# Patient Record
Sex: MALE | Employment: OTHER | URBAN - METROPOLITAN AREA
[De-identification: names, ages, dates, MRNs, and addresses within clinical notes are randomized per-mention and may not be internally consistent; named-entity substitution may affect disease eponyms.]

---

## 2017-01-02 ENCOUNTER — ANESTHESIA EVENT (OUTPATIENT)
Dept: ENDOSCOPY | Age: 82
End: 2017-01-02
Payer: MEDICARE

## 2017-01-03 ENCOUNTER — SURGERY (OUTPATIENT)
Age: 82
End: 2017-01-03

## 2017-01-03 ENCOUNTER — ANESTHESIA (OUTPATIENT)
Dept: ENDOSCOPY | Age: 82
End: 2017-01-03
Payer: MEDICARE

## 2017-01-03 PROBLEM — Z12.11 ENCOUNTER FOR COLONOSCOPY DUE TO HISTORY OF ADENOMATOUS COLONIC POLYPS: Status: ACTIVE | Noted: 2017-01-03

## 2017-01-03 PROBLEM — Z86.010 ENCOUNTER FOR COLONOSCOPY DUE TO HISTORY OF ADENOMATOUS COLONIC POLYPS: Status: ACTIVE | Noted: 2017-01-03

## 2017-01-03 PROCEDURE — 74011000250 HC RX REV CODE- 250

## 2017-01-03 PROCEDURE — 74011250636 HC RX REV CODE- 250/636

## 2017-01-03 RX ORDER — PROPOFOL 10 MG/ML
INJECTION, EMULSION INTRAVENOUS AS NEEDED
Status: DISCONTINUED | OUTPATIENT
Start: 2017-01-03 | End: 2017-01-03 | Stop reason: HOSPADM

## 2017-01-03 RX ORDER — FENTANYL CITRATE 50 UG/ML
INJECTION, SOLUTION INTRAMUSCULAR; INTRAVENOUS AS NEEDED
Status: DISCONTINUED | OUTPATIENT
Start: 2017-01-03 | End: 2017-01-03 | Stop reason: HOSPADM

## 2017-01-03 RX ORDER — LIDOCAINE HYDROCHLORIDE 20 MG/ML
INJECTION, SOLUTION EPIDURAL; INFILTRATION; INTRACAUDAL; PERINEURAL AS NEEDED
Status: DISCONTINUED | OUTPATIENT
Start: 2017-01-03 | End: 2017-01-03 | Stop reason: HOSPADM

## 2017-01-03 RX ADMIN — PROPOFOL 30 MG: 10 INJECTION, EMULSION INTRAVENOUS at 11:43

## 2017-01-03 RX ADMIN — FENTANYL CITRATE 50 MCG: 50 INJECTION, SOLUTION INTRAMUSCULAR; INTRAVENOUS at 11:29

## 2017-01-03 RX ADMIN — LIDOCAINE HYDROCHLORIDE 40 MG: 20 INJECTION, SOLUTION EPIDURAL; INFILTRATION; INTRACAUDAL; PERINEURAL at 11:29

## 2017-01-03 RX ADMIN — PROPOFOL 20 MG: 10 INJECTION, EMULSION INTRAVENOUS at 11:47

## 2017-01-03 RX ADMIN — FENTANYL CITRATE 50 MCG: 50 INJECTION, SOLUTION INTRAMUSCULAR; INTRAVENOUS at 11:35

## 2017-01-03 RX ADMIN — PROPOFOL 30 MG: 10 INJECTION, EMULSION INTRAVENOUS at 11:35

## 2017-01-03 RX ADMIN — PROPOFOL 70 MG: 10 INJECTION, EMULSION INTRAVENOUS at 11:29

## 2017-01-03 NOTE — ANESTHESIA PREPROCEDURE EVALUATION
Anesthetic History   No history of anesthetic complications            Review of Systems / Medical History  Patient summary reviewed and pertinent labs reviewed    Pulmonary                Comments: FLU SHOT received? YES       If NO, provide reason: Pt did not want a Flu shot    Current Smoker? NO       Elective Surgery? Yes       Abstained from smoking 24 hours prior to anesthesia? N/A    Risk Factors for Postoperative nausea/vomiting:       History of postoperative nausea/vomiting? NO       Female? NO       Motion sickness? NO       Intended opioid administration for postoperative analgesia?   NO   Neuro/Psych   Within defined limits           Cardiovascular    Hypertension        Dysrhythmias : atrial fibrillation           GI/Hepatic/Renal     GERD           Endo/Other      Hypothyroidism  Arthritis     Other Findings              Physical Exam    Airway  Mallampati: III  TM Distance: 4 - 6 cm  Neck ROM: normal range of motion   Mouth opening: Normal     Cardiovascular      Rate: normal         Dental    Dentition: Poor dentition     Pulmonary      Decreased breath sounds: bilateral           Abdominal  GI exam deferred       Other Findings            Anesthetic Plan    ASA: 3  Anesthesia type: MAC            Anesthetic plan and risks discussed with: Patient

## 2017-01-03 NOTE — ANESTHESIA POSTPROCEDURE EVALUATION
Post-Anesthesia Evaluation & Assessment    Visit Vitals    /70    Pulse 88    Temp 35.7 °C (96.3 °F)    Resp 12    Ht 5' 8\" (1.727 m)    Wt 86.2 kg (190 lb)    SpO2 97%    BMI 28.89 kg/m2       Nausea/Vomiting: no nausea    Post-operative hydration adequate.     Pain score (VAS): 0    Mental status & Level of consciousness: alert and oriented x 3    Neurological status: moves all extremities, sensation grossly intact    Pulmonary status: airway patent, no supplemental oxygen required    Complications related to anesthesia: none    Additional comments:

## 2017-01-19 ENCOUNTER — HOSPITAL ENCOUNTER (OUTPATIENT)
Dept: GENERAL RADIOLOGY | Age: 82
Discharge: HOME | End: 2017-01-19
Payer: MEDICARE

## 2017-01-19 ENCOUNTER — HOSPITAL ENCOUNTER (OUTPATIENT)
Dept: WOUND CARE | Age: 82
Discharge: HOME | End: 2017-01-19
Payer: MEDICARE

## 2017-01-19 VITALS
DIASTOLIC BLOOD PRESSURE: 81 MMHG | SYSTOLIC BLOOD PRESSURE: 139 MMHG | OXYGEN SATURATION: 98 % | RESPIRATION RATE: 18 BRPM | TEMPERATURE: 97 F | HEART RATE: 86 BPM

## 2017-01-19 DIAGNOSIS — R07.9 CHEST PAIN: ICD-10-CM

## 2017-01-19 PROBLEM — L97.222 NON-PRESSURE CHRONIC ULCER OF LEFT CALF WITH FAT LAYER EXPOSED (HCC): Status: ACTIVE | Noted: 2017-01-19

## 2017-01-19 PROBLEM — S81.811A LACERATION OF RIGHT LOWER LEG WITH COMPLICATION: Status: ACTIVE | Noted: 2017-01-19

## 2017-01-19 PROBLEM — R60.9 EDEMA: Status: ACTIVE | Noted: 2017-01-19

## 2017-01-19 PROBLEM — L97.212 NON-PRESSURE CHRONIC ULCER OF RIGHT CALF WITH FAT LAYER EXPOSED (HCC): Status: ACTIVE | Noted: 2017-01-19

## 2017-01-19 PROCEDURE — 11042 DBRDMT SUBQ TIS 1ST 20SQCM/<: CPT

## 2017-01-19 PROCEDURE — 74011000250 HC RX REV CODE- 250

## 2017-01-19 PROCEDURE — 74011250637 HC RX REV CODE- 250/637

## 2017-01-19 RX ORDER — LIDOCAINE AND PRILOCAINE 25; 25 MG/G; MG/G
CREAM TOPICAL
Status: COMPLETED
Start: 2017-01-19 | End: 2017-01-19

## 2017-01-19 RX ADMIN — COLLAGENASE SANTYL: 250 OINTMENT TOPICAL at 15:11

## 2017-01-19 RX ADMIN — LIDOCAINE AND PRILOCAINE: 25; 25 CREAM TOPICAL at 14:41

## 2017-01-19 NOTE — PROGRESS NOTES
Podiatry Consultation Note    Assessment:       Patient Active Problem List   Diagnosis Code    Malignant neoplasm of prostate ([facility] [address] C61    Hypertension I10    GERD (gastroesophageal reflux disease) K21.9    Glaucoma H40.9    Glaucoma H40.9    Dysphagia R13.10    Encounter for colonoscopy due to history of adenomatous colonic polyps Z12.11, Z86.010    Non-pressure chronic ulcer of right calf with fat layer exposed ([facility] [address] L97.212    Laceration of right lower leg with complication L89.273P    Edema R60.9       Plan:     Selective excisional debridement and LWC with Rx Santyl ointment Q 24 hrs with mepilex and Tubigrip. Elevate feet and legs when sitting and lying. Subjective:     I was asked by Dr. Shy Tyler to evaluate Cecelia Rahman for laceration RLE. Patient states a car door hit his right leg causing a wound. He was seen at [facility] and was placed on Clindamycin and Doxycycline for 10 days, started 12/15/16. The wound was primarily closed with sutures removed 12/15/16. He  is a 80 y.o. male admitted on 1/19/2017 for WOUND. Allergies   Allergen Reactions    Darvon [Propoxyphene] Hives, Myalgia and Other (comments)     Joint pain    Penicillins Rash and Itching       Current Outpatient Prescriptions   Medication Sig    brimonidine (ALPHAGAN P) 0.1 % ophthalmic solution Administer 1 Drop to both eyes two (2) times a day.  gabapentin (NEURONTIN) 600 mg tablet Take 600 mg by mouth three (3) times daily.  levothyroxine (SYNTHROID) 175 mcg tablet Take 175 mcg by mouth Daily (before breakfast).  furosemide (LASIX) 20 mg tablet Take  by mouth daily.  apixaban (ELIQUIS) 2.5 mg tablet Take 2.5 mg by mouth two (2) times a day.  traMADol (ULTRAM) 50 mg tablet Take 1 tablet by mouth every six (6) hours as needed for Pain.  bimatoprost (LUMIGAN) 0.03 % ophthalmic drops Administer 1 drop to both eyes every evening.     multivitamins-minerals-lutein (CENTRUM SILVER) Tab Take  by mouth.      traZODone (DESYREL) 50 mg tablet Take 50 mg by mouth nightly as needed.  PYRIDOXINE HCL (VITAMIN B-6 PO) Take  by mouth.  esomeprazole (NEXIUM) 40 mg capsule Take 40 mg by mouth two (2) times a day.  lisinopril (PRINIVIL) 20 mg tablet Take 20 mg by mouth two (2) times a day. No current facility-administered medications for this encounter. Past Medical History   Diagnosis Date    Arthritis     Atrial fibrillation ([facility] [address]     GERD (gastroesophageal reflux disease)     Glaucoma     Hypertension     Hypothyroidism     Malignant neoplasm of prostate ([facility] [address]      Amy 3+4, s/p combined radiation w/ naht completed 9/09, psa 5.5     Pancreatitis 2009     Past Surgical History   Procedure Laterality Date    Hx cholecystectomy      Hx cataract removal      Hx orthopaedic       rt shoulder cuff repair    Colonoscopy N/A 1/3/2017     COLONOSCOPY w/ polepectomy  performed by Penny Yeh MD at [facility] ENDOSCOPY     Family History   Problem Relation Age of Onset    Dementia Mother     Heart Disease Father      Social History     Social History    Marital status:      Spouse name: N/A    Number of children: N/A    Years of education: N/A     Occupational History    Not on file.      Social History Main Topics    Smoking status: Former Smoker     Quit date: 1/1/1972    Smokeless tobacco: Never Used    Alcohol use 1.2 oz/week     2 Cans of beer per week      Comment: weekly     Drug use: No    Sexual activity: Not on file     Other Topics Concern    Not on file     Social History Narrative       Physical Exam:  GENERAL: alert, cooperative, no distress, appears stated age    REVIEW OF SYSTEMS:  General: denies chronic fatigue, weight loss, fever, anemia, bruising, depression, nervousness, panic attacks  HEENT: denies ringing in ears, ear infections, dizzy spells, poor vision, glaucoma, sinus trouble, hoarseness, eye infections  GI: denies diarrhea, gas, bloating, heartburn, regurgitation, difficulty swallowing, painful swallowing, nausea, vomiting, constipation, abdominal pain, decreased appetite, blood in stools, black stools, jaundice, dark urine  Lungs: denies pneumonia, asthma, cough, SOB, hemoptysis  Heart: denies chest pain, irregular heart beat, ankle swelling, HTN  Skin: denies rashes, hives, allergic reaction  Urinary: denies UTI, kidney stones, decreased urine force and flow, urination at night, blood in urine, painful urination  Bones and Joints: denies arthritis, rheumatism, back pain, gout, osteoporosis  Neurologic: denies stroke, seizures, headaches, numbness, tingling      Vitals:    01/19/17 1418   BP: 139/81   Pulse: 86   Resp: 18   Temp: 97 °F (36.1 °C)   SpO2: 98%       OBJECTIVE:    Patient is well developed, well nourished, cooperative, pleasant and in no apparent distress. Lower Extremity Exam:     VASCULAR EXAM:. Pedal pulses are palpable 2/4 DP 1/4 PT right and left foot. Skin temperature is warm to warm right and left foot. Digital capillary fill time is 4 seconds right and left foot. There is pitting edema of the right and left foot and ankle. NEUROLOGICAL EXAM:. Sensation intact with 5.07g monofilament wire right and left foot. Deep tendon reflexes intact and symmetrical on the right and left foot. .     MUSCULOSKELETAL EXAM:. Muscle tone is normal.  Muscle strength of the flexor and extensor group inversion and eversion Bilateral. 5/5. DERMATOLOGICAL EXAM:. Skin is of abnormal texture and turgor with atrophic skin changes noting hair growth, Bilateral. There is a ulcer full thickness with slough noted in the wound bed. Wound measurements noted below       No results found for this or any previous visit (from the past 24 hour(s)). Imaging: deferred        DEBRIDEMENT NOTE    Selective sharp instrument debridement of slough and devitilized tissue    After the benefits/risks/SE were discussed, the patient agreed to proceed.      Time out was done:   * Patient was identified by name and date of birth   * Agreement on procedure being performed was verified   * Procedure site verified and marked as necessary   * Patient was positioned for comfort   * Consent was signed and verified. Site: Lateral RLE/calf    Instruments used:    [x]  Dermal curette  [] Blade        [] #15  [] #10  [] Forceps  [] Tissue nippers  [] sterile scissors  [] Other     Anesthesia:    [x]  EMLA 2.5% cream: applied to wound beds for approximately 15minutes. []   Lidocaine 2% Topical Gel      []  Lidocaine injectable 1% with epinephrine 1:100,000    []  Lidocaine injectable 1% without epinephrine    []  Other:     []  None         [] patient is insensate due to neuropathy         [] patient declines        [] allergy to anesthetic        [] tissue for debridement is either superficial, loosely adherent and/or necrotic and denervated     After satisfactory anesthesia achieved, the wound/s was/were sharply debrided necrotic, devitalized and granulation tissue down to the sub Q layer, revealing a clean and viable wound bed. Post debridement measurement was 3.6_x3.0_x_0.2_cm . Bleeding: <5mL Resolved with light focal pressure. Wounds were cleaned and irrigated with saline.      Wound care applied:   []   Hydrogell   []  Hypergel   []   Hydrofiber/Aquacel      []   Cadexomer Iodine (Iodosorb)   []  Silver Alginate    []   Medihoney:    [x]   Collagenase:Santyl   []  Calcium Alginate   []   Collagen:    []   Foam   []  Non-Adherent Contact Layer   []   Xeroform    []   Adaptec:   []   Hydrocolloid   []   Transparent Film    []   Promogran   []   Lilia   []   Promogran       []  Antibiotic ointment/cream  []   Wound VAC   []   Other (see below)     Other:     []   Dry Gauze and Roll Gauze    []   Foam and Roll Gauze    []   Dry Gauze    []    Bordered gauze:     []   Secure with Tape    [x]   Other  mepilex    [x]   Compression Wrap:          []   [facility] []Multi-Layer    [x]Tubular Bandages       Charlene-Ulcer Care    []   Cream     []   Lotion     []   Ointment     []   Barrier     []   Other:       The patient tolerated the procedure well with no complications. The patient left the exam room in satisfactory and stable condition.      CAROLEE Schuler DPM [facility]  [phone] [place]  1/19/2017, 3:23 PM

## 2017-01-19 NOTE — WOUND CARE
01/19/17 1429   Wound Leg Right; Lower; Posterior; Lateral   Date First Assessed/Time First Assessed: 01/19/17 1421   POA: Yes  Wound Type: Abrasion  Location: Leg  Orientation: Right; Lower; Posterior; Lateral   DRESSING STATUS Removed   DRESSING TYPE Adhesive wound dressing (Band-Aid)   Non-Pressure Ulcer Partial thickness (epider/derm)   Wound Length (cm) 3.4 cm   Wound Width (cm) 3 cm   Wound Depth (cm) 0.1   Wound Surface area (cm^3) 1.02 cm^2   Condition of Base Pink;Slough   Condition of Edges Open   Tissue Type Pink;Yellow   Tissue Type Percent Pink 5   Tissue Type Percent Yellow 95   Drainage Amount  Small    Drainage Color Serous   Wound Odor None   Periwound Skin Condition Edematous   Cleansing and Cleansing Agents  Dermal wound cleanser   Dressing Type Applied Other (Comment)  (Santyl, saline soaked gauze, Band Aid)   Procedure Tolerated Well     Compression stocking and Santyl ordered for patient. Will return to clinic in two weeks for follow-up. Patient's wife will perform dressing changes and wounds care daily at home.

## 2017-02-02 ENCOUNTER — HOSPITAL ENCOUNTER (OUTPATIENT)
Dept: WOUND CARE | Age: 82
Discharge: HOME | End: 2017-02-02
Payer: MEDICARE

## 2017-02-02 VITALS
DIASTOLIC BLOOD PRESSURE: 79 MMHG | SYSTOLIC BLOOD PRESSURE: 134 MMHG | OXYGEN SATURATION: 97 % | HEART RATE: 78 BPM | TEMPERATURE: 97.4 F | RESPIRATION RATE: 18 BRPM

## 2017-02-02 PROCEDURE — 74011000250 HC RX REV CODE- 250

## 2017-02-02 PROCEDURE — 11042 DBRDMT SUBQ TIS 1ST 20SQCM/<: CPT

## 2017-02-02 PROCEDURE — 77030011256 HC DRSG MEPILEX <16IN NO BORD MOLN -A: Performed by: PODIATRIST

## 2017-02-02 RX ORDER — LIDOCAINE AND PRILOCAINE 25; 25 MG/G; MG/G
CREAM TOPICAL
Status: COMPLETED
Start: 2017-02-02 | End: 2017-02-02

## 2017-02-02 RX ADMIN — LIDOCAINE AND PRILOCAINE: 25; 25 CREAM TOPICAL at 13:57

## 2017-02-02 NOTE — WOUND CARE
Above picture is of the wound prior to debridement. Above picture is of the wound after debridement. Mepliex applied today per Dr. Grover Multani order. Patient to resume Santyl at home. Patient states he has two tubes of Santyl at home. 02/02/17 1326   Wound Leg Right; Lower; Posterior; Lateral   Date First Assessed/Time First Assessed: 01/19/17 1421   POA: Yes  Wound Type: Abrasion  Location: Leg  Orientation: Right; Lower; Posterior; Lateral   DRESSING STATUS Removed   DRESSING TYPE Other (Comment)  (Saline moistened gauze/Mepilex border)   Non-Pressure Ulcer Partial thickness (epider/derm)   Wound Length (cm) 3.5 cm   Wound Width (cm) 2.6 cm   Wound Depth (cm) 0.1   Wound Surface area (cm^3) 0.91 cm^2   Condition of Base Pink;Slough   Condition of Edges Open   Tissue Type Pink;Yellow   Tissue Type Percent Pink 10   Tissue Type Percent Yellow 90   Drainage Amount  Small    Drainage Color Serous   Wound Odor None   Cleansing and Cleansing Agents  Normal saline   Dressing Type Applied Other (Comment)  (Mepliex border/compression stocking patient came in with)   Procedure Tolerated Well

## 2017-02-02 NOTE — PROGRESS NOTES
Progress and Debridement Note    Patient: Bryce Chang MRN: [id number]  SSN: [id number]    YOB: 1933  Age: 80 y.o. Sex: male      Assessment:     Active Problems:    Non-pressure chronic ulcer of right calf with fat layer exposed ([address] (1/19/2017)      Laceration of right lower leg with complication (4/93/6383)      Edema (1/19/2017)        Plan:      Selective excisional debridement with cross hatching and LWC with Rx Santyl ointment Q 24 hrs with mepilex and Tubigrip. Elevate feet and legs when sitting and lying      Subjective:     81 y/o male present to the wound clinic for ulcer RLE secondary to laceration from a care door injury. Patient states his wife has been applying the Santyl as instructed and using the CircAid compression    Objective:     Patient Vitals for the past 24 hrs:   Temp Pulse Resp BP SpO2   02/02/17 1319 97.4 °F (36.3 °C) 78 18 134/79 97 %       Physical Exam:     General Exam  alert, cooperative, no distress, appears stated age    REVIEW OF SYSTEMS:  No changes    VASCULAR EXAM:. Pedal pulses are palpable 2/4 DP 1/4 PT right and left foot. Skin temperature is warm to warm right and left foot. Digital capillary fill time is 4 seconds right and left foot. There is pitting edema of the right and left foot and ankle.      NEUROLOGICAL EXAM:. Sensation intact with 5.07g monofilament wire right and left foot. Deep tendon reflexes intact and symmetrical on the right and left foot.  .      MUSCULOSKELETAL EXAM:. Muscle tone is normal. Muscle strength of the flexor and extensor group inversion and eversion Bilateral. 5/5.      DERMATOLOGICAL EXAM:. Skin is of abnormal texture and turgor with atrophic skin changes noting hair growth, Bilateral. There is a ulcer full thickness with decrease slough with increase granulation tissue noted in the wound bed lateral RLE Wound measurements noted below   Wound Shoulder Right (Active)   Number of days:295       Wound Leg Right; Lower; Posterior; Lateral (Active)   DRESSING STATUS Removed 2/2/2017  1:26 PM   DRESSING TYPE Other (Comment) 2/2/2017  1:26 PM   Non-Pressure Ulcer Partial thickness (epider/derm) 2/2/2017  1:26 PM   Wound Length (cm) 3.5 cm 2/2/2017  1:26 PM   Wound Width (cm) 2.6 cm 2/2/2017  1:26 PM   Wound Depth (cm) 0.1 2/2/2017  1:26 PM   Wound Surface area (cm^3) 0.91 cm^2 2/2/2017  1:26 PM   Condition of Base Pink;Slough 2/2/2017  1:26 PM   Condition of Edges Open 2/2/2017  1:26 PM   Tissue Type Pink;Yellow 2/2/2017  1:26 PM   Tissue Type Percent Pink 10 2/2/2017  1:26 PM   Tissue Type Percent Yellow 90 2/2/2017  1:26 PM   Drainage Amount  Small  2/2/2017  1:26 PM   Drainage Color Serous 2/2/2017  1:26 PM   Wound Odor None 2/2/2017  1:26 PM   Periwound Skin Condition Edematous 1/19/2017  2:29 PM   Cleansing and Cleansing Agents  Normal saline 2/2/2017  1:26 PM   Dressing Type Applied Other (Comment) 1/19/2017  2:29 PM   Procedure Tolerated Well 1/19/2017  2:29 PM   Number of days:14              Lab/Data Review:  No results found for this or any previous visit (from the past 24 hour(s)). none      PROCEDURE    Selective sharp instrument excisional debridement of slough and devitilized tissue    After the benefits/risks/SE were discussed, the patient agreed to proceed. Time out was done:   * Patient was identified by name and date of birth   * Agreement on procedure being performed was verified   * Procedure site verified and marked as necessary   * Patient was positioned for comfort   * Consent was signed and verified. Site: Lateral RLE    Instruments used:    [x]  Dermal curette  [] Blade        [] #15  [] #10  [] Forceps  [] Tissue nippers  [] sterile scissors  [] Other     Anesthesia:    [x]  EMLA 2.5% cream: applied to wound beds for approximately 15minutes.      []   Lidocaine 2% Topical Gel      []  Lidocaine injectable 1% with epinephrine 1:100,000    []  Lidocaine injectable 1% without epinephrine []  Other:     []  None         [] patient is insensate due to neuropathy         [] patient declines        [] allergy to anesthetic        [] tissue for debridement is either superficial, loosely adherent and/or necrotic and denervated     After satisfactory anesthesia achieved, the wound/s was/were sharply debrided necrotic, devitalized and granulation tissue down to the sub Q layer, revealing a clean and viable wound bed. Post debridement measurement was 3.7_x2.6_x_0.2__cm . Bleeding: <5mL Resolved with light focal pressure. Wounds were cleaned and irrigated with saline. Wound care applied:   []   Hydrogell   []  Hypergel   []   Hydrofiber/Aquacel      []   Cadexomer Iodine (Iodosorb)   []  Silver Alginate    []   Medihoney:    [x]   Collagenase:Santyl   []  Calcium Alginate   []   Collagen:    []   Foam   []  Non-Adherent Contact Layer   []   Xeroform    []   Adaptec:   []   Hydrocolloid   []   Transparent Film    []  Antibiotic ointment/cream     []   Other (see below)     Other:     []   Dry Gauze and Roll Gauze    []   Foam and Roll Gauze    []   Dry Gauze    []   Bordered gauze:     []   Secure with Tape    [x]   Bordered foam       []   Other      [x]   Compression Wrap:          []   Unna Boot    []Multi-Layer    [x]Tubular Bandages       Charlene-Ulcer Care    []   Cream     []   Lotion     []   Ointment     []   Barrier     []   Other:       The patient tolerated the procedure well with no complications. The patient left the exam room in satisfactory and stable condition.      Signed By: Kana Aragon DPM     February 2, 2017 2:07 PM

## 2017-02-16 ENCOUNTER — HOSPITAL ENCOUNTER (OUTPATIENT)
Dept: WOUND CARE | Age: 82
Discharge: HOME | End: 2017-02-16
Payer: MEDICARE

## 2017-02-16 VITALS
RESPIRATION RATE: 18 BRPM | SYSTOLIC BLOOD PRESSURE: 145 MMHG | TEMPERATURE: 97.6 F | HEART RATE: 82 BPM | OXYGEN SATURATION: 97 % | DIASTOLIC BLOOD PRESSURE: 72 MMHG

## 2017-02-16 PROCEDURE — 77030020057 HC DRSG MTRX CLLGN STGN -B: Performed by: PODIATRIST

## 2017-02-16 PROCEDURE — 97597 DBRDMT OPN WND 1ST 20 CM/<: CPT

## 2017-02-16 PROCEDURE — 74011000250 HC RX REV CODE- 250

## 2017-02-16 PROCEDURE — 77030011256 HC DRSG MEPILEX <16IN NO BORD MOLN -A: Performed by: PODIATRIST

## 2017-02-16 RX ORDER — LIDOCAINE AND PRILOCAINE 25; 25 MG/G; MG/G
CREAM TOPICAL
Status: COMPLETED
Start: 2017-02-16 | End: 2017-02-16

## 2017-02-16 RX ADMIN — LIDOCAINE AND PRILOCAINE: 25; 25 CREAM TOPICAL at 15:05

## 2017-02-16 NOTE — PROGRESS NOTES
Progress and Debridement Note    Patient: Dianne Ogden MRN: [id number]  SSN: [id number]    YOB: 1933  Age: 80 y.o. Sex: male      Assessment:     Active Problems:    Non-pressure chronic ulcer of right calf with fat layer exposed ([address] (1/19/2017)      Laceration of right lower leg with complication (8/78/2253)      Edema (1/19/2017)        Plan:      Selective excisional debridement with [facility] with Lilia RLE. Patient may benefit from [facility] application at follow up visit. Subjective:   81 y/o male presents for fu care for laceration/ulcer RLE. Patient has been using the Santyl as instructed. Objective:     Patient Vitals for the past 24 hrs:   Temp Pulse Resp BP SpO2   02/16/17 1349 97.6 °F (36.4 °C) 82 18 145/72 97 %       Physical Exam:     General Exam  alert, cooperative, no distress, appears stated age    REVIEW OF SYSTEMS:  No changes    VASCULAR EXAM:. Pedal pulses are palpable 2/4 DP 1/4 PT right and left foot. Skin temperature is warm to warm right and left foot. Digital capillary fill time is 4 seconds right and left foot. There is pitting edema of the right and left foot and ankle.       NEUROLOGICAL EXAM:. Sensation intact with 5.07g monofilament wire right and left foot. Deep tendon reflexes intact and symmetrical on the right and left foot. Evans Harsha  MUSCULOSKELETAL EXAM:. Muscle tone is normal. Muscle strength of the flexor and extensor group inversion and eversion Bilateral. 5/5.       DERMATOLOGICAL EXAM:. Skin is of abnormal texture and turgor with atrophic skin changes noting hair growth, Bilateral. There is a ulcer full thickness with  increased granulation tissue noted in the wound bed lateral RLE Wound measurements noted below     Wound Shoulder Right (Active)   Number of days:309       Wound Leg Right; Lower; Posterior; Lateral (Active)   DRESSING STATUS Removed 2/16/2017  1:42 PM   DRESSING TYPE Adhesive wound dressing (Band-Aid) 2/16/2017  1:42 PM Non-Pressure Ulcer Partial thickness (epider/derm) 2/16/2017  1:42 PM   Wound Length (cm) 3 cm 2/16/2017  1:42 PM   Wound Width (cm) 1.9 cm 2/16/2017  1:42 PM   Wound Depth (cm) 0.1 2/16/2017  1:42 PM   Wound Surface area (cm^3) 0.57 cm^2 2/16/2017  1:42 PM   Condition of Base [place];Slough 2/16/2017  1:42 PM   Condition of Edges Open 2/16/2017  1:42 PM   Tissue Type Pink;Yellow 2/16/2017  1:42 PM   Tissue Type Percent Pink 60 2/16/2017  1:42 PM   Tissue Type Percent Yellow 40 2/16/2017  1:42 PM   Drainage Amount  Moderate 2/16/2017  1:42 PM   Drainage Color Serous 2/16/2017  1:42 PM   Wound Odor None 2/16/2017  1:42 PM   Periwound Skin Condition Edematous 1/19/2017  2:29 PM   Cleansing and Cleansing Agents  Normal saline 2/16/2017  1:42 PM   Dressing Type Applied Other (Comment) 2/2/2017  1:26 PM   Procedure Tolerated Well 2/2/2017  1:26 PM   Number of days:28              Lab/Data Review:  No results found for this or any previous visit (from the past 24 hour(s)). none      PROCEDURE    Selective sharp instrument excisional debridement of slough and devitilized tissue    After the benefits/risks/SE were discussed, the patient agreed to proceed. Time out was done:   * Patient was identified by name and date of birth   * Agreement on procedure being performed was verified   * Procedure site verified and marked as necessary   * Patient was positioned for comfort   * Consent was signed and verified. Site:RLE/calf    Instruments used:    [x]  Dermal curette  [] Blade        [] #15  [] #10  [] Forceps  [] Tissue nippers  [] sterile scissors  [] Other     Anesthesia:    []  EMLA 2.5% cream: applied to wound beds for approximately 15minutes.      []   Lidocaine 2% Topical Gel      []  Lidocaine injectable 1% with epinephrine 1:100,000    []  Lidocaine injectable 1% without epinephrine    []  Other:     []  None         [] patient is insensate due to neuropathy         [] patient declines        [] allergy to anesthetic        [] tissue for debridement is either superficial, loosely adherent and/or necrotic and denervated     After satisfactory anesthesia achieved, the wound/s was/were sharply debrided necrotic, devitalized and granulation tissue down to the sub Q layer, revealing a clean and viable wound bed. Post debridement measurement was 3.2_x_2.0__x_0.1_cm . Bleeding: <5mL Resolved with light focal pressure. Wounds were cleaned and irrigated with saline. Wound care applied:   []   Hydrogell   []  Hypergel   []   Hydrofiber/Aquacel      []   Cadexomer Iodine (Iodosorb)   []  Silver Alginate    []   Medihoney:    []   Collagenase:Santyl   []  Calcium Alginate   [x]   Collagen:    []   Foam   []  Non-Adherent Contact Layer   []   Xeroform    []   Adaptec:   []   Hydrocolloid   []   Transparent Film    []  Antibiotic ointment/cream     []   Other (see below)     Other:     []   Dry Gauze and Roll Gauze    []   Foam and Roll Gauze    []   Dry Gauze    []   Bordered gauze:     []   Secure with Tape    [x]   Bordered foam       []   Other      [x]   Compression Wrap:          []   Unna Boot    []Multi-Layer    [x]Tubular Bandages       Charlene-Ulcer Care    []   Cream     []   Lotion     []   Ointment     []   Barrier     []   Other:       The patient tolerated the procedure well with no complications. The patient left the exam room in satisfactory and stable condition.      Signed By: Mitesh Roman DPM     February 16, 2017 4:16 PM

## 2017-02-16 NOTE — WOUND CARE
02/16/17 1342   Wound Leg Right; Lower; Posterior; Lateral   Date First Assessed/Time First Assessed: 01/19/17 1421   POA: Yes  Wound Type: Abrasion  Location: Leg  Orientation: Right; Lower; Posterior; Lateral   DRESSING STATUS Removed   DRESSING TYPE Adhesive wound dressing (Band-Aid)   Non-Pressure Ulcer Partial thickness (epider/derm)   Wound Length (cm) 3 cm   Wound Width (cm) 1.9 cm   Wound Depth (cm) 0.1   Wound Surface area (cm^3) 0.57 cm^2   Condition of Base Pink;Slough   Condition of Edges Open   Tissue Type Pink;Yellow   Tissue Type Percent Pink 60   Tissue Type Percent Yellow 40   Drainage Amount  Moderate   Drainage Color Serous   Wound Odor None   Cleansing and Cleansing Agents  Normal saline   Dressing Type Applied Other (Comment)  (Lilia, Mepilex)   Procedure Tolerated Well

## 2017-02-20 ENCOUNTER — TELEPHONE (OUTPATIENT)
Dept: WOUND CARE | Age: 82
End: 2017-02-20

## 2017-02-20 NOTE — TELEPHONE ENCOUNTER
Patient called and left message on Saturday stating that his wound is \"infected\". Returned call this morning and discussed symptoms with patient. Patient states that the wound \"actually looks better than on Saturday when I called\". States that he has a \"red area\" around the wound where he was placing his band-aids. Inquired if patient is using the gentle bandages that were ordered for him and sent to his house. Patient states that he put another band-aid on but that he will go and change it \"right now\". Patient denies swelling in the area of the wound, warmth in the lorrie-wound, fould smelling or cloudy drainage, and fever. Educated patient that if any of these symptoms should occur that he should seek medical attention promptly. Patient verbalizes understanding.

## 2017-03-02 ENCOUNTER — HOSPITAL ENCOUNTER (OUTPATIENT)
Dept: WOUND CARE | Age: 82
Discharge: HOME | End: 2017-03-02
Payer: MEDICARE

## 2017-03-02 VITALS
OXYGEN SATURATION: 96 % | SYSTOLIC BLOOD PRESSURE: 124 MMHG | DIASTOLIC BLOOD PRESSURE: 69 MMHG | TEMPERATURE: 96.9 F | RESPIRATION RATE: 16 BRPM | HEART RATE: 81 BPM

## 2017-03-02 PROCEDURE — 74011000250 HC RX REV CODE- 250

## 2017-03-02 PROCEDURE — 77030011256 HC DRSG MEPILEX <16IN NO BORD MOLN -A: Performed by: PODIATRIST

## 2017-03-02 PROCEDURE — 15271 SKIN SUB GRAFT TRNK/ARM/LEG: CPT

## 2017-03-02 RX ORDER — LIDOCAINE AND PRILOCAINE 25; 25 MG/G; MG/G
CREAM TOPICAL
Status: COMPLETED
Start: 2017-03-02 | End: 2017-03-02

## 2017-03-02 RX ADMIN — LIDOCAINE AND PRILOCAINE: 25; 25 CREAM TOPICAL at 13:14

## 2017-03-09 ENCOUNTER — HOSPITAL ENCOUNTER (OUTPATIENT)
Dept: WOUND CARE | Age: 82
Discharge: HOME | End: 2017-03-09
Payer: MEDICARE

## 2017-03-09 ENCOUNTER — APPOINTMENT (OUTPATIENT)
Dept: WOUND CARE | Age: 82
End: 2017-03-09
Payer: MEDICARE

## 2017-03-09 PROCEDURE — 77030011256 HC DRSG MEPILEX <16IN NO BORD MOLN -A

## 2017-03-09 PROCEDURE — 97602 WOUND(S) CARE NON-SELECTIVE: CPT

## 2017-03-09 PROCEDURE — 74011250637 HC RX REV CODE- 250/637

## 2017-03-09 RX ORDER — TRIAMCINOLONE ACETONIDE 5 MG/G
CREAM TOPICAL
Status: COMPLETED
Start: 2017-03-09 | End: 2017-03-09

## 2017-03-09 RX ADMIN — TRIAMCINOLONE ACETONIDE: 5 CREAM TOPICAL at 12:52

## 2017-03-09 NOTE — WOUND CARE
03/09/17 1248   Wound Leg Right; Lower; Posterior; Lateral   Date First Assessed/Time First Assessed: 01/19/17 1421   POA: Yes  Wound Type: Abrasion  Location: Leg  Orientation: Right; Lower; Posterior; Lateral   DRESSING STATUS Removed   DRESSING TYPE Other (Comment)  (2x2's/Mepilex Border/Tape - Mepitel 1 left in place)   Non-Pressure Ulcer Partial thickness (epider/derm)   Wound Length (cm) (Mepitel 1 and graft left in place)   Wound Width (cm) (Mepitel 1 and graft left in place)   Wound Depth (cm) (Mepitel 1 and graft left in place)   Drainage Amount  Large   Drainage Color Serosanguinous   Wound Odor None   Periwound Skin Condition Excoriated;Erythema, blanchable;Macerated   Cleansing and Cleansing Agents  Normal saline   Dressing Type Applied Other (Comment)  (Mepitel 1/Moistened Gauze/Foam/Rolled Gauze/Tape/Tubi-)   Procedure Tolerated Well     Patient arrived to the [address] [place] [address] 3/9/2017 for a nurse visit dressing change. Patient states he has noticed increased drainage to the wound site. Upon removal of the dressing patient's periwound appeared excoriated. The graft and contact layer left in place and foam applied to cover the wound. Rolled gauze used and secured with tape. Patient's periwound sensitive to touch. Patient tolerated dressing change.       Wound Tracking:   Wound Location Right Lateral Leg    Week # 10  *Graft in place

## 2017-03-16 ENCOUNTER — HOSPITAL ENCOUNTER (OUTPATIENT)
Dept: WOUND CARE | Age: 82
Discharge: HOME | End: 2017-03-16
Payer: MEDICARE

## 2017-03-16 ENCOUNTER — APPOINTMENT (OUTPATIENT)
Dept: WOUND CARE | Age: 82
End: 2017-03-16
Payer: MEDICARE

## 2017-03-16 PROCEDURE — 77030011256 HC DRSG MEPILEX <16IN NO BORD MOLN -A

## 2017-03-16 PROCEDURE — 97602 WOUND(S) CARE NON-SELECTIVE: CPT

## 2017-03-16 NOTE — WOUND CARE
03/16/17 1252   Wound Leg Right; Lower; Posterior; Lateral   Date First Assessed/Time First Assessed: 01/19/17 1421   POA: Yes  Wound Type: Abrasion  Location: Leg  Orientation: Right; Lower; Posterior; Lateral   DRESSING STATUS Removed   DRESSING TYPE Other (Comment)  (Rolled Gauze/4x4's/TaPE)   Non-Pressure Ulcer Partial thickness (epider/derm)   Wound Length (cm) 2.7 cm   Wound Width (cm) 1.6 cm   Wound Depth (cm) 0.1   Wound Surface area (cm^3) 0.43 cm^2   Tissue Type Yellow;Red   Tissue Type Percent Red 80   Tissue Type Percent Yellow 20   Drainage Amount  Moderate   Drainage Color Serosanguinous   Wound Odor None   Periwound Skin Condition Excoriated;Erythema, blanchable   Cleansing and Cleansing Agents  Dermal wound cleanser   Dressing Type Applied Foam  (Tubi-/Mepilex Border)   Procedure Tolerated Well     Patient seen for nurse visit dressing change. Patient to follow up in wound clinic in one week.

## 2017-03-17 ENCOUNTER — APPOINTMENT (OUTPATIENT)
Dept: WOUND CARE | Age: 82
End: 2017-03-17
Payer: MEDICARE

## 2017-03-23 ENCOUNTER — HOSPITAL ENCOUNTER (OUTPATIENT)
Dept: WOUND CARE | Age: 82
Discharge: HOME | End: 2017-03-23
Payer: MEDICARE

## 2017-03-23 PROCEDURE — 77030011256 HC DRSG MEPILEX <16IN NO BORD MOLN -A

## 2017-03-23 PROCEDURE — 77030020057 HC DRSG MTRX CLLGN STGN -B

## 2017-03-23 NOTE — WOUND CARE
03/23/17 1252   Wound Leg Right; Lower; Posterior; Lateral   Date First Assessed/Time First Assessed: 01/19/17 1421   POA: Yes  Wound Type: Abrasion  Location: Leg  Orientation: Right; Lower; Posterior; Lateral   DRESSING STATUS Removed   DRESSING TYPE Other (Comment)  (Mepilex)   Non-Pressure Ulcer Partial thickness (epider/derm)   Wound Length (cm) 2.5 cm   Wound Width (cm) 1.1 cm   Wound Depth (cm) 0.1   Wound Surface area (cm^3) 0.28 cm^2   Tissue Type Yellow;Pink   Tissue Type Percent Pink 85   Tissue Type Percent Yellow 15   Drainage Amount  Scant   Drainage Color Serosanguinous   Wound Odor None   Periwound Skin Condition Intact   Cleansing and Cleansing Agents  Dermal wound cleanser   Dressing Type Applied Other (Comment)  (Curex, Lilia, Mepilex)   Procedure Tolerated Well       Patient seen in wound clinic for nurse visit dressing change. Wound is healing, patient to follow up in wound clinic in one week for nurse visit dressing change and on 4/5/17 with Dr Deepika Villa.

## 2017-03-30 ENCOUNTER — HOSPITAL ENCOUNTER (OUTPATIENT)
Dept: WOUND CARE | Age: 82
Discharge: HOME | End: 2017-03-30
Payer: MEDICARE

## 2017-03-30 PROCEDURE — 77030020057 HC DRSG MTRX CLLGN STGN -B

## 2017-03-30 PROCEDURE — 97602 WOUND(S) CARE NON-SELECTIVE: CPT

## 2017-03-30 PROCEDURE — 77030011256 HC DRSG MEPILEX <16IN NO BORD MOLN -A

## 2017-03-30 NOTE — WOUND CARE
03/30/17 1324   Wound Leg Right; Lower; Posterior; Lateral   Date First Assessed/Time First Assessed: 01/19/17 1421   POA: Yes  Wound Type: Abrasion  Location: Leg  Orientation: Right; Lower; Posterior; Lateral   DRESSING STATUS Removed   DRESSING TYPE Silicone; Other (Comment)  (Mepilex, Promogran)   Non-Pressure Ulcer Partial thickness (epider/derm)   Wound Length (cm) 2.1 cm   Wound Width (cm) 0.8 cm   Wound Depth (cm) 0.1   Wound Surface area (cm^3) 0.17 cm^2   Condition of Edges Open   Tissue Type Yellow;Pink   Tissue Type Percent Pink 85   Tissue Type Percent Yellow 15   Drainage Amount  Small    Drainage Color Serosanguinous   Wound Odor None   Periwound Skin Condition Intact   Cleansing and Cleansing Agents  Dermal wound cleanser   Dressing Type Applied Collagens/cell matrix; Silicone  (Promogran, Mepilex)   Procedure Tolerated Well         Patient seen in wound clinic for nurse visit dressing change. Patient to follow up in wound clinic in one week with Dr Gerald Sue.

## 2017-04-05 ENCOUNTER — HOSPITAL ENCOUNTER (OUTPATIENT)
Dept: WOUND CARE | Age: 82
Discharge: HOME | End: 2017-04-05
Payer: MEDICARE

## 2017-04-05 VITALS
RESPIRATION RATE: 16 BRPM | DIASTOLIC BLOOD PRESSURE: 70 MMHG | TEMPERATURE: 97.1 F | HEART RATE: 85 BPM | OXYGEN SATURATION: 98 % | SYSTOLIC BLOOD PRESSURE: 127 MMHG

## 2017-04-05 PROCEDURE — 77030011256 HC DRSG MEPILEX <16IN NO BORD MOLN -A: Performed by: INTERNAL MEDICINE

## 2017-04-05 PROCEDURE — 74011000250 HC RX REV CODE- 250

## 2017-04-05 PROCEDURE — 11042 DBRDMT SUBQ TIS 1ST 20SQCM/<: CPT

## 2017-04-05 PROCEDURE — 77030020057 HC DRSG MTRX CLLGN STGN -B: Performed by: INTERNAL MEDICINE

## 2017-04-05 RX ORDER — LIDOCAINE AND PRILOCAINE 25; 25 MG/G; MG/G
CREAM TOPICAL
Status: COMPLETED
Start: 2017-04-05 | End: 2017-04-05

## 2017-04-05 RX ADMIN — LIDOCAINE AND PRILOCAINE: 25; 25 CREAM TOPICAL at 09:44

## 2017-04-05 NOTE — PROGRESS NOTES
[facility] WOUND CARE INITIAL/ follow up NOTE      Patient: Michele Matamoros MRN: [id number]  SSN: [id number]    YOB: 1933  Age: 80 y.o. 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 - Post Thera skin X 2  1/2 with a membrane over granulation tissue  Periwound contact dermatitis     Laceration of right lower leg with complication      Chronic Skin lesions over forehead and face        WOUND POA CONDITIONS    Wound Shoulder Right (Active)   Number of days:357       Wound Leg Right; Lower; Posterior; Lateral (Active)   DRESSING STATUS Removed 3/30/2017  1:24 PM   DRESSING TYPE Silicone; Other (Comment) 3/30/2017  1:24 PM   Non-Pressure Ulcer Partial thickness (epider/derm) 3/30/2017  1:24 PM   Wound Length (cm) 2.1 cm 3/30/2017  1:24 PM   Wound Width (cm) 0.8 cm 3/30/2017  1:24 PM   Wound Depth (cm) 0.1 3/30/2017  1:24 PM   Wound Surface area (cm^3) 0.17 cm^2 3/30/2017  1:24 PM   Condition of Base Pink;Slough 3/2/2017 12:13 PM   Condition of Edges Open 3/30/2017  1:24 PM   Tissue Type Yellow;Pink 3/30/2017  1:24 PM   Tissue Type Percent Pink 85 3/30/2017  1:24 PM   Tissue Type Percent Red 80 3/16/2017 12:52 PM   Tissue Type Percent Yellow 15 3/30/2017  1:24 PM   Drainage Amount  Small  3/30/2017  1:24 PM   Drainage Color Serosanguinous 3/30/2017  1:24 PM   Wound Odor None 3/30/2017  1:24 PM   Periwound Skin Condition Intact 3/30/2017  1:24 PM   Cleansing and Cleansing Agents  Dermal wound cleanser 3/30/2017  1:24 PM   Dressing Type Applied Collagens/cell matrix; Silicone 2/19/7481  2:59 PM   Procedure Tolerated Well 3/30/2017  1:24 PM   Number of days:76                   PLAN /RECOMMENDATIONS:    Selective excisional debridement   Lilia and foam dressing     PROCEDURE NOTE:    DEBRIDEMENT NOTE    Selective sharp instrument debridement of slough and devitilized tissue    After the benefits/risks/SE were discussed, the patient agreed to proceed. Time out was done:   * Patient was identified by name and date of birth   * Agreement on procedure being performed was verified   * Procedure site verified and marked as necessary   * Patient was positioned for comfort   * Consent was signed and verified. Site: RLL    Instruments used:    []  Dermal curette  [] Blade        [] #15  [x] #10  [x] Forceps  [] Tissue nippers  [x] sterile scissors  [] Other     Anesthesia:    [x]  EMLA 2.5% cream: applied to wound beds for approximately 15minutes. []   Lidocaine 2% Topical Gel      []  Lidocaine injectable 1% with epinephrine 1:100,000    []  Lidocaine injectable 1% without epinephrine    []  Other:     []  None         [] patient is insensate due to neuropathy         [] patient declines        [] allergy to anesthetic        [] tissue for debridement is either superficial, loosely adherent and/or necrotic and denervated     After satisfactory anesthesia achieved, the membrane removed down to granulation tissue /sq layer, revealing a clean and viable wound bed. Post debridement measurement was ___x__same___x___cm . Bleeding: <5mL Resolved with light focal pressure. Wounds were cleaned and irrigated with saline.      Wound care applied:   []   Hydrogell   []  Hypergel   []   Hydrofiber/Aquacel      []   Cadexomer Iodine (Iodosorb)   []  Silver Alginate    []   Medihoney:    []   Collagenase:Santyl   []  Calcium Alginate   []   Collagen:    []   Foam   []  Non-Adherent Contact Layer   []   Xeroform    []   Adaptec:   []   Hydrocolloid   []   Transparent Film    []   Promogran   [x]   Lilia   []   Promogran       []  Antibiotic ointment/cream  []   Wound VAC   []   Other (see below)     Other:     []   Dry Gauze and Roll Gauze    [x]   Foam and Roll Gauze    []   Dry Gauze []    Bordered gauze:     []   Secure with Tape    []   Other      []   Compression Wrap:          []   Unna Boot    []Multi-Layer    []Tubular Bandages       Charlene-Ulcer Care    []   Cream     []   Lotion     []   Ointment     [x]   Barrier     [x]   Other: triamcinolone       The patient tolerated the procedure well with no complications. The patient left the exam room in satisfactory and stable condition. Dashawn Sim MD        FIRST VISIT DATA: on   4-5-17  Reason for consult:   Nazanin Camargo is 80 y.o. male on whom Wound Care Service is being consulted for RLL laceration followed by nonhealing wound    History of Present Illness: On First Visit  I was asked by Dr. Vicky Clark to evaluate and manage 80 yr old Mr Nazanin Camargo for laceration RLE. Patient states a car door hit his right leg causing a wound. He was seen at [facility] and was placed on Clindamycin and Doxycycline for 10 days, started 12/15/16. The wound was primarily closed with sutures removed 12/15/16. Post  three applications of Theraskin.    Wound still open with a memb over 1/2 of granulation tissue at the base.     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 ([address] L97.212    Laceration of right lower leg with complication R44.702Y    Edema R60.9     Allergies   Allergen Reactions    Adhesive Other (comments)     Skin irritation    Darvon [Propoxyphene] Hives, Myalgia and Other (comments)     Joint pain    Penicillins Rash and Itching      Past Medical History:   Diagnosis Date    Arthritis     Atrial fibrillation (HCC)     GERD (gastroesophageal reflux disease)     Glaucoma     Hypertension  Hypothyroidism     Malignant neoplasm of prostate ([facility]     [place] 3+4, s/p combined radiation w/ naht completed 9/09, psa 5.5     Pancreatitis 2009     Past Surgical History:   Procedure Laterality Date    COLONOSCOPY N/A 1/3/2017    COLONOSCOPY w/ polepectomy  performed by N[facility] Gustafson [facility] at [facility] ENDOSCOPY    HX CATARACT REMOVAL      HX CHOLECYSTECTOMY      HX ORTHOPAEDIC      rt shoulder cuff repair     Social History   Substance Use Topics    Smoking status: Former Smoker     Quit date: 1/1/1972    Smokeless tobacco: Never Used    Alcohol use 1.2 oz/week     2 Cans of beer per week      Comment: weekly      Family History   Problem Relation Age of Onset    Dementia Mother     Heart Disease Father      Prior to Admission medications    Medication Sig Start Date End Date Taking? Authorizing Provider   brimonidine (ALPHAGAN P) 0.1 % ophthalmic solution Administer 1 Drop to both eyes two (2) times a day. Historical Provider   gabapentin (NEURONTIN) 600 mg tablet Take 600 mg by mouth three (3) times daily. Historical Provider   levothyroxine (SYNTHROID) 175 mcg tablet Take 175 mcg by mouth Daily (before breakfast). Historical Provider   furosemide (LASIX) 20 mg tablet Take  by mouth daily. Historical Provider   apixaban (ELIQUIS) 2.5 mg tablet Take 2.5 mg by mouth two (2) times a day. Historical Provider   traMADol (ULTRAM) 50 mg tablet Take 1 tablet by mouth every six (6) hours as needed for Pain. 12/26/14   YADIRA Hines   bimatoprost (LUMIGAN) 0.03 % ophthalmic drops Administer 1 drop to both eyes every evening. Historical Provider   multivitamins-minerals-lutein (CENTRUM SILVER) Tab Take  by mouth. Historical Provider   traZODone (DESYREL) 50 mg tablet Take 50 mg by mouth nightly as needed. Historical Provider   PYRIDOXINE HCL (VITAMIN B-6 PO) Take  by mouth. Historical Provider   esomeprazole (NEXIUM) 40 mg capsule Take 40 mg by mouth two (2) times a day. Historical Provider   lisinopril (PRINIVIL) 20 mg tablet Take 20 mg by mouth two (2) times a day. Historical Provider     There are no discontinued medications. Current Outpatient Prescriptions   Medication Sig    brimonidine (ALPHAGAN P) 0.1 % ophthalmic solution Administer 1 Drop to both eyes two (2) times a day.  gabapentin (NEURONTIN) 600 mg tablet Take 600 mg by mouth three (3) times daily.  levothyroxine (SYNTHROID) 175 mcg tablet Take 175 mcg by mouth Daily (before breakfast).  furosemide (LASIX) 20 mg tablet Take  by mouth daily.  apixaban (ELIQUIS) 2.5 mg tablet Take 2.5 mg by mouth two (2) times a day.  traMADol (ULTRAM) 50 mg tablet Take 1 tablet by mouth every six (6) hours as needed for Pain.  bimatoprost (LUMIGAN) 0.03 % ophthalmic drops Administer 1 drop to both eyes every evening.  multivitamins-minerals-lutein (CENTRUM SILVER) Tab Take  by mouth.  traZODone (DESYREL) 50 mg tablet Take 50 mg by mouth nightly as needed.  PYRIDOXINE HCL (VITAMIN B-6 PO) Take  by mouth.  esomeprazole (NEXIUM) 40 mg capsule Take 40 mg by mouth two (2) times a day.  lisinopril (PRINIVIL) 20 mg tablet Take 20 mg by mouth two (2) times a day. No current facility-administered medications for this encounter.               MICROBIOLOGY:-   No results found for: CULT, CULT1, CULT2, GMS      Antibiotic History:    Current:     S/P:    Radiology:  [unfilled]      Review of Systems:     Constitutional: negative for chills, diaphoresis, fever, malaise/fatigue, weakness and weight loss   Skin: Known chronic dermatosis   HENT: negative for ear discharge, ear pain, hearing loss and sore throat   Eyes: negative for blurred vision, double vision and photophobia   Cardiovascular: negative for chest pain, claudication, leg swelling, orthopnea, paroxysmal nocturnal dyspnea   Respiratory: negative for cough, hemoptysis, shortness of breath or sputum production   Gastointestinal: negative for abdominal pain, blood in stool, constipation, diarrhea, melena, nausea and vomiting   Genitourinary: No dysuria, increased frequency, hematuria   Musculoskeletal: negative for back pain, Rt shoulder pain   Endo: negative for cold intolerance, heat intolerance, polydipsia and polyuria. Heme: negative for easy bleeding and easy bruising   Allergies: negative for hives   Neurological: negative for headaches, dizziness, focal weakness, level of consciousness, seizures and tingling   Psychiatric:  negative for depression, hallucinations and suicidal ideals       Objective:     No data found. Constitutional: well developed, nourished, no distress and alert and oriented x 3   HENT: atraumatic, nose normal, normocephalic and oropharynx clear and moist   Eyes: conjunctiva normal, EOM normal and PERRL   Neck: ROM normal, supple, trachea normal and no adenopathy, thrush. MMM   Cardiovascular: heart sounds normal, intact distal pulses, normal rate and regular rhythm   Pulmonary/Chest Wall: breath sounds normal and effort normal   Abdominal: appearance normal, bowel sounds normal, soft and No rebound, gaurding or tenderness   Genitourinary/Anorectal: deferred   Musculoskeletal: normal ROM   Neurological: awake, alert and oriented x 3, and   cranial nerves intact  muscular strength 5/5 and symmetric  reflexes normal and symmetric  sensory normal       Skin: Dermatosis of face and forehead       Lower Extremity Exam:      VASCULAR EXAM:. Pedal pulses are palpable 2/4 DP 1/4 PT right and left foot. Skin temperature is warm to warm right and left foot. Digital capillary fill time is 4 seconds right and left foot. There is pitting edema of the right and left foot and ankle.      NEUROLOGICAL EXAM:. Sensation intact with 5.07g monofilament wire right and left foot. Deep tendon reflexes intact and symmetrical on the right and left foot.  .      MUSCULOSKELETAL EXAM:. Muscle tone is normal. Muscle strength of the flexor and extensor group inversion and eversion Bilateral. 5/5.      DERMATOLOGICAL EXAM:. Skin is of abnormal texture and turgor with atrophic skin changes noting hair growth, Bilateral. There is a ulcer full thickness with slough noted in the wound bed.  Wound measurements noted below          Recent Results         Labwork and Ancillary Studies   CBC w/Diff  Lab Results   Component Value Date/Time    WBC 6.5 04/13/2016 06:29 AM    RBC 3.44 (L) 04/13/2016 06:29 AM    HCT 35.2 (L) 04/13/2016 06:29 AM    .3 (H) 04/13/2016 06:29 AM    MCH 33.4 04/13/2016 06:29 AM    MCHC 32.7 04/13/2016 06:29 AM    RDW 14.4 04/13/2016 06:29 AM    MONOS 12 (H) 04/13/2016 06:29 AM    EOS 2 04/13/2016 06:29 AM    BASOS 0 04/13/2016 06:29 AM       Comprehensive Metabolic Profile  Lab Results   Component Value Date/Time     04/13/2016 06:29 AM    CO2 30 04/13/2016 06:29 AM    BUN 34 (H) 04/13/2016 06:29 AM         Vince Ayala MD  Pager: [phone]  [facility] Wound Care Staff

## 2017-04-05 NOTE — WOUND CARE
04/05/17 0929   Wound Leg Right; Lower; Posterior; Lateral   Date First Assessed/Time First Assessed: 01/19/17 1421   POA: Yes  Wound Type: Abrasion  Location: Leg  Orientation: Right; Lower; Posterior; Lateral   DRESSING STATUS Removed   DRESSING TYPE Other (Comment)   Wound Length (cm) 2.1 cm   Wound Width (cm) 0.8 cm   Wound Depth (cm) 0.1   Wound Surface area (cm^3) 0.17 cm^2   Condition of Edges Open   Tissue Type Yellow;Pink   Tissue Type Percent Pink 80   Tissue Type Percent Yellow 20   Drainage Amount  Small    Drainage Color Serous   Wound Odor None   Periwound Skin Condition Excoriated   Cleansing and Cleansing Agents  Dermal wound cleanser   Dressing Type Applied Collagens/cell matrix; Silicone  (Lilia, Mepilex, Triamcinolone to periwound, Tubigrip)   Procedure Tolerated Well

## 2017-04-13 ENCOUNTER — HOSPITAL ENCOUNTER (OUTPATIENT)
Dept: GENERAL RADIOLOGY | Age: 82
Discharge: HOME | End: 2017-04-13
Attending: FAMILY MEDICINE
Payer: MEDICARE

## 2017-04-13 ENCOUNTER — HOSPITAL ENCOUNTER (OUTPATIENT)
Dept: WOUND CARE | Age: 82
Discharge: HOME | End: 2017-04-13
Payer: MEDICARE

## 2017-04-13 DIAGNOSIS — M25.529 ELBOW PAIN: ICD-10-CM
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
## 2017-04-13 PROCEDURE — 77030020057 HC DRSG MTRX CLLGN STGN -B

## 2017-04-13 PROCEDURE — 77030011256 HC DRSG MEPILEX <16IN NO BORD MOLN -A

## 2017-04-13 PROCEDURE — 74011250637 HC RX REV CODE- 250/637

## 2017-04-13 PROCEDURE — 97602 WOUND(S) CARE NON-SELECTIVE: CPT

## 2017-04-13 RX ORDER — TRIAMCINOLONE ACETONIDE 5 MG/G
CREAM TOPICAL
Status: COMPLETED
Start: 2017-04-13 | End: 2017-04-13

## 2017-04-13 RX ADMIN — TRIAMCINOLONE ACETONIDE: 5 CREAM TOPICAL at 12:58

## 2017-04-20 ENCOUNTER — HOSPITAL ENCOUNTER (OUTPATIENT)
Dept: WOUND CARE | Age: 82
Discharge: HOME | End: 2017-04-20
Payer: MEDICARE

## 2017-04-20 PROCEDURE — A6209 FOAM DRSG <=16 SQ IN W/O BDR: HCPCS | Performed by: PODIATRIST

## 2017-04-20 PROCEDURE — 11042 DBRDMT SUBQ TIS 1ST 20SQCM/<: CPT

## 2017-04-20 PROCEDURE — 77030020057 HC DRSG MTRX CLLGN STGN -B: Performed by: PODIATRIST

## 2017-04-20 PROCEDURE — A6222 GAUZE <=16 IN NO W/SAL W/O B: HCPCS | Performed by: PODIATRIST

## 2017-04-20 NOTE — PROGRESS NOTES
Progress and Debridement Note    Patient: Fred Black MRN: [id number]  SSN: [id number]    YOB: 1933  Age: 80 y.o. Sex: male      Assessment:     Active Problems:    Non-pressure chronic ulcer of right calf with fat layer exposed ([address] (1/19/2017)      Laceration of right lower leg with complication (6/66/6940)        Plan:      Selective excisional debridement with continued [address] with Lilia and mepilex AG RLE/calf. Subjective:     81 y/o male presents to the wound clinic for ulcer care on the RLE/calf region. Patient states he has been applying the collagen as instructed. \"The sore looks better\" No new complaints today    Objective:     No data found. Physical Exam:     General Exam  alert, cooperative, no distress, appears stated age    REVIEW OF SYSTEMS:  No changes    VASCULAR EXAM:. Pedal pulses are palpable 2/4 DP 1/4 PT right and left foot. Skin temperature is warm to warm right and left foot. Digital capillary fill time is 4 seconds right and left foot. There is pitting edema of the right and left foot and ankle.       NEUROLOGICAL EXAM:. Sensation intact with 5.07g monofilament wire right and left foot. Deep tendon reflexes intact and symmetrical on the right and left foot. Tamera Quevedo  MUSCULOSKELETAL EXAM:. Muscle tone is normal. Muscle strength of the flexor and extensor group inversion and eversion Bilateral. 5/5.       DERMATOLOGICAL EXAM:. Skin is of abnormal texture and turgor with atrophic skin changes noting hair growth, Bilateral. There is a ulcer full thickness with increased granulation tissue noted and decrease is measurements of  the wound bed lateral RLE Wound measurements noted below     Wound Shoulder Right (Active)   Number of days:372       Wound Leg Right; Lower; Posterior; Lateral (Active)   DRESSING STATUS Removed 4/20/2017  1:54 PM   DRESSING TYPE Other (Comment) 4/20/2017  1:54 PM   Non-Pressure Ulcer Partial thickness (epider/derm) 4/20/2017  1:54 PM Wound Length (cm) 2.1 cm 4/20/2017  1:54 PM   Wound Width (cm) 0.8 cm 4/20/2017  1:54 PM   Wound Depth (cm) 0.1 4/20/2017  1:54 PM   Wound Surface area (cm^3) 0.17 cm^2 4/20/2017  1:54 PM   Condition of Base Pink;Slough 3/2/2017 12:13 PM   Condition of Edges Open 4/20/2017  1:54 PM   Tissue Type Pink;Yellow 4/20/2017  1:54 PM   Tissue Type Percent Pink 90 4/20/2017  1:54 PM   Tissue Type Percent Red 80 3/16/2017 12:52 PM   Tissue Type Percent Yellow 10 4/20/2017  1:54 PM   Drainage Amount  Small  4/20/2017  1:54 PM   Drainage Color Serous 4/20/2017  1:54 PM   Wound Odor None 4/20/2017  1:54 PM   Periwound Skin Condition Intact 4/20/2017  1:54 PM   Cleansing and Cleansing Agents  Dermal wound cleanser 4/20/2017  1:54 PM   Dressing Type Applied Other (Comment) 4/13/2017 12:53 PM   Procedure Tolerated Well 4/13/2017 12:53 PM   Number of days:91       Wound Arm Right (Active)   DRESSING STATUS Removed 4/20/2017  1:54 PM   DRESSING TYPE Other (Comment) 4/20/2017  1:54 PM   Non-Pressure Ulcer Partial thickness (epider/derm) 4/20/2017  1:54 PM   Wound Length (cm) 2 cm 4/20/2017  1:54 PM   Wound Width (cm) 2 cm 4/20/2017  1:54 PM   Wound Depth (cm) 0.1 4/20/2017  1:54 PM   Wound Surface area (cm^3) 0.4 cm^2 4/20/2017  1:54 PM   Tissue Type Pink 4/20/2017  1:54 PM   Tissue Type Percent Pink 100 4/20/2017  1:54 PM   Tissue Type Percent Red 100 4/13/2017 12:53 PM   Drainage Amount  Moderate 4/20/2017  1:54 PM   Drainage Color Serosanguinous 4/20/2017  1:54 PM   Wound Odor None 4/20/2017  1:54 PM   Periwound Skin Condition Ecchymosis 4/20/2017  1:54 PM   Cleansing and Cleansing Agents  Dermal wound cleanser 4/20/2017  1:54 PM   Dressing Type Applied Other (Comment) 4/13/2017 12:53 PM   Procedure Tolerated Well 4/13/2017 12:53 PM   Number of days:7              Lab/Data Review:  No results found for this or any previous visit (from the past 24 hour(s)).       none      PROCEDURE    Selective sharp instrument excisional debridement of slough and devitilized tissue    After the benefits/risks/SE were discussed, the patient agreed to proceed. Time out was done:   * Patient was identified by name and date of birth   * Agreement on procedure being performed was verified   * Procedure site verified and marked as necessary   * Patient was positioned for comfort   * Consent was signed and verified. Site: lateral RLE/calf    Instruments used:    [x]  Dermal curette  [] Blade        [] #15  [] #10  [] Forceps  [] Tissue nippers  [] sterile scissors  [] Other     Anesthesia:    []  EMLA 2.5% cream: applied to wound beds for approximately 15minutes. []   Lidocaine 2% Topical Gel      []  Lidocaine injectable 1% with epinephrine 1:100,000    []  Lidocaine injectable 1% without epinephrine    []  Other:     [x]  None         [] patient is insensate due to neuropathy         [] patient declines        [] allergy to anesthetic        [x] tissue for debridement is either superficial, loosely adherent and/or necrotic and denervated     After satisfactory anesthesia achieved, the wound/s was/were sharply debrided necrotic, devitalized and granulation tissue down to the sub Q layer, revealing a clean and viable wound bed. Post debridement measurement was _2.2_x_0.8 x0.1_cm . Bleeding: <5mL Resolved with light focal pressure. Wounds were cleaned and irrigated with saline.      Wound care applied:   []   Hydrogell   []  Hypergel   []   Hydrofiber/Aquacel      []   Cadexomer Iodine (Iodosorb)   []  Silver Alginate    []   Medihoney:    []   Collagenase:Santyl   []  Calcium Alginate   [x]   Collagen:Lilia    []   Foam   []  Non-Adherent Contact Layer   []   Xeroform    []   Adaptec:   []   Hydrocolloid   []   Transparent Film    []  Antibiotic ointment/cream     []   Other (see below)     Other:     []   Dry Gauze and Roll Gauze    []   Foam and Roll Gauze    []   Dry Gauze    []   Bordered gauze:     []   Secure with Tape    [x] Bordered foam       []   Other      []   Compression Wrap:          []   Michelle Joya    []Multi-Layer    []Tubular Bandages       Charlene-Ulcer Care    []   Cream     []   Lotion     []   Ointment     []   Barrier     []   Other:       The patient tolerated the procedure well with no complications. The patient left the exam room in satisfactory and stable condition.      Signed By: Moe Givens DPM     April 20, 2017 2:27 PM

## 2017-04-20 NOTE — WOUND CARE
04/20/17 1354   Wound Leg Right; Lower; Posterior; Lateral   Date First Assessed/Time First Assessed: 01/19/17 1421   POA: Yes  Wound Type: Abrasion  Location: Leg  Orientation: Right; Lower; Posterior; Lateral   DRESSING STATUS Removed   DRESSING TYPE Other (Comment)  (Mepilex border)   Non-Pressure Ulcer Partial thickness (epider/derm)   Wound Length (cm) 2.1 cm   Wound Width (cm) 0.8 cm   Wound Depth (cm) 0.1   Wound Surface area (cm^3) 0.17 cm^2   Condition of Edges Open   Tissue Type Pink;Yellow   Tissue Type Percent Pink 90   Tissue Type Percent Yellow 10   Drainage Amount  Small    Drainage Color Serous   Wound Odor None   Periwound Skin Condition Intact   Cleansing and Cleansing Agents  Dermal wound cleanser   Dressing Type Applied Other (Comment)  (Lilia, Mepilex)   Procedure Tolerated Well   Wound Arm Right   Date First Assessed/Time First Assessed: 04/13/17 1304   POA: Yes  Wound Type: Abrasion  Location: Arm  Orientation: Right   DRESSING STATUS Removed   DRESSING TYPE Other (Comment)  (Coban, gauze)   Non-Pressure Ulcer Partial thickness (epider/derm)   Wound Length (cm) 2 cm   Wound Width (cm) 2 cm   Wound Depth (cm) 0.1  (Skin tear with scattered healing areas)   Wound Surface area (cm^3) 0.4 cm^2   Tissue Type Pink   Tissue Type Percent Pink 100   Drainage Amount  Moderate   Drainage Color Serosanguinous   Wound Odor None   Periwound Skin Condition Ecchymosis   Cleansing and Cleansing Agents  Dermal wound cleanser   Dressing Type Applied Other (Comment)  (Cutimed Sorbact)   Procedure Tolerated Well

## 2017-05-04 ENCOUNTER — HOSPITAL ENCOUNTER (OUTPATIENT)
Dept: WOUND CARE | Age: 82
Discharge: HOME | End: 2017-05-04
Payer: MEDICARE

## 2017-05-04 PROCEDURE — 74011000250 HC RX REV CODE- 250

## 2017-05-04 PROCEDURE — 17250 CHEM CAUT OF GRANLTJ TISSUE: CPT

## 2017-05-04 RX ORDER — SILVER NITRATE 38.21; 12.74 MG/1; MG/1
STICK TOPICAL
Status: COMPLETED
Start: 2017-05-04 | End: 2017-05-04

## 2017-05-04 RX ADMIN — SILVER NITRATE APPLICATORS: 25; 75 STICK TOPICAL at 13:47

## 2017-05-04 NOTE — PROGRESS NOTES
Podiatry Surgery Progress Note      Patient: Edna Maki MRN: [id number]  SSN: [id number]    YOB: 1933  Age: 80 y.o. Sex: male      Assessment:     Patient Active Problem List   Diagnosis Code    Malignant neoplasm of prostate ([facility] C61    Hypertension I10    GERD (gastroesophageal reflux disease) K21.9    Glaucoma H40.9    Glaucoma H40.9    Dysphagia R13.10    Encounter for colonoscopy due to history of adenomatous colonic polyps Z12.11, Z86.010    Non-pressure chronic ulcer of right calf with fat layer exposed ([facility] U15.760    Laceration of right lower leg with complication E49.989O    Edema R60.9          Plan:   Sliver Nitrate to the hypergranulation and will discontinue Lilia dressings. Will continue [address] with cutimed siltec sorbact Q 48 hrs. Monitor. RTC in 2 weeks    Total time spent with patient: [address] discussed with: Patient and Nursing Staff    Discussed:  Care Plan    Disposition:  Stable      Mr. Erlin Kent is a 80 y.o. male who was admitted for fu wound care RLE. Patient has been changing the leg dressing as instructed       Subjective:   Past Medical History  Past Medical History:   Diagnosis Date    Arthritis     Atrial fibrillation ([facility]     GERD (gastroesophageal reflux disease)     Glaucoma     Hypertension     Hypothyroidism     Malignant neoplasm of prostate ([facility]     [place] 3+4, s/p combined radiation w/ naht completed 9/09, psa 5.5     Pancreatitis 2009     Social History     Social History    Marital status:      Spouse name: N/A    Number of children: N/A    Years of education: N/A     Occupational History    Not on file.      Social History Main Topics    Smoking status: Former Smoker     Quit date: 1/1/1972    Smokeless tobacco: Never Used    Alcohol use 1.2 oz/week     2 Cans of beer per week      Comment: weekly     Drug use: No    Sexual activity: Not on file     Other Topics Concern    Not on file Social History Narrative       Current Medications  Current Outpatient Prescriptions   Medication Sig Dispense Refill    brimonidine (ALPHAGAN P) 0.1 % ophthalmic solution Administer 1 Drop to both eyes two (2) times a day.  gabapentin (NEURONTIN) 600 mg tablet Take 600 mg by mouth three (3) times daily.  levothyroxine (SYNTHROID) 175 mcg tablet Take 175 mcg by mouth Daily (before breakfast).  furosemide (LASIX) 20 mg tablet Take  by mouth daily.  apixaban (ELIQUIS) 2.5 mg tablet Take 2.5 mg by mouth two (2) times a day.  traMADol (ULTRAM) 50 mg tablet Take 1 tablet by mouth every six (6) hours as needed for Pain. 15 tablet 0    bimatoprost (LUMIGAN) 0.03 % ophthalmic drops Administer 1 drop to both eyes every evening.  multivitamins-minerals-lutein (CENTRUM SILVER) Tab Take  by mouth.  traZODone (DESYREL) 50 mg tablet Take 50 mg by mouth nightly as needed.  PYRIDOXINE HCL (VITAMIN B-6 PO) Take  by mouth.  esomeprazole (NEXIUM) 40 mg capsule Take 40 mg by mouth two (2) times a day.  lisinopril (PRINIVIL) 20 mg tablet Take 20 mg by mouth two (2) times a day. Patient Allergies  Allergies   Allergen Reactions    Adhesive Other (comments)     Skin irritation    Darvon [Propoxyphene] Hives, Myalgia and Other (comments)     Joint pain    Penicillins Rash and Itching          Objective:   General Exam  alert, cooperative, no distress, appears stated age    Vitals  There were no vitals taken for this visit. REVIEW OF SYSTEMS:  REVIEW OF SYSTEMS:  No changes     VASCULAR EXAM:. Pedal pulses are palpable 2/4 DP 1/4 PT right and left foot. Skin temperature is warm to warm right and left foot. Digital capillary fill time is 4 seconds right and left foot. There is pitting edema of the right and left foot and ankle.       NEUROLOGICAL EXAM:. Sensation intact with 5.07g monofilament wire right and left foot.  Deep tendon reflexes intact and symmetrical on the right and left foot. Yasmine Dodd  MUSCULOSKELETAL EXAM:. Muscle tone is normal. Muscle strength of the flexor and extensor group inversion and eversion Bilateral. 5/5.       DERMATOLOGICAL EXAM:. Skin is of abnormal texture and turgor with atrophic skin changes noting hair growth, Bilateral. There is a ulcer full thickness with hypergranulation tissue noted and continued decrease in measurements of  the wound bed lateral RLE Wound measurements noted below        Wound Shoulder Right (Active)   Number of days:386       Wound Leg Right; Lower; Posterior; Lateral (Active)   DRESSING STATUS Removed 5/4/2017  1:17 PM   DRESSING TYPE Other (Comment) 5/4/2017  1:17 PM   Non-Pressure Injury Partial thickness (epider/derm) 5/4/2017  1:17 PM   Wound Length (cm) 1.7 cm 5/4/2017  1:17 PM   Wound Width (cm) 1.4 cm 5/4/2017  1:17 PM   Wound Depth (cm) 0.1 5/4/2017  1:17 PM   Wound Surface area (cm^3) 0.24 cm^2 5/4/2017  1:17 PM   Condition of Base Pink;Slough 3/2/2017 12:13 PM   Condition of Edges Open 5/4/2017  1:17 PM   Tissue Type Red 5/4/2017  1:17 PM   Tissue Type Percent Pink 90 4/20/2017  1:54 PM   Tissue Type Percent Red 100 5/4/2017  1:17 PM   Tissue Type Percent Yellow 10 4/20/2017  1:54 PM   Drainage Amount  Small  5/4/2017  1:17 PM   Drainage Color Serosanguinous 5/4/2017  1:17 PM   Wound Odor None 5/4/2017  1:17 PM   Periwound Skin Condition Intact 5/4/2017  1:17 PM   Cleansing and Cleansing Agents  Dermal wound cleanser 5/4/2017  1:17 PM   Dressing Type Applied Other (Comment) 5/4/2017  1:17 PM   Procedure Tolerated Well 5/4/2017  1:17 PM   Number of days:105       Wound Arm Right (Active)   DRESSING STATUS Removed 4/20/2017  1:54 PM   DRESSING TYPE Other (Comment) 4/20/2017  1:54 PM   Non-Pressure Injury Partial thickness (epider/derm) 4/20/2017  1:54 PM   Wound Length (cm) 2 cm 4/20/2017  1:54 PM   Wound Width (cm) 2 cm 4/20/2017  1:54 PM   Wound Depth (cm) 0.1 4/20/2017  1:54 PM   Wound Surface area (cm^3) 0.4 cm^2 4/20/2017  1:54 PM   Tissue Type Pink 4/20/2017  1:54 PM   Tissue Type Percent Pink 100 4/20/2017  1:54 PM   Tissue Type Percent Red 100 4/13/2017 12:53 PM   Drainage Amount  Moderate 4/20/2017  1:54 PM   Drainage Color Serosanguinous 4/20/2017  1:54 PM   Wound Odor None 4/20/2017  1:54 PM   Periwound Skin Condition Ecchymosis 4/20/2017  1:54 PM   Cleansing and Cleansing Agents  Dermal wound cleanser 4/20/2017  1:54 PM   Dressing Type Applied Other (Comment) 4/20/2017  1:54 PM   Procedure Tolerated Well 4/20/2017  1:54 PM   Number of days:21            Labs  No results found for this or any previous visit (from the past 24 hour(s)).     X-Ray:  deferred    Procedures:   none               Martita Yeagre DPM  May 4, 2017

## 2017-05-04 NOTE — WOUND CARE
05/04/17 1317   Wound Leg Right; Lower; Posterior; Lateral   Date First Assessed/Time First Assessed: 01/19/17 1421   POA: Yes  Wound Type: Abrasion  Location: Leg  Orientation: Right; Lower; Posterior; Lateral   DRESSING STATUS Removed   DRESSING TYPE Other (Comment)  (Bordered Foam, Collagen)   Non-Pressure Injury Partial thickness (epider/derm)   Wound Length (cm) 1.7 cm   Wound Width (cm) 1.4 cm   Wound Depth (cm) 0.1   Wound Surface area (cm^3) 0.24 cm^2   Condition of Edges Open   Tissue Type Red   Tissue Type Percent Red 100   Drainage Amount  Small    Drainage Color Serosanguinous   Wound Odor None   Periwound Skin Condition Intact   Cleansing and Cleansing Agents  Dermal wound cleanser   Dressing Type Applied Other (Comment)  (Cutimed Siltec Sorbact)   Procedure Tolerated Well     Patient will return to clinic in two weeks for follow-up with Dr. Kacy Ratliff.

## 2017-05-18 ENCOUNTER — HOSPITAL ENCOUNTER (OUTPATIENT)
Dept: WOUND CARE | Age: 82
Discharge: HOME | End: 2017-05-18
Payer: MEDICARE

## 2017-05-18 VITALS
DIASTOLIC BLOOD PRESSURE: 91 MMHG | HEART RATE: 76 BPM | SYSTOLIC BLOOD PRESSURE: 140 MMHG | TEMPERATURE: 97.7 F | RESPIRATION RATE: 16 BRPM | OXYGEN SATURATION: 93 %

## 2017-05-18 PROCEDURE — A6021 COLLAGEN DRESSING <=16 SQ IN: HCPCS | Performed by: PODIATRIST

## 2017-05-18 PROCEDURE — 97602 WOUND(S) CARE NON-SELECTIVE: CPT

## 2017-05-18 PROCEDURE — 77030011256 HC DRSG MEPILEX <16IN NO BORD MOLN -A: Performed by: PODIATRIST

## 2017-05-18 PROCEDURE — 77030013575 HC DRSG HYDROFERA HOLL -B: Performed by: PODIATRIST

## 2017-05-18 NOTE — WOUND CARE
05/18/17 1309   Wound Leg Right; Lower; Posterior; Lateral   Date First Assessed/Time First Assessed: 01/19/17 1421   POA: Yes  Wound Type: Abrasion  Location: Leg  Orientation: Right; Lower; Posterior; Lateral   DRESSING STATUS Removed   DRESSING TYPE Other (Comment)  (Mepilex border, Cutimed sorbact)   Non-Pressure Injury Partial thickness (epider/derm)   Wound Length (cm) 1.9 cm   Wound Width (cm) 1.1 cm   Wound Depth (cm) 0.1   Wound Surface area (cm^3) 0.21 cm^2   Condition of Edges Open   Tissue Type Red;Yellow   Tissue Type Percent Red 95   Tissue Type Percent Yellow 5   Drainage Amount  Small    Drainage Color Serosanguinous   Wound Odor None   Periwound Skin Condition Intact   Cleansing and Cleansing Agents  Dermal wound cleanser   Dressing Type Applied Other (Comment)  (Endoform/Mepilex Border)   Procedure Tolerated Well

## 2017-05-18 NOTE — PROGRESS NOTES
Podiatry Surgery Progress Note      Patient: Mirna Be MRN: [id number]  SSN: [id number]    YOB: 1933  Age: 80 y.o. Sex: male      Assessment:     Patient Active Problem List   Diagnosis Code    Malignant neoplasm of prostate ([facility] C61    Hypertension I10    GERD (gastroesophageal reflux disease) K21.9    Glaucoma H40.9    Glaucoma H40.9    Dysphagia R13.10    Encounter for colonoscopy due to history of adenomatous colonic polyps Z12.11, Z86.010    Non-pressure chronic ulcer of right calf with fat layer exposed ([facility] R71.632    Laceration of right lower leg with complication U57.630U    Edema R60.9          Plan:   Superficially debrided the hypergranular tissue. Will change LWC to endoform with Hydrofera blur ready Q 48hrs. Plain to apply Theraskin gradft at next appointment    Total time spent with patient: [address] discussed with: Patient and Nursing Staff    Discussed:  Care Plan and home health    Disposition:  Stable      Mr. Sachin Jones is a 80 y.o. male who was seen in the wound clinic for fu ulcer RLE. No new complaints today       Subjective:   Past Medical History  Past Medical History:   Diagnosis Date    Arthritis     Atrial fibrillation ([facility]     GERD (gastroesophageal reflux disease)     Glaucoma     Hypertension     Hypothyroidism     Malignant neoplasm of prostate ([facility]     [place] 3+4, s/p combined radiation w/ naht completed 9/09, psa 5.5     Pancreatitis 2009     Social History     Social History    Marital status:      Spouse name: N/A    Number of children: N/A    Years of education: N/A     Occupational History    Not on file.      Social History Main Topics    Smoking status: Former Smoker     Quit date: 1/1/1972    Smokeless tobacco: Never Used    Alcohol use 1.2 oz/week     2 Cans of beer per week      Comment: weekly     Drug use: No    Sexual activity: Not on file     Other Topics Concern    Not on file Social History Narrative       Current Medications  Current Outpatient Prescriptions   Medication Sig Dispense Refill    brimonidine (ALPHAGAN P) 0.1 % ophthalmic solution Administer 1 Drop to both eyes two (2) times a day.  gabapentin (NEURONTIN) 600 mg tablet Take 600 mg by mouth three (3) times daily.  levothyroxine (SYNTHROID) 175 mcg tablet Take 175 mcg by mouth Daily (before breakfast).  furosemide (LASIX) 20 mg tablet Take  by mouth daily.  apixaban (ELIQUIS) 2.5 mg tablet Take 2.5 mg by mouth two (2) times a day.  traMADol (ULTRAM) 50 mg tablet Take 1 tablet by mouth every six (6) hours as needed for Pain. 15 tablet 0    bimatoprost (LUMIGAN) 0.03 % ophthalmic drops Administer 1 drop to both eyes every evening.  multivitamins-minerals-lutein (CENTRUM SILVER) Tab Take  by mouth.  traZODone (DESYREL) 50 mg tablet Take 50 mg by mouth nightly as needed.  PYRIDOXINE HCL (VITAMIN B-6 PO) Take  by mouth.  esomeprazole (NEXIUM) 40 mg capsule Take 40 mg by mouth two (2) times a day.  lisinopril (PRINIVIL) 20 mg tablet Take 20 mg by mouth two (2) times a day. Patient Allergies  Allergies   Allergen Reactions    Adhesive Other (comments)     Skin irritation    Darvon [Propoxyphene] Hives, Myalgia and Other (comments)     Joint pain    Penicillins Rash and Itching          Objective:   General Exam  alert, cooperative, no distress, appears stated age    Vitals  Visit Vitals    BP (!) 140/91 (BP 1 Location: Right arm, BP Patient Position: Sitting)    Pulse 76    Temp 97.7 °F (36.5 °C)    Resp 16    SpO2 93%       REVIEW OF SYSTEMS:  No changes      VASCULAR EXAM:. Pedal pulses are palpable 2/4 DP 1/4 PT right and left foot. Skin temperature is warm to warm right and left foot. Digital capillary fill time is 4 seconds right and left foot.  There is pitting edema of the right and left foot and ankle.       NEUROLOGICAL EXAM:. Sensation intact with 5.07g monofilament wire right and left foot. Deep tendon reflexes intact and symmetrical on the right and left foot. .       MUSCULOSKELETAL EXAM:. Muscle tone is normal. Muscle strength of the flexor and extensor group inversion and eversion Bilateral. 5/5.       DERMATOLOGICAL EXAM:. Skin is of abnormal texture and turgor with atrophic skin changes noting hair growth, Bilateral. There is a ulcer full thickness again with hypergranulation tissue noted . No SOI noted. Ulcer  Ulcer Location: R lower leg lateral, R lower leg posterior, Ulcer measurements: see below and Ulcer base: Hypergranulation   Lombardo Scale: Stage 2    Wound Shoulder Right (Active)   Number of days:400       Wound Leg Right; Lower; Posterior; Lateral (Active)   DRESSING STATUS Removed 5/18/2017  1:09 PM   DRESSING TYPE Other (Comment) 5/18/2017  1:09 PM   Non-Pressure Injury Partial thickness (epider/derm) 5/18/2017  1:09 PM   Wound Length (cm) 1.9 cm 5/18/2017  1:09 PM   Wound Width (cm) 1.1 cm 5/18/2017  1:09 PM   Wound Depth (cm) 0.1 5/18/2017  1:09 PM   Wound Surface area (cm^3) 0.21 cm^2 5/18/2017  1:09 PM   Condition of Base Pink;Slough 3/2/2017 12:13 PM   Condition of Edges Open 5/18/2017  1:09 PM   Tissue Type Red;Yellow 5/18/2017  1:09 PM   Tissue Type Percent Pink 90 4/20/2017  1:54 PM   Tissue Type Percent Red 95 5/18/2017  1:09 PM   Tissue Type Percent Yellow 5 5/18/2017  1:09 PM   Drainage Amount  Small  5/18/2017  1:09 PM   Drainage Color Serosanguinous 5/18/2017  1:09 PM   Wound Odor None 5/18/2017  1:09 PM   Periwound Skin Condition Intact 5/18/2017  1:09 PM   Cleansing and Cleansing Agents  Dermal wound cleanser 5/18/2017  1:09 PM   Dressing Type Applied Other (Comment) 5/4/2017  1:17 PM   Procedure Tolerated Well 5/4/2017  1:17 PM   Number of days:119       Wound Arm Right (Active)   DRESSING STATUS Removed 4/20/2017  1:54 PM   DRESSING TYPE Other (Comment) 4/20/2017  1:54 PM   Non-Pressure Injury Partial thickness (epider/derm) 4/20/2017  1:54 PM   Wound Length (cm) 2 cm 4/20/2017  1:54 PM   Wound Width (cm) 2 cm 4/20/2017  1:54 PM   Wound Depth (cm) 0.1 4/20/2017  1:54 PM   Wound Surface area (cm^3) 0.4 cm^2 4/20/2017  1:54 PM   Tissue Type Pink 4/20/2017  1:54 PM   Tissue Type Percent Pink 100 4/20/2017  1:54 PM   Tissue Type Percent Red 100 4/13/2017 12:53 PM   Drainage Amount  Moderate 4/20/2017  1:54 PM   Drainage Color Serosanguinous 4/20/2017  1:54 PM   Wound Odor None 4/20/2017  1:54 PM   Periwound Skin Condition Ecchymosis 4/20/2017  1:54 PM   Cleansing and Cleansing Agents  Dermal wound cleanser 4/20/2017  1:54 PM   Dressing Type Applied Other (Comment) 4/20/2017  1:54 PM   Procedure Tolerated Well 4/20/2017  1:54 PM   Number of days:35            Labs  No results found for this or any previous visit (from the past 24 hour(s)).     X-Ray:  deferred    Procedures:   none               De Emmanuel DPM  May 18, 2017

## 2017-06-01 ENCOUNTER — HOSPITAL ENCOUNTER (OUTPATIENT)
Dept: WOUND CARE | Age: 82
Discharge: HOME | End: 2017-06-01
Payer: MEDICARE

## 2017-06-01 VITALS
DIASTOLIC BLOOD PRESSURE: 77 MMHG | TEMPERATURE: 98.4 F | SYSTOLIC BLOOD PRESSURE: 122 MMHG | HEART RATE: 90 BPM | OXYGEN SATURATION: 93 % | RESPIRATION RATE: 16 BRPM

## 2017-06-01 PROCEDURE — 97602 WOUND(S) CARE NON-SELECTIVE: CPT

## 2017-06-01 PROCEDURE — 77030011256 HC DRSG MEPILEX <16IN NO BORD MOLN -A: Performed by: PODIATRIST

## 2017-06-01 NOTE — PROGRESS NOTES
Podiatry Surgery Progress Note      Patient: Meaghan Cheatham MRN: [id number]  SSN: [id number]    YOB: 1933  Age: 80 y.o. Sex: male      Assessment:     Patient Active Problem List   Diagnosis Code    Malignant neoplasm of prostate ([facility] C61    Hypertension I10    GERD (gastroesophageal reflux disease) K21.9    Glaucoma H40.9    Glaucoma H40.9    Dysphagia R13.10    Encounter for colonoscopy due to history of adenomatous colonic polyps Z12.11, Z86.010    Non-pressure chronic ulcer of right calf with fat layer exposed ([facility] Q18.801    Laceration of right lower leg with complication K77.479P    Edema R60.9          Plan:   Patient was advised to protect the area with bordered foam x 1-2 weeks. Monitor for any breakdown and RTC in this event. Total time spent with patient: [address] discussed with: Patient and Nursing Staff    Discussed:  D/C Planning    Disposition:  Stable      Mr. Angeles Nichols is a 80 y.o. male who was admitted for RLE ulcer care. He has been placing cutimed siltec sorbact as insrtucted. .        Subjective:   Past Medical History  Past Medical History:   Diagnosis Date    Arthritis     Atrial fibrillation ([facility]     GERD (gastroesophageal reflux disease)     Glaucoma     Hypertension     Hypothyroidism     Malignant neoplasm of prostate ([facility]     [place] 3+4, s/p combined radiation w/ naht completed 9/09, psa 5.5     Pancreatitis 2009     Social History     Social History    Marital status:      Spouse name: N/A    Number of children: N/A    Years of education: N/A     Occupational History    Not on file.      Social History Main Topics    Smoking status: Former Smoker     Quit date: 1/1/1972    Smokeless tobacco: Never Used    Alcohol use 1.2 oz/week     2 Cans of beer per week      Comment: weekly     Drug use: No    Sexual activity: Not on file     Other Topics Concern    Not on file     Social History Narrative       Current Medications  Current Outpatient Prescriptions   Medication Sig Dispense Refill    brimonidine (ALPHAGAN P) 0.1 % ophthalmic solution Administer 1 Drop to both eyes two (2) times a day.  gabapentin (NEURONTIN) 600 mg tablet Take 600 mg by mouth three (3) times daily.  levothyroxine (SYNTHROID) 175 mcg tablet Take 175 mcg by mouth Daily (before breakfast).  furosemide (LASIX) 20 mg tablet Take  by mouth daily.  apixaban (ELIQUIS) 2.5 mg tablet Take 2.5 mg by mouth two (2) times a day.  traMADol (ULTRAM) 50 mg tablet Take 1 tablet by mouth every six (6) hours as needed for Pain. 15 tablet 0    bimatoprost (LUMIGAN) 0.03 % ophthalmic drops Administer 1 drop to both eyes every evening.  multivitamins-minerals-lutein (CENTRUM SILVER) Tab Take  by mouth.  traZODone (DESYREL) 50 mg tablet Take 50 mg by mouth nightly as needed.  PYRIDOXINE HCL (VITAMIN B-6 PO) Take  by mouth.  esomeprazole (NEXIUM) 40 mg capsule Take 40 mg by mouth two (2) times a day.  lisinopril (PRINIVIL) 20 mg tablet Take 20 mg by mouth two (2) times a day. Patient Allergies  Allergies   Allergen Reactions    Adhesive Other (comments)     Skin irritation    Darvon [Propoxyphene] Hives, Myalgia and Other (comments)     Joint pain    Penicillins Rash and Itching          Objective:   General Exam  alert, cooperative, no distress, appears stated age    Vitals  Visit Vitals    /77    Pulse 90    Temp 98.4 °F (36.9 °C)    Resp 16    SpO2 93%       REVIEW OF SYSTEMS:  No changes      VASCULAR EXAM:. Pedal pulses are palpable 2/4 DP 1/4 PT right and left foot. Skin temperature is warm to warm right and left foot. Digital capillary fill time is 4 seconds right and left foot. There is pitting edema of the right and left foot and ankle.       NEUROLOGICAL EXAM:. Sensation intact with 5.07g monofilament wire right and left foot.  Deep tendon reflexes intact and symmetrical on the right and left foot. .       MUSCULOSKELETAL EXAM:. Muscle tone is normal. Muscle strength of the flexor and extensor group inversion and eversion Bilateral. 5/5.       DERMATOLOGICAL EXAM:. Skin is of abnormal texture and turgor with atrophic skin changes noting hair growth, Bilateral. There is a complete epithelization noted posterior aspect of the RLE/calf. Ulcer  Ulcer Location: R lower leg lateral, R lower leg posterior, Ulcer measurements: see below and Ulcer base: Complete epithelization  Lombardo Scale: Stage 2Resolved    Wound Shoulder Right (Active)   Number of days:414       Wound Leg Right; Lower; Posterior; Lateral (Active)   DRESSING STATUS Removed 5/18/2017  1:09 PM   DRESSING TYPE Other (Comment) 5/18/2017  1:09 PM   Non-Pressure Injury Partial thickness (epider/derm) 5/18/2017  1:09 PM   Wound Length (cm) 1.9 cm 5/18/2017  1:09 PM   Wound Width (cm) 1.1 cm 5/18/2017  1:09 PM   Wound Depth (cm) 0.1 5/18/2017  1:09 PM   Wound Surface area (cm^3) 0.21 cm^2 5/18/2017  1:09 PM   Condition of Base Pink;Slough 3/2/2017 12:13 PM   Condition of Edges Open 5/18/2017  1:09 PM   Tissue Type Red;Yellow 5/18/2017  1:09 PM   Tissue Type Percent Pink 90 4/20/2017  1:54 PM   Tissue Type Percent Red 95 5/18/2017  1:09 PM   Tissue Type Percent Yellow 5 5/18/2017  1:09 PM   Drainage Amount  Small  5/18/2017  1:09 PM   Drainage Color Serosanguinous 5/18/2017  1:09 PM   Wound Odor None 5/18/2017  1:09 PM   Periwound Skin Condition Intact 5/18/2017  1:09 PM   Cleansing and Cleansing Agents  Dermal wound cleanser 5/18/2017  1:09 PM   Dressing Type Applied Other (Comment) 5/18/2017  1:09 PM   Procedure Tolerated Well 5/18/2017  1:09 PM   Number of days:133       Wound Arm Right (Active)   DRESSING STATUS Removed 4/20/2017  1:54 PM   DRESSING TYPE Other (Comment) 4/20/2017  1:54 PM   Non-Pressure Injury Partial thickness (epider/derm) 4/20/2017  1:54 PM   Wound Length (cm) 2 cm 4/20/2017  1:54 PM   Wound Width (cm) 2 cm 4/20/2017  1:54 PM   Wound Depth (cm) 0.1 4/20/2017  1:54 PM   Wound Surface area (cm^3) 0.4 cm^2 4/20/2017  1:54 PM   Tissue Type Pink 4/20/2017  1:54 PM   Tissue Type Percent Pink 100 4/20/2017  1:54 PM   Tissue Type Percent Red 100 4/13/2017 12:53 PM   Drainage Amount  Moderate 4/20/2017  1:54 PM   Drainage Color Serosanguinous 4/20/2017  1:54 PM   Wound Odor None 4/20/2017  1:54 PM   Periwound Skin Condition Ecchymosis 4/20/2017  1:54 PM   Cleansing and Cleansing Agents  Dermal wound cleanser 4/20/2017  1:54 PM   Dressing Type Applied Other (Comment) 4/20/2017  1:54 PM   Procedure Tolerated Well 4/20/2017  1:54 PM   Number of days:49            Labs  No results found for this or any previous visit (from the past 24 hour(s)).     X-Ray:  none    Procedures:   none               QI Caro DPM  June 1, 2017

## 2017-06-01 NOTE — WOUND CARE
06/01/17 1327   Wound Leg Right; Lower; Posterior; Lateral   Date First Assessed/Time First Assessed: 01/19/17 1421   POA: Yes  Wound Type: Abrasion  Location: Leg  Orientation: Right; Lower; Posterior; Lateral   DRESSING STATUS Removed   DRESSING TYPE Silicone  (Mepilex)   Wound Length (cm) 0 cm   Wound Width (cm) 0 cm   Wound Depth (cm) 0   Wound Surface area (cm^3) 0 cm^2   Change in Wound Size % 100   Condition of Base Epithelializing   Condition of Edges Closed   Epithelialization (%) 100   Drainage Amount  None   Wound Odor None   Periwound Skin Condition Intact   Cleansing and Cleansing Agents  Dermal wound cleanser   Dressing Type Applied Silicone  (Mepilex)   Procedure Tolerated Well

## 2017-06-07 ENCOUNTER — HOSPITAL ENCOUNTER (OUTPATIENT)
Dept: VASCULAR SURGERY | Age: 82
Discharge: HOME | End: 2017-06-07
Attending: FAMILY MEDICINE
Payer: MEDICARE

## 2017-06-07 DIAGNOSIS — I65.29 CAROTID ARTERY STENOSIS: ICD-10-CM

## 2017-06-07 PROCEDURE — 93880 EXTRACRANIAL BILAT STUDY: CPT

## 2017-06-07 NOTE — PROCEDURES
[employer]  *** FINAL REPORT ***    Name: Adriane England  MRN: [id number]    Outpatient  : 1933  HIS Order #: [id number]  [address] [facility] Visit #: [id number]  Date: 2017    TYPE OF TEST: Cerebrovascular Duplex    REASON FOR TEST  165.29  Carotid artery stenosis by Life Line Screening. Right Carotid:-             Proximal               Mid                 Distal  cm/s  Systolic  Diastolic  Systolic  Diastolic  Systolic  Diastolic  CCA:     68.0      14.0       63.0      14.0       49.0      15.0  Bulb:  ECA:     94.0      13.0  ICA:     54.0      15.0       56.0      26.0       67.0      18.0  ICA/CCA:  0.9       1.3    ICA Stenosis: <50%    Right Vertebral:-  Finding: Antegrade  Sys:       54.0  Perla:       11.0    Right Subclavian: Normal    Left Carotid:-            Proximal                Mid                 Distal  cm/s  Systolic  Diastolic  Systolic  Diastolic  Systolic  Diastolic  CCA:     72.7      12.0       64.0      15.0       50.0      11.0  Bulb:  ECA:     89.0      11.0  ICA:     66.0      13.0       61.0      17.0       47.0      14.0  ICA/CCA:  0.9       1.1    ICA Stenosis: <50%    Left Vertebral:-  Finding: Antegrade  Sys:       52.0  Perla:       15.0    Left Subclavian: Normal    INTERPRETATION/FINDINGS  Duplex images were obtained using 2-D gray scale, color flow, and  spectral Doppler analysis. 1. Very mild heterogeneous plaque in the bifurcations and proximal  internal carotid arteries bilaterally with less than 50% stenosis. 2. No significant stenosis in the external carotid arteries  bilaterally. 3. Antegrade flow in both vertebral arteries. 4. Normal flow in both subclavian arteries. Plaque Morphology:  1. Heterogeneous plaque in the bulb and right ICA. 2. Heterogeneous plaque in the bulb and left ICA. ADDITIONAL COMMENTS    I have personally reviewed the data relevant to the interpretation of  this  study. TECHNOLOGIST: Danny Webb.  Fellowes, RTR, RVT  Signed: 06/07/2017 02:40 PM    PHYSICIAN: Awilda Leigh L. Alaina Carrel, MD  Signed: 06/07/2017 03:55 PM

## 2017-10-09 ENCOUNTER — HOSPITAL ENCOUNTER (EMERGENCY)
Age: 82
Discharge: HOME | End: 2017-10-09
Attending: EMERGENCY MEDICINE
Payer: MEDICARE

## 2017-10-09 ENCOUNTER — APPOINTMENT (OUTPATIENT)
Dept: CT IMAGING | Age: 82
End: 2017-10-09
Attending: EMERGENCY MEDICINE
Payer: MEDICARE

## 2017-10-09 VITALS
HEART RATE: 92 BPM | RESPIRATION RATE: 16 BRPM | SYSTOLIC BLOOD PRESSURE: 123 MMHG | DIASTOLIC BLOOD PRESSURE: 71 MMHG | TEMPERATURE: 99 F | OXYGEN SATURATION: 100 %

## 2017-10-09 DIAGNOSIS — T07.XXXA MULTIPLE ABRASIONS: ICD-10-CM

## 2017-10-09 DIAGNOSIS — S09.90XA CLOSED HEAD INJURY, INITIAL ENCOUNTER: Primary | ICD-10-CM

## 2017-10-09 PROCEDURE — 70450 CT HEAD/BRAIN W/O DYE: CPT

## 2017-10-09 PROCEDURE — 99282 EMERGENCY DEPT VISIT SF MDM: CPT

## 2017-10-09 NOTE — ED PROVIDER NOTES
HPI Comments: 3:41 PM Edd Cantrell is a 80 y.o. male with h/o A-fib and HTN who presents to ED complaining of Headaches and intermittent dizziness onset 2 days ago. Patient states that the storm door at his house knocked into him. He fell, trying to avoid landing on his right shoulder due to recent shoulder surgery, landing on his buttocks and fell back and hit his head. Patient says he lost consciousness for a few minutes. Does report bilateral arm abrasions that have been bandaged. Denies neck pain, numbness and tingling. CP and SOB prior to fall. Tetanus UTD. No other concerns or symptoms at this time. PCP: Asia Solomon MD      The history is provided by the patient. Past Medical History:   Diagnosis Date    Arthritis     Atrial fibrillation ([facility]     GERD (gastroesophageal reflux disease)     Glaucoma     Hypertension     Hypothyroidism     Malignant neoplasm of prostate ([facility]     [place] 3+4, s/p combined radiation w/ naht completed 9/09, psa 5.5     Pancreatitis 2009       Past Surgical History:   Procedure Laterality Date    COLONOSCOPY N/A 1/3/2017    COLONOSCOPY w/ polepectomy  performed by [facility]eh [facility] at [facility] ENDOSCOPY    HX CATARACT REMOVAL      HX CHOLECYSTECTOMY      HX ORTHOPAEDIC      rt shoulder cuff repair    HX ROTATOR CUFF REPAIR           Family History:   Problem Relation Age of Onset    Dementia Mother     Heart Disease Father        Social History     Social History    Marital status:      Spouse name: N/A    Number of children: N/A    Years of education: N/A     Occupational History    Not on file.      Social History Main Topics    Smoking status: Former Smoker     Quit date: 1/1/1972    Smokeless tobacco: Never Used    Alcohol use 1.2 oz/week     2 Cans of beer per week      Comment: weekly     Drug use: No    Sexual activity: Not on file     Other Topics Concern    Not on file     Social History Narrative         ALLERGIES: Adhesive; Darvon [propoxyphene]; and Penicillins    Review of Systems   Respiratory: Negative for shortness of breath. Cardiovascular: Negative for chest pain. Neurological: Positive for dizziness and headaches. Negative for numbness. All other systems reviewed and are negative. Vitals:    10/09/17 1537   BP: 123/71   Pulse: 92   Resp: 16   Temp: 99 °F (37.2 °C)   SpO2: 100%            Physical Exam   Constitutional: He is oriented to person, place, and time. He appears well-developed and well-nourished. HENT:   Head: Normocephalic and atraumatic. Eyes: EOM are normal. Pupils are equal, round, and reactive to light. Neck: Normal range of motion. Neck supple. No JVD present. No midline spinal tenderness or step off   Musculoskeletal: He exhibits no edema. Neurological: He is alert and oriented to person, place, and time. Strength 5/5 x4 extremities  Gross sensation intact x4   Normal gait   Skin: Skin is warm and dry. No erythema. 3cm linear skin tear L forearm  1cm skin tear R elbow   Psychiatric: Judgment normal.        MDM  Number of Diagnoses or Management Options  Closed head injury, initial encounter:   Multiple abrasions:   Diagnosis management comments: 81 y/o male presents with HA and dizziness after fall. Pt on eliquis so will obtain ct head to eval for bleed. ED Course       Procedures   Vitals:  Patient Vitals for the past 12 hrs:   Temp Pulse Resp BP SpO2   10/09/17 1537 99 °F (37.2 °C) 92 16 123/71 100 %       X-Ray, CT or other radiology findings or impressions:  CT HEAD WO CONT   IMPRESSION  IMPRESSION: No acute intracranial abnormality.               Discussed results with pt. Discussed return precautions and head injury precautions. Stable for dc home. Disposition:  Diagnosis:   1. Closed head injury, initial encounter    2.  Multiple abrasions        Disposition: Discharged    Follow-up Information     Follow up With Details Comments Contact Vonda Parker MD Schedule an appointment as soon as possible for a visit If symptoms worsen, As needed [address]  [phone]             Patient's Medications   Start Taking    No medications on file   Continue Taking    ACITRETIN 25 MG CAPSULE    TK 1 C PO QPM    ALBUTEROL (PROAIR HFA) 90 MCG/ACTUATION INHALER    Take  inhaled by mouth. ALPHAGAN P 0.15 % OPHTHALMIC SOLUTION        APIXABAN (ELIQUIS) 5 MG TABLET    5 mg. BIMATOPROST (LUMIGAN) 0.03 % OPHTHALMIC DROPS    Administer 1 drop to both eyes every evening. CLOBETASOL 0.05 % SHAM        COLCHICINE (COLCRYS) 0.6 MG TABLET    Take As Needed. CYANOCOBALAMIN 1,000 MCG TABLET    1,000 mcg. DESONIDE (TRIDESILON) 0.05 % CREAM    Take As Needed. DILTIAZEM CD (CARDIZEM CD) 180 MG ER CAPSULE    180 mg. ESOMEPRAZOLE (NEXIUM) 40 MG CAPSULE    40 mg.    FUROSEMIDE (LASIX) 40 MG TABLET        GABAPENTIN (NEURONTIN) 600 MG TABLET    Take 600 mg by mouth three (3) times daily. HYDROCORTISONE (HYTONE) 2.5 % TOPICAL CREAM        HYDROCORTISONE (HYTONE) 2.5 % TOPICAL CREAM        HYDROXYZINE HCL (ATARAX) 10 MG TABLET    Take As Needed. INGENOL MEBUTATE (PICATO) 0.015 % GEL        IPRATROPIUM-ALBUTEROL (COMBIVENT RESPIMAT)  MCG/ACTUATION INHALER    Take As Needed. LEVOTHYROXINE (SYNTHROID) 175 MCG TABLET    Take 175 mcg by mouth Daily (before breakfast). LORATADINE (CLARITIN) 10 MG TABLET    10 mg. METRONIDAZOLE (METROCREAM) 0.75 % TOPICAL CREAM    APPLY TO FACE BID    MULTIVITAMINS-MINERALS-LUTEIN (CENTRUM SILVER) TAB    Take  by mouth. OXYCODONE-ACETAMINOPHEN (PERCOCET) 5-325 MG PER TABLET    TK ONE TO TWO TS PO Q 4 TO 6 H PRNP    PICATO 0.015 % GEL        PYRIDOXINE HCL (VITAMIN B-6 PO)    Take  by mouth. SYNTHROID 150 MCG TABLET        TAMSULOSIN (FLOMAX) 0.4 MG CAPSULE    Take 1 Cap by mouth daily (after dinner).     TRAMADOL (ULTRAM) 50 MG TABLET    Take 1 tablet by mouth every six (6) hours as needed for Pain. TRAZODONE (DESYREL) 50 MG TABLET    Take 50 mg by mouth nightly as needed. These Medications have changed    No medications on file   Stop Taking    No medications on file     [facility] acting as a scribe for and in the presence of Edita Davison DO      October 09, 2017 at 3:41 PM       Provider Attestation:      I personally performed the services described in the documentation, reviewed the documentation, as recorded by the scribe in my presence, and it accurately and completely records my words and actions.  October 09, 2017 at 3:41 PM - Edita Davison DO

## 2017-10-09 NOTE — DISCHARGE INSTRUCTIONS
Learning About a Closed Head Injury  What is a closed head injury? A closed head injury happens when your head gets hit hard. The strong force of the blow causes your brain to shake in your skull. This movement can cause the brain to bruise, swell, or tear. Sometimes nerves or blood vessels also get damaged. This can cause bleeding in or around the brain. A concussion is a type of closed head injury. What are the symptoms? If you have a mild concussion, you may have a mild headache or feel \"not quite right. \" These symptoms are common. They usually go away over a few days to 4 weeks. But sometimes after a concussion, you feel like you can't function as well as before the injury. And you have new symptoms. This is called postconcussive syndrome. You may:  · Find it harder to solve problems, think, concentrate, or remember. · Have headaches. · Have changes in your sleep patterns, such as not being able to sleep or sleeping all the time. · Have changes in your personality. · Not be interested in your usual activities. · Feel angry or anxious without a clear reason. · Lose your sense of taste or smell. · Be dizzy, lightheaded, or unsteady. It may be hard to stand or walk. How is a closed head injury treated? Any person who may have a concussion needs to see a doctor. Some people have to stay in the hospital to be watched. Others can go home safely. If you go home, follow your doctor's instructions. He or she will tell you if you need someone to watch you closely for the next 24 hours or longer. Rest is the best treatment. Get plenty of sleep at night. And try to rest during the day. · Avoid activities that are physically or mentally demanding. These include housework, exercise, and schoolwork. And don't play video games, send text messages, or use the computer. You may need to change your school or work schedule to be able to avoid these activities.   · Ask your doctor when it's okay to drive, ride a bike, or operate machinery. · Take an over-the-counter pain medicine, such as acetaminophen (Tylenol), ibuprofen (Advil, Motrin), or naproxen (Aleve). Be safe with medicines. Read and follow all instructions on the label. · Check with your doctor before you use any other medicines for pain. · Do not drink alcohol or use illegal drugs. They can slow recovery. They can also increase your risk of getting a second head injury. Follow-up care is a key part of your treatment and safety. Be sure to make and go to all appointments, and call your doctor if you are having problems. It's also a good idea to know your test results and keep a list of the medicines you take. Where can you learn more? Go to http://zenaida-dell.info/. Enter E235 in the search box to learn more about \"Learning About a Closed Head Injury. \"  Current as of: October 14, 2016  Content Version: 11.3  © 0728-2361 [employer]. Care instructions adapted under license by [employer] (which disclaims liability or warranty for this information). If you have questions about a medical condition or this instruction, always ask your healthcare professional. [address] any warranty or liability for your use of this information.

## 2017-10-09 NOTE — ED NOTES
I have reviewed discharge instructions with patient and family. Patient verbalized understanding and has no further questions at this time. Education taught and patient verbalized understanding of education. Teach back method used. Patients pain 0/10. Belongings given to patient. Patient discharged with family to home.

## 2018-06-02 ENCOUNTER — HOSPITAL ENCOUNTER (INPATIENT)
Age: 83
LOS: 2 days | Discharge: HOME HEALTH | DRG: 871 | End: 2018-06-04
Attending: EMERGENCY MEDICINE | Admitting: HOSPITALIST
Payer: MEDICARE

## 2018-06-02 ENCOUNTER — APPOINTMENT (OUTPATIENT)
Dept: GENERAL RADIOLOGY | Age: 83
DRG: 871 | End: 2018-06-02
Attending: EMERGENCY MEDICINE
Payer: MEDICARE

## 2018-06-02 DIAGNOSIS — J18.9 PNEUMONIA OF RIGHT LOWER LOBE DUE TO INFECTIOUS ORGANISM: Primary | ICD-10-CM

## 2018-06-02 DIAGNOSIS — A41.9 SEPSIS, DUE TO UNSPECIFIED ORGANISM: ICD-10-CM

## 2018-06-02 DIAGNOSIS — I95.9 HYPOTENSION, UNSPECIFIED HYPOTENSION TYPE: ICD-10-CM

## 2018-06-02 DIAGNOSIS — R09.02 HYPOXIA: ICD-10-CM

## 2018-06-02 PROBLEM — E03.9 HYPOTHYROIDISM: Status: ACTIVE | Noted: 2018-06-02

## 2018-06-02 PROBLEM — I48.91 ATRIAL FIBRILLATION (HCC): Status: ACTIVE | Noted: 2018-06-02

## 2018-06-02 LAB
ALBUMIN SERPL-MCNC: 2.9 G/DL (ref 3.4–5)
ALBUMIN/GLOB SERPL: 1.1 {RATIO} (ref 0.8–1.7)
ALP SERPL-CCNC: 88 U/L (ref 45–117)
ALT SERPL-CCNC: 21 U/L (ref 16–61)
ANION GAP SERPL CALC-SCNC: 6 MMOL/L (ref 3–18)
APPEARANCE UR: CLEAR
AST SERPL-CCNC: 23 U/L (ref 15–37)
BASOPHILS # BLD: 0 K/UL (ref 0–0.06)
BASOPHILS NFR BLD: 0 % (ref 0–2)
BILIRUB SERPL-MCNC: 0.5 MG/DL (ref 0.2–1)
BILIRUB UR QL: NEGATIVE
BNP SERPL-MCNC: 1905 PG/ML (ref 0–1800)
BUN SERPL-MCNC: 13 MG/DL (ref 7–18)
BUN/CREAT SERPL: 12 (ref 12–20)
CALCIUM SERPL-MCNC: 8.2 MG/DL (ref 8.5–10.1)
CHLORIDE SERPL-SCNC: 103 MMOL/L (ref 100–108)
CK MB CFR SERPL CALC: NORMAL % (ref 0–4)
CK MB SERPL-MCNC: <1 NG/ML (ref 5–25)
CK SERPL-CCNC: 57 U/L (ref 39–308)
CO2 SERPL-SCNC: 32 MMOL/L (ref 21–32)
COLOR UR: YELLOW
CREAT SERPL-MCNC: 1.05 MG/DL (ref 0.6–1.3)
DIFFERENTIAL METHOD BLD: ABNORMAL
EOSINOPHIL # BLD: 0 K/UL (ref 0–0.4)
EOSINOPHIL NFR BLD: 0 % (ref 0–5)
ERYTHROCYTE [DISTWIDTH] IN BLOOD BY AUTOMATED COUNT: 14.4 % (ref 11.6–14.5)
FLUAV AG NPH QL IA: NEGATIVE
FLUBV AG NOSE QL IA: NEGATIVE
GLOBULIN SER CALC-MCNC: 2.7 G/DL (ref 2–4)
GLUCOSE SERPL-MCNC: 95 MG/DL (ref 74–99)
GLUCOSE UR STRIP.AUTO-MCNC: NEGATIVE MG/DL
HCT VFR BLD AUTO: 36 % (ref 36–48)
HGB BLD-MCNC: 12 G/DL (ref 13–16)
HGB UR QL STRIP: NEGATIVE
KETONES UR QL STRIP.AUTO: NEGATIVE MG/DL
L PNEUMO AG UR QL IA: NEGATIVE
LACTATE BLD-SCNC: 1.4 MMOL/L (ref 0.4–2)
LACTATE SERPL-SCNC: 1.2 MMOL/L (ref 0.4–2)
LEUKOCYTE ESTERASE UR QL STRIP.AUTO: NEGATIVE
LYMPHOCYTES # BLD: 0.5 K/UL (ref 0.9–3.6)
LYMPHOCYTES NFR BLD: 6 % (ref 21–52)
MCH RBC QN AUTO: 34.5 PG (ref 24–34)
MCHC RBC AUTO-ENTMCNC: 33.3 G/DL (ref 31–37)
MCV RBC AUTO: 103.4 FL (ref 74–97)
MONOCYTES # BLD: 0.3 K/UL (ref 0.05–1.2)
MONOCYTES NFR BLD: 4 % (ref 3–10)
NEUTS SEG # BLD: 7.1 K/UL (ref 1.8–8)
NEUTS SEG NFR BLD: 90 % (ref 40–73)
NITRITE UR QL STRIP.AUTO: NEGATIVE
PH UR STRIP: 6.5 [PH] (ref 5–8)
PLATELET # BLD AUTO: 191 K/UL (ref 135–420)
PMV BLD AUTO: 8.7 FL (ref 9.2–11.8)
POTASSIUM SERPL-SCNC: 3.8 MMOL/L (ref 3.5–5.5)
PROT SERPL-MCNC: 5.6 G/DL (ref 6.4–8.2)
PROT UR STRIP-MCNC: NEGATIVE MG/DL
RBC # BLD AUTO: 3.48 M/UL (ref 4.7–5.5)
S PNEUM AG UR QL: NEGATIVE
SODIUM SERPL-SCNC: 141 MMOL/L (ref 136–145)
SP GR UR REFRACTOMETRY: 1.01 (ref 1–1.03)
TROPONIN I SERPL-MCNC: 0.02 NG/ML (ref 0–0.04)
UROBILINOGEN UR QL STRIP.AUTO: 0.2 EU/DL (ref 0.2–1)
WBC # BLD AUTO: 7.9 K/UL (ref 4.6–13.2)

## 2018-06-02 PROCEDURE — 82550 ASSAY OF CK (CPK): CPT | Performed by: EMERGENCY MEDICINE

## 2018-06-02 PROCEDURE — 77030011256 HC DRSG MEPILEX <16IN NO BORD MOLN -A

## 2018-06-02 PROCEDURE — 36415 COLL VENOUS BLD VENIPUNCTURE: CPT | Performed by: EMERGENCY MEDICINE

## 2018-06-02 PROCEDURE — 83880 ASSAY OF NATRIURETIC PEPTIDE: CPT | Performed by: EMERGENCY MEDICINE

## 2018-06-02 PROCEDURE — 87450 LEGIONELLA PNEUMOPHILA AG, URINE: CPT | Performed by: HOSPITALIST

## 2018-06-02 PROCEDURE — 74011250637 HC RX REV CODE- 250/637: Performed by: HOSPITALIST

## 2018-06-02 PROCEDURE — 83605 ASSAY OF LACTIC ACID: CPT

## 2018-06-02 PROCEDURE — 51701 INSERT BLADDER CATHETER: CPT

## 2018-06-02 PROCEDURE — 74011250636 HC RX REV CODE- 250/636: Performed by: EMERGENCY MEDICINE

## 2018-06-02 PROCEDURE — 99285 EMERGENCY DEPT VISIT HI MDM: CPT

## 2018-06-02 PROCEDURE — 74011250636 HC RX REV CODE- 250/636: Performed by: HOSPITALIST

## 2018-06-02 PROCEDURE — 65270000029 HC RM PRIVATE

## 2018-06-02 PROCEDURE — 87040 BLOOD CULTURE FOR BACTERIA: CPT | Performed by: EMERGENCY MEDICINE

## 2018-06-02 PROCEDURE — 74011250637 HC RX REV CODE- 250/637: Performed by: EMERGENCY MEDICINE

## 2018-06-02 PROCEDURE — 83605 ASSAY OF LACTIC ACID: CPT | Performed by: EMERGENCY MEDICINE

## 2018-06-02 PROCEDURE — 71045 X-RAY EXAM CHEST 1 VIEW: CPT

## 2018-06-02 PROCEDURE — 74011250637 HC RX REV CODE- 250/637

## 2018-06-02 PROCEDURE — 85025 COMPLETE CBC W/AUTO DIFF WBC: CPT | Performed by: EMERGENCY MEDICINE

## 2018-06-02 PROCEDURE — 81003 URINALYSIS AUTO W/O SCOPE: CPT | Performed by: EMERGENCY MEDICINE

## 2018-06-02 PROCEDURE — 74011250636 HC RX REV CODE- 250/636

## 2018-06-02 PROCEDURE — 87449 NOS EACH ORGANISM AG IA: CPT | Performed by: HOSPITALIST

## 2018-06-02 PROCEDURE — 87804 INFLUENZA ASSAY W/OPTIC: CPT | Performed by: EMERGENCY MEDICINE

## 2018-06-02 PROCEDURE — 96365 THER/PROPH/DIAG IV INF INIT: CPT

## 2018-06-02 PROCEDURE — 80053 COMPREHEN METABOLIC PANEL: CPT | Performed by: EMERGENCY MEDICINE

## 2018-06-02 PROCEDURE — 93005 ELECTROCARDIOGRAM TRACING: CPT

## 2018-06-02 PROCEDURE — 77030005563 HC CATH URETH INT MMGH -A

## 2018-06-02 RX ORDER — IPRATROPIUM BROMIDE AND ALBUTEROL SULFATE 2.5; .5 MG/3ML; MG/3ML
3 SOLUTION RESPIRATORY (INHALATION)
Status: DISCONTINUED | OUTPATIENT
Start: 2018-06-02 | End: 2018-06-04 | Stop reason: HOSPADM

## 2018-06-02 RX ORDER — LEVOFLOXACIN 5 MG/ML
750 INJECTION, SOLUTION INTRAVENOUS EVERY 24 HOURS
Status: DISCONTINUED | OUTPATIENT
Start: 2018-06-03 | End: 2018-06-04 | Stop reason: HOSPADM

## 2018-06-02 RX ORDER — SODIUM CHLORIDE 0.9 % (FLUSH) 0.9 %
5-10 SYRINGE (ML) INJECTION EVERY 8 HOURS
Status: DISCONTINUED | OUTPATIENT
Start: 2018-06-02 | End: 2018-06-04 | Stop reason: HOSPADM

## 2018-06-02 RX ORDER — LEVOFLOXACIN 5 MG/ML
750 INJECTION, SOLUTION INTRAVENOUS EVERY 24 HOURS
Status: DISCONTINUED | OUTPATIENT
Start: 2018-06-02 | End: 2018-06-02 | Stop reason: SDUPTHER

## 2018-06-02 RX ORDER — LEVOFLOXACIN 5 MG/ML
INJECTION, SOLUTION INTRAVENOUS
Status: COMPLETED
Start: 2018-06-02 | End: 2018-06-02

## 2018-06-02 RX ORDER — TRAMADOL HYDROCHLORIDE 50 MG/1
50 TABLET ORAL
Status: DISCONTINUED | OUTPATIENT
Start: 2018-06-02 | End: 2018-06-04 | Stop reason: HOSPADM

## 2018-06-02 RX ORDER — TAMSULOSIN HYDROCHLORIDE 0.4 MG/1
0.4 CAPSULE ORAL
Status: DISCONTINUED | OUTPATIENT
Start: 2018-06-02 | End: 2018-06-04 | Stop reason: HOSPADM

## 2018-06-02 RX ORDER — PANTOPRAZOLE SODIUM 40 MG/1
40 TABLET, DELAYED RELEASE ORAL
Status: DISCONTINUED | OUTPATIENT
Start: 2018-06-03 | End: 2018-06-04 | Stop reason: HOSPADM

## 2018-06-02 RX ORDER — SODIUM CHLORIDE 0.9 % (FLUSH) 0.9 %
5-10 SYRINGE (ML) INJECTION AS NEEDED
Status: DISCONTINUED | OUTPATIENT
Start: 2018-06-02 | End: 2018-06-04 | Stop reason: HOSPADM

## 2018-06-02 RX ORDER — DILTIAZEM HYDROCHLORIDE 180 MG/1
180 CAPSULE, COATED, EXTENDED RELEASE ORAL DAILY
Status: DISCONTINUED | OUTPATIENT
Start: 2018-06-02 | End: 2018-06-04 | Stop reason: HOSPADM

## 2018-06-02 RX ORDER — ACETAMINOPHEN 500 MG
1000 TABLET ORAL
Status: COMPLETED | OUTPATIENT
Start: 2018-06-02 | End: 2018-06-02

## 2018-06-02 RX ORDER — GABAPENTIN 300 MG/1
600 CAPSULE ORAL 3 TIMES DAILY
Status: DISCONTINUED | OUTPATIENT
Start: 2018-06-02 | End: 2018-06-04 | Stop reason: HOSPADM

## 2018-06-02 RX ORDER — SODIUM CHLORIDE 9 MG/ML
75 INJECTION, SOLUTION INTRAVENOUS CONTINUOUS
Status: DISPENSED | OUTPATIENT
Start: 2018-06-02 | End: 2018-06-03

## 2018-06-02 RX ORDER — FUROSEMIDE 40 MG/1
40 TABLET ORAL DAILY
Status: DISCONTINUED | OUTPATIENT
Start: 2018-06-02 | End: 2018-06-02

## 2018-06-02 RX ORDER — TRAZODONE HYDROCHLORIDE 50 MG/1
50 TABLET ORAL
Status: DISCONTINUED | OUTPATIENT
Start: 2018-06-02 | End: 2018-06-04 | Stop reason: HOSPADM

## 2018-06-02 RX ADMIN — SODIUM CHLORIDE 2382 ML: 900 INJECTION, SOLUTION INTRAVENOUS at 05:38

## 2018-06-02 RX ADMIN — GABAPENTIN 600 MG: 300 CAPSULE ORAL at 22:13

## 2018-06-02 RX ADMIN — APIXABAN 5 MG: 5 TABLET, FILM COATED ORAL at 22:14

## 2018-06-02 RX ADMIN — GABAPENTIN 600 MG: 300 CAPSULE ORAL at 11:31

## 2018-06-02 RX ADMIN — Medication 10 ML: at 11:32

## 2018-06-02 RX ADMIN — LEVOFLOXACIN 750 MG: 5 INJECTION, SOLUTION INTRAVENOUS at 05:38

## 2018-06-02 RX ADMIN — APIXABAN 5 MG: 5 TABLET, FILM COATED ORAL at 12:01

## 2018-06-02 RX ADMIN — FUROSEMIDE 40 MG: 40 TABLET ORAL at 11:31

## 2018-06-02 RX ADMIN — Medication 10 ML: at 16:11

## 2018-06-02 RX ADMIN — SODIUM CHLORIDE 75 ML/HR: 900 INJECTION, SOLUTION INTRAVENOUS at 16:11

## 2018-06-02 RX ADMIN — TAMSULOSIN HYDROCHLORIDE 0.4 MG: 0.4 CAPSULE ORAL at 17:32

## 2018-06-02 RX ADMIN — GABAPENTIN 600 MG: 300 CAPSULE ORAL at 17:32

## 2018-06-02 RX ADMIN — DILTIAZEM HYDROCHLORIDE 180 MG: 180 CAPSULE, COATED, EXTENDED RELEASE ORAL at 11:31

## 2018-06-02 RX ADMIN — ACETAMINOPHEN 1000 MG: 500 TABLET, FILM COATED ORAL at 05:37

## 2018-06-02 RX ADMIN — Medication 10 ML: at 22:14

## 2018-06-02 NOTE — PROGRESS NOTES
Dr. Abdi paged for second time-awaiting call back. Care endorsed to [facility] RN-Pt Sat 98% on 3 liters O2.

## 2018-06-02 NOTE — ED NOTES
TRANSFER - ED to INPATIENT REPORT:    SBAR report made available to receiving floor on this patient being transferred to [facility]  for routine progression of care       Admitting diagnosis Pneumonia    Information from the following report(s) SBAR, ED Summary and Intake/Output was made available to receiving floor. Lines:   Peripheral IV 06/02/18 Left Antecubital (Active)   Site Assessment Clean, dry, & intact 6/2/2018  5:10 AM   Phlebitis Assessment 0 6/2/2018  5:10 AM   Infiltration Assessment 0 6/2/2018  5:10 AM   Dressing Status Clean, dry, & intact 6/2/2018  5:10 AM   Dressing Type Transparent 6/2/2018  5:10 AM   Hub Color/Line Status Patent; Flushed 6/2/2018  5:10 AM   Action Taken Blood drawn 6/2/2018  5:10 AM        Medication list unable to confirm    Opportunity for questions and clarification was provided. Patient is oriented to time, place, person and situation . Patient is  continent and non-ambulatory     Valuables given to family at patients request only belongings with patient is tshirt.      Patient transported with:   Monitor  O2 @ 2 liters  Registered Nurse

## 2018-06-02 NOTE — PROGRESS NOTES
Pt c/o shortness of breath. Crackles noted in lung bases. O2 Sat 100% on 3 liters O2. IVF turned off, Dr. Abdi paged.

## 2018-06-02 NOTE — PROGRESS NOTES
Admitted alert and oriented male pt to room 3002. Pt accompanied by wife and dtr. Pt on 2 liters n/c Sat 92%, SOB on minimal exertion. Per Luc Parr RN ER-pt is a medical pt and does not require tele. Pt denies pain but c/o chest tightness and frequent productive cough with green sputum. Expiratory wheeze noted.

## 2018-06-02 NOTE — PROGRESS NOTES
Pt with enoch boots to bilateral lower legs for stasis ulcers, per pt they were put on by [facility] under care of Dr. Lorin Hernández. Per pt enoch boots are due to come off tomorrow on Rachel 3-and he is scheduled to have outpatient vascular studies at doctor's office.

## 2018-06-02 NOTE — H&P
Internal Medicine History and Physical          Subjective     HPI: Sharon Mcdermott is a 80 y.o. male with a PMHx of afib on AC, GERD, hypothyroidism who presented to the ED with two day history of cough and SOB. The patient states this started yest morning and persisted into today. He remembers feeling very weak all of a sudden and developed a productive cough. Wife states recorded fever of 102.1 which broke with Tylenol. He has been around some sick contacts. He denies any N/V/D. He denies any associated CP. In the ED, he was found to have a RLL PNA on CXR and was given IVAB. His initial O2 was 89% and he was slightly hypotensive in the 80s. His baseline is 110s. He has Hx of HTN but was recently taken off some of his meds due to lower BPs. PMHx:  Past Medical History:   Diagnosis Date    Arthritis     Atrial fibrillation ([facility] [address]     GERD (gastroesophageal reflux disease)     Glaucoma     Hypertension     Hypothyroidism     Malignant neoplasm of prostate ([facility] [address]     [place] 3+4, s/p combined radiation w/ naht completed 9/09, psa 5.5     Pancreatitis 2009       PSurgHx:  Past Surgical History:   Procedure Laterality Date    COLONOSCOPY N/A 1/3/2017    COLONOSCOPY w/ polepectomy  performed by Vinicius Petit MD at [facility] ENDOSCOPY    HX CATARACT REMOVAL      HX CHOLECYSTECTOMY      HX ORTHOPAEDIC      rt shoulder cuff repair    HX ROTATOR CUFF REPAIR         SocialHx:  Social History     Social History    Marital status:      Spouse name: N/A    Number of children: N/A    Years of education: N/A     Occupational History    Not on file.      Social History Main Topics    Smoking status: Former Smoker     Quit date: 1/1/1972    Smokeless tobacco: Never Used    Alcohol use 1.2 oz/week     2 Cans of beer per week      Comment: weekly     Drug use: No    Sexual activity: Not on file     Other Topics Concern    Not on file     Social History Narrative       FamilyHx:  Family History   Problem Relation Age of Onset    Dementia Mother     Heart Disease Father        Prior to Admission Medications   Prescriptions Last Dose Informant Patient Reported? Taking? ALPHAGAN P 0.15 % ophthalmic solution   Yes No   PICATO 0.015 % gel   Yes No   PYRIDOXINE HCL (VITAMIN B-6 PO)   Yes No   Sig: Take  by mouth. acitretin 25 mg capsule   Yes No   Sig: TK 1 C PO QPM   albuterol (PROAIR HFA) 90 mcg/actuation inhaler   Yes No   Sig: Take  inhaled by mouth. apixaban (ELIQUIS) 2.5 mg tablet   Yes Yes   Sig: Take 2.5 mg by mouth two (2) times a day. bimatoprost (LUMIGAN) 0.03 % ophthalmic drops   Yes No   Sig: Administer 1 drop to both eyes every evening. clobetasol 0.05 % sham   Yes No   colchicine (COLCRYS) 0.6 mg tablet   Yes No   Sig: Take As Needed. cyanocobalamin 1,000 mcg tablet   Yes No   Si,000 mcg. desonide (TRIDESILON) 0.05 % cream   Yes No   Sig: Take As Needed. dilTIAZem CD (CARDIZEM CD) 180 mg ER capsule   Yes No   Si mg.   esomeprazole (NEXIUM) 40 mg capsule   Yes No   Si mg.   furosemide (LASIX) 40 mg tablet   Yes No   gabapentin (NEURONTIN) 600 mg tablet   Yes No   Sig: Take 600 mg by mouth three (3) times daily. hydrOXYzine HCl (ATARAX) 10 mg tablet   Yes No   Sig: Take As Needed. hydrocortisone (HYTONE) 2.5 % topical cream   Yes No   hydrocortisone (HYTONE) 2.5 % topical cream   Yes No   ingenol mebutate (PICATO) 0.015 % gel   Yes No   ipratropium-albuterol (COMBIVENT RESPIMAT)  mcg/actuation inhaler   Yes No   Sig: Take As Needed. levothyroxine (SYNTHROID) 175 mcg tablet   Yes No   Sig: Take 175 mcg by mouth Daily (before breakfast). loratadine (CLARITIN) 10 mg tablet   Yes No   Sig: 10 mg.   metroNIDAZOLE (METROCREAM) 0.75 % topical cream   Yes No   Sig: APPLY TO FACE BID   multivitamins-minerals-lutein (CENTRUM SILVER) Tab   Yes No   Sig: Take  by mouth. tamsulosin (FLOMAX) 0.4 mg capsule   No No   Sig: Take 1 Cap by mouth daily (after dinner).    traMADol (ULTRAM) 50 mg tablet   No No   Sig: Take 1 tablet by mouth every six (6) hours as needed for Pain. traZODone (DESYREL) 50 mg tablet   Yes No   Sig: Take 50 mg by mouth nightly as needed.       Facility-Administered Medications: None       Review of Systems:  CONST: Fever, weight loss, fatigue or chills  HEENT: Recent changes in vision, vertigo, epistaxis, dysphagia and hoarseness  CV: Chest pain, palpitations, HTN, edema and varicosities  RESP: Cough, shortness of breath, wheezing, hemoptysis, snoring and reactive airway disease  GI: Nausea, vomiting, abdominal pain, change in bowel habits, hematochezia, melena, and GERD   : Hematuria, dysuria, frequency, urgency, nocturia and stress urinary incontinence   MS: Weakness, joint pain and arthritis  ENDO: Diabetes, thyroid disease, polyuria, polydipsia, polyphagia, poor wound healing, heat intolerance, cold intolerance  LYMPH/HEME: Anemia, bruising and history of blood transfusions  INTEG: Dermatitis, abnormal moles  NEURO: Dizziness, headache, fainting, seizures and stroke  PSYCH: Anxiety and depression      Objective      Visit Vitals    /72 (BP 1 Location: Left arm, BP Patient Position: Head of bed elevated (Comment degrees))    Pulse 90    Temp 98.1 °F (36.7 °C)    Resp 18    Ht 5' 8\" (1.727 m)    Wt 79.4 kg (175 lb)    SpO2 98%    BMI 26.61 kg/m2       Physical Exam:  General Appearance: NAD, conversant  HENT: normocephalic/atraumatic, dry mucus membranes  Lungs: RLL rhonchi, coarse with normal respiratory effort  Cardiovascular: IRIR, no m/r/g, capillary refill < 2 sec, B/L DP/PT pulses +3/4  Abdomen: soft, non-tender, normal bowel sounds  Extremities: no cyanosis, B/L pedal edema w/ wraps B/L in place  Neuro: moves all extremities, no focal deficits  Psych: appropriate affect, alert and oriented to person, place and time    Laboratory Studies:  BMP:   Lab Results   Component Value Date/Time     06/02/2018 05:10 AM    K 3.8 06/02/2018 05:10 AM     06/02/2018 05:10 AM    CO2 32 06/02/2018 05:10 AM    AGAP 6 06/02/2018 05:10 AM    GLU 95 06/02/2018 05:10 AM    BUN 13 06/02/2018 05:10 AM    CREA 1.05 06/02/2018 05:10 AM    GFRAA >60 06/02/2018 05:10 AM    GFRNA >60 06/02/2018 05:10 AM     CBC:   Lab Results   Component Value Date/Time    WBC 7.9 06/02/2018 05:10 AM    HGB 12.0 (L) 06/02/2018 05:10 AM    HCT 36.0 06/02/2018 05:10 AM     06/02/2018 05:10 AM       Imaging Reviewed:  Xr Chest Port    Result Date: 6/2/2018  EXAM:Chest X-Ray  History: Sepsis Technique:  Portable Frontal View Comparison: 01/19/2017, 11/16/2011 _______________ FINDINGS: The trachea is midline. Stable midline cardiac silhouette and hilar structures. Developing confluent/consolidative right hilar to lower lung opacity with obscured right diaphragm, with depressed right minor fissure. Mild increased left perihilar to retrocardiac left lower lung interstitial markings. No pneumothorax. Intact osseous structures. _______________     IMPRESSION: 1. Right lower thoracic developing confluent airspace disease, pneumonia/atelectasis. Assessment/Plan     Active Hospital Problems    Diagnosis Date Noted    Pneumonia 06/02/2018    Atrial fibrillation ([facility] [address] 06/02/2018    Hypothyroidism 06/02/2018    Sepsis ([facility] [address] 06/02/2018    GERD (gastroesophageal reflux disease)      - Responded to fluids immediately w/ pressures stable at his baseline  - Cont IVAB for CAP coverage  - Strep and legionella Ag  - Trend CBC  - Cont puls ox  - Cont low flow IVFs  - PT/OT  - Cont acceptable home medications for chronic conditions   - DVT protocol    I have personally reviewed all pertinent labs, films and EKGs that have officially resulted. I reviewed available electronic documentation outlining the initial presentation as well as the emergency room physician's encounter.     Eber Mills DO  Internal Medicine, Hospitalist  Pager: [phone]  [facility]

## 2018-06-02 NOTE — ED PROVIDER NOTES
EMERGENCY DEPARTMENT HISTORY AND PHYSICAL EXAM    6:11 AM      Date: 6/2/2018  Patient Name: Fred Christianson    History of Presenting Illness     Chief Complaint   Patient presents with    Fever    Cough    Fatigue         History Provided By: Patient, Patient's Wife and Patient's Daughter    Chief Complaint: Fatigue  Duration: 1 Days  Timing:  Acute  Location: Generalized  Quality: N/A  Severity: Moderate  Modifying Factors: No worsening or alleviating factors. Pt tried nothing for this PTA. Associated Symptoms: Fever (102.7at Home), cough      Additional History (Context): Fred Christianson is a 80 y.o. male with hypertension who presents with moderate, acute, generalized fatigue, onset 1 day PTA, with associated symptoms of Fever (102.7at Home), and cough. No worsening or alleviating factors. Pt tried nothing for this PTA. Pt states he has been feeling extremely weak for the past day, to the point to where he can not get out of bed. Pt states he feels like a \"dummy\" physically. Pt states he has sick contacts at home with URI. Pt also states he is on blood thinners for HTN. Pt denies any constipations, or any other symptoms or complaints at this time. Pt is on Eliquas for A-Fib    PCP: Ayde Ivan MD    Current Facility-Administered Medications   Medication Dose Route Frequency Provider Last Rate Last Dose    sodium chloride (NS) flush 5-10 mL  5-10 mL IntraVENous PRN Gwendlyn Picking, DO        levoFLOXacin (LEVAQUIN) 750 mg in D5W IVPB  750 mg IntraVENous Q24H Gwendlyn Picking, DO   Stopped at 06/02/18 0708     Current Outpatient Prescriptions   Medication Sig Dispense Refill    apixaban (ELIQUIS) 2.5 mg tablet Take 2.5 mg by mouth two (2) times a day.  tamsulosin (FLOMAX) 0.4 mg capsule Take 1 Cap by mouth daily (after dinner). 90 Cap 3    acitretin 25 mg capsule TK 1 C PO QPM  1    albuterol (PROAIR HFA) 90 mcg/actuation inhaler Take  inhaled by mouth.       ALPHAGAN P 0.15 % ophthalmic solution       clobetasol 0.05 % sham       colchicine (COLCRYS) 0.6 mg tablet Take As Needed.  ipratropium-albuterol (COMBIVENT RESPIMAT)  mcg/actuation inhaler Take As Needed.  cyanocobalamin 1,000 mcg tablet 1,000 mcg.  desonide (TRIDESILON) 0.05 % cream Take As Needed.  dilTIAZem CD (CARDIZEM CD) 180 mg ER capsule 180 mg.      furosemide (LASIX) 40 mg tablet       hydrocortisone (HYTONE) 2.5 % topical cream       hydrocortisone (HYTONE) 2.5 % topical cream       hydrOXYzine HCl (ATARAX) 10 mg tablet Take As Needed.  PICATO 0.015 % gel       loratadine (CLARITIN) 10 mg tablet 10 mg.      metroNIDAZOLE (METROCREAM) 0.75 % topical cream APPLY TO FACE BID  1    ingenol mebutate (PICATO) 0.015 % gel       esomeprazole (NEXIUM) 40 mg capsule 40 mg.      gabapentin (NEURONTIN) 600 mg tablet Take 600 mg by mouth three (3) times daily.  levothyroxine (SYNTHROID) 175 mcg tablet Take 175 mcg by mouth Daily (before breakfast).  traMADol (ULTRAM) 50 mg tablet Take 1 tablet by mouth every six (6) hours as needed for Pain. 15 tablet 0    bimatoprost (LUMIGAN) 0.03 % ophthalmic drops Administer 1 drop to both eyes every evening.  multivitamins-minerals-lutein (CENTRUM SILVER) Tab Take  by mouth.  traZODone (DESYREL) 50 mg tablet Take 50 mg by mouth nightly as needed.  PYRIDOXINE HCL (VITAMIN B-6 PO) Take  by mouth.          Past History     Past Medical History:  Past Medical History:   Diagnosis Date    Arthritis     Atrial fibrillation ([facility] [address]     GERD (gastroesophageal reflux disease)     Glaucoma     Hypertension     Hypothyroidism     Malignant neoplasm of prostate ([facility] [address]     [place] 3+4, s/p combined radiation w/ naht completed 9/09, psa 5.5     Pancreatitis 2009       Past Surgical History:  Past Surgical History:   Procedure Laterality Date    COLONOSCOPY N/A 1/3/2017    COLONOSCOPY w/ polepectomy  performed by Arturo Canales MD at [address] [place] [address] ENDOSCOPY    HX CATARACT REMOVAL      HX CHOLECYSTECTOMY      HX ORTHOPAEDIC      rt shoulder cuff repair    HX ROTATOR CUFF REPAIR         Family History:  Family History   Problem Relation Age of Onset    Dementia Mother     Heart Disease Father        Social History:  Social History   Substance Use Topics    Smoking status: Former Smoker     Quit date: 1/1/1972    Smokeless tobacco: Never Used    Alcohol use 1.2 oz/week     2 Cans of beer per week      Comment: weekly        Allergies: Allergies   Allergen Reactions    Adhesive Other (comments)     Skin irritation    Darvon [Propoxyphene] Hives, Myalgia and Other (comments)     Joint pain    Penicillins Rash and Itching         Review of Systems       Review of Systems   Constitutional: Positive for fatigue (generalized) and fever. Negative for activity change. HENT: Negative for congestion and rhinorrhea. Eyes: Negative for visual disturbance. Respiratory: Positive for cough. Negative for shortness of breath. Cardiovascular: Negative for chest pain and palpitations. Gastrointestinal: Negative for abdominal pain, constipation, diarrhea, nausea and vomiting. Genitourinary: Negative for dysuria and hematuria. Musculoskeletal: Negative for back pain. Skin: Negative for rash. Neurological: Negative for dizziness, weakness and light-headedness. Psychiatric/Behavioral: Negative for agitation. All other systems reviewed and are negative. Physical Exam     Visit Vitals    /88    Pulse 85    Temp 99.9 °F (37.7 °C)    Resp 17    Ht 5' 8\" (1.727 m)    Wt 79.4 kg (175 lb)    SpO2 97%    BMI 26.61 kg/m2         Physical Exam   Constitutional: He appears well-developed and well-nourished. HENT:   Head: Normocephalic and atraumatic. Eyes: Conjunctivae are normal. Pupils are equal, round, and reactive to light. Neck: Normal range of motion. Neck supple. Cardiovascular: Normal rate and regular rhythm.     Pulmonary/Chest: Effort normal and breath sounds normal.   Abdominal: Soft. Bowel sounds are normal. There is no tenderness. Musculoskeletal: Normal range of motion. No spinal tenderness. Venous superficial ulcers bilateral lower extremities. Lymphadenopathy:     He has no cervical adenopathy. Neurological: He is alert. Skin: Skin is warm. No cellulitis on bilateral knees   Psychiatric: He has a normal mood and affect. Diagnostic Study Results     Labs -  Recent Results (from the past 12 hour(s))   METABOLIC PANEL, COMPREHENSIVE    Collection Time: 06/02/18  5:10 AM   Result Value Ref Range    Sodium 141 136 - 145 mmol/L    Potassium 3.8 3.5 - 5.5 mmol/L    Chloride 103 100 - 108 mmol/L    CO2 32 21 - 32 mmol/L    Anion gap 6 3.0 - 18 mmol/L    Glucose 95 74 - 99 mg/dL    BUN 13 7.0 - 18 MG/DL    Creatinine 1.05 0.6 - 1.3 MG/DL    BUN/Creatinine ratio 12 12 - 20      GFR est AA >60 >60 ml/min/1.73m2    GFR est non-AA >60 >60 ml/min/1.73m2    Calcium 8.2 (L) 8.5 - 10.1 MG/DL    Bilirubin, total 0.5 0.2 - 1.0 MG/DL    ALT (SGPT) 21 16 - 61 U/L    AST (SGOT) 23 15 - 37 U/L    Alk. phosphatase 88 45 - 117 U/L    Protein, total 5.6 (L) 6.4 - 8.2 g/dL    Albumin 2.9 (L) 3.4 - 5.0 g/dL    Globulin 2.7 2.0 - 4.0 g/dL    A-G Ratio 1.1 0.8 - 1.7     CBC WITH AUTOMATED DIFF    Collection Time: 06/02/18  5:10 AM   Result Value Ref Range    WBC 7.9 4.6 - 13.2 K/uL    RBC 3.48 (L) 4.70 - 5.50 M/uL    HGB 12.0 (L) 13.0 - 16.0 g/dL    HCT 36.0 36.0 - 48.0 %    .4 (H) 74.0 - 97.0 FL    MCH 34.5 (H) 24.0 - 34.0 PG    MCHC 33.3 31.0 - 37.0 g/dL    RDW 14.4 11.6 - 14.5 %    PLATELET 987 977 - 026 K/uL    MPV 8.7 (L) 9.2 - 11.8 FL    NEUTROPHILS 90 (H) 40 - 73 %    LYMPHOCYTES 6 (L) 21 - 52 %    MONOCYTES 4 3 - 10 %    EOSINOPHILS 0 0 - 5 %    BASOPHILS 0 0 - 2 %    ABS. NEUTROPHILS 7.1 1.8 - 8.0 K/UL    ABS. LYMPHOCYTES 0.5 (L) 0.9 - 3.6 K/UL    ABS. MONOCYTES 0.3 0.05 - 1.2 K/UL    ABS. EOSINOPHILS 0.0 0.0 - 0.4 K/UL    ABS. BASOPHILS 0.0 0.0 - 0.06 K/UL    DF AUTOMATED     CARDIAC PANEL,(CK, CKMB & TROPONIN)    Collection Time: 06/02/18  5:10 AM   Result Value Ref Range    CK 57 39 - 308 U/L    CK - MB <1.0 <3.6 ng/ml    CK-MB Index  0.0 - 4.0 %     CALCULATION NOT PERFORMED WHEN RESULT IS BELOW LINEAR LIMIT    Troponin-I, Qt. 0.02 0.0 - 0.045 NG/ML   NT-PRO BNP    Collection Time: 06/02/18  5:10 AM   Result Value Ref Range    NT pro-BNP 1905 (H) 0 - 1800 PG/ML   POC LACTIC ACID    Collection Time: 06/02/18  5:12 AM   Result Value Ref Range    Lactic Acid (POC) 1.4 0.4 - 2.0 mmol/L   EKG, 12 LEAD, INITIAL    Collection Time: 06/02/18  5:25 AM   Result Value Ref Range    Ventricular Rate 103 BPM    Atrial Rate 103 BPM    QRS Duration 94 ms    Q-T Interval 354 ms    QTC Calculation (Bezet) 463 ms    Calculated R Axis -36 degrees    Calculated T Axis 86 degrees    Diagnosis       Atrial fibrillation with rapid ventricular response  Left axis deviation  Nonspecific ST and T wave abnormality , probably digitalis effect  Abnormal ECG  When compared with ECG of 03-JAN-2017 10:34,  Nonspecific T wave abnormality, worse in Lateral leads     INFLUENZA A & B AG (RAPID TEST)    Collection Time: 06/02/18  6:35 AM   Result Value Ref Range    Influenza A Antigen NEGATIVE  NEG      Influenza B Antigen NEGATIVE  NEG     URINALYSIS W/ RFLX MICROSCOPIC    Collection Time: 06/02/18  7:32 AM   Result Value Ref Range    Color YELLOW      Appearance CLEAR      Specific gravity 1.015 1.005 - 1.030      pH (UA) 6.5 5.0 - 8.0      Protein NEGATIVE  NEG mg/dL    Glucose NEGATIVE  NEG mg/dL    Ketone NEGATIVE  NEG mg/dL    Bilirubin NEGATIVE  NEG      Blood NEGATIVE  NEG      Urobilinogen 0.2 0.2 - 1.0 EU/dL    Nitrites NEGATIVE  NEG      Leukocyte Esterase NEGATIVE  NEG         Radiologic Studies -   XR CHEST PORT    (Results Pending)         Medical Decision Making   I am the first provider for this patient.     I reviewed the vital signs, available nursing notes, past medical history, past surgical history, family history and social history. Vital Signs-Reviewed the patient's vital signs. Pulse Oximetry Analysis -  97% on room air (Interpretation)normal    Pt is Hypotensive at 80 systolic blood pressure. Pt is Hypoxic 89%   these vitals First set off vitals on arrival.      Cardiac Monitor:  Rate: 103  Rhythm:  Normal Sinus Rhythm     EKG: Interpreted by the EP. Time Interpreted: 6994   Rate: 103   Rhythm: Sinus Tachycardia    Interpretation: A fib is present. Non-specific ST abnormalities. Records Reviewed: Nursing Notes and Old Medical Records (Time of Review: 6:11 AM)        Provider Notes (Medical Decision Making): Pt presents with fatigue, fever, cough. Concern with pneumonia, and Hx with hypoxia and HTN. CXR reveals infultry of the right base. Sepsis protocol initiated because of HTN which has improved on arrival. Pt will need to be admited and aggressively treated for symptoms. 7:59 AM Pt does meet sepsis criteria as Pt has no elevated lactate and HTN is resolved with fluids. Pt will be treated for sepsis,  but has no indication of sepsis or sepsis shock. Consult:  Discussed care with Dr Yaa Mederos, Specialty: Hospitalist  Standard discussion; including history of patients chief complaint, available diagnostic results, and treatment course. Will evaluate Pt in the ED.  8:28 AM, 6/2/2018      9:00 AM Dr Yaa Mederos accepted Pt for admittance. He evaluated the patient emergency department. 08:35 AM Pt reevaluated. Wife was at bedside. BP of 125/18-with 76 map. Fluids for sepsis continuing. Pt is improved will be admit     6:23 AM CXR Show right vascular infiltrate. Left vascular infiltrate compared to 2017 is unchanged. 6:23 AM CBC non-specific   Cmp Non-specific  On review     awaiting urinalysis. Patient continues to be stable in the emergency department with normal platelets.   Patient and wife state that he is feeling much better and is improving and his color is improving appears more alert. Nurse agrees with this assessment as well. There is no sign of severe sepsis or  Septic shock before patient went to the floor    Procedures:   CRITICAL CARE:    Core Measures:     Critical Care Time: 7:55 AM  I have spent 30 minutes of critical care time involved in lab review, consultations with specialist, family decision-making, and documentation. During this entire length of time I was immediately available to the patient. Critical Care: The reason for providing this level of medical care for this critically ill patient was due a critical illness that impaired one or more vital organ systems such that there was a high probability of imminent or life threatening deterioration in the patients condition. This care involved high complexity decision making to assess, manipulate, and support vital system functions, to treat this degreee vital organ system failure and to prevent further life threatening deterioration of the patients condition. For Hospitalized Patients:    1. Hospitalization Decision Time:  The decision to hospitalize the patient was made by Dr. Ricky Kelley at [facility] on 6/2/2018    2. Aspirin: Aspirin was not given because the patient did not present with a stroke at the time of their Emergency Department evaluation    Diagnosis     Clinical Impression:   1. Pneumonia of right lower lobe due to infectious organism ([facility] [address]    2. Sepsis, due to unspecified organism ([facility] [address]    3. Hypotension, unspecified hypotension type    4. Hypoxia        Disposition: Admitted    Follow-up Information     None           Patient's Medications   Start Taking    No medications on file   Continue Taking    ACITRETIN 25 MG CAPSULE    TK 1 C PO QPM    ALBUTEROL (PROAIR HFA) 90 MCG/ACTUATION INHALER    Take  inhaled by mouth. ALPHAGAN P 0.15 % OPHTHALMIC SOLUTION        APIXABAN (ELIQUIS) 2.5 MG TABLET    Take 2.5 mg by mouth two (2) times a day.     BIMATOPROST (LUMIGAN) 0.03 % OPHTHALMIC DROPS    Administer 1 drop to both eyes every evening. CLOBETASOL 0.05 % SHAM        COLCHICINE (COLCRYS) 0.6 MG TABLET    Take As Needed. CYANOCOBALAMIN 1,000 MCG TABLET    1,000 mcg. DESONIDE (TRIDESILON) 0.05 % CREAM    Take As Needed. DILTIAZEM CD (CARDIZEM CD) 180 MG ER CAPSULE    180 mg. ESOMEPRAZOLE (NEXIUM) 40 MG CAPSULE    40 mg.    FUROSEMIDE (LASIX) 40 MG TABLET        GABAPENTIN (NEURONTIN) 600 MG TABLET    Take 600 mg by mouth three (3) times daily. HYDROCORTISONE (HYTONE) 2.5 % TOPICAL CREAM        HYDROCORTISONE (HYTONE) 2.5 % TOPICAL CREAM        HYDROXYZINE HCL (ATARAX) 10 MG TABLET    Take As Needed. INGENOL MEBUTATE (PICATO) 0.015 % GEL        IPRATROPIUM-ALBUTEROL (COMBIVENT RESPIMAT)  MCG/ACTUATION INHALER    Take As Needed. LEVOTHYROXINE (SYNTHROID) 175 MCG TABLET    Take 175 mcg by mouth Daily (before breakfast). LORATADINE (CLARITIN) 10 MG TABLET    10 mg. METRONIDAZOLE (METROCREAM) 0.75 % TOPICAL CREAM    APPLY TO FACE BID    MULTIVITAMINS-MINERALS-LUTEIN (CENTRUM SILVER) TAB    Take  by mouth. PICATO 0.015 % GEL        PYRIDOXINE HCL (VITAMIN B-6 PO)    Take  by mouth. TAMSULOSIN (FLOMAX) 0.4 MG CAPSULE    Take 1 Cap by mouth daily (after dinner). TRAMADOL (ULTRAM) 50 MG TABLET    Take 1 tablet by mouth every six (6) hours as needed for Pain. TRAZODONE (DESYREL) 50 MG TABLET    Take 50 mg by mouth nightly as needed. These Medications have changed    No medications on file   Stop Taking    APIXABAN (ELIQUIS) 5 MG TABLET    5 mg.     OXYCODONE-ACETAMINOPHEN (PERCOCET) 5-325 MG PER TABLET    TK ONE TO TWO TS PO Q 4 TO 6 H PRNP    SYNTHROID 150 MCG TABLET         _______________________________    Attestations:  [address] acting as a scribe for and in the presence of Jose Larkin MD      June 02, 2018 at 6:11 AM       Provider Attestation:      I personally performed the services described in the documentation, reviewed the documentation, as recorded by the scribe in my presence, and it accurately and completely records my words and actions.  June 02, 2018 at 6:11 AM - Garett Paez MD    _______________________________

## 2018-06-02 NOTE — ED TRIAGE NOTES
Patient states he is having trouble getting around this AM. Reports cough and fever x 2 days. Took Tylenol approx 3 hrs ago.

## 2018-06-03 LAB
ATRIAL RATE: 103 BPM
CALCULATED R AXIS, ECG10: -36 DEGREES
CALCULATED T AXIS, ECG11: 86 DEGREES
DIAGNOSIS, 93000: NORMAL
Q-T INTERVAL, ECG07: 354 MS
QRS DURATION, ECG06: 94 MS
QTC CALCULATION (BEZET), ECG08: 463 MS
VENTRICULAR RATE, ECG03: 103 BPM

## 2018-06-03 PROCEDURE — 97162 PT EVAL MOD COMPLEX 30 MIN: CPT

## 2018-06-03 PROCEDURE — 65270000029 HC RM PRIVATE

## 2018-06-03 PROCEDURE — 74011250637 HC RX REV CODE- 250/637: Performed by: HOSPITALIST

## 2018-06-03 PROCEDURE — 97530 THERAPEUTIC ACTIVITIES: CPT

## 2018-06-03 PROCEDURE — 74011250636 HC RX REV CODE- 250/636: Performed by: HOSPITALIST

## 2018-06-03 PROCEDURE — 77010033678 HC OXYGEN DAILY

## 2018-06-03 RX ADMIN — LEVOFLOXACIN 750 MG: 5 INJECTION, SOLUTION INTRAVENOUS at 06:55

## 2018-06-03 RX ADMIN — GABAPENTIN 600 MG: 300 CAPSULE ORAL at 22:22

## 2018-06-03 RX ADMIN — GABAPENTIN 600 MG: 300 CAPSULE ORAL at 15:51

## 2018-06-03 RX ADMIN — Medication 10 ML: at 06:56

## 2018-06-03 RX ADMIN — Medication 10 ML: at 22:23

## 2018-06-03 RX ADMIN — APIXABAN 5 MG: 5 TABLET, FILM COATED ORAL at 22:23

## 2018-06-03 RX ADMIN — TAMSULOSIN HYDROCHLORIDE 0.4 MG: 0.4 CAPSULE ORAL at 18:46

## 2018-06-03 RX ADMIN — TRAZODONE HYDROCHLORIDE 50 MG: 50 TABLET ORAL at 22:23

## 2018-06-03 RX ADMIN — TRAMADOL HYDROCHLORIDE 50 MG: 50 TABLET, FILM COATED ORAL at 18:52

## 2018-06-03 RX ADMIN — GABAPENTIN 600 MG: 300 CAPSULE ORAL at 08:34

## 2018-06-03 RX ADMIN — PANTOPRAZOLE SODIUM 40 MG: 40 TABLET, DELAYED RELEASE ORAL at 06:55

## 2018-06-03 RX ADMIN — Medication 10 ML: at 13:29

## 2018-06-03 RX ADMIN — DILTIAZEM HYDROCHLORIDE 180 MG: 180 CAPSULE, COATED, EXTENDED RELEASE ORAL at 08:34

## 2018-06-03 RX ADMIN — TRAMADOL HYDROCHLORIDE 50 MG: 50 TABLET, FILM COATED ORAL at 03:52

## 2018-06-03 RX ADMIN — LEVOTHYROXINE SODIUM 175 MCG: 75 TABLET ORAL at 06:55

## 2018-06-03 RX ADMIN — APIXABAN 5 MG: 5 TABLET, FILM COATED ORAL at 08:35

## 2018-06-03 NOTE — PROGRESS NOTES
Assumed pt's care, he is alert and oriented, he denies any pain and in no apparent discomfort. Right arm dressing reinforced, bed locked and low with call light within pt's reach. 1009: Pt assisted to the BR for ADLs, tolerated well. 1230: Pt sitting in chair. 1522: Pt sitting in chair, spouse at beside. No complaint voiced. Bedside shift change report given to Gonzalez Blackwood RN (oncoming nurse) by Issa Bobby RN (offgoing nurse). Report included the following information SBAR, Kardex, Intake/Output, MAR and Med Rec Status.

## 2018-06-03 NOTE — PROGRESS NOTES
met with Patient completed the initial Spiritual Assessment of the patient, and offered Pastoral Care, see flow sheets for interventions. Patient does not have any Bahai/cultural needs that will affect patients preferences in health care. Charted reviewed. Chaplains will continue to follow and will provide pastoral care on an as needed/requested basis.       Magalie Starr  [phone]

## 2018-06-03 NOTE — PROGRESS NOTES
Problem: Mobility Impaired (Adult and Pediatric)  Goal: *Acute Goals and Plan of Care (Insert Text)  Physical Therapy Goals  Initiated 6/3/2018 and to be accomplished within 7 day(s) with SAT's >89%  1. Patient will move from supine to sit and sit to supine , scoot up and down and roll side to side in bed with modified independence. 2.  Patient will transfer from bed to chair and chair to bed with modified independence using the least restrictive device. 3.  Patient will perform sit to stand with modified independence. 4.  Patient will ambulate with modified independence for 150 feet with the least restrictive device. 5.  Patient will ascend/descend 4 stairs with  handrail(s) with modified independence. Outcome: Progressing Towards Goal  PHYSICAL THERAPY: Initial Assessment   INPATIENT: Medicare: Hospital Day: 2     Patient: Neetu Bradford [de-identified]80 y.o. male)    Date: 6/3/2018  Primary Diagnosis: Pneumonia    ,     Precautions: Fall  WBAT       PLOF:I with ADL's and ambulation without assistive device     ASSESSMENT :  Patient requires between supervision/set-up and minimal assistance/contact guard assist for , transfers and ambulation. Patient found sitting in bathroom when PT arrived  On room air. SAT's taken and were 88%. moved to sitting in chair with cga and supervision for 3 feet  Holding furniture. Patient  Returned to PO2 and SAT's improved to 93%  Sit to stand and exercises in sitting SAT's stayed 96% with PO2 on. No pain reported education on plan of care safety importance of mobility verbalized understanding stayed in chair nursing notified. Patient presents with deficits in:  Bed Mobility, Transfers, Gait, Strength, Balance, Stairs and Precautions    Patient will benefit from skilled intervention to address the above impairments.   Patients rehabilitation potential is considered to be Fair  Factors which may influence rehabilitation potential include:   []         None noted  []         Mental ability/status  [x]         Medical condition  []         Home/family situation and support systems  []         Safety awareness  []         Pain tolerance/management  []         Other:      PLAN :  Recommendations and Planned Interventions:  [x]           Bed Mobility Training             [x]    Neuromuscular Re-Education  [x]           Transfer Training                   []    Orthotic/Prosthetic Training  [x]           Gait Training                          []    Modalities  [x]           Therapeutic Exercises          []    Edema Management/Control  [x]           Therapeutic Activities            [x]    Patient and Family Training/Education  []           Other (comment):      EDUCATION:   Education:  Patient was educated on the following topics:  plan of care safety importance of mobility verbalized understanding    Barriers to Learning/Limitations: None  Compensate with: visual, verbal, tactile, kinesthetic cues/model    Recommendations for the next treatment session: gait with oxygen using rw in schwartz   Frequency/Duration: Patient will be followed by physical therapy 3-5 times a week to address goals.   Discharge Recommendations: Home Health  Further Equipment Recommendations for Discharge: rolling walker and N/A  Factors which may impact discharge planning: n/a     SUBJECTIVE:   Patient stated .    OBJECTIVE DATA SUMMARY:     Past Medical History:   Diagnosis Date    Arthritis     [facility]     GERD (gastroesophageal reflux disease)     Glaucoma     Hypertension     Hypothyroidism     Malignant neoplasm of prostate ([facility]     [place] 3+4, s/p combined radiation w/ naht completed 9/09, psa 5.5     Pancreatitis 2009     Past Surgical History:   Procedure Laterality Date    COLONOSCOPY N/A 1/3/2017    COLONOSCOPY w/ polepectomy  performed by [facility]faro [facility] at [facility] ENDOSCOPY    HX CATARACT REMOVAL      HX CHOLECYSTECTOMY      HX ORTHOPAEDIC      rt shoulder cuff repair    HX ROTATOR CUFF REPAIR           Eval Complexity: History: HIGH Complexity :3+ comorbidities / personal factors will impact the outcome/ POC Exam:MEDIUM Complexity : 3 Standardized tests and measures addressing body structure, function, activity limitation and / or participation in recreation  Presentation: MEDIUM Complexity : Evolving with changing characteristics  Clinical Decision Making:Medium Complexity [facility] Standing Balance Scale3/5 Overall Complexity:MEDIUM    G CODES:Mobility [id number] Current  CK= 40-59%  [id number] Goal  CI= 1-19%. The severity rating is based on the Other [employer] Balance Scale3/5    [employer] Balance Scale3/5  0: Pt performs 25% or less of standing activity (Max assist) CN, 100% impaired. 1: Pt supports self with upper extremities but requires therapist assistance. Pt performs 25-50% of effort (Mod assist) CM, 80% to <100% impaired. 1+: Pt supports self with upper extremities but requires therapist assistance. Pt performs >50% effort. (Min assist). CL, 60% to <80% impaired. 2: Pt supports self independently with both upper extremities (walker, crutches, parallel bars). CL, 60% to <80% impaired. 2+: Pt support self independently with 1 upper extremity (cane, crutch, 1 parallel bar). CK, 40% to <60% impaired. 3: Pt stands without upper extremity support for up to 30 seconds. CK, 40% to <60% impaired. 3+: Pt stands without upper extremity support for 30 seconds or greater. CJ, 20% to <40% impaired. 4: Pt independently moves and returns center of gravity 1-2 inches in one plane. CJ, 20% to <40% impaired. 4+: Pt independently moves and returns center of gravity 1-2 inches in multiple planes. CI, 1% to <20% impaired. 5: Pt independently moves and returns center of gravity in all planes greater than 2 inches. CH, 0% impaired.     Prior Level of Function/Home Situation: *I with ADL's and ambulation without assistive device  Home Situation  Home Environment: Private residence  # Steps to Enter: 4  One/Two Story Residence: One story  Living Alone: No  Support Systems: Family member(s)  Patient Expects to be Discharged to[de-identified] Private residence  Current DME Used/Available at Home: None  Critical Behavior:  Neurologic State: Alert  Orientation Level: Oriented X4  Cognition: Follows commands  Safety/Judgement: Awareness of environment; Fall prevention  Psychosocial  Patient Behaviors: Calm; Cooperative  Purposeful Interaction: Yes  Pt Identified Daily Priority: Clinical issues (comment)  Caritas Process: Nurture loving kindness; Attend basic human needs; Teaching/learning;Create healing environment  Caring Interventions: Reassure  Reassure: Therapeutic listening; Informing; Acceptance;Caring rounds  Therapeutic Modalities: Humor; Intentional therapeutic touch  Skin Condition/Temp: Fragile;Peeling;Dry     Skin Integrity: Tear;Abrasion; Wound (add Wound LDA) (Right arm)  Skin Integumentary  Skin Color: Ecchymosis (comment)  Skin Condition/Temp: Fragile;Peeling;Dry  Skin Integrity: Tear;Abrasion; Wound (add Wound LDA) (Right arm)  Turgor: Epidermis thin w/ loss of subcut tissue  Hair Growth: Present  Varicosities: Absent       Manual Muscle Testing (LE)  3+/5 both legs   Tone : normal   Sensation:  Intact   Range Of Motion:  wfl both legs     Functional Mobility:      Functional Status      Indep   (I)   Mod I   Super-vision   Min A   Mod A   Max A   Total A   Assist x2 Verbal cues Additional time Not tested   Comments   Rolling []  []  [] []    []    []  []  [] [] [] [x]    Supine to sit []  []  [] []  []  []  []  [] [] [] [x]    Sit to supine []  []  [] []  []  []  []  [] [] [] [x]    Sit to stand []  []  [x] [x]  []  []  []  [] [] [] []    Stand to sit []  []  [x] [x]  []  []  []  [] [] [] []    Bed to chair transfers []  []  [] []  []  []  []  [] [] [] []        Balance    Good   Fair   Poor   Unable   Not tested   Comments   Sitting static [x]  [x]  []  []  []    Sitting dynamic []  [x]  []  []  []    Standing static []  [x]  []  []  []    Standing dynamic []  [x]  []  []  []        Mobility/Gait:   Level of Assistance: Stand-by assistance, Contact guard assistance, Additional time and Assist x1  Assistive Device: hand held assist   Distance Ambulated: 3 feet     Left Lower Extremity: WBAT  Right Lower Extremity: WBAT  Base of Support: center of gravity altered  Speed/Annie: delayed, pace decreased (<100 feet/min) and slow  Step Length: left shortened and right shortened  Gait Abnormalities: decreased step clearance and step to gait        Therapeutic Exercises: Ankle pumps, glut sets knee ext, hip flex. Pain:  Pre treatment pain level:0  Post treatment pain level:0  Pain Scale 1: Numeric (0 - 10)  Pain Intensity 1: 0  Vital Signs  Temp: 98.3 °F (36.8 °C)     Pulse (Heart Rate): 87     BP: 127/71     Resp Rate: 20     O2 Sat (%): 97 %    Activity Tolerance:   Fair   Please refer to the flow sheet for vital signs taken during this treatment. After treatment:   [x]         Patient left in no apparent distress sitting up in chair  []         Patient left in no apparent distress in bed  [x]         Call bell left within reach  [x]         Nursing notified  []         Caregiver present  []         Bed alarm activated    COMMUNICATION/EDUCATION:   [x]         Fall prevention education was provided and the patient/caregiver indicated understanding. [x]         Patient/family have participated as able in goal setting and plan of care. [x]         Patient/family agree to work toward stated goals and plan of care. []         Patient understands intent and goals of therapy, but is neutral about his/her participation. []         Patient is unable to participate in goal setting and plan of care.         Thank you for this referral.  Melody Hebert, PT   Time Calculation: 25 mins

## 2018-06-03 NOTE — PROGRESS NOTES
1930 --  Bedside, Verbal and Written shift change report given to [id number] (oncoming nurse) by Virgen Israel nurse). Report included the following information SBAR, Kardex, Intake/Output, MAR and Recent Results. Will discuss with oncoming RN possible place Cardiac monitor on pt due dx of Afib w/ RVR.      2214 -- PM medications administered, pt tolerated with ease, will continue to monitor.      0000 -- Shift reassessment, pt condition unchanged, will continue to monitor. 0345 -- PRN pain medications administered, pt tolerated with ease, will continue to monitor.     7940 --  Shift reassessment, pt condition unchanged, will continue to monitor.        0800  -- Bedside, Verbal and Written shift change report given to [address]) by DEMI (offgoing nurse). Report included the following information SBAR, Kardex, Intake/Output, MAR and Recent Results. Skin assessment completed.

## 2018-06-03 NOTE — PROGRESS NOTES
Reason for Admission:  Pneumonia                    RRAT Score:  8                   Plan for utilizing home health:   As indiacted                       Likelihood of Readmission:  mod                         Transition of Care Plan:    Home with home care as indicated    Interviewed patient, he agrees to share his discharge information with his spouse, Leroy Valentino . He was independent prior to admission and sees Dr Eliu Robertson for his primary care needs. His discharge plan is to return home. Patient is requesting walker at discharge. Requested H2H via [phone]. Heather Drake RN  Care Management Interventions  PCP Verified by CM: Yes  Palliative Care Criteria Met (RRAT>21 & CHF Dx)?: No  Mode of Transport at Discharge:  Other (see comment) (private vehicle)  Transition of Care Consult (CM Consult): Discharge Planning  MyChart Signup: No  Discharge Durable Medical Equipment: No  Health Maintenance Reviewed: Yes  Physical Therapy Consult: Yes  Occupational Therapy Consult: No  Speech Therapy Consult: No  Current Support Network: Lives with Spouse  Confirm Follow Up Transport: Family  Plan discussed with Pt/Family/Caregiver: Yes  Reading Resource Information Provided?: No

## 2018-06-03 NOTE — PROGRESS NOTES
Problem: Falls - Risk of  Goal: *Absence of Falls  Document Madelaine Fall Risk and appropriate interventions in the flowsheet.    Outcome: Progressing Towards Goal  Fall Risk Interventions:  Mobility Interventions: Patient to call before getting OOB, PT Consult for mobility concerns, PT Consult for assist device competence         Medication Interventions: Patient to call before getting OOB, Teach patient to arise slowly         History of Falls Interventions: Door open when patient unattended, Investigate reason for fall, Room close to nurse's station

## 2018-06-03 NOTE — PROGRESS NOTES
Internal Medicine Progress Note    Patient's Name: Kaya Morgan  Admit Date: 6/2/2018  Length of Stay: 1      Assessment/Plan     Active Hospital Problems    Diagnosis Date Noted    Pneumonia 06/02/2018    Atrial fibrillation ([facility] 06/02/2018    Hypothyroidism 06/02/2018    Sepsis ([facility] 06/02/2018    GERD (gastroesophageal reflux disease)      - Cont IVAB  - Strep and legionella neg  - Blood cx NGTD  - OK to stop IVFs  - PT/OT  - Possible d/c in next 24-48 hours w/ transition to PO antibx  - Cont acceptable home medications for chronic conditions   - DVT protocol    I have personally reviewed all pertinent labs and films that have officially resulted over the last 24 hours. I have personally checked for all pending labs that are awaiting final results. Subjective     Pt s/e @ bedside  No major events overnight  Feeling better today  Still w/ some SOB  Denies CP    Objective     Visit Vitals    /79 (BP 1 Location: Right arm, BP Patient Position: Head of bed elevated (Comment degrees))    Pulse 84    Temp 98.1 °F (36.7 °C)    Resp 20    Ht 5' 8\" (1.727 m)    Wt 85.3 kg (188 lb)    SpO2 93%    BMI 28.59 kg/m2       Physical Exam:  General Appearance: NAD, conversant  Lungs: B/L coarse w/ mild exp wheeze and rhonchi on L  CV: IRIR, no m/r/g  Abdomen: soft, non-tender, normal bowel sounds  Extremities: no cyanosis, no peripheral edema  Neuro: No focal deficits, motor/sensory intact    Lab/Data Reviewed:  BMP: No results found for: NA, K, CL, CO2, AGAP, GLU, BUN, CREA, GFRAA, GFRNA  CBC: No results found for: WBC, HGB, HGBEXT, HCT, HCTEXT, PLT, PLTEXT, HGBEXT, HCTEXT, PLTEXT    Imaging Reviewed:  No results found.     Medications Reviewed:  Current Facility-Administered Medications   Medication Dose Route Frequency    sodium chloride (NS) flush 5-10 mL  5-10 mL IntraVENous PRN    traZODone (DESYREL) tablet 50 mg  50 mg Oral QHS PRN    tamsulosin (FLOMAX) capsule 0.4 mg  0.4 mg Oral PCD    dilTIAZem CD (CARDIZEM CD) capsule 180 mg  180 mg Oral DAILY    gabapentin (NEURONTIN) capsule 600 mg  600 mg Oral TID    levothyroxine (SYNTHROID) tablet 175 mcg  175 mcg Oral ACB    traMADol (ULTRAM) tablet 50 mg  50 mg Oral Q6H PRN    sodium chloride (NS) flush 5-10 mL  5-10 mL IntraVENous Q8H    sodium chloride (NS) flush 5-10 mL  5-10 mL IntraVENous PRN    levoFLOXacin (LEVAQUIN) 750 mg in D5W IVPB  750 mg IntraVENous Q24H    albuterol-ipratropium (DUO-NEB) 2.5 MG-0.5 MG/3 ML  3 mL Nebulization Q4H PRN    apixaban (ELIQUIS) tablet 5 mg  5 mg Oral BID    pantoprazole (PROTONIX) tablet 40 mg  40 mg Oral ACB           Evelina Barraza DO  Internal Medicine, Hospitalist  Pager: [phone]

## 2018-06-04 ENCOUNTER — HOME HEALTH ADMISSION (OUTPATIENT)
Dept: HOME HEALTH SERVICES | Facility: HOME HEALTH | Age: 83
End: 2018-06-04
Payer: MEDICARE

## 2018-06-04 VITALS
HEIGHT: 68 IN | WEIGHT: 180 LBS | BODY MASS INDEX: 27.28 KG/M2 | SYSTOLIC BLOOD PRESSURE: 144 MMHG | HEART RATE: 85 BPM | OXYGEN SATURATION: 98 % | DIASTOLIC BLOOD PRESSURE: 87 MMHG | RESPIRATION RATE: 20 BRPM | TEMPERATURE: 97 F

## 2018-06-04 PROCEDURE — 74011250637 HC RX REV CODE- 250/637: Performed by: NURSE PRACTITIONER

## 2018-06-04 PROCEDURE — 97116 GAIT TRAINING THERAPY: CPT

## 2018-06-04 PROCEDURE — 74011250636 HC RX REV CODE- 250/636: Performed by: HOSPITALIST

## 2018-06-04 PROCEDURE — 74011250637 HC RX REV CODE- 250/637: Performed by: HOSPITALIST

## 2018-06-04 RX ORDER — GUAIFENESIN 600 MG/1
600 TABLET, EXTENDED RELEASE ORAL EVERY 12 HOURS
Status: DISCONTINUED | OUTPATIENT
Start: 2018-06-04 | End: 2018-06-04 | Stop reason: HOSPADM

## 2018-06-04 RX ORDER — LEVOFLOXACIN 750 MG/1
750 TABLET ORAL DAILY
Qty: 5 TAB | Refills: 0 | Status: SHIPPED | OUTPATIENT
Start: 2018-06-04 | End: 2018-06-09

## 2018-06-04 RX ADMIN — GUAIFENESIN 600 MG: 600 TABLET, EXTENDED RELEASE ORAL at 11:52

## 2018-06-04 RX ADMIN — PANTOPRAZOLE SODIUM 40 MG: 40 TABLET, DELAYED RELEASE ORAL at 08:22

## 2018-06-04 RX ADMIN — Medication 10 ML: at 06:00

## 2018-06-04 RX ADMIN — LEVOFLOXACIN 750 MG: 5 INJECTION, SOLUTION INTRAVENOUS at 05:39

## 2018-06-04 RX ADMIN — LEVOTHYROXINE SODIUM 175 MCG: 75 TABLET ORAL at 05:41

## 2018-06-04 RX ADMIN — DILTIAZEM HYDROCHLORIDE 180 MG: 180 CAPSULE, COATED, EXTENDED RELEASE ORAL at 08:22

## 2018-06-04 RX ADMIN — GABAPENTIN 600 MG: 300 CAPSULE ORAL at 08:22

## 2018-06-04 RX ADMIN — APIXABAN 5 MG: 5 TABLET, FILM COATED ORAL at 08:22

## 2018-06-04 NOTE — PROGRESS NOTES
Problem: Falls - Risk of  Goal: *Absence of Falls  Document Madelaine Fall Risk and appropriate interventions in the flowsheet.    Outcome: Progressing Towards Goal  Fall Risk Interventions:  Mobility Interventions: Bed/chair exit alarm         Medication Interventions: Evaluate medications/consider consulting pharmacy    Elimination Interventions: Call light in reach    History of Falls Interventions: Door open when patient unattended

## 2018-06-04 NOTE — PROGRESS NOTES
Offered the pt FOC for home care, he chose [facility]. Pt verified the contact information on the face sheet is correct. Referral sent to intake via [facility] and called to their intake nurse, Deneen Dsouza. Pt reported he will get a rolling walker from his Jehovah's witness. Care Management Interventions  PCP Verified by CM: Yes  Palliative Care Criteria Met (RRAT>21 & CHF Dx)?: No  Mode of Transport at Discharge:  Other (see comment) (private vehicle)  Transition of Care Consult (CM Consult): [address]: Yes  MyChart Signup: No  Discharge Durable Medical Equipment: No  Health Maintenance Reviewed: Yes  Physical Therapy Consult: Yes  Occupational Therapy Consult: No  Speech Therapy Consult: No  Current Support Network: Lives with Spouse  Confirm Follow Up Transport: Family  Plan discussed with Pt/Family/Caregiver: Yes  Freedom of Choice Offered: Yes  [address] Provided?: No  Discharge Location  Discharge Placement: Home with home health

## 2018-06-04 NOTE — PROGRESS NOTES
Nutrition initial assessment/Plan of care      RECOMMENDATIONS:     1. Cardiac diet  2. Monitor weight, labs and PO intake  3. RD to follow     GOALS:     1. PO intake meets >75% of protein/calorie needs by 6/11  2. Weight Maintenance (+/- 1-2 lb by 6/11)       ASSESSMENT:     Weight status is classified as overweight per BMI of 27.4. PO intake is adequate. Labs noted. Nutrition recommendations listed. RD to follow. Nutrition Diagnoses:   Overweight related to excessive energy intake as evidenced by BMI of 27.4. Nutrition Risk:  [] High  [] Moderate [x]  Low    SUBJECTIVE/OBJECTIVE:      Admitted with sepsis. Patient reports having a good appetite and eating all of meals. Observed 100% intake of lunch meal during visit. % intake of meals per vitals. States weight has been stable at 175 lb. Denies food allergies and problems with chewing/swallowing. Will monitor. Information Obtained from:    [x] Chart Review   [x] Patient   [] Family/Caregiver   [] Nurse/Physician   [] Interdisciplinary Meeting/Rounds    Diet: Cardiac diet  Medications: [x] Reviewed    Allergies: [x] Reviewed   Encounter Diagnoses     ICD-10-CM ICD-9-CM   1. Pneumonia of right lower lobe due to infectious organism ([facility] J18.1 486   2. Sepsis, due to unspecified organism ([facility] A41.9 038.9     995.91   3. Hypotension, unspecified hypotension type I95.9 458.9   4.  Hypoxia R09.02 799.02     Past Medical History:   Diagnosis Date    Arthritis     Atrial fibrillation ([facility]     GERD (gastroesophageal reflux disease)     Glaucoma     Hypertension     Hypothyroidism     Malignant neoplasm of prostate ([facility]     [place] 3+4, s/p combined radiation w/ naht completed 9/09, psa 5.5     Pancreatitis 2009      Labs:  Lab Results   Component Value Date/Time    Sodium 141 06/02/2018 05:10 AM    Potassium 3.8 06/02/2018 05:10 AM    Chloride 103 06/02/2018 05:10 AM    CO2 32 06/02/2018 05:10 AM    Anion gap 6 06/02/2018 05:10 AM    Glucose 95 06/02/2018 05:10 AM    BUN 13 06/02/2018 05:10 AM    Creatinine 1.05 06/02/2018 05:10 AM    Calcium 8.2 (L) 06/02/2018 05:10 AM    Albumin 2.9 (L) 06/02/2018 05:10 AM     Anthropometrics: BMI (calculated): 27.4  Last 3 Recorded Weights in this Encounter    06/02/18 0456 06/03/18 0343 06/04/18 0546   Weight: 79.4 kg (175 lb) 85.3 kg (188 lb) 81.6 kg (180 lb)    Ht Readings from Last 1 Encounters:   06/02/18 5' 8\" (1.727 m)     Patient Vitals for the past 100 hrs:   % Diet Eaten   06/04/18 0940 100 %   06/03/18 1744 75 %   06/03/18 1314 100 %   06/03/18 0915 50 %   06/02/18 1744 50 %   06/02/18 1312 50 %     IBW: 154 lb %IBW: 117% UBW: 175 lb %UBW: 103%   [] Weight Loss [] Weight Gain [x] Weight Stable    Estimated Nutrition Needs: [x] MSJ   Calories: 1920 Kcal Based on:   [x] Actual BW    Protein:   82-98 g Based on:   [x] Actual BW    Fluid:       2000 ml Based on:   [x] Actual BW      [x] No Cultural, Mormonism or ethnic dietary need identified.     [] Cultural, Mormonism and ethnic food preferences identified and addressed     Wt Status:  [] Normal (18.6 - 24.9) [] Underweight (<18.5) [x] Overweight (25 - 29.9) [] Mild Obesity (30 - 34.9)  [] Moderate Obesity (35 - 39.9) [] Morbid Obesity (40+)     Nutrition Problems Identified:   [] Suboptimal PO intake   [] Food Allergies  [] Difficulty chewing/swallowing/poor dentition  [] Constipation/Diarrhea   [] Nausea/Vomiting   [x] None  [] Other:     Plan:   [x] Therapeutic Diet  []  Obtained/adjusted food preferences/tolerances and/or snacks options   []  Supplements added   [] Occupational therapy following for feeding techniques  []  HS snack added   []  Modify diet texture   []  Modify diet for food allergies   []  Assist with menu selection   [x]  Monitor PO intake on meal rounds   [x]  Continue inpatient monitoring and intervention   []  Participated in discharge planning/Interdisciplinary rounds/Team meetings   []  Other:     Education Needs:   [] Not appropriate for teaching at this time due to:   [x] Identified and addressed    Nutrition Monitoring and Evaluation:  [x] Continue ongoing monitoring and intervention  [] Other Romana Kiel

## 2018-06-04 NOTE — DISCHARGE INSTRUCTIONS
Patient armband removed and shredded      DISCHARGE SUMMARY from Nurse    PATIENT INSTRUCTIONS:    After general anesthesia or intravenous sedation, for 24 hours or while taking prescription Narcotics:  · Limit your activities  · Do not drive and operate hazardous machinery  · Do not make important personal or business decisions  · Do  not drink alcoholic beverages  · If you have not urinated within 8 hours after discharge, please contact your surgeon on call. Report the following to your surgeon:  · Excessive pain, swelling, redness or odor of or around the surgical area  · Temperature over 100.5  · Nausea and vomiting lasting longer than 4 hours or if unable to take medications  · Any signs of decreased circulation or nerve impairment to extremity: change in color, persistent  numbness, tingling, coldness or increase pain  · Any questions    What to do at Home:  Recommended activity: Activity as tolerated. If you experience any of the following symptoms chest pains, shortness of breath, or dizziness, please follow up with primary care provider/ED. *  Please give a list of your current medications to your Primary Care Provider. *  Please update this list whenever your medications are discontinued, doses are      changed, or new medications (including over-the-counter products) are added. *  Please carry medication information at all times in case of emergency situations. These are general instructions for a healthy lifestyle:    No smoking/ No tobacco products/ Avoid exposure to second hand smoke  Surgeon General's Warning:  Quitting smoking now greatly reduces serious risk to your health.     Obesity, smoking, and sedentary lifestyle greatly increases your risk for illness    A healthy diet, regular physical exercise & weight monitoring are important for maintaining a healthy lifestyle    You may be retaining fluid if you have a history of heart failure or if you experience any of the following symptoms:  Weight gain of 3 pounds or more overnight or 5 pounds in a week, increased swelling in our hands or feet or shortness of breath while lying flat in bed. Please call your doctor as soon as you notice any of these symptoms; do not wait until your next office visit. Recognize signs and symptoms of STROKE:    F-face looks uneven    A-arms unable to move or move unevenly    S-speech slurred or non-existent    T-time-call 911 as soon as signs and symptoms begin-DO NOT go       Back to bed or wait to see if you get better-TIME IS BRAIN. Warning Signs of HEART ATTACK     Call 911 if you have these symptoms:   Chest discomfort. Most heart attacks involve discomfort in the center of the chest that lasts more than a few minutes, or that goes away and comes back. It can feel like uncomfortable pressure, squeezing, fullness, or pain.  Discomfort in other areas of the upper body. Symptoms can include pain or discomfort in one or both arms, the back, neck, jaw, or stomach.  Shortness of breath with or without chest discomfort.  Other signs may include breaking out in a cold sweat, nausea, or lightheadedness. Don't wait more than five minutes to call 911 - MINUTES MATTER! Fast action can save your life. Calling 911 is almost always the fastest way to get lifesaving treatment. Emergency Medical Services staff can begin treatment when they arrive -- up to an hour sooner than if someone gets to the hospital by car. The discharge information has been reviewed with the patient. The patient verbalized understanding. Discharge medications reviewed with the patient and appropriate educational materials and side effects teaching were provided.   ___________________________________________________________________________________________________________________________________

## 2018-06-04 NOTE — PROGRESS NOTES
7818 -- Bedside shift change report given to FLYNN Briseno (oncoming nurse) by Maxwell Cooper RN (offgoing nurse). Report included the following information SBAR, Kardex, Intake/Output, MAR and Recent Results.      0680 -- AM medications given, well tolerated, will continue to monitor. 1010 -- Walk Test completed.      1052 -- Reassessment completed, no change in patient condition, will continue to monitor.      1403 -- Patient discharged.

## 2018-06-04 NOTE — PROGRESS NOTES
Problem: Mobility Impaired (Adult and Pediatric)  Goal: *Acute Goals and Plan of Care (Insert Text)  Physical Therapy Goals  Initiated 6/3/2018 and to be accomplished within 7 day(s) with SAT's >89%  1. Patient will move from supine to sit and sit to supine , scoot up and down and roll side to side in bed with modified independence. 2.  Patient will transfer from bed to chair and chair to bed with modified independence using the least restrictive device. 3.  Patient will perform sit to stand with modified independence. 4.  Patient will ambulate with modified independence for 150 feet with the least restrictive device. 5.  Patient will ascend/descend 4 stairs with  handrail(s) with modified independence. Outcome: Progressing Towards Goal    PHYSICAL THERAPY: Daily TREATMENT Note   INPATIENT: Medicare: Hospital Day: 3     Patient: John Bhatia [de-identified]80 y.o. male)    Date: 6/4/2018  Primary Diagnosis: Pneumonia    ,  ,   Precautions: Fall  WBAT    Chart, physical therapy assessment, plan of care and goals were reviewed. PLOF:I with adl's and ambulation without  Assistive device     ASSESSMENT:  patient in bed with O2 on removed and came to sitting at edge of bed. Increased coughing wit movement but  SAT's stayed 90 to 91 % with gait  Of 50 feet x2  Was 94% and when he returned to room 93 % patient moving on own and reported no pain . Education on safety plan of care and  Oxygen level SAT\"S explained post gait.; verbalized understanding and needs reinforcement. Left in bed. With call light . Progression toward goals:        Improving slowly and progressing toward goals     PLAN:  Patient continues to benefit from skilled intervention to address the above impairments. Continue treatment per established plan of care. EDUCATION:   Education:  Patient was educated on the following topics: safety plan of care and  Oxygen level SAT\"S explained post gait.; verbalized understanding and needs reinforcement. Barriers to Learning/Limitations: None  Compensate with: visual, verbal, tactile, kinesthetic cues/model    Discharge Recommendations:  Home Health  Further Equipment Recommendations for Discharge:  N/A  Factors which may impact discharge planning: n/a     SUBJECTIVE:   Patient stated .    OBJECTIVE DATA SUMMARY:   Critical Behavior:  Neurologic State: Alert  Orientation Level: Oriented X4  Cognition: Follows commands  Safety/Judgement: Fall prevention    G CODE:Mobility [id number] Current  CJ= 20-39%   Goal  CI= 1-19%. The severity rating is based on the Other clinical observations.    Functional Mobility:      Functional Status     Indep   (I)   Mod I   Super-vision   Min A   Mod A   Max A   Total A   Assist x2 Verbal cues Additional time Not tested   Comments   Rolling []  [x]  [] []    []    []  []  [] [] [] []    Supine to sit []  [x]  [x] []  []  []  []  [] [] [] []    Sit to supine []  [x]  [x] []  []  []  []  [] [] [] []    Sit to stand []  [x]  [x] []  []  []  []  [] [] [] []    Stand to sit []  []  [x] [x]  []  []  []  [] [] [] []    Bed to chair transfers []  []  [] []  []  []  []  [] [] [] []        Balance   Good   Fair   Poor   Unable   Not tested   Comments   Sitting static [x]  []  []  []  []    Sitting dynamic [x]  [x]  []  []  []    Standing static [x]  [x]  []  []  []    Standing dynamic []  [x]  []  []  []      Mobility/Gait:   Level of Assistance: Modified independent  Assistive Device: rolling walker  Distance Ambulated: 50 feet   x2  Left Lower Extremity: WBAT  Right Lower Extremity: WBAT  Base of Support: center of gravity altered  Speed/Annie: slow  Step Length: left shortened and right shortened  Gait Abnormalities: path deviations and shuffling gait    Neuro Re-Education:  Upright posture with standing     Vital Signs  Temp: 97 °F (36.1 °C)     Pulse (Heart Rate): 85     BP: 144/87     Resp Rate: 20     O2 Sat (%): 98 %  Pain:  Pre treatment pain level:0  Post treatment pain level:0  Pain Scale 1: Visual  Pain Intensity 1: 0  Activity Tolerance:   Fair      After treatment:   Patient left in no apparent distress in bed  Call bell left within reach  Nursing notified    Efrain Chang PT   Time Calculation: 25 mins

## 2018-06-04 NOTE — PROGRESS NOTES
Resting on Room Air 96%  Ambulating on Room Air 92%  Ambulation on 2L of O2 94%    Completed 1010 today.

## 2018-06-04 NOTE — DISCHARGE SUMMARY
[facility] [place]    Patient: Cullen Witt MRN: [id number]  CSN: [id number]    YOB: 1933  Age: 80 y.o. Sex: male    DOA: 6/2/2018 LOS:  LOS: 2 days   Discharge Date: 6/4/2018     Admission Diagnoses: Pneumonia    Discharge Diagnoses:    Problem List as of 6/4/2018  Date Reviewed: 8/29/2017          Codes Class Noted - Resolved    * (Principal)Pneumonia ICD-10-CM: J18.9  ICD-9-CM: 486  6/2/2018 - Present        Atrial fibrillation ([facility] ICD-10-CM: I48.91  ICD-9-CM: 427.31  6/2/2018 - Present        Hypothyroidism ICD-10-CM: E03.9  ICD-9-CM: 244.9  6/2/2018 - Present        Sepsis ([facility] ICD-10-CM: A41.9  ICD-9-CM: 038.9, 995.91  6/2/2018 - Present        Non-pressure chronic ulcer of right calf with fat layer exposed ([facility] ICD-10-CM: K66.462  ICD-9-CM: 707.12  1/19/2017 - Present        Laceration of right lower leg with complication AAL-22-HK: W98.196N  ICD-9-CM: 891.1  1/19/2017 - Present        Edema ICD-10-CM: R60.9  ICD-9-CM: 782.3  1/19/2017 - Present        Encounter for colonoscopy due to history of adenomatous colonic polyps ICD-10-CM: Z12.11, Z86.010  ICD-9-CM: V76.51, V12.72  1/3/2017 - Present        Dysphagia ICD-10-CM: R13.10  ICD-9-CM: 787.20  6/17/2016 - Present        Hypertension ICD-10-CM: I10  ICD-9-CM: 401.9  Unknown - Present        GERD (gastroesophageal reflux disease) ICD-10-CM: K21.9  ICD-9-CM: 530.81  Unknown - Present        Glaucoma ICD-10-CM: H40.9  ICD-9-CM: 365.9  Unknown - Present        Glaucoma ICD-10-CM: H40.9  ICD-9-CM: 365.9  Unknown - Present        Malignant neoplasm of prostate ([facility] ICD-10-CM: C61  ICD-9-CM: 753  1/16/2012 - Present              Discharge Condition: Stable    Discharge To: Home    Consults: [facility] Course: 80 y.o.  male with a PMHx of afib on AC, GERD, hypothyroidism who presented to the ED with two day history of cough and SOB. The patient states this started the day prior and persisted.   He remembers feeling very weak all of a sudden and developed a productive cough. Wife states recorded fever of 102.1 which broke with Tylenol. He has been around some sick contacts. He denies any N/V/D. He denies any associated CP. In the ED, he was found to have a RLL PNA on CXR and was given IV Abx. His initial O2 was 89% and he was slightly hypotensive in the 80s. His baseline is 110s. He has Hx of HTN but was recently taken off some of his meds due to lower BPs. Pt remained afebrile w/o leukocytosis. He had a walk test today- oxygen sats on RA 96%, ambulating on room air 92%. Labs and vital signs WNL. He is appropriate for discharge home, on oral abx, recommend OTC Mucinex. He is to follow up with PCP within one week. Physical Exam:  General appearance: alert, cooperative, no distress, appears stated age  Head: Normocephalic, without obvious abnormality, atraumatic  Lungs: mild audible congestion, clears after cough  Heart: regular rate and rhythm, S1, S2 normal, no murmur, click, rub or gallop  Abdomen: soft, non-tender.  Bowel sounds normal.   Extremities: extremities normal, atraumatic, no cyanosis or edema  Skin: Scattered bruises BUE, dressings BLE intact  Neurologic: Grossly normal  PSY: Mood and affect normal, appropriately behaved    Significant Diagnostic Studies: labs  BMP:         Lab Results   Component Value Date/Time      06/02/2018 05:10 AM     K 3.8 06/02/2018 05:10 AM      06/02/2018 05:10 AM     CO2 32 06/02/2018 05:10 AM     AGAP 6 06/02/2018 05:10 AM     GLU 95 06/02/2018 05:10 AM     BUN 13 06/02/2018 05:10 AM     CREA 1.05 06/02/2018 05:10 AM     GFRAA >60 06/02/2018 05:10 AM     GFRNA >60 06/02/2018 05:10 AM      CBC:         Lab Results   Component Value Date/Time     WBC 7.9 06/02/2018 05:10 AM     HGB 12.0 (L) 06/02/2018 05:10 AM     HCT 36.0 06/02/2018 05:10 AM               Discharge Medications:     Discharge Medication List as of 6/4/2018  1:30 PM      START taking these medications    Details   levoFLOXacin (LEVAQUIN) 750 mg tablet Take 1 Tab by mouth daily for 5 days. , Print, Disp-5 Tab, R-0         CONTINUE these medications which have CHANGED    Details   apixaban (ELIQUIS) 5 mg tablet Take 1 Tab by mouth two (2) times a day., Print, Disp-60 Tab, R-0         CONTINUE these medications which have NOT CHANGED    Details   tamsulosin (FLOMAX) 0.4 mg capsule Take 1 Cap by mouth daily (after dinner). , Normal, Disp-90 Cap, R-3      acitretin 25 mg capsule TK 1 C PO QPM, Historical Med, R-1      albuterol (PROAIR HFA) 90 mcg/actuation inhaler Take  inhaled by mouth., Historical Med      ALPHAGAN P 0.15 % ophthalmic solution Historical Med, PAPI      clobetasol 0.05 % sham Historical Med      colchicine (COLCRYS) 0.6 mg tablet Take As Needed., Historical Med      ipratropium-albuterol (COMBIVENT RESPIMAT)  mcg/actuation inhaler Take As Needed., Historical Med      cyanocobalamin 1,000 mcg tablet 1,000 mcg., Historical Med      desonide (TRIDESILON) 0.05 % cream Take As Needed., Historical Med      dilTIAZem CD (CARDIZEM CD) 180 mg ER capsule 180 mg., Historical Med      furosemide (LASIX) 40 mg tablet Historical Med      hydrocortisone (HYTONE) 2.5 % topical cream Historical Med      hydrOXYzine HCl (ATARAX) 10 mg tablet Take As Needed., Historical Med      loratadine (CLARITIN) 10 mg tablet 10 mg., Historical Med      metroNIDAZOLE (METROCREAM) 0.75 % topical cream APPLY TO FACE BID, Historical Med, R-1      ingenol mebutate (PICATO) 0.015 % gel Historical Med      esomeprazole (NEXIUM) 40 mg capsule 40 mg., Historical Med      gabapentin (NEURONTIN) 600 mg tablet Take 600 mg by mouth three (3) times daily. , Historical Med      levothyroxine (SYNTHROID) 175 mcg tablet Take 175 mcg by mouth Daily (before breakfast). , Historical Med      traMADol (ULTRAM) 50 mg tablet Take 1 tablet by mouth every six (6) hours as needed for Pain., Print, Disp-15 tablet, R-0      bimatoprost (LUMIGAN) 0.03 % ophthalmic drops Administer 1 drop to both eyes every evening., Historical Med      multivitamins-minerals-lutein (CENTRUM SILVER) Tab Take  by mouth.  , Historical Med      traZODone (DESYREL) 50 mg tablet Take 50 mg by mouth nightly as needed., Historical Med      PYRIDOXINE HCL (VITAMIN B-6 PO) Take  by mouth., Historical Med                 Activity: Activity as tolerated and PT/OT Eval and Treat    Diet: Cardiac Diet    Wound Care: Continue with current wound care treatment, follow up as planned this Friday 6/8/2018    Follow-up: Follow up with PCP within one week.     Discharge time: > 35 mins  Neyda Zambrano NP  6/4/2018, 2:17 PM

## 2018-06-04 NOTE — PROGRESS NOTES
Chart reviewed. Transition plan remains home with home health/H2H & out-pt follow up when medically stable. Will cont to follow for further needs. Michelle Vazquez RN,ext [phone].

## 2018-06-04 NOTE — PROGRESS NOTES
1900  -- Bedside, Verbal and Written shift change report given to 2309 Edwin Beebe (oncoming nurse) by MAMADOU (offgoing nurse). Pt approved report to be given with daughter Hal Huitron [phone] at bedside. Discussed with pt and daughter possible discharge 06/04/18, concerns are wife of pt does not drive, daughter will need to be called for transportation, information will be passed on to oncoming RN. Pt would also like prescription for liquid Mucinex at discharge. Report included the following information SBAR, Kardex, Intake/Output, MAR and Recent Results.       8268 -- PM and PRN sleep medications administered, pt tolerated with ease, will continue to monitor.     0030 -- Shift reassessment, pt condition unchanged, will continue to monitor. 0100 -- Pt assisted to restroom Hygiene care provided to pt, gown and linen changed and bath given.      0400 --  Shift reassessment, pt condition unchanged, will continue to monitor.        0700  -- Bedside, Verbal and Written shift change report given to SIMRAN  (oncoming nurse) by DEMI (offgoing nurse). Report included the following information SBAR, Kardex, Intake/Output, MAR and Recent Results. Skin assessment completed.

## 2018-06-04 NOTE — PROGRESS NOTES
Problem: Falls - Risk of  Goal: *Absence of Falls  Document Madelaine Fall Risk and appropriate interventions in the flowsheet.    Outcome: Progressing Towards Goal  Fall Risk Interventions:  Mobility Interventions: Bed/chair exit alarm, Patient to call before getting OOB, PT Consult for mobility concerns, PT Consult for assist device competence         Medication Interventions: Evaluate medications/consider consulting pharmacy, Patient to call before getting OOB, Teach patient to arise slowly    Elimination Interventions: Call light in reach, Urinal in reach, Patient to call for help with toileting needs, Toileting schedule/hourly rounds    History of Falls Interventions: Door open when patient unattended

## 2018-06-05 ENCOUNTER — PATIENT OUTREACH (OUTPATIENT)
Dept: CASE MANAGEMENT | Age: 83
End: 2018-06-05

## 2018-06-05 ENCOUNTER — HOME CARE VISIT (OUTPATIENT)
Dept: SCHEDULING | Facility: HOME HEALTH | Age: 83
End: 2018-06-05
Payer: MEDICARE

## 2018-06-05 ENCOUNTER — HOME CARE VISIT (OUTPATIENT)
Dept: HOME HEALTH SERVICES | Facility: HOME HEALTH | Age: 83
End: 2018-06-05

## 2018-06-05 VITALS — HEART RATE: 95 BPM | DIASTOLIC BLOOD PRESSURE: 72 MMHG | SYSTOLIC BLOOD PRESSURE: 124 MMHG | TEMPERATURE: 99.3 F

## 2018-06-05 PROCEDURE — 3331090001 HH PPS REVENUE CREDIT

## 2018-06-05 PROCEDURE — G0151 HHCP-SERV OF PT,EA 15 MIN: HCPCS

## 2018-06-05 PROCEDURE — 400013 HH SOC

## 2018-06-05 PROCEDURE — 3331090002 HH PPS REVENUE DEBIT

## 2018-06-05 NOTE — PROGRESS NOTES
Transition of Care (THIAGO) Plan:  Home with home health & out-pt follow up    [facility] Transportation:       How is patient being transported at discharge? Family     When?  6/5/18     Is transport scheduled? NA    Follow-up appointment and transportation:     PCP? Dr. Jae Brunson     Specialist?     Time/Date? 6/8/18 @1045     Who is transporting to the follow-up appointment? Family      Is transport for follow up appointment scheduled? NA    Communication plan (with patient/family): Who is being called? Patient or Next of Kin? Responsible party? Savannah Mohs [place](patient)      What number(s) is to be used? [phone]      What service provider is calling for [facility] services? [facility]      When are they calling? 24-48hrs    Readmission Risk? (Green/Low; Yellow/Moderate; Red/High):  LOW    Care Management Interventions  PCP Verified by CM: Yes  Palliative Care Criteria Met (RRAT>21 & CHF Dx)?: No  Mode of Transport at Discharge:  Other (see comment) (private vehicle)  Transition of Care Consult (CM Consult): [address]: Yes  MyChart Signup: No  Discharge Durable Medical Equipment: No  Health Maintenance Reviewed: Yes  Physical Therapy Consult: Yes  Occupational Therapy Consult: No  Speech Therapy Consult: No  Current Support Network: Lives with Spouse  Confirm Follow Up Transport: Family  Plan discussed with Pt/Family/Caregiver: Yes  Freedom of Choice Offered: Yes  [address] Provided?: No  Discharge Location  Discharge Placement: Home with home health

## 2018-06-05 NOTE — PROGRESS NOTES
Hospital Discharge Follow-Up      Date/Time:  2018 2:53 PM    Patient was admitted to [facility] on 18 and discharged on 18 for pneumonia/sepsis. The physician discharge summary was available at the time of outreach. Patient was contacted within 2 business days of discharge. Inpatient RRAT score: 8  Was this a readmission? no   Patient stated reason for the readmission: NA    Nurse Navigator (NN) contacted the patient's wife, Mrs. Rosemary Tanner (patient is napping at present time) by telephone to perform post hospital discharge assessment. Verified name and  with Mrs. Rosemary Tanner as identifiers. Provided introduction to self, and explanation of the Nurse Navigator role. Reviewed discharge instructions and red flags with Mrs. Rosemary Tanner who verbalized understanding. Mrs. Rosemary Tanner given an opportunity to ask questions and does not have any further questions or concerns at this time. Mrs. Rosemary Tanner agrees to contact the PCP office for questions related to their healthcare. NN provided contact information for future reference. Summary of patient's top problems:  1. Pneumonia - Mrs. Rosemary Tanner reports that patient continues to have productive cough, remains 'weak' but denies fever at this time. 2. Sepsis - NN instructed Mrs. Rosemary Tanner when to call PCP: temp >100.5,<97.2; increased shortness of breath; sputum that is green, brown or bloody. Mrs. Madsen Jacki understanding. [facility]  Date of initial visit: 18    Durable Medical Equipment ordered/company: NA  Durable Medical Equipment received: Mrs. Rosemary Tanner states that patient received rolling walker from his [employer]    Barriers to care? None noted at this time    Advance Care Planning:   Does patient have an Advance Directive:  not on file     Medication(s):     New Medications at Discharge: yes  Changed Medications at Discharge: no - Mrs. Rosemary Tanner states that scripts for eliquis and synthyroid remain same as prior to hospitalization, therefore she did not get scripts filled since patient just received medications from [facility]. Discontinued Medications at Discharge: no    Mrs. Winkler Notice that both she and patient understand administration of home medications. There were no barriers to obtaining medications identified at this time. Referral to Pharm D needed: no     Current Outpatient Prescriptions   Medication Sig    levoFLOXacin (LEVAQUIN) 750 mg tablet Take 1 Tab by mouth daily for 5 days.  apixaban (ELIQUIS) 5 mg tablet Take 1 Tab by mouth two (2) times a day.  tamsulosin (FLOMAX) 0.4 mg capsule Take 1 Cap by mouth daily (after dinner).  acitretin 25 mg capsule TK 1 C PO QPM    albuterol (PROAIR HFA) 90 mcg/actuation inhaler Take  inhaled by mouth.  ALPHAGAN P 0.15 % ophthalmic solution     clobetasol 0.05 % sham     colchicine (COLCRYS) 0.6 mg tablet Take As Needed.  ipratropium-albuterol (COMBIVENT RESPIMAT)  mcg/actuation inhaler Take As Needed.  cyanocobalamin 1,000 mcg tablet 1,000 mcg.  desonide (TRIDESILON) 0.05 % cream Take As Needed.  dilTIAZem CD (CARDIZEM CD) 180 mg ER capsule 180 mg.    furosemide (LASIX) 40 mg tablet     hydrocortisone (HYTONE) 2.5 % topical cream     hydrOXYzine HCl (ATARAX) 10 mg tablet Take As Needed.  loratadine (CLARITIN) 10 mg tablet 10 mg.    metroNIDAZOLE (METROCREAM) 0.75 % topical cream APPLY TO FACE BID    ingenol mebutate (PICATO) 0.015 % gel     esomeprazole (NEXIUM) 40 mg capsule 40 mg.    gabapentin (NEURONTIN) 600 mg tablet Take 600 mg by mouth three (3) times daily.  levothyroxine (SYNTHROID) 175 mcg tablet Take 175 mcg by mouth Daily (before breakfast).  traMADol (ULTRAM) 50 mg tablet Take 1 tablet by mouth every six (6) hours as needed for Pain.  bimatoprost (LUMIGAN) 0.03 % ophthalmic drops Administer 1 drop to both eyes every evening.     multivitamins-minerals-lutein (CENTRUM SILVER) Tab Take  by mouth.  traZODone (DESYREL) 50 mg tablet Take 50 mg by mouth nightly as needed.  PYRIDOXINE HCL (VITAMIN B-6 PO) Take  by mouth. No current facility-administered medications for this visit. There are no discontinued medications.     PCP/Specialist follow up: Future Appointments  Date Time Provider Kristen Carter   8/28/2018 1:00 PM MD [address]rera [address] - PCP- 6/8/18

## 2018-06-06 ENCOUNTER — HOME CARE VISIT (OUTPATIENT)
Dept: HOME HEALTH SERVICES | Facility: HOME HEALTH | Age: 83
End: 2018-06-06
Payer: MEDICARE

## 2018-06-06 PROCEDURE — 3331090001 HH PPS REVENUE CREDIT

## 2018-06-06 PROCEDURE — 3331090002 HH PPS REVENUE DEBIT

## 2018-06-06 NOTE — ANCILLARY DISCHARGE INSTRUCTIONS
[facility]  Discharge Phone Call       After-Care Discharge Phone Call Questions: completed by nurse navigator     Were you able to get your prescriptions filled? Comment:      [] Yes  []No    Comment if answer is \"No\"   Are you taking your medication(s) as your doctor ordered? Do you understand the purpose of your medications? Comment:    [] Yes  []No    Comment if answer is \"No\"   Are you taking any other medications that are not on the list?  Comment:      [] Yes  []No    Comment if answer is \"Yes\"   Do you have any questions about your medications? Are you aware of potential side effects? Comment:    [] Yes  []No    Comment if answer is \"Yes\"   Did you make your follow-up appointments (if the hospital did not do this before  discharge)? Comment:    [] Yes  []No    Comment if answer is \"No\"   Is there any reason you might not be able to keep your follow-up appointments? Comment:     [] Yes  []No    Comment if answer is \"Yes\"   Do you have any questions about your care plan? Are you aware of what health problems to be alert for? Comment:    [] Yes  []No    Comment if answer is \"Yes\"   Do you have a good understanding of how you should manage your health? Comment:    [] Yes  []No    Comment if answer is \"Yes\"   Do you know which symptoms to watch for that would mean you would need to call your doctor right away? Comment:      [] Yes  []No    Comment if answer is \"No\"   Do you have any questions about the follow up process or any instructions that we have provided? Comment:    [] Yes  []No    Comment if answer is \"Yes\"   Did staff take your preferences into account?         [] Yes  []No    Comment if answer is \"Yes\"

## 2018-06-07 PROCEDURE — 3331090001 HH PPS REVENUE CREDIT

## 2018-06-07 PROCEDURE — 3331090002 HH PPS REVENUE DEBIT

## 2018-06-08 ENCOUNTER — HOME CARE VISIT (OUTPATIENT)
Dept: SCHEDULING | Facility: HOME HEALTH | Age: 83
End: 2018-06-08
Payer: MEDICARE

## 2018-06-08 LAB
BACTERIA SPEC CULT: NORMAL
BACTERIA SPEC CULT: NORMAL
SERVICE CMNT-IMP: NORMAL
SERVICE CMNT-IMP: NORMAL

## 2018-06-08 PROCEDURE — G0157 HHC PT ASSISTANT EA 15: HCPCS

## 2018-06-08 PROCEDURE — 3331090001 HH PPS REVENUE CREDIT

## 2018-06-08 PROCEDURE — 3331090002 HH PPS REVENUE DEBIT

## 2018-06-09 VITALS
DIASTOLIC BLOOD PRESSURE: 80 MMHG | HEART RATE: 72 BPM | OXYGEN SATURATION: 100 % | SYSTOLIC BLOOD PRESSURE: 118 MMHG | TEMPERATURE: 99 F

## 2018-06-09 PROCEDURE — 3331090001 HH PPS REVENUE CREDIT

## 2018-06-09 PROCEDURE — 3331090002 HH PPS REVENUE DEBIT

## 2018-06-10 PROCEDURE — 3331090001 HH PPS REVENUE CREDIT

## 2018-06-10 PROCEDURE — 3331090002 HH PPS REVENUE DEBIT

## 2018-06-11 ENCOUNTER — HOME CARE VISIT (OUTPATIENT)
Dept: SCHEDULING | Facility: HOME HEALTH | Age: 83
End: 2018-06-11
Payer: MEDICARE

## 2018-06-11 VITALS — TEMPERATURE: 98 F | SYSTOLIC BLOOD PRESSURE: 120 MMHG | DIASTOLIC BLOOD PRESSURE: 72 MMHG | HEART RATE: 80 BPM

## 2018-06-11 PROCEDURE — 3331090001 HH PPS REVENUE CREDIT

## 2018-06-11 PROCEDURE — G0157 HHC PT ASSISTANT EA 15: HCPCS

## 2018-06-11 PROCEDURE — 3331090002 HH PPS REVENUE DEBIT

## 2018-06-12 ENCOUNTER — HOME CARE VISIT (OUTPATIENT)
Dept: SCHEDULING | Facility: HOME HEALTH | Age: 83
End: 2018-06-12
Payer: MEDICARE

## 2018-06-12 PROCEDURE — G0157 HHC PT ASSISTANT EA 15: HCPCS

## 2018-06-12 PROCEDURE — 3331090001 HH PPS REVENUE CREDIT

## 2018-06-12 PROCEDURE — 3331090002 HH PPS REVENUE DEBIT

## 2018-06-13 PROCEDURE — 3331090001 HH PPS REVENUE CREDIT

## 2018-06-13 PROCEDURE — 3331090002 HH PPS REVENUE DEBIT

## 2018-06-14 ENCOUNTER — HOME CARE VISIT (OUTPATIENT)
Dept: SCHEDULING | Facility: HOME HEALTH | Age: 83
End: 2018-06-14
Payer: MEDICARE

## 2018-06-14 VITALS
SYSTOLIC BLOOD PRESSURE: 124 MMHG | TEMPERATURE: 97.5 F | OXYGEN SATURATION: 100 % | DIASTOLIC BLOOD PRESSURE: 70 MMHG | HEART RATE: 61 BPM

## 2018-06-14 VITALS — SYSTOLIC BLOOD PRESSURE: 100 MMHG | TEMPERATURE: 98.1 F | DIASTOLIC BLOOD PRESSURE: 64 MMHG

## 2018-06-14 PROCEDURE — 3331090001 HH PPS REVENUE CREDIT

## 2018-06-14 PROCEDURE — G0157 HHC PT ASSISTANT EA 15: HCPCS

## 2018-06-14 PROCEDURE — 3331090002 HH PPS REVENUE DEBIT

## 2018-06-15 PROCEDURE — 3331090001 HH PPS REVENUE CREDIT

## 2018-06-15 PROCEDURE — 3331090002 HH PPS REVENUE DEBIT

## 2018-06-16 PROCEDURE — 3331090001 HH PPS REVENUE CREDIT

## 2018-06-16 PROCEDURE — 3331090002 HH PPS REVENUE DEBIT

## 2018-06-17 PROCEDURE — 3331090001 HH PPS REVENUE CREDIT

## 2018-06-17 PROCEDURE — 3331090002 HH PPS REVENUE DEBIT

## 2018-06-18 ENCOUNTER — HOME CARE VISIT (OUTPATIENT)
Dept: SCHEDULING | Facility: HOME HEALTH | Age: 83
End: 2018-06-18
Payer: MEDICARE

## 2018-06-18 PROCEDURE — G0157 HHC PT ASSISTANT EA 15: HCPCS

## 2018-06-18 PROCEDURE — 3331090001 HH PPS REVENUE CREDIT

## 2018-06-18 PROCEDURE — 3331090002 HH PPS REVENUE DEBIT

## 2018-06-19 ENCOUNTER — HOME CARE VISIT (OUTPATIENT)
Dept: SCHEDULING | Facility: HOME HEALTH | Age: 83
End: 2018-06-19
Payer: MEDICARE

## 2018-06-19 VITALS
SYSTOLIC BLOOD PRESSURE: 122 MMHG | OXYGEN SATURATION: 93 % | DIASTOLIC BLOOD PRESSURE: 70 MMHG | HEART RATE: 63 BPM | TEMPERATURE: 98.3 F

## 2018-06-19 PROCEDURE — 3331090002 HH PPS REVENUE DEBIT

## 2018-06-19 PROCEDURE — 3331090001 HH PPS REVENUE CREDIT

## 2018-06-19 PROCEDURE — G0157 HHC PT ASSISTANT EA 15: HCPCS

## 2018-06-20 VITALS
HEART RATE: 80 BPM | TEMPERATURE: 98.7 F | OXYGEN SATURATION: 96 % | SYSTOLIC BLOOD PRESSURE: 114 MMHG | DIASTOLIC BLOOD PRESSURE: 60 MMHG

## 2018-06-20 PROCEDURE — 3331090002 HH PPS REVENUE DEBIT

## 2018-06-20 PROCEDURE — 3331090001 HH PPS REVENUE CREDIT

## 2018-06-21 ENCOUNTER — HOME CARE VISIT (OUTPATIENT)
Dept: SCHEDULING | Facility: HOME HEALTH | Age: 83
End: 2018-06-21
Payer: MEDICARE

## 2018-06-21 PROCEDURE — 3331090001 HH PPS REVENUE CREDIT

## 2018-06-21 PROCEDURE — 3331090002 HH PPS REVENUE DEBIT

## 2018-06-21 PROCEDURE — G0157 HHC PT ASSISTANT EA 15: HCPCS

## 2018-06-22 VITALS
HEART RATE: 79 BPM | OXYGEN SATURATION: 94 % | SYSTOLIC BLOOD PRESSURE: 104 MMHG | TEMPERATURE: 98.8 F | DIASTOLIC BLOOD PRESSURE: 60 MMHG

## 2018-06-22 PROCEDURE — 3331090002 HH PPS REVENUE DEBIT

## 2018-06-22 PROCEDURE — 3331090001 HH PPS REVENUE CREDIT

## 2018-06-23 PROCEDURE — 3331090002 HH PPS REVENUE DEBIT

## 2018-06-23 PROCEDURE — 3331090001 HH PPS REVENUE CREDIT

## 2018-06-24 PROCEDURE — 3331090002 HH PPS REVENUE DEBIT

## 2018-06-24 PROCEDURE — 3331090001 HH PPS REVENUE CREDIT

## 2018-06-25 ENCOUNTER — HOME CARE VISIT (OUTPATIENT)
Dept: SCHEDULING | Facility: HOME HEALTH | Age: 83
End: 2018-06-25
Payer: MEDICARE

## 2018-06-25 VITALS
DIASTOLIC BLOOD PRESSURE: 65 MMHG | HEART RATE: 85 BPM | TEMPERATURE: 98.8 F | OXYGEN SATURATION: 97 % | SYSTOLIC BLOOD PRESSURE: 110 MMHG

## 2018-06-25 PROCEDURE — 3331090001 HH PPS REVENUE CREDIT

## 2018-06-25 PROCEDURE — 3331090002 HH PPS REVENUE DEBIT

## 2018-06-25 PROCEDURE — G0157 HHC PT ASSISTANT EA 15: HCPCS

## 2018-06-26 ENCOUNTER — HOME CARE VISIT (OUTPATIENT)
Dept: SCHEDULING | Facility: HOME HEALTH | Age: 83
End: 2018-06-26
Payer: MEDICARE

## 2018-06-26 ENCOUNTER — HOSPITAL ENCOUNTER (OUTPATIENT)
Dept: GENERAL RADIOLOGY | Age: 83
Discharge: HOME | End: 2018-06-26
Payer: MEDICARE

## 2018-06-26 VITALS
TEMPERATURE: 98.4 F | HEART RATE: 66 BPM | SYSTOLIC BLOOD PRESSURE: 120 MMHG | DIASTOLIC BLOOD PRESSURE: 70 MMHG | OXYGEN SATURATION: 96 %

## 2018-06-26 DIAGNOSIS — R05.9 COUGH: ICD-10-CM

## 2018-06-26 PROCEDURE — 71046 X-RAY EXAM CHEST 2 VIEWS: CPT

## 2018-06-26 PROCEDURE — G0151 HHCP-SERV OF PT,EA 15 MIN: HCPCS

## 2018-06-26 PROCEDURE — 3331090001 HH PPS REVENUE CREDIT

## 2018-06-26 PROCEDURE — 3331090002 HH PPS REVENUE DEBIT

## 2018-06-27 PROCEDURE — 3331090001 HH PPS REVENUE CREDIT

## 2018-06-27 PROCEDURE — 3331090002 HH PPS REVENUE DEBIT

## 2018-07-31 ENCOUNTER — PATIENT OUTREACH (OUTPATIENT)
Dept: CASE MANAGEMENT | Age: 83
End: 2018-07-31

## 2018-07-31 NOTE — PROGRESS NOTES
Follow up Dx:  [facility] -18 pneumonia/sepsis Nurse Navigator(NN) contacted the patient by telephone to perform follow up assessment. Verified  and address with patient as identifiers. Patient attended PCP follow up visits on 18 and 18. Attended pulmonologist follow up appointment on 18, pulmonary testing was done on 18. Patient has not had any ED visits/hospitalizations since [facility] discharge 18. NN will resolve this episode.

## 2019-01-01 ENCOUNTER — HOME CARE VISIT (OUTPATIENT)
Dept: HOME HEALTH SERVICES | Facility: HOME HEALTH | Age: 84
End: 2019-01-01
Payer: MEDICARE

## 2019-01-01 ENCOUNTER — HOME CARE VISIT (OUTPATIENT)
Dept: SCHEDULING | Facility: HOME HEALTH | Age: 84
End: 2019-01-01

## 2019-01-01 ENCOUNTER — APPOINTMENT (OUTPATIENT)
Dept: GENERAL RADIOLOGY | Age: 84
DRG: 867 | End: 2019-01-01
Attending: PHYSICIAN ASSISTANT
Payer: MEDICARE

## 2019-01-01 ENCOUNTER — APPOINTMENT (OUTPATIENT)
Dept: GENERAL RADIOLOGY | Age: 84
DRG: 867 | End: 2019-01-01
Attending: HOSPITALIST
Payer: MEDICARE

## 2019-01-01 ENCOUNTER — APPOINTMENT (OUTPATIENT)
Dept: RADIATION THERAPY | Age: 84
End: 2019-01-01

## 2019-01-01 ENCOUNTER — HOSPITAL ENCOUNTER (OUTPATIENT)
Dept: RADIATION THERAPY | Age: 84
Discharge: HOME | End: 2019-09-27
Payer: MEDICARE

## 2019-01-01 ENCOUNTER — HOSPITAL ENCOUNTER (OUTPATIENT)
Dept: RADIATION THERAPY | Age: 84
Discharge: HOME | End: 2019-08-14
Payer: MEDICARE

## 2019-01-01 ENCOUNTER — APPOINTMENT (OUTPATIENT)
Dept: CT IMAGING | Age: 84
DRG: 867 | End: 2019-01-01
Attending: INTERNAL MEDICINE
Payer: MEDICARE

## 2019-01-01 ENCOUNTER — HOSPITAL ENCOUNTER (OUTPATIENT)
Dept: NUCLEAR MEDICINE | Age: 84
Discharge: HOME | DRG: 867 | End: 2019-10-14
Attending: INTERNAL MEDICINE
Payer: MEDICARE

## 2019-01-01 ENCOUNTER — HOSPITAL ENCOUNTER (OUTPATIENT)
Dept: RADIATION THERAPY | Age: 84
Discharge: HOME | End: 2019-09-20
Payer: MEDICARE

## 2019-01-01 ENCOUNTER — APPOINTMENT (OUTPATIENT)
Dept: GENERAL RADIOLOGY | Age: 84
DRG: 867 | End: 2019-01-01
Attending: INTERNAL MEDICINE
Payer: MEDICARE

## 2019-01-01 ENCOUNTER — APPOINTMENT (OUTPATIENT)
Dept: CT IMAGING | Age: 84
DRG: 309 | End: 2019-01-01
Attending: EMERGENCY MEDICINE
Payer: MEDICARE

## 2019-01-01 ENCOUNTER — APPOINTMENT (OUTPATIENT)
Dept: GENERAL RADIOLOGY | Age: 84
DRG: 867 | End: 2019-01-01
Attending: EMERGENCY MEDICINE
Payer: MEDICARE

## 2019-01-01 ENCOUNTER — ANESTHESIA (OUTPATIENT)
Dept: ENDOSCOPY | Age: 84
DRG: 867 | End: 2019-01-01
Payer: MEDICARE

## 2019-01-01 ENCOUNTER — HOSPITAL ENCOUNTER (OUTPATIENT)
Dept: RADIATION THERAPY | Age: 84
Discharge: HOME | End: 2019-09-24
Payer: MEDICARE

## 2019-01-01 ENCOUNTER — HOME CARE VISIT (OUTPATIENT)
Dept: HOME HEALTH SERVICES | Facility: HOME HEALTH | Age: 84
End: 2019-01-01

## 2019-01-01 ENCOUNTER — HOSPITAL ENCOUNTER (OUTPATIENT)
Dept: RADIATION THERAPY | Age: 84
Discharge: HOME | End: 2019-08-08
Payer: MEDICARE

## 2019-01-01 ENCOUNTER — HOME CARE VISIT (OUTPATIENT)
Dept: SCHEDULING | Facility: HOME HEALTH | Age: 84
End: 2019-01-01
Payer: MEDICARE

## 2019-01-01 ENCOUNTER — HOSPITAL ENCOUNTER (INPATIENT)
Age: 84
LOS: 20 days | Discharge: NURSING, REHAB OR LONG-TERM CARE | DRG: 867 | End: 2019-10-28
Attending: EMERGENCY MEDICINE | Admitting: HOSPITALIST
Payer: MEDICARE

## 2019-01-01 ENCOUNTER — PATIENT OUTREACH (OUTPATIENT)
Dept: CASE MANAGEMENT | Age: 84
End: 2019-01-01

## 2019-01-01 ENCOUNTER — APPOINTMENT (OUTPATIENT)
Dept: GENERAL RADIOLOGY | Age: 84
End: 2019-01-01
Attending: PHYSICIAN ASSISTANT
Payer: MEDICARE

## 2019-01-01 ENCOUNTER — HOSPITAL ENCOUNTER (OUTPATIENT)
Dept: RADIATION THERAPY | Age: 84
End: 2019-01-01

## 2019-01-01 ENCOUNTER — HOSPITAL ENCOUNTER (OUTPATIENT)
Dept: NUCLEAR MEDICINE | Age: 84
Discharge: HOME | DRG: 867 | End: 2019-10-13
Attending: INTERNAL MEDICINE
Payer: MEDICARE

## 2019-01-01 ENCOUNTER — ANESTHESIA EVENT (OUTPATIENT)
Dept: ENDOSCOPY | Age: 84
DRG: 867 | End: 2019-01-01
Payer: MEDICARE

## 2019-01-01 ENCOUNTER — HOSPITAL ENCOUNTER (INPATIENT)
Age: 84
LOS: 13 days | Discharge: STILL ADMITTED OR ADMINISTRATIVE | End: 2019-11-10
Attending: INTERNAL MEDICINE | Admitting: INTERNAL MEDICINE

## 2019-01-01 ENCOUNTER — APPOINTMENT (OUTPATIENT)
Dept: CT IMAGING | Age: 84
DRG: 867 | End: 2019-01-01
Attending: EMERGENCY MEDICINE
Payer: MEDICARE

## 2019-01-01 ENCOUNTER — APPOINTMENT (OUTPATIENT)
Dept: GENERAL RADIOLOGY | Age: 84
DRG: 872 | End: 2019-01-01
Attending: EMERGENCY MEDICINE
Payer: MEDICARE

## 2019-01-01 ENCOUNTER — HOME HEALTH ADMISSION (OUTPATIENT)
Dept: HOME HEALTH SERVICES | Facility: HOME HEALTH | Age: 84
End: 2019-01-01
Payer: MEDICARE

## 2019-01-01 ENCOUNTER — HOSPITAL ENCOUNTER (OUTPATIENT)
Dept: RADIATION THERAPY | Age: 84
Discharge: HOME | End: 2019-08-16
Payer: MEDICARE

## 2019-01-01 ENCOUNTER — HOSPITAL ENCOUNTER (EMERGENCY)
Age: 84
Discharge: HOME | End: 2019-10-03
Attending: EMERGENCY MEDICINE
Payer: MEDICARE

## 2019-01-01 ENCOUNTER — APPOINTMENT (OUTPATIENT)
Dept: NON INVASIVE DIAGNOSTICS | Age: 84
DRG: 309 | End: 2019-01-01
Attending: FAMILY MEDICINE
Payer: MEDICARE

## 2019-01-01 ENCOUNTER — HOSPITAL ENCOUNTER (OUTPATIENT)
Dept: RADIATION THERAPY | Age: 84
Discharge: HOME | End: 2019-08-15
Payer: MEDICARE

## 2019-01-01 ENCOUNTER — APPOINTMENT (OUTPATIENT)
Dept: GENERAL RADIOLOGY | Age: 84
DRG: 872 | End: 2019-01-01
Attending: PHYSICIAN ASSISTANT
Payer: MEDICARE

## 2019-01-01 ENCOUNTER — HOSPITAL ENCOUNTER (OUTPATIENT)
Dept: RADIATION THERAPY | Age: 84
Discharge: HOME | End: 2019-09-03
Payer: MEDICARE

## 2019-01-01 ENCOUNTER — HOSPITAL ENCOUNTER (OUTPATIENT)
Dept: RADIATION THERAPY | Age: 84
Discharge: HOME | End: 2019-08-20
Payer: MEDICARE

## 2019-01-01 ENCOUNTER — HOSPITAL ENCOUNTER (OUTPATIENT)
Dept: MRI IMAGING | Age: 84
Discharge: HOME | End: 2019-05-23
Attending: FAMILY MEDICINE
Payer: MEDICARE

## 2019-01-01 ENCOUNTER — APPOINTMENT (OUTPATIENT)
Dept: GENERAL RADIOLOGY | Age: 84
End: 2019-01-01
Attending: EMERGENCY MEDICINE
Payer: MEDICARE

## 2019-01-01 ENCOUNTER — HOSPITAL ENCOUNTER (OUTPATIENT)
Dept: RADIATION THERAPY | Age: 84
Discharge: HOME | End: 2019-09-26
Payer: MEDICARE

## 2019-01-01 ENCOUNTER — HOSPITAL ENCOUNTER (OUTPATIENT)
Dept: RADIATION THERAPY | Age: 84
Discharge: HOME | End: 2019-09-30
Payer: MEDICARE

## 2019-01-01 ENCOUNTER — HOSPITAL ENCOUNTER (OUTPATIENT)
Dept: RADIATION THERAPY | Age: 84
Discharge: HOME | End: 2019-08-30
Payer: MEDICARE

## 2019-01-01 ENCOUNTER — HOSPITAL ENCOUNTER (OUTPATIENT)
Dept: RADIATION THERAPY | Age: 84
Discharge: HOME | End: 2019-08-22
Payer: MEDICARE

## 2019-01-01 ENCOUNTER — HOSPITAL ENCOUNTER (EMERGENCY)
Age: 84
Discharge: HOME | End: 2019-09-16
Attending: EMERGENCY MEDICINE
Payer: MEDICARE

## 2019-01-01 ENCOUNTER — HOSPITAL ENCOUNTER (EMERGENCY)
Age: 84
Discharge: HOME | End: 2019-09-23
Attending: EMERGENCY MEDICINE
Payer: MEDICARE

## 2019-01-01 ENCOUNTER — HOSPITAL ENCOUNTER (OUTPATIENT)
Dept: NUCLEAR MEDICINE | Age: 84
Discharge: HOME | DRG: 867 | End: 2019-10-12
Attending: INTERNAL MEDICINE
Payer: MEDICARE

## 2019-01-01 ENCOUNTER — HOSPITAL ENCOUNTER (OUTPATIENT)
Dept: RADIATION THERAPY | Age: 84
Discharge: HOME | End: 2019-08-21
Payer: MEDICARE

## 2019-01-01 ENCOUNTER — HOSPITAL ENCOUNTER (OUTPATIENT)
Dept: RADIATION THERAPY | Age: 84
Discharge: HOME | End: 2019-08-28
Payer: MEDICARE

## 2019-01-01 ENCOUNTER — APPOINTMENT (OUTPATIENT)
Dept: CT IMAGING | Age: 84
End: 2019-01-01
Attending: EMERGENCY MEDICINE
Payer: MEDICARE

## 2019-01-01 ENCOUNTER — HOSPITAL ENCOUNTER (OUTPATIENT)
Dept: RADIATION THERAPY | Age: 84
Discharge: HOME | End: 2019-08-27
Payer: MEDICARE

## 2019-01-01 ENCOUNTER — HOME HEALTH ADMISSION (OUTPATIENT)
Dept: HOME HEALTH SERVICES | Facility: HOME HEALTH | Age: 84
End: 2019-01-01

## 2019-01-01 ENCOUNTER — HOSPITAL ENCOUNTER (EMERGENCY)
Age: 84
End: 2019-11-10
Attending: EMERGENCY MEDICINE | Admitting: EMERGENCY MEDICINE
Payer: MEDICARE

## 2019-01-01 ENCOUNTER — APPOINTMENT (OUTPATIENT)
Dept: GENERAL RADIOLOGY | Age: 84
End: 2019-01-01
Attending: NURSE PRACTITIONER

## 2019-01-01 ENCOUNTER — APPOINTMENT (OUTPATIENT)
Dept: GENERAL RADIOLOGY | Age: 84
End: 2019-01-01
Attending: HOSPITALIST
Payer: MEDICARE

## 2019-01-01 ENCOUNTER — HOSPITAL ENCOUNTER (OUTPATIENT)
Dept: RADIATION THERAPY | Age: 84
Discharge: HOME | End: 2019-08-13
Payer: MEDICARE

## 2019-01-01 ENCOUNTER — HOSPITAL ENCOUNTER (INPATIENT)
Age: 84
LOS: 1 days | Discharge: HOME | DRG: 309 | End: 2019-09-28
Attending: EMERGENCY MEDICINE | Admitting: FAMILY MEDICINE
Payer: MEDICARE

## 2019-01-01 ENCOUNTER — HOSPITAL ENCOUNTER (INPATIENT)
Age: 84
LOS: 3 days | Discharge: HOME | DRG: 872 | End: 2019-07-13
Attending: EMERGENCY MEDICINE | Admitting: HOSPITALIST
Payer: MEDICARE

## 2019-01-01 ENCOUNTER — HOSPITAL ENCOUNTER (OUTPATIENT)
Dept: RADIATION THERAPY | Age: 84
Discharge: HOME | End: 2019-09-23
Payer: MEDICARE

## 2019-01-01 ENCOUNTER — APPOINTMENT (OUTPATIENT)
Dept: VASCULAR SURGERY | Age: 84
DRG: 872 | End: 2019-01-01
Attending: PHYSICIAN ASSISTANT
Payer: MEDICARE

## 2019-01-01 ENCOUNTER — HOSPITAL ENCOUNTER (OUTPATIENT)
Dept: RADIATION THERAPY | Age: 84
Discharge: HOME | End: 2019-08-12
Payer: MEDICARE

## 2019-01-01 ENCOUNTER — HOSPITAL ENCOUNTER (OUTPATIENT)
Dept: RADIATION THERAPY | Age: 84
Discharge: HOME | End: 2019-08-29
Payer: MEDICARE

## 2019-01-01 ENCOUNTER — HOSPITAL ENCOUNTER (EMERGENCY)
Age: 84
Discharge: HOME | End: 2019-04-16
Attending: EMERGENCY MEDICINE
Payer: MEDICARE

## 2019-01-01 ENCOUNTER — HOSPITAL ENCOUNTER (OUTPATIENT)
Dept: RADIATION THERAPY | Age: 84
Discharge: HOME | End: 2019-08-26
Payer: MEDICARE

## 2019-01-01 ENCOUNTER — HOSPITAL ENCOUNTER (OUTPATIENT)
Dept: RADIATION THERAPY | Age: 84
Discharge: HOME | End: 2019-08-19
Payer: MEDICARE

## 2019-01-01 ENCOUNTER — HOSPITAL ENCOUNTER (OUTPATIENT)
Dept: RADIATION THERAPY | Age: 84
Discharge: HOME | End: 2019-09-25
Payer: MEDICARE

## 2019-01-01 ENCOUNTER — APPOINTMENT (OUTPATIENT)
Dept: NUCLEAR MEDICINE | Age: 84
DRG: 867 | End: 2019-01-01
Attending: INTERNAL MEDICINE
Payer: MEDICARE

## 2019-01-01 ENCOUNTER — APPOINTMENT (OUTPATIENT)
Dept: VASCULAR SURGERY | Age: 84
DRG: 309 | End: 2019-01-01
Attending: FAMILY MEDICINE
Payer: MEDICARE

## 2019-01-01 ENCOUNTER — HOSPITAL ENCOUNTER (OUTPATIENT)
Dept: RADIATION THERAPY | Age: 84
Discharge: HOME | End: 2019-08-23
Payer: MEDICARE

## 2019-01-01 VITALS
OXYGEN SATURATION: 95 % | TEMPERATURE: 99.8 F | HEART RATE: 120 BPM | DIASTOLIC BLOOD PRESSURE: 56 MMHG | SYSTOLIC BLOOD PRESSURE: 91 MMHG

## 2019-01-01 VITALS
TEMPERATURE: 98.6 F | RESPIRATION RATE: 18 BRPM | SYSTOLIC BLOOD PRESSURE: 110 MMHG | OXYGEN SATURATION: 90 % | HEART RATE: 100 BPM | DIASTOLIC BLOOD PRESSURE: 55 MMHG

## 2019-01-01 VITALS
OXYGEN SATURATION: 100 % | HEART RATE: 75 BPM | SYSTOLIC BLOOD PRESSURE: 105 MMHG | WEIGHT: 190 LBS | TEMPERATURE: 98 F | DIASTOLIC BLOOD PRESSURE: 73 MMHG | RESPIRATION RATE: 15 BRPM | HEIGHT: 68 IN | BODY MASS INDEX: 28.79 KG/M2

## 2019-01-01 VITALS
OXYGEN SATURATION: 98 % | TEMPERATURE: 98.2 F | RESPIRATION RATE: 16 BRPM | BODY MASS INDEX: 28.79 KG/M2 | HEART RATE: 86 BPM | HEIGHT: 68 IN | SYSTOLIC BLOOD PRESSURE: 158 MMHG | WEIGHT: 190 LBS | DIASTOLIC BLOOD PRESSURE: 72 MMHG

## 2019-01-01 VITALS
OXYGEN SATURATION: 90 % | TEMPERATURE: 98.3 F | SYSTOLIC BLOOD PRESSURE: 104 MMHG | DIASTOLIC BLOOD PRESSURE: 66 MMHG | HEART RATE: 92 BPM

## 2019-01-01 VITALS
HEART RATE: 94 BPM | SYSTOLIC BLOOD PRESSURE: 110 MMHG | DIASTOLIC BLOOD PRESSURE: 55 MMHG | TEMPERATURE: 97.8 F | OXYGEN SATURATION: 90 %

## 2019-01-01 VITALS
TEMPERATURE: 99.8 F | OXYGEN SATURATION: 91 % | SYSTOLIC BLOOD PRESSURE: 91 MMHG | DIASTOLIC BLOOD PRESSURE: 56 MMHG | HEART RATE: 115 BPM

## 2019-01-01 VITALS
DIASTOLIC BLOOD PRESSURE: 35 MMHG | SYSTOLIC BLOOD PRESSURE: 76 MMHG | OXYGEN SATURATION: 100 % | HEART RATE: 31 BPM | TEMPERATURE: 92.4 F

## 2019-01-01 VITALS
TEMPERATURE: 97.8 F | SYSTOLIC BLOOD PRESSURE: 140 MMHG | DIASTOLIC BLOOD PRESSURE: 75 MMHG | RESPIRATION RATE: 16 BRPM | HEART RATE: 80 BPM | OXYGEN SATURATION: 97 %

## 2019-01-01 VITALS
BODY MASS INDEX: 29.46 KG/M2 | OXYGEN SATURATION: 99 % | HEART RATE: 98 BPM | TEMPERATURE: 97.7 F | SYSTOLIC BLOOD PRESSURE: 116 MMHG | DIASTOLIC BLOOD PRESSURE: 61 MMHG | HEIGHT: 68 IN | WEIGHT: 194.4 LBS | RESPIRATION RATE: 18 BRPM

## 2019-01-01 VITALS
TEMPERATURE: 97.8 F | RESPIRATION RATE: 18 BRPM | DIASTOLIC BLOOD PRESSURE: 62 MMHG | SYSTOLIC BLOOD PRESSURE: 110 MMHG | HEART RATE: 56 BPM

## 2019-01-01 VITALS
TEMPERATURE: 98.2 F | BODY MASS INDEX: 27.28 KG/M2 | OXYGEN SATURATION: 100 % | RESPIRATION RATE: 18 BRPM | HEART RATE: 97 BPM | WEIGHT: 180 LBS | DIASTOLIC BLOOD PRESSURE: 62 MMHG | SYSTOLIC BLOOD PRESSURE: 122 MMHG | HEIGHT: 68 IN

## 2019-01-01 VITALS
RESPIRATION RATE: 18 BRPM | BODY MASS INDEX: 26.98 KG/M2 | TEMPERATURE: 98 F | HEIGHT: 68 IN | WEIGHT: 178 LBS | OXYGEN SATURATION: 96 % | DIASTOLIC BLOOD PRESSURE: 78 MMHG | SYSTOLIC BLOOD PRESSURE: 124 MMHG | HEART RATE: 69 BPM

## 2019-01-01 VITALS — WEIGHT: 177 LBS | BODY MASS INDEX: 26.91 KG/M2

## 2019-01-01 VITALS
TEMPERATURE: 98.9 F | HEART RATE: 81 BPM | OXYGEN SATURATION: 98 % | DIASTOLIC BLOOD PRESSURE: 62 MMHG | SYSTOLIC BLOOD PRESSURE: 123 MMHG

## 2019-01-01 VITALS
WEIGHT: 181.5 LBS | HEIGHT: 68 IN | BODY MASS INDEX: 27.51 KG/M2 | OXYGEN SATURATION: 98 % | SYSTOLIC BLOOD PRESSURE: 94 MMHG | DIASTOLIC BLOOD PRESSURE: 57 MMHG | RESPIRATION RATE: 18 BRPM | HEART RATE: 97 BPM | TEMPERATURE: 97.7 F

## 2019-01-01 VITALS
DIASTOLIC BLOOD PRESSURE: 55 MMHG | TEMPERATURE: 98.6 F | OXYGEN SATURATION: 90 % | HEART RATE: 100 BPM | SYSTOLIC BLOOD PRESSURE: 110 MMHG

## 2019-01-01 VITALS
HEIGHT: 68 IN | BODY MASS INDEX: 27.28 KG/M2 | HEART RATE: 71 BPM | WEIGHT: 180 LBS | TEMPERATURE: 98.2 F | RESPIRATION RATE: 16 BRPM | SYSTOLIC BLOOD PRESSURE: 114 MMHG | DIASTOLIC BLOOD PRESSURE: 68 MMHG | OXYGEN SATURATION: 94 %

## 2019-01-01 VITALS — DIASTOLIC BLOOD PRESSURE: 64 MMHG | SYSTOLIC BLOOD PRESSURE: 104 MMHG | HEART RATE: 72 BPM | TEMPERATURE: 97.4 F

## 2019-01-01 VITALS
TEMPERATURE: 97.9 F | DIASTOLIC BLOOD PRESSURE: 60 MMHG | SYSTOLIC BLOOD PRESSURE: 118 MMHG | OXYGEN SATURATION: 96 % | RESPIRATION RATE: 18 BRPM | HEART RATE: 77 BPM

## 2019-01-01 DIAGNOSIS — M54.2 ACUTE NECK PAIN: Primary | ICD-10-CM

## 2019-01-01 DIAGNOSIS — A41.9 SEVERE SEPSIS (HCC): Primary | ICD-10-CM

## 2019-01-01 DIAGNOSIS — N17.9 AKI (ACUTE KIDNEY INJURY) (HCC): ICD-10-CM

## 2019-01-01 DIAGNOSIS — C61 PROSTATE CANCER (HCC): ICD-10-CM

## 2019-01-01 DIAGNOSIS — L03.012 CELLULITIS OF LEFT THUMB: ICD-10-CM

## 2019-01-01 DIAGNOSIS — S09.90XA CLOSED HEAD INJURY, INITIAL ENCOUNTER: ICD-10-CM

## 2019-01-01 DIAGNOSIS — L03.116 CELLULITIS OF LEFT FOOT: Primary | ICD-10-CM
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
## 2019-01-01 DIAGNOSIS — R55 SYNCOPE: ICD-10-CM

## 2019-01-01 DIAGNOSIS — R35.1 NOCTURIA: ICD-10-CM

## 2019-01-01 DIAGNOSIS — I48.91 ATRIAL FIBRILLATION (HCC): ICD-10-CM

## 2019-01-01 DIAGNOSIS — R26.89 LIMP: ICD-10-CM

## 2019-01-01 DIAGNOSIS — R55 SYNCOPE AND COLLAPSE: Primary | ICD-10-CM

## 2019-01-01 DIAGNOSIS — L03.115 CELLULITIS OF RIGHT LOWER EXTREMITY: ICD-10-CM

## 2019-01-01 DIAGNOSIS — M47.22 OSTEOARTHRITIS OF SPINE WITH RADICULOPATHY, CERVICAL REGION: ICD-10-CM

## 2019-01-01 DIAGNOSIS — R65.20 SEVERE SEPSIS (HCC): Primary | ICD-10-CM

## 2019-01-01 DIAGNOSIS — C61 MALIGNANT NEOPLASM OF PROSTATE (HCC): ICD-10-CM

## 2019-01-01 DIAGNOSIS — L03.115 CELLULITIS OF RIGHT LOWER LEG: ICD-10-CM

## 2019-01-01 DIAGNOSIS — R33.9 URINARY RETENTION: ICD-10-CM

## 2019-01-01 DIAGNOSIS — S16.1XXA NECK STRAIN, INITIAL ENCOUNTER: ICD-10-CM

## 2019-01-01 DIAGNOSIS — L03.115 CELLULITIS OF RIGHT LEG: ICD-10-CM

## 2019-01-01 DIAGNOSIS — S16.1XXD STRAIN OF NECK MUSCLE, SUBSEQUENT ENCOUNTER: Primary | ICD-10-CM

## 2019-01-01 DIAGNOSIS — M25.562 ACUTE PAIN OF LEFT KNEE: Primary | ICD-10-CM

## 2019-01-01 DIAGNOSIS — I46.9 CARDIOPULMONARY ARREST (HCC): Primary | ICD-10-CM

## 2019-01-01 DIAGNOSIS — S13.9XXA CERVICAL SPRAIN, INITIAL ENCOUNTER: Primary | ICD-10-CM

## 2019-01-01 LAB
ALBUMIN SERPL-MCNC: 1.5 G/DL (ref 3.4–5)
ALBUMIN SERPL-MCNC: 1.8 G/DL (ref 3.4–5)
ALBUMIN SERPL-MCNC: 1.9 G/DL (ref 3.4–5)
ALBUMIN SERPL-MCNC: 2 G/DL (ref 3.4–5)
ALBUMIN SERPL-MCNC: 2 G/DL (ref 3.4–5)
ALBUMIN SERPL-MCNC: 2.2 G/DL (ref 3.4–5)
ALBUMIN SERPL-MCNC: 2.5 G/DL (ref 3.4–5)
ALBUMIN SERPL-MCNC: 3 G/DL (ref 3.4–5)
ALBUMIN/GLOB SERPL: 0.5 {RATIO} (ref 0.8–1.7)
ALBUMIN/GLOB SERPL: 0.6 {RATIO} (ref 0.8–1.7)
ALBUMIN/GLOB SERPL: 0.7 {RATIO} (ref 0.8–1.7)
ALBUMIN/GLOB SERPL: 0.8 {RATIO} (ref 0.8–1.7)
ALBUMIN/GLOB SERPL: 0.8 {RATIO} (ref 0.8–1.7)
ALBUMIN/GLOB SERPL: 0.9 {RATIO} (ref 0.8–1.7)
ALP BONE CFR SERPL: 38 % (ref 12–68)
ALP INTEST CFR SERPL: 12 % (ref 0–18)
ALP LIVER CFR SERPL: 50 % (ref 13–88)
ALP SERPL-CCNC: 104 U/L (ref 45–117)
ALP SERPL-CCNC: 108 U/L (ref 45–117)
ALP SERPL-CCNC: 140 U/L (ref 45–117)
ALP SERPL-CCNC: 197 U/L (ref 45–117)
ALP SERPL-CCNC: 241 IU/L (ref 39–117)
ALP SERPL-CCNC: 260 U/L (ref 45–117)
ALP SERPL-CCNC: 269 U/L (ref 45–117)
ALP SERPL-CCNC: 271 U/L (ref 45–117)
ALP SERPL-CCNC: 278 U/L (ref 45–117)
ALP SERPL-CCNC: 293 U/L (ref 45–117)
ALP SERPL-CCNC: 355 U/L (ref 45–117)
ALP SERPL-CCNC: 396 U/L (ref 45–117)
ALP SERPL-CCNC: 401 U/L (ref 45–117)
ALP SERPL-CCNC: 479 U/L (ref 45–117)
ALP SERPL-CCNC: 484 U/L (ref 45–117)
ALP SERPL-CCNC: 530 U/L (ref 45–117)
ALP SERPL-CCNC: 596 U/L (ref 45–117)
ALT SERPL-CCNC: 117 U/L (ref 16–61)
ALT SERPL-CCNC: 22 U/L (ref 16–61)
ALT SERPL-CCNC: 23 U/L (ref 16–61)
ALT SERPL-CCNC: 23 U/L (ref 16–61)
ALT SERPL-CCNC: 25 U/L (ref 16–61)
ALT SERPL-CCNC: 30 U/L (ref 16–61)
ALT SERPL-CCNC: 30 U/L (ref 16–61)
ALT SERPL-CCNC: 33 U/L (ref 16–61)
ALT SERPL-CCNC: 42 U/L (ref 16–61)
ALT SERPL-CCNC: 43 U/L (ref 16–61)
ALT SERPL-CCNC: 45 U/L (ref 16–61)
ALT SERPL-CCNC: 46 U/L (ref 16–61)
ALT SERPL-CCNC: 48 U/L (ref 16–61)
ALT SERPL-CCNC: 61 U/L (ref 16–61)
ALT SERPL-CCNC: 68 U/L (ref 16–61)
ALT SERPL-CCNC: 682 U/L (ref 16–61)
AMMONIA PLAS-SCNC: 10 UMOL/L (ref 11–32)
AMMONIA PLAS-SCNC: <10 UMOL/L (ref 11–32)
AMORPH CRY URNS QL MICRO: ABNORMAL
ANION GAP BLD CALC-SCNC: 20 MMOL/L (ref 10–20)
ANION GAP SERPL CALC-SCNC: 12 MMOL/L (ref 3–18)
ANION GAP SERPL CALC-SCNC: 18 MMOL/L (ref 3–18)
ANION GAP SERPL CALC-SCNC: 3 MMOL/L (ref 3–18)
ANION GAP SERPL CALC-SCNC: 4 MMOL/L (ref 3–18)
ANION GAP SERPL CALC-SCNC: 5 MMOL/L (ref 3–18)
ANION GAP SERPL CALC-SCNC: 6 MMOL/L (ref 3–18)
ANION GAP SERPL CALC-SCNC: 7 MMOL/L (ref 3–18)
ANION GAP SERPL CALC-SCNC: 7 MMOL/L (ref 3–18)
ANION GAP SERPL CALC-SCNC: 8 MMOL/L (ref 3–18)
ANION GAP SERPL CALC-SCNC: 9 MMOL/L (ref 3–18)
APPEARANCE FLD: ABNORMAL
APPEARANCE SNV: ABNORMAL
APPEARANCE UR: ABNORMAL
APPEARANCE UR: ABNORMAL
APPEARANCE UR: CLEAR
APPEARANCE UR: CLEAR
ARTERIAL PATENCY WRIST A: NO
ARTERIAL PATENCY WRIST A: YES
AST SERPL-CCNC: 126 U/L (ref 10–38)
AST SERPL-CCNC: 1306 U/L (ref 10–38)
AST SERPL-CCNC: 31 U/L (ref 10–38)
AST SERPL-CCNC: 33 U/L (ref 15–37)
AST SERPL-CCNC: 37 U/L (ref 10–38)
AST SERPL-CCNC: 40 U/L (ref 10–38)
AST SERPL-CCNC: 40 U/L (ref 10–38)
AST SERPL-CCNC: 44 U/L (ref 10–38)
AST SERPL-CCNC: 47 U/L (ref 10–38)
AST SERPL-CCNC: 48 U/L (ref 10–38)
AST SERPL-CCNC: 52 U/L (ref 10–38)
AST SERPL-CCNC: 55 U/L (ref 10–38)
AST SERPL-CCNC: 57 U/L (ref 10–38)
AST SERPL-CCNC: 58 U/L (ref 10–38)
AST SERPL-CCNC: 66 U/L (ref 10–38)
AST SERPL-CCNC: 74 U/L (ref 10–38)
ATRIAL RATE: 102 BPM
ATRIAL RATE: 241 BPM
ATRIAL RATE: 65 BPM
ATRIAL RATE: 80 BPM
BACTERIA SPEC ANAEROBE CULT: NORMAL
BACTERIA SPEC CULT: ABNORMAL
BACTERIA SPEC CULT: ABNORMAL
BACTERIA SPEC CULT: NORMAL
BACTERIA URNS QL MICRO: ABNORMAL /HPF
BACTERIA URNS QL MICRO: ABNORMAL /HPF
BACTERIA URNS QL MICRO: NEGATIVE /HPF
BASE EXCESS BLD CALC-SCNC: 3 MMOL/L
BASE EXCESS BLD CALC-SCNC: 3 MMOL/L
BASOPHILS # BLD: 0 K/UL (ref 0–0.1)
BASOPHILS # BLD: 0.1 K/UL (ref 0–0.1)
BASOPHILS NFR BLD: 0 % (ref 0–2)
BASOPHILS NFR BLD: 0 % (ref 0–3)
BASOPHILS NFR BLD: 0 % (ref 0–3)
BASOPHILS NFR BLD: 1 % (ref 0–2)
BDY SITE: ABNORMAL
BDY SITE: ABNORMAL
BILIRUB DIRECT SERPL-MCNC: 0.2 MG/DL (ref 0–0.2)
BILIRUB DIRECT SERPL-MCNC: 0.4 MG/DL (ref 0–0.2)
BILIRUB DIRECT SERPL-MCNC: 0.4 MG/DL (ref 0–0.2)
BILIRUB DIRECT SERPL-MCNC: 0.6 MG/DL (ref 0–0.2)
BILIRUB DIRECT SERPL-MCNC: 0.6 MG/DL (ref 0–0.2)
BILIRUB DIRECT SERPL-MCNC: 0.8 MG/DL (ref 0–0.2)
BILIRUB DIRECT SERPL-MCNC: 0.9 MG/DL (ref 0–0.2)
BILIRUB DIRECT SERPL-MCNC: 0.9 MG/DL (ref 0–0.2)
BILIRUB DIRECT SERPL-MCNC: 1.3 MG/DL (ref 0–0.2)
BILIRUB SERPL-MCNC: 0.3 MG/DL (ref 0.2–1)
BILIRUB SERPL-MCNC: 0.5 MG/DL (ref 0.2–1)
BILIRUB SERPL-MCNC: 0.5 MG/DL (ref 0.2–1)
BILIRUB SERPL-MCNC: 0.6 MG/DL (ref 0.2–1)
BILIRUB SERPL-MCNC: 0.6 MG/DL (ref 0.2–1)
BILIRUB SERPL-MCNC: 0.9 MG/DL (ref 0.2–1)
BILIRUB SERPL-MCNC: 1 MG/DL (ref 0.2–1)
BILIRUB SERPL-MCNC: 1.1 MG/DL (ref 0.2–1)
BILIRUB SERPL-MCNC: 1.1 MG/DL (ref 0.2–1)
BILIRUB SERPL-MCNC: 1.2 MG/DL (ref 0.2–1)
BILIRUB SERPL-MCNC: 1.3 MG/DL (ref 0.2–1)
BILIRUB SERPL-MCNC: 1.3 MG/DL (ref 0.2–1)
BILIRUB SERPL-MCNC: 1.5 MG/DL (ref 0.2–1)
BILIRUB SERPL-MCNC: 1.6 MG/DL (ref 0.2–1)
BILIRUB UR QL: ABNORMAL
BILIRUB UR QL: NEGATIVE
BNP SERPL-MCNC: 1258 PG/ML (ref 0–1800)
BNP SERPL-MCNC: 2375 PG/ML (ref 0–1800)
BNP SERPL-MCNC: 3827 PG/ML (ref 0–1800)
BODY FLD TYPE: NORMAL
BODY FLD TYPE: NORMAL
BODY TEMPERATURE: 98
BODY TEMPERATURE: 98.6
BUN BLD-MCNC: 38 MG/DL (ref 7–18)
BUN SERPL-MCNC: 15 MG/DL (ref 7–18)
BUN SERPL-MCNC: 16 MG/DL (ref 7–18)
BUN SERPL-MCNC: 17 MG/DL (ref 7–18)
BUN SERPL-MCNC: 18 MG/DL (ref 7–18)
BUN SERPL-MCNC: 18 MG/DL (ref 7–18)
BUN SERPL-MCNC: 19 MG/DL (ref 7–18)
BUN SERPL-MCNC: 20 MG/DL (ref 7–18)
BUN SERPL-MCNC: 21 MG/DL (ref 7–18)
BUN SERPL-MCNC: 23 MG/DL (ref 7–18)
BUN SERPL-MCNC: 24 MG/DL (ref 7–18)
BUN SERPL-MCNC: 24 MG/DL (ref 7–18)
BUN SERPL-MCNC: 25 MG/DL (ref 7–18)
BUN SERPL-MCNC: 26 MG/DL (ref 7–18)
BUN SERPL-MCNC: 27 MG/DL (ref 7–18)
BUN SERPL-MCNC: 27 MG/DL (ref 7–18)
BUN SERPL-MCNC: 29 MG/DL (ref 7–18)
BUN SERPL-MCNC: 31 MG/DL (ref 7–18)
BUN SERPL-MCNC: 38 MG/DL (ref 7–18)
BUN SERPL-MCNC: 41 MG/DL (ref 7–18)
BUN SERPL-MCNC: 44 MG/DL (ref 7–18)
BUN SERPL-MCNC: 47 MG/DL (ref 7–18)
BUN SERPL-MCNC: 52 MG/DL (ref 7–18)
BUN/CREAT SERPL: 18 (ref 12–20)
BUN/CREAT SERPL: 18 (ref 12–20)
BUN/CREAT SERPL: 19 (ref 12–20)
BUN/CREAT SERPL: 20 (ref 12–20)
BUN/CREAT SERPL: 20 (ref 12–20)
BUN/CREAT SERPL: 22 (ref 12–20)
BUN/CREAT SERPL: 22 (ref 12–20)
BUN/CREAT SERPL: 23 (ref 12–20)
BUN/CREAT SERPL: 23 (ref 12–20)
BUN/CREAT SERPL: 25 (ref 12–20)
BUN/CREAT SERPL: 25 (ref 12–20)
BUN/CREAT SERPL: 26 (ref 12–20)
BUN/CREAT SERPL: 27 (ref 12–20)
BUN/CREAT SERPL: 28 (ref 12–20)
BUN/CREAT SERPL: 29 (ref 12–20)
BUN/CREAT SERPL: 29 (ref 12–20)
BUN/CREAT SERPL: 30 (ref 12–20)
BUN/CREAT SERPL: 31 (ref 12–20)
BUN/CREAT SERPL: 32 (ref 12–20)
BUN/CREAT SERPL: 34 (ref 12–20)
BUN/CREAT SERPL: 35 (ref 12–20)
BUN/CREAT SERPL: 36 (ref 12–20)
BUN/CREAT SERPL: 53 (ref 12–20)
CA-I BLD-MCNC: 1.17 MMOL/L (ref 1.12–1.32)
CA-I SERPL-SCNC: 1.05 MMOL/L (ref 1.12–1.32)
CA-I SERPL-SCNC: 1.1 MMOL/L (ref 1.12–1.32)
CA-I SERPL-SCNC: 1.12 MMOL/L (ref 1.12–1.32)
CA-I SERPL-SCNC: 1.13 MMOL/L (ref 1.12–1.32)
CA-I SERPL-SCNC: 1.14 MMOL/L (ref 1.12–1.32)
CA-I SERPL-SCNC: 1.15 MMOL/L (ref 1.12–1.32)
CALCIUM SERPL-MCNC: 7.3 MG/DL (ref 8.5–10.1)
CALCIUM SERPL-MCNC: 7.3 MG/DL (ref 8.5–10.1)
CALCIUM SERPL-MCNC: 7.5 MG/DL (ref 8.5–10.1)
CALCIUM SERPL-MCNC: 7.6 MG/DL (ref 8.5–10.1)
CALCIUM SERPL-MCNC: 7.7 MG/DL (ref 8.5–10.1)
CALCIUM SERPL-MCNC: 7.7 MG/DL (ref 8.5–10.1)
CALCIUM SERPL-MCNC: 7.8 MG/DL (ref 8.5–10.1)
CALCIUM SERPL-MCNC: 7.9 MG/DL (ref 8.5–10.1)
CALCIUM SERPL-MCNC: 7.9 MG/DL (ref 8.5–10.1)
CALCIUM SERPL-MCNC: 8 MG/DL (ref 8.5–10.1)
CALCIUM SERPL-MCNC: 8.1 MG/DL (ref 8.5–10.1)
CALCIUM SERPL-MCNC: 8.1 MG/DL (ref 8.5–10.1)
CALCIUM SERPL-MCNC: 8.2 MG/DL (ref 8.5–10.1)
CALCIUM SERPL-MCNC: 8.3 MG/DL (ref 8.5–10.1)
CALCIUM SERPL-MCNC: 8.4 MG/DL (ref 8.5–10.1)
CALCIUM SERPL-MCNC: 9.1 MG/DL (ref 8.5–10.1)
CALCIUM SERPL-MCNC: 9.5 MG/DL (ref 8.5–10.1)
CALCULATED R AXIS, ECG10: -21 DEGREES
CALCULATED R AXIS, ECG10: -21 DEGREES
CALCULATED R AXIS, ECG10: -27 DEGREES
CALCULATED R AXIS, ECG10: -30 DEGREES
CALCULATED R AXIS, ECG10: -36 DEGREES
CALCULATED R AXIS, ECG10: 129 DEGREES
CALCULATED T AXIS, ECG11: -12 DEGREES
CALCULATED T AXIS, ECG11: -24 DEGREES
CALCULATED T AXIS, ECG11: -9 DEGREES
CALCULATED T AXIS, ECG11: 153 DEGREES
CALCULATED T AXIS, ECG11: 157 DEGREES
CALCULATED T AXIS, ECG11: 88 DEGREES
CHLORIDE BLD-SCNC: 88 MMOL/L (ref 100–108)
CHLORIDE SERPL-SCNC: 100 MMOL/L (ref 100–111)
CHLORIDE SERPL-SCNC: 101 MMOL/L (ref 100–111)
CHLORIDE SERPL-SCNC: 102 MMOL/L (ref 100–108)
CHLORIDE SERPL-SCNC: 102 MMOL/L (ref 100–108)
CHLORIDE SERPL-SCNC: 102 MMOL/L (ref 100–111)
CHLORIDE SERPL-SCNC: 103 MMOL/L (ref 100–108)
CHLORIDE SERPL-SCNC: 105 MMOL/L (ref 100–111)
CHLORIDE SERPL-SCNC: 106 MMOL/L (ref 100–111)
CHLORIDE SERPL-SCNC: 109 MMOL/L (ref 100–111)
CHLORIDE SERPL-SCNC: 110 MMOL/L (ref 100–111)
CHLORIDE SERPL-SCNC: 110 MMOL/L (ref 100–111)
CHLORIDE SERPL-SCNC: 111 MMOL/L (ref 100–111)
CHLORIDE SERPL-SCNC: 112 MMOL/L (ref 100–111)
CHLORIDE SERPL-SCNC: 114 MMOL/L (ref 100–111)
CHLORIDE SERPL-SCNC: 91 MMOL/L (ref 100–111)
CHLORIDE SERPL-SCNC: 93 MMOL/L (ref 100–111)
CHLORIDE SERPL-SCNC: 94 MMOL/L (ref 100–111)
CHLORIDE SERPL-SCNC: 95 MMOL/L (ref 100–111)
CHLORIDE SERPL-SCNC: 96 MMOL/L (ref 100–111)
CHLORIDE SERPL-SCNC: 96 MMOL/L (ref 100–111)
CHLORIDE SERPL-SCNC: 97 MMOL/L (ref 100–108)
CHLORIDE SERPL-SCNC: 98 MMOL/L (ref 100–111)
CHLORIDE SERPL-SCNC: 99 MMOL/L (ref 100–111)
CK MB CFR SERPL CALC: 2.2 % (ref 0–4)
CK MB CFR SERPL CALC: 2.3 % (ref 0–4)
CK MB CFR SERPL CALC: ABNORMAL % (ref 0–4)
CK MB SERPL-MCNC: 1.6 NG/ML (ref 5–25)
CK MB SERPL-MCNC: 5.2 NG/ML (ref 5–25)
CK MB SERPL-MCNC: <1 NG/ML (ref 5–25)
CK SERPL-CCNC: 240 U/L (ref 39–308)
CK SERPL-CCNC: 69 U/L (ref 39–308)
CK SERPL-CCNC: 89 U/L (ref 39–308)
CO2 BLD-SCNC: 28 MMOL/L (ref 19–24)
CO2 SERPL-SCNC: 24 MMOL/L (ref 21–32)
CO2 SERPL-SCNC: 25 MMOL/L (ref 21–32)
CO2 SERPL-SCNC: 25 MMOL/L (ref 21–32)
CO2 SERPL-SCNC: 26 MMOL/L (ref 21–32)
CO2 SERPL-SCNC: 27 MMOL/L (ref 21–32)
CO2 SERPL-SCNC: 29 MMOL/L (ref 21–32)
CO2 SERPL-SCNC: 30 MMOL/L (ref 21–32)
CO2 SERPL-SCNC: 30 MMOL/L (ref 21–32)
CO2 SERPL-SCNC: 31 MMOL/L (ref 21–32)
CO2 SERPL-SCNC: 32 MMOL/L (ref 21–32)
CO2 SERPL-SCNC: 33 MMOL/L (ref 21–32)
CO2 SERPL-SCNC: 34 MMOL/L (ref 21–32)
CO2 SERPL-SCNC: 35 MMOL/L (ref 21–32)
CO2 SERPL-SCNC: 37 MMOL/L (ref 21–32)
COLOR FLD: YELLOW
COLOR SNV: YELLOW
COLOR UR: ABNORMAL
COLOR UR: YELLOW
CREAT SERPL-MCNC: 0.51 MG/DL (ref 0.6–1.3)
CREAT SERPL-MCNC: 0.59 MG/DL (ref 0.6–1.3)
CREAT SERPL-MCNC: 0.62 MG/DL (ref 0.6–1.3)
CREAT SERPL-MCNC: 0.63 MG/DL (ref 0.6–1.3)
CREAT SERPL-MCNC: 0.65 MG/DL (ref 0.6–1.3)
CREAT SERPL-MCNC: 0.66 MG/DL (ref 0.6–1.3)
CREAT SERPL-MCNC: 0.67 MG/DL (ref 0.6–1.3)
CREAT SERPL-MCNC: 0.69 MG/DL (ref 0.6–1.3)
CREAT SERPL-MCNC: 0.72 MG/DL (ref 0.6–1.3)
CREAT SERPL-MCNC: 0.73 MG/DL (ref 0.6–1.3)
CREAT SERPL-MCNC: 0.75 MG/DL (ref 0.6–1.3)
CREAT SERPL-MCNC: 0.78 MG/DL (ref 0.6–1.3)
CREAT SERPL-MCNC: 0.84 MG/DL (ref 0.6–1.3)
CREAT SERPL-MCNC: 0.85 MG/DL (ref 0.6–1.3)
CREAT SERPL-MCNC: 0.86 MG/DL (ref 0.6–1.3)
CREAT SERPL-MCNC: 0.88 MG/DL (ref 0.6–1.3)
CREAT SERPL-MCNC: 0.9 MG/DL (ref 0.6–1.3)
CREAT SERPL-MCNC: 0.95 MG/DL (ref 0.6–1.3)
CREAT SERPL-MCNC: 1 MG/DL (ref 0.6–1.3)
CREAT SERPL-MCNC: 1.04 MG/DL (ref 0.6–1.3)
CREAT SERPL-MCNC: 1.05 MG/DL (ref 0.6–1.3)
CREAT SERPL-MCNC: 1.13 MG/DL (ref 0.6–1.3)
CREAT SERPL-MCNC: 1.14 MG/DL (ref 0.6–1.3)
CREAT SERPL-MCNC: 1.35 MG/DL (ref 0.6–1.3)
CREAT SERPL-MCNC: 1.57 MG/DL (ref 0.6–1.3)
CREAT SERPL-MCNC: 1.63 MG/DL (ref 0.6–1.3)
CREAT SERPL-MCNC: 1.7 MG/DL (ref 0.6–1.3)
CREAT SERPL-MCNC: 1.87 MG/DL (ref 0.6–1.3)
CREAT SERPL-MCNC: 2.3 MG/DL (ref 0.6–1.3)
CREAT UR-MCNC: 0.9 MG/DL (ref 0.6–1.3)
CREAT UR-MCNC: 1.8 MG/DL (ref 0.6–1.3)
CRP SERPL HS-MCNC: >9.5 MG/L
CRYSTALS FLD MICRO: NORMAL
CRYSTALS FLD MICRO: NORMAL
DATE LAST DOSE: ABNORMAL
DATE LAST DOSE: ABNORMAL
DATE LAST DOSE: NORMAL
DIAGNOSIS, 93000: NORMAL
DIFFERENTIAL METHOD BLD: ABNORMAL
ECHO AO ROOT DIAM: 2.97 CM
ECHO EST RA PRESSURE: 8 MMHG
ECHO LA AREA 2C: 22.67 CM2
ECHO LA AREA 4C: 25 CM2
ECHO LA MAJOR AXIS: 3.38 CM
ECHO LA TO AORTIC ROOT RATIO: 1.14
ECHO LA VOL 2C: 84.54 ML (ref 18–58)
ECHO LA VOL 4C: 72.13 ML (ref 18–58)
ECHO LA VOL BP: 79.3 ML (ref 18–58)
ECHO LA VOL/BSA BIPLANE: 40.58 ML/M2 (ref 16–28)
ECHO LA VOLUME INDEX A2C: 43.26 ML/M2 (ref 16–28)
ECHO LA VOLUME INDEX A4C: 36.91 ML/M2 (ref 16–28)
ECHO LV INTERNAL DIMENSION DIASTOLIC: 4.64 CM (ref 4.2–5.9)
ECHO LV INTERNAL DIMENSION SYSTOLIC: 3.05 CM
ECHO LV IVSD: 0.93 CM (ref 0.6–1)
ECHO LV MASS 2D: 173.2 G (ref 88–224)
ECHO LV MASS INDEX 2D: 88.6 G/M2 (ref 49–115)
ECHO LV POSTERIOR WALL DIASTOLIC: 0.97 CM (ref 0.6–1)
ECHO MV A VELOCITY: 0.96 CM/S
ECHO MV AREA PHT: 3.9 CM2
ECHO MV E DECELERATION TIME (DT): 195.9 MS
ECHO MV E VELOCITY: 101.58 CM/S
ECHO MV E/A RATIO: 105.81
ECHO MV PRESSURE HALF TIME (PHT): 56.8 MS
ECHO PULMONARY ARTERY SYSTOLIC PRESSURE (PASP): 40.8 MMHG
ECHO RIGHT VENTRICULAR SYSTOLIC PRESSURE (RVSP): 40.8 MMHG
ECHO RV TAPSE: 1.7 CM (ref 1.5–2)
ECHO TV REGURGITANT MAX VELOCITY: 286.2 CM/S
ECHO TV REGURGITANT PEAK GRADIENT: 32.8 MMHG
EOSINOPHIL # BLD: 0 K/UL (ref 0–0.4)
EOSINOPHIL # BLD: 0.1 K/UL (ref 0–0.4)
EOSINOPHIL # BLD: 0.2 K/UL (ref 0–0.4)
EOSINOPHIL # BLD: 0.5 K/UL (ref 0–0.4)
EOSINOPHIL NFR BLD: 0 % (ref 0–5)
EOSINOPHIL NFR BLD: 1 % (ref 0–5)
EOSINOPHIL NFR BLD: 2 % (ref 0–5)
EOSINOPHIL NFR BLD: 3 % (ref 0–5)
EOSINOPHIL NFR BLD: 4 % (ref 0–5)
EOSINOPHIL NFR FLD MANUAL: 0 %
EOSINOPHIL NFR FLD MANUAL: 0 %
EPITH CASTS URNS QL MICRO: ABNORMAL /LPF (ref 0–5)
EPITH CASTS URNS QL MICRO: ABNORMAL /LPF (ref 0–5)
EPITH CASTS URNS QL MICRO: NORMAL /LPF (ref 0–5)
ERYTHROCYTE [DISTWIDTH] IN BLOOD BY AUTOMATED COUNT: 14.3 % (ref 11.6–14.5)
ERYTHROCYTE [DISTWIDTH] IN BLOOD BY AUTOMATED COUNT: 14.5 % (ref 11.6–14.5)
ERYTHROCYTE [DISTWIDTH] IN BLOOD BY AUTOMATED COUNT: 15.2 % (ref 11.6–14.5)
ERYTHROCYTE [DISTWIDTH] IN BLOOD BY AUTOMATED COUNT: 15.4 % (ref 11.6–14.5)
ERYTHROCYTE [DISTWIDTH] IN BLOOD BY AUTOMATED COUNT: 15.5 % (ref 11.6–14.5)
ERYTHROCYTE [DISTWIDTH] IN BLOOD BY AUTOMATED COUNT: 16.1 % (ref 11.6–14.5)
ERYTHROCYTE [DISTWIDTH] IN BLOOD BY AUTOMATED COUNT: 16.1 % (ref 11.6–14.5)
ERYTHROCYTE [DISTWIDTH] IN BLOOD BY AUTOMATED COUNT: 16.2 % (ref 11.6–14.5)
ERYTHROCYTE [DISTWIDTH] IN BLOOD BY AUTOMATED COUNT: 16.2 % (ref 11.6–14.5)
ERYTHROCYTE [DISTWIDTH] IN BLOOD BY AUTOMATED COUNT: 16.3 % (ref 11.6–14.5)
ERYTHROCYTE [DISTWIDTH] IN BLOOD BY AUTOMATED COUNT: 16.3 % (ref 11.6–14.5)
ERYTHROCYTE [DISTWIDTH] IN BLOOD BY AUTOMATED COUNT: 16.4 % (ref 11.6–14.5)
ERYTHROCYTE [DISTWIDTH] IN BLOOD BY AUTOMATED COUNT: 16.4 % (ref 11.6–14.5)
ERYTHROCYTE [DISTWIDTH] IN BLOOD BY AUTOMATED COUNT: 16.9 % (ref 11.6–14.5)
ERYTHROCYTE [DISTWIDTH] IN BLOOD BY AUTOMATED COUNT: 17 % (ref 11.6–14.5)
ERYTHROCYTE [DISTWIDTH] IN BLOOD BY AUTOMATED COUNT: 17.3 % (ref 11.6–14.5)
ERYTHROCYTE [DISTWIDTH] IN BLOOD BY AUTOMATED COUNT: 17.3 % (ref 11.6–14.5)
ERYTHROCYTE [DISTWIDTH] IN BLOOD BY AUTOMATED COUNT: 17.5 % (ref 11.6–14.5)
ERYTHROCYTE [DISTWIDTH] IN BLOOD BY AUTOMATED COUNT: 17.5 % (ref 11.6–14.5)
ERYTHROCYTE [DISTWIDTH] IN BLOOD BY AUTOMATED COUNT: 17.6 % (ref 11.6–14.5)
ERYTHROCYTE [DISTWIDTH] IN BLOOD BY AUTOMATED COUNT: 17.7 % (ref 11.6–14.5)
ERYTHROCYTE [DISTWIDTH] IN BLOOD BY AUTOMATED COUNT: 17.7 % (ref 11.6–14.5)
ERYTHROCYTE [DISTWIDTH] IN BLOOD BY AUTOMATED COUNT: 17.8 % (ref 11.6–14.5)
ERYTHROCYTE [DISTWIDTH] IN BLOOD BY AUTOMATED COUNT: 17.9 % (ref 11.6–14.5)
ERYTHROCYTE [DISTWIDTH] IN BLOOD BY AUTOMATED COUNT: 17.9 % (ref 11.6–14.5)
ERYTHROCYTE [DISTWIDTH] IN BLOOD BY AUTOMATED COUNT: 18.3 % (ref 11.6–14.5)
ERYTHROCYTE [DISTWIDTH] IN BLOOD BY AUTOMATED COUNT: 18.3 % (ref 11.6–14.5)
ERYTHROCYTE [SEDIMENTATION RATE] IN BLOOD: >140 MM/HR (ref 0–20)
ERYTHROCYTE [SEDIMENTATION RATE] IN BLOOD: >140 MM/HR (ref 0–20)
GAS FLOW.O2 O2 DELIVERY SYS: ABNORMAL L/MIN
GAS FLOW.O2 O2 DELIVERY SYS: ABNORMAL L/MIN
GAS FLOW.O2 SETTING OXYMISER: 2 L/M
GAS FLOW.O2 SETTING OXYMISER: 3 L/M
GLOBULIN SER CALC-MCNC: 2.5 G/DL (ref 2–4)
GLOBULIN SER CALC-MCNC: 2.6 G/DL (ref 2–4)
GLOBULIN SER CALC-MCNC: 2.7 G/DL (ref 2–4)
GLOBULIN SER CALC-MCNC: 2.8 G/DL (ref 2–4)
GLOBULIN SER CALC-MCNC: 2.9 G/DL (ref 2–4)
GLOBULIN SER CALC-MCNC: 3 G/DL (ref 2–4)
GLOBULIN SER CALC-MCNC: 3.1 G/DL (ref 2–4)
GLOBULIN SER CALC-MCNC: 3.2 G/DL (ref 2–4)
GLOBULIN SER CALC-MCNC: 3.3 G/DL (ref 2–4)
GLOBULIN SER CALC-MCNC: 3.4 G/DL (ref 2–4)
GLOBULIN SER CALC-MCNC: 3.9 G/DL (ref 2–4)
GLUCOSE BLD STRIP.AUTO-MCNC: 100 MG/DL (ref 70–110)
GLUCOSE BLD STRIP.AUTO-MCNC: 110 MG/DL (ref 70–110)
GLUCOSE BLD STRIP.AUTO-MCNC: 110 MG/DL (ref 70–110)
GLUCOSE BLD STRIP.AUTO-MCNC: 111 MG/DL (ref 70–110)
GLUCOSE BLD STRIP.AUTO-MCNC: 111 MG/DL (ref 70–110)
GLUCOSE BLD STRIP.AUTO-MCNC: 112 MG/DL (ref 70–110)
GLUCOSE BLD STRIP.AUTO-MCNC: 116 MG/DL (ref 70–110)
GLUCOSE BLD STRIP.AUTO-MCNC: 117 MG/DL (ref 70–110)
GLUCOSE BLD STRIP.AUTO-MCNC: 117 MG/DL (ref 70–110)
GLUCOSE BLD STRIP.AUTO-MCNC: 119 MG/DL (ref 70–110)
GLUCOSE BLD STRIP.AUTO-MCNC: 120 MG/DL (ref 70–110)
GLUCOSE BLD STRIP.AUTO-MCNC: 120 MG/DL (ref 70–110)
GLUCOSE BLD STRIP.AUTO-MCNC: 123 MG/DL (ref 70–110)
GLUCOSE BLD STRIP.AUTO-MCNC: 123 MG/DL (ref 70–110)
GLUCOSE BLD STRIP.AUTO-MCNC: 128 MG/DL (ref 70–110)
GLUCOSE BLD STRIP.AUTO-MCNC: 128 MG/DL (ref 70–110)
GLUCOSE BLD STRIP.AUTO-MCNC: 130 MG/DL (ref 70–110)
GLUCOSE BLD STRIP.AUTO-MCNC: 130 MG/DL (ref 70–110)
GLUCOSE BLD STRIP.AUTO-MCNC: 132 MG/DL (ref 70–110)
GLUCOSE BLD STRIP.AUTO-MCNC: 134 MG/DL (ref 70–110)
GLUCOSE BLD STRIP.AUTO-MCNC: 134 MG/DL (ref 70–110)
GLUCOSE BLD STRIP.AUTO-MCNC: 136 MG/DL (ref 70–110)
GLUCOSE BLD STRIP.AUTO-MCNC: 137 MG/DL (ref 70–110)
GLUCOSE BLD STRIP.AUTO-MCNC: 137 MG/DL (ref 70–110)
GLUCOSE BLD STRIP.AUTO-MCNC: 138 MG/DL (ref 70–110)
GLUCOSE BLD STRIP.AUTO-MCNC: 140 MG/DL (ref 70–110)
GLUCOSE BLD STRIP.AUTO-MCNC: 142 MG/DL (ref 70–110)
GLUCOSE BLD STRIP.AUTO-MCNC: 148 MG/DL (ref 70–110)
GLUCOSE BLD STRIP.AUTO-MCNC: 150 MG/DL (ref 70–110)
GLUCOSE BLD STRIP.AUTO-MCNC: 154 MG/DL (ref 70–110)
GLUCOSE BLD STRIP.AUTO-MCNC: 168 MG/DL (ref 70–110)
GLUCOSE BLD STRIP.AUTO-MCNC: 170 MG/DL (ref 70–110)
GLUCOSE BLD STRIP.AUTO-MCNC: 172 MG/DL (ref 70–110)
GLUCOSE BLD STRIP.AUTO-MCNC: 175 MG/DL (ref 70–110)
GLUCOSE BLD STRIP.AUTO-MCNC: 177 MG/DL (ref 74–106)
GLUCOSE BLD STRIP.AUTO-MCNC: 186 MG/DL (ref 70–110)
GLUCOSE BLD STRIP.AUTO-MCNC: 190 MG/DL (ref 70–110)
GLUCOSE BLD STRIP.AUTO-MCNC: 77 MG/DL (ref 70–110)
GLUCOSE BLD STRIP.AUTO-MCNC: 78 MG/DL (ref 70–110)
GLUCOSE BLD STRIP.AUTO-MCNC: 79 MG/DL (ref 70–110)
GLUCOSE BLD STRIP.AUTO-MCNC: 85 MG/DL (ref 70–110)
GLUCOSE BLD STRIP.AUTO-MCNC: 86 MG/DL (ref 70–110)
GLUCOSE BLD STRIP.AUTO-MCNC: 87 MG/DL (ref 70–110)
GLUCOSE BLD STRIP.AUTO-MCNC: 88 MG/DL (ref 70–110)
GLUCOSE BLD STRIP.AUTO-MCNC: 92 MG/DL (ref 70–110)
GLUCOSE BLD STRIP.AUTO-MCNC: 94 MG/DL (ref 70–110)
GLUCOSE BLD STRIP.AUTO-MCNC: 95 MG/DL (ref 70–110)
GLUCOSE SERPL-MCNC: 100 MG/DL (ref 74–99)
GLUCOSE SERPL-MCNC: 103 MG/DL (ref 74–99)
GLUCOSE SERPL-MCNC: 106 MG/DL (ref 74–99)
GLUCOSE SERPL-MCNC: 108 MG/DL (ref 74–99)
GLUCOSE SERPL-MCNC: 109 MG/DL (ref 74–99)
GLUCOSE SERPL-MCNC: 111 MG/DL (ref 74–99)
GLUCOSE SERPL-MCNC: 115 MG/DL (ref 74–99)
GLUCOSE SERPL-MCNC: 117 MG/DL (ref 74–99)
GLUCOSE SERPL-MCNC: 118 MG/DL (ref 74–99)
GLUCOSE SERPL-MCNC: 119 MG/DL (ref 74–99)
GLUCOSE SERPL-MCNC: 120 MG/DL (ref 74–99)
GLUCOSE SERPL-MCNC: 131 MG/DL (ref 74–99)
GLUCOSE SERPL-MCNC: 135 MG/DL (ref 74–99)
GLUCOSE SERPL-MCNC: 135 MG/DL (ref 74–99)
GLUCOSE SERPL-MCNC: 138 MG/DL (ref 74–99)
GLUCOSE SERPL-MCNC: 155 MG/DL (ref 74–99)
GLUCOSE SERPL-MCNC: 185 MG/DL (ref 74–99)
GLUCOSE SERPL-MCNC: 231 MG/DL (ref 74–99)
GLUCOSE SERPL-MCNC: 272 MG/DL (ref 74–99)
GLUCOSE SERPL-MCNC: 72 MG/DL (ref 74–99)
GLUCOSE SERPL-MCNC: 78 MG/DL (ref 74–99)
GLUCOSE SERPL-MCNC: 90 MG/DL (ref 74–99)
GLUCOSE SERPL-MCNC: 95 MG/DL (ref 74–99)
GLUCOSE SERPL-MCNC: 96 MG/DL (ref 74–99)
GLUCOSE SERPL-MCNC: 97 MG/DL (ref 74–99)
GLUCOSE SERPL-MCNC: 97 MG/DL (ref 74–99)
GLUCOSE UR STRIP.AUTO-MCNC: NEGATIVE MG/DL
GRAM STN SPEC: ABNORMAL
GRAM STN SPEC: ABNORMAL
GRAM STN SPEC: NORMAL
HAV IGM SER QL: NEGATIVE
HBV CORE IGM SER QL: NEGATIVE
HBV SURFACE AG SER QL: <0.1 INDEX
HBV SURFACE AG SER QL: NEGATIVE
HCO3 BLD-SCNC: 26.5 MMOL/L (ref 22–26)
HCO3 BLD-SCNC: 27.7 MMOL/L (ref 22–26)
HCT VFR BLD AUTO: 20.2 % (ref 36–48)
HCT VFR BLD AUTO: 22.4 % (ref 36–48)
HCT VFR BLD AUTO: 22.8 % (ref 36–48)
HCT VFR BLD AUTO: 23.1 % (ref 36–48)
HCT VFR BLD AUTO: 23.3 % (ref 36–48)
HCT VFR BLD AUTO: 23.3 % (ref 36–48)
HCT VFR BLD AUTO: 23.4 % (ref 36–48)
HCT VFR BLD AUTO: 23.5 % (ref 36–48)
HCT VFR BLD AUTO: 23.9 % (ref 36–48)
HCT VFR BLD AUTO: 23.9 % (ref 36–48)
HCT VFR BLD AUTO: 24.4 % (ref 36–48)
HCT VFR BLD AUTO: 24.6 % (ref 36–48)
HCT VFR BLD AUTO: 24.7 % (ref 36–48)
HCT VFR BLD AUTO: 24.9 % (ref 36–48)
HCT VFR BLD AUTO: 25.2 % (ref 36–48)
HCT VFR BLD AUTO: 25.5 % (ref 36–48)
HCT VFR BLD AUTO: 25.5 % (ref 36–48)
HCT VFR BLD AUTO: 25.6 % (ref 36–48)
HCT VFR BLD AUTO: 25.7 % (ref 36–48)
HCT VFR BLD AUTO: 26.2 % (ref 36–48)
HCT VFR BLD AUTO: 27 % (ref 36–48)
HCT VFR BLD AUTO: 27.2 % (ref 36–48)
HCT VFR BLD AUTO: 32.5 % (ref 36–48)
HCT VFR BLD AUTO: 32.7 % (ref 36–48)
HCT VFR BLD AUTO: 34.9 % (ref 36–48)
HCT VFR BLD AUTO: 35 % (ref 36–48)
HCT VFR BLD AUTO: 37.9 % (ref 36–48)
HCT VFR BLD CALC: 20 % (ref 36–49)
HCV AB SER IA-ACNC: 0.03 INDEX
HCV AB SERPL QL IA: NEGATIVE
HCV COMMENT,HCGAC: NORMAL
HGB BLD-MCNC: 10.6 G/DL (ref 13–16)
HGB BLD-MCNC: 10.6 G/DL (ref 13–16)
HGB BLD-MCNC: 11.3 G/DL (ref 13–16)
HGB BLD-MCNC: 11.4 G/DL (ref 13–16)
HGB BLD-MCNC: 12.6 G/DL (ref 13–16)
HGB BLD-MCNC: 5.8 G/DL (ref 13–16)
HGB BLD-MCNC: 6.8 G/DL (ref 12–16)
HGB BLD-MCNC: 7 G/DL (ref 13–16)
HGB BLD-MCNC: 7 G/DL (ref 13–16)
HGB BLD-MCNC: 7.2 G/DL (ref 13–16)
HGB BLD-MCNC: 7.3 G/DL (ref 13–16)
HGB BLD-MCNC: 7.5 G/DL (ref 13–16)
HGB BLD-MCNC: 7.7 G/DL (ref 13–16)
HGB BLD-MCNC: 7.7 G/DL (ref 13–16)
HGB BLD-MCNC: 7.8 G/DL (ref 13–16)
HGB BLD-MCNC: 7.9 G/DL (ref 13–16)
HGB BLD-MCNC: 8 G/DL (ref 13–16)
HGB BLD-MCNC: 8 G/DL (ref 13–16)
HGB BLD-MCNC: 8.1 G/DL (ref 13–16)
HGB BLD-MCNC: 8.3 G/DL (ref 13–16)
HGB BLD-MCNC: 8.4 G/DL (ref 13–16)
HGB BLD-MCNC: 8.5 G/DL (ref 13–16)
HGB BLD-MCNC: 8.7 G/DL (ref 13–16)
HGB UR QL STRIP: ABNORMAL
HGB UR QL STRIP: ABNORMAL
HGB UR QL STRIP: NEGATIVE
HGB UR QL STRIP: NEGATIVE
INR PPP: 1.1 (ref 0.8–1.2)
INR PPP: 1.7 (ref 0.8–1.2)
INR PPP: 1.8 (ref 0.8–1.2)
INR PPP: 1.9 (ref 0.8–1.2)
KETONES UR QL STRIP.AUTO: NEGATIVE MG/DL
LACTATE BLD-SCNC: 1.85 MMOL/L (ref 0.4–2)
LACTATE BLD-SCNC: 15.35 MMOL/L (ref 0.4–2)
LACTATE BLD-SCNC: 2.17 MMOL/L (ref 0.4–2)
LACTATE BLD-SCNC: 4.05 MMOL/L (ref 0.4–2)
LACTATE SERPL-SCNC: 1.1 MMOL/L (ref 0.4–2)
LACTATE SERPL-SCNC: 1.8 MMOL/L (ref 0.4–2)
LACTATE SERPL-SCNC: 1.9 MMOL/L (ref 0.4–2)
LEFT CCA DIST DIAS: 14.6 CM/S
LEFT CCA DIST SYS: 50.2 CM/S
LEFT CCA MID DIAS: 14.6 CM/S
LEFT CCA MID SYS: 52.7 CM/S
LEFT CCA PROX DIAS: 13.1 CM/S
LEFT CCA PROX SYS: 78.9 CM/S
LEFT ECA DIAS: 12 CM/S
LEFT ECA SYS: 69.1 CM/S
LEFT ICA DIST DIAS: 16 CM/S
LEFT ICA DIST SYS: 57.5 CM/S
LEFT ICA MID DIAS: 11.4 CM/S
LEFT ICA MID SYS: 48.3 CM/S
LEFT ICA PROX DIAS: 15 CM/S
LEFT ICA PROX SYS: 42.4 CM/S
LEFT ICA/CCA SYS: 0.7
LEFT SUBCLAVIAN DIAS: 0 CM/S
LEFT SUBCLAVIAN SYS: 66.2 CM/S
LEFT VERTEBRAL DIAS: 13.1 CM/S
LEFT VERTEBRAL SYS: 51.5 CM/S
LEUKOCYTE ESTERASE UR QL STRIP.AUTO: ABNORMAL
LEUKOCYTE ESTERASE UR QL STRIP.AUTO: ABNORMAL
LEUKOCYTE ESTERASE UR QL STRIP.AUTO: NEGATIVE
LEUKOCYTE ESTERASE UR QL STRIP.AUTO: NEGATIVE
LIPASE SERPL-CCNC: 36 U/L (ref 73–393)
LIPASE SERPL-CCNC: 47 U/L (ref 73–393)
LIPASE SERPL-CCNC: 62 U/L (ref 73–393)
LYMPHOCYTES # BLD: 0.5 K/UL (ref 0.9–3.6)
LYMPHOCYTES # BLD: 0.6 K/UL (ref 0.9–3.6)
LYMPHOCYTES # BLD: 0.7 K/UL (ref 0.9–3.6)
LYMPHOCYTES # BLD: 0.7 K/UL (ref 0.9–3.6)
LYMPHOCYTES # BLD: 0.9 K/UL (ref 0.8–3.5)
LYMPHOCYTES # BLD: 0.9 K/UL (ref 0.9–3.6)
LYMPHOCYTES # BLD: 1 K/UL (ref 0.9–3.6)
LYMPHOCYTES # BLD: 1 K/UL (ref 0.9–3.6)
LYMPHOCYTES # BLD: 1.1 K/UL (ref 0.9–3.6)
LYMPHOCYTES # BLD: 1.3 K/UL (ref 0.9–3.6)
LYMPHOCYTES # BLD: 1.4 K/UL (ref 0.9–3.6)
LYMPHOCYTES # BLD: 1.4 K/UL (ref 0.9–3.6)
LYMPHOCYTES # BLD: 1.5 K/UL (ref 0.9–3.6)
LYMPHOCYTES # BLD: 1.6 K/UL (ref 0.9–3.6)
LYMPHOCYTES # BLD: 2 K/UL (ref 0.9–3.6)
LYMPHOCYTES # BLD: 2.1 K/UL (ref 0.8–3.5)
LYMPHOCYTES # BLD: 2.2 K/UL (ref 0.9–3.6)
LYMPHOCYTES # BLD: 2.3 K/UL (ref 0.9–3.6)
LYMPHOCYTES # BLD: 2.3 K/UL (ref 0.9–3.6)
LYMPHOCYTES # BLD: 2.4 K/UL (ref 0.9–3.6)
LYMPHOCYTES # BLD: 2.4 K/UL (ref 0.9–3.6)
LYMPHOCYTES # BLD: 2.6 K/UL (ref 0.9–3.6)
LYMPHOCYTES NFR BLD: 10 % (ref 21–52)
LYMPHOCYTES NFR BLD: 11 % (ref 21–52)
LYMPHOCYTES NFR BLD: 12 % (ref 21–52)
LYMPHOCYTES NFR BLD: 12 % (ref 21–52)
LYMPHOCYTES NFR BLD: 14 % (ref 21–52)
LYMPHOCYTES NFR BLD: 17 % (ref 21–52)
LYMPHOCYTES NFR BLD: 18 % (ref 20–51)
LYMPHOCYTES NFR BLD: 18 % (ref 21–52)
LYMPHOCYTES NFR BLD: 19 % (ref 21–52)
LYMPHOCYTES NFR BLD: 2 % (ref 21–52)
LYMPHOCYTES NFR BLD: 20 % (ref 21–52)
LYMPHOCYTES NFR BLD: 21 % (ref 21–52)
LYMPHOCYTES NFR BLD: 21 % (ref 21–52)
LYMPHOCYTES NFR BLD: 22 % (ref 21–52)
LYMPHOCYTES NFR BLD: 3 % (ref 20–51)
LYMPHOCYTES NFR BLD: 3 % (ref 21–52)
LYMPHOCYTES NFR BLD: 3 % (ref 21–52)
LYMPHOCYTES NFR BLD: 4 % (ref 21–52)
LYMPHOCYTES NFR BLD: 5 % (ref 21–52)
LYMPHOCYTES NFR BLD: 5 % (ref 21–52)
LYMPHOCYTES NFR BLD: 8 % (ref 21–52)
LYMPHOCYTES NFR BLD: 9 % (ref 21–52)
LYMPHOCYTES NFR FLD: 0 %
LYMPHOCYTES NFR FLD: 12 %
MACROPHAGES NFR FLD: 18 %
MACROPHAGES NFR FLD: 3 %
MAGNESIUM SERPL-MCNC: 1.7 MG/DL (ref 1.6–2.6)
MAGNESIUM SERPL-MCNC: 1.8 MG/DL (ref 1.6–2.6)
MAGNESIUM SERPL-MCNC: 1.9 MG/DL (ref 1.6–2.6)
MAGNESIUM SERPL-MCNC: 2 MG/DL (ref 1.6–2.6)
MAGNESIUM SERPL-MCNC: 2 MG/DL (ref 1.6–2.6)
MAGNESIUM SERPL-MCNC: 2.1 MG/DL (ref 1.6–2.6)
MAGNESIUM SERPL-MCNC: 2.2 MG/DL (ref 1.6–2.6)
MAGNESIUM SERPL-MCNC: 2.3 MG/DL (ref 1.6–2.6)
MAGNESIUM SERPL-MCNC: 2.5 MG/DL (ref 1.6–2.6)
MAGNESIUM SERPL-MCNC: 2.5 MG/DL (ref 1.6–2.6)
MAGNESIUM SERPL-MCNC: 2.6 MG/DL (ref 1.6–2.6)
MCH RBC QN AUTO: 29.8 PG (ref 24–34)
MCH RBC QN AUTO: 30.3 PG (ref 24–34)
MCH RBC QN AUTO: 30.3 PG (ref 24–34)
MCH RBC QN AUTO: 30.4 PG (ref 24–34)
MCH RBC QN AUTO: 30.4 PG (ref 24–34)
MCH RBC QN AUTO: 30.5 PG (ref 24–34)
MCH RBC QN AUTO: 30.6 PG (ref 24–34)
MCH RBC QN AUTO: 30.7 PG (ref 24–34)
MCH RBC QN AUTO: 30.8 PG (ref 24–34)
MCH RBC QN AUTO: 30.8 PG (ref 24–34)
MCH RBC QN AUTO: 30.9 PG (ref 24–34)
MCH RBC QN AUTO: 31 PG (ref 24–34)
MCH RBC QN AUTO: 31.6 PG (ref 24–34)
MCH RBC QN AUTO: 31.9 PG (ref 24–34)
MCH RBC QN AUTO: 31.9 PG (ref 24–34)
MCH RBC QN AUTO: 32.7 PG (ref 24–34)
MCH RBC QN AUTO: 32.8 PG (ref 24–34)
MCHC RBC AUTO-ENTMCNC: 28.7 G/DL (ref 31–37)
MCHC RBC AUTO-ENTMCNC: 29.9 G/DL (ref 31–37)
MCHC RBC AUTO-ENTMCNC: 30.6 G/DL (ref 31–37)
MCHC RBC AUTO-ENTMCNC: 30.7 G/DL (ref 31–37)
MCHC RBC AUTO-ENTMCNC: 30.9 G/DL (ref 31–37)
MCHC RBC AUTO-ENTMCNC: 31 G/DL (ref 31–37)
MCHC RBC AUTO-ENTMCNC: 31.2 G/DL (ref 31–37)
MCHC RBC AUTO-ENTMCNC: 31.3 G/DL (ref 31–37)
MCHC RBC AUTO-ENTMCNC: 31.4 G/DL (ref 31–37)
MCHC RBC AUTO-ENTMCNC: 31.5 G/DL (ref 31–37)
MCHC RBC AUTO-ENTMCNC: 31.6 G/DL (ref 31–37)
MCHC RBC AUTO-ENTMCNC: 31.7 G/DL (ref 31–37)
MCHC RBC AUTO-ENTMCNC: 31.7 G/DL (ref 31–37)
MCHC RBC AUTO-ENTMCNC: 32 G/DL (ref 31–37)
MCHC RBC AUTO-ENTMCNC: 32.4 G/DL (ref 31–37)
MCHC RBC AUTO-ENTMCNC: 32.4 G/DL (ref 31–37)
MCHC RBC AUTO-ENTMCNC: 32.5 G/DL (ref 31–37)
MCHC RBC AUTO-ENTMCNC: 32.5 G/DL (ref 31–37)
MCHC RBC AUTO-ENTMCNC: 32.6 G/DL (ref 31–37)
MCHC RBC AUTO-ENTMCNC: 32.6 G/DL (ref 31–37)
MCHC RBC AUTO-ENTMCNC: 32.7 G/DL (ref 31–37)
MCHC RBC AUTO-ENTMCNC: 32.9 G/DL (ref 31–37)
MCHC RBC AUTO-ENTMCNC: 33 G/DL (ref 31–37)
MCHC RBC AUTO-ENTMCNC: 33.1 G/DL (ref 31–37)
MCHC RBC AUTO-ENTMCNC: 33.2 G/DL (ref 31–37)
MCV RBC AUTO: 100.3 FL (ref 74–97)
MCV RBC AUTO: 100.4 FL (ref 74–97)
MCV RBC AUTO: 101.2 FL (ref 74–97)
MCV RBC AUTO: 105.8 FL (ref 74–97)
MCV RBC AUTO: 92.6 FL (ref 74–97)
MCV RBC AUTO: 93.2 FL (ref 74–97)
MCV RBC AUTO: 93.5 FL (ref 74–97)
MCV RBC AUTO: 94.4 FL (ref 74–97)
MCV RBC AUTO: 94.4 FL (ref 74–97)
MCV RBC AUTO: 94.8 FL (ref 74–97)
MCV RBC AUTO: 95.9 FL (ref 74–97)
MCV RBC AUTO: 96.1 FL (ref 74–97)
MCV RBC AUTO: 96.2 FL (ref 74–97)
MCV RBC AUTO: 97 FL (ref 74–97)
MCV RBC AUTO: 97.2 FL (ref 74–97)
MCV RBC AUTO: 97.3 FL (ref 74–97)
MCV RBC AUTO: 97.5 FL (ref 74–97)
MCV RBC AUTO: 97.6 FL (ref 74–97)
MCV RBC AUTO: 98.5 FL (ref 74–97)
MCV RBC AUTO: 98.7 FL (ref 74–97)
MCV RBC AUTO: 99.1 FL (ref 74–97)
MCV RBC AUTO: 99.1 FL (ref 74–97)
MCV RBC AUTO: 99.2 FL (ref 74–97)
MCV RBC AUTO: 99.2 FL (ref 74–97)
MCV RBC AUTO: 99.6 FL (ref 74–97)
METAMYELOCYTES NFR BLD MANUAL: 1 %
MONOCYTES # BLD: 0.5 K/UL (ref 0.05–1.2)
MONOCYTES # BLD: 0.5 K/UL (ref 0.05–1.2)
MONOCYTES # BLD: 0.6 K/UL (ref 0.05–1.2)
MONOCYTES # BLD: 0.6 K/UL (ref 0–1)
MONOCYTES # BLD: 0.7 K/UL (ref 0.05–1.2)
MONOCYTES # BLD: 0.8 K/UL (ref 0.05–1.2)
MONOCYTES # BLD: 0.9 K/UL (ref 0.05–1.2)
MONOCYTES # BLD: 0.9 K/UL (ref 0.05–1.2)
MONOCYTES # BLD: 0.9 K/UL (ref 0–1)
MONOCYTES # BLD: 1 K/UL (ref 0.05–1.2)
MONOCYTES # BLD: 1.1 K/UL (ref 0.05–1.2)
MONOCYTES # BLD: 1.1 K/UL (ref 0.05–1.2)
MONOCYTES # BLD: 1.2 K/UL (ref 0.05–1.2)
MONOCYTES # BLD: 1.3 K/UL (ref 0.05–1.2)
MONOCYTES # BLD: 1.5 K/UL (ref 0.05–1.2)
MONOCYTES NFR BLD: 10 % (ref 3–10)
MONOCYTES NFR BLD: 11 % (ref 3–10)
MONOCYTES NFR BLD: 12 % (ref 3–10)
MONOCYTES NFR BLD: 12 % (ref 3–10)
MONOCYTES NFR BLD: 2 % (ref 3–10)
MONOCYTES NFR BLD: 3 % (ref 2–9)
MONOCYTES NFR BLD: 3 % (ref 3–10)
MONOCYTES NFR BLD: 4 % (ref 3–10)
MONOCYTES NFR BLD: 5 % (ref 2–9)
MONOCYTES NFR BLD: 5 % (ref 3–10)
MONOCYTES NFR BLD: 7 % (ref 3–10)
MONOCYTES NFR BLD: 7 % (ref 3–10)
MONOCYTES NFR BLD: 8 % (ref 3–10)
MONOCYTES NFR BLD: 9 % (ref 3–10)
MONOCYTES NFR BLD: 9 % (ref 3–10)
MONOCYTES NFR FLD: 0 %
MONOCYTES NFR FLD: 0 %
MUCOUS THREADS URNS QL MICRO: ABNORMAL /LPF
MYELOCYTES NFR BLD MANUAL: 1 %
NEUTROPHILS NFR FLD: 70 %
NEUTROPHILS NFR FLD: 97 %
NEUTS BAND # FLD: 0 %
NEUTS BAND # FLD: 0 %
NEUTS BAND NFR BLD MANUAL: 1 % (ref 0–5)
NEUTS BAND NFR BLD MANUAL: 13 % (ref 0–5)
NEUTS SEG # BLD: 10.5 K/UL (ref 1.8–8)
NEUTS SEG # BLD: 10.5 K/UL (ref 1.8–8)
NEUTS SEG # BLD: 10.8 K/UL (ref 1.8–8)
NEUTS SEG # BLD: 11 K/UL (ref 1.8–8)
NEUTS SEG # BLD: 13 K/UL (ref 1.8–8)
NEUTS SEG # BLD: 13.9 K/UL (ref 1.8–8)
NEUTS SEG # BLD: 14.5 K/UL (ref 1.8–8)
NEUTS SEG # BLD: 18.1 K/UL (ref 1.8–8)
NEUTS SEG # BLD: 22.2 K/UL (ref 1.8–8)
NEUTS SEG # BLD: 23.9 K/UL (ref 1.8–8)
NEUTS SEG # BLD: 5.7 K/UL (ref 1.8–8)
NEUTS SEG # BLD: 6.2 K/UL (ref 1.8–8)
NEUTS SEG # BLD: 6.6 K/UL (ref 1.8–8)
NEUTS SEG # BLD: 6.7 K/UL (ref 1.8–8)
NEUTS SEG # BLD: 7.2 K/UL (ref 1.8–8)
NEUTS SEG # BLD: 7.2 K/UL (ref 1.8–8)
NEUTS SEG # BLD: 7.5 K/UL (ref 1.8–8)
NEUTS SEG # BLD: 7.6 K/UL (ref 1.8–8)
NEUTS SEG # BLD: 7.8 K/UL (ref 1.8–8)
NEUTS SEG # BLD: 7.9 K/UL (ref 1.8–8)
NEUTS SEG # BLD: 8.1 K/UL (ref 1.8–8)
NEUTS SEG # BLD: 8.2 K/UL (ref 1.8–8)
NEUTS SEG # BLD: 8.7 K/UL (ref 1.8–8)
NEUTS SEG # BLD: 9.9 K/UL (ref 1.8–8)
NEUTS SEG NFR BLD: 64 % (ref 40–73)
NEUTS SEG NFR BLD: 65 % (ref 40–73)
NEUTS SEG NFR BLD: 65 % (ref 40–73)
NEUTS SEG NFR BLD: 67 % (ref 40–73)
NEUTS SEG NFR BLD: 67 % (ref 40–73)
NEUTS SEG NFR BLD: 68 % (ref 40–73)
NEUTS SEG NFR BLD: 68 % (ref 40–73)
NEUTS SEG NFR BLD: 69 % (ref 40–73)
NEUTS SEG NFR BLD: 69 % (ref 40–73)
NEUTS SEG NFR BLD: 74 % (ref 40–73)
NEUTS SEG NFR BLD: 74 % (ref 42–75)
NEUTS SEG NFR BLD: 77 % (ref 40–73)
NEUTS SEG NFR BLD: 78 % (ref 40–73)
NEUTS SEG NFR BLD: 79 % (ref 40–73)
NEUTS SEG NFR BLD: 81 % (ref 42–75)
NEUTS SEG NFR BLD: 82 % (ref 40–73)
NEUTS SEG NFR BLD: 83 % (ref 40–73)
NEUTS SEG NFR BLD: 84 % (ref 40–73)
NEUTS SEG NFR BLD: 90 % (ref 40–73)
NEUTS SEG NFR BLD: 94 % (ref 40–73)
NEUTS SEG NFR BLD: 94 % (ref 40–73)
NEUTS SEG NFR BLD: 95 % (ref 40–73)
NITRITE UR QL STRIP.AUTO: NEGATIVE
NUC CELL # FLD: 7200 /CU MM
O2/TOTAL GAS SETTING VFR VENT: 0.28 %
O2/TOTAL GAS SETTING VFR VENT: 32 %
P-R INTERVAL, ECG05: 182 MS
PCO2 BLD: 35.9 MMHG (ref 35–45)
PCO2 BLD: 43.4 MMHG (ref 35–45)
PH BLD: 7.41 [PH] (ref 7.35–7.45)
PH BLD: 7.48 [PH] (ref 7.35–7.45)
PH UR STRIP: 5 [PH] (ref 5–8)
PH UR STRIP: 5.5 [PH] (ref 5–8)
PH UR STRIP: 5.5 [PH] (ref 5–8)
PH UR STRIP: 6.5 [PH] (ref 5–8)
PHOSPHATE SERPL-MCNC: 2.3 MG/DL (ref 2.5–4.9)
PHOSPHATE SERPL-MCNC: 2.4 MG/DL (ref 2.5–4.9)
PHOSPHATE SERPL-MCNC: 2.5 MG/DL (ref 2.5–4.9)
PHOSPHATE SERPL-MCNC: 2.6 MG/DL (ref 2.5–4.9)
PHOSPHATE SERPL-MCNC: 2.7 MG/DL (ref 2.5–4.9)
PHOSPHATE SERPL-MCNC: 2.7 MG/DL (ref 2.5–4.9)
PHOSPHATE SERPL-MCNC: 2.9 MG/DL (ref 2.5–4.9)
PHOSPHATE SERPL-MCNC: 2.9 MG/DL (ref 2.5–4.9)
PHOSPHATE SERPL-MCNC: 3 MG/DL (ref 2.5–4.9)
PHOSPHATE SERPL-MCNC: 3.4 MG/DL (ref 2.5–4.9)
PHOSPHATE SERPL-MCNC: 3.5 MG/DL (ref 2.5–4.9)
PHOSPHATE SERPL-MCNC: 3.5 MG/DL (ref 2.5–4.9)
PHOSPHATE SERPL-MCNC: 3.7 MG/DL (ref 2.5–4.9)
PHOSPHATE SERPL-MCNC: 4 MG/DL (ref 2.5–4.9)
PHOSPHATE SERPL-MCNC: 4.5 MG/DL (ref 2.5–4.9)
PHOSPHATE SERPL-MCNC: 4.5 MG/DL (ref 2.5–4.9)
PLATELET # BLD AUTO: 102 K/UL (ref 135–420)
PLATELET # BLD AUTO: 113 K/UL (ref 135–420)
PLATELET # BLD AUTO: 120 K/UL (ref 135–420)
PLATELET # BLD AUTO: 135 K/UL (ref 135–420)
PLATELET # BLD AUTO: 138 K/UL (ref 135–420)
PLATELET # BLD AUTO: 150 K/UL (ref 135–420)
PLATELET # BLD AUTO: 164 K/UL (ref 135–420)
PLATELET # BLD AUTO: 176 K/UL (ref 135–420)
PLATELET # BLD AUTO: 185 K/UL (ref 135–420)
PLATELET # BLD AUTO: 204 K/UL (ref 135–420)
PLATELET # BLD AUTO: 213 K/UL (ref 135–420)
PLATELET # BLD AUTO: 213 K/UL (ref 135–420)
PLATELET # BLD AUTO: 221 K/UL (ref 135–420)
PLATELET # BLD AUTO: 241 K/UL (ref 135–420)
PLATELET # BLD AUTO: 258 K/UL (ref 135–420)
PLATELET # BLD AUTO: 293 K/UL (ref 135–420)
PLATELET # BLD AUTO: 322 K/UL (ref 135–420)
PLATELET # BLD AUTO: 342 K/UL (ref 135–420)
PLATELET # BLD AUTO: 380 K/UL (ref 135–420)
PLATELET # BLD AUTO: 407 K/UL (ref 135–420)
PLATELET # BLD AUTO: 439 K/UL (ref 135–420)
PLATELET # BLD AUTO: 440 K/UL (ref 135–420)
PLATELET # BLD AUTO: 465 K/UL (ref 135–420)
PLATELET # BLD AUTO: 466 K/UL (ref 135–420)
PLATELET # BLD AUTO: 486 K/UL (ref 135–420)
PLATELET # BLD AUTO: 503 K/UL (ref 135–420)
PLATELET # BLD AUTO: 547 K/UL (ref 135–420)
PLATELET COMMENTS,PCOM: ABNORMAL
PLATELET COMMENTS,PCOM: ABNORMAL
PMV BLD AUTO: 10.1 FL (ref 9.2–11.8)
PMV BLD AUTO: 10.1 FL (ref 9.2–11.8)
PMV BLD AUTO: 10.3 FL (ref 9.2–11.8)
PMV BLD AUTO: 10.3 FL (ref 9.2–11.8)
PMV BLD AUTO: 10.6 FL (ref 9.2–11.8)
PMV BLD AUTO: 10.8 FL (ref 9.2–11.8)
PMV BLD AUTO: 10.9 FL (ref 9.2–11.8)
PMV BLD AUTO: 10.9 FL (ref 9.2–11.8)
PMV BLD AUTO: 11.1 FL (ref 9.2–11.8)
PMV BLD AUTO: 11.1 FL (ref 9.2–11.8)
PMV BLD AUTO: 11.4 FL (ref 9.2–11.8)
PMV BLD AUTO: 11.8 FL (ref 9.2–11.8)
PMV BLD AUTO: 8.4 FL (ref 9.2–11.8)
PMV BLD AUTO: 8.4 FL (ref 9.2–11.8)
PMV BLD AUTO: 8.5 FL (ref 9.2–11.8)
PMV BLD AUTO: 8.6 FL (ref 9.2–11.8)
PMV BLD AUTO: 8.6 FL (ref 9.2–11.8)
PMV BLD AUTO: 8.7 FL (ref 9.2–11.8)
PMV BLD AUTO: 8.9 FL (ref 9.2–11.8)
PMV BLD AUTO: 8.9 FL (ref 9.2–11.8)
PMV BLD AUTO: 9 FL (ref 9.2–11.8)
PMV BLD AUTO: 9 FL (ref 9.2–11.8)
PMV BLD AUTO: 9.4 FL (ref 9.2–11.8)
PMV BLD AUTO: 9.7 FL (ref 9.2–11.8)
PMV BLD AUTO: 9.8 FL (ref 9.2–11.8)
PMV BLD AUTO: 9.9 FL (ref 9.2–11.8)
PMV BLD AUTO: 9.9 FL (ref 9.2–11.8)
PO2 BLD: 77 MMHG (ref 80–100)
PO2 BLD: 82 MMHG (ref 80–100)
POTASSIUM BLD-SCNC: 4.7 MMOL/L (ref 3.5–5.5)
POTASSIUM SERPL-SCNC: 3.1 MMOL/L (ref 3.5–5.5)
POTASSIUM SERPL-SCNC: 3.2 MMOL/L (ref 3.5–5.5)
POTASSIUM SERPL-SCNC: 3.4 MMOL/L (ref 3.5–5.5)
POTASSIUM SERPL-SCNC: 3.5 MMOL/L (ref 3.5–5.5)
POTASSIUM SERPL-SCNC: 3.5 MMOL/L (ref 3.5–5.5)
POTASSIUM SERPL-SCNC: 3.6 MMOL/L (ref 3.5–5.5)
POTASSIUM SERPL-SCNC: 3.7 MMOL/L (ref 3.5–5.5)
POTASSIUM SERPL-SCNC: 3.8 MMOL/L (ref 3.5–5.5)
POTASSIUM SERPL-SCNC: 3.9 MMOL/L (ref 3.5–5.5)
POTASSIUM SERPL-SCNC: 4 MMOL/L (ref 3.5–5.5)
POTASSIUM SERPL-SCNC: 4.1 MMOL/L (ref 3.5–5.5)
POTASSIUM SERPL-SCNC: 4.2 MMOL/L (ref 3.5–5.5)
POTASSIUM SERPL-SCNC: 4.2 MMOL/L (ref 3.5–5.5)
POTASSIUM SERPL-SCNC: 4.3 MMOL/L (ref 3.5–5.5)
POTASSIUM SERPL-SCNC: 4.4 MMOL/L (ref 3.5–5.5)
POTASSIUM SERPL-SCNC: 4.4 MMOL/L (ref 3.5–5.5)
POTASSIUM SERPL-SCNC: 4.5 MMOL/L (ref 3.5–5.5)
POTASSIUM SERPL-SCNC: 4.6 MMOL/L (ref 3.5–5.5)
POTASSIUM SERPL-SCNC: 4.7 MMOL/L (ref 3.5–5.5)
POTASSIUM SERPL-SCNC: 4.7 MMOL/L (ref 3.5–5.5)
POTASSIUM SERPL-SCNC: 4.8 MMOL/L (ref 3.5–5.5)
POTASSIUM SERPL-SCNC: 5 MMOL/L (ref 3.5–5.5)
POTASSIUM SERPL-SCNC: 5.4 MMOL/L (ref 3.5–5.5)
PROT SERPL-MCNC: 4.3 G/DL (ref 6.4–8.2)
PROT SERPL-MCNC: 4.6 G/DL (ref 6.4–8.2)
PROT SERPL-MCNC: 4.6 G/DL (ref 6.4–8.2)
PROT SERPL-MCNC: 4.7 G/DL (ref 6.4–8.2)
PROT SERPL-MCNC: 4.7 G/DL (ref 6.4–8.2)
PROT SERPL-MCNC: 4.8 G/DL (ref 6.4–8.2)
PROT SERPL-MCNC: 5 G/DL (ref 6.4–8.2)
PROT SERPL-MCNC: 5.1 G/DL (ref 6.4–8.2)
PROT SERPL-MCNC: 5.3 G/DL (ref 6.4–8.2)
PROT SERPL-MCNC: 5.5 G/DL (ref 6.4–8.2)
PROT SERPL-MCNC: 5.7 G/DL (ref 6.4–8.2)
PROT SERPL-MCNC: 6.4 G/DL (ref 6.4–8.2)
PROT UR STRIP-MCNC: 30 MG/DL
PROT UR STRIP-MCNC: ABNORMAL MG/DL
PROT UR STRIP-MCNC: ABNORMAL MG/DL
PROT UR STRIP-MCNC: NEGATIVE MG/DL
PROTHROMBIN TIME: 13.5 SEC (ref 11.5–15.2)
PROTHROMBIN TIME: 19.4 SEC (ref 11.5–15.2)
PROTHROMBIN TIME: 20.7 SEC (ref 11.5–15.2)
PROTHROMBIN TIME: 21.3 SEC (ref 11.5–15.2)
Q-T INTERVAL, ECG07: 320 MS
Q-T INTERVAL, ECG07: 332 MS
Q-T INTERVAL, ECG07: 340 MS
Q-T INTERVAL, ECG07: 398 MS
Q-T INTERVAL, ECG07: 404 MS
Q-T INTERVAL, ECG07: 518 MS
QRS DURATION, ECG06: 100 MS
QRS DURATION, ECG06: 102 MS
QRS DURATION, ECG06: 92 MS
QRS DURATION, ECG06: 98 MS
QTC CALCULATION (BEZET), ECG08: 385 MS
QTC CALCULATION (BEZET), ECG08: 434 MS
QTC CALCULATION (BEZET), ECG08: 440 MS
QTC CALCULATION (BEZET), ECG08: 462 MS
QTC CALCULATION (BEZET), ECG08: 491 MS
QTC CALCULATION (BEZET), ECG08: 538 MS
RBC # BLD AUTO: 1.91 M/UL (ref 4.7–5.5)
RBC # BLD AUTO: 2.26 M/UL (ref 4.7–5.5)
RBC # BLD AUTO: 2.31 M/UL (ref 4.7–5.5)
RBC # BLD AUTO: 2.34 M/UL (ref 4.7–5.5)
RBC # BLD AUTO: 2.35 M/UL (ref 4.7–5.5)
RBC # BLD AUTO: 2.38 M/UL (ref 4.7–5.5)
RBC # BLD AUTO: 2.45 M/UL (ref 4.7–5.5)
RBC # BLD AUTO: 2.45 M/UL (ref 4.7–5.5)
RBC # BLD AUTO: 2.48 M/UL (ref 4.7–5.5)
RBC # BLD AUTO: 2.5 M/UL (ref 4.7–5.5)
RBC # BLD AUTO: 2.54 M/UL (ref 4.7–5.5)
RBC # BLD AUTO: 2.56 M/UL (ref 4.7–5.5)
RBC # BLD AUTO: 2.57 M/UL (ref 4.7–5.5)
RBC # BLD AUTO: 2.58 M/UL (ref 4.7–5.5)
RBC # BLD AUTO: 2.58 M/UL (ref 4.7–5.5)
RBC # BLD AUTO: 2.61 M/UL (ref 4.7–5.5)
RBC # BLD AUTO: 2.62 M/UL (ref 4.7–5.5)
RBC # BLD AUTO: 2.63 M/UL (ref 4.7–5.5)
RBC # BLD AUTO: 2.7 M/UL (ref 4.7–5.5)
RBC # BLD AUTO: 2.71 M/UL (ref 4.7–5.5)
RBC # BLD AUTO: 2.77 M/UL (ref 4.7–5.5)
RBC # BLD AUTO: 2.83 M/UL (ref 4.7–5.5)
RBC # BLD AUTO: 3.24 M/UL (ref 4.7–5.5)
RBC # BLD AUTO: 3.32 M/UL (ref 4.7–5.5)
RBC # BLD AUTO: 3.45 M/UL (ref 4.7–5.5)
RBC # BLD AUTO: 3.61 M/UL (ref 4.7–5.5)
RBC # BLD AUTO: 3.95 M/UL (ref 4.7–5.5)
RBC # FLD: 3525 /CU MM
RBC # SNV: 2905 /CU MM
RBC #/AREA URNS HPF: 0 /HPF (ref 0–5)
RBC #/AREA URNS HPF: ABNORMAL /HPF (ref 0–5)
RBC #/AREA URNS HPF: ABNORMAL /HPF (ref 0–5)
RBC MORPH BLD: ABNORMAL
REPORTED DOSE,DOSE: ABNORMAL UNITS
REPORTED DOSE,DOSE: ABNORMAL UNITS
REPORTED DOSE/TIME,TMG: 1200
REPORTED DOSE/TIME,TMG: 1900
REPORTED DOSE/TIME,TMG: 900
RIGHT CCA DIST DIAS: 12.2 CM/S
RIGHT CCA DIST SYS: 41.5 CM/S
RIGHT CCA MID DIAS: 10.3 CM/S
RIGHT CCA MID SYS: 65.1 CM/S
RIGHT CCA PROX DIAS: 10.3 CM/S
RIGHT CCA PROX SYS: 66.2 CM/S
RIGHT ECA DIAS: 9.7 CM/S
RIGHT ECA SYS: 85.8 CM/S
RIGHT ICA DIST DIAS: 20.4 CM/S
RIGHT ICA DIST SYS: 60.5 CM/S
RIGHT ICA MID DIAS: 15 CM/S
RIGHT ICA MID SYS: 45.3 CM/S
RIGHT ICA PROX DIAS: 14.1 CM/S
RIGHT ICA PROX SYS: 34.9 CM/S
RIGHT ICA/CCA SYS: 0.9
RIGHT SUBCLAVIAN DIAS: 0 CM/S
RIGHT SUBCLAVIAN SYS: 76 CM/S
RIGHT VERTEBRAL DIAS: 10.4 CM/S
RIGHT VERTEBRAL SYS: 36.6 CM/S
SAO2 % BLD: 95 % (ref 92–97)
SAO2 % BLD: 97 % (ref 92–97)
SERVICE CMNT-IMP: ABNORMAL
SERVICE CMNT-IMP: NORMAL
SODIUM BLD-SCNC: 131 MMOL/L (ref 136–145)
SODIUM SERPL-SCNC: 132 MMOL/L (ref 136–145)
SODIUM SERPL-SCNC: 133 MMOL/L (ref 136–145)
SODIUM SERPL-SCNC: 134 MMOL/L (ref 136–145)
SODIUM SERPL-SCNC: 135 MMOL/L (ref 136–145)
SODIUM SERPL-SCNC: 136 MMOL/L (ref 136–145)
SODIUM SERPL-SCNC: 137 MMOL/L (ref 136–145)
SODIUM SERPL-SCNC: 138 MMOL/L (ref 136–145)
SODIUM SERPL-SCNC: 139 MMOL/L (ref 136–145)
SODIUM SERPL-SCNC: 142 MMOL/L (ref 136–145)
SODIUM SERPL-SCNC: 144 MMOL/L (ref 136–145)
SODIUM SERPL-SCNC: 146 MMOL/L (ref 136–145)
SODIUM SERPL-SCNC: 147 MMOL/L (ref 136–145)
SODIUM SERPL-SCNC: 149 MMOL/L (ref 136–145)
SODIUM SERPL-SCNC: 149 MMOL/L (ref 136–145)
SODIUM SERPL-SCNC: 151 MMOL/L (ref 136–145)
SOURCE, RSRC56: NORMAL
SP GR UR REFRACTOMETRY: 1.01 (ref 1–1.03)
SP GR UR REFRACTOMETRY: 1.01 (ref 1–1.03)
SP GR UR REFRACTOMETRY: 1.02 (ref 1–1.03)
SP GR UR REFRACTOMETRY: 1.02 (ref 1–1.03)
SP1: NORMAL
SP2: NORMAL
SP3: NORMAL
SPECIMEN SOURCE FLD: ABNORMAL
SPECIMEN SOURCE FLD: ABNORMAL
SPECIMEN SOURCE: NORMAL
SPECIMEN TYPE: ABNORMAL
SPECIMEN TYPE: ABNORMAL
T4 FREE SERPL-MCNC: 0.8 NG/DL (ref 0.7–1.5)
T4 FREE SERPL-MCNC: 1.5 NG/DL (ref 0.7–1.5)
TOTAL RESP. RATE, ITRR: 26
TOTAL RESP. RATE, ITRR: 36
TROPONIN I SERPL-MCNC: 0.03 NG/ML (ref 0–0.04)
TROPONIN I SERPL-MCNC: 0.04 NG/ML (ref 0–0.04)
TROPONIN I SERPL-MCNC: 0.08 NG/ML (ref 0–0.04)
TROPONIN I SERPL-MCNC: <0.02 NG/ML (ref 0–0.04)
TSH SERPL DL<=0.05 MIU/L-ACNC: 4.2 UIU/ML (ref 0.36–3.74)
TSH SERPL DL<=0.05 MIU/L-ACNC: 9.38 UIU/ML (ref 0.36–3.74)
URATE SERPL-MCNC: 2.9 MG/DL (ref 2.6–7.2)
URATE SERPL-MCNC: 4.4 MG/DL (ref 2.6–7.2)
UROBILINOGEN UR QL STRIP.AUTO: 0.2 EU/DL (ref 0.2–1)
UROBILINOGEN UR QL STRIP.AUTO: 0.2 EU/DL (ref 0.2–1)
UROBILINOGEN UR QL STRIP.AUTO: 1 EU/DL (ref 0.2–1)
UROBILINOGEN UR QL STRIP.AUTO: 1 EU/DL (ref 0.2–1)
VANCOMYCIN SERPL-MCNC: 16.9 UG/ML (ref 5–40)
VANCOMYCIN SERPL-MCNC: 17.5 UG/ML (ref 5–40)
VANCOMYCIN TROUGH SERPL-MCNC: 15.9 UG/ML (ref 10–20)
VANCOMYCIN TROUGH SERPL-MCNC: 20.9 UG/ML (ref 10–20)
VANCOMYCIN TROUGH SERPL-MCNC: 25.1 UG/ML (ref 10–20)
VENTRICULAR RATE, ECG03: 101 BPM
VENTRICULAR RATE, ECG03: 103 BPM
VENTRICULAR RATE, ECG03: 65 BPM
VENTRICULAR RATE, ECG03: 81 BPM
VENTRICULAR RATE, ECG03: 87 BPM
VENTRICULAR RATE, ECG03: 89 BPM
WBC # BLD AUTO: 10.3 K/UL (ref 4.6–13.2)
WBC # BLD AUTO: 10.3 K/UL (ref 4.6–13.2)
WBC # BLD AUTO: 10.6 K/UL (ref 4.6–13.2)
WBC # BLD AUTO: 10.7 K/UL (ref 4.6–13.2)
WBC # BLD AUTO: 10.9 K/UL (ref 4.6–13.2)
WBC # BLD AUTO: 11.3 K/UL (ref 4.6–13.2)
WBC # BLD AUTO: 11.7 K/UL (ref 4.6–13.2)
WBC # BLD AUTO: 11.7 K/UL (ref 4.6–13.2)
WBC # BLD AUTO: 11.9 K/UL (ref 4.6–13.2)
WBC # BLD AUTO: 12 K/UL (ref 4.6–13.2)
WBC # BLD AUTO: 12.2 K/UL (ref 4.6–13.2)
WBC # BLD AUTO: 12.6 K/UL (ref 4.6–13.2)
WBC # BLD AUTO: 12.6 K/UL (ref 4.6–13.2)
WBC # BLD AUTO: 12.9 K/UL (ref 4.6–13.2)
WBC # BLD AUTO: 13.1 K/UL (ref 4.6–13.2)
WBC # BLD AUTO: 14.4 K/UL (ref 4.6–13.2)
WBC # BLD AUTO: 15.4 K/UL (ref 4.6–13.2)
WBC # BLD AUTO: 15.5 K/UL (ref 4.6–13.2)
WBC # BLD AUTO: 15.8 K/UL (ref 4.6–13.2)
WBC # BLD AUTO: 19.3 K/UL (ref 4.6–13.2)
WBC # BLD AUTO: 23.4 K/UL (ref 4.6–13.2)
WBC # BLD AUTO: 29.5 K/UL (ref 4.6–13.2)
WBC # BLD AUTO: 8.1 K/UL (ref 4.6–13.2)
WBC # BLD AUTO: 8.5 K/UL (ref 4.6–13.2)
WBC # BLD AUTO: 8.5 K/UL (ref 4.6–13.2)
WBC # BLD AUTO: 9 K/UL (ref 4.6–13.2)
WBC # BLD AUTO: 9.6 K/UL (ref 4.6–13.2)
WBC # SNV: 23 /CU MM (ref 0–200)
WBC URNS QL MICRO: ABNORMAL /HPF (ref 0–4)
WBC URNS QL MICRO: ABNORMAL /HPF (ref 0–4)
WBC URNS QL MICRO: NORMAL /HPF (ref 0–4)

## 2019-01-01 PROCEDURE — 3331090001 HH PPS REVENUE CREDIT

## 2019-01-01 PROCEDURE — 82962 GLUCOSE BLOOD TEST: CPT

## 2019-01-01 PROCEDURE — 77767 HDR RDNCL SKN SURF BRACHYTX: CPT

## 2019-01-01 PROCEDURE — 3331090002 HH PPS REVENUE DEBIT

## 2019-01-01 PROCEDURE — 74011250636 HC RX REV CODE- 250/636: Performed by: NURSE PRACTITIONER

## 2019-01-01 PROCEDURE — 77030037878 HC DRSG MEPILEX >48IN BORD MOLN -B

## 2019-01-01 PROCEDURE — 74011000258 HC RX REV CODE- 258: Performed by: INTERNAL MEDICINE

## 2019-01-01 PROCEDURE — 74011250637 HC RX REV CODE- 250/637: Performed by: HOSPITALIST

## 2019-01-01 PROCEDURE — 97530 THERAPEUTIC ACTIVITIES: CPT

## 2019-01-01 PROCEDURE — 80048 BASIC METABOLIC PNL TOTAL CA: CPT

## 2019-01-01 PROCEDURE — 74011000250 HC RX REV CODE- 250: Performed by: INTERNAL MEDICINE

## 2019-01-01 PROCEDURE — 85025 COMPLETE CBC W/AUTO DIFF WBC: CPT

## 2019-01-01 PROCEDURE — 92611 MOTION FLUOROSCOPY/SWALLOW: CPT

## 2019-01-01 PROCEDURE — 94640 AIRWAY INHALATION TREATMENT: CPT

## 2019-01-01 PROCEDURE — A9575 INJ GADOTERATE MEGLUMI 0.1ML: HCPCS

## 2019-01-01 PROCEDURE — 74011250637 HC RX REV CODE- 250/637: Performed by: INTERNAL MEDICINE

## 2019-01-01 PROCEDURE — 93005 ELECTROCARDIOGRAM TRACING: CPT

## 2019-01-01 PROCEDURE — 77010033678 HC OXYGEN DAILY

## 2019-01-01 PROCEDURE — 74011636320 HC RX REV CODE- 636/320

## 2019-01-01 PROCEDURE — 77030018798 HC PMP KT ENTRL FED COVD -A

## 2019-01-01 PROCEDURE — 74011250636 HC RX REV CODE- 250/636: Performed by: HOSPITALIST

## 2019-01-01 PROCEDURE — 74011636637 HC RX REV CODE- 636/637: Performed by: INTERNAL MEDICINE

## 2019-01-01 PROCEDURE — C1717 BRACHYTX, NON-STR,HDR IR-192: HCPCS

## 2019-01-01 PROCEDURE — 74011250636 HC RX REV CODE- 250/636: Performed by: INTERNAL MEDICINE

## 2019-01-01 PROCEDURE — 87070 CULTURE OTHR SPECIMN AEROBIC: CPT

## 2019-01-01 PROCEDURE — 94762 N-INVAS EAR/PLS OXIMTRY CONT: CPT

## 2019-01-01 PROCEDURE — 74011250637 HC RX REV CODE- 250/637: Performed by: PHYSICIAN ASSISTANT

## 2019-01-01 PROCEDURE — 36573 INSJ PICC RS&I 5 YR+: CPT | Performed by: HOSPITALIST

## 2019-01-01 PROCEDURE — 74011250637 HC RX REV CODE- 250/637: Performed by: NURSE PRACTITIONER

## 2019-01-01 PROCEDURE — 82550 ASSAY OF CK (CPK): CPT

## 2019-01-01 PROCEDURE — 80076 HEPATIC FUNCTION PANEL: CPT

## 2019-01-01 PROCEDURE — 65660000000 HC RM CCU STEPDOWN

## 2019-01-01 PROCEDURE — 85610 PROTHROMBIN TIME: CPT

## 2019-01-01 PROCEDURE — 83690 ASSAY OF LIPASE: CPT

## 2019-01-01 PROCEDURE — 99283 EMERGENCY DEPT VISIT LOW MDM: CPT

## 2019-01-01 PROCEDURE — 71045 X-RAY EXAM CHEST 1 VIEW: CPT

## 2019-01-01 PROCEDURE — 84132 ASSAY OF SERUM POTASSIUM: CPT

## 2019-01-01 PROCEDURE — 74011636637 HC RX REV CODE- 636/637: Performed by: PHYSICIAN ASSISTANT

## 2019-01-01 PROCEDURE — 83735 ASSAY OF MAGNESIUM: CPT

## 2019-01-01 PROCEDURE — 94760 N-INVAS EAR/PLS OXIMETRY 1: CPT

## 2019-01-01 PROCEDURE — 74011250636 HC RX REV CODE- 250/636: Performed by: EMERGENCY MEDICINE

## 2019-01-01 PROCEDURE — 84550 ASSAY OF BLOOD/URIC ACID: CPT

## 2019-01-01 PROCEDURE — 86920 COMPATIBILITY TEST SPIN: CPT

## 2019-01-01 PROCEDURE — 86141 C-REACTIVE PROTEIN HS: CPT

## 2019-01-01 PROCEDURE — 3331090003 HH PPS REVENUE ADJ

## 2019-01-01 PROCEDURE — 65610000006 HC RM INTENSIVE CARE

## 2019-01-01 PROCEDURE — 84100 ASSAY OF PHOSPHORUS: CPT

## 2019-01-01 PROCEDURE — C9113 INJ PANTOPRAZOLE SODIUM, VIA: HCPCS | Performed by: INTERNAL MEDICINE

## 2019-01-01 PROCEDURE — 31500 INSERT EMERGENCY AIRWAY: CPT

## 2019-01-01 PROCEDURE — 74011000250 HC RX REV CODE- 250

## 2019-01-01 PROCEDURE — 77030018846 HC SOL IRR STRL H20 ICUM -A

## 2019-01-01 PROCEDURE — 83605 ASSAY OF LACTIC ACID: CPT

## 2019-01-01 PROCEDURE — 74011000250 HC RX REV CODE- 250: Performed by: FAMILY MEDICINE

## 2019-01-01 PROCEDURE — 73564 X-RAY EXAM KNEE 4 OR MORE: CPT

## 2019-01-01 PROCEDURE — 74011636637 HC RX REV CODE- 636/637: Performed by: HOSPITALIST

## 2019-01-01 PROCEDURE — 36415 COLL VENOUS BLD VENIPUNCTURE: CPT

## 2019-01-01 PROCEDURE — 74011250637 HC RX REV CODE- 250/637: Performed by: RADIOLOGY

## 2019-01-01 PROCEDURE — 36600 WITHDRAWAL OF ARTERIAL BLOOD: CPT

## 2019-01-01 PROCEDURE — 51798 US URINE CAPACITY MEASURE: CPT

## 2019-01-01 PROCEDURE — 89050 BODY FLUID CELL COUNT: CPT

## 2019-01-01 PROCEDURE — 74011250637 HC RX REV CODE- 250/637: Performed by: EMERGENCY MEDICINE

## 2019-01-01 PROCEDURE — A6223 GAUZE >16<=48 NO W/SAL W/O B: HCPCS

## 2019-01-01 PROCEDURE — P9047 ALBUMIN (HUMAN), 25%, 50ML: HCPCS | Performed by: INTERNAL MEDICINE

## 2019-01-01 PROCEDURE — 77030011256 HC DRSG MEPILEX <16IN NO BORD MOLN -A

## 2019-01-01 PROCEDURE — 80053 COMPREHEN METABOLIC PANEL: CPT

## 2019-01-01 PROCEDURE — 70450 CT HEAD/BRAIN W/O DYE: CPT

## 2019-01-01 PROCEDURE — 87184 SC STD DISK METHOD PER PLATE: CPT

## 2019-01-01 PROCEDURE — 8E0Y3EZ FLUORESCENCE GUIDED PROCEDURE OF LOWER EXTREMITY, PERCUTANEOUS APPROACH: ICD-10-PCS | Performed by: RADIOLOGY

## 2019-01-01 PROCEDURE — 77030037877 HC DRSG MEPILEX >48IN BORD MOLN -A

## 2019-01-01 PROCEDURE — 51702 INSERT TEMP BLADDER CATH: CPT

## 2019-01-01 PROCEDURE — 85652 RBC SED RATE AUTOMATED: CPT

## 2019-01-01 PROCEDURE — A9556 GA67 GALLIUM: HCPCS

## 2019-01-01 PROCEDURE — 74011250637 HC RX REV CODE- 250/637: Performed by: FAMILY MEDICINE

## 2019-01-01 PROCEDURE — 81001 URINALYSIS AUTO W/SCOPE: CPT

## 2019-01-01 PROCEDURE — 77030021352 HC CBL LD SYS DISP COVD -B

## 2019-01-01 PROCEDURE — 74011000258 HC RX REV CODE- 258: Performed by: NURSE PRACTITIONER

## 2019-01-01 PROCEDURE — 93971 EXTREMITY STUDY: CPT

## 2019-01-01 PROCEDURE — 74011000255 HC RX REV CODE- 255: Performed by: HOSPITALIST

## 2019-01-01 PROCEDURE — 82330 ASSAY OF CALCIUM: CPT

## 2019-01-01 PROCEDURE — 90686 IIV4 VACC NO PRSV 0.5 ML IM: CPT | Performed by: FAMILY MEDICINE

## 2019-01-01 PROCEDURE — 77290 THER RAD SIMULAJ FIELD CPLX: CPT

## 2019-01-01 PROCEDURE — 73502 X-RAY EXAM HIP UNI 2-3 VIEWS: CPT

## 2019-01-01 PROCEDURE — 80202 ASSAY OF VANCOMYCIN: CPT

## 2019-01-01 PROCEDURE — 87641 MR-STAPH DNA AMP PROBE: CPT

## 2019-01-01 PROCEDURE — 94761 N-INVAS EAR/PLS OXIMETRY MLT: CPT

## 2019-01-01 PROCEDURE — 99284 EMERGENCY DEPT VISIT MOD MDM: CPT

## 2019-01-01 PROCEDURE — 02HV33Z INSERTION OF INFUSION DEVICE INTO SUPERIOR VENA CAVA, PERCUTANEOUS APPROACH: ICD-10-PCS | Performed by: INTERNAL MEDICINE

## 2019-01-01 PROCEDURE — 85027 COMPLETE CBC AUTOMATED: CPT

## 2019-01-01 PROCEDURE — 97116 GAIT TRAINING THERAPY: CPT

## 2019-01-01 PROCEDURE — 96375 TX/PRO/DX INJ NEW DRUG ADDON: CPT

## 2019-01-01 PROCEDURE — 97167 OT EVAL HIGH COMPLEX 60 MIN: CPT

## 2019-01-01 PROCEDURE — 02HV33Z INSERTION OF INFUSION DEVICE INTO SUPERIOR VENA CAVA, PERCUTANEOUS APPROACH: ICD-10-PCS | Performed by: EMERGENCY MEDICINE

## 2019-01-01 PROCEDURE — 77030020186 HC BOOT HL PROTCT SAGE -B

## 2019-01-01 PROCEDURE — 92610 EVALUATE SWALLOWING FUNCTION: CPT

## 2019-01-01 PROCEDURE — 87086 URINE CULTURE/COLONY COUNT: CPT

## 2019-01-01 PROCEDURE — 72050 X-RAY EXAM NECK SPINE 4/5VWS: CPT

## 2019-01-01 PROCEDURE — 87075 CULTR BACTERIA EXCEPT BLOOD: CPT

## 2019-01-01 PROCEDURE — 74230 X-RAY XM SWLNG FUNCJ C+: CPT

## 2019-01-01 PROCEDURE — 84484 ASSAY OF TROPONIN QUANT: CPT

## 2019-01-01 PROCEDURE — 74011250636 HC RX REV CODE- 250/636: Performed by: FAMILY MEDICINE

## 2019-01-01 PROCEDURE — 82565 ASSAY OF CREATININE: CPT

## 2019-01-01 PROCEDURE — G0496 LPN CARE TRAIN/EDU IN HH: HCPCS

## 2019-01-01 PROCEDURE — P9045 ALBUMIN (HUMAN), 5%, 250 ML: HCPCS | Performed by: INTERNAL MEDICINE

## 2019-01-01 PROCEDURE — 74011000250 HC RX REV CODE- 250: Performed by: NURSE PRACTITIONER

## 2019-01-01 PROCEDURE — 0S993ZX DRAINAGE OF RIGHT HIP JOINT, PERCUTANEOUS APPROACH, DIAGNOSTIC: ICD-10-PCS | Performed by: RADIOLOGY

## 2019-01-01 PROCEDURE — 74176 CT ABD & PELVIS W/O CONTRAST: CPT

## 2019-01-01 PROCEDURE — 92526 ORAL FUNCTION THERAPY: CPT

## 2019-01-01 PROCEDURE — 99285 EMERGENCY DEPT VISIT HI MDM: CPT

## 2019-01-01 PROCEDURE — 65270000029 HC RM PRIVATE

## 2019-01-01 PROCEDURE — 77030029684 HC NEB SM VOL KT MONA -A

## 2019-01-01 PROCEDURE — 90471 IMMUNIZATION ADMIN: CPT

## 2019-01-01 PROCEDURE — 89060 EXAM SYNOVIAL FLUID CRYSTALS: CPT

## 2019-01-01 PROCEDURE — G0299 HHS/HOSPICE OF RN EA 15 MIN: HCPCS

## 2019-01-01 PROCEDURE — 74011000258 HC RX REV CODE- 258: Performed by: HOSPITALIST

## 2019-01-01 PROCEDURE — 74018 RADEX ABDOMEN 1 VIEW: CPT

## 2019-01-01 PROCEDURE — 74011250636 HC RX REV CODE- 250/636: Performed by: PHYSICIAN ASSISTANT

## 2019-01-01 PROCEDURE — 82803 BLOOD GASES ANY COMBINATION: CPT

## 2019-01-01 PROCEDURE — 89051 BODY FLUID CELL COUNT: CPT

## 2019-01-01 PROCEDURE — 97162 PT EVAL MOD COMPLEX 30 MIN: CPT

## 2019-01-01 PROCEDURE — 0DH63UZ INSERTION OF FEEDING DEVICE INTO STOMACH, PERCUTANEOUS APPROACH: ICD-10-PCS | Performed by: INTERNAL MEDICINE

## 2019-01-01 PROCEDURE — P9016 RBC LEUKOCYTES REDUCED: HCPCS

## 2019-01-01 PROCEDURE — 84443 ASSAY THYROID STIM HORMONE: CPT

## 2019-01-01 PROCEDURE — 97164 PT RE-EVAL EST PLAN CARE: CPT

## 2019-01-01 PROCEDURE — G0155 HHCP-SVS OF CSW,EA 15 MIN: HCPCS

## 2019-01-01 PROCEDURE — 81003 URINALYSIS AUTO W/O SCOPE: CPT

## 2019-01-01 PROCEDURE — 77030012058: Performed by: INTERNAL MEDICINE

## 2019-01-01 PROCEDURE — 99282 EMERGENCY DEPT VISIT SF MDM: CPT

## 2019-01-01 PROCEDURE — 72125 CT NECK SPINE W/O DYE: CPT

## 2019-01-01 PROCEDURE — P9045 ALBUMIN (HUMAN), 5%, 250 ML: HCPCS | Performed by: PHYSICIAN ASSISTANT

## 2019-01-01 PROCEDURE — 83880 ASSAY OF NATRIURETIC PEPTIDE: CPT

## 2019-01-01 PROCEDURE — A6446 CONFORM BAND S W>=3" <5"/YD: HCPCS

## 2019-01-01 PROCEDURE — 36430 TRANSFUSION BLD/BLD COMPNT: CPT

## 2019-01-01 PROCEDURE — 73140 X-RAY EXAM OF FINGER(S): CPT

## 2019-01-01 PROCEDURE — 77030005122 HC CATH GASTMY PEG BSC -B: Performed by: INTERNAL MEDICINE

## 2019-01-01 PROCEDURE — 93306 TTE W/DOPPLER COMPLETE: CPT

## 2019-01-01 PROCEDURE — 99211 OFF/OP EST MAY X REQ PHY/QHP: CPT

## 2019-01-01 PROCEDURE — 80074 ACUTE HEPATITIS PANEL: CPT

## 2019-01-01 PROCEDURE — 36591 DRAW BLOOD OFF VENOUS DEVICE: CPT

## 2019-01-01 PROCEDURE — 77030037186 HC VLV ENDOSC STRL DEFENDO DISP MVAT -A: Performed by: INTERNAL MEDICINE

## 2019-01-01 PROCEDURE — 84080 ASSAY ALKALINE PHOSPHATASES: CPT

## 2019-01-01 PROCEDURE — 77030034848

## 2019-01-01 PROCEDURE — 70553 MRI BRAIN STEM W/O & W/DYE: CPT

## 2019-01-01 PROCEDURE — 76060000032 HC ANESTHESIA 0.5 TO 1 HR: Performed by: INTERNAL MEDICINE

## 2019-01-01 PROCEDURE — 77370 RADIATION PHYSICS CONSULT: CPT

## 2019-01-01 PROCEDURE — 74011000258 HC RX REV CODE- 258: Performed by: PHYSICIAN ASSISTANT

## 2019-01-01 PROCEDURE — 74011000250 HC RX REV CODE- 250: Performed by: PHYSICIAN ASSISTANT

## 2019-01-01 PROCEDURE — 77332 RADIATION TREATMENT AID(S): CPT

## 2019-01-01 PROCEDURE — 77030008771 HC TU NG SALEM SUMP -A

## 2019-01-01 PROCEDURE — 77030019905 HC CATH URETH INTMIT MDII -A

## 2019-01-01 PROCEDURE — G0158 HHC OT ASSISTANT EA 15: HCPCS

## 2019-01-01 PROCEDURE — A6216 NON-STERILE GAUZE<=16 SQ IN: HCPCS

## 2019-01-01 PROCEDURE — 73610 X-RAY EXAM OF ANKLE: CPT

## 2019-01-01 PROCEDURE — 87040 BLOOD CULTURE FOR BACTERIA: CPT

## 2019-01-01 PROCEDURE — 74011000250 HC RX REV CODE- 250: Performed by: EMERGENCY MEDICINE

## 2019-01-01 PROCEDURE — G0151 HHCP-SERV OF PT,EA 15 MIN: HCPCS

## 2019-01-01 PROCEDURE — 74011250636 HC RX REV CODE- 250/636: Performed by: NURSE ANESTHETIST, CERTIFIED REGISTERED

## 2019-01-01 PROCEDURE — 80047 BASIC METABLC PNL IONIZED CA: CPT

## 2019-01-01 PROCEDURE — G0152 HHCP-SERV OF OT,EA 15 MIN: HCPCS

## 2019-01-01 PROCEDURE — 92950 HEART/LUNG RESUSCITATION CPR: CPT

## 2019-01-01 PROCEDURE — C1751 CATH, INF, PER/CENT/MIDLINE: HCPCS

## 2019-01-01 PROCEDURE — A4452 WATERPROOF TAPE: HCPCS

## 2019-01-01 PROCEDURE — 77030040830 HC CATH URETH FOL MDII -A

## 2019-01-01 PROCEDURE — 77002 NEEDLE LOCALIZATION BY XRAY: CPT

## 2019-01-01 PROCEDURE — 93880 EXTRACRANIAL BILAT STUDY: CPT

## 2019-01-01 PROCEDURE — 96367 TX/PROPH/DG ADDL SEQ IV INF: CPT

## 2019-01-01 PROCEDURE — 74011000250 HC RX REV CODE- 250: Performed by: HOSPITALIST

## 2019-01-01 PROCEDURE — 94664 DEMO&/EVAL PT USE INHALER: CPT

## 2019-01-01 PROCEDURE — 77030014143 XR FLUORO GUIDE ASP/BX/INJ/LOC

## 2019-01-01 PROCEDURE — 77334 RADIATION TREATMENT AID(S): CPT

## 2019-01-01 PROCEDURE — G0157 HHC PT ASSISTANT EA 15: HCPCS

## 2019-01-01 PROCEDURE — 84439 ASSAY OF FREE THYROXINE: CPT

## 2019-01-01 PROCEDURE — 82140 ASSAY OF AMMONIA: CPT

## 2019-01-01 PROCEDURE — 0S9D3ZX DRAINAGE OF LEFT KNEE JOINT, PERCUTANEOUS APPROACH, DIAGNOSTIC: ICD-10-PCS | Performed by: RADIOLOGY

## 2019-01-01 PROCEDURE — 87077 CULTURE AEROBIC IDENTIFY: CPT

## 2019-01-01 PROCEDURE — 74011000258 HC RX REV CODE- 258: Performed by: EMERGENCY MEDICINE

## 2019-01-01 PROCEDURE — 86900 BLOOD TYPING SEROLOGIC ABO: CPT

## 2019-01-01 PROCEDURE — 96366 THER/PROPH/DIAG IV INF ADDON: CPT

## 2019-01-01 PROCEDURE — 94002 VENT MGMT INPAT INIT DAY: CPT

## 2019-01-01 PROCEDURE — 77316 BRACHYTX ISODOSE PLAN SIMPLE: CPT

## 2019-01-01 PROCEDURE — 400013 HH SOC

## 2019-01-01 PROCEDURE — 96368 THER/DIAG CONCURRENT INF: CPT

## 2019-01-01 PROCEDURE — 76040000007: Performed by: INTERNAL MEDICINE

## 2019-01-01 PROCEDURE — 74011636320 HC RX REV CODE- 636/320: Performed by: HOSPITALIST

## 2019-01-01 PROCEDURE — 96365 THER/PROPH/DIAG IV INF INIT: CPT

## 2019-01-01 PROCEDURE — 77336 RADIATION PHYSICS CONSULT: CPT

## 2019-01-01 PROCEDURE — 3E0G76Z INTRODUCTION OF NUTRITIONAL SUBSTANCE INTO UPPER GI, VIA NATURAL OR ARTIFICIAL OPENING: ICD-10-PCS | Performed by: INTERNAL MEDICINE

## 2019-01-01 PROCEDURE — 96361 HYDRATE IV INFUSION ADD-ON: CPT

## 2019-01-01 PROCEDURE — 75810000455 HC PLCMT CENT VENOUS CATH LVL 2 5182

## 2019-01-01 RX ORDER — LIDOCAINE HYDROCHLORIDE 10 MG/ML
10 INJECTION, SOLUTION EPIDURAL; INFILTRATION; INTRACAUDAL; PERINEURAL
Status: COMPLETED | OUTPATIENT
Start: 2019-01-01 | End: 2019-01-01

## 2019-01-01 RX ORDER — LORAZEPAM 2 MG/ML
1 INJECTION INTRAMUSCULAR
Status: DISCONTINUED | OUTPATIENT
Start: 2019-01-01 | End: 2019-01-01

## 2019-01-01 RX ORDER — NYSTATIN 100000 [USP'U]/ML
500000 SUSPENSION ORAL 3 TIMES DAILY
Status: DISCONTINUED | OUTPATIENT
Start: 2019-01-01 | End: 2019-01-01 | Stop reason: HOSPADM

## 2019-01-01 RX ORDER — SODIUM CHLORIDE, SODIUM LACTATE, POTASSIUM CHLORIDE, CALCIUM CHLORIDE 600; 310; 30; 20 MG/100ML; MG/100ML; MG/100ML; MG/100ML
100 INJECTION, SOLUTION INTRAVENOUS CONTINUOUS
Status: DISPENSED | OUTPATIENT
Start: 2019-01-01 | End: 2019-01-01

## 2019-01-01 RX ORDER — IPRATROPIUM BROMIDE AND ALBUTEROL SULFATE 2.5; .5 MG/3ML; MG/3ML
3 SOLUTION RESPIRATORY (INHALATION)
Status: DISCONTINUED | OUTPATIENT
Start: 2019-01-01 | End: 2019-01-01 | Stop reason: HOSPADM

## 2019-01-01 RX ORDER — DOCUSATE SODIUM 100 MG/1
100 CAPSULE, LIQUID FILLED ORAL
Qty: 15 CAP | Refills: 0 | Status: SHIPPED | OUTPATIENT
Start: 2019-01-01 | End: 2020-01-26

## 2019-01-01 RX ORDER — SODIUM CHLORIDE 0.9 % (FLUSH) 0.9 %
5-40 SYRINGE (ML) INJECTION AS NEEDED
Status: DISCONTINUED | OUTPATIENT
Start: 2019-01-01 | End: 2019-01-01 | Stop reason: SDUPTHER

## 2019-01-01 RX ORDER — FAMOTIDINE 20 MG/1
20 TABLET, FILM COATED ORAL DAILY
Status: DISCONTINUED | OUTPATIENT
Start: 2019-01-01 | End: 2019-01-01 | Stop reason: HOSPADM

## 2019-01-01 RX ORDER — IPRATROPIUM BROMIDE AND ALBUTEROL SULFATE 2.5; .5 MG/3ML; MG/3ML
3 SOLUTION RESPIRATORY (INHALATION)
Status: DISCONTINUED | OUTPATIENT
Start: 2019-01-01 | End: 2019-01-01

## 2019-01-01 RX ORDER — DEXTROSE, SODIUM CHLORIDE, AND POTASSIUM CHLORIDE 5; .45; .075 G/100ML; G/100ML; G/100ML
100 INJECTION INTRAVENOUS CONTINUOUS
Status: DISCONTINUED | OUTPATIENT
Start: 2019-01-01 | End: 2019-01-01

## 2019-01-01 RX ORDER — LEVOFLOXACIN 5 MG/ML
250 INJECTION, SOLUTION INTRAVENOUS EVERY 24 HOURS
Status: DISCONTINUED | OUTPATIENT
Start: 2019-01-01 | End: 2019-01-01

## 2019-01-01 RX ORDER — LEVOFLOXACIN 750 MG/1
750 TABLET ORAL EVERY 24 HOURS
Status: DISCONTINUED | OUTPATIENT
Start: 2019-01-01 | End: 2019-01-01

## 2019-01-01 RX ORDER — VANCOMYCIN 2 GRAM/500 ML IN 0.9 % SODIUM CHLORIDE INTRAVENOUS
2000 ONCE
Status: COMPLETED | OUTPATIENT
Start: 2019-01-01 | End: 2019-01-01

## 2019-01-01 RX ORDER — LIDOCAINE 4 G/100G
1 PATCH TOPICAL EVERY 24 HOURS
Status: DISCONTINUED | OUTPATIENT
Start: 2019-01-01 | End: 2019-01-01 | Stop reason: HOSPADM

## 2019-01-01 RX ORDER — ACETAMINOPHEN 500 MG
500 TABLET ORAL
Qty: 30 TAB | Refills: 0 | Status: SHIPPED | OUTPATIENT
Start: 2019-01-01

## 2019-01-01 RX ORDER — HYDROMORPHONE HYDROCHLORIDE 1 MG/ML
0.25 INJECTION, SOLUTION INTRAMUSCULAR; INTRAVENOUS; SUBCUTANEOUS
Status: DISCONTINUED | OUTPATIENT
Start: 2019-01-01 | End: 2019-01-01

## 2019-01-01 RX ORDER — TRAMADOL HYDROCHLORIDE 50 MG/1
25 TABLET ORAL
Status: DISCONTINUED | OUTPATIENT
Start: 2019-01-01 | End: 2019-01-01 | Stop reason: HOSPADM

## 2019-01-01 RX ORDER — SODIUM CHLORIDE 9 MG/ML
250 INJECTION, SOLUTION INTRAVENOUS AS NEEDED
Status: DISCONTINUED | OUTPATIENT
Start: 2019-01-01 | End: 2019-01-01 | Stop reason: HOSPADM

## 2019-01-01 RX ORDER — FACIAL-BODY WIPES
10 EACH TOPICAL DAILY
Status: DISPENSED | OUTPATIENT
Start: 2019-01-01 | End: 2019-01-01

## 2019-01-01 RX ORDER — METOPROLOL TARTRATE 5 MG/5ML
1.25 INJECTION INTRAVENOUS EVERY 6 HOURS
Status: DISCONTINUED | OUTPATIENT
Start: 2019-01-01 | End: 2019-01-01

## 2019-01-01 RX ORDER — HYDROMORPHONE HYDROCHLORIDE 1 MG/ML
0.25 INJECTION, SOLUTION INTRAMUSCULAR; INTRAVENOUS; SUBCUTANEOUS ONCE
Status: COMPLETED | OUTPATIENT
Start: 2019-01-01 | End: 2019-01-01

## 2019-01-01 RX ORDER — ASPIRIN 325 MG/1
100 TABLET, FILM COATED ORAL DAILY
Status: DISCONTINUED | OUTPATIENT
Start: 2019-01-01 | End: 2019-01-01 | Stop reason: HOSPADM

## 2019-01-01 RX ORDER — SODIUM CHLORIDE 9 MG/ML
25 INJECTION, SOLUTION INTRAVENOUS CONTINUOUS
Status: DISCONTINUED | OUTPATIENT
Start: 2019-01-01 | End: 2019-01-01

## 2019-01-01 RX ORDER — ONDANSETRON 2 MG/ML
4 INJECTION INTRAMUSCULAR; INTRAVENOUS
Status: DISCONTINUED | OUTPATIENT
Start: 2019-01-01 | End: 2019-01-01 | Stop reason: HOSPADM

## 2019-01-01 RX ORDER — FEBUXOSTAT 40 MG/1
80 TABLET, FILM COATED ORAL DAILY
Status: DISCONTINUED | OUTPATIENT
Start: 2019-01-01 | End: 2019-01-01 | Stop reason: HOSPADM

## 2019-01-01 RX ORDER — TRAZODONE HYDROCHLORIDE 50 MG/1
50 TABLET ORAL
Status: DISCONTINUED | OUTPATIENT
Start: 2019-01-01 | End: 2019-01-01 | Stop reason: HOSPADM

## 2019-01-01 RX ORDER — UREA 10 %
2 LOTION (ML) TOPICAL 2 TIMES DAILY
Status: DISCONTINUED | OUTPATIENT
Start: 2019-01-01 | End: 2019-01-01 | Stop reason: HOSPADM

## 2019-01-01 RX ORDER — PREDNISONE 10 MG/1
40 TABLET ORAL
Status: DISCONTINUED | OUTPATIENT
Start: 2019-01-01 | End: 2019-01-01 | Stop reason: HOSPADM

## 2019-01-01 RX ORDER — FOLIC ACID 1 MG/1
1 TABLET ORAL DAILY
Qty: 30 TAB | Refills: 0 | Status: SHIPPED | OUTPATIENT
Start: 2019-01-01

## 2019-01-01 RX ORDER — ONDANSETRON 4 MG/1
4 TABLET, ORALLY DISINTEGRATING ORAL
Status: DISCONTINUED | OUTPATIENT
Start: 2019-01-01 | End: 2019-01-01 | Stop reason: HOSPADM

## 2019-01-01 RX ORDER — LORAZEPAM 0.5 MG/1
0.5 TABLET ORAL
Status: DISCONTINUED | OUTPATIENT
Start: 2019-01-01 | End: 2019-01-01

## 2019-01-01 RX ORDER — MAGNESIUM SULFATE HEPTAHYDRATE 40 MG/ML
2 INJECTION, SOLUTION INTRAVENOUS ONCE
Status: COMPLETED | OUTPATIENT
Start: 2019-01-01 | End: 2019-01-01

## 2019-01-01 RX ORDER — METOPROLOL TARTRATE 25 MG/1
12.5 TABLET, FILM COATED ORAL 2 TIMES DAILY
Status: DISCONTINUED | OUTPATIENT
Start: 2019-01-01 | End: 2019-01-01

## 2019-01-01 RX ORDER — CEPHALEXIN 250 MG/1
500 CAPSULE ORAL EVERY 6 HOURS
Status: DISCONTINUED | OUTPATIENT
Start: 2019-01-01 | End: 2019-01-01 | Stop reason: HOSPADM

## 2019-01-01 RX ORDER — BUMETANIDE 0.25 MG/ML
1 INJECTION INTRAMUSCULAR; INTRAVENOUS ONCE
Status: COMPLETED | OUTPATIENT
Start: 2019-01-01 | End: 2019-01-01

## 2019-01-01 RX ORDER — SODIUM CHLORIDE 0.9 % (FLUSH) 0.9 %
10 SYRINGE (ML) INJECTION EVERY 24 HOURS
Status: DISCONTINUED | OUTPATIENT
Start: 2019-01-01 | End: 2019-01-01 | Stop reason: HOSPADM

## 2019-01-01 RX ORDER — SODIUM CHLORIDE, SODIUM LACTATE, POTASSIUM CHLORIDE, CALCIUM CHLORIDE 600; 310; 30; 20 MG/100ML; MG/100ML; MG/100ML; MG/100ML
INJECTION, SOLUTION INTRAVENOUS
Status: DISCONTINUED | OUTPATIENT
Start: 2019-01-01 | End: 2019-01-01 | Stop reason: HOSPADM

## 2019-01-01 RX ORDER — DOCUSATE SODIUM 100 MG/1
100 CAPSULE, LIQUID FILLED ORAL
Status: DISCONTINUED | OUTPATIENT
Start: 2019-01-01 | End: 2019-01-01 | Stop reason: HOSPADM

## 2019-01-01 RX ORDER — ALBUTEROL SULFATE 90 UG/1
1 AEROSOL, METERED RESPIRATORY (INHALATION)
Status: DISCONTINUED | OUTPATIENT
Start: 2019-01-01 | End: 2019-01-01

## 2019-01-01 RX ORDER — IPRATROPIUM BROMIDE AND ALBUTEROL SULFATE 2.5; .5 MG/3ML; MG/3ML
SOLUTION RESPIRATORY (INHALATION)
Status: COMPLETED
Start: 2019-01-01 | End: 2019-01-01

## 2019-01-01 RX ORDER — LEVOFLOXACIN 5 MG/ML
500 INJECTION, SOLUTION INTRAVENOUS
Status: DISCONTINUED | OUTPATIENT
Start: 2019-01-01 | End: 2019-01-01 | Stop reason: DRUGHIGH

## 2019-01-01 RX ORDER — TRAZODONE HYDROCHLORIDE 100 MG/1
50 TABLET ORAL
Status: DISCONTINUED | OUTPATIENT
Start: 2019-01-01 | End: 2019-01-01 | Stop reason: HOSPADM

## 2019-01-01 RX ORDER — METHYLPREDNISOLONE 4 MG/1
TABLET ORAL
Qty: 1 DOSE PACK | Refills: 0 | Status: SHIPPED | OUTPATIENT
Start: 2019-01-01 | End: 2019-01-01

## 2019-01-01 RX ORDER — POTASSIUM CHLORIDE 750 MG/1
10 TABLET, EXTENDED RELEASE ORAL ONCE
Status: COMPLETED | OUTPATIENT
Start: 2019-01-01 | End: 2019-01-01

## 2019-01-01 RX ORDER — EPINEPHRINE HCL IN DEXTROSE 5% 4MG/250ML
1-10 PLASTIC BAG, INJECTION (ML) INTRAVENOUS
Status: DISCONTINUED | OUTPATIENT
Start: 2019-01-01 | End: 2019-01-01 | Stop reason: HOSPADM

## 2019-01-01 RX ORDER — LANOLIN ALCOHOL/MO/W.PET/CERES
1000 CREAM (GRAM) TOPICAL DAILY
Status: DISCONTINUED | OUTPATIENT
Start: 2019-01-01 | End: 2019-01-01 | Stop reason: HOSPADM

## 2019-01-01 RX ORDER — FUROSEMIDE 10 MG/ML
40 INJECTION INTRAMUSCULAR; INTRAVENOUS ONCE
Status: COMPLETED | OUTPATIENT
Start: 2019-01-01 | End: 2019-01-01

## 2019-01-01 RX ORDER — LORAZEPAM 0.5 MG/1
1 TABLET ORAL
Status: DISCONTINUED | OUTPATIENT
Start: 2019-01-01 | End: 2019-01-01

## 2019-01-01 RX ORDER — OXYCODONE AND ACETAMINOPHEN 5; 325 MG/1; MG/1
1 TABLET ORAL ONCE
Status: COMPLETED | OUTPATIENT
Start: 2019-01-01 | End: 2019-01-01

## 2019-01-01 RX ORDER — CEPHALEXIN 500 MG/1
500 CAPSULE ORAL 4 TIMES DAILY
Qty: 28 CAP | Refills: 0 | Status: SHIPPED | OUTPATIENT
Start: 2019-01-01 | End: 2019-01-01

## 2019-01-01 RX ORDER — HALOPERIDOL 5 MG/ML
2 INJECTION INTRAMUSCULAR
Status: DISCONTINUED | OUTPATIENT
Start: 2019-01-01 | End: 2019-01-01

## 2019-01-01 RX ORDER — PREDNISONE 20 MG/1
40 TABLET ORAL
Qty: 14 TAB | Refills: 0 | Status: SHIPPED | OUTPATIENT
Start: 2019-01-01 | End: 2019-01-01

## 2019-01-01 RX ORDER — EPINEPHRINE 1 MG/ML
INJECTION, SOLUTION, CONCENTRATE INTRAVENOUS
Status: DISCONTINUED
Start: 2019-01-01 | End: 2019-01-01 | Stop reason: WASHOUT

## 2019-01-01 RX ORDER — METOPROLOL TARTRATE 25 MG/1
12.5 TABLET, FILM COATED ORAL 2 TIMES DAILY
Status: DISCONTINUED | OUTPATIENT
Start: 2019-01-01 | End: 2019-01-01 | Stop reason: HOSPADM

## 2019-01-01 RX ORDER — SULFAMETHOXAZOLE AND TRIMETHOPRIM 800; 160 MG/1; MG/1
1 TABLET ORAL 2 TIMES DAILY
Qty: 20 TAB | Refills: 0 | Status: SHIPPED | OUTPATIENT
Start: 2019-01-01 | End: 2019-01-01

## 2019-01-01 RX ORDER — FOLIC ACID 1 MG/1
1 TABLET ORAL DAILY
Status: DISCONTINUED | OUTPATIENT
Start: 2019-01-01 | End: 2019-01-01 | Stop reason: HOSPADM

## 2019-01-01 RX ORDER — DILTIAZEM HYDROCHLORIDE 180 MG/1
180 CAPSULE, COATED, EXTENDED RELEASE ORAL DAILY
Status: DISCONTINUED | OUTPATIENT
Start: 2019-01-01 | End: 2019-01-01 | Stop reason: HOSPADM

## 2019-01-01 RX ORDER — OMEPRAZOLE 20 MG/1
20 TABLET, DELAYED RELEASE ORAL DAILY
Status: SHIPPED | COMMUNITY
Start: 2019-01-01

## 2019-01-01 RX ORDER — PANTOPRAZOLE SODIUM 40 MG/10ML
40 INJECTION, POWDER, LYOPHILIZED, FOR SOLUTION INTRAVENOUS DAILY
Status: DISCONTINUED | OUTPATIENT
Start: 2019-01-01 | End: 2019-01-01 | Stop reason: HOSPADM

## 2019-01-01 RX ORDER — SODIUM CHLORIDE 0.9 % (FLUSH) 0.9 %
5-10 SYRINGE (ML) INJECTION AS NEEDED
Status: DISCONTINUED | OUTPATIENT
Start: 2019-01-01 | End: 2019-01-01 | Stop reason: HOSPADM

## 2019-01-01 RX ORDER — SODIUM CHLORIDE 0.9 % (FLUSH) 0.9 %
5-40 SYRINGE (ML) INJECTION AS NEEDED
Status: DISCONTINUED | OUTPATIENT
Start: 2019-01-01 | End: 2019-01-01 | Stop reason: HOSPADM

## 2019-01-01 RX ORDER — SODIUM,POTASSIUM PHOSPHATES 280-250MG
1 POWDER IN PACKET (EA) ORAL ONCE
Status: COMPLETED | OUTPATIENT
Start: 2019-01-01 | End: 2019-01-01

## 2019-01-01 RX ORDER — POTASSIUM CHLORIDE 29.8 MG/ML
20 INJECTION INTRAVENOUS
Status: COMPLETED | OUTPATIENT
Start: 2019-01-01 | End: 2019-01-01

## 2019-01-01 RX ORDER — LANOLIN ALCOHOL/MO/W.PET/CERES
3 CREAM (GRAM) TOPICAL
Status: DISCONTINUED | OUTPATIENT
Start: 2019-01-01 | End: 2019-01-01

## 2019-01-01 RX ORDER — ACETAMINOPHEN 500 MG
500 TABLET ORAL
Status: DISCONTINUED | OUTPATIENT
Start: 2019-01-01 | End: 2019-01-01 | Stop reason: HOSPADM

## 2019-01-01 RX ORDER — PROPOFOL 10 MG/ML
INJECTION, EMULSION INTRAVENOUS AS NEEDED
Status: DISCONTINUED | OUTPATIENT
Start: 2019-01-01 | End: 2019-01-01 | Stop reason: HOSPADM

## 2019-01-01 RX ORDER — DILTIAZEM HYDROCHLORIDE 120 MG/1
120 CAPSULE, COATED, EXTENDED RELEASE ORAL DAILY
Status: DISCONTINUED | OUTPATIENT
Start: 2019-01-01 | End: 2019-01-01

## 2019-01-01 RX ORDER — COLCHICINE 0.6 MG/1
0.6 TABLET ORAL ONCE
Status: DISCONTINUED | OUTPATIENT
Start: 2019-01-01 | End: 2019-01-01

## 2019-01-01 RX ORDER — NALOXONE HYDROCHLORIDE 0.4 MG/ML
0.4 INJECTION, SOLUTION INTRAMUSCULAR; INTRAVENOUS; SUBCUTANEOUS AS NEEDED
Status: DISCONTINUED | OUTPATIENT
Start: 2019-01-01 | End: 2019-01-01 | Stop reason: HOSPADM

## 2019-01-01 RX ORDER — FUROSEMIDE 10 MG/ML
20 INJECTION INTRAMUSCULAR; INTRAVENOUS ONCE
Status: COMPLETED | OUTPATIENT
Start: 2019-01-01 | End: 2019-01-01

## 2019-01-01 RX ORDER — UREA 10 %
2 LOTION (ML) TOPICAL 2 TIMES DAILY
Status: DISCONTINUED | OUTPATIENT
Start: 2019-01-01 | End: 2019-01-01

## 2019-01-01 RX ORDER — IPRATROPIUM BROMIDE AND ALBUTEROL SULFATE 2.5; .5 MG/3ML; MG/3ML
3 SOLUTION RESPIRATORY (INHALATION)
Status: DISCONTINUED | OUTPATIENT
Start: 2019-01-01 | End: 2019-01-01 | Stop reason: SDUPTHER

## 2019-01-01 RX ORDER — DICLOFENAC SODIUM 10 MG/G
2 GEL TOPICAL 4 TIMES DAILY
Status: DISCONTINUED | OUTPATIENT
Start: 2019-01-01 | End: 2019-01-01

## 2019-01-01 RX ORDER — TRAMADOL HYDROCHLORIDE 50 MG/1
25 TABLET ORAL
Status: DISCONTINUED | OUTPATIENT
Start: 2019-01-01 | End: 2019-01-01

## 2019-01-01 RX ORDER — GUAIFENESIN 100 MG/5ML
162 LIQUID (ML) ORAL
Status: COMPLETED | OUTPATIENT
Start: 2019-01-01 | End: 2019-01-01

## 2019-01-01 RX ORDER — ACETAMINOPHEN 650 MG/1
975 SUPPOSITORY RECTAL
Status: COMPLETED | OUTPATIENT
Start: 2019-01-01 | End: 2019-01-01

## 2019-01-01 RX ORDER — DESONIDE 0.5 MG/G
CREAM TOPICAL 2 TIMES DAILY
Status: DISCONTINUED | OUTPATIENT
Start: 2019-01-01 | End: 2019-01-01

## 2019-01-01 RX ORDER — PANTOPRAZOLE SODIUM 40 MG/1
40 TABLET, DELAYED RELEASE ORAL
Status: DISCONTINUED | OUTPATIENT
Start: 2019-01-01 | End: 2019-01-01

## 2019-01-01 RX ORDER — LEVOFLOXACIN 5 MG/ML
750 INJECTION, SOLUTION INTRAVENOUS EVERY 24 HOURS
Status: DISCONTINUED | OUTPATIENT
Start: 2019-01-01 | End: 2019-01-01

## 2019-01-01 RX ORDER — NOREPINEPHRINE BIT/0.9 % NACL 8 MG/250ML
INFUSION BOTTLE (ML) INTRAVENOUS
Status: COMPLETED
Start: 2019-01-01 | End: 2019-01-01

## 2019-01-01 RX ORDER — ZOLPIDEM TARTRATE 5 MG/1
5 TABLET ORAL
Status: DISCONTINUED | OUTPATIENT
Start: 2019-01-01 | End: 2019-01-01 | Stop reason: HOSPADM

## 2019-01-01 RX ORDER — FUROSEMIDE 40 MG/1
80 TABLET ORAL DAILY
Status: DISCONTINUED | OUTPATIENT
Start: 2019-01-01 | End: 2019-01-01 | Stop reason: HOSPADM

## 2019-01-01 RX ORDER — ASPIRIN 325 MG/1
100 TABLET, FILM COATED ORAL DAILY
Qty: 30 TAB | Refills: 0 | Status: SHIPPED | OUTPATIENT
Start: 2019-01-01

## 2019-01-01 RX ORDER — ALBUTEROL SULFATE 0.83 MG/ML
2.5 SOLUTION RESPIRATORY (INHALATION)
Status: DISCONTINUED | OUTPATIENT
Start: 2019-01-01 | End: 2019-01-01 | Stop reason: HOSPADM

## 2019-01-01 RX ORDER — BRIMONIDINE TARTRATE 2 MG/ML
1 SOLUTION/ DROPS OPHTHALMIC 3 TIMES DAILY
Status: DISCONTINUED | OUTPATIENT
Start: 2019-01-01 | End: 2019-01-01 | Stop reason: HOSPADM

## 2019-01-01 RX ORDER — POTASSIUM CHLORIDE 7.45 MG/ML
10 INJECTION INTRAVENOUS
Status: DISCONTINUED | OUTPATIENT
Start: 2019-01-01 | End: 2019-01-01

## 2019-01-01 RX ORDER — ACETAMINOPHEN 500 MG
1000 TABLET ORAL
Status: DISCONTINUED | OUTPATIENT
Start: 2019-01-01 | End: 2019-01-01

## 2019-01-01 RX ORDER — CEPHALEXIN 500 MG/1
500 CAPSULE ORAL 4 TIMES DAILY
Qty: 16 CAP | Refills: 0 | Status: SHIPPED | OUTPATIENT
Start: 2019-01-01 | End: 2019-01-01

## 2019-01-01 RX ORDER — SODIUM CHLORIDE 0.9 % (FLUSH) 0.9 %
10-40 SYRINGE (ML) INJECTION EVERY 8 HOURS
Status: DISCONTINUED | OUTPATIENT
Start: 2019-01-01 | End: 2019-01-01 | Stop reason: HOSPADM

## 2019-01-01 RX ORDER — DEXTROSE MONOHYDRATE 50 MG/ML
50 INJECTION, SOLUTION INTRAVENOUS CONTINUOUS
Status: DISCONTINUED | OUTPATIENT
Start: 2019-01-01 | End: 2019-01-01 | Stop reason: HOSPADM

## 2019-01-01 RX ORDER — METOPROLOL TARTRATE 25 MG/1
12.5 TABLET, FILM COATED ORAL 2 TIMES DAILY
Qty: 30 TAB | Refills: 0 | Status: SHIPPED | OUTPATIENT
Start: 2019-01-01

## 2019-01-01 RX ORDER — ASPIRIN 325 MG/1
100 TABLET, FILM COATED ORAL DAILY
Status: DISCONTINUED | OUTPATIENT
Start: 2019-01-01 | End: 2019-01-01

## 2019-01-01 RX ORDER — ALBUMIN HUMAN 50 G/1000ML
25 SOLUTION INTRAVENOUS ONCE
Status: COMPLETED | OUTPATIENT
Start: 2019-01-01 | End: 2019-01-01

## 2019-01-01 RX ORDER — ALBUTEROL SULFATE 0.83 MG/ML
1.25 SOLUTION RESPIRATORY (INHALATION) EVERY 8 HOURS
Status: COMPLETED | OUTPATIENT
Start: 2019-01-01 | End: 2019-01-01

## 2019-01-01 RX ORDER — TAMSULOSIN HYDROCHLORIDE 0.4 MG/1
0.4 CAPSULE ORAL
Status: DISCONTINUED | OUTPATIENT
Start: 2019-01-01 | End: 2019-01-01 | Stop reason: HOSPADM

## 2019-01-01 RX ORDER — POTASSIUM CHLORIDE 7.45 MG/ML
10 INJECTION INTRAVENOUS
Status: COMPLETED | OUTPATIENT
Start: 2019-01-01 | End: 2019-01-01

## 2019-01-01 RX ORDER — INSULIN LISPRO 100 [IU]/ML
INJECTION, SOLUTION INTRAVENOUS; SUBCUTANEOUS EVERY 6 HOURS
Status: DISCONTINUED | OUTPATIENT
Start: 2019-01-01 | End: 2019-01-01 | Stop reason: HOSPADM

## 2019-01-01 RX ORDER — SODIUM CHLORIDE 9 MG/ML
3 INJECTION INTRAMUSCULAR; INTRAVENOUS; SUBCUTANEOUS
Status: COMPLETED | OUTPATIENT
Start: 2019-01-01 | End: 2019-01-01

## 2019-01-01 RX ORDER — OMEPRAZOLE 20 MG/1
20 CAPSULE, DELAYED RELEASE ORAL
Status: DISCONTINUED | OUTPATIENT
Start: 2019-01-01 | End: 2019-01-01

## 2019-01-01 RX ORDER — BACITRACIN 500 UNIT/G
1 PACKET (EA) TOPICAL AS NEEDED
Status: DISCONTINUED | OUTPATIENT
Start: 2019-01-01 | End: 2019-01-01 | Stop reason: HOSPADM

## 2019-01-01 RX ORDER — PREDNISONE 10 MG/1
30 TABLET ORAL ONCE
Status: COMPLETED | OUTPATIENT
Start: 2019-01-01 | End: 2019-01-01

## 2019-01-01 RX ORDER — DEXTROSE MONOHYDRATE 50 MG/ML
60 INJECTION, SOLUTION INTRAVENOUS CONTINUOUS
Status: DISCONTINUED | OUTPATIENT
Start: 2019-01-01 | End: 2019-01-01

## 2019-01-01 RX ORDER — LEVOFLOXACIN 5 MG/ML
750 INJECTION, SOLUTION INTRAVENOUS EVERY 24 HOURS
Status: DISCONTINUED | OUTPATIENT
Start: 2019-01-01 | End: 2019-01-01 | Stop reason: DRUGHIGH

## 2019-01-01 RX ORDER — SODIUM CHLORIDE 0.9 % (FLUSH) 0.9 %
10-30 SYRINGE (ML) INJECTION AS NEEDED
Status: DISCONTINUED | OUTPATIENT
Start: 2019-01-01 | End: 2019-01-01 | Stop reason: HOSPADM

## 2019-01-01 RX ORDER — FUROSEMIDE 40 MG/1
40 TABLET ORAL DAILY
Status: DISCONTINUED | OUTPATIENT
Start: 2019-01-01 | End: 2019-01-01 | Stop reason: HOSPADM

## 2019-01-01 RX ORDER — PANTOPRAZOLE SODIUM 20 MG/1
20 TABLET, DELAYED RELEASE ORAL DAILY
Status: DISCONTINUED | OUTPATIENT
Start: 2019-01-01 | End: 2019-01-01

## 2019-01-01 RX ORDER — DEXTROSE MONOHYDRATE 50 MG/ML
100 INJECTION, SOLUTION INTRAVENOUS CONTINUOUS
Status: DISCONTINUED | OUTPATIENT
Start: 2019-01-01 | End: 2019-01-01

## 2019-01-01 RX ORDER — TRAMADOL HYDROCHLORIDE 50 MG/1
50 TABLET ORAL
Qty: 20 TAB | Refills: 0 | Status: SHIPPED | OUTPATIENT
Start: 2019-01-01 | End: 2019-01-01

## 2019-01-01 RX ORDER — SODIUM CHLORIDE 0.9 % (FLUSH) 0.9 %
5-40 SYRINGE (ML) INJECTION EVERY 8 HOURS
Status: DISCONTINUED | OUTPATIENT
Start: 2019-01-01 | End: 2019-01-01 | Stop reason: HOSPADM

## 2019-01-01 RX ORDER — MAGNESIUM SULFATE 100 %
16 CRYSTALS MISCELLANEOUS AS NEEDED
Status: DISCONTINUED | OUTPATIENT
Start: 2019-01-01 | End: 2019-01-01 | Stop reason: HOSPADM

## 2019-01-01 RX ORDER — BUMETANIDE 0.25 MG/ML
2 INJECTION INTRAMUSCULAR; INTRAVENOUS ONCE
Status: DISCONTINUED | OUTPATIENT
Start: 2019-01-01 | End: 2019-01-01

## 2019-01-01 RX ORDER — DEXTROSE AND POTASSIUM CHLORIDE 5; .15 G/100ML; G/100ML
SOLUTION INTRAVENOUS CONTINUOUS
Status: DISCONTINUED | OUTPATIENT
Start: 2019-01-01 | End: 2019-01-01

## 2019-01-01 RX ORDER — GABAPENTIN 300 MG/1
600 CAPSULE ORAL 3 TIMES DAILY
Status: DISCONTINUED | OUTPATIENT
Start: 2019-01-01 | End: 2019-01-01 | Stop reason: HOSPADM

## 2019-01-01 RX ORDER — LANOLIN ALCOHOL/MO/W.PET/CERES
6 CREAM (GRAM) TOPICAL
Status: DISCONTINUED | OUTPATIENT
Start: 2019-01-01 | End: 2019-01-01 | Stop reason: HOSPADM

## 2019-01-01 RX ORDER — ACETAMINOPHEN 325 MG/1
650 TABLET ORAL
Status: DISCONTINUED | OUTPATIENT
Start: 2019-01-01 | End: 2019-01-01

## 2019-01-01 RX ORDER — LORAZEPAM 2 MG/ML
2 INJECTION INTRAMUSCULAR
Status: DISCONTINUED | OUTPATIENT
Start: 2019-01-01 | End: 2019-01-01

## 2019-01-01 RX ORDER — DESONIDE 0.5 MG/G
CREAM TOPICAL 2 TIMES DAILY
Status: DISCONTINUED | OUTPATIENT
Start: 2019-01-01 | End: 2019-01-01 | Stop reason: HOSPADM

## 2019-01-01 RX ORDER — ENOXAPARIN SODIUM 100 MG/ML
40 INJECTION SUBCUTANEOUS EVERY 24 HOURS
Status: DISCONTINUED | OUTPATIENT
Start: 2019-01-01 | End: 2019-01-01 | Stop reason: SDUPTHER

## 2019-01-01 RX ORDER — HYDROCORTISONE 25 MG/G
CREAM TOPICAL 2 TIMES DAILY
Status: DISPENSED | OUTPATIENT
Start: 2019-01-01 | End: 2019-01-01

## 2019-01-01 RX ORDER — LORATADINE 10 MG/1
10 TABLET ORAL DAILY
Status: DISCONTINUED | OUTPATIENT
Start: 2019-01-01 | End: 2019-01-01 | Stop reason: HOSPADM

## 2019-01-01 RX ORDER — LORAZEPAM 2 MG/ML
3 INJECTION INTRAMUSCULAR
Status: DISCONTINUED | OUTPATIENT
Start: 2019-01-01 | End: 2019-01-01

## 2019-01-01 RX ORDER — ACETAMINOPHEN 325 MG/1
650 TABLET ORAL
Status: DISCONTINUED | OUTPATIENT
Start: 2019-01-01 | End: 2019-01-01 | Stop reason: HOSPADM

## 2019-01-01 RX ORDER — EPINEPHRINE 0.1 MG/ML
1 INJECTION INTRACARDIAC; INTRAVENOUS
Status: COMPLETED | OUTPATIENT
Start: 2019-01-01 | End: 2019-01-01

## 2019-01-01 RX ORDER — LATANOPROST 50 UG/ML
1 SOLUTION/ DROPS OPHTHALMIC
Status: DISCONTINUED | OUTPATIENT
Start: 2019-01-01 | End: 2019-01-01 | Stop reason: HOSPADM

## 2019-01-01 RX ORDER — LORAZEPAM 0.5 MG/1
2 TABLET ORAL
Status: DISCONTINUED | OUTPATIENT
Start: 2019-01-01 | End: 2019-01-01

## 2019-01-01 RX ORDER — LATANOPROST 50 UG/ML
1 SOLUTION/ DROPS OPHTHALMIC EVERY EVENING
Status: DISCONTINUED | OUTPATIENT
Start: 2019-01-01 | End: 2019-01-01 | Stop reason: HOSPADM

## 2019-01-01 RX ORDER — LANSOPRAZOLE 30 MG/1
30 TABLET, ORALLY DISINTEGRATING, DELAYED RELEASE ORAL
Status: DISCONTINUED | OUTPATIENT
Start: 2019-01-01 | End: 2019-01-01 | Stop reason: HOSPADM

## 2019-01-01 RX ORDER — ALBUMIN HUMAN 250 G/1000ML
12.5 SOLUTION INTRAVENOUS ONCE
Status: COMPLETED | OUTPATIENT
Start: 2019-01-01 | End: 2019-01-01

## 2019-01-01 RX ORDER — SODIUM CHLORIDE 0.9 % (FLUSH) 0.9 %
10 SYRINGE (ML) INJECTION AS NEEDED
Status: DISCONTINUED | OUTPATIENT
Start: 2019-01-01 | End: 2019-01-01 | Stop reason: HOSPADM

## 2019-01-01 RX ORDER — NYSTATIN 100000 [USP'U]/ML
500000 SUSPENSION ORAL 3 TIMES DAILY
Qty: 60 ML | Refills: 0 | Status: SHIPPED | OUTPATIENT
Start: 2019-01-01 | End: 2019-01-01

## 2019-01-01 RX ORDER — CYCLOBENZAPRINE HCL 10 MG
5 TABLET ORAL
Status: COMPLETED | OUTPATIENT
Start: 2019-01-01 | End: 2019-01-01

## 2019-01-01 RX ORDER — ALBUTEROL SULFATE 0.83 MG/ML
1.25 SOLUTION RESPIRATORY (INHALATION)
Status: DISCONTINUED | OUTPATIENT
Start: 2019-01-01 | End: 2019-01-01 | Stop reason: HOSPADM

## 2019-01-01 RX ORDER — FUROSEMIDE 40 MG/1
40 TABLET ORAL DAILY
Status: DISCONTINUED | OUTPATIENT
Start: 2019-01-01 | End: 2019-01-01

## 2019-01-01 RX ORDER — DILTIAZEM HYDROCHLORIDE 180 MG/1
180 CAPSULE, COATED, EXTENDED RELEASE ORAL DAILY
Status: DISCONTINUED | OUTPATIENT
Start: 2019-01-01 | End: 2019-01-01

## 2019-01-01 RX ORDER — L. ACIDOPHILUS/L.BULGARICUS 100MM CELL
1 GRANULES IN PACKET (EA) ORAL 2 TIMES DAILY
Status: DISCONTINUED | OUTPATIENT
Start: 2019-01-01 | End: 2019-01-01 | Stop reason: HOSPADM

## 2019-01-01 RX ORDER — POTASSIUM CHLORIDE 20 MEQ/1
20 TABLET, EXTENDED RELEASE ORAL ONCE
Status: COMPLETED | OUTPATIENT
Start: 2019-01-01 | End: 2019-01-01

## 2019-01-01 RX ORDER — MAGNESIUM SULFATE 1 G/100ML
1 INJECTION INTRAVENOUS ONCE
Status: COMPLETED | OUTPATIENT
Start: 2019-01-01 | End: 2019-01-01

## 2019-01-01 RX ORDER — SODIUM CHLORIDE 0.9 % (FLUSH) 0.9 %
20 SYRINGE (ML) INJECTION EVERY 24 HOURS
Status: DISCONTINUED | OUTPATIENT
Start: 2019-01-01 | End: 2019-01-01 | Stop reason: HOSPADM

## 2019-01-01 RX ORDER — BRIMONIDINE TARTRATE 2 MG/ML
1 SOLUTION/ DROPS OPHTHALMIC EVERY 8 HOURS
Status: DISCONTINUED | OUTPATIENT
Start: 2019-01-01 | End: 2019-01-01 | Stop reason: HOSPADM

## 2019-01-01 RX ORDER — FUROSEMIDE 10 MG/ML
20 INJECTION INTRAMUSCULAR; INTRAVENOUS DAILY
Status: DISCONTINUED | OUTPATIENT
Start: 2019-01-01 | End: 2019-01-01

## 2019-01-01 RX ORDER — SODIUM CHLORIDE 9 MG/ML
25 INJECTION, SOLUTION INTRAVENOUS CONTINUOUS
Status: DISPENSED | OUTPATIENT
Start: 2019-01-01 | End: 2019-01-01

## 2019-01-01 RX ORDER — CIPROFLOXACIN HYDROCHLORIDE 3.5 MG/ML
1 SOLUTION/ DROPS TOPICAL EVERY 8 HOURS
Status: COMPLETED | OUTPATIENT
Start: 2019-01-01 | End: 2019-01-01

## 2019-01-01 RX ORDER — ALBUTEROL SULFATE 0.83 MG/ML
1.25 SOLUTION RESPIRATORY (INHALATION) EVERY 8 HOURS
Status: DISCONTINUED | OUTPATIENT
Start: 2019-01-01 | End: 2019-01-01

## 2019-01-01 RX ORDER — TRAMADOL HYDROCHLORIDE 50 MG/1
50 TABLET ORAL
Status: DISCONTINUED | OUTPATIENT
Start: 2019-01-01 | End: 2019-01-01 | Stop reason: HOSPADM

## 2019-01-01 RX ORDER — DICLOFENAC SODIUM 10 MG/G
2 GEL TOPICAL EVERY 6 HOURS
Status: DISCONTINUED | OUTPATIENT
Start: 2019-01-01 | End: 2019-01-01 | Stop reason: HOSPADM

## 2019-01-01 RX ORDER — KETOROLAC TROMETHAMINE 30 MG/ML
15 INJECTION, SOLUTION INTRAMUSCULAR; INTRAVENOUS
Status: DISPENSED | OUTPATIENT
Start: 2019-01-01 | End: 2019-01-01

## 2019-01-01 RX ORDER — DEXAMETHASONE 4 MG/1
10 TABLET ORAL
Status: COMPLETED | OUTPATIENT
Start: 2019-01-01 | End: 2019-01-01

## 2019-01-01 RX ORDER — SODIUM CHLORIDE 9 MG/ML
500 INJECTION, SOLUTION INTRAVENOUS ONCE
Status: COMPLETED | OUTPATIENT
Start: 2019-01-01 | End: 2019-01-01

## 2019-01-01 RX ORDER — HALOPERIDOL 5 MG/ML
1 INJECTION INTRAMUSCULAR
Status: DISCONTINUED | OUTPATIENT
Start: 2019-01-01 | End: 2019-01-01 | Stop reason: HOSPADM

## 2019-01-01 RX ORDER — IPRATROPIUM BROMIDE AND ALBUTEROL SULFATE 2.5; .5 MG/3ML; MG/3ML
3 SOLUTION RESPIRATORY (INHALATION)
Qty: 30 NEBULE | Refills: 0 | Status: SHIPPED | OUTPATIENT
Start: 2019-01-01

## 2019-01-01 RX ORDER — MAGNESIUM CITRATE
296 SOLUTION, ORAL ORAL
Status: COMPLETED | OUTPATIENT
Start: 2019-01-01 | End: 2019-01-01

## 2019-01-01 RX ORDER — HYDROCODONE BITARTRATE AND ACETAMINOPHEN 5; 325 MG/1; MG/1
1 TABLET ORAL
Status: DISCONTINUED | OUTPATIENT
Start: 2019-01-01 | End: 2019-01-01 | Stop reason: HOSPADM

## 2019-01-01 RX ORDER — LANOLIN ALCOHOL/MO/W.PET/CERES
50 CREAM (GRAM) TOPICAL DAILY
Status: DISCONTINUED | OUTPATIENT
Start: 2019-01-01 | End: 2019-01-01 | Stop reason: HOSPADM

## 2019-01-01 RX ORDER — NOREPINEPHRINE BIT/0.9 % NACL 8 MG/250ML
2-16 INFUSION BOTTLE (ML) INTRAVENOUS
Status: DISCONTINUED | OUTPATIENT
Start: 2019-01-01 | End: 2019-01-01 | Stop reason: HOSPADM

## 2019-01-01 RX ORDER — SODIUM CHLORIDE 0.9 % (FLUSH) 0.9 %
5-10 SYRINGE (ML) INJECTION AS NEEDED
Status: CANCELLED | OUTPATIENT
Start: 2019-01-01

## 2019-01-01 RX ORDER — METOPROLOL TARTRATE 25 MG/1
12.5 TABLET, FILM COATED ORAL EVERY 12 HOURS
Status: DISCONTINUED | OUTPATIENT
Start: 2019-01-01 | End: 2019-01-01 | Stop reason: HOSPADM

## 2019-01-01 RX ORDER — MAGNESIUM CITRATE
296 SOLUTION, ORAL ORAL
Status: DISPENSED | OUTPATIENT
Start: 2019-01-01 | End: 2019-01-01

## 2019-01-01 RX ORDER — UREA 10 %
2 LOTION (ML) TOPICAL 2 TIMES DAILY
Qty: 30 TAB | Refills: 0 | Status: SHIPPED | OUTPATIENT
Start: 2019-01-01

## 2019-01-01 RX ORDER — POTASSIUM CHLORIDE 750 MG/1
40 TABLET, EXTENDED RELEASE ORAL
Status: COMPLETED | OUTPATIENT
Start: 2019-01-01 | End: 2019-01-01

## 2019-01-01 RX ORDER — PREDNISONE 10 MG/1
10 TABLET ORAL
Status: DISCONTINUED | OUTPATIENT
Start: 2019-01-01 | End: 2019-01-01

## 2019-01-01 RX ORDER — LEVOFLOXACIN 5 MG/ML
750 INJECTION, SOLUTION INTRAVENOUS
Status: DISCONTINUED | OUTPATIENT
Start: 2019-01-01 | End: 2019-01-01

## 2019-01-01 RX ORDER — NOREPINEPHRINE BIT/0.9 % NACL 8 MG/250ML
2-16 INFUSION BOTTLE (ML) INTRAVENOUS
Status: DISCONTINUED | OUTPATIENT
Start: 2019-01-01 | End: 2019-01-01

## 2019-01-01 RX ORDER — GABAPENTIN 300 MG/1
600 CAPSULE ORAL 2 TIMES DAILY
Status: DISCONTINUED | OUTPATIENT
Start: 2019-01-01 | End: 2019-01-01 | Stop reason: HOSPADM

## 2019-01-01 RX ORDER — FUROSEMIDE 10 MG/ML
40 INJECTION INTRAMUSCULAR; INTRAVENOUS DAILY
Status: DISCONTINUED | OUTPATIENT
Start: 2019-01-01 | End: 2019-01-01

## 2019-01-01 RX ADMIN — ACETAMINOPHEN 325 MG: 160 SOLUTION ORAL at 19:04

## 2019-01-01 RX ADMIN — METOPROLOL TARTRATE 12.5 MG: 25 TABLET ORAL at 18:52

## 2019-01-01 RX ADMIN — FUROSEMIDE 40 MG: 40 TABLET ORAL at 12:07

## 2019-01-01 RX ADMIN — MEROPENEM 1 G: 1 INJECTION, POWDER, FOR SOLUTION INTRAVENOUS at 23:31

## 2019-01-01 RX ADMIN — HYDROMORPHONE HYDROCHLORIDE 0.25 MG: 1 INJECTION, SOLUTION INTRAMUSCULAR; INTRAVENOUS; SUBCUTANEOUS at 23:14

## 2019-01-01 RX ADMIN — SODIUM CHLORIDE 1000 MG: 900 INJECTION, SOLUTION INTRAVENOUS at 04:39

## 2019-01-01 RX ADMIN — ACETAMINOPHEN 325 MG: 160 SOLUTION ORAL at 05:07

## 2019-01-01 RX ADMIN — DESONIDE: 0.5 CREAM TOPICAL at 19:27

## 2019-01-01 RX ADMIN — LEVOTHYROXINE SODIUM 175 MCG: 25 TABLET ORAL at 05:47

## 2019-01-01 RX ADMIN — FOLIC ACID 1 MG: 1 TABLET ORAL at 09:24

## 2019-01-01 RX ADMIN — SODIUM CHLORIDE, SODIUM LACTATE, POTASSIUM CHLORIDE, AND CALCIUM CHLORIDE 1000 ML: 600; 310; 30; 20 INJECTION, SOLUTION INTRAVENOUS at 14:00

## 2019-01-01 RX ADMIN — Medication 10 ML: at 18:56

## 2019-01-01 RX ADMIN — CEFTRIAXONE 2 G: 2 INJECTION, POWDER, FOR SOLUTION INTRAMUSCULAR; INTRAVENOUS at 18:34

## 2019-01-01 RX ADMIN — ACETAMINOPHEN 325 MG: 160 SOLUTION ORAL at 18:44

## 2019-01-01 RX ADMIN — APIXABAN 5 MG: 5 TABLET, FILM COATED ORAL at 08:32

## 2019-01-01 RX ADMIN — APIXABAN 5 MG: 5 TABLET, FILM COATED ORAL at 10:12

## 2019-01-01 RX ADMIN — SODIUM CHLORIDE 1000 MG: 900 INJECTION, SOLUTION INTRAVENOUS at 11:22

## 2019-01-01 RX ADMIN — APIXABAN 5 MG: 5 TABLET, FILM COATED ORAL at 12:19

## 2019-01-01 RX ADMIN — LEVOTHYROXINE SODIUM 175 MCG: 100 TABLET ORAL at 06:00

## 2019-01-01 RX ADMIN — Medication 100 MG: at 08:58

## 2019-01-01 RX ADMIN — DICLOFENAC 2 G: 10 GEL TOPICAL at 00:06

## 2019-01-01 RX ADMIN — DICLOFENAC 2 G: 10 GEL TOPICAL at 06:32

## 2019-01-01 RX ADMIN — CEFTRIAXONE 2 G: 2 INJECTION, POWDER, FOR SOLUTION INTRAMUSCULAR; INTRAVENOUS at 12:06

## 2019-01-01 RX ADMIN — FUROSEMIDE 80 MG: 40 TABLET ORAL at 08:10

## 2019-01-01 RX ADMIN — ACETAMINOPHEN 650 MG: 325 TABLET, FILM COATED ORAL at 06:20

## 2019-01-01 RX ADMIN — ONDANSETRON 4 MG: 4 TABLET, ORALLY DISINTEGRATING ORAL at 17:55

## 2019-01-01 RX ADMIN — CIPROFLOXACIN HYDROCHLORIDE 1 DROP: 3 SOLUTION/ DROPS OPHTHALMIC at 13:05

## 2019-01-01 RX ADMIN — Medication 100 MG: at 08:32

## 2019-01-01 RX ADMIN — LORAZEPAM 1 MG: 1 TABLET ORAL at 00:20

## 2019-01-01 RX ADMIN — LACTOBACILLUS ACIDOPHILUS / LACTOBACILLUS BULGARICUS 1 PACKET: 100 MILLION CFU STRENGTH GRANULES at 09:22

## 2019-01-01 RX ADMIN — GABAPENTIN 600 MG: 300 CAPSULE ORAL at 21:50

## 2019-01-01 RX ADMIN — ACETAMINOPHEN 325 MG: 160 SOLUTION ORAL at 06:46

## 2019-01-01 RX ADMIN — FOLIC ACID: 5 INJECTION, SOLUTION INTRAMUSCULAR; INTRAVENOUS; SUBCUTANEOUS at 22:00

## 2019-01-01 RX ADMIN — ACETAMINOPHEN 325 MG: 160 SOLUTION ORAL at 12:18

## 2019-01-01 RX ADMIN — POTASSIUM CHLORIDE 20 MEQ: 400 INJECTION, SOLUTION INTRAVENOUS at 10:00

## 2019-01-01 RX ADMIN — LACTOBACILLUS ACIDOPHILUS / LACTOBACILLUS BULGARICUS 1 PACKET: 100 MILLION CFU STRENGTH GRANULES at 10:56

## 2019-01-01 RX ADMIN — ACETAMINOPHEN 500 MG: 500 TABLET, FILM COATED ORAL at 08:32

## 2019-01-01 RX ADMIN — FOLIC ACID 1 MG: 1 TABLET ORAL at 09:21

## 2019-01-01 RX ADMIN — FUROSEMIDE 80 MG: 40 TABLET ORAL at 08:08

## 2019-01-01 RX ADMIN — APIXABAN 5 MG: 5 TABLET, FILM COATED ORAL at 09:00

## 2019-01-01 RX ADMIN — DESONIDE: 0.5 CREAM TOPICAL at 10:29

## 2019-01-01 RX ADMIN — LEVOTHYROXINE SODIUM 175 MCG: 100 TABLET ORAL at 05:12

## 2019-01-01 RX ADMIN — PANTOPRAZOLE SODIUM 40 MG: 40 INJECTION, POWDER, LYOPHILIZED, FOR SOLUTION INTRAVENOUS at 09:25

## 2019-01-01 RX ADMIN — METOPROLOL TARTRATE 12.5 MG: 25 TABLET ORAL at 08:58

## 2019-01-01 RX ADMIN — METOPROLOL TARTRATE 1.25 MG: 5 INJECTION INTRAVENOUS at 11:15

## 2019-01-01 RX ADMIN — CEFTRIAXONE 2 G: 2 INJECTION, POWDER, FOR SOLUTION INTRAMUSCULAR; INTRAVENOUS at 12:13

## 2019-01-01 RX ADMIN — HYDROMORPHONE HYDROCHLORIDE 0.25 MG: 1 INJECTION, SOLUTION INTRAMUSCULAR; INTRAVENOUS; SUBCUTANEOUS at 13:46

## 2019-01-01 RX ADMIN — LEVOTHYROXINE SODIUM 175 MCG: 25 TABLET ORAL at 06:30

## 2019-01-01 RX ADMIN — HALOPERIDOL LACTATE 1 MG: 5 INJECTION, SOLUTION INTRAMUSCULAR at 22:45

## 2019-01-01 RX ADMIN — THIAMINE HCL TAB 100 MG 100 MG: 100 TAB at 09:20

## 2019-01-01 RX ADMIN — IPRATROPIUM BROMIDE AND ALBUTEROL SULFATE 3 ML: .5; 3 SOLUTION RESPIRATORY (INHALATION) at 01:57

## 2019-01-01 RX ADMIN — ACETAMINOPHEN 325 MG: 160 SOLUTION ORAL at 12:11

## 2019-01-01 RX ADMIN — DESONIDE: 0.5 CREAM TOPICAL at 08:46

## 2019-01-01 RX ADMIN — Medication 10 ML: at 16:00

## 2019-01-01 RX ADMIN — LACTOBACILLUS ACIDOPHILUS / LACTOBACILLUS BULGARICUS 1 PACKET: 100 MILLION CFU STRENGTH GRANULES at 17:28

## 2019-01-01 RX ADMIN — IPRATROPIUM BROMIDE AND ALBUTEROL SULFATE 3 ML: .5; 3 SOLUTION RESPIRATORY (INHALATION) at 11:35

## 2019-01-01 RX ADMIN — LEVOFLOXACIN 250 MG: 5 INJECTION, SOLUTION INTRAVENOUS at 20:52

## 2019-01-01 RX ADMIN — IPRATROPIUM BROMIDE AND ALBUTEROL SULFATE 3 ML: .5; 3 SOLUTION RESPIRATORY (INHALATION) at 07:21

## 2019-01-01 RX ADMIN — PANTOPRAZOLE SODIUM 40 MG: 40 INJECTION, POWDER, LYOPHILIZED, FOR SOLUTION INTRAVENOUS at 10:27

## 2019-01-01 RX ADMIN — Medication 10 ML: at 22:00

## 2019-01-01 RX ADMIN — Medication 10 ML: at 06:20

## 2019-01-01 RX ADMIN — DICLOFENAC 2 G: 10 GEL TOPICAL at 06:00

## 2019-01-01 RX ADMIN — CEFTRIAXONE 2 G: 2 INJECTION, POWDER, FOR SOLUTION INTRAMUSCULAR; INTRAVENOUS at 13:34

## 2019-01-01 RX ADMIN — METOPROLOL TARTRATE 12.5 MG: 25 TABLET ORAL at 22:43

## 2019-01-01 RX ADMIN — Medication 10 ML: at 18:43

## 2019-01-01 RX ADMIN — ACETAMINOPHEN 500 MG: 500 TABLET, FILM COATED ORAL at 18:26

## 2019-01-01 RX ADMIN — BISACODYL 10 MG: 10 SUPPOSITORY RECTAL at 12:32

## 2019-01-01 RX ADMIN — DICLOFENAC 2 G: 10 GEL TOPICAL at 18:00

## 2019-01-01 RX ADMIN — ACETAMINOPHEN 325 MG: 160 SOLUTION ORAL at 00:30

## 2019-01-01 RX ADMIN — Medication 10 ML: at 13:04

## 2019-01-01 RX ADMIN — IPRATROPIUM BROMIDE AND ALBUTEROL SULFATE 3 ML: .5; 3 SOLUTION RESPIRATORY (INHALATION) at 12:00

## 2019-01-01 RX ADMIN — GABAPENTIN 600 MG: 300 CAPSULE ORAL at 16:04

## 2019-01-01 RX ADMIN — THIAMINE HCL TAB 100 MG 100 MG: 100 TAB at 10:18

## 2019-01-01 RX ADMIN — CEFAZOLIN 1 G: 1 INJECTION, POWDER, FOR SOLUTION INTRAMUSCULAR; INTRAVENOUS at 15:12

## 2019-01-01 RX ADMIN — LACTOBACILLUS ACIDOPHILUS / LACTOBACILLUS BULGARICUS 1 PACKET: 100 MILLION CFU STRENGTH GRANULES at 17:17

## 2019-01-01 RX ADMIN — METOPROLOL TARTRATE 1.25 MG: 5 INJECTION INTRAVENOUS at 17:17

## 2019-01-01 RX ADMIN — CEFTRIAXONE 2 G: 2 INJECTION, POWDER, FOR SOLUTION INTRAMUSCULAR; INTRAVENOUS at 13:06

## 2019-01-01 RX ADMIN — ALBUTEROL SULFATE 1.25 MG: 2.5 SOLUTION RESPIRATORY (INHALATION) at 22:00

## 2019-01-01 RX ADMIN — TAMSULOSIN HYDROCHLORIDE 0.4 MG: 0.4 CAPSULE ORAL at 18:19

## 2019-01-01 RX ADMIN — TAMSULOSIN HYDROCHLORIDE 0.4 MG: 0.4 CAPSULE ORAL at 18:44

## 2019-01-01 RX ADMIN — Medication 10 ML: at 00:23

## 2019-01-01 RX ADMIN — LACTOBACILLUS ACIDOPHILUS / LACTOBACILLUS BULGARICUS 1 PACKET: 100 MILLION CFU STRENGTH GRANULES at 18:48

## 2019-01-01 RX ADMIN — FOLIC ACID 1 MG: 1 TABLET ORAL at 08:58

## 2019-01-01 RX ADMIN — IPRATROPIUM BROMIDE AND ALBUTEROL SULFATE 3 ML: .5; 3 SOLUTION RESPIRATORY (INHALATION) at 19:44

## 2019-01-01 RX ADMIN — METOPROLOL TARTRATE 12.5 MG: 25 TABLET ORAL at 19:04

## 2019-01-01 RX ADMIN — ZOLPIDEM TARTRATE 5 MG: 5 TABLET ORAL at 21:50

## 2019-01-01 RX ADMIN — HYDROCODONE BITARTRATE AND ACETAMINOPHEN 1 TABLET: 5; 325 TABLET ORAL at 14:46

## 2019-01-01 RX ADMIN — METOPROLOL TARTRATE 1.25 MG: 5 INJECTION INTRAVENOUS at 17:46

## 2019-01-01 RX ADMIN — IPRATROPIUM BROMIDE AND ALBUTEROL SULFATE 3 ML: .5; 3 SOLUTION RESPIRATORY (INHALATION) at 14:34

## 2019-01-01 RX ADMIN — BRIMONIDINE TARTRATE 1 DROP: 2 SOLUTION/ DROPS OPHTHALMIC at 05:26

## 2019-01-01 RX ADMIN — Medication 10 ML: at 21:45

## 2019-01-01 RX ADMIN — BARIUM SULFATE 15 ML: 400 PASTE ORAL at 12:24

## 2019-01-01 RX ADMIN — LEVOTHYROXINE SODIUM 175 MCG: 25 TABLET ORAL at 05:08

## 2019-01-01 RX ADMIN — METOPROLOL TARTRATE 12.5 MG: 25 TABLET ORAL at 10:28

## 2019-01-01 RX ADMIN — DILTIAZEM HYDROCHLORIDE 180 MG: 180 CAPSULE, COATED, EXTENDED RELEASE ORAL at 08:58

## 2019-01-01 RX ADMIN — GABAPENTIN 600 MG: 300 CAPSULE ORAL at 08:48

## 2019-01-01 RX ADMIN — THIAMINE HCL TAB 100 MG 100 MG: 100 TAB at 10:12

## 2019-01-01 RX ADMIN — HYDROMORPHONE HYDROCHLORIDE 0.25 MG: 1 INJECTION, SOLUTION INTRAMUSCULAR; INTRAVENOUS; SUBCUTANEOUS at 08:58

## 2019-01-01 RX ADMIN — FAMOTIDINE 20 MG: 20 TABLET ORAL at 09:27

## 2019-01-01 RX ADMIN — HYDROCODONE BITARTRATE AND ACETAMINOPHEN 1 TABLET: 5; 325 TABLET ORAL at 00:22

## 2019-01-01 RX ADMIN — FOLIC ACID 1 MG: 1 TABLET ORAL at 09:34

## 2019-01-01 RX ADMIN — CYANOCOBALAMIN TAB 1000 MCG 1000 MCG: 1000 TAB at 08:09

## 2019-01-01 RX ADMIN — APIXABAN 5 MG: 5 TABLET, FILM COATED ORAL at 21:43

## 2019-01-01 RX ADMIN — CIPROFLOXACIN HYDROCHLORIDE 1 DROP: 3 SOLUTION/ DROPS OPHTHALMIC at 22:45

## 2019-01-01 RX ADMIN — Medication 10 ML: at 15:20

## 2019-01-01 RX ADMIN — APIXABAN 5 MG: 5 TABLET, FILM COATED ORAL at 10:28

## 2019-01-01 RX ADMIN — Medication 100 MG: at 09:00

## 2019-01-01 RX ADMIN — FOLIC ACID 1 MG: 1 TABLET ORAL at 09:10

## 2019-01-01 RX ADMIN — EPINEPHRINE 0.5 MG: 0.1 INJECTION INTRACARDIAC; INTRAVENOUS at 06:15

## 2019-01-01 RX ADMIN — THIAMINE HYDROCHLORIDE 250 MG: 100 INJECTION, SOLUTION INTRAMUSCULAR; INTRAVENOUS at 15:53

## 2019-01-01 RX ADMIN — ACETAMINOPHEN 650 MG: 325 TABLET, FILM COATED ORAL at 03:16

## 2019-01-01 RX ADMIN — ACETAMINOPHEN 325 MG: 160 SOLUTION ORAL at 23:17

## 2019-01-01 RX ADMIN — MULTIPLE VITAMIN LIQUID 30 ML: LIQUID at 08:00

## 2019-01-01 RX ADMIN — DEXTROSE MONOHYDRATE, SODIUM CHLORIDE, AND POTASSIUM CHLORIDE 100 ML/HR: 50; 4.5; .745 INJECTION, SOLUTION INTRAVENOUS at 10:11

## 2019-01-01 RX ADMIN — DESONIDE: 0.5 CREAM TOPICAL at 18:20

## 2019-01-01 RX ADMIN — IPRATROPIUM BROMIDE AND ALBUTEROL SULFATE 3 ML: .5; 3 SOLUTION RESPIRATORY (INHALATION) at 19:34

## 2019-01-01 RX ADMIN — ACETAMINOPHEN 500 MG: 500 TABLET, FILM COATED ORAL at 17:28

## 2019-01-01 RX ADMIN — ACETAMINOPHEN 500 MG: 500 TABLET, FILM COATED ORAL at 05:33

## 2019-01-01 RX ADMIN — Medication 10 ML: at 05:48

## 2019-01-01 RX ADMIN — FOLIC ACID 1 MG: 1 TABLET ORAL at 08:12

## 2019-01-01 RX ADMIN — APIXABAN 5 MG: 5 TABLET, FILM COATED ORAL at 22:01

## 2019-01-01 RX ADMIN — Medication 10 ML: at 06:03

## 2019-01-01 RX ADMIN — POTASSIUM CHLORIDE 40 MEQ: 10 TABLET, EXTENDED RELEASE ORAL at 09:10

## 2019-01-01 RX ADMIN — BRIMONIDINE TARTRATE 1 DROP: 2 SOLUTION/ DROPS OPHTHALMIC at 22:30

## 2019-01-01 RX ADMIN — DESONIDE: 0.5 CREAM TOPICAL at 18:00

## 2019-01-01 RX ADMIN — METOPROLOL TARTRATE 12.5 MG: 25 TABLET ORAL at 22:01

## 2019-01-01 RX ADMIN — Medication 10 ML: at 23:44

## 2019-01-01 RX ADMIN — CEFTRIAXONE 2 G: 2 INJECTION, POWDER, FOR SOLUTION INTRAMUSCULAR; INTRAVENOUS at 12:32

## 2019-01-01 RX ADMIN — IPRATROPIUM BROMIDE AND ALBUTEROL SULFATE 3 ML: .5; 3 SOLUTION RESPIRATORY (INHALATION) at 08:11

## 2019-01-01 RX ADMIN — Medication 10 ML: at 23:32

## 2019-01-01 RX ADMIN — FOLIC ACID 1 MG: 1 TABLET ORAL at 08:09

## 2019-01-01 RX ADMIN — DICLOFENAC 2 G: 10 GEL TOPICAL at 18:34

## 2019-01-01 RX ADMIN — KETOROLAC TROMETHAMINE 15 MG: 30 INJECTION, SOLUTION INTRAMUSCULAR at 15:55

## 2019-01-01 RX ADMIN — SODIUM CHLORIDE 100 ML/HR: 900 INJECTION, SOLUTION INTRAVENOUS at 12:45

## 2019-01-01 RX ADMIN — BRIMONIDINE TARTRATE 1 DROP: 2 SOLUTION/ DROPS OPHTHALMIC at 14:00

## 2019-01-01 RX ADMIN — ACETAMINOPHEN 650 MG: 325 TABLET, FILM COATED ORAL at 21:14

## 2019-01-01 RX ADMIN — ACETAMINOPHEN 500 MG: 500 TABLET, FILM COATED ORAL at 18:19

## 2019-01-01 RX ADMIN — CIPROFLOXACIN HYDROCHLORIDE 1 DROP: 3 SOLUTION/ DROPS OPHTHALMIC at 14:05

## 2019-01-01 RX ADMIN — MULTIPLE VITAMINS W/ MINERALS TAB 1 TABLET: TAB at 09:29

## 2019-01-01 RX ADMIN — CIPROFLOXACIN HYDROCHLORIDE 1 DROP: 3 SOLUTION/ DROPS OPHTHALMIC at 18:26

## 2019-01-01 RX ADMIN — FOLIC ACID 1 MG: 1 TABLET ORAL at 08:00

## 2019-01-01 RX ADMIN — APIXABAN 5 MG: 5 TABLET, FILM COATED ORAL at 22:14

## 2019-01-01 RX ADMIN — ACETAMINOPHEN 325 MG: 160 SOLUTION ORAL at 18:15

## 2019-01-01 RX ADMIN — IPRATROPIUM BROMIDE AND ALBUTEROL SULFATE 3 ML: .5; 3 SOLUTION RESPIRATORY (INHALATION) at 20:15

## 2019-01-01 RX ADMIN — BRIMONIDINE TARTRATE 1 DROP: 2 SOLUTION/ DROPS OPHTHALMIC at 06:00

## 2019-01-01 RX ADMIN — Medication 10 ML: at 02:22

## 2019-01-01 RX ADMIN — ACETAMINOPHEN 650 MG: 325 TABLET, FILM COATED ORAL at 02:19

## 2019-01-01 RX ADMIN — FUROSEMIDE 40 MG: 40 TABLET ORAL at 09:35

## 2019-01-01 RX ADMIN — DICLOFENAC 2 G: 10 GEL TOPICAL at 11:11

## 2019-01-01 RX ADMIN — CEFTRIAXONE 2 G: 2 INJECTION, POWDER, FOR SOLUTION INTRAMUSCULAR; INTRAVENOUS at 12:31

## 2019-01-01 RX ADMIN — LATANOPROST 1 DROP: 50 SOLUTION OPHTHALMIC at 18:33

## 2019-01-01 RX ADMIN — PREDNISONE 30 MG: 10 TABLET ORAL at 23:45

## 2019-01-01 RX ADMIN — DESONIDE: 0.5 CREAM TOPICAL at 18:17

## 2019-01-01 RX ADMIN — IPRATROPIUM BROMIDE AND ALBUTEROL SULFATE 3 ML: .5; 3 SOLUTION RESPIRATORY (INHALATION) at 16:49

## 2019-01-01 RX ADMIN — METOPROLOL TARTRATE 1.25 MG: 5 INJECTION INTRAVENOUS at 18:11

## 2019-01-01 RX ADMIN — Medication 2 TABLET: at 09:05

## 2019-01-01 RX ADMIN — IPRATROPIUM BROMIDE AND ALBUTEROL SULFATE 3 ML: .5; 3 SOLUTION RESPIRATORY (INHALATION) at 17:13

## 2019-01-01 RX ADMIN — MEROPENEM 1 G: 1 INJECTION, POWDER, FOR SOLUTION INTRAVENOUS at 13:12

## 2019-01-01 RX ADMIN — TAMSULOSIN HYDROCHLORIDE 0.4 MG: 0.4 CAPSULE ORAL at 18:25

## 2019-01-01 RX ADMIN — ACETAMINOPHEN 325 MG: 160 SOLUTION ORAL at 18:45

## 2019-01-01 RX ADMIN — DICLOFENAC 2 G: 10 GEL TOPICAL at 12:34

## 2019-01-01 RX ADMIN — GABAPENTIN 600 MG: 300 CAPSULE ORAL at 23:24

## 2019-01-01 RX ADMIN — ALBUTEROL SULFATE 1.25 MG: 2.5 SOLUTION RESPIRATORY (INHALATION) at 06:50

## 2019-01-01 RX ADMIN — HALOPERIDOL LACTATE 1 MG: 5 INJECTION, SOLUTION INTRAMUSCULAR at 22:16

## 2019-01-01 RX ADMIN — MELATONIN TAB 3 MG 6 MG: 3 TAB at 18:06

## 2019-01-01 RX ADMIN — ACETAMINOPHEN 325 MG: 160 SOLUTION ORAL at 18:52

## 2019-01-01 RX ADMIN — CEFTRIAXONE 2 G: 2 INJECTION, POWDER, FOR SOLUTION INTRAMUSCULAR; INTRAVENOUS at 12:12

## 2019-01-01 RX ADMIN — MEROPENEM 1 G: 1 INJECTION, POWDER, FOR SOLUTION INTRAVENOUS at 03:16

## 2019-01-01 RX ADMIN — PANTOPRAZOLE SODIUM 40 MG: 40 TABLET, DELAYED RELEASE ORAL at 10:34

## 2019-01-01 RX ADMIN — MAGNESIUM SULFATE HEPTAHYDRATE 2 G: 40 INJECTION, SOLUTION INTRAVENOUS at 10:27

## 2019-01-01 RX ADMIN — Medication 10 ML: at 06:48

## 2019-01-01 RX ADMIN — FOLIC ACID 1 MG: 1 TABLET ORAL at 09:00

## 2019-01-01 RX ADMIN — ACETAMINOPHEN 325 MG: 160 SOLUTION ORAL at 00:52

## 2019-01-01 RX ADMIN — METOPROLOL TARTRATE 12.5 MG: 25 TABLET ORAL at 08:19

## 2019-01-01 RX ADMIN — LATANOPROST 1 DROP: 50 SOLUTION OPHTHALMIC at 17:56

## 2019-01-01 RX ADMIN — METOPROLOL TARTRATE 1.25 MG: 5 INJECTION INTRAVENOUS at 18:02

## 2019-01-01 RX ADMIN — APIXABAN 5 MG: 5 TABLET, FILM COATED ORAL at 18:15

## 2019-01-01 RX ADMIN — METOPROLOL TARTRATE 12.5 MG: 25 TABLET ORAL at 22:06

## 2019-01-01 RX ADMIN — NOREPINEPHRINE BITARTRATE 8 MCG/MIN: 1 INJECTION INTRAVENOUS at 05:25

## 2019-01-01 RX ADMIN — CEPHALEXIN 500 MG: 250 CAPSULE ORAL at 05:16

## 2019-01-01 RX ADMIN — APIXABAN 5 MG: 5 TABLET, FILM COATED ORAL at 18:24

## 2019-01-01 RX ADMIN — APIXABAN 5 MG: 5 TABLET, FILM COATED ORAL at 17:59

## 2019-01-01 RX ADMIN — DICLOFENAC 2 G: 10 GEL TOPICAL at 00:00

## 2019-01-01 RX ADMIN — ACETAMINOPHEN 325 MG: 160 SOLUTION ORAL at 12:32

## 2019-01-01 RX ADMIN — Medication 10 ML: at 15:42

## 2019-01-01 RX ADMIN — POTASSIUM CHLORIDE 10 MEQ: 10 INJECTION, SOLUTION INTRAVENOUS at 09:51

## 2019-01-01 RX ADMIN — TAMSULOSIN HYDROCHLORIDE 0.4 MG: 0.4 CAPSULE ORAL at 17:08

## 2019-01-01 RX ADMIN — ACETAMINOPHEN 325 MG: 160 SOLUTION ORAL at 11:45

## 2019-01-01 RX ADMIN — IPRATROPIUM BROMIDE AND ALBUTEROL SULFATE 3 ML: .5; 3 SOLUTION RESPIRATORY (INHALATION) at 21:07

## 2019-01-01 RX ADMIN — CEFTRIAXONE 2 G: 2 INJECTION, POWDER, FOR SOLUTION INTRAMUSCULAR; INTRAVENOUS at 11:31

## 2019-01-01 RX ADMIN — CEFTRIAXONE 2 G: 2 INJECTION, POWDER, FOR SOLUTION INTRAMUSCULAR; INTRAVENOUS at 12:02

## 2019-01-01 RX ADMIN — PANTOPRAZOLE SODIUM 40 MG: 40 INJECTION, POWDER, LYOPHILIZED, FOR SOLUTION INTRAVENOUS at 08:49

## 2019-01-01 RX ADMIN — BRIMONIDINE TARTRATE 1 DROP: 2 SOLUTION/ DROPS OPHTHALMIC at 22:00

## 2019-01-01 RX ADMIN — BRIMONIDINE TARTRATE 1 DROP: 2 SOLUTION/ DROPS OPHTHALMIC at 21:38

## 2019-01-01 RX ADMIN — FEBUXOSTAT 80 MG: 40 TABLET ORAL at 13:07

## 2019-01-01 RX ADMIN — IPRATROPIUM BROMIDE AND ALBUTEROL SULFATE 3 ML: .5; 3 SOLUTION RESPIRATORY (INHALATION) at 02:30

## 2019-01-01 RX ADMIN — Medication 10 ML: at 18:44

## 2019-01-01 RX ADMIN — PANTOPRAZOLE SODIUM 40 MG: 40 TABLET, DELAYED RELEASE ORAL at 18:00

## 2019-01-01 RX ADMIN — DICLOFENAC 2 G: 10 GEL TOPICAL at 18:49

## 2019-01-01 RX ADMIN — CEFTRIAXONE 2 G: 2 INJECTION, POWDER, FOR SOLUTION INTRAMUSCULAR; INTRAVENOUS at 11:19

## 2019-01-01 RX ADMIN — BRIMONIDINE TARTRATE 1 DROP: 2 SOLUTION OPHTHALMIC at 08:16

## 2019-01-01 RX ADMIN — APIXABAN 5 MG: 5 TABLET, FILM COATED ORAL at 08:58

## 2019-01-01 RX ADMIN — LATANOPROST 1 DROP: 50 SOLUTION OPHTHALMIC at 22:00

## 2019-01-01 RX ADMIN — TAMSULOSIN HYDROCHLORIDE 0.4 MG: 0.4 CAPSULE ORAL at 17:18

## 2019-01-01 RX ADMIN — DICLOFENAC 2 G: 10 GEL TOPICAL at 05:26

## 2019-01-01 RX ADMIN — APIXABAN 5 MG: 5 TABLET, FILM COATED ORAL at 22:28

## 2019-01-01 RX ADMIN — INFLUENZA VIRUS VACCINE 0.5 ML: 15; 15; 15; 15 SUSPENSION INTRAMUSCULAR at 14:36

## 2019-01-01 RX ADMIN — DICLOFENAC 2 G: 10 GEL TOPICAL at 18:26

## 2019-01-01 RX ADMIN — FOLIC ACID 1 MG: 1 TABLET ORAL at 08:37

## 2019-01-01 RX ADMIN — APIXABAN 5 MG: 5 TABLET, FILM COATED ORAL at 08:00

## 2019-01-01 RX ADMIN — SODIUM CHLORIDE 1000 MG: 900 INJECTION, SOLUTION INTRAVENOUS at 08:07

## 2019-01-01 RX ADMIN — Medication 100 MG: at 08:04

## 2019-01-01 RX ADMIN — ALBUMIN (HUMAN) 25 G: 12.5 INJECTION, SOLUTION INTRAVENOUS at 21:59

## 2019-01-01 RX ADMIN — ACETAMINOPHEN 325 MG: 160 SOLUTION ORAL at 23:31

## 2019-01-01 RX ADMIN — IPRATROPIUM BROMIDE AND ALBUTEROL SULFATE 3 ML: .5; 3 SOLUTION RESPIRATORY (INHALATION) at 07:51

## 2019-01-01 RX ADMIN — IPRATROPIUM BROMIDE AND ALBUTEROL SULFATE 3 ML: .5; 3 SOLUTION RESPIRATORY (INHALATION) at 08:07

## 2019-01-01 RX ADMIN — Medication 2 TABLET: at 10:27

## 2019-01-01 RX ADMIN — PANTOPRAZOLE SODIUM 40 MG: 40 INJECTION, POWDER, LYOPHILIZED, FOR SOLUTION INTRAVENOUS at 10:17

## 2019-01-01 RX ADMIN — Medication 10 ML: at 16:31

## 2019-01-01 RX ADMIN — LANSOPRAZOLE 30 MG: 30 TABLET, ORALLY DISINTEGRATING, DELAYED RELEASE ORAL at 08:58

## 2019-01-01 RX ADMIN — Medication 100 MG: at 08:02

## 2019-01-01 RX ADMIN — LATANOPROST 1 DROP: 50 SOLUTION OPHTHALMIC at 18:00

## 2019-01-01 RX ADMIN — FUROSEMIDE 40 MG: 40 TABLET ORAL at 09:28

## 2019-01-01 RX ADMIN — METOPROLOL TARTRATE 1.25 MG: 5 INJECTION INTRAVENOUS at 23:45

## 2019-01-01 RX ADMIN — METOPROLOL TARTRATE 1.25 MG: 5 INJECTION INTRAVENOUS at 05:31

## 2019-01-01 RX ADMIN — MEROPENEM 1 G: 1 INJECTION, POWDER, FOR SOLUTION INTRAVENOUS at 05:29

## 2019-01-01 RX ADMIN — METOPROLOL TARTRATE 12.5 MG: 25 TABLET ORAL at 18:25

## 2019-01-01 RX ADMIN — Medication 2 TABLET: at 09:34

## 2019-01-01 RX ADMIN — ACETAMINOPHEN 325 MG: 160 SOLUTION ORAL at 05:06

## 2019-01-01 RX ADMIN — DESONIDE: 0.5 CREAM TOPICAL at 19:39

## 2019-01-01 RX ADMIN — ACETAMINOPHEN 500 MG: 500 TABLET, FILM COATED ORAL at 21:43

## 2019-01-01 RX ADMIN — CEPHALEXIN 500 MG: 250 CAPSULE ORAL at 18:44

## 2019-01-01 RX ADMIN — DESONIDE: 0.5 CREAM TOPICAL at 09:12

## 2019-01-01 RX ADMIN — FUROSEMIDE 80 MG: 40 TABLET ORAL at 08:00

## 2019-01-01 RX ADMIN — FOLIC ACID 1 MG: 1 TABLET ORAL at 10:27

## 2019-01-01 RX ADMIN — BRIMONIDINE TARTRATE 1 DROP: 2 SOLUTION/ DROPS OPHTHALMIC at 22:44

## 2019-01-01 RX ADMIN — LACTOBACILLUS ACIDOPHILUS / LACTOBACILLUS BULGARICUS 1 PACKET: 100 MILLION CFU STRENGTH GRANULES at 18:19

## 2019-01-01 RX ADMIN — MELATONIN TAB 3 MG 3 MG: 3 TAB at 22:35

## 2019-01-01 RX ADMIN — HYDROMORPHONE HYDROCHLORIDE 0.25 MG: 1 INJECTION, SOLUTION INTRAMUSCULAR; INTRAVENOUS; SUBCUTANEOUS at 21:12

## 2019-01-01 RX ADMIN — ACETAMINOPHEN 500 MG: 500 TABLET, FILM COATED ORAL at 04:18

## 2019-01-01 RX ADMIN — FOLIC ACID 1 MG: 1 TABLET ORAL at 08:10

## 2019-01-01 RX ADMIN — FUROSEMIDE 40 MG: 40 TABLET ORAL at 10:29

## 2019-01-01 RX ADMIN — ALBUTEROL SULFATE 1.25 MG: 2.5 SOLUTION RESPIRATORY (INHALATION) at 06:00

## 2019-01-01 RX ADMIN — APIXABAN 2.5 MG: 2.5 TABLET, FILM COATED ORAL at 17:18

## 2019-01-01 RX ADMIN — Medication 10 ML: at 22:12

## 2019-01-01 RX ADMIN — FEBUXOSTAT 80 MG: 40 TABLET ORAL at 12:52

## 2019-01-01 RX ADMIN — ACETAMINOPHEN 325 MG: 160 SOLUTION ORAL at 18:06

## 2019-01-01 RX ADMIN — ACETAMINOPHEN 500 MG: 500 TABLET, FILM COATED ORAL at 12:55

## 2019-01-01 RX ADMIN — METOPROLOL TARTRATE 12.5 MG: 25 TABLET ORAL at 10:34

## 2019-01-01 RX ADMIN — FOLIC ACID 1 MG: 1 TABLET ORAL at 10:57

## 2019-01-01 RX ADMIN — FUROSEMIDE 80 MG: 40 TABLET ORAL at 10:35

## 2019-01-01 RX ADMIN — SODIUM CHLORIDE, SODIUM LACTATE, POTASSIUM CHLORIDE, AND CALCIUM CHLORIDE 1000 ML: 600; 310; 30; 20 INJECTION, SOLUTION INTRAVENOUS at 13:16

## 2019-01-01 RX ADMIN — ACETAMINOPHEN 500 MG: 500 TABLET, FILM COATED ORAL at 22:16

## 2019-01-01 RX ADMIN — LANSOPRAZOLE 30 MG: 30 TABLET, ORALLY DISINTEGRATING, DELAYED RELEASE ORAL at 07:34

## 2019-01-01 RX ADMIN — LORAZEPAM 1 MG: 2 INJECTION, SOLUTION INTRAMUSCULAR; INTRAVENOUS at 23:44

## 2019-01-01 RX ADMIN — APIXABAN 5 MG: 5 TABLET, FILM COATED ORAL at 22:36

## 2019-01-01 RX ADMIN — TAMSULOSIN HYDROCHLORIDE 0.4 MG: 0.4 CAPSULE ORAL at 18:14

## 2019-01-01 RX ADMIN — ACETAMINOPHEN 325 MG: 160 SOLUTION ORAL at 11:57

## 2019-01-01 RX ADMIN — HYDROCORTISONE: 25 CREAM TOPICAL at 17:40

## 2019-01-01 RX ADMIN — NYSTATIN 500000 UNITS: 100000 SUSPENSION ORAL at 09:28

## 2019-01-01 RX ADMIN — MEROPENEM 1 G: 1 INJECTION, POWDER, FOR SOLUTION INTRAVENOUS at 00:08

## 2019-01-01 RX ADMIN — TRAMADOL HYDROCHLORIDE 25 MG: 50 TABLET, FILM COATED ORAL at 06:51

## 2019-01-01 RX ADMIN — ACETAMINOPHEN 975 MG: 650 SUPPOSITORY RECTAL at 13:15

## 2019-01-01 RX ADMIN — CEPHALEXIN 500 MG: 250 CAPSULE ORAL at 00:22

## 2019-01-01 RX ADMIN — FUROSEMIDE 80 MG: 40 TABLET ORAL at 10:56

## 2019-01-01 RX ADMIN — SODIUM CHLORIDE 1000 MG: 900 INJECTION, SOLUTION INTRAVENOUS at 09:35

## 2019-01-01 RX ADMIN — LEVOTHYROXINE SODIUM 175 MCG: 100 TABLET ORAL at 05:26

## 2019-01-01 RX ADMIN — POTASSIUM CHLORIDE 10 MEQ: 10 TABLET, EXTENDED RELEASE ORAL at 18:15

## 2019-01-01 RX ADMIN — ACETAMINOPHEN 325 MG: 160 SOLUTION ORAL at 05:38

## 2019-01-01 RX ADMIN — GABAPENTIN 600 MG: 300 CAPSULE ORAL at 09:28

## 2019-01-01 RX ADMIN — CYANOCOBALAMIN TAB 1000 MCG 1000 MCG: 1000 TAB at 08:04

## 2019-01-01 RX ADMIN — DESONIDE: 0.5 CREAM TOPICAL at 18:10

## 2019-01-01 RX ADMIN — GABAPENTIN 600 MG: 300 CAPSULE ORAL at 18:44

## 2019-01-01 RX ADMIN — Medication 10 ML: at 05:27

## 2019-01-01 RX ADMIN — Medication 10 ML: at 05:05

## 2019-01-01 RX ADMIN — CYANOCOBALAMIN TAB 1000 MCG 1000 MCG: 1000 TAB at 09:28

## 2019-01-01 RX ADMIN — FOLIC ACID 1 MG: 1 TABLET ORAL at 08:05

## 2019-01-01 RX ADMIN — INSULIN LISPRO 2 UNITS: 100 INJECTION, SOLUTION INTRAVENOUS; SUBCUTANEOUS at 18:57

## 2019-01-01 RX ADMIN — LORAZEPAM 1 MG: 2 INJECTION, SOLUTION INTRAMUSCULAR; INTRAVENOUS at 02:00

## 2019-01-01 RX ADMIN — IPRATROPIUM BROMIDE AND ALBUTEROL SULFATE 3 ML: .5; 3 SOLUTION RESPIRATORY (INHALATION) at 12:25

## 2019-01-01 RX ADMIN — FOLIC ACID 1 MG: 1 TABLET ORAL at 10:12

## 2019-01-01 RX ADMIN — LANSOPRAZOLE 30 MG: 30 TABLET, ORALLY DISINTEGRATING, DELAYED RELEASE ORAL at 07:40

## 2019-01-01 RX ADMIN — BRIMONIDINE TARTRATE 1 DROP: 2 SOLUTION/ DROPS OPHTHALMIC at 21:43

## 2019-01-01 RX ADMIN — METOPROLOL TARTRATE 12.5 MG: 25 TABLET ORAL at 18:19

## 2019-01-01 RX ADMIN — DICLOFENAC 2 G: 10 GEL TOPICAL at 17:55

## 2019-01-01 RX ADMIN — MULTIPLE VITAMIN LIQUID 30 ML: LIQUID at 08:57

## 2019-01-01 RX ADMIN — METOPROLOL TARTRATE 12.5 MG: 25 TABLET ORAL at 10:27

## 2019-01-01 RX ADMIN — INSULIN LISPRO 2 UNITS: 100 INJECTION, SOLUTION INTRAVENOUS; SUBCUTANEOUS at 06:43

## 2019-01-01 RX ADMIN — SODIUM CHLORIDE 10 ML: 9 INJECTION, SOLUTION INTRAMUSCULAR; INTRAVENOUS; SUBCUTANEOUS at 14:25

## 2019-01-01 RX ADMIN — LATANOPROST 1 DROP: 50 SOLUTION OPHTHALMIC at 18:25

## 2019-01-01 RX ADMIN — PROPOFOL 10 MG: 10 INJECTION, EMULSION INTRAVENOUS at 14:15

## 2019-01-01 RX ADMIN — GABAPENTIN 600 MG: 300 CAPSULE ORAL at 15:12

## 2019-01-01 RX ADMIN — DESONIDE: 0.5 CREAM TOPICAL at 09:00

## 2019-01-01 RX ADMIN — DILTIAZEM HYDROCHLORIDE 120 MG: 120 CAPSULE, COATED, EXTENDED RELEASE ORAL at 09:58

## 2019-01-01 RX ADMIN — FOLIC ACID 1 MG: 1 TABLET ORAL at 09:05

## 2019-01-01 RX ADMIN — HYDROCORTISONE: 25 CREAM TOPICAL at 18:19

## 2019-01-01 RX ADMIN — IPRATROPIUM BROMIDE AND ALBUTEROL SULFATE 3 ML: .5; 3 SOLUTION RESPIRATORY (INHALATION) at 20:20

## 2019-01-01 RX ADMIN — LORAZEPAM 2 MG: 2 INJECTION, SOLUTION INTRAMUSCULAR; INTRAVENOUS at 06:02

## 2019-01-01 RX ADMIN — APIXABAN 5 MG: 5 TABLET, FILM COATED ORAL at 09:44

## 2019-01-01 RX ADMIN — PYRIDOXINE HCL TAB 50 MG 50 MG: 50 TAB at 09:27

## 2019-01-01 RX ADMIN — CEFAZOLIN 1 G: 1 INJECTION, POWDER, FOR SOLUTION INTRAMUSCULAR; INTRAVENOUS at 14:25

## 2019-01-01 RX ADMIN — THIAMINE HCL TAB 100 MG 100 MG: 100 TAB at 12:19

## 2019-01-01 RX ADMIN — Medication 10 ML: at 05:31

## 2019-01-01 RX ADMIN — APIXABAN 5 MG: 5 TABLET, FILM COATED ORAL at 08:11

## 2019-01-01 RX ADMIN — THIAMINE HCL TAB 100 MG 100 MG: 100 TAB at 08:36

## 2019-01-01 RX ADMIN — LEVOTHYROXINE SODIUM 175 MCG: 25 TABLET ORAL at 08:15

## 2019-01-01 RX ADMIN — DICLOFENAC 2 G: 10 GEL TOPICAL at 17:49

## 2019-01-01 RX ADMIN — Medication 2 TABLET: at 19:04

## 2019-01-01 RX ADMIN — ACETAMINOPHEN 325 MG: 160 SOLUTION ORAL at 00:04

## 2019-01-01 RX ADMIN — METOPROLOL TARTRATE 12.5 MG: 25 TABLET ORAL at 09:06

## 2019-01-01 RX ADMIN — FAMOTIDINE 20 MG: 20 TABLET ORAL at 09:29

## 2019-01-01 RX ADMIN — APIXABAN 5 MG: 5 TABLET, FILM COATED ORAL at 08:19

## 2019-01-01 RX ADMIN — FEBUXOSTAT 80 MG: 40 TABLET ORAL at 12:17

## 2019-01-01 RX ADMIN — SODIUM CHLORIDE, SODIUM LACTATE, POTASSIUM CHLORIDE, AND CALCIUM CHLORIDE 100 ML/HR: 600; 310; 30; 20 INJECTION, SOLUTION INTRAVENOUS at 02:22

## 2019-01-01 RX ADMIN — Medication 2 TABLET: at 18:24

## 2019-01-01 RX ADMIN — HYDROCODONE BITARTRATE AND ACETAMINOPHEN 1 TABLET: 5; 325 TABLET ORAL at 20:32

## 2019-01-01 RX ADMIN — EPINEPHRINE 1 MG: 0.1 INJECTION INTRACARDIAC; INTRAVENOUS at 05:15

## 2019-01-01 RX ADMIN — Medication 10 ML: at 05:39

## 2019-01-01 RX ADMIN — Medication 10 ML: at 22:07

## 2019-01-01 RX ADMIN — Medication 2 TABLET: at 17:47

## 2019-01-01 RX ADMIN — IPRATROPIUM BROMIDE AND ALBUTEROL SULFATE 3 ML: .5; 3 SOLUTION RESPIRATORY (INHALATION) at 16:02

## 2019-01-01 RX ADMIN — IPRATROPIUM BROMIDE AND ALBUTEROL SULFATE 3 ML: .5; 3 SOLUTION RESPIRATORY (INHALATION) at 15:42

## 2019-01-01 RX ADMIN — PANTOPRAZOLE SODIUM 40 MG: 40 INJECTION, POWDER, LYOPHILIZED, FOR SOLUTION INTRAVENOUS at 09:06

## 2019-01-01 RX ADMIN — CIPROFLOXACIN HYDROCHLORIDE 1 DROP: 3 SOLUTION/ DROPS OPHTHALMIC at 06:00

## 2019-01-01 RX ADMIN — IPRATROPIUM BROMIDE AND ALBUTEROL SULFATE 3 ML: .5; 3 SOLUTION RESPIRATORY (INHALATION) at 16:56

## 2019-01-01 RX ADMIN — LEVOTHYROXINE SODIUM 175 MCG: 25 TABLET ORAL at 06:06

## 2019-01-01 RX ADMIN — LATANOPROST 1 DROP: 50 SOLUTION OPHTHALMIC at 17:38

## 2019-01-01 RX ADMIN — METOPROLOL TARTRATE 12.5 MG: 25 TABLET ORAL at 20:58

## 2019-01-01 RX ADMIN — BRIMONIDINE TARTRATE 1 DROP: 2 SOLUTION/ DROPS OPHTHALMIC at 13:57

## 2019-01-01 RX ADMIN — PROPOFOL 10 MG: 10 INJECTION, EMULSION INTRAVENOUS at 14:19

## 2019-01-01 RX ADMIN — LANSOPRAZOLE 30 MG: 30 TABLET, ORALLY DISINTEGRATING, DELAYED RELEASE ORAL at 09:22

## 2019-01-01 RX ADMIN — METOPROLOL TARTRATE 1.25 MG: 5 INJECTION INTRAVENOUS at 05:26

## 2019-01-01 RX ADMIN — PREDNISONE 40 MG: 10 TABLET ORAL at 08:12

## 2019-01-01 RX ADMIN — Medication 8 MCG/MIN: at 05:25

## 2019-01-01 RX ADMIN — PANTOPRAZOLE SODIUM 40 MG: 40 INJECTION, POWDER, LYOPHILIZED, FOR SOLUTION INTRAVENOUS at 11:44

## 2019-01-01 RX ADMIN — Medication 10 ML: at 06:24

## 2019-01-01 RX ADMIN — ACETAMINOPHEN 325 MG: 160 SOLUTION ORAL at 13:28

## 2019-01-01 RX ADMIN — FUROSEMIDE 40 MG: 10 INJECTION, SOLUTION INTRAMUSCULAR; INTRAVENOUS at 13:04

## 2019-01-01 RX ADMIN — DESONIDE: 0.5 CREAM TOPICAL at 23:33

## 2019-01-01 RX ADMIN — LORAZEPAM 1 MG: 2 INJECTION, SOLUTION INTRAMUSCULAR; INTRAVENOUS at 23:23

## 2019-01-01 RX ADMIN — APIXABAN 5 MG: 5 TABLET, FILM COATED ORAL at 09:22

## 2019-01-01 RX ADMIN — METOPROLOL TARTRATE 12.5 MG: 25 TABLET ORAL at 09:25

## 2019-01-01 RX ADMIN — Medication 100 MG: at 08:00

## 2019-01-01 RX ADMIN — IPRATROPIUM BROMIDE AND ALBUTEROL SULFATE 3 ML: .5; 3 SOLUTION RESPIRATORY (INHALATION) at 15:24

## 2019-01-01 RX ADMIN — TAMSULOSIN HYDROCHLORIDE 0.4 MG: 0.4 CAPSULE ORAL at 17:28

## 2019-01-01 RX ADMIN — Medication 10 MCG/MIN: at 05:04

## 2019-01-01 RX ADMIN — IPRATROPIUM BROMIDE AND ALBUTEROL SULFATE 3 ML: .5; 3 SOLUTION RESPIRATORY (INHALATION) at 07:35

## 2019-01-01 RX ADMIN — FUROSEMIDE 80 MG: 40 TABLET ORAL at 08:01

## 2019-01-01 RX ADMIN — IPRATROPIUM BROMIDE AND ALBUTEROL SULFATE 3 ML: .5; 3 SOLUTION RESPIRATORY (INHALATION) at 22:06

## 2019-01-01 RX ADMIN — LACTOBACILLUS ACIDOPHILUS / LACTOBACILLUS BULGARICUS 1 PACKET: 100 MILLION CFU STRENGTH GRANULES at 18:30

## 2019-01-01 RX ADMIN — ACETAMINOPHEN 325 MG: 160 SOLUTION ORAL at 06:00

## 2019-01-01 RX ADMIN — FEBUXOSTAT 80 MG: 40 TABLET ORAL at 12:03

## 2019-01-01 RX ADMIN — Medication 10 ML: at 21:00

## 2019-01-01 RX ADMIN — Medication 10 ML: at 06:00

## 2019-01-01 RX ADMIN — SODIUM CHLORIDE 25 ML/HR: 900 INJECTION, SOLUTION INTRAVENOUS at 16:06

## 2019-01-01 RX ADMIN — LEVOTHYROXINE SODIUM 175 MCG: 25 TABLET ORAL at 06:23

## 2019-01-01 RX ADMIN — CEFAZOLIN 1 G: 1 INJECTION, POWDER, FOR SOLUTION INTRAMUSCULAR; INTRAVENOUS at 21:54

## 2019-01-01 RX ADMIN — SODIUM CHLORIDE 1000 MG: 900 INJECTION, SOLUTION INTRAVENOUS at 20:33

## 2019-01-01 RX ADMIN — CIPROFLOXACIN HYDROCHLORIDE 1 DROP: 3 SOLUTION/ DROPS OPHTHALMIC at 05:46

## 2019-01-01 RX ADMIN — Medication 10 ML: at 13:49

## 2019-01-01 RX ADMIN — CYANOCOBALAMIN TAB 1000 MCG 1000 MCG: 1000 TAB at 08:00

## 2019-01-01 RX ADMIN — THIAMINE HCL TAB 100 MG 100 MG: 100 TAB at 10:28

## 2019-01-01 RX ADMIN — FUROSEMIDE 40 MG: 10 INJECTION, SOLUTION INTRAMUSCULAR; INTRAVENOUS at 10:15

## 2019-01-01 RX ADMIN — CIPROFLOXACIN HYDROCHLORIDE 1 DROP: 3 SOLUTION/ DROPS OPHTHALMIC at 22:00

## 2019-01-01 RX ADMIN — Medication 2 TABLET: at 12:32

## 2019-01-01 RX ADMIN — APIXABAN 5 MG: 5 TABLET, FILM COATED ORAL at 21:39

## 2019-01-01 RX ADMIN — SODIUM CHLORIDE 1000 MG: 900 INJECTION, SOLUTION INTRAVENOUS at 16:42

## 2019-01-01 RX ADMIN — IPRATROPIUM BROMIDE AND ALBUTEROL SULFATE 3 ML: .5; 3 SOLUTION RESPIRATORY (INHALATION) at 20:13

## 2019-01-01 RX ADMIN — APIXABAN 5 MG: 5 TABLET, FILM COATED ORAL at 09:27

## 2019-01-01 RX ADMIN — LEVOTHYROXINE SODIUM 175 MCG: 75 TABLET ORAL at 05:33

## 2019-01-01 RX ADMIN — ACETAMINOPHEN 500 MG: 500 TABLET, FILM COATED ORAL at 05:42

## 2019-01-01 RX ADMIN — TAMSULOSIN HYDROCHLORIDE 0.4 MG: 0.4 CAPSULE ORAL at 18:30

## 2019-01-01 RX ADMIN — VANCOMYCIN HYDROCHLORIDE 2000 MG: 10 INJECTION, POWDER, LYOPHILIZED, FOR SOLUTION INTRAVENOUS at 13:30

## 2019-01-01 RX ADMIN — IPRATROPIUM BROMIDE AND ALBUTEROL SULFATE 3 ML: .5; 3 SOLUTION RESPIRATORY (INHALATION) at 13:03

## 2019-01-01 RX ADMIN — METOPROLOL TARTRATE 12.5 MG: 25 TABLET ORAL at 22:17

## 2019-01-01 RX ADMIN — DOCUSATE SODIUM 100 MG: 100 CAPSULE, LIQUID FILLED ORAL at 15:35

## 2019-01-01 RX ADMIN — LEVOTHYROXINE SODIUM 175 MCG: 75 TABLET ORAL at 06:16

## 2019-01-01 RX ADMIN — MEROPENEM 1 G: 1 INJECTION, POWDER, FOR SOLUTION INTRAVENOUS at 15:01

## 2019-01-01 RX ADMIN — MULTIPLE VITAMIN LIQUID 30 ML: LIQUID at 08:32

## 2019-01-01 RX ADMIN — FAMOTIDINE 20 MG: 10 INJECTION, SOLUTION INTRAVENOUS at 22:23

## 2019-01-01 RX ADMIN — MELATONIN TAB 3 MG 6 MG: 3 TAB at 23:31

## 2019-01-01 RX ADMIN — METOPROLOL TARTRATE 1.25 MG: 5 INJECTION INTRAVENOUS at 05:54

## 2019-01-01 RX ADMIN — BRIMONIDINE TARTRATE 1 DROP: 2 SOLUTION/ DROPS OPHTHALMIC at 14:05

## 2019-01-01 RX ADMIN — CEFAZOLIN 1 G: 1 INJECTION, POWDER, FOR SOLUTION INTRAMUSCULAR; INTRAVENOUS at 23:24

## 2019-01-01 RX ADMIN — MULTIPLE VITAMIN LIQUID 30 ML: LIQUID at 08:10

## 2019-01-01 RX ADMIN — FUROSEMIDE 40 MG: 10 INJECTION, SOLUTION INTRAMUSCULAR; INTRAVENOUS at 08:50

## 2019-01-01 RX ADMIN — Medication 10 ML: at 21:02

## 2019-01-01 RX ADMIN — ZOLPIDEM TARTRATE 5 MG: 5 TABLET ORAL at 21:17

## 2019-01-01 RX ADMIN — HYDROCORTISONE: 25 CREAM TOPICAL at 08:09

## 2019-01-01 RX ADMIN — METOPROLOL TARTRATE 12.5 MG: 25 TABLET ORAL at 16:31

## 2019-01-01 RX ADMIN — IPRATROPIUM BROMIDE AND ALBUTEROL SULFATE 3 ML: .5; 3 SOLUTION RESPIRATORY (INHALATION) at 11:24

## 2019-01-01 RX ADMIN — LEVOFLOXACIN 750 MG: 5 INJECTION, SOLUTION INTRAVENOUS at 20:14

## 2019-01-01 RX ADMIN — SODIUM CHLORIDE 1000 MG: 900 INJECTION, SOLUTION INTRAVENOUS at 15:36

## 2019-01-01 RX ADMIN — METOPROLOL TARTRATE 12.5 MG: 25 TABLET ORAL at 22:14

## 2019-01-01 RX ADMIN — ASPIRIN 81 MG 162 MG: 81 TABLET ORAL at 15:00

## 2019-01-01 RX ADMIN — HYDROMORPHONE HYDROCHLORIDE 0.25 MG: 1 INJECTION, SOLUTION INTRAMUSCULAR; INTRAVENOUS; SUBCUTANEOUS at 11:43

## 2019-01-01 RX ADMIN — IPRATROPIUM BROMIDE AND ALBUTEROL SULFATE 3 ML: .5; 3 SOLUTION RESPIRATORY (INHALATION) at 08:43

## 2019-01-01 RX ADMIN — IPRATROPIUM BROMIDE AND ALBUTEROL SULFATE 3 ML: .5; 3 SOLUTION RESPIRATORY (INHALATION) at 19:56

## 2019-01-01 RX ADMIN — MELATONIN TAB 3 MG 3 MG: 3 TAB at 01:00

## 2019-01-01 RX ADMIN — IPRATROPIUM BROMIDE AND ALBUTEROL SULFATE 3 ML: .5; 3 SOLUTION RESPIRATORY (INHALATION) at 21:00

## 2019-01-01 RX ADMIN — ACETAMINOPHEN 650 MG: 325 TABLET, FILM COATED ORAL at 19:47

## 2019-01-01 RX ADMIN — SODIUM CHLORIDE 25 ML/HR: 900 INJECTION, SOLUTION INTRAVENOUS at 16:48

## 2019-01-01 RX ADMIN — LEVOTHYROXINE SODIUM 175 MCG: 25 TABLET ORAL at 05:29

## 2019-01-01 RX ADMIN — LATANOPROST 1 DROP: 50 SOLUTION OPHTHALMIC at 17:49

## 2019-01-01 RX ADMIN — BRIMONIDINE TARTRATE 1 DROP: 2 SOLUTION/ DROPS OPHTHALMIC at 06:32

## 2019-01-01 RX ADMIN — CEFTRIAXONE 2 G: 2 INJECTION, POWDER, FOR SOLUTION INTRAMUSCULAR; INTRAVENOUS at 12:39

## 2019-01-01 RX ADMIN — APIXABAN 5 MG: 5 TABLET, FILM COATED ORAL at 17:42

## 2019-01-01 RX ADMIN — FUROSEMIDE 40 MG: 10 INJECTION, SOLUTION INTRAMUSCULAR; INTRAVENOUS at 08:57

## 2019-01-01 RX ADMIN — BRIMONIDINE TARTRATE 1 DROP: 2 SOLUTION OPHTHALMIC at 16:05

## 2019-01-01 RX ADMIN — HYDROMORPHONE HYDROCHLORIDE 0.25 MG: 1 INJECTION, SOLUTION INTRAMUSCULAR; INTRAVENOUS; SUBCUTANEOUS at 17:59

## 2019-01-01 RX ADMIN — MULTIPLE VITAMIN LIQUID 30 ML: LIQUID at 10:56

## 2019-01-01 RX ADMIN — BACITRACIN 1 PACKET: 500 OINTMENT TOPICAL at 13:34

## 2019-01-01 RX ADMIN — LORAZEPAM 2 MG: 2 INJECTION, SOLUTION INTRAMUSCULAR; INTRAVENOUS at 18:14

## 2019-01-01 RX ADMIN — DESONIDE: 0.5 CREAM TOPICAL at 12:06

## 2019-01-01 RX ADMIN — CIPROFLOXACIN HYDROCHLORIDE 1 DROP: 3 SOLUTION/ DROPS OPHTHALMIC at 18:09

## 2019-01-01 RX ADMIN — PROPOFOL 10 MG: 10 INJECTION, EMULSION INTRAVENOUS at 14:17

## 2019-01-01 RX ADMIN — KETOROLAC TROMETHAMINE 15 MG: 30 INJECTION, SOLUTION INTRAMUSCULAR at 11:45

## 2019-01-01 RX ADMIN — DESONIDE: 0.5 CREAM TOPICAL at 09:11

## 2019-01-01 RX ADMIN — METOPROLOL TARTRATE 1.25 MG: 5 INJECTION INTRAVENOUS at 05:08

## 2019-01-01 RX ADMIN — IPRATROPIUM BROMIDE AND ALBUTEROL SULFATE 3 ML: .5; 3 SOLUTION RESPIRATORY (INHALATION) at 18:06

## 2019-01-01 RX ADMIN — FEBUXOSTAT 80 MG: 40 TABLET ORAL at 12:35

## 2019-01-01 RX ADMIN — APIXABAN 5 MG: 5 TABLET, FILM COATED ORAL at 21:38

## 2019-01-01 RX ADMIN — LEVOTHYROXINE SODIUM 175 MCG: 25 TABLET ORAL at 05:38

## 2019-01-01 RX ADMIN — MEROPENEM 1 G: 1 INJECTION, POWDER, FOR SOLUTION INTRAVENOUS at 13:42

## 2019-01-01 RX ADMIN — LEVOTHYROXINE SODIUM 175 MCG: 25 TABLET ORAL at 06:47

## 2019-01-01 RX ADMIN — ALBUTEROL SULFATE 1.25 MG: 2.5 SOLUTION RESPIRATORY (INHALATION) at 13:07

## 2019-01-01 RX ADMIN — APIXABAN 5 MG: 5 TABLET, FILM COATED ORAL at 17:47

## 2019-01-01 RX ADMIN — APIXABAN 5 MG: 5 TABLET, FILM COATED ORAL at 17:49

## 2019-01-01 RX ADMIN — SODIUM CHLORIDE 1000 MG: 900 INJECTION, SOLUTION INTRAVENOUS at 22:06

## 2019-01-01 RX ADMIN — CYANOCOBALAMIN TAB 1000 MCG 1000 MCG: 1000 TAB at 08:11

## 2019-01-01 RX ADMIN — DICLOFENAC 2 G: 10 GEL TOPICAL at 12:00

## 2019-01-01 RX ADMIN — PROPOFOL 10 MG: 10 INJECTION, EMULSION INTRAVENOUS at 14:10

## 2019-01-01 RX ADMIN — PREDNISONE 40 MG: 10 TABLET ORAL at 08:58

## 2019-01-01 RX ADMIN — METOPROLOL TARTRATE 12.5 MG: 25 TABLET ORAL at 17:49

## 2019-01-01 RX ADMIN — POTASSIUM & SODIUM PHOSPHATES POWDER PACK 280-160-250 MG 1 PACKET: 280-160-250 PACK at 06:30

## 2019-01-01 RX ADMIN — Medication 2 TABLET: at 10:34

## 2019-01-01 RX ADMIN — DICLOFENAC 2 G: 10 GEL TOPICAL at 17:48

## 2019-01-01 RX ADMIN — Medication 10 ML: at 13:35

## 2019-01-01 RX ADMIN — FUROSEMIDE 80 MG: 40 TABLET ORAL at 08:58

## 2019-01-01 RX ADMIN — METOPROLOL TARTRATE 12.5 MG: 25 TABLET ORAL at 09:10

## 2019-01-01 RX ADMIN — ACETAMINOPHEN 500 MG: 500 TABLET, FILM COATED ORAL at 18:48

## 2019-01-01 RX ADMIN — CEPHALEXIN 500 MG: 250 CAPSULE ORAL at 06:19

## 2019-01-01 RX ADMIN — Medication 10 ML: at 06:07

## 2019-01-01 RX ADMIN — DESONIDE: 0.5 CREAM TOPICAL at 17:42

## 2019-01-01 RX ADMIN — LEVOTHYROXINE SODIUM 175 MCG: 100 TABLET ORAL at 05:27

## 2019-01-01 RX ADMIN — METOPROLOL TARTRATE 12.5 MG: 25 TABLET ORAL at 10:18

## 2019-01-01 RX ADMIN — METOPROLOL TARTRATE 1.25 MG: 5 INJECTION INTRAVENOUS at 00:31

## 2019-01-01 RX ADMIN — HYDROMORPHONE HYDROCHLORIDE 0.25 MG: 1 INJECTION, SOLUTION INTRAMUSCULAR; INTRAVENOUS; SUBCUTANEOUS at 03:04

## 2019-01-01 RX ADMIN — FUROSEMIDE 40 MG: 40 TABLET ORAL at 10:18

## 2019-01-01 RX ADMIN — HYDROCORTISONE: 25 CREAM TOPICAL at 18:35

## 2019-01-01 RX ADMIN — HYDROMORPHONE HYDROCHLORIDE 0.25 MG: 1 INJECTION, SOLUTION INTRAMUSCULAR; INTRAVENOUS; SUBCUTANEOUS at 02:00

## 2019-01-01 RX ADMIN — APIXABAN 5 MG: 5 TABLET, FILM COATED ORAL at 08:57

## 2019-01-01 RX ADMIN — CALCIUM GLUCONATE 2 G: 94 INJECTION, SOLUTION INTRAVENOUS at 08:57

## 2019-01-01 RX ADMIN — CEFTRIAXONE 2 G: 2 INJECTION, POWDER, FOR SOLUTION INTRAMUSCULAR; INTRAVENOUS at 11:46

## 2019-01-01 RX ADMIN — PROPOFOL 10 MG: 10 INJECTION, EMULSION INTRAVENOUS at 14:21

## 2019-01-01 RX ADMIN — METOPROLOL TARTRATE 12.5 MG: 25 TABLET ORAL at 21:38

## 2019-01-01 RX ADMIN — CEPHALEXIN 500 MG: 250 CAPSULE ORAL at 12:30

## 2019-01-01 RX ADMIN — TRAMADOL HYDROCHLORIDE 25 MG: 50 TABLET, FILM COATED ORAL at 21:39

## 2019-01-01 RX ADMIN — LATANOPROST 1 DROP: 50 SOLUTION OPHTHALMIC at 17:17

## 2019-01-01 RX ADMIN — MAGESIUM CITRATE 296 ML: 1.75 LIQUID ORAL at 18:00

## 2019-01-01 RX ADMIN — BRIMONIDINE TARTRATE 1 DROP: 2 SOLUTION/ DROPS OPHTHALMIC at 22:17

## 2019-01-01 RX ADMIN — SODIUM CHLORIDE 1000 ML: 900 INJECTION, SOLUTION INTRAVENOUS at 22:25

## 2019-01-01 RX ADMIN — MEROPENEM 1 G: 1 INJECTION, POWDER, FOR SOLUTION INTRAVENOUS at 15:10

## 2019-01-01 RX ADMIN — Medication 10 ML: at 00:11

## 2019-01-01 RX ADMIN — HYDROMORPHONE HYDROCHLORIDE 0.25 MG: 1 INJECTION, SOLUTION INTRAMUSCULAR; INTRAVENOUS; SUBCUTANEOUS at 07:03

## 2019-01-01 RX ADMIN — Medication 10 ML: at 06:11

## 2019-01-01 RX ADMIN — FUROSEMIDE 40 MG: 40 TABLET ORAL at 09:29

## 2019-01-01 RX ADMIN — Medication 10 ML: at 22:44

## 2019-01-01 RX ADMIN — THIAMINE HYDROCHLORIDE 250 MG: 100 INJECTION, SOLUTION INTRAMUSCULAR; INTRAVENOUS at 15:41

## 2019-01-01 RX ADMIN — ACETAMINOPHEN 325 MG: 160 SOLUTION ORAL at 12:06

## 2019-01-01 RX ADMIN — NOREPINEPHRINE BITARTRATE 4 MCG/MIN: 1 INJECTION INTRAVENOUS at 21:30

## 2019-01-01 RX ADMIN — LACTOBACILLUS ACIDOPHILUS / LACTOBACILLUS BULGARICUS 1 PACKET: 100 MILLION CFU STRENGTH GRANULES at 17:38

## 2019-01-01 RX ADMIN — CYANOCOBALAMIN TAB 1000 MCG 1000 MCG: 1000 TAB at 09:24

## 2019-01-01 RX ADMIN — CYANOCOBALAMIN TAB 1000 MCG 1000 MCG: 1000 TAB at 10:56

## 2019-01-01 RX ADMIN — Medication 10 ML: at 22:17

## 2019-01-01 RX ADMIN — HYDROMORPHONE HYDROCHLORIDE 0.25 MG: 1 INJECTION, SOLUTION INTRAMUSCULAR; INTRAVENOUS; SUBCUTANEOUS at 17:14

## 2019-01-01 RX ADMIN — TRAMADOL HYDROCHLORIDE 25 MG: 50 TABLET, FILM COATED ORAL at 11:31

## 2019-01-01 RX ADMIN — Medication 2 TABLET: at 18:20

## 2019-01-01 RX ADMIN — Medication 10 ML: at 15:02

## 2019-01-01 RX ADMIN — VANCOMYCIN HYDROCHLORIDE 2000 MG: 10 INJECTION, POWDER, LYOPHILIZED, FOR SOLUTION INTRAVENOUS at 20:52

## 2019-01-01 RX ADMIN — ACETAMINOPHEN 325 MG: 160 SOLUTION ORAL at 13:13

## 2019-01-01 RX ADMIN — BRIMONIDINE TARTRATE 1 DROP: 2 SOLUTION/ DROPS OPHTHALMIC at 05:13

## 2019-01-01 RX ADMIN — CIPROFLOXACIN HYDROCHLORIDE 1 DROP: 3 SOLUTION/ DROPS OPHTHALMIC at 13:42

## 2019-01-01 RX ADMIN — METOPROLOL TARTRATE 12.5 MG: 25 TABLET ORAL at 08:02

## 2019-01-01 RX ADMIN — HYDROMORPHONE HYDROCHLORIDE 0.25 MG: 1 INJECTION, SOLUTION INTRAMUSCULAR; INTRAVENOUS; SUBCUTANEOUS at 11:11

## 2019-01-01 RX ADMIN — HYDROCORTISONE: 25 CREAM TOPICAL at 09:00

## 2019-01-01 RX ADMIN — IPRATROPIUM BROMIDE AND ALBUTEROL SULFATE 3 ML: .5; 3 SOLUTION RESPIRATORY (INHALATION) at 10:58

## 2019-01-01 RX ADMIN — METOPROLOL TARTRATE 12.5 MG: 25 TABLET ORAL at 12:08

## 2019-01-01 RX ADMIN — ACETAMINOPHEN 650 MG: 325 TABLET, FILM COATED ORAL at 00:20

## 2019-01-01 RX ADMIN — DICLOFENAC 2 G: 10 GEL TOPICAL at 05:39

## 2019-01-01 RX ADMIN — LEVOFLOXACIN 750 MG: 5 INJECTION, SOLUTION INTRAVENOUS at 14:45

## 2019-01-01 RX ADMIN — Medication 2 TABLET: at 10:12

## 2019-01-01 RX ADMIN — METOPROLOL TARTRATE 1.25 MG: 5 INJECTION INTRAVENOUS at 11:45

## 2019-01-01 RX ADMIN — METOPROLOL TARTRATE 12.5 MG: 25 TABLET ORAL at 21:39

## 2019-01-01 RX ADMIN — Medication 10 ML: at 06:56

## 2019-01-01 RX ADMIN — LIDOCAINE HYDROCHLORIDE 10 ML: 10 INJECTION, SOLUTION EPIDURAL; INFILTRATION; INTRACAUDAL; PERINEURAL at 14:24

## 2019-01-01 RX ADMIN — LANSOPRAZOLE 30 MG: 30 TABLET, ORALLY DISINTEGRATING, DELAYED RELEASE ORAL at 07:30

## 2019-01-01 RX ADMIN — ACETAMINOPHEN 325 MG: 160 SOLUTION ORAL at 03:25

## 2019-01-01 RX ADMIN — FUROSEMIDE 40 MG: 10 INJECTION, SOLUTION INTRAMUSCULAR; INTRAVENOUS at 10:00

## 2019-01-01 RX ADMIN — PROPOFOL 30 MG: 10 INJECTION, EMULSION INTRAVENOUS at 14:09

## 2019-01-01 RX ADMIN — TRAMADOL HYDROCHLORIDE 25 MG: 50 TABLET, FILM COATED ORAL at 23:30

## 2019-01-01 RX ADMIN — ACETAMINOPHEN 650 MG: 325 TABLET, FILM COATED ORAL at 21:02

## 2019-01-01 RX ADMIN — MULTIPLE VITAMIN LIQUID 30 ML: LIQUID at 08:01

## 2019-01-01 RX ADMIN — Medication 2 TABLET: at 10:52

## 2019-01-01 RX ADMIN — THIAMINE HCL TAB 100 MG 100 MG: 100 TAB at 09:24

## 2019-01-01 RX ADMIN — DESONIDE: 0.5 CREAM TOPICAL at 10:17

## 2019-01-01 RX ADMIN — CEFTRIAXONE 2 G: 2 INJECTION, POWDER, FOR SOLUTION INTRAMUSCULAR; INTRAVENOUS at 12:48

## 2019-01-01 RX ADMIN — LEVOTHYROXINE SODIUM 200 MCG: 112 TABLET ORAL at 05:16

## 2019-01-01 RX ADMIN — FEBUXOSTAT 80 MG: 40 TABLET ORAL at 12:48

## 2019-01-01 RX ADMIN — APIXABAN 5 MG: 5 TABLET, FILM COATED ORAL at 08:02

## 2019-01-01 RX ADMIN — MELATONIN TAB 3 MG 6 MG: 3 TAB at 21:42

## 2019-01-01 RX ADMIN — CYANOCOBALAMIN TAB 1000 MCG 1000 MCG: 1000 TAB at 09:00

## 2019-01-01 RX ADMIN — FEBUXOSTAT 80 MG: 40 TABLET ORAL at 12:55

## 2019-01-01 RX ADMIN — IPRATROPIUM BROMIDE AND ALBUTEROL SULFATE 3 ML: .5; 3 SOLUTION RESPIRATORY (INHALATION) at 20:02

## 2019-01-01 RX ADMIN — ACETAMINOPHEN 500 MG: 500 TABLET, FILM COATED ORAL at 17:38

## 2019-01-01 RX ADMIN — METOPROLOL TARTRATE 1.25 MG: 5 INJECTION INTRAVENOUS at 13:46

## 2019-01-01 RX ADMIN — TAMSULOSIN HYDROCHLORIDE 0.4 MG: 0.4 CAPSULE ORAL at 18:48

## 2019-01-01 RX ADMIN — METOPROLOL TARTRATE 12.5 MG: 25 TABLET ORAL at 08:00

## 2019-01-01 RX ADMIN — LEVOTHYROXINE SODIUM 175 MCG: 25 TABLET ORAL at 06:00

## 2019-01-01 RX ADMIN — LANSOPRAZOLE 30 MG: 30 TABLET, ORALLY DISINTEGRATING, DELAYED RELEASE ORAL at 07:14

## 2019-01-01 RX ADMIN — IPRATROPIUM BROMIDE AND ALBUTEROL SULFATE 3 ML: .5; 3 SOLUTION RESPIRATORY (INHALATION) at 16:04

## 2019-01-01 RX ADMIN — CYANOCOBALAMIN TAB 1000 MCG 1000 MCG: 1000 TAB at 10:35

## 2019-01-01 RX ADMIN — LATANOPROST 1 DROP: 50 SOLUTION OPHTHALMIC at 17:50

## 2019-01-01 RX ADMIN — SODIUM CHLORIDE 1000 MG: 900 INJECTION, SOLUTION INTRAVENOUS at 21:03

## 2019-01-01 RX ADMIN — LORATADINE 10 MG: 10 TABLET ORAL at 09:27

## 2019-01-01 RX ADMIN — DICLOFENAC 2 G: 10 GEL TOPICAL at 12:03

## 2019-01-01 RX ADMIN — ACETAMINOPHEN 325 MG: 160 SOLUTION ORAL at 00:08

## 2019-01-01 RX ADMIN — PANTOPRAZOLE SODIUM 40 MG: 40 INJECTION, POWDER, LYOPHILIZED, FOR SOLUTION INTRAVENOUS at 09:00

## 2019-01-01 RX ADMIN — BRIMONIDINE TARTRATE 1 DROP: 2 SOLUTION/ DROPS OPHTHALMIC at 23:44

## 2019-01-01 RX ADMIN — LACTOBACILLUS ACIDOPHILUS / LACTOBACILLUS BULGARICUS 1 PACKET: 100 MILLION CFU STRENGTH GRANULES at 08:10

## 2019-01-01 RX ADMIN — HYDROMORPHONE HYDROCHLORIDE 0.25 MG: 1 INJECTION, SOLUTION INTRAMUSCULAR; INTRAVENOUS; SUBCUTANEOUS at 14:58

## 2019-01-01 RX ADMIN — KETOROLAC TROMETHAMINE 15 MG: 30 INJECTION, SOLUTION INTRAMUSCULAR at 09:21

## 2019-01-01 RX ADMIN — CEFAZOLIN 1 G: 1 INJECTION, POWDER, FOR SOLUTION INTRAMUSCULAR; INTRAVENOUS at 06:15

## 2019-01-01 RX ADMIN — ACETAMINOPHEN 325 MG: 160 SOLUTION ORAL at 05:47

## 2019-01-01 RX ADMIN — FUROSEMIDE 40 MG: 40 TABLET ORAL at 10:28

## 2019-01-01 RX ADMIN — FOLIC ACID 1 MG: 1 TABLET ORAL at 16:30

## 2019-01-01 RX ADMIN — PANTOPRAZOLE SODIUM 40 MG: 40 INJECTION, POWDER, LYOPHILIZED, FOR SOLUTION INTRAVENOUS at 10:15

## 2019-01-01 RX ADMIN — ACETAMINOPHEN 325 MG: 160 SOLUTION ORAL at 12:19

## 2019-01-01 RX ADMIN — METOPROLOL TARTRATE 1.25 MG: 5 INJECTION INTRAVENOUS at 23:24

## 2019-01-01 RX ADMIN — CIPROFLOXACIN HYDROCHLORIDE 1 DROP: 3 SOLUTION/ DROPS OPHTHALMIC at 21:41

## 2019-01-01 RX ADMIN — PANTOPRAZOLE SODIUM 40 MG: 40 INJECTION, POWDER, LYOPHILIZED, FOR SOLUTION INTRAVENOUS at 08:37

## 2019-01-01 RX ADMIN — HYDROCORTISONE: 25 CREAM TOPICAL at 08:03

## 2019-01-01 RX ADMIN — APIXABAN 5 MG: 5 TABLET, FILM COATED ORAL at 10:27

## 2019-01-01 RX ADMIN — POTASSIUM CHLORIDE 10 MEQ: 10 INJECTION, SOLUTION INTRAVENOUS at 08:25

## 2019-01-01 RX ADMIN — Medication 10 ML: at 15:43

## 2019-01-01 RX ADMIN — IPRATROPIUM BROMIDE AND ALBUTEROL SULFATE 3 ML: .5; 3 SOLUTION RESPIRATORY (INHALATION) at 19:46

## 2019-01-01 RX ADMIN — APIXABAN 5 MG: 5 TABLET, FILM COATED ORAL at 08:08

## 2019-01-01 RX ADMIN — SODIUM CHLORIDE 75 ML/HR: 900 INJECTION, SOLUTION INTRAVENOUS at 22:00

## 2019-01-01 RX ADMIN — DESONIDE: 0.5 CREAM TOPICAL at 13:05

## 2019-01-01 RX ADMIN — Medication 100 MG: at 10:57

## 2019-01-01 RX ADMIN — NYSTATIN 500000 UNITS: 100000 SUSPENSION ORAL at 00:52

## 2019-01-01 RX ADMIN — METOPROLOL TARTRATE 12.5 MG: 25 TABLET ORAL at 08:08

## 2019-01-01 RX ADMIN — ACETAMINOPHEN 500 MG: 500 TABLET, FILM COATED ORAL at 00:30

## 2019-01-01 RX ADMIN — ACETAMINOPHEN 325 MG: 160 SOLUTION ORAL at 18:21

## 2019-01-01 RX ADMIN — DILTIAZEM HYDROCHLORIDE 180 MG: 180 CAPSULE, COATED, EXTENDED RELEASE ORAL at 08:12

## 2019-01-01 RX ADMIN — LACTOBACILLUS ACIDOPHILUS / LACTOBACILLUS BULGARICUS 1 PACKET: 100 MILLION CFU STRENGTH GRANULES at 08:11

## 2019-01-01 RX ADMIN — FOLIC ACID: 5 INJECTION, SOLUTION INTRAMUSCULAR; INTRAVENOUS; SUBCUTANEOUS at 22:36

## 2019-01-01 RX ADMIN — DESONIDE: 0.5 CREAM TOPICAL at 18:41

## 2019-01-01 RX ADMIN — MAGNESIUM SULFATE HEPTAHYDRATE 1 G: 1 INJECTION, SOLUTION INTRAVENOUS at 21:12

## 2019-01-01 RX ADMIN — METOPROLOL TARTRATE 12.5 MG: 25 TABLET ORAL at 08:10

## 2019-01-01 RX ADMIN — Medication 100 MG: at 08:09

## 2019-01-01 RX ADMIN — FUROSEMIDE 80 MG: 40 TABLET ORAL at 08:32

## 2019-01-01 RX ADMIN — APIXABAN 5 MG: 5 TABLET, FILM COATED ORAL at 21:00

## 2019-01-01 RX ADMIN — BRIMONIDINE TARTRATE 1 DROP: 2 SOLUTION/ DROPS OPHTHALMIC at 13:43

## 2019-01-01 RX ADMIN — ALBUTEROL SULFATE 1.25 MG: 2.5 SOLUTION RESPIRATORY (INHALATION) at 05:41

## 2019-01-01 RX ADMIN — LATANOPROST 1 DROP: 50 SOLUTION OPHTHALMIC at 18:50

## 2019-01-01 RX ADMIN — LEVOFLOXACIN 750 MG: 5 INJECTION, SOLUTION INTRAVENOUS at 21:22

## 2019-01-01 RX ADMIN — LACTOBACILLUS ACIDOPHILUS / LACTOBACILLUS BULGARICUS 1 PACKET: 100 MILLION CFU STRENGTH GRANULES at 08:04

## 2019-01-01 RX ADMIN — Medication 2 TABLET: at 18:22

## 2019-01-01 RX ADMIN — DICLOFENAC 2 G: 10 GEL TOPICAL at 12:41

## 2019-01-01 RX ADMIN — APIXABAN 5 MG: 5 TABLET, FILM COATED ORAL at 20:22

## 2019-01-01 RX ADMIN — BRIMONIDINE TARTRATE 1 DROP: 2 SOLUTION/ DROPS OPHTHALMIC at 05:45

## 2019-01-01 RX ADMIN — Medication 10 ML: at 00:10

## 2019-01-01 RX ADMIN — CEFTRIAXONE 2 G: 2 INJECTION, POWDER, FOR SOLUTION INTRAMUSCULAR; INTRAVENOUS at 13:20

## 2019-01-01 RX ADMIN — CYANOCOBALAMIN TAB 1000 MCG 1000 MCG: 1000 TAB at 08:32

## 2019-01-01 RX ADMIN — METOPROLOL TARTRATE 1.25 MG: 5 INJECTION INTRAVENOUS at 01:25

## 2019-01-01 RX ADMIN — IPRATROPIUM BROMIDE AND ALBUTEROL SULFATE 3 ML: .5; 3 SOLUTION RESPIRATORY (INHALATION) at 15:03

## 2019-01-01 RX ADMIN — CEFTRIAXONE 2 G: 2 INJECTION, POWDER, FOR SOLUTION INTRAMUSCULAR; INTRAVENOUS at 12:16

## 2019-01-01 RX ADMIN — DICLOFENAC 2 G: 10 GEL TOPICAL at 12:38

## 2019-01-01 RX ADMIN — SODIUM CHLORIDE 100 ML/HR: 900 INJECTION, SOLUTION INTRAVENOUS at 23:14

## 2019-01-01 RX ADMIN — LACTOBACILLUS ACIDOPHILUS / LACTOBACILLUS BULGARICUS 1 PACKET: 100 MILLION CFU STRENGTH GRANULES at 08:32

## 2019-01-01 RX ADMIN — GABAPENTIN 600 MG: 300 CAPSULE ORAL at 08:11

## 2019-01-01 RX ADMIN — FUROSEMIDE 40 MG: 10 INJECTION, SOLUTION INTRAMUSCULAR; INTRAVENOUS at 17:52

## 2019-01-01 RX ADMIN — TAMSULOSIN HYDROCHLORIDE 0.4 MG: 0.4 CAPSULE ORAL at 17:38

## 2019-01-01 RX ADMIN — DEXTROSE MONOHYDRATE 50 ML/HR: 5 INJECTION, SOLUTION INTRAVENOUS at 08:46

## 2019-01-01 RX ADMIN — SODIUM CHLORIDE, SODIUM LACTATE, POTASSIUM CHLORIDE, AND CALCIUM CHLORIDE: 600; 310; 30; 20 INJECTION, SOLUTION INTRAVENOUS at 14:02

## 2019-01-01 RX ADMIN — IPRATROPIUM BROMIDE AND ALBUTEROL SULFATE 3 ML: .5; 3 SOLUTION RESPIRATORY (INHALATION) at 07:41

## 2019-01-01 RX ADMIN — FUROSEMIDE 80 MG: 40 TABLET ORAL at 08:04

## 2019-01-01 RX ADMIN — ALBUTEROL SULFATE 1.25 MG: 2.5 SOLUTION RESPIRATORY (INHALATION) at 22:01

## 2019-01-01 RX ADMIN — ALBUMIN (HUMAN) 12.5 G: 0.25 INJECTION, SOLUTION INTRAVENOUS at 14:09

## 2019-01-01 RX ADMIN — DEXAMETHASONE 10 MG: 4 TABLET ORAL at 12:25

## 2019-01-01 RX ADMIN — FUROSEMIDE 40 MG: 10 INJECTION, SOLUTION INTRAMUSCULAR; INTRAVENOUS at 08:25

## 2019-01-01 RX ADMIN — LEVOTHYROXINE SODIUM 175 MCG: 100 TABLET ORAL at 05:39

## 2019-01-01 RX ADMIN — LEVOTHYROXINE SODIUM 175 MCG: 100 TABLET ORAL at 05:46

## 2019-01-01 RX ADMIN — Medication 10 ML: at 18:18

## 2019-01-01 RX ADMIN — DICLOFENAC 2 G: 10 GEL TOPICAL at 00:05

## 2019-01-01 RX ADMIN — ACETAMINOPHEN 325 MG: 160 SOLUTION ORAL at 13:00

## 2019-01-01 RX ADMIN — MELATONIN TAB 3 MG 6 MG: 3 TAB at 21:39

## 2019-01-01 RX ADMIN — INSULIN LISPRO 2 UNITS: 100 INJECTION, SOLUTION INTRAVENOUS; SUBCUTANEOUS at 01:00

## 2019-01-01 RX ADMIN — DICLOFENAC 2 G: 10 GEL TOPICAL at 00:22

## 2019-01-01 RX ADMIN — IPRATROPIUM BROMIDE AND ALBUTEROL SULFATE 3 ML: .5; 3 SOLUTION RESPIRATORY (INHALATION) at 00:00

## 2019-01-01 RX ADMIN — MEROPENEM 1 G: 1 INJECTION, POWDER, FOR SOLUTION INTRAVENOUS at 02:45

## 2019-01-01 RX ADMIN — ACETAMINOPHEN 500 MG: 500 TABLET, FILM COATED ORAL at 14:30

## 2019-01-01 RX ADMIN — PANTOPRAZOLE SODIUM 40 MG: 40 INJECTION, POWDER, LYOPHILIZED, FOR SOLUTION INTRAVENOUS at 09:21

## 2019-01-01 RX ADMIN — DEXTROSE MONOHYDRATE 50 ML/HR: 5 INJECTION, SOLUTION INTRAVENOUS at 09:32

## 2019-01-01 RX ADMIN — DESONIDE: 0.5 CREAM TOPICAL at 20:33

## 2019-01-01 RX ADMIN — Medication 10 ML: at 15:53

## 2019-01-01 RX ADMIN — THIAMINE HCL TAB 100 MG 100 MG: 100 TAB at 09:34

## 2019-01-01 RX ADMIN — DICLOFENAC 2 G: 10 GEL TOPICAL at 12:29

## 2019-01-01 RX ADMIN — MULTIPLE VITAMIN LIQUID 30 ML: LIQUID at 08:06

## 2019-01-01 RX ADMIN — Medication 100 MG: at 08:19

## 2019-01-01 RX ADMIN — ACETAMINOPHEN 325 MG: 160 SOLUTION ORAL at 06:30

## 2019-01-01 RX ADMIN — ACETAMINOPHEN 325 MG: 160 SOLUTION ORAL at 17:32

## 2019-01-01 RX ADMIN — FOLIC ACID 1 MG: 1 TABLET ORAL at 10:29

## 2019-01-01 RX ADMIN — DEXTROSE MONOHYDRATE 60 ML/HR: 5 INJECTION, SOLUTION INTRAVENOUS at 04:02

## 2019-01-01 RX ADMIN — BRIMONIDINE TARTRATE 1 DROP: 2 SOLUTION/ DROPS OPHTHALMIC at 13:18

## 2019-01-01 RX ADMIN — IPRATROPIUM BROMIDE AND ALBUTEROL SULFATE 3 ML: .5; 3 SOLUTION RESPIRATORY (INHALATION) at 02:09

## 2019-01-01 RX ADMIN — LEVOTHYROXINE SODIUM 175 MCG: 25 TABLET ORAL at 05:52

## 2019-01-01 RX ADMIN — LEVOTHYROXINE SODIUM 175 MCG: 25 TABLET ORAL at 06:08

## 2019-01-01 RX ADMIN — HYDROMORPHONE HYDROCHLORIDE 0.25 MG: 1 INJECTION, SOLUTION INTRAMUSCULAR; INTRAVENOUS; SUBCUTANEOUS at 02:53

## 2019-01-01 RX ADMIN — MEROPENEM 1 G: 1 INJECTION, POWDER, FOR SOLUTION INTRAVENOUS at 06:08

## 2019-01-01 RX ADMIN — ACETAMINOPHEN 325 MG: 160 SOLUTION ORAL at 14:09

## 2019-01-01 RX ADMIN — IPRATROPIUM BROMIDE AND ALBUTEROL SULFATE 3 ML: .5; 3 SOLUTION RESPIRATORY (INHALATION) at 16:27

## 2019-01-01 RX ADMIN — MELATONIN TAB 3 MG 6 MG: 3 TAB at 17:59

## 2019-01-01 RX ADMIN — DESONIDE: 0.5 CREAM TOPICAL at 18:21

## 2019-01-01 RX ADMIN — LACTOBACILLUS ACIDOPHILUS / LACTOBACILLUS BULGARICUS 1 PACKET: 100 MILLION CFU STRENGTH GRANULES at 08:00

## 2019-01-01 RX ADMIN — PANTOPRAZOLE SODIUM 40 MG: 40 INJECTION, POWDER, LYOPHILIZED, FOR SOLUTION INTRAVENOUS at 10:29

## 2019-01-01 RX ADMIN — PROPOFOL 10 MG: 10 INJECTION, EMULSION INTRAVENOUS at 14:13

## 2019-01-01 RX ADMIN — VANCOMYCIN HYDROCHLORIDE 2000 MG: 10 INJECTION, POWDER, LYOPHILIZED, FOR SOLUTION INTRAVENOUS at 21:01

## 2019-01-01 RX ADMIN — PANTOPRAZOLE SODIUM 40 MG: 40 INJECTION, POWDER, LYOPHILIZED, FOR SOLUTION INTRAVENOUS at 09:10

## 2019-01-01 RX ADMIN — THIAMINE HCL TAB 100 MG 100 MG: 100 TAB at 09:05

## 2019-01-01 RX ADMIN — APIXABAN 5 MG: 5 TABLET, FILM COATED ORAL at 09:29

## 2019-01-01 RX ADMIN — Medication 10 ML: at 21:18

## 2019-01-01 RX ADMIN — APIXABAN 5 MG: 5 TABLET, FILM COATED ORAL at 08:05

## 2019-01-01 RX ADMIN — ACETAMINOPHEN 650 MG: 325 TABLET, FILM COATED ORAL at 04:47

## 2019-01-01 RX ADMIN — FEBUXOSTAT 80 MG: 40 TABLET ORAL at 12:21

## 2019-01-01 RX ADMIN — LORATADINE 10 MG: 10 TABLET ORAL at 09:28

## 2019-01-01 RX ADMIN — BRIMONIDINE TARTRATE 1 DROP: 2 SOLUTION OPHTHALMIC at 16:28

## 2019-01-01 RX ADMIN — IPRATROPIUM BROMIDE AND ALBUTEROL SULFATE 3 ML: .5; 3 SOLUTION RESPIRATORY (INHALATION) at 11:55

## 2019-01-01 RX ADMIN — FEBUXOSTAT 80 MG: 40 TABLET ORAL at 12:00

## 2019-01-01 RX ADMIN — METOPROLOL TARTRATE 12.5 MG: 25 TABLET ORAL at 09:36

## 2019-01-01 RX ADMIN — BRIMONIDINE TARTRATE 1 DROP: 2 SOLUTION OPHTHALMIC at 09:00

## 2019-01-01 RX ADMIN — DESONIDE: 0.5 CREAM TOPICAL at 20:00

## 2019-01-01 RX ADMIN — HYDROCORTISONE: 25 CREAM TOPICAL at 08:22

## 2019-01-01 RX ADMIN — LATANOPROST 1 DROP: 50 SOLUTION OPHTHALMIC at 21:17

## 2019-01-01 RX ADMIN — CEFTRIAXONE 2 G: 2 INJECTION, POWDER, FOR SOLUTION INTRAMUSCULAR; INTRAVENOUS at 11:44

## 2019-01-01 RX ADMIN — APIXABAN 5 MG: 5 TABLET, FILM COATED ORAL at 22:06

## 2019-01-01 RX ADMIN — IOHEXOL 50 ML: 240 INJECTION, SOLUTION INTRATHECAL; INTRAVASCULAR; INTRAVENOUS; ORAL at 14:25

## 2019-01-01 RX ADMIN — Medication 10 ML: at 05:16

## 2019-01-01 RX ADMIN — HYDROMORPHONE HYDROCHLORIDE 0.25 MG: 1 INJECTION, SOLUTION INTRAMUSCULAR; INTRAVENOUS; SUBCUTANEOUS at 14:10

## 2019-01-01 RX ADMIN — GABAPENTIN 600 MG: 300 CAPSULE ORAL at 08:58

## 2019-01-01 RX ADMIN — FOLIC ACID 1 MG: 1 TABLET ORAL at 10:35

## 2019-01-01 RX ADMIN — CEPHALEXIN 500 MG: 250 CAPSULE ORAL at 13:03

## 2019-01-01 RX ADMIN — METOPROLOL TARTRATE 1.25 MG: 5 INJECTION INTRAVENOUS at 13:17

## 2019-01-01 RX ADMIN — MELATONIN TAB 3 MG 6 MG: 3 TAB at 22:16

## 2019-01-01 RX ADMIN — THIAMINE HYDROCHLORIDE 250 MG: 100 INJECTION, SOLUTION INTRAMUSCULAR; INTRAVENOUS at 18:21

## 2019-01-01 RX ADMIN — PROPOFOL 10 MG: 10 INJECTION, EMULSION INTRAVENOUS at 14:23

## 2019-01-01 RX ADMIN — CIPROFLOXACIN HYDROCHLORIDE 1 DROP: 3 SOLUTION/ DROPS OPHTHALMIC at 14:00

## 2019-01-01 RX ADMIN — METOPROLOL TARTRATE 12.5 MG: 25 TABLET ORAL at 12:19

## 2019-01-01 RX ADMIN — APIXABAN 5 MG: 5 TABLET, FILM COATED ORAL at 20:30

## 2019-01-01 RX ADMIN — IPRATROPIUM BROMIDE AND ALBUTEROL SULFATE 3 ML: .5; 3 SOLUTION RESPIRATORY (INHALATION) at 08:28

## 2019-01-01 RX ADMIN — TAMSULOSIN HYDROCHLORIDE 0.4 MG: 0.4 CAPSULE ORAL at 18:26

## 2019-01-01 RX ADMIN — IPRATROPIUM BROMIDE AND ALBUTEROL SULFATE 3 ML: .5; 3 SOLUTION RESPIRATORY (INHALATION) at 19:35

## 2019-01-01 RX ADMIN — DESONIDE: 0.5 CREAM TOPICAL at 08:49

## 2019-01-01 RX ADMIN — Medication 10 ML: at 15:04

## 2019-01-01 RX ADMIN — HYDROCORTISONE: 25 CREAM TOPICAL at 18:50

## 2019-01-01 RX ADMIN — CEFTRIAXONE 2 G: 2 INJECTION, POWDER, FOR SOLUTION INTRAMUSCULAR; INTRAVENOUS at 13:19

## 2019-01-01 RX ADMIN — CEFAZOLIN 1 G: 1 INJECTION, POWDER, FOR SOLUTION INTRAMUSCULAR; INTRAVENOUS at 06:31

## 2019-01-01 RX ADMIN — PANTOPRAZOLE SODIUM 20 MG: 20 TABLET, DELAYED RELEASE ORAL at 09:00

## 2019-01-01 RX ADMIN — IPRATROPIUM BROMIDE AND ALBUTEROL SULFATE 3 ML: .5; 3 SOLUTION RESPIRATORY (INHALATION) at 00:20

## 2019-01-01 RX ADMIN — ACETAMINOPHEN 325 MG: 160 SOLUTION ORAL at 18:58

## 2019-01-01 RX ADMIN — LACTOBACILLUS ACIDOPHILUS / LACTOBACILLUS BULGARICUS 1 PACKET: 100 MILLION CFU STRENGTH GRANULES at 08:01

## 2019-01-01 RX ADMIN — BRIMONIDINE TARTRATE 1 DROP: 2 SOLUTION/ DROPS OPHTHALMIC at 13:04

## 2019-01-01 RX ADMIN — APIXABAN 5 MG: 5 TABLET, FILM COATED ORAL at 17:32

## 2019-01-01 RX ADMIN — Medication 10 ML: at 17:20

## 2019-01-01 RX ADMIN — PYRIDOXINE HCL TAB 50 MG 50 MG: 50 TAB at 09:29

## 2019-01-01 RX ADMIN — ALBUTEROL SULFATE 1.25 MG: 2.5 SOLUTION RESPIRATORY (INHALATION) at 03:58

## 2019-01-01 RX ADMIN — FUROSEMIDE 20 MG: 10 INJECTION, SOLUTION INTRAMUSCULAR; INTRAVENOUS at 15:14

## 2019-01-01 RX ADMIN — BRIMONIDINE TARTRATE 1 DROP: 2 SOLUTION/ DROPS OPHTHALMIC at 13:03

## 2019-01-01 RX ADMIN — SODIUM CHLORIDE 500 ML: 900 INJECTION, SOLUTION INTRAVENOUS at 10:46

## 2019-01-01 RX ADMIN — ACETAMINOPHEN 325 MG: 160 SOLUTION ORAL at 05:28

## 2019-01-01 RX ADMIN — HYDROCORTISONE: 25 CREAM TOPICAL at 18:00

## 2019-01-01 RX ADMIN — ACETAMINOPHEN 500 MG: 500 TABLET, FILM COATED ORAL at 08:22

## 2019-01-01 RX ADMIN — PANTOPRAZOLE SODIUM 40 MG: 40 INJECTION, POWDER, LYOPHILIZED, FOR SOLUTION INTRAVENOUS at 10:12

## 2019-01-01 RX ADMIN — METOPROLOL TARTRATE 12.5 MG: 25 TABLET ORAL at 22:28

## 2019-01-01 RX ADMIN — SODIUM CHLORIDE 1000 ML: 900 INJECTION, SOLUTION INTRAVENOUS at 18:15

## 2019-01-01 RX ADMIN — ALBUTEROL SULFATE 1.25 MG: 2.5 SOLUTION RESPIRATORY (INHALATION) at 14:04

## 2019-01-01 RX ADMIN — DICLOFENAC 2 G: 10 GEL TOPICAL at 18:11

## 2019-01-01 RX ADMIN — APIXABAN 5 MG: 5 TABLET, FILM COATED ORAL at 10:57

## 2019-01-01 RX ADMIN — HYDROCORTISONE: 25 CREAM TOPICAL at 08:02

## 2019-01-01 RX ADMIN — DESONIDE: 0.5 CREAM TOPICAL at 18:09

## 2019-01-01 RX ADMIN — Medication 10 ML: at 16:28

## 2019-01-01 RX ADMIN — IPRATROPIUM BROMIDE AND ALBUTEROL SULFATE 3 ML: .5; 3 SOLUTION RESPIRATORY (INHALATION) at 12:45

## 2019-01-01 RX ADMIN — IPRATROPIUM BROMIDE AND ALBUTEROL SULFATE 3 ML: .5; 3 SOLUTION RESPIRATORY (INHALATION) at 05:33

## 2019-01-01 RX ADMIN — OXYCODONE HYDROCHLORIDE AND ACETAMINOPHEN 1 TABLET: 5; 325 TABLET ORAL at 12:01

## 2019-01-01 RX ADMIN — CYANOCOBALAMIN TAB 1000 MCG 1000 MCG: 1000 TAB at 08:57

## 2019-01-01 RX ADMIN — ACETAMINOPHEN 650 MG: 325 TABLET, FILM COATED ORAL at 11:06

## 2019-01-01 RX ADMIN — ACETAMINOPHEN 325 MG: 160 SOLUTION ORAL at 17:47

## 2019-01-01 RX ADMIN — METOPROLOL TARTRATE 12.5 MG: 25 TABLET ORAL at 09:20

## 2019-01-01 RX ADMIN — APIXABAN 5 MG: 5 TABLET, FILM COATED ORAL at 18:59

## 2019-01-01 RX ADMIN — MEROPENEM 1 G: 1 INJECTION, POWDER, FOR SOLUTION INTRAVENOUS at 06:03

## 2019-01-01 RX ADMIN — MULTIPLE VITAMIN LIQUID 30 ML: LIQUID at 08:08

## 2019-01-01 RX ADMIN — Medication 10 ML: at 06:30

## 2019-01-01 RX ADMIN — DESONIDE: 0.5 CREAM TOPICAL at 09:15

## 2019-01-01 RX ADMIN — IPRATROPIUM BROMIDE AND ALBUTEROL SULFATE 3 ML: .5; 3 SOLUTION RESPIRATORY (INHALATION) at 12:11

## 2019-01-01 RX ADMIN — SODIUM CHLORIDE, SODIUM LACTATE, POTASSIUM CHLORIDE, AND CALCIUM CHLORIDE 688 ML: 600; 310; 30; 20 INJECTION, SOLUTION INTRAVENOUS at 14:41

## 2019-01-01 RX ADMIN — METOPROLOL TARTRATE 12.5 MG: 25 TABLET ORAL at 18:15

## 2019-01-01 RX ADMIN — SODIUM CHLORIDE 500 ML: 900 INJECTION, SOLUTION INTRAVENOUS at 20:59

## 2019-01-01 RX ADMIN — Medication 10 ML: at 21:22

## 2019-01-01 RX ADMIN — APIXABAN 5 MG: 5 TABLET, FILM COATED ORAL at 22:43

## 2019-01-01 RX ADMIN — PANTOPRAZOLE SODIUM 40 MG: 40 TABLET, DELAYED RELEASE ORAL at 06:30

## 2019-01-01 RX ADMIN — METOPROLOL TARTRATE 12.5 MG: 25 TABLET ORAL at 10:56

## 2019-01-01 RX ADMIN — CIPROFLOXACIN HYDROCHLORIDE 1 DROP: 3 SOLUTION/ DROPS OPHTHALMIC at 22:30

## 2019-01-01 RX ADMIN — Medication 10 ML: at 13:53

## 2019-01-01 RX ADMIN — PANTOPRAZOLE SODIUM 40 MG: 40 INJECTION, POWDER, LYOPHILIZED, FOR SOLUTION INTRAVENOUS at 09:20

## 2019-01-01 RX ADMIN — FOLIC ACID 1 MG: 1 TABLET ORAL at 12:19

## 2019-01-01 RX ADMIN — LEVOFLOXACIN 750 MG: 5 INJECTION, SOLUTION INTRAVENOUS at 20:49

## 2019-01-01 RX ADMIN — HYDROCODONE BITARTRATE AND ACETAMINOPHEN 1 TABLET: 5; 325 TABLET ORAL at 12:30

## 2019-01-01 RX ADMIN — METOPROLOL TARTRATE 12.5 MG: 25 TABLET ORAL at 18:21

## 2019-01-01 RX ADMIN — CEFTRIAXONE 2 G: 2 INJECTION, POWDER, FOR SOLUTION INTRAMUSCULAR; INTRAVENOUS at 11:07

## 2019-01-01 RX ADMIN — ACETAMINOPHEN 500 MG: 500 TABLET, FILM COATED ORAL at 18:25

## 2019-01-01 RX ADMIN — DESONIDE: 0.5 CREAM TOPICAL at 10:00

## 2019-01-01 RX ADMIN — METOPROLOL TARTRATE 12.5 MG: 25 TABLET ORAL at 20:22

## 2019-01-01 RX ADMIN — ACETAMINOPHEN 325 MG: 160 SOLUTION ORAL at 00:31

## 2019-01-01 RX ADMIN — MEROPENEM 1 G: 1 INJECTION, POWDER, FOR SOLUTION INTRAVENOUS at 02:30

## 2019-01-01 RX ADMIN — ACETAMINOPHEN 325 MG: 160 SOLUTION ORAL at 01:00

## 2019-01-01 RX ADMIN — INSULIN LISPRO 2 UNITS: 100 INJECTION, SOLUTION INTRAVENOUS; SUBCUTANEOUS at 12:32

## 2019-01-01 RX ADMIN — APIXABAN 5 MG: 5 TABLET, FILM COATED ORAL at 18:19

## 2019-01-01 RX ADMIN — DESONIDE: 0.5 CREAM TOPICAL at 09:44

## 2019-01-01 RX ADMIN — BRIMONIDINE TARTRATE 1 DROP: 2 SOLUTION/ DROPS OPHTHALMIC at 09:29

## 2019-01-01 RX ADMIN — SODIUM CHLORIDE 1000 ML: 900 INJECTION, SOLUTION INTRAVENOUS at 19:05

## 2019-01-01 RX ADMIN — IPRATROPIUM BROMIDE AND ALBUTEROL SULFATE 3 ML: .5; 3 SOLUTION RESPIRATORY (INHALATION) at 18:02

## 2019-01-01 RX ADMIN — ACETAMINOPHEN 500 MG: 500 TABLET, FILM COATED ORAL at 16:16

## 2019-01-01 RX ADMIN — TAMSULOSIN HYDROCHLORIDE 0.4 MG: 0.4 CAPSULE ORAL at 17:17

## 2019-01-01 RX ADMIN — Medication 2 TABLET: at 18:45

## 2019-01-01 RX ADMIN — LANSOPRAZOLE 30 MG: 30 TABLET, ORALLY DISINTEGRATING, DELAYED RELEASE ORAL at 10:57

## 2019-01-01 RX ADMIN — PREDNISONE 10 MG: 10 TABLET ORAL at 12:45

## 2019-01-01 RX ADMIN — ACETAMINOPHEN 325 MG: 160 SOLUTION ORAL at 23:43

## 2019-01-01 RX ADMIN — MEROPENEM 1 G: 1 INJECTION, POWDER, FOR SOLUTION INTRAVENOUS at 21:59

## 2019-01-01 RX ADMIN — CEFTRIAXONE 2 G: 2 INJECTION, POWDER, FOR SOLUTION INTRAMUSCULAR; INTRAVENOUS at 11:58

## 2019-01-01 RX ADMIN — FUROSEMIDE 40 MG: 40 TABLET ORAL at 08:50

## 2019-01-01 RX ADMIN — ACETAMINOPHEN 325 MG: 160 SOLUTION ORAL at 11:31

## 2019-01-01 RX ADMIN — IPRATROPIUM BROMIDE AND ALBUTEROL SULFATE 3 ML: .5; 3 SOLUTION RESPIRATORY (INHALATION) at 19:24

## 2019-01-01 RX ADMIN — ACETAMINOPHEN 500 MG: 500 TABLET, FILM COATED ORAL at 00:19

## 2019-01-01 RX ADMIN — ACETAMINOPHEN 325 MG: 160 SOLUTION ORAL at 06:06

## 2019-01-01 RX ADMIN — ACETAMINOPHEN 325 MG: 160 SOLUTION ORAL at 18:19

## 2019-01-01 RX ADMIN — ACETAMINOPHEN 500 MG: 500 TABLET, FILM COATED ORAL at 20:58

## 2019-01-01 RX ADMIN — IPRATROPIUM BROMIDE AND ALBUTEROL SULFATE 3 ML: .5; 3 SOLUTION RESPIRATORY (INHALATION) at 17:15

## 2019-01-01 RX ADMIN — Medication 10 ML: at 05:36

## 2019-01-01 RX ADMIN — DICLOFENAC 2 G: 10 GEL TOPICAL at 01:54

## 2019-01-01 RX ADMIN — Medication 10 ML: at 23:05

## 2019-01-01 RX ADMIN — APIXABAN 5 MG: 5 TABLET, FILM COATED ORAL at 10:34

## 2019-01-01 RX ADMIN — IPRATROPIUM BROMIDE AND ALBUTEROL SULFATE 3 ML: .5; 3 SOLUTION RESPIRATORY (INHALATION) at 19:42

## 2019-01-01 RX ADMIN — Medication 10 ML: at 18:17

## 2019-01-01 RX ADMIN — FEBUXOSTAT 80 MG: 40 TABLET ORAL at 12:19

## 2019-01-01 RX ADMIN — MULTIPLE VITAMIN LIQUID 30 ML: LIQUID at 09:23

## 2019-01-01 RX ADMIN — IPRATROPIUM BROMIDE AND ALBUTEROL SULFATE 3 ML: .5; 3 SOLUTION RESPIRATORY (INHALATION) at 07:43

## 2019-01-01 RX ADMIN — CALCIUM GLUCONATE 2 G: 94 INJECTION, SOLUTION INTRAVENOUS at 21:04

## 2019-01-01 RX ADMIN — FOLIC ACID 1 MG: 1 TABLET ORAL at 09:20

## 2019-01-01 RX ADMIN — BRIMONIDINE TARTRATE 1 DROP: 2 SOLUTION/ DROPS OPHTHALMIC at 21:10

## 2019-01-01 RX ADMIN — FUROSEMIDE 40 MG: 40 TABLET ORAL at 09:05

## 2019-01-01 RX ADMIN — ACETAMINOPHEN 325 MG: 160 SOLUTION ORAL at 17:50

## 2019-01-01 RX ADMIN — FUROSEMIDE 40 MG: 10 INJECTION, SOLUTION INTRAMUSCULAR; INTRAVENOUS at 13:29

## 2019-01-01 RX ADMIN — POTASSIUM CHLORIDE 20 MEQ: 400 INJECTION, SOLUTION INTRAVENOUS at 07:06

## 2019-01-01 RX ADMIN — PANTOPRAZOLE SODIUM 40 MG: 40 INJECTION, POWDER, LYOPHILIZED, FOR SOLUTION INTRAVENOUS at 12:19

## 2019-01-01 RX ADMIN — BRIMONIDINE TARTRATE 1 DROP: 2 SOLUTION/ DROPS OPHTHALMIC at 21:41

## 2019-01-01 RX ADMIN — IPRATROPIUM BROMIDE AND ALBUTEROL SULFATE 3 ML: .5; 3 SOLUTION RESPIRATORY (INHALATION) at 11:41

## 2019-01-01 RX ADMIN — DILTIAZEM HYDROCHLORIDE 180 MG: 180 CAPSULE, COATED, EXTENDED RELEASE ORAL at 08:48

## 2019-01-01 RX ADMIN — BRIMONIDINE TARTRATE 1 DROP: 2 SOLUTION OPHTHALMIC at 23:25

## 2019-01-01 RX ADMIN — KETOROLAC TROMETHAMINE 15 MG: 30 INJECTION, SOLUTION INTRAMUSCULAR at 14:25

## 2019-01-01 RX ADMIN — IPRATROPIUM BROMIDE AND ALBUTEROL SULFATE 3 ML: .5; 3 SOLUTION RESPIRATORY (INHALATION) at 11:46

## 2019-01-01 RX ADMIN — INSULIN LISPRO 2 UNITS: 100 INJECTION, SOLUTION INTRAVENOUS; SUBCUTANEOUS at 23:44

## 2019-01-01 RX ADMIN — DESONIDE: 0.5 CREAM TOPICAL at 18:28

## 2019-01-01 RX ADMIN — LACTOBACILLUS ACIDOPHILUS / LACTOBACILLUS BULGARICUS 1 PACKET: 100 MILLION CFU STRENGTH GRANULES at 08:57

## 2019-01-01 RX ADMIN — Medication 10 ML: at 05:56

## 2019-01-01 RX ADMIN — CYCLOBENZAPRINE HYDROCHLORIDE 5 MG: 10 TABLET, FILM COATED ORAL at 12:02

## 2019-01-01 RX ADMIN — DESONIDE: 0.5 CREAM TOPICAL at 14:31

## 2019-01-01 RX ADMIN — METOPROLOL TARTRATE 12.5 MG: 25 TABLET ORAL at 09:34

## 2019-01-01 RX ADMIN — METOPROLOL TARTRATE 12.5 MG: 25 TABLET ORAL at 18:59

## 2019-01-01 RX ADMIN — BRIMONIDINE TARTRATE 1 DROP: 2 SOLUTION/ DROPS OPHTHALMIC at 13:05

## 2019-01-01 RX ADMIN — DEXTROSE MONOHYDRATE 60 ML/HR: 5 INJECTION, SOLUTION INTRAVENOUS at 11:57

## 2019-01-01 RX ADMIN — Medication 2 TABLET: at 09:20

## 2019-01-01 RX ADMIN — GADOTERATE MEGLUMINE 15 ML: 376.9 INJECTION INTRAVENOUS at 17:14

## 2019-01-01 RX ADMIN — LANSOPRAZOLE 30 MG: 30 TABLET, ORALLY DISINTEGRATING, DELAYED RELEASE ORAL at 08:04

## 2019-01-01 RX ADMIN — ACETAMINOPHEN 500 MG: 500 TABLET, FILM COATED ORAL at 18:14

## 2019-01-01 RX ADMIN — METOPROLOL TARTRATE 12.5 MG: 25 TABLET ORAL at 08:32

## 2019-01-01 RX ADMIN — METOPROLOL TARTRATE 12.5 MG: 25 TABLET ORAL at 22:37

## 2019-01-01 RX ADMIN — FUROSEMIDE 80 MG: 40 TABLET ORAL at 09:22

## 2019-01-01 RX ADMIN — APIXABAN 5 MG: 5 TABLET, FILM COATED ORAL at 22:16

## 2019-01-01 RX ADMIN — ACETAMINOPHEN 325 MG: 160 SOLUTION ORAL at 05:52

## 2019-01-01 RX ADMIN — DESONIDE: 0.5 CREAM TOPICAL at 10:21

## 2019-01-01 RX ADMIN — METOPROLOL TARTRATE 12.5 MG: 25 TABLET ORAL at 08:05

## 2019-01-01 RX ADMIN — TAMSULOSIN HYDROCHLORIDE 0.4 MG: 0.4 CAPSULE ORAL at 17:55

## 2019-01-01 RX ADMIN — DESONIDE: 0.5 CREAM TOPICAL at 17:59

## 2019-01-01 RX ADMIN — LATANOPROST 1 DROP: 50 SOLUTION OPHTHALMIC at 18:20

## 2019-01-01 RX ADMIN — CEFTRIAXONE 2 G: 2 INJECTION, POWDER, FOR SOLUTION INTRAMUSCULAR; INTRAVENOUS at 12:19

## 2019-01-01 RX ADMIN — Medication 10 ML: at 14:04

## 2019-01-01 RX ADMIN — MELATONIN TAB 3 MG 3 MG: 3 TAB at 22:01

## 2019-01-01 RX ADMIN — BARIUM SULFATE 30 ML: 0.81 POWDER, FOR SUSPENSION ORAL at 12:20

## 2019-01-01 RX ADMIN — FEBUXOSTAT 80 MG: 40 TABLET ORAL at 12:25

## 2019-01-01 RX ADMIN — IPRATROPIUM BROMIDE AND ALBUTEROL SULFATE 3 ML: .5; 3 SOLUTION RESPIRATORY (INHALATION) at 16:08

## 2019-01-01 RX ADMIN — IPRATROPIUM BROMIDE AND ALBUTEROL SULFATE 3 ML: .5; 3 SOLUTION RESPIRATORY (INHALATION) at 11:53

## 2019-01-01 RX ADMIN — HYDROMORPHONE HYDROCHLORIDE 0.25 MG: 1 INJECTION, SOLUTION INTRAMUSCULAR; INTRAVENOUS; SUBCUTANEOUS at 23:11

## 2019-01-01 RX ADMIN — DICLOFENAC 2 G: 10 GEL TOPICAL at 05:44

## 2019-01-01 RX ADMIN — Medication 10 ML: at 16:29

## 2019-01-01 RX ADMIN — FOLIC ACID 1 MG: 1 TABLET ORAL at 08:32

## 2019-01-01 RX ADMIN — IPRATROPIUM BROMIDE AND ALBUTEROL SULFATE 3 ML: .5; 3 SOLUTION RESPIRATORY (INHALATION) at 19:38

## 2019-01-01 RX ADMIN — MEROPENEM 1 G: 1 INJECTION, POWDER, FOR SOLUTION INTRAVENOUS at 15:25

## 2019-01-01 RX ADMIN — Medication 2 TABLET: at 17:59

## 2019-01-01 RX ADMIN — LATANOPROST 1 DROP: 50 SOLUTION OPHTHALMIC at 18:31

## 2019-01-01 RX ADMIN — Medication 10 ML: at 05:52

## 2019-01-01 RX ADMIN — IPRATROPIUM BROMIDE AND ALBUTEROL SULFATE 3 ML: .5; 3 SOLUTION RESPIRATORY (INHALATION) at 08:22

## 2019-01-01 RX ADMIN — SODIUM CHLORIDE 1000 MG: 900 INJECTION, SOLUTION INTRAVENOUS at 09:19

## 2019-01-01 RX ADMIN — Medication 10 ML: at 13:21

## 2019-01-01 RX ADMIN — POTASSIUM CHLORIDE 20 MEQ: 400 INJECTION, SOLUTION INTRAVENOUS at 14:03

## 2019-01-01 RX ADMIN — APIXABAN 5 MG: 5 TABLET, FILM COATED ORAL at 09:21

## 2019-01-01 RX ADMIN — IPRATROPIUM BROMIDE AND ALBUTEROL SULFATE 3 ML: .5; 3 SOLUTION RESPIRATORY (INHALATION) at 15:49

## 2019-01-01 RX ADMIN — DICLOFENAC 2 G: 10 GEL TOPICAL at 23:42

## 2019-01-01 RX ADMIN — CYANOCOBALAMIN TAB 1000 MCG 1000 MCG: 1000 TAB at 08:10

## 2019-01-01 RX ADMIN — DESONIDE: 0.5 CREAM TOPICAL at 17:51

## 2019-01-01 RX ADMIN — DESONIDE: 0.5 CREAM TOPICAL at 17:17

## 2019-01-01 RX ADMIN — LEVOTHYROXINE SODIUM 175 MCG: 25 TABLET ORAL at 05:05

## 2019-01-01 RX ADMIN — ACETAMINOPHEN 500 MG: 500 TABLET, FILM COATED ORAL at 12:48

## 2019-01-01 RX ADMIN — LEVOTHYROXINE SODIUM 175 MCG: 25 TABLET ORAL at 05:12

## 2019-01-01 RX ADMIN — BRIMONIDINE TARTRATE 1 DROP: 2 SOLUTION/ DROPS OPHTHALMIC at 18:49

## 2019-01-01 RX ADMIN — METOPROLOL TARTRATE 12.5 MG: 25 TABLET ORAL at 10:12

## 2019-01-01 RX ADMIN — ACETAMINOPHEN 325 MG: 160 SOLUTION ORAL at 00:13

## 2019-01-01 RX ADMIN — IPRATROPIUM BROMIDE AND ALBUTEROL SULFATE 3 ML: .5; 3 SOLUTION RESPIRATORY (INHALATION) at 21:40

## 2019-01-01 RX ADMIN — BARIUM SULFATE 15 ML: 400 SUSPENSION ORAL at 12:23

## 2019-01-01 RX ADMIN — HYDROCORTISONE: 25 CREAM TOPICAL at 17:48

## 2019-01-01 RX ADMIN — LEVOTHYROXINE SODIUM 175 MCG: 75 TABLET ORAL at 06:30

## 2019-01-01 RX ADMIN — NOREPINEPHRINE BITARTRATE 4 MCG/MIN: 1 INJECTION INTRAVENOUS at 15:20

## 2019-01-01 RX ADMIN — PANTOPRAZOLE SODIUM 40 MG: 40 INJECTION, POWDER, LYOPHILIZED, FOR SOLUTION INTRAVENOUS at 09:35

## 2019-01-01 RX ADMIN — LACTOBACILLUS ACIDOPHILUS / LACTOBACILLUS BULGARICUS 1 PACKET: 100 MILLION CFU STRENGTH GRANULES at 08:09

## 2019-01-01 RX ADMIN — IPRATROPIUM BROMIDE AND ALBUTEROL SULFATE 3 ML: .5; 3 SOLUTION RESPIRATORY (INHALATION) at 04:18

## 2019-01-01 RX ADMIN — APIXABAN 5 MG: 5 TABLET, FILM COATED ORAL at 09:10

## 2019-01-01 RX ADMIN — LACTOBACILLUS ACIDOPHILUS / LACTOBACILLUS BULGARICUS 1 PACKET: 100 MILLION CFU STRENGTH GRANULES at 17:55

## 2019-01-01 RX ADMIN — BRIMONIDINE TARTRATE 1 DROP: 2 SOLUTION/ DROPS OPHTHALMIC at 22:20

## 2019-01-01 RX ADMIN — FOLIC ACID 1 MG: 1 TABLET ORAL at 08:01

## 2019-01-01 RX ADMIN — TAMSULOSIN HYDROCHLORIDE 0.4 MG: 0.4 CAPSULE ORAL at 16:44

## 2019-01-01 RX ADMIN — APIXABAN 5 MG: 5 TABLET, FILM COATED ORAL at 17:08

## 2019-01-01 RX ADMIN — SODIUM CHLORIDE 1000 MG: 900 INJECTION, SOLUTION INTRAVENOUS at 09:06

## 2019-01-01 RX ADMIN — BRIMONIDINE TARTRATE 1 DROP: 2 SOLUTION/ DROPS OPHTHALMIC at 21:17

## 2019-01-01 RX ADMIN — HYDROMORPHONE HYDROCHLORIDE 0.25 MG: 1 INJECTION, SOLUTION INTRAMUSCULAR; INTRAVENOUS; SUBCUTANEOUS at 18:13

## 2019-01-01 RX ADMIN — ACETAMINOPHEN 325 MG: 160 SOLUTION ORAL at 17:59

## 2019-01-01 RX ADMIN — ACETAMINOPHEN 325 MG: 160 SOLUTION ORAL at 05:12

## 2019-01-01 RX ADMIN — LACTOBACILLUS ACIDOPHILUS / LACTOBACILLUS BULGARICUS 1 PACKET: 100 MILLION CFU STRENGTH GRANULES at 18:25

## 2019-01-01 RX ADMIN — METOPROLOL TARTRATE 12.5 MG: 25 TABLET ORAL at 08:37

## 2019-01-01 RX ADMIN — METOPROLOL TARTRATE 12.5 MG: 25 TABLET ORAL at 18:06

## 2019-01-01 RX ADMIN — APIXABAN 5 MG: 5 TABLET, FILM COATED ORAL at 09:34

## 2019-01-01 RX ADMIN — MELATONIN TAB 3 MG 6 MG: 3 TAB at 23:42

## 2019-01-01 RX ADMIN — HYDROCODONE BITARTRATE AND ACETAMINOPHEN 1 TABLET: 5; 325 TABLET ORAL at 09:28

## 2019-01-01 RX ADMIN — IPRATROPIUM BROMIDE AND ALBUTEROL SULFATE 3 ML: .5; 3 SOLUTION RESPIRATORY (INHALATION) at 21:59

## 2019-01-01 RX ADMIN — BRIMONIDINE TARTRATE 1 DROP: 2 SOLUTION OPHTHALMIC at 21:49

## 2019-01-01 RX ADMIN — APIXABAN 5 MG: 5 TABLET, FILM COATED ORAL at 08:13

## 2019-01-01 RX ADMIN — IPRATROPIUM BROMIDE AND ALBUTEROL SULFATE 3 ML: .5; 3 SOLUTION RESPIRATORY (INHALATION) at 12:17

## 2019-01-01 RX ADMIN — SODIUM CHLORIDE 1000 MG: 900 INJECTION, SOLUTION INTRAVENOUS at 00:38

## 2019-01-01 RX ADMIN — Medication 10 ML: at 13:20

## 2019-01-01 RX ADMIN — ALBUTEROL SULFATE 1.25 MG: 2.5 SOLUTION RESPIRATORY (INHALATION) at 14:00

## 2019-01-01 RX ADMIN — DICLOFENAC 2 G: 10 GEL TOPICAL at 18:31

## 2019-01-01 RX ADMIN — LACTOBACILLUS ACIDOPHILUS / LACTOBACILLUS BULGARICUS 1 PACKET: 100 MILLION CFU STRENGTH GRANULES at 18:26

## 2019-01-01 RX ADMIN — METOPROLOL TARTRATE 12.5 MG: 25 TABLET ORAL at 17:47

## 2019-01-01 RX ADMIN — CEFTRIAXONE 2 G: 2 INJECTION, POWDER, FOR SOLUTION INTRAMUSCULAR; INTRAVENOUS at 12:57

## 2019-01-01 RX ADMIN — ACETAMINOPHEN 325 MG: 160 SOLUTION ORAL at 18:23

## 2019-01-01 RX ADMIN — BUMETANIDE 1 MG: 0.25 INJECTION INTRAMUSCULAR; INTRAVENOUS at 16:27

## 2019-01-01 RX ADMIN — METOPROLOL TARTRATE 12.5 MG: 25 TABLET ORAL at 17:59

## 2019-01-01 RX ADMIN — ALBUTEROL SULFATE 1.25 MG: 2.5 SOLUTION RESPIRATORY (INHALATION) at 21:40

## 2019-01-01 RX ADMIN — Medication 20 ML: at 22:07

## 2019-01-01 RX ADMIN — DICLOFENAC 2 G: 10 GEL TOPICAL at 12:57

## 2019-01-01 RX ADMIN — MELATONIN TAB 3 MG 3 MG: 3 TAB at 22:00

## 2019-01-01 RX ADMIN — LEVOTHYROXINE SODIUM 200 MCG: 112 TABLET ORAL at 06:20

## 2019-01-01 RX ADMIN — LEVOTHYROXINE SODIUM 175 MCG: 100 TABLET ORAL at 05:37

## 2019-01-01 RX ADMIN — LACTOBACILLUS ACIDOPHILUS / LACTOBACILLUS BULGARICUS 1 PACKET: 100 MILLION CFU STRENGTH GRANULES at 18:14

## 2019-01-01 RX ADMIN — APIXABAN 5 MG: 5 TABLET, FILM COATED ORAL at 18:09

## 2019-01-01 RX ADMIN — IPRATROPIUM BROMIDE AND ALBUTEROL SULFATE 3 ML: .5; 3 SOLUTION RESPIRATORY (INHALATION) at 08:54

## 2019-01-01 RX ADMIN — ACETAMINOPHEN 650 MG: 325 TABLET, FILM COATED ORAL at 19:10

## 2019-01-01 RX ADMIN — DESONIDE: 0.5 CREAM TOPICAL at 10:02

## 2019-01-01 RX ADMIN — APIXABAN 2.5 MG: 2.5 TABLET, FILM COATED ORAL at 08:48

## 2019-01-01 RX ADMIN — LEVOFLOXACIN 750 MG: 5 INJECTION, SOLUTION INTRAVENOUS at 22:29

## 2019-01-01 RX ADMIN — POTASSIUM CHLORIDE 20 MEQ: 20 TABLET, EXTENDED RELEASE ORAL at 06:30

## 2019-01-01 RX ADMIN — BISACODYL 10 MG: 10 SUPPOSITORY RECTAL at 10:05

## 2019-01-01 RX ADMIN — ALBUMIN (HUMAN) 25 G: 12.5 INJECTION, SOLUTION INTRAVENOUS at 15:10

## 2019-01-01 RX ADMIN — INSULIN LISPRO 2 UNITS: 100 INJECTION, SOLUTION INTRAVENOUS; SUBCUTANEOUS at 19:38

## 2019-01-01 RX ADMIN — MULTIPLE VITAMINS W/ MINERALS TAB 1 TABLET: TAB at 10:36

## 2019-01-01 RX ADMIN — ACETAMINOPHEN 500 MG: 500 TABLET, FILM COATED ORAL at 05:12

## 2019-01-01 RX ADMIN — LIDOCAINE HYDROCHLORIDE 10 ML: 10 INJECTION, SOLUTION EPIDURAL; INFILTRATION; INTRACAUDAL; PERINEURAL at 12:53

## 2019-01-01 RX ADMIN — ACETAMINOPHEN 325 MG: 160 SOLUTION ORAL at 06:09

## 2019-01-01 RX ADMIN — INSULIN LISPRO 2 UNITS: 100 INJECTION, SOLUTION INTRAVENOUS; SUBCUTANEOUS at 00:00

## 2019-01-01 RX ADMIN — DEXTROSE MONOHYDRATE 50 ML/HR: 5 INJECTION, SOLUTION INTRAVENOUS at 06:26

## 2019-01-01 RX ADMIN — DEXTROSE MONOHYDRATE, SODIUM CHLORIDE, AND POTASSIUM CHLORIDE 100 ML/HR: 50; 4.5; .745 INJECTION, SOLUTION INTRAVENOUS at 22:07

## 2019-01-01 RX ADMIN — ALTEPLASE 2 MG: 2.2 INJECTION, POWDER, LYOPHILIZED, FOR SOLUTION INTRAVENOUS at 15:01

## 2019-01-01 RX ADMIN — IPRATROPIUM BROMIDE AND ALBUTEROL SULFATE 3 ML: .5; 3 SOLUTION RESPIRATORY (INHALATION) at 13:28

## 2019-01-01 RX ADMIN — CEFEPIME HYDROCHLORIDE 2 G: 2 INJECTION, POWDER, FOR SOLUTION INTRAVENOUS at 13:15

## 2019-01-01 RX ADMIN — ACETAMINOPHEN 500 MG: 500 TABLET, FILM COATED ORAL at 05:37

## 2019-01-01 RX ADMIN — Medication 2 TABLET: at 18:52

## 2019-01-01 RX ADMIN — POTASSIUM CHLORIDE 10 MEQ: 10 TABLET, EXTENDED RELEASE ORAL at 10:34

## 2019-01-01 RX ADMIN — HYDROCORTISONE: 25 CREAM TOPICAL at 17:49

## 2019-01-01 RX ADMIN — DOCUSATE SODIUM 100 MG: 100 CAPSULE, LIQUID FILLED ORAL at 06:48

## 2019-01-01 RX ADMIN — DICLOFENAC 2 G: 10 GEL TOPICAL at 17:17

## 2019-01-01 RX ADMIN — Medication 2 TABLET: at 17:49

## 2019-01-01 RX ADMIN — LORAZEPAM 2 MG: 1 TABLET ORAL at 01:30

## 2019-01-01 RX ADMIN — CIPROFLOXACIN HYDROCHLORIDE 1 DROP: 3 SOLUTION/ DROPS OPHTHALMIC at 21:43

## 2019-01-01 RX ADMIN — MULTIPLE VITAMINS W/ MINERALS TAB 1 TABLET: TAB at 09:27

## 2019-01-01 RX ADMIN — SODIUM CHLORIDE 25 ML/HR: 900 INJECTION, SOLUTION INTRAVENOUS at 14:36

## 2019-01-01 RX ADMIN — IPRATROPIUM BROMIDE AND ALBUTEROL SULFATE 3 ML: .5; 3 SOLUTION RESPIRATORY (INHALATION) at 08:15

## 2019-01-01 RX ADMIN — LATANOPROST 1 DROP: 50 SOLUTION OPHTHALMIC at 21:58

## 2019-01-01 RX ADMIN — Medication 100 MG: at 08:11

## 2019-01-01 RX ADMIN — METOPROLOL TARTRATE 12.5 MG: 25 TABLET ORAL at 21:43

## 2019-01-01 RX ADMIN — DICLOFENAC 2 G: 10 GEL TOPICAL at 13:57

## 2019-01-01 RX ADMIN — DICLOFENAC 2 G: 10 GEL TOPICAL at 17:40

## 2019-01-01 RX ADMIN — BARIUM SULFATE 15 ML: 400 SUSPENSION ORAL at 12:21

## 2019-01-01 RX ADMIN — FOLIC ACID 1 MG: 1 TABLET ORAL at 10:18

## 2019-01-01 RX ADMIN — APIXABAN 5 MG: 5 TABLET, FILM COATED ORAL at 18:20

## 2019-01-01 RX ADMIN — DESONIDE: 0.5 CREAM TOPICAL at 18:13

## 2019-01-01 RX ADMIN — CEFTRIAXONE 2 G: 2 INJECTION, POWDER, FOR SOLUTION INTRAMUSCULAR; INTRAVENOUS at 13:46

## 2019-04-16 NOTE — ED PROVIDER NOTES
EMERGENCY DEPARTMENT HISTORY AND PHYSICAL EXAM 
 
11:18 AM 
 
 
Date: 4/16/2019 Patient Name: Jarvis Greene History of Presenting Illness Chief Complaint Patient presents with  Neck Pain  Shoulder Pain  Post OP Complication History Provided By: Patient and Patient's Daughter Additional History (Context): Jarvis Greene is a 80 y.o. male who presents with posterior C-spine pain radiating to his right shoulder. Patient had plastic surgery for removal of a basal cell carcinoma on the right side of face and right side of neck on Thursday and has had increased neck pain since then he denies any radiculopathy with radiations to his arms or legs there is no. Pain is worse with any movement is minimally improved by his oxycodone he has been taking for postop pain. Denies any bleeding drainage or increased pain from the surgical wounds. Patient does take Eliquis for A. fib been medicating with oxycodone for his surgery. PCP: Ivy Brandt MD 
 
 
Current Facility-Administered Medications Medication Dose Route Frequency Provider Last Rate Last Dose  lidocaine 4 % patch 1 Patch  1 Patch TransDERmal Q24H Jaiden Riojas MD   1 Patch at 04/16/19 1202  dexamethasone (DECADRON) tablet 10 mg  10 mg Oral NOW Jaiden Riojas MD      
 
Current Outpatient Medications Medication Sig Dispense Refill  methylPREDNISolone (MEDROL, AGUSTO,) 4 mg tablet Per dose pack instructions 1 Dose Pack 0  
 tamsulosin (FLOMAX) 0.4 mg capsule Take 1 Cap by mouth daily (after dinner). 90 Cap 3  
 apixaban (ELIQUIS) 5 mg tablet Take 1 Tab by mouth two (2) times a day. 60 Tab 0  
 albuterol (PROAIR HFA) 90 mcg/actuation inhaler Take  inhaled by mouth.  ALPHAGAN P 0.15 % ophthalmic solution Administer 1 Drop to both eyes three (3) times daily.  ipratropium-albuterol (COMBIVENT RESPIMAT)  mcg/actuation inhaler daily as needed  cyanocobalamin 1,000 mcg tablet Take 1,000 mcg by mouth daily.  dilTIAZem CD (CARDIZEM CD) 180 mg ER capsule Take 180 mg by mouth daily.  furosemide (LASIX) 40 mg tablet Take 40 mg by mouth daily. 2 tab daily  loratadine (CLARITIN) 10 mg tablet Take 10 mg by mouth daily.  esomeprazole (NEXIUM) 40 mg capsule Take 40 mg by mouth daily.  gabapentin (NEURONTIN) 600 mg tablet Take 600 mg by mouth three (3) times daily.  levothyroxine (SYNTHROID) 175 mcg tablet Take 175 mcg by mouth Daily (before breakfast).  traMADol (ULTRAM) 50 mg tablet Take 1 tablet by mouth every six (6) hours as needed for Pain. 15 tablet 0  
 bimatoprost (LUMIGAN) 0.03 % ophthalmic drops Administer 1 drop to both eyes every evening.  multivitamins-minerals-lutein (CENTRUM SILVER) Tab Take 1 Tab by mouth daily.  traZODone (DESYREL) 50 mg tablet Take 50 mg by mouth nightly as needed.  PYRIDOXINE HCL (VITAMIN B-6 PO) Take 1 Cap by mouth daily. Past History Past Medical History: 
Past Medical History:  
Diagnosis Date  Arthritis  Atrial fibrillation ([facility] 07/2017 Dr Wilda Novoa cardio follows. chronic  Carcinoma of prostate ([facility] [address]  Cellulitis  Coarse tremor   
  hands, states \"my doctor is aware\"  Difficulty swallowing  Dysphagia  Edema  Encounter for colonoscopy due to history of adenomatous colonic polyps  Essential hypertension  Fall 10/07/2017 \"went to [facility] Emergency Room, CT Scan showed concussion, no bleeding\" per patient  Fatigue  GERD (gastroesophageal reflux disease) wo. esophagitis  Glaucoma  H/O joint replacement   
  left knee 2003  HLD (hyperlipidemia)  Hypertension  Hypertensive disorder  Hypothyroidism  Impotence  Laceration of right lower leg with complication  Malignant neoplasm of prostate ([address]   
  [id number] Adenocarcinoma of the Prostate, dx 11/3/2008, karlee 3+4, presenting PSA 5.5ng/mL. 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   
 acute/hist. of   
 
 
Past Surgical History: 
Past Surgical History:  
Procedure Laterality Date  COLONOSCOPY N/A 1/3/2017  HX CHOLECYSTECTOMY  HX KNEE REPLACEMENT Left 01/2003  HX SHOULDER REPLACEMENT Right 7/17, 10/17 Family History: No family history on file. Social History: 
Social History Tobacco Use  Smoking status: Former Smoker  Smokeless tobacco: Never Used Substance Use Topics  Alcohol use: Yes Comment: weekly  Drug use: No  
 
 
Allergies: Allergies Allergen Reactions  Adhesive Other (comments) Skin irritation  Darvon [Propoxyphene] Hives, Myalgia and Other (comments) Joint pain  Penicillins Rash and Itching Review of Systems Review of Systems Constitutional: Positive for activity change. Negative for chills and fever. HENT: Negative for facial swelling. Eyes: Negative for visual disturbance. Respiratory: Negative for shortness of breath. Cardiovascular: Negative for chest pain. Gastrointestinal: Negative for abdominal pain, nausea and vomiting. Musculoskeletal: Positive for neck pain and neck stiffness. Skin: Negative for rash. Neurological: Negative for dizziness, syncope, weakness, light-headedness, numbness and headaches. Hematological: Bruises/bleeds easily. All other systems reviewed and are negative. Physical Exam  
 
Visit Vitals /75 (BP 1 Location: Right arm, BP Patient Position: At rest) Pulse 80 Temp 97.8 °F (36.6 °C) Resp 16 SpO2 97% Physical Exam  
 Constitutional: He is oriented to person, place, and time. He appears well-developed and well-nourished. No distress. HENT:  
Head: Normocephalic and atraumatic. Mouth/Throat: Oropharynx is clear and moist.  
Eyes: Pupils are equal, round, and reactive to light. Conjunctivae and EOM are normal. No scleral icterus. Neck: Normal range of motion. Neck supple. Mild C-spine tenderness along C3-C4 right trapezius tenderness. Surgical wound along the right face extending into the right neck clean dry intact no active bleeding do not believe there is a deep tissue hematoma Cardiovascular: Normal rate and normal heart sounds. An irregularly irregular rhythm present. No murmur heard. Pulmonary/Chest: Effort normal and breath sounds normal. No respiratory distress. Abdominal: Soft. Bowel sounds are normal. He exhibits no distension. There is no tenderness. Musculoskeletal: He exhibits no edema. Lymphadenopathy:  
  He has no cervical adenopathy. Neurological: He is alert and oriented to person, place, and time. Coordination normal.  
Skin: Skin is warm and dry. No rash noted. Psychiatric: He has a normal mood and affect. His behavior is normal.  
Nursing note and vitals reviewed. Diagnostic Study Results Labs - No results found for this or any previous visit (from the past 12 hour(s)). Radiologic Studies -  
CT SPINE CERV WO CONT Final Result IMPRESSION:  
  
  
1. No evidence of fracture or acute traumatic listhesis involving the cervical  
spine. 2. Degenerative anterolisthesis of C3 on C4 and retrolisthesis of C4 on C5.  
3. Diffuse cervical spondylosis with at least moderate osseous canal stenosis at C4-C5 and C5-C6. 4. Multilevel uncovertebral and facet joint osteoarthrosis with associated  
neuroforaminal narrowing, severe on the right at C3-C4 and bilaterally at C5-C6  
and C6/C7. Medical Decision Making I am the first provider for this patient. I reviewed the vital signs, available nursing notes, past medical history, past surgical history, family history and social history. Vital Signs-Reviewed the patient's vital signs. EKG: 
 
Records Reviewed: Nursing Notes and Old Medical Records (Time of Review: 11:18 AM) ED Course: Progress Notes, Reevaluation, and Consults: 
 
 
Provider Notes (Medical Decision Making): MDM Number of Diagnoses or Management Options Diagnosis management comments: Most likely cervical strain due to positioning from surgery will CT rule out deep hematoma patient being on Eliquis occult injury Diagnosis Clinical Impression: 1. Acute neck pain 2. Osteoarthritis of spine with radiculopathy, cervical region Disposition: home Follow-up Information Follow up With Specialties Details Why Contact Info [facility] EMERGENCY DEPT Emergency Medicine  As needed, If symptoms worsen [address] 
[phone] Rosario Rodas MD Family Practice Schedule an appointment as soon as possible for a visit for ED follow up appointment  [place] [address] [address] [place] [id number] [phone] Patient's Medications Start Taking METHYLPREDNISOLONE (MEDROL, AGUSTO,) 4 MG TABLET    Per dose pack instructions Continue Taking ALBUTEROL (PROAIR HFA) 90 MCG/ACTUATION INHALER    Take  inhaled by mouth. ALPHAGAN P 0.15 % OPHTHALMIC SOLUTION    Administer 1 Drop to both eyes three (3) times daily. APIXABAN (ELIQUIS) 5 MG TABLET    Take 1 Tab by mouth two (2) times a day. BIMATOPROST (LUMIGAN) 0.03 % OPHTHALMIC DROPS    Administer 1 drop to both eyes every evening. CYANOCOBALAMIN 1,000 MCG TABLET    Take 1,000 mcg by mouth daily. DILTIAZEM CD (CARDIZEM CD) 180 MG ER CAPSULE    Take 180 mg by mouth daily. ESOMEPRAZOLE (NEXIUM) 40 MG CAPSULE    Take 40 mg by mouth daily. FUROSEMIDE (LASIX) 40 MG TABLET    Take 40 mg by mouth daily. 2 tab daily GABAPENTIN (NEURONTIN) 600 MG TABLET    Take 600 mg by mouth three (3) times daily. IPRATROPIUM-ALBUTEROL (COMBIVENT RESPIMAT)  MCG/ACTUATION INHALER    daily as needed LEVOTHYROXINE (SYNTHROID) 175 MCG TABLET    Take 175 mcg by mouth Daily (before breakfast). LORATADINE (CLARITIN) 10 MG TABLET    Take 10 mg by mouth daily. MULTIVITAMINS-MINERALS-LUTEIN (CENTRUM SILVER) TAB    Take 1 Tab by mouth daily. PYRIDOXINE HCL (VITAMIN B-6 PO)    Take 1 Cap by mouth daily. TAMSULOSIN (FLOMAX) 0.4 MG CAPSULE    Take 1 Cap by mouth daily (after dinner). TRAMADOL (ULTRAM) 50 MG TABLET    Take 1 tablet by mouth every six (6) hours as needed for Pain. TRAZODONE (DESYREL) 50 MG TABLET    Take 50 mg by mouth nightly as needed. These Medications have changed No medications on file Stop Taking No medications on file  
 
_______________________________ Please note that this dictation was completed with [employer], the [employer] voice recognition software. Quite often unanticipated grammatical, syntax, homophones, and other interpretive errors are inadvertently transcribed by the computer software. Please disregard these errors. Please excuse any errors that have escaped final proofreading.

## 2019-04-16 NOTE — ED NOTES
I have reviewed discharge instructions with the patient and family. The patient and family verbalized understanding. Patient NAD, VSS. Patient armband removed and shredded.

## 2019-04-16 NOTE — ED TRIAGE NOTES
Pt arrived through triage with c/o neck and shoulder pain. Pt denies trauma or injury. Had cancerous mass removed from neck last Thursday.

## 2019-07-10 PROBLEM — I10 HTN (HYPERTENSION): Status: ACTIVE | Noted: 2019-01-01

## 2019-07-10 PROBLEM — L03.90 CELLULITIS: Status: ACTIVE | Noted: 2019-01-01

## 2019-07-10 NOTE — ED TRIAGE NOTES
Right upper abdominal pain. Cellulitis bilateral. Left ankle pain . Wearing ace wrap. Has stitches on right wrist from cancer removal. Left thumb swelling. Vomiting and diarrhea has subsided.  Sent by PCP

## 2019-07-11 PROBLEM — N18.2 ACUTE RENAL FAILURE SUPERIMPOSED ON STAGE 2 CHRONIC KIDNEY DISEASE (HCC): Status: ACTIVE | Noted: 2019-01-01

## 2019-07-11 PROBLEM — D72.9 NEUTROPHILIC LEUKOCYTOSIS: Status: ACTIVE | Noted: 2019-01-01

## 2019-07-11 PROBLEM — A41.9 SEPSIS (HCC): Status: ACTIVE | Noted: 2019-01-01

## 2019-07-11 PROBLEM — M10.9 GOUT: Status: ACTIVE | Noted: 2019-01-01

## 2019-07-11 PROBLEM — N17.9 ACUTE RENAL FAILURE SUPERIMPOSED ON STAGE 2 CHRONIC KIDNEY DISEASE (HCC): Status: ACTIVE | Noted: 2019-01-01

## 2019-07-11 NOTE — PROGRESS NOTES
Internal Medicine Progress Note Patient's Name: Ann-Marie Cardoza Admit Date: 7/10/2019 Length of Stay: 1 Assessment/Plan Principal Problem: 
  Sepsis ([facility] [address] (7/10/2019) Active Problems: 
  Cellulitis (7/10/2019) Atrial fibrillation ([address] (6/2/2018) HTN (hypertension) (7/10/2019) Neutrophilic leukocytosis (4/40/6942) Acute gout (7/11/2019) Acute renal failure superimposed on stage 2 chronic kidney disease ([address] (7/11/2019) Sepsis RLE cellulitis Ancef q8 IV fluids Follow cultures NGTD Cellulitis RLE, abx Low concern for endocarditis, no mechanical heart valves, no bacteremia, no chest pain, no fevers, no palmar rash Afib 
eliquis Rates controlled with current medications MACKENZIE on CKD IV fluids Monitor Renal Dose medications Avoid nephrotoxins Gout Suspect polyarticular gouty arthritis as cause of left thumb and left ankle swelling 
-Cellulitic symptoms started prior to flare of left thumb, and left ankle, suspect cellulitis as well as home lasix as contributing factor to gout exacerbation 
-hx of gout 
-stopped home lasix 
-prednisone Atelectasis Suspect cause of SOB, under expanded lungs on CXR, small bilat pleural effusions Incentive spirometer  
duonebs as needed - Cont acceptable home medications for chronic conditions  
- DVT protocol I have personally reviewed all pertinent labs and films that have officially resulted over the last 24 hours. I have personally checked for all pending labs that are awaiting final results. Interval History \"Omero Prakash is a 80 y.o. male who presented to the ER with pain and swelling involving the right leg. This began two days ago. It is associated with warmth at the redness site. He also has swelling of his left thumb with mild redness. He has no specific injury. 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 In the ER he is found to have Cellulitis and he will be admitted for ongoing management. \" 
 
 Creatinine down trended, no osseous fractures on xrays obtained. Subjective Pt s/e @ bedside. No major events overnight. C/o of SOB, and chills. Says cellulitis of RLE started first, then ankle became edematous, as well as first thumb. Hx of gouty arthritis Objective Visit Vitals /69 Pulse 76 Temp 99.7 °F (37.6 °C) Resp 18 Ht 5' 8\" (1.727 m) Wt 80.7 kg (178 lb) SpO2 97% BMI 27.06 kg/m² Physical Exam: 
General Appearance: NAD, conversant HENT: normocephalic/atraumatic, moist mucus membranes Neck: No JVD, supple Lungs: CTA with normal respiratory effort CV: RRR, no m/r/g Abdomen: soft, non-tender, normal bowel sounds Extremities: left thumb Is edematous and erythematous, minimal TTP, able to squeeze fist, Left ankle slightly edematous medial aspect, minimal erythema, minimal tenderness. RLE pitting edema to mid distal tibia, erythematous, no bullae, minimal pain, cellulitic in nature Neuro: No focal deficits, motor/sensory intact Intake and Output: 
Current Shift:  07/11 0701 - 07/11 1900 In: 100 [P.O.:100] Out: 255 [Urine:255] Last three shifts:  07/09 1901 - 07/11 0700 In: -  
Out: 500 [Urine:500] Lab/Data Reviewed: All lab results for the last 24 hours reviewed. Imaging Reviewed: 
Xr Thumb Lt Min 2 V Result Date: 7/11/2019 EXAM: XR THUMB LT MIN 2 V CLINICAL INDICATION/HISTORY: Left thumb pain and swelling  COMPARISON: None. TECHNIQUE: AP view of the left hand as well as lateral and oblique views of the left first digit were obtained. _______________ FINDINGS: Moderate degenerative osteoarthropathy of the first metacarpophalangeal and first interphalangeal joint with asymmetric joint space narrowing and marginal osteophytosis. There is mild asymmetric somewhat diffuse soft tissue swelling throughout the left first digit. Moderately severe degenerative changes of second and third metacarpophalangeal joints.  Surgical changes of prior fourth digit amputation involving distal aspect of the fourth proximal phalanx. Prominent atherosclerotic calcification throughout visualized distal left forearm and wrist. Prominent chondrocalcinosis throughout second and third metacarpophalangeal joints. No visualized erosive changes. Intact ulnar styloid. There is no evidence of acute fracture or dislocation. No joint effusion. _______________ IMPRESSION: No acute osseous abnormality. Nonspecific left first digit soft tissue swelling. Xr Ankle Lt Min 3 V Result Date: 7/11/2019 EXAM: XR ANKLE LT MIN 3 V CLINICAL INDICATION/HISTORY: Pain -Additional: None COMPARISON: None TECHNIQUE: 3 views of the left ankle _______________ FINDINGS: BONES: The foot is plantarflexed. Mild osteophytic spurring. Bony mineralization is within normal limits. No fractures. Suboptimal assessment of the subtalar joints due to positioning. Prominent calcaneal enthesopathy. SOFT TISSUES: Generalized soft tissue swelling with peripheral vascular calcifications noted. _______________ IMPRESSION: 1. Suboptimal positioning however, no acute fractures. 2.  Generalized soft tissue swelling, nonspecific. Xr Chest [facility] Result Date: 7/11/2019 EXAM: XR CHEST PORT CLINICAL INDICATION/HISTORY: Shortness of breath and sepsis -Additional: None COMPARISON: June 26, 2018 TECHNIQUE: Frontal view of the chest _______________ FINDINGS: HEART AND MEDIASTINUM: The cardiac size and mediastinal contours remain stable, and within normal limits for AP technique. LUNGS AND PLEURAL SPACES: Lungs are progressively underexpanded in the interval from prior examination with parallel increased in linear opacities at each lung base. No pneumothorax. Likely small bilateral pleural effusions. BONY THORAX AND SOFT TISSUES: No acute osseous abnormality. Degenerative changes of the shoulder girdles bilaterally with partial visualization of right shoulder arthroplasty. _______________ IMPRESSION: 1. Progressively underexpanded lungs with basilar atelectasis and small bilateral pleural effusions. Medications Reviewed: 
Current Facility-Administered Medications Medication Dose Route Frequency  albuterol-ipratropium (DUO-NEB) 2.5 MG-0.5 MG/3 ML  3 mL Nebulization Q6H PRN  
 ceFAZolin (ANCEF) 1 g in 0.9% sodium chloride (MBP/ADV) 50 mL MBP  1 g IntraVENous Q8H  predniSONE (DELTASONE) tablet 10 mg  10 mg Oral DAILY WITH BREAKFAST  sodium chloride (NS) flush 5-10 mL  5-10 mL IntraVENous PRN  
 brimonidine (ALPHAGAN) 0.2 % ophthalmic solution 1 Drop  1 Drop Both Eyes TID  apixaban (ELIQUIS) tablet 2.5 mg  2.5 mg Oral BID  latanoprost (XALATAN) 0.005 % ophthalmic solution 1 Drop  1 Drop Both Eyes QHS  dilTIAZem CD (CARDIZEM CD) capsule 180 mg  180 mg Oral DAILY  gabapentin (NEURONTIN) capsule 600 mg  600 mg Oral TID  levothyroxine (SYNTHROID) tablet 175 mcg  175 mcg Oral 6am  
 tamsulosin (FLOMAX) capsule 0.4 mg  0.4 mg Oral PCD  traMADol (ULTRAM) tablet 50 mg  50 mg Oral Q6H PRN  
 traZODone (DESYREL) tablet 50 mg  50 mg Oral QHS PRN  
 zolpidem (AMBIEN) tablet 5 mg  5 mg Oral QHS PRN Mono Del Toro PA-C [facility] Pager: [phone] Office: [phone]

## 2019-07-11 NOTE — CDMP QUERY
This Patient admitted with Cellulitis and has Hx of Afib, and takes Eliquis, for severity of illness, please further specify the type if known 
 
>=Persistent Afib 
>=Paroxsymal Afib  
>=Chronic Afib 
>= Permanent Afib Thank you, 
Krystyna Henao RN/[facility] 
[phone]

## 2019-07-11 NOTE — ED PROVIDER NOTES
Estrella Hinojosa is a 80 y.o. Male with history of atrial fibrillation who was noted swelling and redness to his left thumb, right ankle and then left foot over the last 2 to 3 days. Patient has had a subjective fever at home. He denies any new productive cough. Patient did have an episode of upper abdominal pain about 2 to 3 days ago but that resolved. He is on no vomiting or any diarrhea. He denies any dysuria or urinary frequency. Patient did have a skin cancer removed from his right forearm also few days ago as well. Patient denies any other recent dental work. He has no history of cardiac valve issues that he is aware of. The history is provided by the patient and medical records. Past Medical History:  
Diagnosis Date  Arthritis  Atrial fibrillation ([address] 07/2017 Dr Dianne Cyr cardio follows. chronic  Carcinoma of prostate ([address]  Cellulitis  Coarse tremor   
  hands, states \"my doctor is aware\"  Difficulty swallowing  Dysphagia  Edema  Encounter for colonoscopy due to history of adenomatous colonic polyps  Essential hypertension  Fall 10/07/2017 \"went to [facility] Emergency Room, CT Scan showed concussion, no bleeding\" per patient  Fatigue  GERD (gastroesophageal reflux disease) wo. esophagitis  Glaucoma  H/O joint replacement   
  left knee 2003  HLD (hyperlipidemia)  Hypertension  Hypertensive disorder  Hypothyroidism  Impotence  Laceration of right lower leg with complication  Malignant neoplasm of prostate ([address]   
  [id number] Adenocarcinoma of the Prostate, dx 11/3/2008, karlee 3+4, presenting PSA 5.5ng/mL. 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   
 acute/hist. of   
 
 
Past Surgical History:  
Procedure Laterality Date  COLONOSCOPY N/A 1/3/2017  HX CHOLECYSTECTOMY  HX KNEE REPLACEMENT Left 01/2003  HX SHOULDER REPLACEMENT Right 7/17, 10/17 History reviewed. No pertinent family history. Social History Socioeconomic History  Marital status:  Spouse name: Not on file  Number of children: Not on file  Years of education: Not on file  Highest education level: Not on file Occupational History  Not on file Social Needs  Financial resource strain: Not on file  Food insecurity:  
  Worry: Not on file Inability: Not on file  Transportation needs:  
  Medical: Not on file Non-medical: Not on file Tobacco Use  Smoking status: Former Smoker  Smokeless tobacco: Never Used Substance and Sexual Activity  Alcohol use: Yes Comment: weekly  Drug use: No  
 Sexual activity: Not on file Lifestyle  Physical activity:  
  Days per week: Not on file Minutes per session: Not on file  Stress: Not on file Relationships  Social connections:  
  Talks on phone: Not on file Gets together: Not on file Attends Yazidism service: Not on file Active member of club or organization: Not on file Attends meetings of clubs or organizations: Not on file Relationship status: Not on file  Intimate partner violence:  
  Fear of current or ex partner: Not on file Emotionally abused: Not on file Physically abused: Not on file Forced sexual activity: Not on file Other Topics Concern  Not on file Social History Narrative  Not on file ALLERGIES: Adhesive; Darvon [propoxyphene]; and Penicillins Review of Systems Constitutional: Positive for chills and fever. HENT: Negative for sore throat and trouble swallowing. Eyes: Negative for visual disturbance. Respiratory: Negative for shortness of breath. Cardiovascular: Positive for leg swelling. Negative for chest pain. Gastrointestinal: Negative for abdominal pain. Endocrine: Negative for polyuria. Genitourinary: Negative for difficulty urinating and dysuria. Musculoskeletal: Positive for arthralgias and gait problem. Skin: Positive for color change and wound. Allergic/Immunologic: Negative for immunocompromised state. Neurological: Negative for syncope. Hematological: Bruises/bleeds easily. Psychiatric/Behavioral: Positive for sleep disturbance. Negative for confusion. Vitals:  
 07/10/19 1613 07/10/19 2100 07/10/19 2115 07/10/19 2130 BP: 115/66 134/65 122/59 140/68 Pulse: 81 87 81 82 Resp: 16 27 22 21 Temp: 98.3 °F (36.8 °C) SpO2: 93%  94% 97% Weight: 80.7 kg (178 lb) Height: 5' 8\" (1.727 m) Physical Exam  
Constitutional: He is oriented to person, place, and time. He appears well-developed and well-nourished. Non-toxic appearance. He does not have a sickly appearance. He appears ill. He appears distressed. HENT:  
Head: Normocephalic and atraumatic. Right Ear: External ear normal.  
Left Ear: External ear normal.  
Nose: Nose normal.  
Mouth/Throat: Oropharynx is clear and moist. No oropharyngeal exudate. Eyes: Conjunctivae are normal.  
Neck: Normal range of motion. Cardiovascular: Normal rate, regular rhythm, normal heart sounds and intact distal pulses. Pulmonary/Chest: Effort normal and breath sounds normal. No respiratory distress. Abdominal: Soft. There is no tenderness. Musculoskeletal: Normal range of motion. He exhibits edema. Patient's left thumb is diffusely swollen with erythema. There is normal cap refill. There is no extension of redness beyond the hand.  
Patient has erythema extending from the right ankle up into the lower leg with warmth. There is no bullae or skin crepitus. There is no significant pain with range of motion of the foot or ankle. He has normal distal pulses. There is also redness and warmth to the medial aspect of the left foot as well. Neurological: He is alert and oriented to person, place, and time. Skin: Skin is warm and dry. He is not diaphoretic. Psychiatric: His behavior is normal.  
Nursing note and vitals reviewed. MDM Procedures Vitals: 
Patient Vitals for the past 12 hrs: 
 Temp Pulse Resp BP SpO2  
07/10/19 2130  82 21 140/68 97 % 07/10/19 2115  81 22 122/59 94 % 07/10/19 2100  87 27 134/65   
07/10/19 1613 98.3 °F (36.8 °C) 81 16 115/66 93 % Medications ordered:  
Medications  
sodium chloride (NS) flush 5-10 mL (has no administration in time range)  
vancomycin (VANCOCIN) 2000 mg in  ml infusion (2,000 mg IntraVENous New Bag 7/10/19 2052) levoFLOXacin (LEVAQUIN) 250 mg in D5W IVPB (250 mg IntraVENous New Bag 7/10/19 2052)  
sodium chloride 0.9 % bolus infusion 1,000 mL (1,000 mL IntraVENous New Bag 7/10/19 1905)  
sodium chloride 0.9 % bolus infusion 500 mL (500 mL IntraVENous New Bag 7/10/19 2059) Lab findings: 
Recent Results (from the past 12 hour(s)) EKG, 12 LEAD, INITIAL Collection Time: 07/10/19  5:10 PM  
Result Value Ref Range Ventricular Rate 87 BPM  
 Atrial Rate 241 BPM  
 QRS Duration 100 ms Q-T Interval 320 ms QTC Calculation (Bezet) 385 ms Calculated R Axis -36 degrees Calculated T Axis 88 degrees Diagnosis Atrial fibrillation Left axis deviation ST & T wave abnormality, consider lateral ischemia or digitalis effect Abnormal ECG When compared with ECG of 02-JUN-2018 05:25, 
T wave inversion less evident in Lateral leads QT has shortened POC LACTIC ACID Collection Time: 07/10/19  5:34 PM  
Result Value Ref Range  Lactic Acid (POC) 1.85 0.40 - 2.00 mmol/L  
CBC WITH AUTOMATED DIFF  
 Collection Time: 07/10/19  5:38 PM  
Result Value Ref Range WBC 19.3 (H) 4.6 - 13.2 K/uL  
 RBC 3.45 (L) 4.70 - 5.50 M/uL  
 HGB 11.3 (L) 13.0 - 16.0 g/dL HCT 34.9 (L) 36.0 - 48.0 % .2 (H) 74.0 - 97.0 FL  
 MCH 32.8 24.0 - 34.0 PG  
 MCHC 32.4 31.0 - 37.0 g/dL  
 RDW 14.5 11.6 - 14.5 % PLATELET 178 145 - 176 K/uL MPV 8.7 (L) 9.2 - 11.8 FL  
 NEUTROPHILS 94 (H) 40 - 73 % LYMPHOCYTES 2 (L) 21 - 52 % MONOCYTES 4 3 - 10 % EOSINOPHILS 0 0 - 5 % BASOPHILS 0 0 - 2 %  
 ABS. NEUTROPHILS 18.1 (H) 1.8 - 8.0 K/UL  
 ABS. LYMPHOCYTES 0.5 (L) 0.9 - 3.6 K/UL  
 ABS. MONOCYTES 0.7 0.05 - 1.2 K/UL  
 ABS. EOSINOPHILS 0.0 0.0 - 0.4 K/UL  
 ABS. BASOPHILS 0.0 0.0 - 0.1 K/UL  
 DF AUTOMATED METABOLIC PANEL, COMPREHENSIVE Collection Time: 07/10/19  5:38 PM  
Result Value Ref Range Sodium 135 (L) 136 - 145 mmol/L Potassium 3.6 3.5 - 5.5 mmol/L Chloride 97 (L) 100 - 108 mmol/L  
 CO2 30 21 - 32 mmol/L Anion gap 8 3.0 - 18 mmol/L Glucose 131 (H) 74 - 99 mg/dL BUN 29 (H) 7.0 - 18 MG/DL Creatinine 1.57 (H) 0.6 - 1.3 MG/DL  
 BUN/Creatinine ratio 18 12 - 20 GFR est AA 51 (L) >60 ml/min/1.73m2 GFR est non-AA 42 (L) >60 ml/min/1.73m2 Calcium 8.4 (L) 8.5 - 10.1 MG/DL Bilirubin, total 1.1 (H) 0.2 - 1.0 MG/DL  
 ALT (SGPT) 22 16 - 61 U/L  
 AST (SGOT) 33 15 - 37 U/L Alk. phosphatase 108 45 - 117 U/L Protein, total 6.4 6.4 - 8.2 g/dL Albumin 3.0 (L) 3.4 - 5.0 g/dL Globulin 3.4 2.0 - 4.0 g/dL A-G Ratio 0.9 0.8 - 1.7 LIPASE Collection Time: 07/10/19  5:38 PM  
Result Value Ref Range Lipase 47 (L) 73 - 393 U/L  
TROPONIN I Collection Time: 07/10/19  5:38 PM  
Result Value Ref Range Troponin-I, QT <0.02 0.0 - 0.045 NG/ML  
NT-PRO BNP Collection Time: 07/10/19  5:38 PM  
Result Value Ref Range NT pro-BNP 3,827 (H) 0 - 1,800 PG/ML  
 
 
EKG interpretation by ED Physician: 
Atrial fibrillation with ST and T wave changes.   There is no acute ischemic changes. Rate 87   A. fib is chronic Cardiac monitor: Normal rate with irregular rhythm and no ectopy Pulse ox: 97% room air X-Ray, CT or other radiology findings or impressions: 
XR ANKLE LT MIN 3 V    (Results Pending) XR THUMB LT MIN 2 V    (Results Pending) XR CHEST PORT    (Results Pending) X-ray of ankle and thumb with no acute fractures. There is obvious peripheral vascular disease. Progress notes, Consult notes or additional Procedure notes:  
Patient with multiple areas of infection which is concerning for seeding of bacteria. Possible endocarditis. Patient will need admission for IV antibiotics and further work-up to include possible echo Discussed with patient and daughter need for admission who are both agreeable Discussed with Dr. Demetris Davis on-call for the hospitalist service will admit the patient Reevaluation of patient:  
stable Disposition: 
Diagnosis: 1. Cellulitis of left foot 2. Cellulitis of right lower leg 3. Cellulitis of left thumb Disposition: admit Follow-up Information None Patient's Medications Start Taking No medications on file Continue Taking ALBUTEROL (PROAIR HFA) 90 MCG/ACTUATION INHALER    Take  inhaled by mouth. ALPHAGAN P 0.15 % OPHTHALMIC SOLUTION    Administer 1 Drop to both eyes three (3) times daily. APIXABAN (ELIQUIS) 5 MG TABLET    Take 1 Tab by mouth two (2) times a day. BIMATOPROST (LUMIGAN) 0.03 % OPHTHALMIC DROPS    Administer 1 drop to both eyes every evening. CYANOCOBALAMIN 1,000 MCG TABLET    Take 1,000 mcg by mouth daily. DILTIAZEM CD (CARDIZEM CD) 180 MG ER CAPSULE    Take 180 mg by mouth daily. FUROSEMIDE (LASIX) 40 MG TABLET    Take 40 mg by mouth daily. 2 tab daily GABAPENTIN (NEURONTIN) 600 MG TABLET    Take 600 mg by mouth three (3) times daily. IPRATROPIUM-ALBUTEROL (COMBIVENT RESPIMAT)  MCG/ACTUATION INHALER    daily as needed LEVOTHYROXINE (SYNTHROID) 175 MCG TABLET    Take 175 mcg by mouth Daily (before breakfast). LORATADINE (CLARITIN) 10 MG TABLET    Take 10 mg by mouth daily. METHYLPREDNISOLONE (MEDROL, AGUSTO,) 4 MG TABLET    Per dose pack instructions MULTIVITAMINS-MINERALS-LUTEIN (CENTRUM SILVER) TAB    Take 1 Tab by mouth daily. PYRIDOXINE HCL (VITAMIN B-6 PO)    Take 1 Cap by mouth daily. TAMSULOSIN (FLOMAX) 0.4 MG CAPSULE    Take 1 Cap by mouth daily (after dinner). TRAMADOL (ULTRAM) 50 MG TABLET    Take 1 tablet by mouth every six (6) hours as needed for Pain. TRAZODONE (DESYREL) 50 MG TABLET    Take 50 mg by mouth nightly as needed. These Medications have changed No medications on file Stop Taking No medications on file

## 2019-07-11 NOTE — H&P
History and Physical 
 
Patient: Erika Flores               Sex: male          DOA: 7/10/2019 YOB: 1933      Age:  80 y.o.        LOS:  LOS: 1 day HPI:  
 
Erika Flores is a 80 y.o. male who presented to the ER with pain and swelling involving the right leg. This began two days ago. It is associated with warmth at the redness site. He also has swelling of his left thumb with mild redness. He has no specific injury. 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 In the ER he is found to have Cellulitis and he will be admitted for ongoing management. Past Medical History:  
Diagnosis Date  Arthritis  Atrial fibrillation ([address] 07/2017 Dr Roxi Barrett cardio follows. chronic  Carcinoma of prostate ([address]  Cellulitis  Coarse tremor   
  hands, states \"my doctor is aware\"  Difficulty swallowing  Dysphagia  Edema  Encounter for colonoscopy due to history of adenomatous colonic polyps  Essential hypertension  Fall 10/07/2017 \"went to [facility] Emergency Room, CT Scan showed concussion, no bleeding\" per patient  Fatigue  GERD (gastroesophageal reflux disease) wo. esophagitis  Glaucoma  H/O joint replacement   
  left knee 2003  HLD (hyperlipidemia)  Hypertension  Hypertensive disorder  Hypothyroidism  Impotence  Laceration of right lower leg with complication  Malignant neoplasm of prostate ([address]   
  [id number] Adenocarcinoma of the Prostate, dx 11/3/2008, karlee 3+4, presenting PSA 5.5ng/mL. 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   
 acute/hist. of   
 
 
Social History: Tobacco use:  Patient quit smoking in . He had smoked for many years Alcohol use:  Patient uses alcohol occasionally Patient lives with his wife Family History: Mother  at the age of 80. She had a stroke Father  at 80. He had CHF Review of Systems Constitutional:  No fever or weight loss HEENT:  No headache or visual changes Cardiovascular:  No chest pain or diaphoresis Respiratory:  No coughing, wheezing, or shortness of breath. GI:  No nausea or vomitting. No diarrhea :  No hematuria or dysuria Skin:  Redness involving right calf and left thumb as above Neuro:  No seizures or syncope Hematological:  No bruising or bleeding Endocrine:  No diabetes or thyroid disease Physical Exam:  
  
Visit Vitals /74 Pulse 94 Temp 98.1 °F (36.7 °C) Resp 20 Ht 5' 8\" (1.727 m) Wt 80.7 kg (178 lb) SpO2 95% BMI 27.06 kg/m² Physical Exam: 
 
Gen:  No distress, alert HEENT:  Normal cephalic atraumatic, extra-occular movements are intact. Neck:  Supple, No JVD Lungs:  Clear bilaterally, no wheeze, no rales, normal effort Heart:  Regular Rate and Rhythm, normal S1 and S2, no edema Abdomen:  Soft, non tender, normal bowel sounds, no guarding. Extremities:  Erythema involving right anterior shin area Neurological:  Awake and alert, CN's are intact, normal strength throughout extremities Skin:  No rashes or moles Mental Status:  Normal thought process, does not appear anxious Laboratory Studies: 
 
BMP:  
Lab Results Component Value Date/Time  (L) 07/10/2019 05:38 PM  
 K 3.6 07/10/2019 05:38 PM  
 CL 97 (L) 07/10/2019 05:38 PM  
 CO2 30 07/10/2019 05:38 PM  
 AGAP 8 07/10/2019 05:38 PM  
  (H) 07/10/2019 05:38 PM  
 BUN 29 (H) 07/10/2019 05:38 PM  
 CREA 1.57 (H) 07/10/2019 05:38 PM  
 GFRAA 51 (L) 07/10/2019 05:38 PM  
 GFRNA 42 (L) 07/10/2019 05:38 PM  
 
CBC:  
Lab Results Component Value Date/Time WBC 19.3 (H) 07/10/2019 05:38 PM  
 HGB 11.3 (L) 07/10/2019 05:38 PM  
 HCT 34.9 (L) 07/10/2019 05:38 PM  
  07/10/2019 05:38 PM  
 
 
Assessment/Plan Principal Problem: 
  Cellulitis (7/10/2019) Active Problems: 
  HTN (hypertension) (7/10/2019) Atrial fibrillation ([address] (6/2/2018) PLAN: 
 
Continue with antibiotics BP control DVT prophylaxis PT evaluation

## 2019-07-11 NOTE — PROGRESS NOTES
TRANSFER - IN REPORT: 
 
Verbal report received from [address] [place] [address] Avenue name) on Wilman Herrera  being received from ED (unit) for routine progression of care Report consisted of patients Situation, Background, Assessment and  
Recommendations(SBAR). Information from the following report(s) SBAR, Kardex and MAR was reviewed with the receiving nurse. Opportunity for questions and clarification was provided. Assessment completed upon patients arrival to unit and care assumed. Pt assessed. Current vitals. Visit Vitals /86 Pulse 92 Temp 98.7 °F (37.1 °C) Resp 20 Ht 5' 8\" (1.727 m) Wt 80.7 kg (178 lb) SpO2 93% BMI 27.06 kg/m² 2350: Dr Monty Ramos at bedside. 3497: notified lab for repeat lactic acid. 0400: Awaiting lactic acid result from lab. Per Gonzales Chery () sample had to be sent to [place] for analysis. 0425: Lab result actually posted in result review. Per  Gonzales Chery, this discrepancy is due to the collection time being different from the time it was reported. 0510: pt verbalizing shortness of breathe. Sat in and dangled side of the bed and he felt better. 2488: Pt experiencing another bout of SOB. Bilateral wheezes auscultated. Pt abdomen distended. No tripoding. accessory muscle use present. No history of respiratory issues per pt report but he uses a nubulizer at home for diagnoses that is unclear to this RN. Paged Dr. Monty Ramos. 4062: Dr. Monty Ramos returned call. Gave telephone orders for Duo-Neb PRN q6hr. RBV. 
 
3233: Treated pt with Duo-Neb. Pt verbalized feeling better after this treatment.

## 2019-07-11 NOTE — PROGRESS NOTES
conducted an initial consultation and Spiritual Assessment for Yared Browne, who is a 80 y.o.,male. Patients Primary Language is: [place]. According to the patients EMR Mandaeism Affiliation is: Non Jehovah's witness.  
 
The reason the Patient came to the hospital is:  
Patient Active Problem List  
 Diagnosis Date Noted  Neutrophilic leukocytosis 63/19/8617  Cellulitis 07/10/2019  
 HTN (hypertension) 07/10/2019  Pneumonia 06/02/2018  Atrial fibrillation ([facility] 06/02/2018  Hypothyroidism 06/02/2018  Sepsis ([facility] 06/02/2018  Non-pressure chronic ulcer of right calf with fat layer exposed ([facility] 01/19/2017  Laceration of right lower leg with complication 41/44/2746  Edema 01/19/2017  Encounter for colonoscopy due to history of adenomatous colonic polyps 01/03/2017  Dysphagia 06/17/2016  Hypertension  GERD (gastroesophageal reflux disease)  Glaucoma  Glaucoma  Malignant neoplasm of prostate ([facility] 01/16/2012 The  provided the following Interventions: 
Initiated a relationship of care and support. Explored issues of desire, spirituality and/or Presybeterian needs while hospitalized. Listened empathically. Provided chaplaincy education. Provided information about Spiritual Care Services. Offered prayer and assurance of continued prayers on patient's behalf. Chart reviewed. The following outcomes were achieved: 
Patient shared some information about their medical narrative and spiritual journey/beliefs. Patient processed feeling about current hospitalization. Patient expressed gratitude for the 's visit. Assessment: 
Patient did not indicate any spiritual or Presybeterian issues which require Spiritual Care Services interventions at this time. Patient does not have any Presybeterian/cultural needs that will affect patients preferences in health care.  
 
Plan: 
Chaplains will continue to follow and will provide pastoral care on an as needed or requested basis.  recommends bedside caregivers page  on duty if patient shows signs of acute spiritual or emotional distress. [facility] Staff  Spiritual Care  
[phone]

## 2019-07-11 NOTE — ROUTINE PROCESS
Bedside shift change report given to [address] [place] [address] (oncoming nurse) by Estuardo Ruiz RN (offgoing nurse). Report included the following information SBAR, MAR and Quality Measures. Opportunity for questions and clarification provided.

## 2019-07-11 NOTE — PROGRESS NOTES
Pharmacy: Apixaban Order Review Indication: Atrial fibrillation Dose: 2.5 mg PO BID Current Labs: 
Recent Labs  
  07/11/19 
0555 07/10/19 
1738 CREA 1.14 1.57* HGB 10.6* 11.3*  
 164 Est CrCL: 45 ml/min Drug Interactions: None Plan: Change to 5 mg PO BID  (1/3 criteria met) 1. Age > 80 years: Yes 2. Weight < 60 kg: No 
3. SCr > 1.5 mg/dL: No 
 
Please call pharmacy for questions.  
 
Thanks, 
Vivien Pinzon, PHARMD

## 2019-07-11 NOTE — PROGRESS NOTES
Problem: Discharge Planning Goal: *Discharge to safe environment Outcome: Progressing Towards Goal 
 
[facility] Care Management Interventions PCP Verified by CM: Yes 
Palliative Care Criteria Met (RRAT>21 & CHF Dx)?: No 
Transition of Care Consult (CM Consult): Discharge Planning Current Support Network: Lives with Spouse Confirm Follow Up Transport: Family Plan discussed with Pt/Family/Caregiver: Yes Discharge Location Discharge Placement: Other:(SNF VS HH) Reason for Admission:  Cellulitis RRAT Score:  8 Plan for utilizing home health: If indicated Current Advanced Directive/Advance Care Plan: not on file and not interested at this time Transition of Care Plan:     
 
Spoke with patient in room, he stated that he lives with his wife and has been using a walker prior to admission and no other DME's. He verified his address, PCP Dr. El Santiago, Insurance and phone # as correct on the facesheet. Patient has designated _Spouse_______________________ to participate in his/her discharge plan and to receive any needed information. Vishal Johnson [phone]. Tentative plan is to return home with Family. Ultimate plan will be based on patient progress and management , potential for SNF vs HH upon discharge.

## 2019-07-11 NOTE — PROGRESS NOTES
Initial Lactic acid level 1.85. Order to redraw if greater than 1.7. Spoke with Alyssa Castillo RN about repeat lactic needed. Stated she will have lab draw.

## 2019-07-11 NOTE — PROGRESS NOTES
Into patient's chart to check if patient has breathing treatment ordered, pt is complaining of difficulty breathing. Pt sitting up in the bed. Primary RN Sarah at bedside. Pt stated he is breathing better now that he is sitting up.

## 2019-07-11 NOTE — CDMP QUERY
This pt admitted with Cellulitis, Labs show a creat of 1.57 and gfr of 42/51,  Creat  In 2016 showed creat of 1.05 with gfr of 60/60, after review, please document the significant of these labs >=Stage 1  Ckd with Gfr >90 
>=Stage 2 Ckd with  Gfr  60-90 
>=Stage 3  Ckd with  Gfr  30-50 
>=Stage 4 Ckd  With  Gfr  15-29  
>= Darrel Risk Factors:  Htn 
Treatment :  IVF Thank you, 
Brianna Garcia RN/CDI 
[phone]

## 2019-07-11 NOTE — PROGRESS NOTES
Problem: Falls - Risk of 
Goal: *Absence of Falls Description Document Charley Yadav Fall Risk and appropriate interventions in the flowsheet. Outcome: Progressing Towards Goal 
  
Problem: Patient Education: Go to Patient Education Activity Goal: Patient/Family Education Outcome: Progressing Towards Goal 
  
Problem: Pressure Injury - Risk of 
Goal: *Prevention of pressure injury Description Document Dionicio Scale and appropriate interventions in the flowsheet. Outcome: Progressing Towards Goal 
  
Problem: Patient Education: Go to Patient Education Activity Goal: Patient/Family Education Outcome: Progressing Towards Goal

## 2019-07-11 NOTE — PROGRESS NOTES
PT orders received and chart reviewed, PT evaluation attempted x 2 (0914 and 1120). First attempt, patient declining to participate with and states \"I'm not feeling well. \" Second attempt, patient sleeping soundly. Family at bedside. Will f/u with patient as schedule permits. Chelsea Hernandez PT, DPT Office extension: [phone]

## 2019-07-11 NOTE — ED NOTES
TRANSFER - ED to INPATIENT REPORT: 
 
SBAR report made available to receiving floor on this patient being transferred to [address] [place] [address] ([facility])  for routine progression of care Admitting diagnosis Cellulitis [L03.90] HTN (hypertension) [I10] Information from the following report(s) SBAR, ED Summary, [facility] and Recent Results was made available to receiving floor. Lines:  
Peripheral IV 07/10/19 Right Antecubital (Active) Site Assessment Clean, dry, & intact 7/10/2019  5:38 PM  
Phlebitis Assessment 0 7/10/2019  5:38 PM  
Infiltration Assessment 0 7/10/2019  5:38 PM  
Dressing Status Clean, dry, & intact 7/10/2019  5:38 PM  
Dressing Type Transparent 7/10/2019  5:38 PM  
Hub Color/Line Status Green;Flushed 7/10/2019  5:38 PM  
Action Taken Blood drawn 7/10/2019  5:38 PM  
  
 
Medication list updated today Opportunity for questions and clarification was provided. Patient is oriented to time, place, person and situation Sepsis summary done* Patient is  continent and ambulatory with assist 
  
Valuables transported with patient Patient transported with: 
 Tech 
 
MAP (Monitor): 84 =Monitored (most recent) Vitals w/ MEWS Score (last day) Date/Time MEWS Score Pulse Resp Temp BP Level of Consciousness SpO2  
 07/10/19 2130    82  21    140/68    97 % 07/10/19 2115    81  22    122/59    94 % 07/10/19 2100    87  27    134/65      
 07/10/19 1613  1  81  16  98.3 °F (36.8 °C)  115/66  Alert  93 % Septic Patients:  
 
Lactic Acid Lab Results Component Value Date LACPOC 1.85 07/10/2019 LACPOC 1.4 06/02/2018  
 (Most recent on top) Repeat drawn: NO Time: n/a ALL LACTIC ACIDS GREATER THAN 2 MUST BE REPEATED POC WITHIN 4 HOURS OR PRIOR TO ADMISSION Total Fluid Bolus initiated and documented on MAR: YES All ordered antibiotics initiated within first 3 hours of TIME ZERO?   NO

## 2019-07-11 NOTE — PROGRESS NOTES
Assumed care of pt from [facility] pt awake in bed A&O x 4 , no distress noted and denies pain. Frequently used items and call bell within reach. Patient verbalized understanding to use call bell for any needs or assistance. Bed locked in lowest position. 1121 Pt was given pillows to elavate extremities. Pt was educated on the importance of elevating his leg. Pt periodically removed foot from pillows throughout the day. Bedside and Verbal shift change report given to Kim Simmons RN (oncoming nurse) by Dago Rodriguez RN (offgoing nurse). Report included the following information SBAR, Kardex, Intake/Output, MAR and Recent Results.

## 2019-07-11 NOTE — PROGRESS NOTES
Pharmacy Dosing Services: Vancomycin Indication: Skin and Soft Tissue Infection Day of therapy: 0 Other Antimicrobials (Include dose, start day & day of therapy): Levofloxacin 250 mg every 24 hours Loading dose (date given): 2,000 mg 
Current Maintenance dose: New start Goal Vancomycin Level: 15-20 mcg/mL (Trough 15-20 for most infections, 20 for meningitis/osteomyelitis, pre-HD level ~25) Vancomycin Level (if drawn): New start Significant Cultures: New start Renal function stable? (unstable defined as SCr increase of 0.5 mg/dL or > 50% increase from baseline, whichever is greater) (Y/N): Y  
 
CAPD, Hemodialysis or Renal Replacement Therapy (Y/N): N Recent Labs  
  07/10/19 
1738 CREA 1.57* BUN 29* WBC 19.3* Temp (24hrs), Av.3 °F (36.8 °C), Min:98 °F (36.7 °C), Max:98.7 °F (37.1 °C) Creatinine Clearance (Creatinine Clearance (ml/min)): Estimated Creatinine Clearance: 32.7 mL/min (A) (based on SCr of 1.57 mg/dL (H)). Regimen assessment: New start Maintenance dose: 1,250 mg every 24 hours Next scheduled level:  at 2030 Pharmacy will follow daily and adjust medications as appropriate for renal function and/or serum levels. Thank you, GRACE Henderson

## 2019-07-12 NOTE — CDMP QUERY
This Pt was admitted with Cellulitis, Temp 98.3, Pulse 81, RR 16, Bp  115-66, Wbc  19.3, Lactic Acid  1.85, blood Cultures  NGTD, Second day of admission Sepsis was documented , after review , please document if  
 » Patient has sepsis Please document suspected or confirmed causative organism /Cellulitis Please document suspected or confirmed localized infection Please clarify if sepsis is related to a device Please clarify if sepsis was present on admission » Patient had sepsis which is now resolved » Sepsis Ruled out » Clinically unable to determine » Unknown Thank you Josselin Tejeda RN/CDI

## 2019-07-12 NOTE — PROGRESS NOTES
Internal Medicine Progress Note Patient's Name: Yared Browne Admit Date: 7/10/2019 Length of Stay: 2 Assessment/Plan Principal Problem: 
  Sepsis ([facility] (7/10/2019) Active Problems: 
  Atrial fibrillation ([facility] (6/2/2018) Cellulitis (7/10/2019) HTN (hypertension) (7/10/2019) Neutrophilic leukocytosis (4/73/7816) Acute gout (7/11/2019) Acute renal failure superimposed on stage 2 chronic kidney disease ([facility] (7/11/2019) Sepsis - RLE cellulitis - Ancef q8 
- BCx ngtd Cellulitis - RLE 
- Cont IV abx Afib 
- cont eliquis - Rate controlled with current medications CKD - MACKENZIE resolved Gout 
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 
- hx of gout 
- stopped home lasix - prednisone Atelectasis - Suspect cause of SOB, under expanded lungs on CXR, small bilat pleural effusions - Incentive spirometer  
- duonebs as needed - Cont acceptable home medications for chronic conditions  
- DVT protocol I have personally reviewed all pertinent labs and films that have officially resulted over the last 24 hours. I have personally checked for all pending labs that are awaiting final results. Interval History \"Omero Montelongo is a 80 y.o. male who presented to the ER with pain and swelling involving the right leg. This began two days ago. It is associated with warmth at the redness site. He also has swelling of his left thumb with mild redness. He has no specific injury. 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 In the ER he is found to have Cellulitis and he will be admitted for ongoing management. \" Duplex negative for RLE DVT.  Cellulitic symptoms started prior to flare of left thumb/ left ankle, suspect cellulitis as well as home lasix as contributing factor to gout exacerbation. Left thumb and L ankle sx significantly improved after prednisone started. PT recs home health. Subjective Pt s/e @ bedside. No major events overnight. Patient has no complaints this morning. States he just worked with PT and walked around the unit. Objective Visit Vitals /67 (BP 1 Location: Left arm, BP Patient Position: Head of bed elevated (Comment degrees)) Pulse (!) 56 Temp 97.7 °F (36.5 °C) Resp 18 Ht 5' 8\" (1.727 m) Wt 80.7 kg (178 lb) SpO2 97% BMI 27.06 kg/m² Physical Exam: 
General Appearance: NAD, conversant HENT: normocephalic/atraumatic, moist mucus membranes Neck: No JVD, supple Lungs: CTA with normal respiratory effort CV: RRR, no m/r/g Abdomen: soft, non-tender, normal bowel sounds Extremities: BLE 1+ pitting edema RLE>LLE, RLE erythematous to mid tibia Neuro: No focal deficits, motor/sensory intact Intake and Output: 
Current Shift:  07/12 0701 - 07/12 1900 In: 240 [P.O.:240] Out: 600 [Urine:600] Last three shifts:  07/10 1901 - 07/12 0700 In: 250 [P.O.:100; I.V.:150] Out: 0029 [[id number]] Lab/Data Reviewed: All lab results for the last 24 hours reviewed. Imaging Reviewed: 
No results found. Medications Reviewed: 
Current Facility-Administered Medications Medication Dose Route Frequency  albuterol-ipratropium (DUO-NEB) 2.5 MG-0.5 MG/3 ML  3 mL Nebulization Q6H PRN  
 ceFAZolin (ANCEF) 1 g in 0.9% sodium chloride (MBP/ADV) 50 mL MBP  1 g IntraVENous Q8H  
 apixaban (ELIQUIS) tablet 5 mg  5 mg Oral BID  predniSONE (DELTASONE) tablet 40 mg  40 mg Oral DAILY WITH BREAKFAST  sodium chloride (NS) flush 5-10 mL  5-10 mL IntraVENous PRN  
 brimonidine (ALPHAGAN) 0.2 % ophthalmic solution 1 Drop  1 Drop Both Eyes TID  latanoprost (XALATAN) 0.005 % ophthalmic solution 1 Drop  1 Drop Both Eyes QHS  dilTIAZem CD (CARDIZEM CD) capsule 180 mg  180 mg Oral DAILY  gabapentin (NEURONTIN) capsule 600 mg  600 mg Oral TID  levothyroxine (SYNTHROID) tablet 175 mcg  175 mcg Oral 6am  
 tamsulosin (FLOMAX) capsule 0.4 mg  0.4 mg Oral PCD  traMADol (ULTRAM) tablet 50 mg  50 mg Oral Q6H PRN  
 traZODone (DESYREL) tablet 50 mg  50 mg Oral QHS PRN  
 zolpidem (AMBIEN) tablet 5 mg  5 mg Oral QHS PRN Aakash Giraldo PA-C [address] [place], [place] [address] Hospitalist Division Office:  [phone] Pager: [phone]

## 2019-07-12 NOTE — PROGRESS NOTES
Problem: Mobility Impaired (Adult and Pediatric) Goal: *Acute Goals and Plan of Care (Insert Text) Description Physical Therapy Goals Initiated 7/12/2019 and to be accomplished within 7 days. 1.  Patient will complete all bed mobility with modified independence in order to prepare for EOB/OOB activity. 2.  Patient will perform sit <> stand with modified independence in order to prepare for OOB/gait activity. 3.  Patient will perform bed to chair transfers with modified independence in order to promote mobility and encourage seated activity to progress towards their prior level of function. 4.  Patient will ambulate 500 feet with modified independence using LRAD in order to prepare for safe negotiation of their environment. Outcome: Progressing Towards Goal 
 PHYSICAL THERAPY EVALUATION Patient: Alvin Almanzar [de-identified]80 y.o. male) Date: 7/12/2019 Primary Diagnosis: Cellulitis [L03.90] HTN (hypertension) [I10] Precautions:    
 
PLOF: Patient lives with family in a single story home; he has 3 steps on back porch. Has a RW at home. ASSESSMENT : 
Patient supine in bed, agreeable to participation with PT. Family present. MIN A for supine > sit at EOB. Patient requesting to use restroom. MOD A for sit > stand with RW; verbal cues for safety. CGA for ambulation into restroom. Independent with pericare and toileting. Patient able to ambulate an additional 500 ft with RW. Ambulating with decreased gait speed and step length. Verbal cues for safe negotiation of RW. Patient returned to room and left seated in chair with all needs. Reports improvement in LE and R thumb pain. Patient presenting with deficits in: Bed Mobility, Transfers, Gait, Strength and Stairs Patient educated on role of PT, PT POC, bed mobility, transfers, gait, and safety with mobility as indicated. Recommend d/c home with home health Patient will benefit from skilled intervention to address the above impairments. Patient's rehabilitation potential is considered to be Good Factors which may influence rehabilitation potential include:  
? None noted ? Mental ability/status ? Medical condition ? Home/family situation and support systems ? Safety awareness 
? Pain tolerance/management 
? Other: PLAN : 
Recommendations and Planned Interventions:  
?           Bed Mobility Training             ? Neuromuscular Re-Education ? Transfer Training                   ? Orthotic/Prosthetic Training 
? Gait Training                          ? Modalities ? Therapeutic Exercises           ? Edema Management/Control ? Therapeutic Activities            ? Family Training/Education ? Patient Education ? Other (comment): Frequency/Duration: Patient will be followed by physical therapy 3-5x/week to address goals. Discharge Recommendations: Home Health Further Equipment Recommendations for Discharge: N/A; patient has RW SUBJECTIVE:  
Patient stated ? I was so tired yesterday. ? OBJECTIVE DATA SUMMARY:  
 
Past Medical History:  
Diagnosis Date Arthritis [facility] 07/2017 Dr Peyton Velarde cardio follows. chronic Carcinoma of prostate ([facility] Cellulitis Coarse tremor   
  hands, states \"my doctor is aware\" Difficulty swallowing Dysphagia Edema Encounter for colonoscopy due to history of adenomatous colonic polyps Essential hypertension Fall 10/07/2017 \"went to [facility] Emergency Room, CT Scan showed concussion, no bleeding\" per patient Fatigue GERD (gastroesophageal reflux disease) wo. esophagitis Glaucoma H/O joint replacement   
  left knee 2003 HLD (hyperlipidemia) Hypertension Hypertensive disorder Hypothyroidism Impotence Laceration of right lower leg with complication Malignant neoplasm of prostate ([facility]   
  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   
 acute/hist. of   
 
Past Surgical History:  
Procedure Laterality Date COLONOSCOPY N/A 1/3/2017 HX CHOLECYSTECTOMY HX KNEE REPLACEMENT Left 01/2003 HX SHOULDER REPLACEMENT Right 7/17, 10/17 Barriers to Learning/Limitations: None Compensate with: N/A Home Situation: 
Home Situation Home Environment: Private residence # Steps to Enter: 3 One/Two Story Residence: One story Living Alone: No 
Support Systems: Family member(s) Patient Expects to be Discharged to[de-identified] Private residence Current DME Used/Available at Home: Walker, rolling Critical Behavior: 
Alert and oriented x 4 Psychosocial 
Patient Behaviors: Calm; Cooperative Purposeful Interaction: Yes Functional Mobility: 
Bed Mobility: 
  
Supine to Sit: Minimum assistance Sit to Supine: (NT: patient seated in chair) Transfers: 
Sit to Stand: Moderate assistance Stand to Sit: Minimum assistance Balance:  
Sitting: Impaired Sitting - Static: Good (unsupported) Sitting - Dynamic: Fair (occasional) Standing: Impaired Standing - Static: Fair Standing - Dynamic : Fair Ambulation/Gait Training: 
Distance (ft): 500 Feet (ft) Assistive Device: Walker, rolling Ambulation - Level of Assistance: Contact guard assistance Gait Description (WDL): Exceptions to [facility] Gait Abnormalities: Step to gait Base of Support: Center of gravity altered Step Length: Left shortened;Right shortened Pain: 
None Pain level pre-treatment: 0/10 Pain level post-treatment: 0/10 Pain Intervention(s) : N/A Activity Tolerance:  
Good Please refer to the flowsheet for vital signs taken during this treatment. After treatment:  
?         Patient left in no apparent distress sitting up in chair ? Patient left in no apparent distress in bed 
? Call bell left within reach ? Nursing notified ? Caregiver present - CNA Le Cicogne ? Bed alarm activated ? SCDs applied COMMUNICATION/EDUCATION:  
?         Role of Physical Therapy in the acute care setting. ?         Fall prevention education was provided and the patient/caregiver indicated understanding. ? Patient/family have participated as able in goal setting and plan of care. ?         Patient/family agree to work toward stated goals and plan of care. ?         Patient understands intent and goals of therapy, but is neutral about his/her participation. ? Patient is unable to participate in goal setting/plan of care: ongoing with therapy staff. ?         Other: Thank you for this referral. 
Lauri Oneil Time Calculation: 39 mins Eval Complexity: History: MEDIUM  Complexity : 1-2 comorbidities / personal factors will impact the outcome/ POC Exam:MEDIUM Complexity : 3 Standardized tests and measures addressing body structure, function, activity limitation and / or participation in recreation  Presentation: MEDIUM Complexity : Evolving with changing characteristics  Clinical Decision Making:Medium Complexity MOD - CGA for mobility  Overall Complexity:MEDIUM

## 2019-07-12 NOTE — PROGRESS NOTES
Bedside shift change report given to this RN (oncoming nurse) by Daniela Szymanski RN (offgoing nurse). Report included the following information SBAR, Kardex and Cardiac Rhythm A-flutter.

## 2019-07-12 NOTE — PROGRESS NOTES
Nutrition initial assessment/ 
Plan of care RECOMMENDATIONS:  
 
1. Regular diet 2. Monitor weight, labs and PO intake 3. RD to follow GOALS:  
 
1. PO intake meets >75% of protein/calorie needs by 7/19 
2. Weight Maintenance (+/- 1-2 lb by 7/19) ASSESSMENT:  
 
Weight status is classified as overweight per BMI of 27.1. PO intake is adequate. Labs noted. Patient with hypocalcemia and hypoalbuminemia. Elevated BUN and BNP (3827). Nutrition recommendations listed. RD to follow. Nutrition Diagnoses:  
Inadequate oral intake related to poor appetite as evidenced by pt reported poor PO intake over past few days. (Now improved). Nutrition Risk:  [] High  [] Moderate [x]  Low SUBJECTIVE/OBJECTIVE:  
  
Admitted with sepsis and cellulitis of left foot, right lower leg and left thumb. Patient reports generally having a good appetite but with poor appetite and PO intake for past few days due to not feeling well. Reports appetite currently improved and has been eating all of meals. Observed 100% intake of lunch meal during visit. Denies food allergies and problems with chewing/swallowing. Reports weight has been stable at 175 lb. Encouraged adequate intake. Will monitor. Information Obtained from:  
 [x] Chart Review [x] Patient 
 [] Family/Caregiver 
 [] Nurse/Physician 
 [] Interdisciplinary Meeting/Rounds Diet: Regular diet Medications: [x] Reviewed (Prednisone) Allergies: [x] Reviewed Encounter Diagnoses ICD-10-CM ICD-9-CM 1. Cellulitis of left foot L03.116 682.7 2. Cellulitis of right lower leg L03.115 682.6 3. Cellulitis of left thumb L03.012 681.00 Past Medical History:  
Diagnosis Date  Arthritis  Atrial fibrillation ([facility] 07/2017 Dr Errol Hart cardio follows. chronic  Carcinoma of prostate ([facility]  Cellulitis  Coarse tremor   
  hands, states \"my doctor is aware\"  Difficulty swallowing  Dysphagia  Edema  Encounter for colonoscopy due to history of adenomatous colonic polyps  Essential hypertension  Fall 10/07/2017 \"went to [facility] Emergency Room, CT Scan showed concussion, no bleeding\" per patient  Fatigue  GERD (gastroesophageal reflux disease) wo. esophagitis  Glaucoma  H/O joint replacement   
  left knee 2003  HLD (hyperlipidemia)  Hypertension  Hypertensive disorder  Hypothyroidism  Impotence  Laceration of right lower leg with complication  Malignant neoplasm of prostate ([facility] [address]   
  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   
 acute/hist. of   
  
Labs:   
Lab Results Component Value Date/Time Sodium 136 07/12/2019 04:10 AM  
 Potassium 3.7 07/12/2019 04:10 AM  
 Chloride 102 07/12/2019 04:10 AM  
 CO2 27 07/12/2019 04:10 AM  
 Anion gap 7 07/12/2019 04:10 AM  
 Glucose 117 (H) 07/12/2019 04:10 AM  
 BUN 20 (H) 07/12/2019 04:10 AM  
 Creatinine 0.90 07/12/2019 04:10 AM  
 Calcium 7.9 (L) 07/12/2019 04:10 AM  
 Albumin 3.0 (L) 07/10/2019 05:38 PM  
 
Anthropometrics: BMI (calculated): 27.1 Last 3 Recorded Weights in this Encounter  
 07/10/19 1613 Weight: 80.7 kg (178 lb) Ht Readings from Last 1 Encounters:  
07/10/19 5' 8\" (1.727 m) Weight Metrics 7/10/2019 6/25/2019 5/23/2019 12/17/2018 8/28/2018 6/4/2018 8/29/2017 Weight 178 lb 177 lb 177 lb 173 lb 168 lb 180 lb 181 lb BMI 27.06 kg/m2 26.91 kg/m2 26.91 kg/m2 26.3 kg/m2 25.54 kg/m2 27.37 kg/m2 27.52 kg/m2 Patient Vitals for the past 100 hrs: 
 % Diet Eaten 07/12/19 0914 100 % 07/11/19 0904 0 % IBW: 154 lb %IBW: 115% [] Weight Loss [] Weight Gain [x] Weight Stable Estimated Nutrition Needs: [x] MSJ Calories: 1900 Kcal Based on:   [x] Actual BW   
Protein:   81-97 g Based on:   [x] Actual BW Fluid:       0009-6959 ml Based on:   [x] Actual BW  
 
 [x] No Cultural, Yarsani or ethnic dietary need identified. [] Cultural, Yarsani and ethnic food preferences identified and addressed Wt Status:  [] Normal (18.6 - 24.9) [] Underweight (<18.5) [x] Overweight (25 - 29.9) [] Mild Obesity (30 - 34.9)  [] Moderate Obesity (35 - 39.9) [] Morbid Obesity (40+) Nutrition Problems Identified:  
[x] Suboptimal PO intake (PTA- now improved) [] Food Allergies [] Difficulty chewing/swallowing/poor dentition 
[] Constipation/Diarrhea  
[] Nausea/Vomiting  
[] None 
[] Other:  
 
Plan:  
[] Therapeutic Diet 
[]  Obtained/adjusted food preferences/tolerances and/or snacks options  
[]  Supplements added  
[] Occupational therapy following for feeding techniques []  HS snack added  
[]  Modify diet texture  
[]  Modify diet for food allergies []  Assist with menu selection  
[x]  Monitor PO intake on meal rounds  
[x]  Continue inpatient monitoring and intervention  
[]  Participated in discharge planning/Interdisciplinary rounds/Team meetings  
[]  Other:  
 
Education Needs: 
 [] Not appropriate for teaching at this time due to: 
 [x] Identified and addressed Nutrition Monitoring and Evaluation: 
[x] Continue ongoing monitoring and intervention 
[] Other Isai Au

## 2019-07-12 NOTE — PROGRESS NOTES
Problem: Discharge Planning Goal: *Discharge to safe environment Outcome: Progressing Towards Goal 
   Plan: home vs home health Chart reviewed. Noted therapy rec for home health, will need orders. Will cont to follow. Michelle Vazquez RN,ext [phone].

## 2019-07-12 NOTE — PROGRESS NOTES
7/11/2019  20:00- Assessment completed- see flow sheet. Patient has no c/o pain/discomfort at this time. Continue to monitor- Call light and urinal in reach. Hourly rounding. 7/12/2019  00:30- No change in condition- continue to monitor. Call light in reach at all times. 04:30- No change in condition- continue to monitor. Call light in reach at all times. 07:20- Bedside report given to oncoming nurse, Riley Cano RN.

## 2019-07-13 NOTE — DISCHARGE INSTRUCTIONS
Patient Education        Cellulitis: Care Instructions  Your Care Instructions    Cellulitis is a skin infection caused by bacteria, most often strep or staph. It often occurs after a break in the skin from a scrape, cut, bite, or puncture, or after a rash. Cellulitis may be treated without doing tests to find out what caused it. But your doctor may do tests, if needed, to look for a specific bacteria, like methicillin-resistant Staphylococcus aureus (MRSA). The doctor has checked you carefully, but problems can develop later. If you notice any problems or new symptoms, get medical treatment right away. Follow-up care is a key part of your treatment and safety. Be sure to make and go to all appointments, and call your doctor if you are having problems. It's also a good idea to know your test results and keep a list of the medicines you take. How can you care for yourself at home? · Take your antibiotics as directed. Do not stop taking them just because you feel better. You need to take the full course of antibiotics. · Prop up the infected area on pillows to reduce pain and swelling. Try to keep the area above the level of your heart as often as you can. · If your doctor told you how to care for your wound, follow your doctor's instructions. If you did not get instructions, follow this general advice:  ? Wash the wound with clean water 2 times a day. Don't use hydrogen peroxide or alcohol, which can slow healing. ? You may cover the wound with a thin layer of petroleum jelly, such as Vaseline, and a nonstick bandage. ? Apply more petroleum jelly and replace the bandage as needed. · Be safe with medicines. Take pain medicines exactly as directed. ? If the doctor gave you a prescription medicine for pain, take it as prescribed. ? If you are not taking a prescription pain medicine, ask your doctor if you can take an over-the-counter medicine.   To prevent cellulitis in the future  · Try to prevent cuts, scrapes, or other injuries to your skin. Cellulitis most often occurs where there is a break in the skin. · If you get a scrape, cut, mild burn, or bite, wash the wound with clean water as soon as you can to help avoid infection. Don't use hydrogen peroxide or alcohol, which can slow healing. · If you have swelling in your legs (edema), support stockings and good skin care may help prevent leg sores and cellulitis. · Take care of your feet, especially if you have diabetes or other conditions that increase the risk of infection. Wear shoes and socks. Do not go barefoot. If you have athlete's foot or other skin problems on your feet, talk to your doctor about how to treat them. When should you call for help? Call your doctor now or seek immediate medical care if:    · You have signs that your infection is getting worse, such as:  ? Increased pain, swelling, warmth, or redness. ? Red streaks leading from the area. ? Pus draining from the area. ? A fever.     · You get a rash.    Watch closely for changes in your health, and be sure to contact your doctor if:    · You do not get better as expected. Where can you learn more? Go to http://zenaida-dell.info/. Yasmin Thornton in the search box to learn more about \"Cellulitis: Care Instructions. \"  Current as of: April 17, 2018  Content Version: 11.9  © 3636-7237 [employer]. Care instructions adapted under license by [employer] (which disclaims liability or warranty for this information). If you have questions about a medical condition or this instruction, always ask your healthcare professional. [place] [address] any warranty or liability for your use of this information. Patient Education        Gout: Care Instructions  Your Care Instructions    Gout is a form of arthritis caused by a buildup of uric acid crystals in a joint.  It causes sudden attacks of pain, swelling, redness, and stiffness, usually in one joint, especially the big toe. Gout usually comes on without a cause. But it can be brought on by drinking alcohol (especially beer) or eating seafood and red meat. Taking certain medicines, such as diuretics or aspirin, also can bring on an attack of gout. Taking your medicines as prescribed and following up with your doctor regularly can help you avoid gout attacks in the future. Follow-up care is a key part of your treatment and safety. Be sure to make and go to all appointments, and call your doctor if you are having problems. It's also a good idea to know your test results and keep a list of the medicines you take. How can you care for yourself at home? · If the joint is swollen, put ice or a cold pack on the area for 10 to 20 minutes at a time. Put a thin cloth between the ice and your skin. · Prop up the sore limb on a pillow when you ice it or anytime you sit or lie down during the next 3 days. Try to keep it above the level of your heart. This will help reduce swelling. · Rest sore joints. Avoid activities that put weight or strain on the joints for a few days. Take short rest breaks from your regular activities during the day. · Take your medicines exactly as prescribed. Call your doctor if you think you are having a problem with your medicine. · Take pain medicines exactly as directed. ? If the doctor gave you a prescription medicine for pain, take it as prescribed. ? If you are not taking a prescription pain medicine, ask your doctor if you can take an over-the-counter medicine. · Eat less seafood and red meat. · Check with your doctor before drinking alcohol. · Losing weight, if you are overweight, may help reduce attacks of gout. But do not go on a [employer]. \" Losing a lot of weight in a short amount of time can cause a gout attack. When should you call for help?   Call your doctor now or seek immediate medical care if:    · You have a fever.     · The joint is so painful you cannot use it.     · You have sudden, unexplained swelling, redness, warmth, or severe pain in one or more joints.    Watch closely for changes in your health, and be sure to contact your doctor if:    · You have joint pain.     · Your symptoms get worse or are not improving after 2 or 3 days. Where can you learn more? Go to http://zenaida-dell.info/. Enter E727 in the search box to learn more about \"Gout: Care Instructions. \"  Current as of: Rachel 10, 2018  Content Version: 11.9  © 1043-2383 [employer]. Care instructions adapted under license by [employer] (which disclaims liability or warranty for this information). If you have questions about a medical condition or this instruction, always ask your healthcare professional. [address] any warranty or liability for your use of this information. Patient Education        High Blood Pressure: Care Instructions  Overview    It's normal for blood pressure to go up and down throughout the day. But if it stays up, you have high blood pressure. Another name for high blood pressure is hypertension. Despite what a lot of people think, high blood pressure usually doesn't cause headaches or make you feel dizzy or lightheaded. It usually has no symptoms. But it does increase your risk of stroke, heart attack, and other problems. You and your doctor will talk about your risks of these problems based on your blood pressure. Your doctor will give you a goal for your blood pressure. Your goal will be based on your health and your age. Lifestyle changes, such as eating healthy and being active, are always important to help lower blood pressure. You might also take medicine to reach your blood pressure goal.  Follow-up care is a key part of your treatment and safety. Be sure to make and go to all appointments, and call your doctor if you are having problems.  It's also a good idea to know your test results and keep a list of the medicines you take. How can you care for yourself at home? Medical treatment  · If you stop taking your medicine, your blood pressure will go back up. You may take one or more types of medicine to lower your blood pressure. Be safe with medicines. Take your medicine exactly as prescribed. Call your doctor if you think you are having a problem with your medicine. · Talk to your doctor before you start taking aspirin every day. Aspirin can help certain people lower their risk of a heart attack or stroke. But taking aspirin isn't right for everyone, because it can cause serious bleeding. · See your doctor regularly. You may need to see the doctor more often at first or until your blood pressure comes down. · If you are taking blood pressure medicine, talk to your doctor before you take decongestants or anti-inflammatory medicine, such as ibuprofen. Some of these medicines can raise blood pressure. · Learn how to check your blood pressure at home. Lifestyle changes  · Stay at a healthy weight. This is especially important if you put on weight around the waist. Losing even 10 pounds can help you lower your blood pressure. · If your doctor recommends it, get more exercise. Walking is a good choice. Bit by bit, increase the amount you walk every day. Try for at least 30 minutes on most days of the week. You also may want to swim, bike, or do other activities. · Avoid or limit alcohol. Talk to your doctor about whether you can drink any alcohol. · Try to limit how much sodium you eat to less than 2,300 milligrams (mg) a day. Your doctor may ask you to try to eat less than 1,500 mg a day. · Eat plenty of fruits (such as bananas and oranges), vegetables, legumes, whole grains, and low-fat dairy products. · Lower the amount of saturated fat in your diet. Saturated fat is found in animal products such as milk, cheese, and meat.  Limiting these foods may help you lose weight and also lower your risk for heart disease. · Do not smoke. Smoking increases your risk for heart attack and stroke. If you need help quitting, talk to your doctor about stop-smoking programs and medicines. These can increase your chances of quitting for good. When should you call for help? Call 911 anytime you think you may need emergency care. This may mean having symptoms that suggest that your blood pressure is causing a serious heart or blood vessel problem. Your blood pressure may be over 180/120.   For example, call 911 if:    · You have symptoms of a heart attack. These may include:  ? Chest pain or pressure, or a strange feeling in the chest.  ? Sweating. ? Shortness of breath. ? Nausea or vomiting. ? Pain, pressure, or a strange feeling in the back, neck, jaw, or upper belly or in one or both shoulders or arms. ? Lightheadedness or sudden weakness. ? A fast or irregular heartbeat.     · You have symptoms of a stroke. These may include:  ? Sudden numbness, tingling, weakness, or loss of movement in your face, arm, or leg, especially on only one side of your body. ? Sudden vision changes. ? Sudden trouble speaking. ? Sudden confusion or trouble understanding simple statements. ? Sudden problems with walking or balance. ? A sudden, severe headache that is different from past headaches.     · You have severe back or belly pain.    Do not wait until your blood pressure comes down on its own. Get help right away.   Call your doctor now or seek immediate care if:    · Your blood pressure is much higher than normal (such as 180/120 or higher), but you don't have symptoms.     · You think high blood pressure is causing symptoms, such as:  ? Severe headache.  ? Blurry vision.    Watch closely for changes in your health, and be sure to contact your doctor if:    · Your blood pressure measures higher than your doctor recommends at least 2 times.  That means the top number is higher or the bottom number is higher, or both.     · You think you may be having side effects from your blood pressure medicine. Where can you learn more? Go to http://zenaida-dell.info/. Enter F067 in the search box to learn more about \"High Blood Pressure: Care Instructions. \"  Current as of: July 22, 2018  Content Version: 11.9  © 2006-2018 [employer]. Care instructions adapted under license by [employer] (which disclaims liability or warranty for this information). If you have questions about a medical condition or this instruction, always ask your healthcare professional. [place] [address] any warranty or liability for your use of this information. Patient Education        Learning About Sepsis  What is sepsis? Sepsis is an intense reaction to an infection. It can cause deadly damage to the body and lead to dangerously low blood pressure. You may have inflammation across large areas of your body. It can damage tissue and even go deep into your organs. Sepsis requires immediate care in a hospital. People with sepsis might need to be treated in an intensive care unit (ICU) for several days or weeks. An ICU is a part of the hospital where very sick people get care. Infections that can lead to sepsis include:  · A skin infection such as from a cut. · A lung infection like pneumonia. · A kidney infection. · A gut infection such as E. coli. Sepsis can develop very quickly. Symptoms can include low blood pressure, breathing problems, fast heartbeat, and confusion. Other symptoms include fever or low body temperature, chills, cool clammy skin, skin rashes, and shaking. Sepsis can cause problems in many organs. Severe sepsis is called septic shock. It often causes extremely low blood pressure, which limits blood flow to the body. It can cause organ failure and death. How is sepsis treated? When a person has sepsis, equipment in the ICU can support many body systems.  That includes breathing, circulation, fluids, and help for organs like the kidneys and heart. If the person needs help breathing, a ventilator may be used. Doctors will treat the person with antibiotics. They will try to find the infection that led to sepsis. Machines will track the person's vital signs, including temperature, blood pressure, breathing rate, and pulse rate. The person will get fluids through an IV and will get medicine to help blood flow. If the sepsis is severe, medicine may help raise the blood pressure. What can you expect when someone has sepsis? · The ICU staff will do everything they can to treat all of the problems sepsis causes, including the infection. · The person may start new treatments while still in the hospital. Different doctors may help with different symptoms. · Expect a long recovery after the person leaves the ICU. · If you need it, ask for support from friends and family. There's a lot going on in the ICU. It can be scary and confusing for patients and their families, friends, and supporters. But it's designed to keep your loved one comfortable and safe and to provide the best medical care. Where can you learn more? Go to http://zenaida-dell.info/. Enter L055 in the search box to learn more about \"Learning About Sepsis. \"  Current as of: September 23, 2018  Content Version: 11.9  © 0132-3532 [employer]. Care instructions adapted under license by [employer] (which disclaims liability or warranty for this information). If you have questions about a medical condition or this instruction, always ask your healthcare professional. [place] [address] any warranty or liability for your use of this information. Patient Education        Learning About Atrial Fibrillation  What is atrial fibrillation?     Atrial fibrillation (say \"AY-tree-carol fdl-zogl-SEW-shun\") is the most common type of irregular heartbeat (arrhythmia). Normally, the heart beats in a strong, steady rhythm. In atrial fibrillation, a problem with the heart's electrical system causes the two upper parts of the heart (the atria) to quiver, or fibrillate. Your heart rate also may be faster than normal.  Atrial fibrillation can be dangerous because if the heartbeat isn't strong and steady, blood can collect, or pool, in the atria. And pooled blood is more likely to form clots. Clots can travel to the brain, block blood flow, and cause a stroke. Atrial fibrillation can also lead to heart failure. Treatment for atrial fibrillation helps prevent stroke and heart failure. It also helps relieve symptoms. Atrial fibrillation is often caused by another heart problem. It may happen after heart surgery. It may also be caused by other problems, such as an overactive thyroid gland or lung disease. Many people with atrial fibrillation are able to live full and active lives. What are the symptoms? Some people feel symptoms when they have episodes of atrial fibrillation. But other people don't notice any symptoms. If you have symptoms, you may feel:  · A fluttering, racing, or pounding feeling in your chest called palpitations. · Weak or tired. · Dizzy or lightheaded. · Short of breath. · Chest pain. · Confused. You may notice signs of atrial fibrillation when you check your pulse. Your pulse may seem uneven or fast.  What can you expect when you have atrial fibrillation? At first, spells of atrial fibrillation may come on suddenly and last a short time. It may go away on its own or it goes away after treatment. This is called paroxysmal atrial fibrillation. Over time, the spells may last longer and occur more often. They often don't go away on their own. How is it treated? Treatments can help you feel better and prevent future problems, especially stroke and heart failure.   The main types of treatment slow the heart rate, control the heart rhythm, and help prevent stroke. Your treatment will depend on the cause of your atrial fibrillation, your symptoms, and your risk for stroke. · Heart rate treatment. Medicine may be used to slow your heart rate. Your heartbeat may still be irregular. But these medicines keep your heart from beating too fast. They may also help relieve your symptoms. · Heart rhythm treatment. Different treatments may be used to try to stop atrial fibrillation and keep it from returning. They can also relieve symptoms. These treatments include medicine, electrical cardioversion to shock the heart back to a normal rhythm, a procedure called catheter ablation, and heart surgery. · Stroke prevention. You and your doctor can decide how to lower your risk. You may decide to take a blood-thinning medicine, such as aspirin or an anticoagulant. How can you live well with it? You can live well and help manage atrial fibrillation by having a heart-healthy lifestyle. To have a heart-healthy lifestyle:  · Don't smoke. · Eat heart-healthy foods. · Be active. Talk to your doctor about what type and level of exercise is safe for you. · Stay at a healthy weight. Lose weight if you need to. · Manage stress. · Avoid alcohol if it triggers symptoms. · Manage other health problems such as high blood pressure, high cholesterol, and diabetes. · Avoid getting sick from the flu. Get a flu shot every year. Where can you learn more? Go to http://zenaida-dell.info/. Enter [phone] in the search box to learn more about \"Learning About Atrial Fibrillation. \"  Current as of: July 22, 2018  Content Version: 11.9  © 7654-5360 [employer]. Care instructions adapted under license by [employer] (which disclaims liability or warranty for this information).  If you have questions about a medical condition or this instruction, always ask your healthcare professional. Ramses Lares disclaims any warranty or liability for your use of this information. DISCHARGE SUMMARY from Nurse    PATIENT INSTRUCTIONS:    After general anesthesia or intravenous sedation, for 24 hours or while taking prescription Narcotics:  · Limit your activities  · Do not drive and operate hazardous machinery  · Do not make important personal or business decisions  · Do  not drink alcoholic beverages  · If you have not urinated within 8 hours after discharge, please contact your surgeon on call. Report the following to your surgeon:  · Excessive pain, swelling, redness or odor of or around the surgical area  · Temperature over 100.5  · Nausea and vomiting lasting longer than 4 hours or if unable to take medications  · Any signs of decreased circulation or nerve impairment to extremity: change in color, persistent  numbness, tingling, coldness or increase pain  · Any questions    What to do at Home:  Recommended activity: Activity as tolerated,     If you experience any of the following symptoms chest pains, shortness of breath, dizziness, increased weakness, increased temperature greater than 100.5, nausea, vomiting, diarrhea, severe pains, please follow up with PCP or call 911    *  Please give a list of your current medications to your Primary Care Provider. *  Please update this list whenever your medications are discontinued, doses are      changed, or new medications (including over-the-counter products) are added. *  Please carry medication information at all times in case of emergency situations. These are general instructions for a healthy lifestyle:    No smoking/ No tobacco products/ Avoid exposure to second hand smoke  Surgeon General's Warning:  Quitting smoking now greatly reduces serious risk to your health.     Obesity, smoking, and sedentary lifestyle greatly increases your risk for illness    A healthy diet, regular physical exercise & weight monitoring are important for maintaining a healthy lifestyle    You may be retaining fluid if you have a history of heart failure or if you experience any of the following symptoms:  Weight gain of 3 pounds or more overnight or 5 pounds in a week, increased swelling in our hands or feet or shortness of breath while lying flat in bed. Please call your doctor as soon as you notice any of these symptoms; do not wait until your next office visit. The discharge information has been reviewed with the patient. The patient verbalized understanding. Discharge medications reviewed with the patient and appropriate educational materials and side effects teaching were provided. ___________________________________________________________________________________________________________________________________     As part of the discharge instructions, medications already given today were discussed with the patient. The next dose due of all ordered meds was highlighted as part of the medication discharge instructions. Discussed with the patient the importance of taking medications as directed, as well as the side effects and adverse reactions to medications ordered. [employer] Document Management software Activation    Thank you for requesting access to [employer] Document [employer] software. Please follow the instructions below to securely access and download your online medical record. [employer] software allows you to send messages to your doctor, view your test results, renew your prescriptions, schedule appointments, and more. How Do I Sign Up? 1. In your internet browser, go to [email or web address]  2. Click on the First Time User? Click Here link in the Sign In box. You will be redirect to the New Member Sign Up page. 3. Enter your [employer] software Access Code exactly as it appears below. You will not need to use this code after youve completed the sign-up process. If you do not sign up before the expiration date, you must request a new code. [employer] Document Management software Access Code:  Activation code not generated  Current [employer] Document Management software Status: Active (This is the date your [employer] Document [employer] software access code will )    4. Enter the last four digits of your Social Security Number (xxxx) and Date of Birth (mm/dd/yyyy) as indicated and click Submit. You will be taken to the next sign-up page. 5. Create a mobiDEOS ID. This will be your mobiDEOS login ID and cannot be changed, so think of one that is secure and easy to remember. 6. Create a mobiDEOS password. You can change your password at any time. 7. Enter your Password Reset Question and Answer. This can be used at a later time if you forget your password. 8. Enter your e-mail address. You will receive e-mail notification when new information is available in [address] 9. Click Sign Up. You can now view and download portions of your medical record. 10. Click the Download Summary menu link to download a portable copy of your medical information. Additional Information    If you have questions, please visit the Frequently Asked Questions section of the [email or web address] website at [email or web address] Remember, [employer] is NOT to be used for urgent needs. For medical emergencies, dial 911.     Patient armband removed and shredded

## 2019-07-13 NOTE — PROGRESS NOTES
Problem: Discharge Planning Goal: *Discharge to safe environment Outcome: Resolved/Met Home with [address] visit

## 2019-07-13 NOTE — PROGRESS NOTES
Patient discharge to home with home health services per MD orders. Patient giving discharge instructions, follow-up appointment, and prescription. Patient stated understanding of discharge instructions, follow-up appointments, and medication administration. 1205  Patient transported to the exit via wheelchair accompanied by family and staff. No acute distress noted at the time of transport.

## 2019-07-13 NOTE — DISCHARGE SUMMARY
[address] [place], [place] [address] Hospitalist Division Discharge Summary Patient: Estrella Hinojosa MRN: [id number]  CSN: [id number] YOB: 1933  Age: 80 y.o. Sex: male DOA: 7/10/2019 LOS:  LOS: 3 days   Discharge Date:   
 
Admission Diagnoses: Cellulitis [A43.21] HTN (hypertension) [I10] Discharge Diagnoses:   
Problem List as of 7/13/2019 Date Reviewed: 7/11/2019 Codes Class Noted - Resolved Neutrophilic leukocytosis QMI-53-TK: D72.9 ICD-9-CM: 288.8  7/11/2019 - Present Acute gout ICD-10-CM: M10.9 ICD-9-CM: 274.01  7/11/2019 - Present CKD (chronic kidney disease) stage 2, GFR 60-89 ml/min ICD-10-CM: N18.2 ICD-9-CM: 585.2  7/11/2019 - Present * (Principal) Sepsis ([facility] ICD-10-CM: A41.9 ICD-9-CM: 038.9, 995.91  7/10/2019 - Present Cellulitis ICD-10-CM: L03.90 ICD-9-CM: 682.9  7/10/2019 - Present HTN (hypertension) ICD-10-CM: I10 
ICD-9-CM: 401.9  7/10/2019 - Present Pneumonia ICD-10-CM: J18.9 ICD-9-CM: 920  6/2/2018 - Present Atrial fibrillation [facility]) ICD-10-CM: I48.91 
ICD-9-CM: 427.31  6/2/2018 - Present Hypothyroidism ICD-10-CM: E03.9 ICD-9-CM: 244.9  6/2/2018 - Present Non-pressure chronic ulcer of right calf with fat layer exposed ([facility] ICD-10-CM: I10.331 ICD-9-CM: 707.12  1/19/2017 - Present Laceration of right lower leg with complication YEG-54-MY: X67.875I ICD-9-CM: 891.1  1/19/2017 - Present Edema ICD-10-CM: R60.9 ICD-9-CM: 782.3  1/19/2017 - Present Encounter for colonoscopy due to history of adenomatous colonic polyps ICD-10-CM: Z12.11, Z86.010 
ICD-9-CM: V76.51, V12.72  1/3/2017 - Present Dysphagia ICD-10-CM: R13.10 ICD-9-CM: 787.20  6/17/2016 - Present Hypertension ICD-10-CM: I10 
ICD-9-CM: 401.9  Unknown - Present GERD (gastroesophageal reflux disease) ICD-10-CM: K21.9 ICD-9-CM: 530.81  Unknown - Present Glaucoma ICD-10-CM: H40.9 ICD-9-CM: 365.9  Unknown - Present Glaucoma ICD-10-CM: H40.9 ICD-9-CM: 365.9  Unknown - Present Malignant neoplasm of prostate [facility]) ICD-10-CM: P88 ICD-9-CM: 185  1/16/2012 - Present Discharge Condition: Stable Discharge To: Home Consults: None Hospital Course: Per H&P, \"Omero Hernández is a 80 y. o. male who presented to the ER with pain and swelling involving the right leg.  This began two days ago. Caprice Allred is associated with warmth at the redness site. Isabella De Santiago also has swelling of his left thumb with mild redness.  He has no specific injury.  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  In the ER he is found to have Cellulitis and he will be admitted for ongoing management. \" 
  
Duplex negative for RLE DVT. Cellulitic symptoms started prior to flare of left thumb/ left ankle, suspect cellulitis as well as home lasix as contributing factor to gout exacerbation. Left thumb and L ankle sx significantly improved after prednisone was started. PT recs home health, but patient declined, requesting only H2H. Patient has remained afebrile. BCx ngtd. Pain, erythema, and edema have improved to RLE. VS and labs stable for discharge. Physical Exam: 
General Appearance: NAD, conversant HENT: normocephalic/atraumatic, moist mucus membranes Neck: No JVD, supple Lungs: CTA with normal respiratory effort CV: RRR, no m/r/g Abdomen: soft, non-tender, normal bowel sounds Extremities: trace pitting edema BLE, RLE mildly erythematous Neuro: No focal deficits, motor/sensory intact Significant Diagnostic Studies: All lab results for the last 24 hours reviewed. Xr Thumb Lt Min 2 V Result Date: 7/11/2019 EXAM: XR THUMB LT MIN 2 V CLINICAL INDICATION/HISTORY: Left thumb pain and swelling  COMPARISON: None.  TECHNIQUE: AP view of the left hand as well as lateral and oblique views of the left first digit were obtained. _______________ FINDINGS: Moderate degenerative osteoarthropathy of the first metacarpophalangeal and first interphalangeal joint with asymmetric joint space narrowing and marginal osteophytosis. There is mild asymmetric somewhat diffuse soft tissue swelling throughout the left first digit. Moderately severe degenerative changes of second and third metacarpophalangeal joints. Surgical changes of prior fourth digit amputation involving distal aspect of the fourth proximal phalanx. Prominent atherosclerotic calcification throughout visualized distal left forearm and wrist. Prominent chondrocalcinosis throughout second and third metacarpophalangeal joints. No visualized erosive changes. Intact ulnar styloid. There is no evidence of acute fracture or dislocation. No joint effusion. _______________ IMPRESSION: No acute osseous abnormality. Nonspecific left first digit soft tissue swelling. Xr Ankle Lt Min 3 V Result Date: 7/11/2019 EXAM: XR ANKLE LT MIN 3 V CLINICAL INDICATION/HISTORY: Pain -Additional: None COMPARISON: None TECHNIQUE: 3 views of the left ankle _______________ FINDINGS: BONES: The foot is plantarflexed. Mild osteophytic spurring. Bony mineralization is within normal limits. No fractures. Suboptimal assessment of the subtalar joints due to positioning. Prominent calcaneal enthesopathy. SOFT TISSUES: Generalized soft tissue swelling with peripheral vascular calcifications noted. _______________ IMPRESSION: 1. Suboptimal positioning however, no acute fractures. 2.  Generalized soft tissue swelling, nonspecific. Xr Chest [facility] Result Date: 7/11/2019 EXAM: XR CHEST PORT CLINICAL INDICATION/HISTORY: Shortness of breath and sepsis -Additional: None COMPARISON: June 26, 2018 TECHNIQUE: Frontal view of the chest _______________ FINDINGS: HEART AND MEDIASTINUM: The cardiac size and mediastinal contours remain stable, and within normal limits for AP technique.  LUNGS AND PLEURAL SPACES: Lungs are progressively underexpanded in the interval from prior examination with parallel increased in linear opacities at each lung base. No pneumothorax. Likely small bilateral pleural effusions. BONY THORAX AND SOFT TISSUES: No acute osseous abnormality. Degenerative changes of the shoulder girdles bilaterally with partial visualization of right shoulder arthroplasty. _______________ IMPRESSION: 1. Progressively underexpanded lungs with basilar atelectasis and small bilateral pleural effusions. Discharge Medications:    
Current Discharge Medication List  
  
START taking these medications Details  
predniSONE (DELTASONE) 20 mg tablet Take 40 mg by mouth daily (with breakfast). Qty: 14 Tab, Refills: 0  
  
cephALEXin (KEFLEX) 500 mg capsule Take 1 Cap by mouth four (4) times daily for 4 days. Qty: 16 Cap, Refills: 0 CONTINUE these medications which have NOT CHANGED Details  
tamsulosin (FLOMAX) 0.4 mg capsule Take 1 Cap by mouth daily (after dinner). Qty: 90 Cap, Refills: 3 Associated Diagnoses: Malignant neoplasm of prostate ([address] History of urinary retention  
  
apixaban (ELIQUIS) 5 mg tablet Take 1 Tab by mouth two (2) times a day. Qty: 60 Tab, Refills: 0  
  
albuterol (PROAIR HFA) 90 mcg/actuation inhaler Take  inhaled by mouth. Associated Diagnoses: Malignant neoplasm of prostate ([address] Urinary retention; Nocturia ALPHAGAN P 0.15 % ophthalmic solution Administer 1 Drop to both eyes three (3) times daily. Associated Diagnoses: Malignant neoplasm of prostate ([address] Urinary retention; Nocturia  
  
ipratropium-albuterol (COMBIVENT RESPIMAT)  mcg/actuation inhaler daily as needed Comments: for SOB Associated Diagnoses: Malignant neoplasm of prostate ([address] Urinary retention; Nocturia  
  
cyanocobalamin 1,000 mcg tablet Take 1,000 mcg by mouth daily. Associated Diagnoses: Malignant neoplasm of prostate ([address] Urinary retention; Nocturia dilTIAZem CD (CARDIZEM CD) 180 mg ER capsule Take 180 mg by mouth daily. Associated Diagnoses: Malignant neoplasm of prostate ([address] Urinary retention; Nocturia  
  
furosemide (LASIX) 40 mg tablet Take 40 mg by mouth daily. 2 tab daily Associated Diagnoses: Malignant neoplasm of prostate ([address] Urinary retention; Nocturia  
  
loratadine (CLARITIN) 10 mg tablet Take 10 mg by mouth daily. Associated Diagnoses: Malignant neoplasm of prostate ([facility] Urinary retention; Nocturia  
  
gabapentin (NEURONTIN) 600 mg tablet Take 600 mg by mouth three (3) times daily. levothyroxine (SYNTHROID) 175 mcg tablet Take 175 mcg by mouth Daily (before breakfast). traMADol (ULTRAM) 50 mg tablet Take 1 tablet by mouth every six (6) hours as needed for Pain. Qty: 15 tablet, Refills: 0  
  
bimatoprost (LUMIGAN) 0.03 % ophthalmic drops Administer 1 drop to both eyes every evening.  
  
multivitamins-minerals-lutein (CENTRUM SILVER) Tab Take 1 Tab by mouth daily. traZODone (DESYREL) 50 mg tablet Take 50 mg by mouth nightly as needed. PYRIDOXINE HCL (VITAMIN B-6 PO) Take 1 Cap by mouth daily. STOP taking these medications  
  
 methylPREDNISolone (MEDROL, AGUSTO,) 4 mg tablet Comments:  
Reason for Stopping:   
   
  
 
 
Activity: Activity as tolerated Diet: Cardiac Diet Wound Care: None needed Follow-up: 1 week with PCP Discharge time: >35 minutes Criselda Escalante PA-C [address] [place], [place] [address] Hospital[facility] Office:  [phone] Pager: [phone] 7/13/2019, 10:48 AM

## 2019-07-13 NOTE — PROGRESS NOTES
Problem: Falls - Risk of 
Goal: *Absence of Falls Description Document Lida Aviles Fall Risk and appropriate interventions in the flowsheet. Outcome: Progressing Towards Goal 
  
Problem: Pressure Injury - Risk of 
Goal: *Prevention of pressure injury Description Document Dionicio Scale and appropriate interventions in the flowsheet.  
Outcome: Progressing Towards Goal

## 2019-07-13 NOTE — MANAGEMENT PLAN
Discharge/Transition Planning Spoke with patient and he just wanted Hospital to Home Visit and not full HH. Pt doesn't have follow up yet with Dr Theresa Ron; Notified pt to call Monday morning and get follow up as soon as possible and notify he was in hospital. If needs more services Dr Theresa Ron can order. Verified demographics and FOC signed for [facility]. Addend the [facility] order and referral placed. Called Rita at [facility] and notified Care Management Interventions PCP Verified by CM: Yes(Dr Nesbitt) Palliative Care Criteria Met (RRAT>21 & CHF Dx)?: No 
Mode of Transport at Discharge: Other (see comment)(wife) Transition of Care Consult (CM Consult): Home Health [facility] - INPATIENT: Yes Current Support Network: Lives with Spouse, Own Home Confirm Follow Up Transport: Self Plan discussed with Pt/Family/Caregiver: Yes Freedom of Choice Offered: Yes Discharge Location Discharge Placement: Home with home health

## 2019-07-13 NOTE — PROGRESS NOTES
Assume care of patient sitting up in chair with bilateral edema noted to lower extremities. Assisted back to bed with assisted device with bilateral lower extremities elevated. Alert and oriented X 4. Denies pain or discomfort at present. 2330 Noted with bilateral lower extremities remained elevated with swelling decreased at present. 07/13/2019 
 
0230 Resting quietly without complaints voiced. 9182 Bedside and Verbal shift change report given to Elian alfaro RN (oncoming nurse) by Ariel Khan RN (offgoing nurse). Report given with SBAR, Kardex, Intake/Output, MAR and Recent Results.

## 2019-09-16 NOTE — ED TRIAGE NOTES
Patient c/o neck and bilateral shoulder neck pain since Friday after painting. Pt also c/o redness and swelling to right lower leg with hx of cellulitis. Pt seen 6 weeks and treated with abx with improvement. Swelling and redness returned 3 days ago. Denies fever.

## 2019-09-16 NOTE — ED NOTES
I have reviewed discharge instructions with the patient. The patient verbalized understanding. Patient armband removed and shredded. Assisted patient to wheelchair and out of care area.

## 2019-09-16 NOTE — DISCHARGE INSTRUCTIONS
Patient Education        Neck Strain: Care Instructions  Your Care Instructions    You have strained the muscles and ligaments in your neck. A sudden, awkward movement can strain the neck. This often occurs with falls or car accidents or during certain sports. Everyday activities like working on a computer or sleeping can also cause neck strain if they force you to hold your neck in an awkward position for a long time. It is common for neck pain to get worse for a day or two after an injury, but it should start to feel better after that. You may have more pain and stiffness for several days before it gets better. This is expected. It may take a few weeks or longer for it to heal completely. Good home treatment can help you get better faster and avoid future neck problems. Follow-up care is a key part of your treatment and safety. Be sure to make and go to all appointments, and call your doctor if you are having problems. It's also a good idea to know your test results and keep a list of the medicines you take. How can you care for yourself at home? · If you were given a neck brace (cervical collar) to limit neck motion, wear it as instructed for as many days as your doctor tells you to. Do not wear it longer than you were told to. Wearing a brace for too long can make neck stiffness worse and weaken the neck muscles. · You can try using heat or ice to see if it helps. ? Try using a heating pad on a low or medium setting for 15 to 20 minutes every 2 to 3 hours. Try a warm shower in place of one session with the heating pad. You can also buy single-use heat wraps that last up to 8 hours. ? You can also try an ice pack for 10 to 15 minutes every 2 to 3 hours. · Take pain medicines exactly as directed. ? If the doctor gave you a prescription medicine for pain, take it as prescribed. ? If you are not taking a prescription pain medicine, ask your doctor if you can take an over-the-counter medicine.   · Gently rub the area to relieve pain and help with blood flow. Do not massage the area if it hurts to do so. · Do not do anything that makes the pain worse. Take it easy for a couple of days. You can do your usual activities if they do not hurt your neck or put it at risk for more stress or injury. · Try sleeping on a special neck pillow. Place it under your neck, not under your head. Placing a tightly rolled-up towel under your neck while you sleep will also work. If you use a neck pillow or rolled towel, do not use your regular pillow at the same time. · To prevent future neck pain, do exercises to stretch and strengthen your neck and back. Learn how to use good posture, safe lifting techniques, and proper body mechanics. When should you call for help? Call 911 anytime you think you may need emergency care. For example, call if:    · You are unable to move an arm or a leg at all.   [facility] your doctor now or seek immediate medical care if:    · You have new or worse symptoms in your arms, legs, chest, belly, or buttocks. Symptoms may include:  ? Numbness or tingling. ? Weakness. ? Pain.     · You lose bladder or bowel control.    Watch closely for changes in your health, and be sure to contact your doctor if:    · You are not getting better as expected. Where can you learn more? Go to http://zenaida-dell.info/. Enter M253 in the search box to learn more about \"Neck Strain: Care Instructions. \"  Current as of: September 20, 2018  Content Version: 12.1  © 3342-0893 Healthwise, [employer]. Care instructions adapted under license by [employer] (which disclaims liability or warranty for this information). If you have questions about a medical condition or this instruction, always ask your healthcare professional. [place] [address] any warranty or liability for your use of this information.          Patient Education        Cellulitis: Care Instructions  Your Care Instructions    Cellulitis is a skin infection caused by bacteria, most often strep or staph. It often occurs after a break in the skin from a scrape, cut, bite, or puncture, or after a rash. Cellulitis may be treated without doing tests to find out what caused it. But your doctor may do tests, if needed, to look for a specific bacteria, like methicillin-resistant Staphylococcus aureus (MRSA). The doctor has checked you carefully, but problems can develop later. If you notice any problems or new symptoms, get medical treatment right away. Follow-up care is a key part of your treatment and safety. Be sure to make and go to all appointments, and call your doctor if you are having problems. It's also a good idea to know your test results and keep a list of the medicines you take. How can you care for yourself at home? · Take your antibiotics as directed. Do not stop taking them just because you feel better. You need to take the full course of antibiotics. · Prop up the infected area on pillows to reduce pain and swelling. Try to keep the area above the level of your heart as often as you can. · If your doctor told you how to care for your wound, follow your doctor's instructions. If you did not get instructions, follow this general advice:  ? Wash the wound with clean water 2 times a day. Don't use hydrogen peroxide or alcohol, which can slow healing. ? You may cover the wound with a thin layer of petroleum jelly, such as Vaseline, and a nonstick bandage. ? Apply more petroleum jelly and replace the bandage as needed. · Be safe with medicines. Take pain medicines exactly as directed. ? If the doctor gave you a prescription medicine for pain, take it as prescribed. ? If you are not taking a prescription pain medicine, ask your doctor if you can take an over-the-counter medicine. To prevent cellulitis in the future  · Try to prevent cuts, scrapes, or other injuries to your skin.  Cellulitis most often occurs where there is a break in the skin. · If you get a scrape, cut, mild burn, or bite, wash the wound with clean water as soon as you can to help avoid infection. Don't use hydrogen peroxide or alcohol, which can slow healing. · If you have swelling in your legs (edema), support stockings and good skin care may help prevent leg sores and cellulitis. · Take care of your feet, especially if you have diabetes or other conditions that increase the risk of infection. Wear shoes and socks. Do not go barefoot. If you have athlete's foot or other skin problems on your feet, talk to your doctor about how to treat them. When should you call for help? Call your doctor now or seek immediate medical care if:    · You have signs that your infection is getting worse, such as:  ? Increased pain, swelling, warmth, or redness. ? Red streaks leading from the area. ? Pus draining from the area. ? A fever.     · You get a rash.    Watch closely for changes in your health, and be sure to contact your doctor if:    · You do not get better as expected. Where can you learn more? Go to http://zenaida-dell.info/. Yasmin Thornton in the search box to learn more about \"Cellulitis: Care Instructions. \"  Current as of: April 1, 2019  Content Version: 12.1  © 3358-5822 Healthwise, Incorporated. Care instructions adapted under license by [employer] (which disclaims liability or warranty for this information). If you have questions about a medical condition or this instruction, always ask your healthcare professional. [place] [address] any warranty or liability for your use of this information.

## 2019-09-23 NOTE — ED PROVIDER NOTES
[facility] EMERGENCY DEPT Date: 9/23/2019 Patient Name: Hammad Blair History of Presenting Illness Chief Complaint Patient presents with  Neck Pain 80 y.o. male with noted past medical history who presents to the emergency department complaining of right neck pain for the past week. Patient states that he was doing a lot of painting and looking up, when he developed the neck pain. He reports having a constant mild aching to the site, which is worse with head movement. States that he was seen here a week ago, diagnosed with a cervical strain. He also notes having a rash to his right lower extremity, which she was diagnosed by his PCP with cellulitis for which she is currently taking Bactrim. He notes improvement of his rash, but is concerned because his neck pain is still present. He denies any fever, chills, numbness, weakness, new injury, chest pain or shortness of breath, other symptoms at this time. Patient denies any other associated signs or symptoms. Patient denies any other complaints. Nursing notes regarding the HPI and triage nursing notes were reviewed. Prior medical records were reviewed. Current Facility-Administered Medications Medication Dose Route Frequency Provider Last Rate Last Dose  sodium chloride 0.9 % bolus infusion 1,000 mL  1,000 mL IntraVENous ONCE YADIRA Watson Current Outpatient Medications Medication Sig Dispense Refill  cephALEXin (KEFLEX) 500 mg capsule Take 1 Cap by mouth four (4) times daily for 7 days. 28 Cap 0  
 zolpidem (AMBIEN) 5 mg tablet Take 5 mg by mouth nightly.  tamsulosin (FLOMAX) 0.4 mg capsule Take 1 Cap by mouth daily (after dinner). 90 Cap 3  
 apixaban (ELIQUIS) 5 mg tablet Take 1 Tab by mouth two (2) times a day. 60 Tab 0  
 albuterol (PROAIR HFA) 90 mcg/actuation inhaler Take  inhaled by mouth.     
 ALPHAGAN P 0.15 % ophthalmic solution Administer 1 Drop to both eyes three (3) times daily.  ipratropium-albuterol (COMBIVENT RESPIMAT)  mcg/actuation inhaler daily as needed  cyanocobalamin 1,000 mcg tablet Take 1,000 mcg by mouth daily.  dilTIAZem CD (CARDIZEM CD) 180 mg ER capsule Take 180 mg by mouth daily.  furosemide (LASIX) 40 mg tablet Take 40 mg by mouth daily. 2 tab daily  loratadine (CLARITIN) 10 mg tablet Take 10 mg by mouth daily.  gabapentin (NEURONTIN) 600 mg tablet Take 600 mg by mouth three (3) times daily.  levothyroxine (SYNTHROID) 175 mcg tablet Take 175 mcg by mouth Daily (before breakfast).  bimatoprost (LUMIGAN) 0.03 % ophthalmic drops Administer 1 drop to both eyes every evening.  multivitamins-minerals-lutein (CENTRUM SILVER) Tab Take 1 Tab by mouth daily.  traZODone (DESYREL) 50 mg tablet Take 50 mg by mouth nightly as needed.  PYRIDOXINE HCL (VITAMIN B-6 PO) Take 1 Cap by mouth daily. Past History Past Medical History: 
Past Medical History:  
Diagnosis Date  Arthritis  Atrial fibrillation ([facility] 07/2017 Dr Dianne Cyr cardio follows. chronic  Carcinoma of prostate ([address]  Cellulitis  Coarse tremor   
  hands, states \"my doctor is aware\"  Difficulty swallowing  Dysphagia  Edema  Encounter for colonoscopy due to history of adenomatous colonic polyps  Essential hypertension  Fall 10/07/2017 \"went to [facility] Emergency Room, CT Scan showed concussion, no bleeding\" per patient  Fatigue  GERD (gastroesophageal reflux disease) wo. esophagitis  Glaucoma  H/O joint replacement   
  left knee 2003  HLD (hyperlipidemia)  Hypertension  Hypertensive disorder  Hypothyroidism  Impotence  Laceration of right lower leg with complication  Malignant neoplasm of prostate ([address]   
  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   
 acute/hist. of   
 
 
Past Surgical History: 
Past Surgical History:  
Procedure Laterality Date  COLONOSCOPY N/A 1/3/2017  HX CHOLECYSTECTOMY  HX KNEE REPLACEMENT Left 01/2003  HX SHOULDER REPLACEMENT Right 7/17, 10/17 Family History: 
History reviewed. No pertinent family history. Social History: 
Social History Tobacco Use  Smoking status: Former Smoker  Smokeless tobacco: Never Used Substance Use Topics  Alcohol use: Yes Comment: weekly  Drug use: No  
 
 
Allergies: Allergies Allergen Reactions  Adhesive Other (comments) Skin irritation  Darvon [Propoxyphene] Hives, Myalgia and Other (comments) Joint pain  Penicillins Rash and Itching Patient's primary care provider (as noted in EPIC):  Graham Chao MD 
 
Review of Systems Constitutional:  Denies malaise, fever, chills. Head:  Denies injury. Neck:  + right neck pain. Cardiac:  Denies chest pain. Respiratory:  Denies shortness of breath. GI/ABD:  Denies nausea, vomiting. :  Denies injury, pain, dysuria or urgency. Back:  Denies injury or pain. Extremity/MS: Denies pain/injury. Neuro:  Denies headache, LOC, dizziness, neurologic symptoms/deficits/paresthesias. Skin: + rash to right leg. All other systems negative as reviewed. Visit Vitals /73 (BP 1 Location: Right arm, BP Patient Position: At rest) Pulse 75 Temp 98 °F (36.7 °C) Resp 15 Ht 5' 8\" (1.727 m) Wt 86.2 kg (190 lb) SpO2 100% BMI 28.89 kg/m² PHYSICAL EXAM: 
 
CONSTITUTIONAL:  Alert, in no apparent distress;  well developed;  well nourished. HEAD:  Normocephalic, atraumatic. EYES:  EOMI. Non-icteric sclera. Normal conjunctiva. ENTM:  Mouth: mucous membranes moist. 
NECK:  Supple, no bony midline tenderness, right paracervical musculature with reproducible tenderness to palpation. RESPIRATORY:  Chest clear, equal breath sounds, good air movement. Without wheezes, rhonchi, rales. CARDIOVASCULAR:  Regular rate and rhythm. No murmurs, rubs, or gallops. GI:  Normal bowel sounds, abdomen soft and non-tender. No rebound or guarding. BACK:  Non-tender. UPPER EXT:  Normal inspection. LOWER EXT:  Mild edema bilaterally, distal pulses intact. NEURO:  Moves all four extremities, and grossly normal motor exam. 
SKIN: Right anterior inferior shin with mild erythema present. PSYCH:  Alert and normal affect. DIFFERENTIAL DIAGNOSES/ MEDICAL DECISION MAKING: 
Cellulitis, cervical strain, arthritis, disc protrusion, other etiologies. Recent Results (from the past 12 hour(s)) CBC WITH AUTOMATED DIFF Collection Time: 09/23/19  4:27 PM  
Result Value Ref Range WBC 14.4 (H) 4.6 - 13.2 K/uL  
 RBC 3.95 (L) 4.70 - 5.50 M/uL  
 HGB 12.6 (L) 13.0 - 16.0 g/dL HCT 37.9 36.0 - 48.0 % MCV 95.9 74.0 - 97.0 FL  
 MCH 31.9 24.0 - 34.0 PG  
 MCHC 33.2 31.0 - 37.0 g/dL  
 RDW 15.4 (H) 11.6 - 14.5 % PLATELET 346 604 - 577 K/uL MPV 10.3 9.2 - 11.8 FL  
 NEUTROPHILS 90 (H) 40 - 73 % LYMPHOCYTES 5 (L) 21 - 52 % MONOCYTES 5 3 - 10 % EOSINOPHILS 0 0 - 5 % BASOPHILS 0 0 - 2 %  
 ABS. NEUTROPHILS 13.0 (H) 1.8 - 8.0 K/UL  
 ABS. LYMPHOCYTES 0.7 (L) 0.9 - 3.6 K/UL  
 ABS. MONOCYTES 0.7 0.05 - 1.2 K/UL  
 ABS. EOSINOPHILS 0.0 0.0 - 0.4 K/UL  
 ABS. BASOPHILS 0.0 0.0 - 0.1 K/UL  
 DF AUTOMATED METABOLIC PANEL, BASIC Collection Time: 09/23/19  4:27 PM  
Result Value Ref Range Sodium 135 (L) 136 - 145 mmol/L Potassium 4.1 3.5 - 5.5 mmol/L Chloride 93 (L) 100 - 111 mmol/L  
 CO2 34 (H) 21 - 32 mmol/L  Anion gap 8 3.0 - 18 mmol/L  
 Glucose 231 (H) 74 - 99 mg/dL BUN 52 (H) 7.0 - 18 MG/DL Creatinine 1.87 (H) 0.6 - 1.3 MG/DL  
 BUN/Creatinine ratio 28 (H) 12 - 20 GFR est AA 42 (L) >60 ml/min/1.73m2 GFR est non-AA 34 (L) >60 ml/min/1.73m2 Calcium 9.1 8.5 - 10.1 MG/DL Xr Spine Cerv 4 Or 5 V Result Date: 9/23/2019 EXAM: Cervical spine complete INDICATION: Neck pain COMPARISON: Cervical spine series 1/13/2009 _______________ FINDINGS: AP, lateral, bilateral oblique and open-mouth odontoid views were performed. There is straightening of the normal cervical lordosis. No acute fracture. Complete loss of disc space C4-5 similar to prior exam 10 years earlier. Significant disc space narrowing which has progressed C3-4 C5-6 and C6-7. Facet degenerative changes are present. Odontoid is intact. At least moderately severe right foraminal narrowing C4-5 with moderate narrowing at C3-4 and C5-6. Only mild left foraminal narrowing C4-5 precervical soft tissues are normal. Patient is status post right shoulder. IMPRESSION: 1. No acute bony or degenerative changes with bilateral foraminal narrowing shown slight progression since 2009. ED COURSE:  
 
Patient has an MACKENZIE, current creatinine is 1.87, prior was 0.84 on 7/13. This may likely be from patient being on Bactrim. He states he has been drinking normally over the past few days. I have discontinued his Bactrim, he will instead take Keflex per Dr. Rola Childers direction. Given 1L NS here in the ED as pt reports no hx of CHF. Patient does not have any bony midline tenderness on the C-spine, his CT of the C-spine was unremarkable for acute process. Likely a strain of his right neck, which patient states was from painting. He is already on Percocet at home, I will have him continue this. He agrees to follow-up with his regular doctor as well as a nephrologist. 
 
Diagnosis: 1. Strain of neck muscle, subsequent encounter 2. MACKENZIE (acute kidney injury) ([address] 3. Cellulitis of right lower extremity Disposition: Discharge Follow-up Information Follow up With Specialties Details Why Contact Info Nita Ornelas MD Nephrology In 3 days  [address] [address] [id number] [phone] Angel Ortez MD Family Practice In 3 days  [facility] [address] [id number] [phone] [facility] EMERGENCY DEPT Emergency Medicine  If symptoms worsen [address] 
[phone] Patient's Medications Start Taking CEPHALEXIN (KEFLEX) 500 MG CAPSULE    Take 1 Cap by mouth four (4) times daily for 7 days. Continue Taking ALBUTEROL (PROAIR HFA) 90 MCG/ACTUATION INHALER    Take  inhaled by mouth. ALPHAGAN P 0.15 % OPHTHALMIC SOLUTION    Administer 1 Drop to both eyes three (3) times daily. APIXABAN (ELIQUIS) 5 MG TABLET    Take 1 Tab by mouth two (2) times a day. BIMATOPROST (LUMIGAN) 0.03 % OPHTHALMIC DROPS    Administer 1 drop to both eyes every evening. CYANOCOBALAMIN 1,000 MCG TABLET    Take 1,000 mcg by mouth daily. DILTIAZEM CD (CARDIZEM CD) 180 MG ER CAPSULE    Take 180 mg by mouth daily. FUROSEMIDE (LASIX) 40 MG TABLET    Take 40 mg by mouth daily. 2 tab daily GABAPENTIN (NEURONTIN) 600 MG TABLET    Take 600 mg by mouth three (3) times daily. IPRATROPIUM-ALBUTEROL (COMBIVENT RESPIMAT)  MCG/ACTUATION INHALER    daily as needed LEVOTHYROXINE (SYNTHROID) 175 MCG TABLET    Take 175 mcg by mouth Daily (before breakfast). LORATADINE (CLARITIN) 10 MG TABLET    Take 10 mg by mouth daily. MULTIVITAMINS-MINERALS-LUTEIN (CENTRUM SILVER) TAB    Take 1 Tab by mouth daily. PYRIDOXINE HCL (VITAMIN B-6 PO)    Take 1 Cap by mouth daily. TAMSULOSIN (FLOMAX) 0.4 MG CAPSULE    Take 1 Cap by mouth daily (after dinner). TRAZODONE (DESYREL) 50 MG TABLET    Take 50 mg by mouth nightly as needed. ZOLPIDEM (AMBIEN) 5 MG TABLET    Take 5 mg by mouth nightly. These Medications have changed No medications on file Stop Taking TRIMETHOPRIM-SULFAMETHOXAZOLE (BACTRIM DS) 160-800 MG PER TABLET    Take 1 Tab by mouth two (2) times a day for 10 days.   
 
YADIRA Hutson

## 2019-09-23 NOTE — ED NOTES
Neck pain, RLE cellulitis. Evaluated on 9/16/19. I performed a brief evaluation, including history and physical, of the patient here in triage and I have determined that pt will need further treatment and evaluation from the main side ER physician. I have placed initial orders to help in expediting patients care.      September 23, 2019 at 3:43 PM - YADIRA Carbajal

## 2019-09-23 NOTE — ED TRIAGE NOTES
Seen a week ago for cervical strain after moving furniture and doing work around house. Hard to move head. Also tx for cellulitis right leg. improving

## 2019-09-23 NOTE — ED NOTES
I have reviewed discharge instructions with the patient. The patient verbalized understanding. Patient NAD, VSS. Patient armband removed and shredded

## 2019-09-23 NOTE — DISCHARGE INSTRUCTIONS
Patient Education        Cellulitis: Care Instructions  Your Care Instructions    Cellulitis is a skin infection caused by bacteria, most often strep or staph. It often occurs after a break in the skin from a scrape, cut, bite, or puncture, or after a rash. Cellulitis may be treated without doing tests to find out what caused it. But your doctor may do tests, if needed, to look for a specific bacteria, like methicillin-resistant Staphylococcus aureus (MRSA). The doctor has checked you carefully, but problems can develop later. If you notice any problems or new symptoms, get medical treatment right away. Follow-up care is a key part of your treatment and safety. Be sure to make and go to all appointments, and call your doctor if you are having problems. It's also a good idea to know your test results and keep a list of the medicines you take. How can you care for yourself at home? · Take your antibiotics as directed. Do not stop taking them just because you feel better. You need to take the full course of antibiotics. · Prop up the infected area on pillows to reduce pain and swelling. Try to keep the area above the level of your heart as often as you can. · If your doctor told you how to care for your wound, follow your doctor's instructions. If you did not get instructions, follow this general advice:  ? Wash the wound with clean water 2 times a day. Don't use hydrogen peroxide or alcohol, which can slow healing. ? You may cover the wound with a thin layer of petroleum jelly, such as Vaseline, and a nonstick bandage. ? Apply more petroleum jelly and replace the bandage as needed. · Be safe with medicines. Take pain medicines exactly as directed. ? If the doctor gave you a prescription medicine for pain, take it as prescribed. ? If you are not taking a prescription pain medicine, ask your doctor if you can take an over-the-counter medicine.   To prevent cellulitis in the future  · Try to prevent cuts, scrapes, or other injuries to your skin. Cellulitis most often occurs where there is a break in the skin. · If you get a scrape, cut, mild burn, or bite, wash the wound with clean water as soon as you can to help avoid infection. Don't use hydrogen peroxide or alcohol, which can slow healing. · If you have swelling in your legs (edema), support stockings and good skin care may help prevent leg sores and cellulitis. · Take care of your feet, especially if you have diabetes or other conditions that increase the risk of infection. Wear shoes and socks. Do not go barefoot. If you have athlete's foot or other skin problems on your feet, talk to your doctor about how to treat them. When should you call for help? Call your doctor now or seek immediate medical care if:    · You have signs that your infection is getting worse, such as:  ? Increased pain, swelling, warmth, or redness. ? Red streaks leading from the area. ? Pus draining from the area. ? A fever.     · You get a rash.    Watch closely for changes in your health, and be sure to contact your doctor if:    · You do not get better as expected. Where can you learn more? Go to http://zenaida-dell.info/. Filiberto Hernandez in the search box to learn more about \"Cellulitis: Care Instructions. \"  Current as of: April 1, 2019  Content Version: 12.2  © 7400-0673 [employer]. Care instructions adapted under license by [employer] (which disclaims liability or warranty for this information). If you have questions about a medical condition or this instruction, always ask your healthcare professional. [place] [address] any warranty or liability for your use of this information. Patient Education        Acute Kidney Injury: Care Instructions  Your Care Instructions    Acute kidney injury (MACKENZIE) is a sudden decrease in kidney function. This can happen over a period of hours, days or, in some cases, weeks.  MACKENZIE used to be called acute renal failure, but kidney failure doesn't always happen with MACKENZIE. Common causes of MACKENZIE are dehydration, blood loss, and medicines. When MACKENZIE happens, the kidneys have trouble removing waste and excess fluids from the body. The waste and fluids build up and become harmful. Kidney function may return to normal if the cause of MACKENZIE is treated quickly. Your chance of a full recovery depends on what caused the problem, how quickly the cause was treated, and what other medical problems you have. A machine may be used to help your kidneys remove waste and fluids for a short period of time. This is called dialysis. Follow-up care is a key part of your treatment and safety. Be sure to make and go to all appointments, and call your doctor if you are having problems. It's also a good idea to know your test results and keep a list of the medicines you take. How can you care for yourself at home? · Talk to your doctor about how much fluid you should drink. · Eat a balanced diet. Talk to your doctor or a dietitian about what type of diet may be best for you. You may need to limit sodium, potassium, and phosphorus. · If you need dialysis, follow the instructions and schedule for dialysis that your doctor gives you. · Do not smoke. Smoking can make your condition worse. If you need help quitting, talk to your doctor about stop-smoking programs and medicines. These can increase your chances of quitting for good. · Do not drink alcohol. · Review all of your medicines with your doctor. Do not take any medicines, including nonsteroidal anti-inflammatory drugs (NSAIDs) such as ibuprofen (Advil, Motrin) or naproxen (Aleve), unless your doctor says it is safe for you to do so. · Make sure that anyone treating you for any health problem knows that you have had MACKENZIE. When should you call for help? Call 911 anytime you think you may need emergency care.  For example, call if:    · You passed out (lost consciousness).    Call your doctor now or seek immediate medical care if:    · You have new or worse nausea and vomiting.     · You have much less urine than normal, or you have no urine.     · You are feeling confused or cannot think clearly.     · You have new or more blood in your urine.     · You have new swelling.     · You are dizzy or lightheaded, or feel like you may faint.    Watch closely for changes in your health, and be sure to contact your doctor if:    · You do not get better as expected. Where can you learn more? Go to http://zenaida-dell.info/. Enter B302 in the search box to learn more about \"Acute Kidney Injury: Care Instructions. \"  Current as of: October 31, 2018  Content Version: 12.2  © 5924-8289 [employer]. Care instructions adapted under license by [employer] (which disclaims liability or warranty for this information). If you have questions about a medical condition or this instruction, always ask your healthcare professional. [place] [address] any warranty or liability for your use of this information.

## 2019-09-27 PROBLEM — N17.9 AKI (ACUTE KIDNEY INJURY) (HCC): Status: ACTIVE | Noted: 2019-01-01

## 2019-09-27 PROBLEM — R55 SYNCOPE: Status: ACTIVE | Noted: 2019-01-01

## 2019-09-27 NOTE — PROGRESS NOTES
Problem: Discharge Planning Goal: *Discharge to safe environment Outcome: Progressing Towards Goal

## 2019-09-27 NOTE — ED NOTES
TRANSFER - ED to INPATIENT REPORT: 
 
SBAR report made available to receiving floor on this patient being transferred to [address] [place] [address] ([facility])  for routine progression of care Admitting diagnosis Syncope [R55] MACKENZIE (acute kidney injury) ([facility] [address] [N17.9] Hypothyroidism [E03.9] Information from the following report(s) SBAR, Kardex, ED Summary and Intake/Output was made available to receiving floor. Lines:  
Peripheral IV 09/26/19 Left Antecubital (Active) Site Assessment Clean, dry, & intact 9/26/2019 10:22 PM  
Phlebitis Assessment 0 9/26/2019 10:22 PM  
Infiltration Assessment 0 9/26/2019 10:22 PM  
Dressing Status Clean, dry, & intact 9/26/2019 10:22 PM  
Dressing Type Transparent 9/26/2019 10:22 PM  
Hub Color/Line Status Pink;Flushed;Patent 9/26/2019 10:22 PM  
Action Taken Blood drawn 9/26/2019 10:22 PM  
  
 
Medication list confirmed with patient Opportunity for questions and clarification was provided. Patient is oriented to time, place, person and situation none Patient is  continent and ambulatory with assist 
  
Valuables transported with patient Patient transported with: 
 Tech 
 
MAP (Monitor): 89 =Monitored (most recent) Vitals w/ MEWS Score (last day) Date/Time MEWS Score Pulse Resp Temp BP Level of Consciousness SpO2  
 09/27/19 0030  1  76  18  98 °F (36.7 °C)  137/76  Alert  99 % 09/26/19 2345    76  15    117/73    100 % 09/26/19 2300    77  18    120/63    99 % 09/26/19 2215    93  24    126/77    99 % 09/26/19 2200    93  17    131/84      
 09/26/19 2145    94  22    128/77    99 % 09/26/19 2130    (!) 103  24    125/83    100 % 09/26/19 2115    (!) 107  19    131/76      
 09/26/19 2039  1  98  16  98.4 °F (36.9 °C)  124/77  Alert  98 %

## 2019-09-27 NOTE — PROGRESS NOTES
Problem: Falls - Risk of 
Goal: *Absence of Falls Description Document Sumaya Velasquez Fall Risk and appropriate interventions in the flowsheet. Outcome: Progressing Towards Goal 
Note:  
Fall Risk Interventions: 
Mobility Interventions: Patient to call before getting OOB, PT Consult for mobility concerns Medication Interventions: Evaluate medications/consider consulting pharmacy, Patient to call before getting OOB Elimination Interventions: Call light in reach, Patient to call for help with toileting needs History of Falls Interventions: Investigate reason for fall, Room close to nurse's station, Door open when patient unattended Problem: Syncope Goal: *Absence of injury Outcome: Progressing Towards Goal 
Goal: Decrease or eliminate episodes of syncope Outcome: Progressing Towards Goal 
  
Problem: Patient Education: Go to Patient Education Activity Goal: Patient/Family Education Outcome: Progressing Towards Goal 
  
Problem: Pain Goal: *Control of Pain Outcome: Progressing Towards Goal 
  
Problem: Patient Education: Go to Patient Education Activity Goal: Patient/Family Education Outcome: Progressing Towards Goal

## 2019-09-27 NOTE — PROGRESS NOTES
Problem: Falls - Risk of 
Goal: *Absence of Falls Description Document Master Stevenson Fall Risk and appropriate interventions in the flowsheet. Outcome: Progressing Towards Goal 
Note:  
Fall Risk Interventions: 
Mobility Interventions: Bed/chair exit alarm, Patient to call before getting OOB Medication Interventions: Evaluate medications/consider consulting pharmacy Elimination Interventions: Call light in reach History of Falls Interventions: Door open when patient unattended Problem: Patient Education: Go to Patient Education Activity Goal: Patient/Family Education Outcome: Progressing Towards Goal 
  
Problem: Syncope Goal: *Absence of injury Outcome: Progressing Towards Goal 
Goal: Decrease or eliminate episodes of syncope Outcome: Progressing Towards Goal 
  
Problem: Patient Education: Go to Patient Education Activity Goal: Patient/Family Education Outcome: Progressing Towards Goal 
  
Problem: Pain Goal: *Control of Pain Outcome: Progressing Towards Goal 
  
Problem: Patient Education: Go to Patient Education Activity Goal: Patient/Family Education Outcome: Progressing Towards Goal 
  
Problem: Patient Education: Go to Patient Education Activity Goal: Patient/Family Education Outcome: Progressing Towards Goal 
  
Problem: Afib Pathway: Day 1 Goal: Off Pathway (Use only if patient is Off Pathway) Outcome: Progressing Towards Goal 
Goal: Activity/Safety Outcome: Progressing Towards Goal 
Goal: Consults, if ordered Outcome: Progressing Towards Goal 
Goal: Diagnostic Test/Procedures Outcome: Progressing Towards Goal 
Goal: Nutrition/Diet Outcome: Progressing Towards Goal 
Goal: Discharge Planning Outcome: Progressing Towards Goal 
Goal: Medications Outcome: Progressing Towards Goal 
Goal: Respiratory Outcome: Progressing Towards Goal 
Goal: Treatments/Interventions/Procedures Outcome: Progressing Towards Goal 
Goal: Psychosocial 
Outcome: Progressing Towards Goal 
 Goal: *Optimal pain control at patient's stated goal 
Outcome: Progressing Towards Goal 
Goal: *Hemodynamically stable Outcome: Progressing Towards Goal 
Goal: *Stable cardiac rhythm Outcome: Progressing Towards Goal 
Goal: *Lungs clear or at baseline Outcome: Progressing Towards Goal 
Goal: *Labs within defined limits Outcome: Progressing Towards Goal 
Goal: *Describes available resources and support systems Outcome: Progressing Towards Goal 
  
Problem: Discharge Planning Goal: *Discharge to safe environment Outcome: Progressing Towards Goal

## 2019-09-27 NOTE — WOUND CARE
Wound/Ostomy Nurse Progress Note Patient: Mario Berumen FDX:[birth year] MRN: [id number] Situation: Wound consult for bilateral arm skin tears. Background: Patient admitted to [facility] following a fall. He has mutliple skin tears on his arms. Assessment: The patient was lying quietly in bed when wound care arrived for consult. He was surrounded by his wife and children. He was able to lift his arms for assessment. His right arm has multiple skin tears and extensive bruising. His left arm is also bruised with a skin tear near the elbow. Mepilex bordered dressings had been applied to the wounds. Theses dressings were removed. The wounds were cleansed with dermal wound cleanser, and the skin tear flaps were approximated as much as possible then dressed with Xeroform and roll gauze. Recommendation: Continue with Xeroform and roll gauze every other day.

## 2019-09-27 NOTE — MANAGEMENT PLAN
Discharge/Transition Planning Problem: Discharge Planning Goal: *Discharge to safe environment Outcome: Progressing Towards Goal 
 
Reason for Admission:   Syncope RRAT Score:     18 Do you (patient/family) have any concerns for transition/discharge? Not at time Plan for utilizing home health:   tbd 
 
Current Advanced Directive/Advance Care Plan:  Yes, but not on file Transition of Care Plan: Interviewed patient. Verified demographics listed on face sheet with patient; all information correct. Pt with Medicare and [facility]. Patient stated their PCP is Dr Nicki Quintero and last appt yesterday. Patient lives in 1 story home with 4 steps with wife. Patient's NOK is wife and 5 kids. Patient independent with ADLs prior to admission. 2 falls in last 2 days with syncope and bruised, cut right arm. DME prior to admission: has walker. Discharge plan is Home Patient has designated __wife and one of daughters______________________ to participate in his/her discharge plan and to receive any needed information. Benji Hernándezothy Spouse [phone] Hung Ricci Daughter [phone] Care Management Interventions PCP Verified by CM: Yes(Dr Nesbitt) Mode of Transport at Discharge: Other (see comment) Transition of Care Consult (CM Consult): Discharge Planning Current Support Network: Lives with Spouse, Own Home Confirm Follow Up Transport: Self Plan discussed with Pt/Family/Caregiver: Yes Discharge Location Discharge Placement: Home

## 2019-09-27 NOTE — PROGRESS NOTES
0130: Arrived on the floor from ED via stretcher accompanied by ED nurse; AOX4, on room air, ambulated with assist from stretcher to bed; hooked on tele reading Afib; gown changed; oriented to unit, room, call bell and tv use; discussed plan of care; IVF initiated as ordered' 0219: Tylenol po given for c/o neck pain; late snack given as requested 0300: sleeping; no visual indicators for pain noted at this time 0500: sleeping; IVF infusing well 
 
0620: awake; c/o neck pain; po Tylenol given as requested- see flowsheet 
 
0700: sleeping; no visual indicators for pain at this time 1489: Bedside and Verbal shift change report given to Lea Watters RN (oncoming nurse) by Huey Reyes RN (offgoing nurse). Report included the following information SBAR, Kardex, Intake/Output, Recent Results and Cardiac Rhythm Afib.

## 2019-09-27 NOTE — CDMP QUERY
Patient admitted with MACKENZIE. noted to have syncope. When all testing has been completed, if possible, please document in progress notes and discharge summary if you are evaluating and/or treating any of the following: ? Syncope due to TIA, Stroke or Carotid Stenosis ? Syncope due to an (arrhythmia) ? Syncope due to orthostatic hypotension secondary to (Diabetic, Parkinson's or MS) autonomic neuropathy ? Syncope due to other hypotension ? Carotid sinus syncope ? Syncope due to other, please specify ? Clinically unable to determine The medical record reflects the following: 
  Risk Factors: 79 yo male with  PMH atrial fibrillation Clinical Indicators: Per H&P Patient had 2 syncopal episodes in the last couple of days.  Patient states that he has had this morning with bruising and swelling to the back of his head Treatment: Pending ECHO and carotid US, orthostatic blood pressures, tele monitoring Thank you, 
Yao Mattson RN CDI   [phone]

## 2019-09-27 NOTE — H&P
Internal Medicine History and Physical 
   
 
 
Subjective HPI: Cassie Camacho is a 80 y.o. male who presented to the ED with history of atrial fibrillation on Eliquis who was sent in by his doctor for dehydration. Patient had 2 syncopal episodes in the last couple of days. Patient states that he has had this morning with bruising and swelling to the back of his head. Patient denies any fever. He denies any chest pain or cough, increased shortness of breath. Patient was treated for cellulitis which is significantly improving on the left lower extremity. He said no blood in his stool. He denies any current significant neck pain. Symptoms are worse with standing. Patient was noted to have worsening of his renal function at his last ER visit ED evaluation revealed negative CT head. Labs show MACKENZIE and uncontrolled hypothyroidism. Will admit for further evaluation and treatment PMHx: 
Past Medical History:  
Diagnosis Date  Arthritis  Atrial fibrillation ([facility] [address] 07/2017 Dr Darin Chapa cardio follows. chronic  Carcinoma of prostate ([facility] [address]  Cellulitis  Coarse tremor   
  hands, states \"my doctor is aware\"  Difficulty swallowing  Dysphagia  Edema  Encounter for colonoscopy due to history of adenomatous colonic polyps  Essential hypertension  Fall 10/07/2017 \"went to [facility] Emergency Room, CT Scan showed concussion, no bleeding\" per patient  Fatigue  GERD (gastroesophageal reflux disease) wo. esophagitis  Glaucoma  H/O joint replacement   
  left knee 2003  HLD (hyperlipidemia)  Hypertension  Hypertensive disorder  Hypothyroidism  Impotence  Laceration of right lower leg with complication  Malignant neoplasm of prostate ([address]   
  [id number] Adenocarcinoma of the Prostate, dx 11/3/2008, karlee 3+4, presenting PSA 5.5ng/mL. 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   
 acute/hist. of   
 
 
PSurgHx: 
Past Surgical History:  
Procedure Laterality Date  COLONOSCOPY N/A 1/3/2017  HX CHOLECYSTECTOMY  HX KNEE REPLACEMENT Left 01/2003  HX SHOULDER REPLACEMENT Right 7/17, 10/17 SocialHx: 
Social History Socioeconomic History  Marital status:  Spouse name: Not on file  Number of children: Not on file  Years of education: Not on file  Highest education level: Not on file Occupational History  Not on file Social Needs  Financial resource strain: Not on file  Food insecurity:  
  Worry: Not on file Inability: Not on file  Transportation needs:  
  Medical: Not on file Non-medical: Not on file Tobacco Use  Smoking status: Former Smoker  Smokeless tobacco: Never Used Substance and Sexual Activity  Alcohol use: Yes Comment: weekly  Drug use: No  
 Sexual activity: Not on file Lifestyle  Physical activity:  
  Days per week: Not on file Minutes per session: Not on file  Stress: Not on file Relationships  Social connections:  
  Talks on phone: Not on file Gets together: Not on file Attends Evangelical service: Not on file Active member of club or organization: Not on file Attends meetings of clubs or organizations: Not on file Relationship status: Not on file  Intimate partner violence:  
  Fear of current or ex partner: Not on file Emotionally abused: Not on file Physically abused: Not on file Forced sexual activity: Not on file Other Topics Concern  Not on file Social History Narrative  Not on file Allergies: Allergies Allergen Reactions  Adhesive Other (comments) Skin irritation  Darvon [Propoxyphene] Hives, Myalgia and Other (comments) Joint pain  Penicillins Rash and Itching FamilyHx: 
History reviewed. No pertinent family history. Prior to Admission Medications Prescriptions Last Dose Informant Patient Reported? Taking? ALPHAGAN P 0.15 % ophthalmic solution   Yes No  
Sig: Administer 1 Drop to both eyes three (3) times daily. PYRIDOXINE HCL (VITAMIN B-6 PO)   Yes No  
Sig: Take 1 Cap by mouth daily. albuterol (PROAIR HFA) 90 mcg/actuation inhaler   Yes No  
Sig: Take  inhaled by mouth. apixaban (ELIQUIS) 5 mg tablet   No No  
Sig: Take 1 Tab by mouth two (2) times a day. bimatoprost (LUMIGAN) 0.03 % ophthalmic drops   Yes No  
Sig: Administer 1 drop to both eyes every evening. cephALEXin (KEFLEX) 500 mg capsule   No No  
Sig: Take 1 Cap by mouth four (4) times daily for 7 days. cyanocobalamin 1,000 mcg tablet   Yes No  
Sig: Take 1,000 mcg by mouth daily. dilTIAZem CD (CARDIZEM CD) 180 mg ER capsule   Yes No  
Sig: Take 180 mg by mouth daily. furosemide (LASIX) 40 mg tablet   Yes No  
Sig: Take 40 mg by mouth daily. 2 tab daily  
gabapentin (NEURONTIN) 600 mg tablet   Yes No  
Sig: Take 600 mg by mouth three (3) times daily. ipratropium-albuterol (COMBIVENT RESPIMAT)  mcg/actuation inhaler   Yes No  
Sig: daily as needed  
levothyroxine (SYNTHROID) 175 mcg tablet   Yes No  
Sig: Take 175 mcg by mouth Daily (before breakfast). loratadine (CLARITIN) 10 mg tablet   Yes No  
Sig: Take 10 mg by mouth daily. multivitamins-minerals-lutein (CENTRUM SILVER) Tab   Yes No  
Sig: Take 1 Tab by mouth daily. tamsulosin (FLOMAX) 0.4 mg capsule   No No  
Sig: Take 1 Cap by mouth daily (after dinner). traZODone (DESYREL) 50 mg tablet   Yes No  
Sig: Take 50 mg by mouth nightly as needed. zolpidem (AMBIEN) 5 mg tablet   Yes No  
Sig: Take 5 mg by mouth nightly. Facility-Administered Medications: None Review of Systems: 
CONST: fever(-),   chills(-),   fatigue(-),   malaise(-) SKIN: itching(-),   rash(-) EYES: vision - no change from baseline ENT: ear pain(-),   nasal discharge(-),   sore throat(-),   voice change(-) RESP: SOB(-),   cough(-),   hemoptysis(-) CV: chest pain(-),   PND(-),   orthopnea(-),   exertional dyspnea(-),   palpitations(-), syncope(+) GI: abd pain(-),   nausea(-),   vomiting(-),   diarrhea(-),   melena(-),   hematemesis(-), hematochesia(-) 
: dysuria(-),   frequency(-),   urgency(-),   hematuria(-) 
MS: arthralgias(-),   myalgias(-) NEURO: speech w/o change,   tremors(-),   seizures(-),   numbness(-),   dizziness(-) Objective Visit Vitals /73 Pulse 76 Temp 98.4 °F (36.9 °C) Resp 15 Wt 81.6 kg (180 lb) SpO2 100% BMI 27.37 kg/m² Physical Exam: 
CONST: NAD,   VSS reviewed SKIN: rashes(-),   ulcers(-), multiple hemorrhagic crusts on facial skin, no signs of infection, hematoma of the scalp on the left parietal area EYES: PERRL,   EOMI,   sclerae w/o jaundice HENT: HEENT,   normal appearing nose and ears,   normal nasal mucosa and turbinates NECK: supple,   no LA,   trachea is midline,   thyroid w/o goiter or palpable nodules RESP: normal respiratory effort,   wheezes(-),   rales(-),   rhochi(-),   no consolidation on percussion CHEST: normal axillae,   retractions(-),   use of accessory muscles(-) CV: JVD(-),   carotid bruits(-),   RRR,   gallops(-),   murmurs(-),   edema LE(-),   abd bruits(-),   peripheral pulses normal 
ABD: soft, tender(-),   distended(-),   HSM(-),   BS(+) in all quadrants,   peritoneal signs(-) 
MS: NROM in all extremities,  clubbing(-),   peripheral cyanosis(-) NEURO: speech normal, tremors(-),   CN II-XII(-),   lateralizing signs(-) PSYCH: AOC x 3,   appropriate affect,   non-suicidal 
 
 
Laboratory Studies: 
CMP:  
Lab Results Component Value Date/Time  09/26/2019 10:13 PM  
 K 4.7 09/26/2019 10:13 PM  
 CL 96 (L) 09/26/2019 10:13 PM  
 CO2 34 (H) 09/26/2019 10:13 PM  
 AGAP 9 09/26/2019 10:13 PM  
  (H) 09/26/2019 10:13 PM  
 BUN 38 (H) 09/26/2019 10:13 PM  
 CREA 1.70 (H) 09/26/2019 10:13 PM  
 GFRAA 47 (L) 09/26/2019 10:13 PM  
 GFRNA 38 (L) 09/26/2019 10:13 PM  
 CA 8.3 (L) 09/26/2019 10:13 PM  
 ALB 2.5 (L) 09/26/2019 10:13 PM  
 TP 5.5 (L) 09/26/2019 10:13 PM  
 GLOB 3.0 09/26/2019 10:13 PM  
 AGRAT 0.8 09/26/2019 10:13 PM  
 SGOT 47 (H) 09/26/2019 10:13 PM  
  (H) 09/26/2019 10:13 PM  
 
CBC:  
Lab Results Component Value Date/Time WBC 12.9 09/26/2019 10:13 PM  
 HGB 11.4 (L) 09/26/2019 10:13 PM  
 HCT 35.0 (L) 09/26/2019 10:13 PM  
  09/26/2019 10:13 PM  
 
All Cardiac Markers in the last 24 hours:  
Lab Results Component Value Date/Time CPK 69 09/26/2019 10:13 PM  
 CKMB 1.6 09/26/2019 10:13 PM  
 CKND1 2.3 09/26/2019 10:13 PM  
 TROIQ 0.04 09/26/2019 10:13 PM  
 
COAGS:  
Lab Results Component Value Date/Time PTP 13.5 09/26/2019 10:13 PM  
 INR 1.1 09/26/2019 10:13 PM  
 
Liver Panel:  
Lab Results Component Value Date/Time ALB 2.5 (L) 09/26/2019 10:13 PM  
 TP 5.5 (L) 09/26/2019 10:13 PM  
 GLOB 3.0 09/26/2019 10:13 PM  
 AGRAT 0.8 09/26/2019 10:13 PM  
 SGOT 47 (H) 09/26/2019 10:13 PM  
  (H) 09/26/2019 10:13 PM  
  (H) 09/26/2019 10:13 PM  
 
 
Imaging Reviewed: 
No results found. Assessment/Plan Active Hospital Problems Diagnosis Date Noted  Syncope 09/27/2019  MACKENZIE (acute kidney injury) ([facility] [address] 09/27/2019  Hypothyroidism 06/02/2018 Syncope IVF Echo and carotid US in am 
Orthostatic pressures MACKENZIE (acute kidney injury) ([facility] [address] IVF Serial labs Hypothyroidism Increase Synthroid from 175mcg to 200mcg qd 
 
 
- Cont acceptable home medications for chronic conditions  
- DVT protocol and GI prophylaxis I have personally reviewed all pertinent labs, films and EKGs that have officially resulted. I reviewed available electronic documentation outlining the initial presentation as well as the emergency room physician's encounter. Total time spent with patient and chart review, orders and documentation - 45min out of which more than 50% spent face-to-face with patient. Addi Gu MD 
9/27/2019  
12:36 AM 
 
 
[facility]

## 2019-09-27 NOTE — PROGRESS NOTES
Problem: Falls - Risk of 
Goal: *Absence of Falls Description Document Levar Renee Fall Risk and appropriate interventions in the flowsheet. 9/27/2019 0204 by Katarina Pugh RN Note:  
Fall Risk Interventions: 
Mobility Interventions: Patient to call before getting OOB, PT Consult for mobility concerns Medication Interventions: Evaluate medications/consider consulting pharmacy, Patient to call before getting OOB Elimination Interventions: Call light in reach, Patient to call for help with toileting needs History of Falls Interventions: Investigate reason for fall, Room close to nurse's station, Door open when patient unattended 9/27/2019 0202 by Katarina Pugh RN Outcome: Progressing Towards Goal 
Note:  
Fall Risk Interventions: 
Mobility Interventions: Patient to call before getting OOB, PT Consult for mobility concerns Medication Interventions: Evaluate medications/consider consulting pharmacy, Patient to call before getting OOB Elimination Interventions: Call light in reach, Patient to call for help with toileting needs History of Falls Interventions: Investigate reason for fall, Room close to nurse's station, Door open when patient unattended Problem: Syncope Goal: *Absence of injury Outcome: Progressing Towards Goal 
Goal: Decrease or eliminate episodes of syncope Outcome: Progressing Towards Goal 
  
Problem: Patient Education: Go to Patient Education Activity Goal: Patient/Family Education Outcome: Progressing Towards Goal 
  
Problem: Pain Goal: *Control of Pain 9/27/2019 0204 by Katarina Pugh RN Outcome: Progressing Towards Goal 
9/27/2019 0202 by Katarina Pugh RN Outcome: Progressing Towards Goal 
  
Problem: Patient Education: Go to Patient Education Activity Goal: Patient/Family Education Outcome: Progressing Towards Goal 
  
Problem: Patient Education: Go to Patient Education Activity Goal: Patient/Family Education Outcome: Progressing Towards Goal 
  
Problem: Afib Pathway: Day 1 Goal: Off Pathway (Use only if patient is Off Pathway) Outcome: Progressing Towards Goal 
Goal: Activity/Safety Outcome: Progressing Towards Goal 
Goal: Consults, if ordered Outcome: Progressing Towards Goal 
Goal: Diagnostic Test/Procedures Outcome: Progressing Towards Goal 
Goal: Nutrition/Diet Outcome: Progressing Towards Goal 
Goal: Discharge Planning Outcome: Progressing Towards Goal 
Goal: Medications Outcome: Progressing Towards Goal 
Goal: Respiratory Outcome: Progressing Towards Goal 
Goal: Treatments/Interventions/Procedures Outcome: Progressing Towards Goal 
Goal: Psychosocial 
Outcome: Progressing Towards Goal 
Goal: *Optimal pain control at patient's stated goal 
Outcome: Progressing Towards Goal 
Goal: *Hemodynamically stable Outcome: Progressing Towards Goal 
Goal: *Stable cardiac rhythm Outcome: Progressing Towards Goal 
Goal: *Lungs clear or at baseline Outcome: Progressing Towards Goal 
Goal: *Labs within defined limits Outcome: Progressing Towards Goal 
Goal: *Describes available resources and support systems Outcome: Progressing Towards Goal

## 2019-09-27 NOTE — PROGRESS NOTES
Problem: Discharge Planning Goal: *Discharge to safe environment Outcome: [address] Chart reviewed. Pt to have echo, carotid US in am.  Plan is home. Case management following for needs at discharge.

## 2019-09-27 NOTE — ED TRIAGE NOTES
Presents with family members stating Dr Rob Alvarado told him to come in for dehydration and hypotension. Pt denies any symptoms at this time

## 2019-09-27 NOTE — CONSULTS
[facility] - Consult Note Consultation request by Dr. Gerald Coronel for advice/opinion related to evaluating atrial fibrillation with slow ventricular response Date of  Admission: 9/26/2019  8:41 PM  
Primary Care Physician:  Dash Witt MD 
 
I saw, evaluated, interviewed and examined the patient personally. I agree with the findings and plan of care as documented below with PA-BRANDY note Patient admitted with syncopal episode with head trauma. Chronic atrial fibrillation on Cardizem and Eliquis Telemetry monitor show atrial fibrillation with optimal heart rate response. Occasional slow heart rate noted with pause up to 2.6 cm. Would recommend to hold the Cardizem for now. Patient could have tachybradycardia syndrome and may need to be considered for pacemaker however patient also has hypothyroidism which could be contributing. Will order echo today to make sure he does not have any LV dysfunction Further plan depending on hospital course Kathleen Leija MD 
 
 
 
 Assessment:  
 
-Syncope, post-micturition x 2 
-Chronic atrial fibrillation, followed by Dr. Charisma Jonas 
-MACKENZIE, Cr 1.87 on 9/23/19 --> 1.70 on admission 9/26/19. Cr 0.84 in 07/2019. Possibly due to antibiotics vs dehydration. 
-Transaminitis, , AST 46 on presentation 9/26/19 
-Hypoalbuminemia, Albumin 2.5 on admission 
-Basal cell carcinoma of forehead and squamous cell carcinoma of scalp. S/p brachytherapy of R scalp/forehead x 7 treatments. Daughter reports had first radiation treatment (since May) 1 week ago and was due to have second radiation treatment this afternoon. 
-Hx solid food dysphagia, reports esophageal dilatation every 3 years (last in 06/2016) and feels that he is going to need repeat dilatation soon. Followed by Dr. Lucia Lobo. 
-Hx alcohol-related pancreatitis -HTN 
-Hyperlipidemia -Hypothyroidism, TSH 9.38 on 9/26/19 
-Hx prostate adenocarcinoma 2008 Primary cardiologist is Dr. Charisma Jonas Plan: -Echocardiogram ordered by primary team, pending. 
-Discussed possible need for pacemaker with patient and daughter due to atrial fibrillation with tachy-faviola rates. Continued on telemetry monitoring. Will d/c Cardizem for now; he did have his dose this morning. 
-Further recommendations to follow based on test results, hospital course. History of Present Illness: This is a 80 y.o. male admitted for Syncope [R55] MACKENZIE (acute kidney injury) ([facility] [address] [N17.9] Hypothyroidism [E03.9]. Patient complains of:  syncope John Paul Chase is a 80 y.o. male with PMHx as described above, who presented to the hospital due to syncope. Pt states he passed out twice the other day, both times after urinating. First time was around 1:30 AM, second episode was later the same morning around 8:30 AM.  Daughter notes that he had only had about 3 doses of his new antibiotic that he was prescribed in the ER earlier this week for cellulitis, and they stopped giving it to him after he started passing out. She also notes that he has some chronic esophageal problems; pt feels that he will need another esophageal dilatation in the near future. Cardiac risk factors: dyslipidemia, male gender, hypertension Review of Symptoms:  Except as stated above include: 
Constitutional:  negative Respiratory:  negative Cardiovascular:  negative Gastrointestinal: negative Genitourinary:  negative Musculoskeletal:  Negative Neurological:  Negative Dermatological:  Negative Endocrinological: Negative Psychological:  Negative Past Medical History:  
 
Past Medical History:  
Diagnosis Date  Arthritis  Atrial fibrillation ([facility] 07/2017 Dr Charisma Jonas cardio follows. chronic  Carcinoma of prostate ([facility]  Cellulitis  Coarse tremor   
  hands, states \"my doctor is aware\"  Difficulty swallowing  Dysphagia  Edema  Encounter for colonoscopy due to history of adenomatous colonic polyps  Essential hypertension  Fall 10/07/2017 \"went to [facility] Emergency Room, CT Scan showed concussion, no bleeding\" per patient  Fatigue  GERD (gastroesophageal reflux disease) wo. esophagitis  Glaucoma  H/O joint replacement   
  left knee 2003  HLD (hyperlipidemia)  Hypertension  Hypertensive disorder  Hypothyroidism  Impotence  Laceration of right lower leg with complication  Malignant neoplasm of prostate ([facility] [address]   
  [id number] Adenocarcinoma of the Prostate, dx 11/3/2008, karlee 3+4, presenting PSA 5.5ng/mL. 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   
 acute/hist. of   
  
 
 Social History:  
 
Social History Socioeconomic History  Marital status:  Spouse name: Not on file  Number of children: Not on file  Years of education: Not on file  Highest education level: Not on file Tobacco Use  Smoking status: Former Smoker  Smokeless tobacco: Never Used Substance and Sexual Activity  Alcohol use: Yes Comment: weekly  Drug use: No  
 
 
 Family History:  
History reviewed. No pertinent family history. Medications: Allergies Allergen Reactions  Adhesive Other (comments) Skin irritation  Darvon [Propoxyphene] Hives, Myalgia and Other (comments) Joint pain  Penicillins Rash and Itching Current Facility-Administered Medications Medication Dose Route Frequency  brimonidine (ALPHAGAN) 0.2 % ophthalmic solution 1 Drop  1 Drop Both Eyes TID  apixaban (ELIQUIS) tablet 5 mg  5 mg Oral Q12H  
 latanoprost (XALATAN) 0.005 % ophthalmic solution 1 Drop  1 Drop Both Eyes QHS  cephALEXin (KEFLEX) capsule 500 mg  500 mg Oral Q6H  
 cyanocobalamin tablet 1,000 mcg  1,000 mcg Oral DAILY  furosemide (LASIX) tablet 40 mg  40 mg Oral DAILY  gabapentin (NEURONTIN) capsule 600 mg  600 mg Oral BID  levothyroxine (SYNTHROID) tablet 200 mcg  200 mcg Oral 6am  
 loratadine (CLARITIN) tablet 10 mg  10 mg Oral DAILY  multivitamin, tx-iron-ca-min (THERA-M w/ IRON) tablet 1 Tab  1 Tab Oral DAILY  pyridoxine (vitamin B6) (VITAMIN B-6) tablet 50 mg  50 mg Oral DAILY  tamsulosin (FLOMAX) capsule 0.4 mg  0.4 mg Oral PCD  traZODone (DESYREL) tablet 50 mg  50 mg Oral QHS PRN  
 zolpidem (AMBIEN) tablet 5 mg  5 mg Oral QHS  sodium chloride (NS) flush 5-40 mL  5-40 mL IntraVENous Q8H  
 sodium chloride (NS) flush 5-40 mL  5-40 mL IntraVENous PRN  
 acetaminophen (TYLENOL) tablet 650 mg  650 mg Oral Q4H PRN  
 lactated Ringers infusion  100 mL/hr IntraVENous CONTINUOUS  
 ondansetron (ZOFRAN) injection 4 mg  4 mg IntraVENous Q4H PRN  
 famotidine (PEPCID) tablet 20 mg  20 mg Oral DAILY  albuterol (PROVENTIL VENTOLIN) nebulizer solution 2.5 mg  2.5 mg Nebulization Q4H PRN  
 albuterol-ipratropium (DUO-NEB) 2.5 MG-0.5 MG/3 ML  3 mL Nebulization Q6H RT  
 influenza vaccine 2019-20 (6 mos+)(PF) (FLUARIX/FLULAVAL/FLUZONE QUAD) injection 0.5 mL  0.5 mL IntraMUSCular PRIOR TO DISCHARGE  dilTIAZem CD (CARDIZEM CD) capsule 120 mg  120 mg Oral DAILY Physical Exam:  
 
Visit Vitals /58 (BP 1 Location: Right arm, BP Patient Position: At rest) Pulse 98 Temp 97.8 °F (36.6 °C) Resp 18 Ht 5' 8\" (1.727 m) Wt 180 lb (81.6 kg) SpO2 93% BMI 27.37 kg/m² BP Readings from Last 3 Encounters:  
09/27/19 148/58  
09/23/19 105/73  
09/16/19 158/72 Pulse Readings from Last 3 Encounters:  
09/27/19 98  
09/23/19 75  
09/16/19 86 Wt Readings from Last 3 Encounters:  
09/26/19 180 lb (81.6 kg) 09/23/19 190 lb (86.2 kg) 09/16/19 190 lb (86.2 kg) General:  alert, cooperative, no distress, appears stated age Neck:  No JVD Lungs:  clear to auscultation bilaterally Heart:  Irregularly irregular rhythm Abdomen:  abdomen is soft without significant tenderness, masses, organomegaly or guarding Extremities:  Atraumatic, mild erythema to RLE which daughter states is improved Skin: Warm and dry. Neuro: alert, oriented x3, affect appropriate, no focal neurological deficits, moves all extremities well, no involuntary movements Psych: non focal 
 
 Data Review:  
 
Recent Labs  
  09/26/19 
2213 WBC 12.9 HGB 11.4* HCT 35.0*  
 Recent Labs  
  09/26/19 2213   
K 4.7 CL 96* CO2 34* * BUN 38* CREA 1.70* CA 8.3* ALB 2.5* SGOT 47* * INR 1.1 Results for orders placed or performed during the hospital encounter of 09/26/19 EKG, 12 LEAD, INITIAL Result Value Ref Range Ventricular Rate 103 BPM  
 QRS Duration 102 ms Q-T Interval 332 ms QTC Calculation (Bezet) 434 ms Calculated R Axis -21 degrees Calculated T Axis 153 degrees Diagnosis Atrial fibrillation with rapid ventricular response Septal infarct , age undetermined ST & T wave abnormality, consider lateral ischemia Abnormal ECG When compared with ECG of 10-JUL-2019 17:10, 
Septal infarct is now present QT has lengthened All Cardiac Markers in the last 24 hours:   
Lab Results Component Value Date/Time CPK 69 09/26/2019 10:13 PM  
 CKMB 1.6 09/26/2019 10:13 PM  
 CKND1 2.3 09/26/2019 10:13 PM  
 TROIQ 0.04 09/26/2019 10:13 PM  
 
 
 
Signed By: Jhonny Car PA-C September 27, 2019

## 2019-09-27 NOTE — PROGRESS NOTES
0730 Bedside and Verbal shift change report given to Linda Ferreira (oncoming nurse) by Oswald Cole  (offgoing nurse). Report included the following information SBAR, Kardex, ED Summary, Intake/Output, MAR, Recent Results and Med Rec Status Pt was calm in bed with no distress noted. Follows command, denies any pain at this time but stated that he was having some neck discomfort. . On room air. Bed in low position, side rail x 3 up, call bell within reach . 1000 Pt. Was assisted to the bathroom by two nurses. He tolerated ambulation well with no dizziness reported. 1100  Bilateral Carotid duplex done by bedside . 1 [facility] Dr. Ya Damon by bedside with his team  
 
1150 Family by bedside with the pt  
 
1900 Bedside and Verbal shift change report given to [facility] RN  (oncoming nurse) by Linda Ferreira (offgoing nurse). Report included the following information SBAR, Kardex, ED Summary, Intake/Output, MAR, Recent Results and Med Rec Status.

## 2019-09-27 NOTE — ED PROVIDER NOTES
Carmen Lucas is a 80 y.o. Male with history of atrial fibrillation on Eliquis who was sent in by his doctor for dehydration. Patient had 2 syncopal episodes in the last couple of days. Patient states that he has had this morning with bruising and swelling to the back of his head. Patient denies any fever. He denies any chest pain or cough, increased shortness of breath. Patient was treated for cellulitis which is significantly improving on the left lower extremity. He said no blood in his stool. He denies any current significant neck pain. Symptoms are worse with standing. Patient was noted to have worsening of his renal function at his last ER visit The history is provided by the patient, the spouse and medical records (pcp). Past Medical History:  
Diagnosis Date  Arthritis  Atrial fibrillation ([address] 07/2017 Dr Aldo Hedrick cardio follows. chronic  Carcinoma of prostate ([address]  Cellulitis  Coarse tremor   
  hands, states \"my doctor is aware\"  Difficulty swallowing  Dysphagia  Edema  Encounter for colonoscopy due to history of adenomatous colonic polyps  Essential hypertension  Fall 10/07/2017 \"went to [facility] Emergency Room, CT Scan showed concussion, no bleeding\" per patient  Fatigue  GERD (gastroesophageal reflux disease) wo. esophagitis  Glaucoma  H/O joint replacement   
  left knee 2003  HLD (hyperlipidemia)  Hypertension  Hypertensive disorder  Hypothyroidism  Impotence  Laceration of right lower leg with complication  Malignant neoplasm of prostate ([address]   
  [id number] Adenocarcinoma of the Prostate, dx 11/3/2008, karlee 3+4, presenting PSA 5.5ng/mL.  Malignant tumor of prostate ([address]  Nocturia  Non-pressure chronic ulcer of right calf ([address] with fat layer exposed  Pancreatitis  Pneumonia  Primary osteoarthritis, right shoulder  Rotator cuff tear, right  Sepsis ([facility] [address]  Shoulder dislocation, right, initial encounter  Syncope  Thyroid disease  Urinary retention   
 acute/hist. of   
 
 
Past Surgical History:  
Procedure Laterality Date  COLONOSCOPY N/A 1/3/2017  HX CHOLECYSTECTOMY  HX KNEE REPLACEMENT Left 01/2003  HX SHOULDER REPLACEMENT Right 7/17, 10/17 History reviewed. No pertinent family history. Social History Socioeconomic History  Marital status:  Spouse name: Not on file  Number of children: Not on file  Years of education: Not on file  Highest education level: Not on file Occupational History  Not on file Social Needs  Financial resource strain: Not on file  Food insecurity:  
  Worry: Not on file Inability: Not on file  Transportation needs:  
  Medical: Not on file Non-medical: Not on file Tobacco Use  Smoking status: Former Smoker  Smokeless tobacco: Never Used Substance and Sexual Activity  Alcohol use: Yes Comment: weekly  Drug use: No  
 Sexual activity: Not on file Lifestyle  Physical activity:  
  Days per week: Not on file Minutes per session: Not on file  Stress: Not on file Relationships  Social connections:  
  Talks on phone: Not on file Gets together: Not on file Attends Islam service: Not on file Active member of club or organization: Not on file Attends meetings of clubs or organizations: Not on file Relationship status: Not on file  Intimate partner violence:  
  Fear of current or ex partner: Not on file Emotionally abused: Not on file Physically abused: Not on file Forced sexual activity: Not on file Other Topics Concern  Not on file Social History Narrative  Not on file ALLERGIES: Adhesive; Darvon [propoxyphene]; and Penicillins Review of Systems Constitutional: Negative for fever. HENT: Negative for sore throat and trouble swallowing. Eyes: Negative for visual disturbance. Respiratory: Negative for shortness of breath. Cardiovascular: Negative for chest pain. Gastrointestinal: Negative for abdominal pain and blood in stool. Genitourinary: Negative for difficulty urinating. Skin: Positive for rash and wound. Allergic/Immunologic: Negative for immunocompromised state. Neurological: Positive for syncope. Negative for tremors, seizures and headaches. Hematological: Bruises/bleeds easily. Psychiatric/Behavioral: Positive for confusion. Negative for sleep disturbance. Vitals:  
 09/26/19 2145 09/26/19 2200 09/26/19 2215 09/26/19 2300 BP: 128/77 131/84 126/77 120/63 Pulse: 94 93 93 77 Resp: 22 17 24 18 Temp:      
SpO2: 99%  99% 99% Weight:      
      
 
Physical Exam  
Constitutional: He is oriented to person, place, and time. Non-toxic appearance. He does not appear ill. No distress. HENT:  
Head: Head is with contusion. Head is without raccoon's eyes and without Dukes's sign. Right Ear: External ear normal. No mastoid tenderness. No hemotympanum. Left Ear: External ear normal. No mastoid tenderness. No hemotympanum. Nose: Nose normal.  
Mouth/Throat: Oropharynx is clear and moist. No oropharyngeal exudate. Eyes: Pupils are equal, round, and reactive to light. Conjunctivae and EOM are normal.  
Neck: Normal range of motion. Neck supple. Cardiovascular: Normal rate, regular rhythm, normal heart sounds and intact distal pulses. Pulmonary/Chest: Effort normal and breath sounds normal. No respiratory distress. Abdominal: Soft. There is no tenderness. Musculoskeletal: Normal range of motion. He exhibits edema. Left lower extremity edema with minimal erythema compared to previous exam  
Neurological: He is alert and oriented to person, place, and time. Skin: Skin is warm and dry. Capillary refill takes less than 2 seconds. He is not diaphoretic. Multiple excoriated areas and bruising on both arms. Psychiatric: His behavior is normal.  
Nursing note and vitals reviewed. MDM Procedures Vitals: 
Patient Vitals for the past 12 hrs: 
 Temp Pulse Resp BP SpO2  
09/26/19 2300  77 18 120/63 99 % 09/26/19 2215  93 24 126/77 99 % 09/26/19 2200  93 17 131/84   
09/26/19 2145  94 22 128/77 99 % 09/26/19 2130  (!) 103 24 125/83 100 % 09/26/19 2115  (!) 107 19 131/76   
09/26/19 2039 98.4 °F (36.9 °C) 98 16 124/77 98 % Medications ordered:  
Medications  
sodium chloride 0.9 % bolus infusion 1,000 mL (1,000 mL IntraVENous New Bag 9/26/19 2225) Lab findings: 
Recent Results (from the past 12 hour(s)) EKG, 12 LEAD, INITIAL Collection Time: 09/26/19  9:24 PM  
Result Value Ref Range Ventricular Rate 103 BPM  
 QRS Duration 102 ms Q-T Interval 332 ms QTC Calculation (Bezet) 434 ms Calculated R Axis -21 degrees Calculated T Axis 153 degrees Diagnosis Atrial fibrillation with rapid ventricular response Septal infarct , age undetermined ST & T wave abnormality, consider lateral ischemia Abnormal ECG When compared with ECG of 10-JUL-2019 17:10, 
Septal infarct is now present QT has lengthened CBC WITH AUTOMATED DIFF Collection Time: 09/26/19 10:13 PM  
Result Value Ref Range WBC 12.9 4.6 - 13.2 K/uL  
 RBC 3.61 (L) 4.70 - 5.50 M/uL  
 HGB 11.4 (L) 13.0 - 16.0 g/dL HCT 35.0 (L) 36.0 - 48.0 % MCV 97.0 74.0 - 97.0 FL  
 MCH 31.6 24.0 - 34.0 PG  
 MCHC 32.6 31.0 - 37.0 g/dL  
 RDW 15.2 (H) 11.6 - 14.5 % PLATELET 050 255 - 410 K/uL MPV 9.9 9.2 - 11.8 FL  
 NEUTROPHILS 84 (H) 40 - 73 % LYMPHOCYTES 10 (L) 21 - 52 % MONOCYTES 5 3 - 10 % EOSINOPHILS 1 0 - 5 % BASOPHILS 0 0 - 2 %  
 ABS. NEUTROPHILS 10.8 (H) 1.8 - 8.0 K/UL  
 ABS. LYMPHOCYTES 1.3 0.9 - 3.6 K/UL  
 ABS. MONOCYTES 0.7 0.05 - 1.2 K/UL  
 ABS. EOSINOPHILS 0.1 0.0 - 0.4 K/UL  
 ABS. BASOPHILS 0.0 0.0 - 0.1 K/UL DF AUTOMATED METABOLIC PANEL, COMPREHENSIVE Collection Time: 09/26/19 10:13 PM  
Result Value Ref Range Sodium 139 136 - 145 mmol/L Potassium 4.7 3.5 - 5.5 mmol/L Chloride 96 (L) 100 - 111 mmol/L  
 CO2 34 (H) 21 - 32 mmol/L Anion gap 9 3.0 - 18 mmol/L Glucose 272 (H) 74 - 99 mg/dL BUN 38 (H) 7.0 - 18 MG/DL Creatinine 1.70 (H) 0.6 - 1.3 MG/DL  
 BUN/Creatinine ratio 22 (H) 12 - 20 GFR est AA 47 (L) >60 ml/min/1.73m2 GFR est non-AA 38 (L) >60 ml/min/1.73m2 Calcium 8.3 (L) 8.5 - 10.1 MG/DL Bilirubin, total 0.5 0.2 - 1.0 MG/DL  
 ALT (SGPT) 117 (H) 16 - 61 U/L  
 AST (SGOT) 47 (H) 10 - 38 U/L Alk. phosphatase 140 (H) 45 - 117 U/L Protein, total 5.5 (L) 6.4 - 8.2 g/dL Albumin 2.5 (L) 3.4 - 5.0 g/dL Globulin 3.0 2.0 - 4.0 g/dL A-G Ratio 0.8 0.8 - 1.7 NT-PRO BNP Collection Time: 09/26/19 10:13 PM  
Result Value Ref Range NT pro-BNP 1,258 0 - 1,800 PG/ML  
PROTHROMBIN TIME + INR Collection Time: 09/26/19 10:13 PM  
Result Value Ref Range Prothrombin time 13.5 11.5 - 15.2 sec INR 1.1 0.8 - 1.2 CARDIAC PANEL,(CK, CKMB & TROPONIN) Collection Time: 09/26/19 10:13 PM  
Result Value Ref Range CK 69 39 - 308 U/L  
 CK - MB 1.6 <3.6 ng/ml CK-MB Index 2.3 0.0 - 4.0 % Troponin-I, QT 0.04 0.0 - 0.045 NG/ML  
TSH 3RD GENERATION Collection Time: 09/26/19 10:13 PM  
Result Value Ref Range TSH 9.38 (H) 0.36 - 3.74 uIU/mL EKG interpretation by ED Physician: 
Atrial fibrillation with rapid ventricle response. There is ST and T wave changes. No indication of STEMI. Rate 103   Not significantly different Cardiac monitor: Tachycardia with irregular rhythm and no ectopy Pulse ox: 99% room air X-Ray, CT or other radiology findings or impressions: 
CT HEAD WO CONT    (Results Pending) No acute process per preliminary report Progress notes, Consult notes or additional Procedure notes: Patient without acute symptoms here. Patient with unprovoked syncope in light of A. fib, age and acute kidney injury would benefit from short hospital stay for further work-up Discussed with Dr. Navid Zamora on-call for the hospitalist service will admit Reevaluation of patient:  
stable Disposition: 
Diagnosis: 1. Syncope and collapse 2. Closed head injury, initial encounter 3. MACKENZIE (acute kidney injury) ([facility] [address] Disposition: admit Follow-up Information None Patient's Medications Start Taking No medications on file Continue Taking ALBUTEROL (PROAIR HFA) 90 MCG/ACTUATION INHALER    Take  inhaled by mouth. ALPHAGAN P 0.15 % OPHTHALMIC SOLUTION    Administer 1 Drop to both eyes three (3) times daily. APIXABAN (ELIQUIS) 5 MG TABLET    Take 1 Tab by mouth two (2) times a day. BIMATOPROST (LUMIGAN) 0.03 % OPHTHALMIC DROPS    Administer 1 drop to both eyes every evening. CEPHALEXIN (KEFLEX) 500 MG CAPSULE    Take 1 Cap by mouth four (4) times daily for 7 days. CYANOCOBALAMIN 1,000 MCG TABLET    Take 1,000 mcg by mouth daily. DILTIAZEM CD (CARDIZEM CD) 180 MG ER CAPSULE    Take 180 mg by mouth daily. FUROSEMIDE (LASIX) 40 MG TABLET    Take 40 mg by mouth daily. 2 tab daily GABAPENTIN (NEURONTIN) 600 MG TABLET    Take 600 mg by mouth three (3) times daily. IPRATROPIUM-ALBUTEROL (COMBIVENT RESPIMAT)  MCG/ACTUATION INHALER    daily as needed LEVOTHYROXINE (SYNTHROID) 175 MCG TABLET    Take 175 mcg by mouth Daily (before breakfast). LORATADINE (CLARITIN) 10 MG TABLET    Take 10 mg by mouth daily. MULTIVITAMINS-MINERALS-LUTEIN (CENTRUM SILVER) TAB    Take 1 Tab by mouth daily. PYRIDOXINE HCL (VITAMIN B-6 PO)    Take 1 Cap by mouth daily. TAMSULOSIN (FLOMAX) 0.4 MG CAPSULE    Take 1 Cap by mouth daily (after dinner). TRAZODONE (DESYREL) 50 MG TABLET    Take 50 mg by mouth nightly as needed. ZOLPIDEM (AMBIEN) 5 MG TABLET    Take 5 mg by mouth nightly. These Medications have changed No medications on file Stop Taking No medications on file

## 2019-09-27 NOTE — ROUTINE PROCESS
TRANSFER - IN REPORT: 
 
Verbal report received from Bassam Ball RN(name) on [address]  being received from ED(unit) for routine progression of care Report consisted of patients Situation, Background, Assessment and  
Recommendations(SBAR). Information from the following report(s) SBAR, Kardex, Intake/Output, Recent Results and Cardiac Rhythm Afib 80's was reviewed with the receiving nurse. Opportunity for questions and clarification was provided. Assessment completed upon patients arrival to unit and care assumed.

## 2019-09-28 PROBLEM — I49.5 TACHY-BRADY SYNDROME (HCC): Status: ACTIVE | Noted: 2019-01-01

## 2019-09-28 NOTE — PROGRESS NOTES
Problem: Falls - Risk of 
Goal: *Absence of Falls Description Document Sumaya Velasquez Fall Risk and appropriate interventions in the flowsheet. Outcome: Progressing Towards Goal 
Note:  
Fall Risk Interventions: 
Mobility Interventions: Bed/chair exit alarm, Communicate number of staff needed for ambulation/transfer, Patient to call before getting OOB, Utilize walker, cane, or other assistive device Medication Interventions: Bed/chair exit alarm, Patient to call before getting OOB, Teach patient to arise slowly Elimination Interventions: Bed/chair exit alarm, Patient to call for help with toileting needs, Toilet paper/wipes in reach, Stay With Me (per policy), Toileting schedule/hourly rounds History of Falls Interventions: Bed/chair exit alarm, Door open when patient unattended, Investigate reason for fall Problem: Patient Education: Go to Patient Education Activity Goal: Patient/Family Education Outcome: Progressing Towards Goal 
  
Problem: Syncope Goal: *Absence of injury Outcome: Progressing Towards Goal 
Goal: Decrease or eliminate episodes of syncope Outcome: Progressing Towards Goal 
  
Problem: Patient Education: Go to Patient Education Activity Goal: Patient/Family Education Outcome: Progressing Towards Goal 
  
Problem: Pain Goal: *Control of Pain Outcome: Progressing Towards Goal 
  
Problem: Patient Education: Go to Patient Education Activity Goal: Patient/Family Education Outcome: Progressing Towards Goal 
  
Problem: Patient Education: Go to Patient Education Activity Goal: Patient/Family Education Outcome: Progressing Towards Goal 
  
Problem: Afib Pathway: Day 1 Goal: Off Pathway (Use only if patient is Off Pathway) Outcome: Progressing Towards Goal 
Goal: Activity/Safety Outcome: Progressing Towards Goal 
Goal: Consults, if ordered Outcome: Progressing Towards Goal 
Goal: Diagnostic Test/Procedures Outcome: Progressing Towards Goal 
Goal: Nutrition/Diet Outcome: Progressing Towards Goal 
Goal: Discharge Planning Outcome: Progressing Towards Goal 
Goal: Medications Outcome: Progressing Towards Goal 
Goal: Respiratory Outcome: Progressing Towards Goal 
Goal: Treatments/Interventions/Procedures Outcome: Progressing Towards Goal 
Goal: Psychosocial 
Outcome: Progressing Towards Goal 
Goal: *Optimal pain control at patient's stated goal 
Outcome: Progressing Towards Goal 
Goal: *Hemodynamically stable Outcome: Progressing Towards Goal 
Goal: *Stable cardiac rhythm Outcome: Progressing Towards Goal 
Goal: *Lungs clear or at baseline Outcome: Progressing Towards Goal 
Goal: *Labs within defined limits Outcome: Progressing Towards Goal 
Goal: *Describes available resources and support systems Outcome: Progressing Towards Goal 
  
Problem: Discharge Planning Goal: *Discharge to safe environment Outcome: Progressing Towards Goal 
  
Problem: Pressure Injury - Risk of 
Goal: *Prevention of pressure injury Description Document Dionicio Scale and appropriate interventions in the flowsheet. Outcome: Progressing Towards Goal 
Note:  
Pressure Injury Interventions: Activity Interventions: Increase time out of bed, Pressure redistribution bed/mattress(bed type), PT/OT evaluation Mobility Interventions: HOB 30 degrees or less, Pressure redistribution bed/mattress (bed type) Nutrition Interventions: Document food/fluid/supplement intake Friction and Shear Interventions: Apply protective barrier, creams and emollients, Foam dressings/transparent film/skin sealants Problem: Patient Education: Go to Patient Education Activity Goal: Patient/Family Education Outcome: Progressing Towards Goal

## 2019-09-28 NOTE — PROGRESS NOTES
[facility] - Progress Note Admit Date: 9/26/2019 Assessment:  
 
-Syncope, post-micturition x 2 
-Chronic atrial fibrillation, followed by Dr. Idalia Zambrano, tachy/faviola worsened by thyroid disorder 
-MACKENZIE, Cr 1.87 on 9/23/19 --> 1.70 on admission 9/26/19. Cr 0.84 in 07/2019. Possibly due to antibiotics vs dehydration. 
-Transaminitis, , AST 46 on presentation 9/26/19 
-Hypoalbuminemia, Albumin 2.5 on admission 
-Basal cell carcinoma of forehead and squamous cell carcinoma of scalp. S/p brachytherapy of R scalp/forehead x 7 treatments. Daughter reports had first radiation treatment (since May) 1 week ago and was due to have second radiation treatment this afternoon. 
-Hx solid food dysphagia, reports esophageal dilatation every 3 years (last in 06/2016) and feels that he is going to need repeat dilatation soon. Followed by Dr. Roxie Willett. 
-Hx alcohol-related pancreatitis -HTN 
-Hyperlipidemia -Hypothyroidism, TSH 9.38 on 9/26/19 
-Hx prostate adenocarcinoma 2008 
  
Primary cardiologist is Dr. Idalia Zambrano Plan:  
 
Patient has tachy/faviola syndrome with chronic A.fib but maximum pauses 2 seconds overnight when sleeping. Syncope is post-micturition and does not appear to be related primarily to bradycardia. Also with hypothyroidism, adjusting and dehydration. I discussed with patient and family at bedside about the possibility of pacemaker. Although patient has an element of tachy/faviola syndrome, I would not pursue pacemaker at this time and continue to encourage hydration, treat thyroid, and follow-up primary cardiologist. 
 
Subjective: No new complaints. Objective:  
  
Patient Vitals for the past 8 hrs: 
 Temp Pulse Resp BP SpO2  
09/28/19 0807     97 % 09/28/19 0746 98.1 °F (36.7 °C) 70 16 120/70 97 % 09/28/19 0404  73  119/71   
09/28/19 0352 98 °F (36.7 °C) 75 16 (!) 83/48 92 % Patient Vitals for the past 96 hrs: 
 Weight 09/27/19 1950 81.6 kg (180 lb)  
09/26/19 2039 81.6 kg (180 lb) Intake/Output Summary (Last 24 hours) at 9/28/2019 0128 Last data filed at 9/28/2019 0841 Gross per 24 hour Intake 1100 ml Output 900 ml Net 200 ml Physical Exam: 
General:  alert, cooperative, no distress, appears stated age Neck:  nontender Lungs:  clear to auscultation bilaterally Heart:  irreg, S1, S2 normal, no murmur, click, rub or gallop Abdomen:  abdomen is soft without significant tenderness, masses, organomegaly or guarding Extremities:  extremities normal, atraumatic, no cyanosis or edema Data Review:  
 
Labs: Results:  
   
Chemistry Recent Labs  
  09/28/19 0430 09/26/19 2213 * 272*  139  
K 4.4 4.7 CL 99* 96* CO2 35* 34* BUN 20* 38* CREA 0.86 1.70* CA 7.8* 8.3* AGAP 4 9 BUCR 23* 22*  140* TP 4.8* 5.5* ALB 2.2* 2.5*  
GLOB 2.6 3.0 AGRAT 0.8 0.8 CBC w/Diff Recent Labs  
  09/28/19 0430 09/26/19 2213 WBC 10.7 12.9 RBC 3.32* 3.61* HGB 10.6* 11.4* HCT 32.7* 35.0*  
 322 GRANS  --  84* LYMPH  --  10* EOS  --  1 Cardiac Enzymes No results found for: CPK, CK, CKMMB, CKMB, RCK3, CKMBT, CKNDX, CKND1, CHAYA, TROPT, TROIQ, BRENDAN, TROPT, TNIPOC, BNP, BNPP Coagulation Recent Labs  
  09/26/19 2213 PTP 13.5 INR 1.1 Lipid Panel No results found for: CHOL, CHOLPOCT, CHOLX, CHLST, CHOLV, 874551, HDL, HDLP, LDL, LDLC, DLDLP, 154581, VLDLC, VLDL, TGLX, TRIGL, TRIGP, TGLPOCT, CHHD, CHHDX  
BNP No results found for: BNP, BNPP, XBNPT Liver Enzymes Recent Labs  
  09/28/19 
0430 TP 4.8* ALB 2.2*  SGOT 31 Digoxin Thyroid Studies Lab Results Component Value Date/Time TSH 9.38 (H) 09/26/2019 10:13 PM  
    
 
Signed By: Lamont Villegas MD   
 September 28, 2019

## 2019-09-28 NOTE — PROGRESS NOTES
Problem: Discharge Planning Goal: *Discharge to safe environment  
Outcome:met Care Management Interventions PCP Verified by CM: Yes(Dr Nesbitt) Mode of Transport at Discharge: Other (see comment) Transition of Care Consult (CM Consult): Discharge Planning Current Support Network: Lives with Spouse, Own Home Confirm Follow Up Transport: Self Plan discussed with Pt/Family/Caregiver: Yes Discharge Location Discharge Placement: Home

## 2019-09-28 NOTE — ROUTINE PROCESS
Bedside and Verbal shift change report given to FLYNN Miner (oncoming nurse) by Nikita Riojas RN (offgoing nurse). Report included the following information SBAR, Kardex, Intake/Output, MAR and Cardiac Rhythm Afib with occassional PVCs.

## 2019-09-28 NOTE — DISCHARGE SUMMARY
[address] [place], [place] [address] Hospitalist Division Discharge Summary Patient: Evelin Rodriguez MRN: [id number]  CSN: [id number] YOB: 1933  Age: 80 y.o. Sex: male DOA: 9/26/2019 LOS:  LOS: 1 day   Discharge Date:   
 
Admission Diagnoses: Syncope [R55] MACKENZIE (acute kidney injury) ([facility] [N17.9] Hypothyroidism [E03.9] Discharge Diagnoses:   
Problem List as of 9/28/2019 Date Reviewed: 7/11/2019 Codes Class Noted - Resolved Tachy-faviola syndrome ([place] [address] ICD-10-CM: I49.5 ICD-9-CM: 427.81  9/28/2019 - Present * (Principal) Syncope ICD-10-CM: R55 
ICD-9-CM: 780.2  9/27/2019 - Present MACKENZIE (acute kidney injury) ([place] [address] ICD-10-CM: N17.9 ICD-9-CM: 584.9  9/27/2019 - Present Neutrophilic leukocytosis [id number]: D72.9 ICD-9-CM: 288.8  7/11/2019 - Present Acute gout ICD-10-CM: M10.9 ICD-9-CM: 274.01  7/11/2019 - Present CKD (chronic kidney disease) stage 2, GFR 60-89 ml/min ICD-10-CM: N18.2 ICD-9-CM: 585.2  7/11/2019 - Present Sepsis ([place] [address] ICD-10-CM: A41.9 ICD-9-CM: 038.9, 995.91  7/10/2019 - Present Cellulitis ICD-10-CM: L03.90 ICD-9-CM: 682.9  7/10/2019 - Present HTN (hypertension) ICD-10-CM: I10 
ICD-9-CM: 401.9  7/10/2019 - Present Pneumonia ICD-10-CM: J18.9 ICD-9-CM: 346  6/2/2018 - Present Atrial fibrillation [facility]) ICD-10-CM: I48.91 
ICD-9-CM: 427.31  6/2/2018 - Present Hypothyroidism ICD-10-CM: E03.9 ICD-9-CM: 244.9  6/2/2018 - Present Non-pressure chronic ulcer of right calf with fat layer exposed ([facility] ICD-10-CM: B53.227 ICD-9-CM: 707.12  1/19/2017 - Present Laceration of right lower leg with complication [id number]: P73.543P ICD-9-CM: 891.1  1/19/2017 - Present Edema ICD-10-CM: R60.9 ICD-9-CM: 782.3  1/19/2017 - Present  Encounter for colonoscopy due to history of adenomatous colonic polyps ICD-10-CM: Z12.11, Z86.010 
 ICD-9-CM: V76.51, V12.72  1/3/2017 - Present Dysphagia ICD-10-CM: R13.10 ICD-9-CM: 787.20  6/17/2016 - Present Hypertension ICD-10-CM: I10 
ICD-9-CM: 401.9  Unknown - Present GERD (gastroesophageal reflux disease) ICD-10-CM: K21.9 ICD-9-CM: 530.81  Unknown - Present Glaucoma ICD-10-CM: H40.9 ICD-9-CM: 365.9  Unknown - Present Glaucoma ICD-10-CM: H40.9 ICD-9-CM: 365.9  Unknown - Present Malignant neoplasm of prostate [facility]) ICD-10-CM: O21 ICD-9-CM: 185  1/16/2012 - Present Discharge Condition: Stable Discharge To: Home Consults: Cardiology HPI: Per H&P, \"Omero Benjamin is a 80 y.o. male who presented to the ED with history of atrial fibrillation on Eliquis who was sent in by his doctor for dehydration.  Patient had 2 syncopal episodes in the last couple of days.  Patient states that he has had this morning with bruising and swelling to the back of his head.  Patient denies any fever.  He denies any chest pain or cough, increased shortness of breath.  Patient was treated for cellulitis which is significantly improving on the left lower extremity.  He said no blood in his stool.  He denies any current significant neck pain.  Symptoms are worse with standing.  Patient was noted to have worsening of his renal function at his last ER visit 
  
ED evaluation revealed negative CT head. Labs show MACKENZIE and uncontrolled hypothyroidism. 
  
Will admit for further evaluation and treatment\" Hospital Course: MACKENZIE resolved with IV fluids. Patient had no further syncopal episodes during admission. On cardiac monitoring, patient was noted to have intermittent bradycardia and pauses up to 2.6. Cardiology was consulted - patient has tachy-faviola syndrome, but they would not pursue pacemaker at this time as syncope does not appear to be related to heart rate/rhythm. Echo results below.  T4 was 0.8, so synthroid was not adjusted. Cardizem was discontinued for now given abnormalities on tele. VS and labs stable for discharge. Physical Exam: 
General appearance: alert, cooperative, no distress, appears stated age Head: hematoma present, multiple crusted scabs on face Lungs: clear to auscultation bilaterally Heart: IRIR, S1, S2 normal, no murmur, click, rub or gallop Extremities: no cyanosis or edema Neurologic: no focal deficits, motor/sensory intact PSY: Mood and affect normal, appropriately behaved Significant Diagnostic Studies:  
 
BMP:  
Lab Results Component Value Date/Time  09/28/2019 04:30 AM  
 K 4.4 09/28/2019 04:30 AM  
 CL 99 (L) 09/28/2019 04:30 AM  
 CO2 35 (H) 09/28/2019 04:30 AM  
 AGAP 4 09/28/2019 04:30 AM  
  (H) 09/28/2019 04:30 AM  
 BUN 20 (H) 09/28/2019 04:30 AM  
 CREA 0.86 09/28/2019 04:30 AM  
 GFRAA >60 09/28/2019 04:30 AM  
 GFRNA >60 09/28/2019 04:30 AM  
 
CBC:  
Lab Results Component Value Date/Time WBC 10.7 09/28/2019 04:30 AM  
 HGB 10.6 (L) 09/28/2019 04:30 AM  
 HCT 32.7 (L) 09/28/2019 04:30 AM  
  09/28/2019 04:30 AM  
 
TTE 9/27/19 Interpretation Summary Result status: Final result · Left Ventricle: Normal cavity size, wall thickness and systolic function (ejection fraction normal). Estimated left ventricular ejection fraction is 56 - 60%. Visually measured ejection fraction. No regional wall motion abnormality noted. · Left Atrium: Dilated left atrium. · Tricuspid Valve: Mild tricuspid valve regurgitation is present. · Pulmonary Artery: Mild pulmonary hypertension. · Mitral Valve: Mitral valve non-specific thickening. Mild mitral valve regurgitation is present. · Aortic Valve: Mild aortic valve focal thickening present. Comparison Study Information Prior Study There is a prior study available for comparison that was performed on 12/22/2016.  As compared to the previous study, there are no significant changes. Technically difficult study noted, ejection fraction was at 55%. Left atrium was noted as dilated. Echo Findings Left Ventricle Normal cavity size, wall thickness and systolic function (ejection fraction normal). The estimated ejection fraction is 56 - 60%. Visually measured ejection fraction. No regional wall motion abnormality noted. Diastolic pattern consistent with atrial fibrillation observed. There is no thrombus. There is no mass. Left Atrium Dilated left atrium. Left Atrium volume index is 48 mL/m2. Right Ventricle Normal cavity size, wall thickness and global systolic function. Right Atrium Right atrium not well visualized. Aortic Valve Aortic valve not well visualized. Trileaflet valve structure, no stenosis and no regurgitation. Mild focal thickening present. Mitral Valve No stenosis. Mitral valve non-specific thickening. Mild regurgitation. Tricuspid Valve Tricuspid valve not well visualized. No stenosis. Mild tricuspid valve regurgitation. Pulmonary arterial systolic pressure is 12.1 mmHg. Mild pulmonary hypertension. Pulmonic Valve Pulmonic valve not well visualized. No stenosis. Trace regurgitation. Aorta Normal aortic root, ascending aortic, and aortic arch. IVC/Hepatic Veins Normal central venous pressure (0-5 mmHg); IVC diameter is less than 21 mm and collapses more than 50% with respiration. Pericardium No evidence of pericardial effusion. Xr Spine Cerv 4 Or 5 V Result Date: 9/23/2019 EXAM: Cervical spine complete INDICATION: Neck pain COMPARISON: Cervical spine series 1/13/2009 _______________ FINDINGS: AP, lateral, bilateral oblique and open-mouth odontoid views were performed. There is straightening of the normal cervical lordosis. No acute fracture. Complete loss of disc space C4-5 similar to prior exam 10 years earlier. Significant disc space narrowing which has progressed C3-4 C5-6 and C6-7. Facet degenerative changes are present. Odontoid is intact. At least moderately severe right foraminal narrowing C4-5 with moderate narrowing at C3-4 and C5-6. Only mild left foraminal narrowing C4-5 precervical soft tissues are normal. Patient is status post right shoulder. IMPRESSION: 1. No acute bony or degenerative changes with bilateral foraminal narrowing shown slight progression since 2009. Ct Head Wo Cont Result Date: 9/27/2019 EXAM: CT head INDICATION: Decreased level of consciousness. Change in mental status. COMPARISON: 10/9/2017 TECHNIQUE: Axial CT imaging of the head was performed without intravenous contrast. One or more dose reduction techniques were used on this CT: automated exposure control, adjustment of the mAs and/or kVp according to patient size, and iterative reconstruction techniques. The specific techniques used on this CT exam have been documented in the patient's electronic medical record. Digital Imaging and Communications in Medicine (DICOM) format image data are available to nonaffiliated external healthcare facilities or entities on a secure, media free, reciprocally searchable basis with patient authorization for at least a 12-month period after this study. _______________ FINDINGS: BRAIN:   Stable mild atrophy with prominence of sulci maximal in the frontal and perisylvian region. Lateral ventricles and temporal tips mildly prominent. There is no intracranial hemorrhage, mass effect, or midline shift. There are no areas of abnormal parenchymal attenuation. No hypodensity in cortex would be consistent with an acute cortical infarction. EXTRA-AXIAL SPACES AND MENINGES: There are no abnormal extra-axial fluid collections or mass. CALVARIUM: Intact. OTHER: No additional significant abnormality. _______________ IMPRESSION: 1. No acute intracranial abnormalities. No hemorrhage, mass effect or CT evident acute cortical infarction. 2. Atrophy as described. No other significant intracranial abnormality. Preliminary report provided by on-call radiology resident. Discharge Medications:    
Current Discharge Medication List  
  
START taking these medications Details  
nystatin (MYCOSTATIN) 100,000 unit/mL suspension Take 5 mL by mouth three (3) times daily. swish and spit Qty: 60 mL, Refills: 0 CONTINUE these medications which have NOT CHANGED Details  
tamsulosin (FLOMAX) 0.4 mg capsule Take 1 Cap by mouth daily (after dinner). Qty: 90 Cap, Refills: 3 Associated Diagnoses: Malignant neoplasm of prostate ([address] History of urinary retention  
  
apixaban (ELIQUIS) 5 mg tablet Take 1 Tab by mouth two (2) times a day. Qty: 60 Tab, Refills: 0  
  
albuterol (PROAIR HFA) 90 mcg/actuation inhaler Take  inhaled by mouth. Associated Diagnoses: Malignant neoplasm of prostate ([address] Urinary retention; Nocturia ALPHAGAN P 0.15 % ophthalmic solution Administer 1 Drop to both eyes three (3) times daily. Associated Diagnoses: Malignant neoplasm of prostate ([address] Urinary retention; Nocturia  
  
ipratropium-albuterol (COMBIVENT RESPIMAT)  mcg/actuation inhaler daily as needed Comments: for SOB Associated Diagnoses: Malignant neoplasm of prostate ([address] Urinary retention; Nocturia  
  
cyanocobalamin 1,000 mcg tablet Take 1,000 mcg by mouth daily. Associated Diagnoses: Malignant neoplasm of prostate ([address] Urinary retention; Nocturia  
  
furosemide (LASIX) 40 mg tablet Take 40 mg by mouth daily. 2 tab daily Associated Diagnoses: Malignant neoplasm of prostate ([address] Urinary retention; Nocturia  
  
loratadine (CLARITIN) 10 mg tablet Take 10 mg by mouth daily. Associated Diagnoses: Malignant neoplasm of prostate ([address] Urinary retention; Nocturia PYRIDOXINE HCL (VITAMIN B-6 PO) Take 1 Cap by mouth daily. cephALEXin (KEFLEX) 500 mg capsule Take 1 Cap by mouth four (4) times daily for 7 days. Qty: 28 Cap, Refills: 0 zolpidem (AMBIEN) 5 mg tablet Take 5 mg by mouth nightly.  
  
gabapentin (NEURONTIN) 600 mg tablet Take 600 mg by mouth three (3) times daily. levothyroxine (SYNTHROID) 175 mcg tablet Take 175 mcg by mouth Daily (before breakfast). bimatoprost (LUMIGAN) 0.03 % ophthalmic drops Administer 1 drop to both eyes every evening.  
  
multivitamins-minerals-lutein (CENTRUM SILVER) Tab Take 1 Tab by mouth daily. traZODone (DESYREL) 50 mg tablet Take 50 mg by mouth nightly as needed. STOP taking these medications  
  
 dilTIAZem CD (CARDIZEM CD) 180 mg ER capsule Comments:  
Reason for Stopping: bradycardia Activity: Activity as tolerated Diet: Cardiac Diet Wound Care: None needed Follow-up: 1 week with PCP, cardiology Discharge time: >35 minutes Keith Ye PA-C [address] [place], [place] [address] Hospitalist Division Office:  [phone] Pager: [phone] 
 
 
9/28/2019, 2:07 PM

## 2019-09-28 NOTE — PROGRESS NOTES
7336. Unruly Meraz paged Dr. Yury Wilkinson for Nystatin, pt c/o oral discomfort 0038. Unruly Meraz Per Dr. Yury Wilkinson, order Nystatin three times a day.

## 2019-09-28 NOTE — DISCHARGE INSTRUCTIONS
DISCHARGE SUMMARY from Nurse    PATIENT INSTRUCTIONS:  Patient has tachy/faviola syndrome with chronic A.fib but maximum pauses 2 seconds overnight when sleeping. Syncope is post-micturition and does not appear to be related primarily to bradycardia. Also with hypothyroidism, adjusting and dehydration.     In house cardiologist discussed with patient and family at bedside about the possibility of pacemaker. Although patient has an element of tachy/faviola syndrome, no pacemaker at this time and continue to encourage hydration, treat thyroid, and follow-up primary cardiologist.       What to do at Home:  Recommended activity: Activity as tolerated,     If you experience any of the following symptoms related to syncope as listed in your teachings, please follow up with your PCP and or call 911. *  Please give a list of your current medications to your Primary Care Provider. *  Please update this list whenever your medications are discontinued, doses are      changed, or new medications (including over-the-counter products) are added. *  Please carry medication information at all times in case of emergency situations. These are general instructions for a healthy lifestyle:    No smoking/ No tobacco products/ Avoid exposure to second hand smoke  Surgeon General's Warning:  Quitting smoking now greatly reduces serious risk to your health. Obesity, smoking, and sedentary lifestyle greatly increases your risk for illness    A healthy diet, regular physical exercise & weight monitoring are important for maintaining a healthy lifestyle    You may be retaining fluid if you have a history of heart failure or if you experience any of the following symptoms:  Weight gain of 3 pounds or more overnight or 5 pounds in a week, increased swelling in our hands or feet or shortness of breath while lying flat in bed.   Please call your doctor as soon as you notice any of these symptoms; do not wait until your next office visit.    The discharge information has been reviewed with the patient and caregiver. The patient and caregiver verbalized understanding. Discharge medications reviewed with the patient and caregiver and appropriate educational materials and side effects teaching were provided. Patient armband removed and shredded.    ___________________________________________________________________________________________________________________________________

## 2019-09-28 NOTE — ROUTINE PROCESS
Assume care of patient from [facility]. Patient received in bed awake. Patient A&Ox4, denies pain and discomfort. No distress noted. Frequently use items within reach. Bed locked in low position. Call bell within reach and patient verbalized understanding of use for assistance and needs. 1315- At bedside, noted dressing to BUE are loose and falling apart. Also RUE dressing has some small breakthrough serosanguinous drainage. Will change dressing today also per patient and family request.  
 
1330- Dressing changed to BUE. Old dressings removed. Wound cleansed w/ DWC, xeroform applied to skin tears and wrapped with kerlex. Changed per order as PRN. Prevolon boots placed on patient, pt and family educated on skin care management. 1544- Patient discharged home, accompanied by DILLON Yoon via wheelchair to front entrance to car. No distress noted. Patient has discharge instructions, along with one prescription, wound care supplies for breakthrough drainage and belongings. Voiced understanding of discharge instructions. Pt left in stable condition.

## 2019-10-03 NOTE — DISCHARGE INSTRUCTIONS
SPECIFIC PATIENT INSTRUCTIONS FROM THE PHYSICIAN WHO TREATED YOU IN THE ER TODAY:  1. Over-the-counter Tylenol Motrin for pain. 2. Return if any concerns or worsening condition(s). 3. FOLLOW UP APPOINTMENT:  Your primary doctor in 1-2 days. Patient Education        Neck Strain: Care Instructions  Your Care Instructions    You have strained the muscles and ligaments in your neck. A sudden, awkward movement can strain the neck. This often occurs with falls or car accidents or during certain sports. Everyday activities like working on a computer or sleeping can also cause neck strain if they force you to hold your neck in an awkward position for a long time. It is common for neck pain to get worse for a day or two after an injury, but it should start to feel better after that. You may have more pain and stiffness for several days before it gets better. This is expected. It may take a few weeks or longer for it to heal completely. Good home treatment can help you get better faster and avoid future neck problems. Follow-up care is a key part of your treatment and safety. Be sure to make and go to all appointments, and call your doctor if you are having problems. It's also a good idea to know your test results and keep a list of the medicines you take. How can you care for yourself at home? · If you were given a neck brace (cervical collar) to limit neck motion, wear it as instructed for as many days as your doctor tells you to. Do not wear it longer than you were told to. Wearing a brace for too long can make neck stiffness worse and weaken the neck muscles. · You can try using heat or ice to see if it helps. ? Try using a heating pad on a low or medium setting for 15 to 20 minutes every 2 to 3 hours. Try a warm shower in place of one session with the heating pad. You can also buy single-use heat wraps that last up to 8 hours. ? You can also try an ice pack for 10 to 15 minutes every 2 to 3 hours.   · Take pain medicines exactly as directed. ? If the doctor gave you a prescription medicine for pain, take it as prescribed. ? If you are not taking a prescription pain medicine, ask your doctor if you can take an over-the-counter medicine. · Gently rub the area to relieve pain and help with blood flow. Do not massage the area if it hurts to do so. · Do not do anything that makes the pain worse. Take it easy for a couple of days. You can do your usual activities if they do not hurt your neck or put it at risk for more stress or injury. · Try sleeping on a special neck pillow. Place it under your neck, not under your head. Placing a tightly rolled-up towel under your neck while you sleep will also work. If you use a neck pillow or rolled towel, do not use your regular pillow at the same time. · To prevent future neck pain, do exercises to stretch and strengthen your neck and back. Learn how to use good posture, safe lifting techniques, and proper body mechanics. When should you call for help? Call 911 anytime you think you may need emergency care. For example, call if:    · You are unable to move an arm or a leg at all.   [facility] your doctor now or seek immediate medical care if:    · You have new or worse symptoms in your arms, legs, chest, belly, or buttocks. Symptoms may include:  ? Numbness or tingling. ? Weakness. ? Pain.     · You lose bladder or bowel control.    Watch closely for changes in your health, and be sure to contact your doctor if:    · You are not getting better as expected. Where can you learn more? Go to http://zenaida-dell.info/. Enter M253 in the search box to learn more about \"Neck Strain: Care Instructions. \"  Current as of: June 26, 2019  Content Version: 12.2  © 5286-0999 [employer]. Care instructions adapted under license by [employer] (which disclaims liability or warranty for this information).  If you have questions about a medical condition or this instruction, always ask your healthcare professional. [address] any warranty or liability for your use of this information. Patient Education        Preventing Falls: Care Instructions  Your Care Instructions    Getting around your home safely can be a challenge if you have injuries or health problems that make it easy for you to fall. Loose rugs and furniture in walkways are among the dangers for many older people who have problems walking or who have poor eyesight. People who have conditions such as arthritis, osteoporosis, or dementia also have to be careful not to fall. You can make your home safer with a few simple measures. Follow-up care is a key part of your treatment and safety. Be sure to make and go to all appointments, and call your doctor if you are having problems. It's also a good idea to know your test results and keep a list of the medicines you take. How can you care for yourself at home? Taking care of yourself  · You may get dizzy if you do not drink enough water. To prevent dehydration, drink plenty of fluids, enough so that your urine is light yellow or clear like water. Choose water and other caffeine-free clear liquids. If you have kidney, heart, or liver disease and have to limit fluids, talk with your doctor before you increase the amount of fluids you drink. · Exercise regularly to improve your strength, muscle tone, and balance. Walk if you can. Swimming may be a good choice if you cannot walk easily. · Have your vision and hearing checked each year or any time you notice a change. If you have trouble seeing and hearing, you might not be able to avoid objects and could lose your balance. · Know the side effects of the medicines you take. Ask your doctor or pharmacist whether the medicines you take can affect your balance. Sleeping pills or sedatives can affect your balance. · Limit the amount of alcohol you drink.  Alcohol can impair your balance and other senses. · Ask your doctor whether calluses or corns on your feet need to be removed. If you wear loose-fitting shoes because of calluses or corns, you can lose your balance and fall. · Talk to your doctor if you have numbness in your feet. Preventing falls at home  · Remove raised doorway thresholds, throw rugs, and clutter. Repair loose carpet or raised areas in the floor. · Move furniture and electrical cords to keep them out of walking paths. · Use nonskid floor wax, and wipe up spills right away, especially on ceramic tile floors. · If you use a walker or cane, put rubber tips on it. If you use crutches, clean the bottoms of them regularly with an abrasive pad, such as steel wool. · Keep your house well lit, especially [place] [employer], and outside walkways. Use night-lights in areas such as hallways and bathrooms. Add extra light switches or use remote switches (such as switches that go on or off when you clap your hands) to make it easier to turn lights on if you have to get up during the night. · Install sturdy handrails on stairways. · Move items in your cabinets so that the things you use a lot are on the lower shelves (about waist level). · Keep a cordless phone and a flashlight with new batteries by your bed. If possible, put a phone in each of the main rooms of your house, or carry a cell phone in case you fall and cannot reach a phone. Or, you can wear a device around your neck or wrist. You push a button that sends a signal for help. · Wear low-heeled shoes that fit well and give your feet good support. Use footwear with nonskid soles. Check the heels and soles of your shoes for wear. Repair or replace worn heels or soles. · Do not wear socks without shoes on wood floors. · Walk on the grass when the sidewalks are slippery. If you live in an area that gets snow and ice in the winter, sprinkle salt on slippery steps and sidewalks.   Preventing falls in the bath  · Install grab bars and nonskid mats inside and outside your shower or tub and near the toilet and sinks. · Use shower chairs and bath benches. · Use a hand-held shower head that will allow you to sit while showering. · Get into a tub or shower by putting the weaker leg in first. Get out of a tub or shower with your strong side first.  · Repair loose toilet seats and consider installing a raised toilet seat to make getting on and off the toilet easier. · Keep your bathroom door unlocked while you are in the shower. Where can you learn more? Go to http://zenaidaSearchMedell.info/. Enter 0476 [id number] 69 [id number] in the search box to learn more about \"Preventing Falls: Care Instructions. \"  Current as of: November 7, 2018  Content Version: 12.2  © 0756-9183 [employer]. Care instructions adapted under license by [employer] (which disclaims liability or warranty for this information). If you have questions about a medical condition or this instruction, always ask your healthcare professional. [place] [address] any warranty or liability for your use of this information. Patient Education        Knee Pain or Injury: Care Instructions  Your Care Instructions    Injuries are a common cause of knee problems. Sudden (acute) injuries may be caused by a direct blow to the knee. They can also be caused by abnormal twisting, bending, or falling on the knee. Pain, bruising, or swelling may be severe, and may start within minutes of the injury. Overuse is another cause of knee pain. Other causes are climbing stairs, kneeling, and other activities that use the knee. Everyday wear and tear, especially as you get older, also can cause knee pain. Rest, along with home treatment, often relieves pain and allows your knee to heal. If you have a serious knee injury, you may need tests and treatment. Follow-up care is a key part of your treatment and safety.  Be sure to make and go to all appointments, and call your doctor if you are having problems. It's also a good idea to know your test results and keep a list of the medicines you take. How can you care for yourself at home? · Be safe with medicines. Read and follow all instructions on the label. ? If the doctor gave you a prescription medicine for pain, take it as prescribed. ? If you are not taking a prescription pain medicine, ask your doctor if you can take an over-the-counter medicine. · Rest and protect your knee. Take a break from any activity that may cause pain. · Put ice or a cold pack on your knee for 10 to 20 minutes at a time. Put a thin cloth between the ice and your skin. · Prop up a sore knee on a pillow when you ice it or anytime you sit or lie down for the next 3 days. Try to keep it above the level of your heart. This will help reduce swelling. · If your knee is not swollen, you can put moist heat, a heating pad, or a warm cloth on your knee. · If your doctor recommends an elastic bandage, sleeve, or other type of support for your knee, wear it as directed. · Follow your doctor's instructions about how much weight you can put on your leg. Use a cane, crutches, or a walker as instructed. · Follow your doctor's instructions about activity during your healing process. If you can do mild exercise, slowly increase your activity. · Reach and stay at a healthy weight. Extra weight can strain the joints, especially the knees and hips, and make the pain worse. Losing even a few pounds may help. When should you call for help? Call 911 anytime you think you may need emergency care. For example, call if:    · You have symptoms of a blood clot in your lung (called a pulmonary embolism). These may include:  ? Sudden chest pain. ? Trouble breathing. ?  Coughing up blood.    Call your doctor now or seek immediate medical care if:    · You have severe or increasing pain.     · Your leg or foot turns cold or changes color.     · You cannot stand or put weight on your knee.     · Your knee looks twisted or bent out of shape.     · You cannot move your knee.     · You have signs of infection, such as:  ? Increased pain, swelling, warmth, or redness. ? Red streaks leading from the knee. ? Pus draining from a place on your knee. ? A fever.     · You have signs of a blood clot in your leg (called a deep vein thrombosis), such as:  ? Pain in your calf, back of the knee, thigh, or groin. ? Redness and swelling in your leg or groin.    Watch closely for changes in your health, and be sure to contact your doctor if:    · You have tingling, weakness, or numbness in your knee.     · You have any new symptoms, such as swelling.     · You have bruises from a knee injury that last longer than 2 weeks.     · You do not get better as expected. Where can you learn more? Go to http://zenaida-dell.info/. Enter K195 in the search box to learn more about \"Knee Pain or Injury: Care Instructions. \"  Current as of: June 26, 2019  Content Version: 12.2  © 5268-3958 [employer]. Care instructions adapted under license by [employer] (which disclaims liability or warranty for this information). If you have questions about a medical condition or this instruction, always ask your healthcare professional. [address] any warranty or liability for your use of this information. [employer] Activation    Thank you for requesting access to [employer]. Please follow the instructions below to securely access and download your online medical record. [employer] allows you to send messages to your doctor, view your test results, renew your prescriptions, schedule appointments, and more. How Do I Sign Up? In your internet browser, go to [email or web address]. Click on the First Time User? Click Here link in the Sign In box. You will see the New Member Sign Up page.   Enter your IWTt Access Code exactly as it appears below. You will not need to use this code after you´ve completed the sign-up process. If you do not sign up before the expiration date, you must request a new code. ShipHawk Access Code: [email or web address]  Expires: 3/28/2019  2:27 PM (This is the date your ShipH[employer] access code will )    Enter the last four digits of your Social Security Number (xxxx) and Date of Birth (mm/dd/yyyy) as indicated and click Submit. You will be taken to the next sign-up page. Create a ShipHawk ID. This will be your ShipHawk login ID and cannot be changed, so think of one that is secure and easy to remember. Create a ShipHawk password. You can change your password at any time. Enter your Password Reset Question and Answer. This can be used at a later time if you forget your password. Enter your e-mail address. You will receive e-mail notification when new information is available in [address] Click Sign Up. You can now view and download portions of your medical record. Click the [place] link to download a portable copy of your medical information. Additional Information    If you have questions, please visit the Frequently Asked Questions section of the ShipHawk website at [email or web address] Remember, ShipHawk is NOT to be used for urgent needs. For medical emergencies, dial 911.

## 2019-10-03 NOTE — ED TRIAGE NOTES
EMS reports mobility issues x 2 weeks; Fall last Thursday; hospitalization R/T UTI and dehydration; discharged Saturday. C/o L knee and neck pain.

## 2019-10-03 NOTE — ED PROVIDER NOTES
[facility] EMERGENCY DEPT 
 
 
5:01 PM 
 
Date: 10/3/2019 Patient Name: Cheryl Davalos History of Presenting Illness Chief Complaint Patient presents with  Fatigue  Other  
  decrease mobility 80 y.o. male with noted past medical history who presents to the emergency department with left knee and right neck pain status post fall. Patient states that he does have a history of infrequent falling and last week he fell against the wall and with that fall has had some right lateral neck pain as well as some left knee pain. He can ambulate on the left knee but states that it is painful to ambulate on it. That knee has had a prior left knee replacement. No other complaints. Patient denies any other associated signs or symptoms. Patient denies any other complaints. Nursing notes regarding the HPI and triage nursing notes were reviewed. Prior medical records were reviewed. Current Outpatient Medications Medication Sig Dispense Refill  oxyCODONE-acetaminophen (PERCOCET) 5-325 mg per tablet Take 2 Tabs by mouth every six (6) hours as needed for Pain.  febuxostat (ULORIC) 80 mg tab tablet Take 80 mg by mouth daily.  desonide (TRIDESILON) 0.05 % cream Apply 1 Each to affected area two (2) times a day. TO FACE  nystatin (MYCOSTATIN) 100,000 unit/mL suspension Take 5 mL by mouth three (3) times daily. swish and spit 60 mL 0  
 zolpidem (AMBIEN) 5 mg tablet Take 5 mg by mouth nightly.  tamsulosin (FLOMAX) 0.4 mg capsule Take 1 Cap by mouth daily (after dinner). 90 Cap 3  
 apixaban (ELIQUIS) 5 mg tablet Take 1 Tab by mouth two (2) times a day. 60 Tab 0  
 albuterol (PROAIR HFA) 90 mcg/actuation inhaler Take  inhaled by mouth.  ALPHAGAN P 0.15 % ophthalmic solution Administer 1 Drop to both eyes three (3) times daily.  ipratropium-albuterol (COMBIVENT RESPIMAT)  mcg/actuation inhaler daily as needed  cyanocobalamin 1,000 mcg tablet Take 1,000 mcg by mouth daily.  furosemide (LASIX) 40 mg tablet Take 40 mg by mouth daily. 2 tab daily  loratadine (CLARITIN) 10 mg tablet Take 10 mg by mouth daily.  gabapentin (NEURONTIN) 600 mg tablet Take 600 mg by mouth two (2) times a day. 1 in A.M. 2 AT NIGHT  levothyroxine (SYNTHROID) 175 mcg tablet Take 175 mcg by mouth Daily (before breakfast).  bimatoprost (LUMIGAN) 0.03 % ophthalmic drops Administer 1 drop to both eyes every evening.  multivitamins-minerals-lutein (CENTRUM SILVER) Tab Take 1 Tab by mouth daily.  traZODone (DESYREL) 50 mg tablet Take 50 mg by mouth nightly as needed.  PYRIDOXINE HCL (VITAMIN B-6 PO) Take 1 Cap by mouth daily. Past History Past Medical History: 
Past Medical History:  
Diagnosis Date  Arthritis  Atrial fibrillation ([facility] [address] 07/2017 Dr Fran Braxton cardio follows. chronic  Carcinoma of prostate ([facility] [address]  Cellulitis  Coarse tremor   
  hands, states \"my doctor is aware\"  Difficulty swallowing  Dysphagia  Edema  Encounter for colonoscopy due to history of adenomatous colonic polyps  Essential hypertension  Fall 10/07/2017 \"went to [facility] Emergency Room, CT Scan showed concussion, no bleeding\" per patient  Fatigue  GERD (gastroesophageal reflux disease) wo. esophagitis  Glaucoma  H/O joint replacement   
  left knee 2003  HLD (hyperlipidemia)  Hypertension  Hypertensive disorder  Hypothyroidism  Impotence  Laceration of right lower leg with complication  Malignant neoplasm of prostate ([facility] [address]   
  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  Malignant tumor of prostate ([address]  Nocturia  Non-pressure chronic ulcer of right calf ([address] with fat layer exposed  Pancreatitis  Pneumonia  Primary osteoarthritis, right shoulder  Rotator cuff tear, right  Sepsis ([facility] [address]  Shoulder dislocation, right, initial encounter  Syncope  Thyroid disease  Urinary retention   
 acute/hist. of   
 
 
Past Surgical History: 
Past Surgical History:  
Procedure Laterality Date  COLONOSCOPY N/A 1/3/2017  HX CHOLECYSTECTOMY  HX KNEE REPLACEMENT Left 01/2003  HX SHOULDER REPLACEMENT Right 7/17, 10/17 Family History: 
History reviewed. No pertinent family history. Social History: 
Social History Tobacco Use  Smoking status: Former Smoker  Smokeless tobacco: Never Used Substance Use Topics  Alcohol use: Yes Comment: weekly  Drug use: No  
 
 
Allergies: Allergies Allergen Reactions  Adhesive Other (comments) Skin irritation  Darvon [Propoxyphene] Hives, Myalgia and Other (comments) Joint pain  Penicillins Rash and Itching Patient's primary care provider (as noted in EPIC):  Michele Gupta MD 
 
Review of Systems Constitutional: Negative for diaphoresis. HENT: Negative for congestion. Eyes: Negative for discharge. Respiratory: Negative for stridor. Cardiovascular: Negative for palpitations. Gastrointestinal: Negative for diarrhea. Genitourinary: Negative for flank pain. Musculoskeletal: Negative for back pain. Neurological: Negative for weakness. Psychiatric/Behavioral: Negative for hallucinations. All other systems reviewed and are negative. Visit Vitals /82 (BP 1 Location: Right arm, BP Patient Position: At rest) Pulse (!) 101 Temp 98.2 °F (36.8 °C) Resp 16 Ht 5' 8\" (1.727 m) Wt 81.6 kg (180 lb) SpO2 98% BMI 27.37 kg/m² PHYSICAL EXAM: 
 
CONSTITUTIONAL: Alert, in no apparent distress; well-developed; well-nourished. HEAD:  Normocephalic, atraumatic. No Battles sign. No Raccoons eyes. EYES:  EOM's intact. Normal conjunctiva. Anicteric sclera.  
ENTM: Nose: no rhinorrhea; Oropharynx:  mucous membranes moist 
 
 Neck:  No JVD. No cervical vertebral bony point tenderness or step-off. There is right lateral neck sternocleidomastoid muscle reproducible focal tenderness to palpation. RESP: Chest clear, equal breath sounds. CARDIOVASCULAR:  Regular rate and rhythm. No murmurs, rubs, or gallops. GI: Normal bowel sounds, abdomen soft and non-tender. No masses or organomegaly. : No costo-vertebral angle tenderness. BACK:  No TLS vertebral bony point tenderness or step-off. UPPER EXT:  Normal inspection. LOWER EXT: No edema, no calf tenderness. Distal pulses intact. Left knee exam:   
Normal anterior and posterior drawer tests. No pain or laxity with stressing of MCL and stressing of LCL. No effusion. No laceration. No rash, lesions. NEURO: Grossly normal motor and sensation. SKIN: No rashes; Normal for age and stage. PSYCH:  Alert and oriented, normal affect. DIFFERENTIAL DIAGNOSES/ MEDICAL DECISION MAKING: 
Contusion, sprain, dislocation, fracture, ligamentous tear/ disruption or a combination of the above. Diagnostic Study Results Abnormal lab results from this emergency department encounter: 
Labs Reviewed - No data to display Lab values for this patient within approximately the last 12 hours: 
No results found for this or any previous visit (from the past 12 hour(s)). Radiologist and cardiologist interpretations if available at time of this note: No results found. Left knee x-ray: Orthopedic hardware consistent with left knee replacement. No obvious fracture dislocation. Preliminary review of x-rays by ED Physician. Interpretation of the C-Spine x-ray shows, c-spine negative, no fracture, no subluxation, no soft tissue swelling, no foreign body. Degenerative changes noted. Medication(s) ordered for patient during this emergency visit encounter: 
Medications - No data to display Medical Decision Making I am the first provider for this patient. I reviewed the vital signs, available nursing notes, past medical history, past surgical history, family history and social history. Vital Signs:  Reviewed the patient's vital signs. ED COURSE:   
 
IMPRESSION AND MEDICAL DECISION MAKING: 
Based upon the patients presentation with noted HPI and PE, along with the work up done in the emergency department, I believe that the patient is having noted injury from noted fall. DIAGNOSIS: 
1. Contusions 2. Fall SPECIFIC PATIENT INSTRUCTIONS FROM THE PHYSICIAN WHO TREATED YOU IN THE ER TODAY: 
1. Over-the-counter Tylenol Motrin for pain. 2. Return if any concerns or worsening condition(s). 3. FOLLOW UP APPOINTMENT:  Your primary doctor in 1-2 days. Patient is improved, resting quietly and comfortably. The patient will be discharged home. The patient was reassured that these symptoms do not appear to represent a serious or life threatening condition at this time. Warning signs of worsening condition were discussed and understood by the patient. Based on patient's age, coexisting illness, exam, and the results of this ED evaluation, the decision to treat as an outpatient was made. Based on the information available at time of discharge, acute pathology requiring immediate intervention was deemed relative unlikely. While it is impossible to completely exclude the possibility of underlying serious disease or worsening of condition, I feel the relative likelihood is extremely low. I discussed this uncertainty with the patient, who understood ED evaluation and treatment and felt comfortable with the outpatient treatment plan. All questions regarding care, test results, and follow up were answered. The patient is stable and appropriate to discharge. They understand that they should return to the emergency department for any new or worsening symptoms.  I stressed the importance of follow up for repeat assessment and possibly further evaluation/treatment. Dictation disclaimer:  Please note that this dictation was completed with [email or web address], the computer voice recognition software. Quite often unanticipated grammatical, syntax, homophones, and other interpretive errors are inadvertently transcribed by the computer software. Please disregard these errors. Please excuse any errors that have escaped final proofreading. Coding Diagnoses Clinical Impression: 1. Acute pain of left knee 2. Neck strain, initial encounter 3. Fall, initial encounter Disposition Disposition: Discharge. WILVER Ornelas Board Certified Emergency Physician Provider Attestation: If a scribe was utilized in generation of this patient record, I personally performed the services described in the documentation, reviewed the documentation, as recorded by the scribe in my presence, and it accurately records the patient's history of presenting illness, review of systems, patient physical examination, and procedures performed by me as the attending physician. WILVER Ornelas Board Certified Emergency Physician 
10/3/2019. 
5:15 PM

## 2019-10-08 PROBLEM — L03.116 CELLULITIS OF LEFT KNEE: Status: ACTIVE | Noted: 2019-01-01

## 2019-10-08 PROBLEM — R65.20 SEVERE SEPSIS (HCC): Status: ACTIVE | Noted: 2019-01-01

## 2019-10-08 PROBLEM — A41.9 SEVERE SEPSIS (HCC): Status: ACTIVE | Noted: 2019-01-01

## 2019-10-08 PROBLEM — R41.82 ALTERED MENTAL STATUS: Status: ACTIVE | Noted: 2019-01-01

## 2019-10-08 NOTE — CONSULTS
Critical care consultation attestation Consultation discussed with JAYSHREE Pinto. I concur with her note, assessment and plan. History 75-year-old male presented to the emergency room on 10/8/2019 with altered mental status. Although he had received Percocet last night there was no improvement with Narcan. The physical exam showed that he was hypotensive. White blood cell count was 29.5 with 13% bands. Sepsis protocol was initiated. He has received vancomycin and meropenem. He has been volume resuscitated and started on norepinephrine. Review of systems No headaches or stiff neck. No sore throat. No cough or sputum. No SOB. No aspiration. Thrush but no chest pain with swallowing. LE edema for one week. Left knee, right hip and right groin have been painful for at least the past several days. Erythema over the left knee. He has been constipated secondary to narcotics. No right upper quadrant pain. ROS from daughter. 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 Current Outpatient Medications on File Prior to Encounter Medication Sig Dispense Refill  oxyCODONE-acetaminophen (PERCOCET) 5-325 mg per tablet Take 2 Tabs by mouth every six (6) hours as needed for Pain.  febuxostat (ULORIC) 80 mg tab tablet Take 80 mg by mouth daily.     
 desonide (TRIDESILON) 0.05 % cream Apply 1 Each to affected area two (2) times a day. TO FACE  nystatin (MYCOSTATIN) 100,000 unit/mL suspension Take 5 mL by mouth three (3) times daily. swish and spit 60 mL 0  
 zolpidem (AMBIEN) 5 mg tablet Take 5 mg by mouth nightly.  tamsulosin (FLOMAX) 0.4 mg capsule Take 1 Cap by mouth daily (after dinner). 90 Cap 3  
 apixaban (ELIQUIS) 5 mg tablet Take 1 Tab by mouth two (2) times a day. 60 Tab 0  
 albuterol (PROAIR HFA) 90 mcg/actuation inhaler Take  inhaled by mouth.  ALPHAGAN P 0.15 % ophthalmic solution Administer 1 Drop to both eyes three (3) times daily.  ipratropium-albuterol (COMBIVENT RESPIMAT)  mcg/actuation inhaler daily as needed  cyanocobalamin 1,000 mcg tablet Take 1,000 mcg by mouth daily.  furosemide (LASIX) 40 mg tablet Take 40 mg by mouth daily. 2 tab daily  loratadine (CLARITIN) 10 mg tablet Take 10 mg by mouth daily.  gabapentin (NEURONTIN) 600 mg tablet Take 600 mg by mouth two (2) times a day. 1 in A.M. 2 AT NIGHT  levothyroxine (SYNTHROID) 175 mcg tablet Take 175 mcg by mouth Daily (before breakfast).  bimatoprost (LUMIGAN) 0.03 % ophthalmic drops Administer 1 drop to both eyes every evening.  multivitamins-minerals-lutein (CENTRUM SILVER) Tab Take 1 Tab by mouth daily.  traZODone (DESYREL) 50 mg tablet Take 50 mg by mouth nightly as needed.  PYRIDOXINE HCL (VITAMIN B-6 PO) Take 1 Cap by mouth daily. Social history Quit smoking 1972. Retired [employer] Family history Diabetes in father and brothers Exam 
Somnolent but wakens. Cannot assess affect Blood pressure 99/56, pulse 87, temperature 98.7 °F (37.1 °C), resp. rate 18, height 5' 8\" (1.727 m), weight 89.6 kg (197 lb 8 oz), SpO2 100 %. On arrival: Temperature 100.5, pulse 115 and blood pressure 79/45 Conjugate gaze Breath sounds clear with limited effort No murmur, irregular Obese but soft. No RUQ tenderness Pitting BLE and pedal edema Erythema over left knee. Possible bogginess No erythema of scrotum Multiple ecchymoses. Multiple skin lesions, particularly over his face, that according to his daughter are various forms of skin cancer Labs 10/8/2019 WBC 29.5 with 13% bands. Hemoglobin 8.4 and platelets 862. Sodium 133, potassium 4.4, bicarb 27 and anion gap 12. BUN is 47 and creatinine is 2.30 Alkaline phosphatase 530. AST 57 and ALT 45. Total bilirubin 1.3 and albumin 1.5 On 9/28/2019 hemoglobin was 10.6 and creatinine was 0.86 Urinalysis 10/8 is normal 
 
 
 
 
 
 
 
 
 
Echocardiogram 9/27/2019 LVEF 56 to 60%. Atrial fibrillation. No significant valve disease. Assessment Severe sepsis with shock Elevated alkaline phosphatase without evidence of bile duct dilatation on CT Atrial fibrillation on chronic anticoagulation Acute kidney injury Hypoalbuminemia Anemia of unclear etiology The acute kidney injury is secondary to dehydration. While albumin is an acute phase reactant this degree of decreased suggests an ill-defined nutritional deficiency. The source of sepsis is unclear. Given the available information, cholangitis seems a reasonable possibility, despite the normal size bile ducts. The alkaline phosphatase elevation see only significant abnormality beyond the white count. The lung parenchyma visible on the abdominal and pelvic CT might suggest chronic aspiration, but there is not a dense enough infiltrate to suggest pneumonia. There is no urinary tract infection. The bowel wall thickening is associated with the prostate seeds and may not represent colitis. The best information points towards a GI source of sepsis. No obvious cellulitis but joint infections of the right hip and left knee cannot be excluded. Merrem and vancomycin will cover virtually all bacterial pathogens. The antibiotic should be scaled back if the source is better defined. Plan Volume resuscitation with albumin Norepinephrine and consider vasopressin Continue vancomycin and meropenem Hold Eliquis given possibility of GI intervention or joint fluid aspiration. Start heparin. No indication for transfusions Plain films of left knee and right hip The patient is critically ill with organ failure. Total critical care time without physician overlap or procedure time included: 75 minutes

## 2019-10-08 NOTE — H&P
History and Physical 
 
Patient: Joann Tamayo               Sex: male          DOA: 10/8/2019 YOB: 1933      Age:  80 y.o.        LOS:  LOS: 0 days CHIEF COMPLAINT: Altered mental status Assessment/Plan:  
 
Principal Problem: 
  Severe sepsis ([facility] [address] (10/8/2019) Active Problems: 
  Atrial fibrillation ([facility] [address] (6/2/2018) Hypothyroidism (6/2/2018) MACKENZIE (acute kidney injury) ([facility] [address] (9/27/2019) Altered mental status (10/8/2019) Cellulitis of left knee (10/8/2019) PLAN: 
· Severe sepsis - unknown source, however, left knee is erythematous and tender, suspect left knee cellulitis, need to r/o prosthetic septic arthritis, has h/o left knee replacement. Will check XR Left Knee. Cont broad spectrum IV abx. Follow blood cultures. Consult Orthopedics in AM. Continue Levophed for MAP goal >65. Monitor WBC count. PCCM consulted, appreciate assistance. · MACKENZIE - IV fluid hydration, monitor closely. · AMS - pt is lethargic, but arousable, not fully oriented. Suspect 2/2 metabolic encephalopathy from sepsis. Cont to monitor. · AFIB - Cont Eliquis for AC. · Hypothyroidism - Cont home levothyroxine · Elevated ALP - unknown etiology, No suspicious liver lesions on unenhanced CT. No biliary dilation. Gallbladder appears surgically absent. Cont to monitor. · DVT ppx - Already anticoagulated on Eliquis. HPI:  
 
Joann Tamayo is a 80 y.o. male with PMH as below who presents from home due to AMS, fevers and hypotension. Pt is lethargic, h/o taken from family at bedside. They state that starting 6 days ago he has been confused, lethargic and unable to ambulate, per family he was walking and driving last week. They state he has also been falling. He presented to the ED multiple times in the last month for syncope, fall and dehydration. He presented to the ED on 10/3 with left knee pain after a fall.  Per family he continues to have left knee pain but improved from 2 days ago. He has h/o knee replacement of left knee. In the ED, he presented with fever and hypotension. He was found to have severe sepsis, unknown source. Central line was placed due to hypotension. Levophed started with stable BP. Broad spectrum atbx started, and pt is to be admitted for further management. Past Medical History:  
Diagnosis Date  Arthritis  Atrial fibrillation ([address] 07/2017 Dr Darin Chapa cardio follows. chronic  Carcinoma of prostate ([address]  Cellulitis  Coarse tremor   
  hands, states \"my doctor is aware\"  Difficulty swallowing  Dysphagia  Edema  Encounter for colonoscopy due to history of adenomatous colonic polyps  Essential hypertension  Fall 10/07/2017 \"went to [facility] Emergency Room, CT Scan showed concussion, no bleeding\" per patient  Fatigue  GERD (gastroesophageal reflux disease) wo. esophagitis  Glaucoma  H/O joint replacement   
  left knee 2003  HLD (hyperlipidemia)  Hypertension  Hypertensive disorder  Hypothyroidism  Impotence  Laceration of right lower leg with complication  Malignant neoplasm of prostate ([address]   
  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   
 acute/hist. of   
 
 
Social History: 
Social History Socioeconomic History  Marital status:  Spouse name: Not on file  Number of children: Not on file  Years of education: Not on file  Highest education level: Not on file Tobacco Use  
  Smoking status: Former Smoker  Smokeless tobacco: Never Used Substance and Sexual Activity  Alcohol use: Yes Comment: weekly  Drug use: No  
 
 
Family History: 
History reviewed. No pertinent family history. Allergies: Allergies Allergen Reactions  Adhesive Other (comments) Skin irritation  Darvon [Propoxyphene] Hives, Myalgia and Other (comments) Joint pain  Penicillins Rash and Itching Home Medications: 
Prior to Admission medications Medication Sig Start Date End Date Taking? Authorizing Provider  
oxyCODONE-acetaminophen (PERCOCET) 5-325 mg per tablet Take 2 Tabs by mouth every six (6) hours as needed for Pain. Provider, Historical  
febuxostat (ULORIC) 80 mg tab tablet Take 80 mg by mouth daily. Provider, Historical  
desonide (TRIDESILON) 0.05 % cream Apply 1 Each to affected area two (2) times a day. TO FACE    Provider, Historical  
nystatin (MYCOSTATIN) 100,000 unit/mL suspension Take 5 mL by mouth three (3) times daily. swish and spit 9/28/19   Melinda Becerril PA  
zolpidem (AMBIEN) 5 mg tablet Take 5 mg by mouth nightly. Provider, Historical  
tamsulosin (FLOMAX) 0.4 mg capsule Take 1 Cap by mouth daily (after dinner). 6/25/19   Zaid Gar MD  
apixaban (ELIQUIS) 5 mg tablet Take 1 Tab by mouth two (2) times a day. 6/4/18   Mane Iraheta NP  
albuterol (PROAIR HFA) 90 mcg/actuation inhaler Take  inhaled by mouth. 6/5/18   Provider, Historical  
ALPHAGAN P 0.15 % ophthalmic solution Administer 1 Drop to both eyes three (3) times daily. 8/2/17   Provider, Historical  
ipratropium-albuterol (COMBIVENT RESPIMAT)  mcg/actuation inhaler daily as needed 3/29/17   Provider, Historical  
cyanocobalamin 1,000 mcg tablet Take 1,000 mcg by mouth daily. Provider, Historical  
furosemide (LASIX) 40 mg tablet Take 40 mg by mouth daily.  2 tab daily 6/20/17   Provider, Historical  
 loratadine (CLARITIN) 10 mg tablet Take 10 mg by mouth daily. Provider, Historical  
gabapentin (NEURONTIN) 600 mg tablet Take 600 mg by mouth two (2) times a day. 1 in A.M. 2 AT NIGHT     Provider, Historical  
levothyroxine (SYNTHROID) 175 mcg tablet Take 175 mcg by mouth Daily (before breakfast). Provider, Historical  
bimatoprost (LUMIGAN) 0.03 % ophthalmic drops Administer 1 drop to both eyes every evening. Provider, Historical  
multivitamins-minerals-lutein (CENTRUM SILVER) Tab Take 1 Tab by mouth daily. Provider, Historical  
traZODone (DESYREL) 50 mg tablet Take 50 mg by mouth nightly as needed. Provider, Historical  
PYRIDOXINE HCL (VITAMIN B-6 PO) Take 1 Cap by mouth daily. Provider, Historical  
 
 
 
Review of Systems Review of Systems Unable to perform ROS: Mental status change Objective:  
  
Visit Vitals BP 99/56 Pulse 87 Temp 98.7 °F (37.1 °C) Resp 18 Ht 5' 8\" (1.727 m) Wt 89.6 kg (197 lb 8 oz) SpO2 100% BMI 30.03 kg/m² Physical Exam: 
Gen: somnolent, oriented to person, place, not time Ears: hearing is intact Eyes: Icterus is not present Lungs: clear to auscultation bilaterally Heart: regular rate and rhythm, S1, S2 normal, no murmur, click, rub or gallop Gastrointestinal: soft, non-tender. Bowel sounds normal. No masses,  no organomegaly Neurological:  New Focal Deficits are not present Skin: diffuse ecchymosis. Face with small petechiae (per family is due to known actinic keratosis). Ext: skin overlying left knee erythematous and warm, tender to palpation Psychiatric:  Mood is stable Laboratory Studies: 
CMP:  
Lab Results Component Value Date/Time   (L) 10/08/2019 01:01 PM  
 K 4.4 10/08/2019 01:01 PM  
 CL 94 (L) 10/08/2019 01:01 PM  
 CO2 27 10/08/2019 01:01 PM  
 AGAP 12 10/08/2019 01:01 PM  
  (H) 10/08/2019 01:01 PM  
 BUN 47 (H) 10/08/2019 01:01 PM  
 CREA 2.30 (H) 10/08/2019 01:01 PM  
 GFRAA 33 (L) 10/08/2019 01:01 PM  
 GFRNA 27 (L) 10/08/2019 01:01 PM  
 CA 7.5 (L) 10/08/2019 01:01 PM  
 ALB 1.5 (L) 10/08/2019 01:01 PM  
 TP 4.8 (L) 10/08/2019 01:01 PM  
 GLOB 3.3 10/08/2019 01:01 PM  
 AGRAT 0.5 (L) 10/08/2019 01:01 PM  
 SGOT 57 (H) 10/08/2019 01:01 PM  
 ALT 45 10/08/2019 01:01 PM  
 
CBC:  
Lab Results Component Value Date/Time WBC 29.5 (H) 10/08/2019 01:01 PM  
 HGB 8.4 (L) 10/08/2019 01:01 PM  
 HCT 25.7 (L) 10/08/2019 01:01 PM  
  10/08/2019 01:01 PM  
 
 
Imaging: XR CHEST PORT Final Result IMPRESSION:  
  
Central line in place. No pneumothorax. CT ABD PELV WO CONT Final Result IMPRESSION:  
  
1. Moderate mural thickening throughout the distal sigmoid colon, rectum, and  
anus, most likely represents reactive inflammatory changes related to adjacent  
prostate seeds. An acute infectious or inflammatory proctocolitis could feasibly  
have a similar appearance. 2. Bladder wall thickening is also probably secondary to adjacent radiation  
prostate seeds. Cystitis is thought unlikely. 3. Tiny left and right pleural effusions, each with adjacent basilar  
atelectasis. No visualized acute pulmonary infiltrate. CT HEAD WO CONT Final Result IMPRESSION:  
  
1. No acute intracranial pathology. XR CHEST PORT Final Result IMPRESSION:  
Low lung volumes. Possible trace pleural effusions. No acute pulmonary process otherwise identified. XR KNEE LT 3 V    (Results Pending)

## 2019-10-08 NOTE — PROGRESS NOTES
Problem: Pain Goal: *Control of Pain Outcome: Progressing Towards Goal 
Goal: *PALLIATIVE CARE:  Alleviation of Pain Outcome: Progressing Towards Goal 
  
Problem: Falls - Risk of 
Goal: *Absence of Falls Description Document Sumaya Velasquez Fall Risk and appropriate interventions in the flowsheet. Outcome: Progressing Towards Goal 
Note:  
Fall Risk Interventions: 
  
 
  
 
  
 
  
 
  
 
 
  
Problem: Patient Education: Go to Patient Education Activity Goal: Patient/Family Education Outcome: Progressing Towards Goal 
  
Problem: Pressure Injury - Risk of 
Goal: *Prevention of pressure injury Description Document Dionicio Scale and appropriate interventions in the flowsheet. Outcome: Progressing Towards Goal 
Note:  
Pressure Injury Interventions:

## 2019-10-08 NOTE — PROGRESS NOTES
Phone primary RN Babatunde Cárdenas to notify that [address] that was to be drawn at [phone] is over due. RN states it is being drawn at this time.

## 2019-10-08 NOTE — ED TRIAGE NOTES
Patient brought in by EMS for evaluation of altered mental status after taking Percocet for the first time. Patient was given 1 mg Narcan IVP per EMS and now more responsive.

## 2019-10-08 NOTE — ED PROVIDER NOTES
EMERGENCY DEPARTMENT HISTORY AND PHYSICAL EXAM 
 
12:43 PM 
 
 
Date: 10/8/2019 Patient Name: Sreedhar Sanz History of Presenting Illness Chief Complaint Patient presents with  Altered mental status Additional History (Context): Sreedhar Sanz is a 80 y.o. male with hypotension and AMS from home. Narcan given in route by EMS. Reported to have received percocet for the first time last night. History and ROS limited due to patient mental status. PCP: Meagan Ashraf MD 
 
Current Facility-Administered Medications Medication Dose Route Frequency Provider Last Rate Last Dose  sodium chloride (NS) flush 5-10 mL  5-10 mL IntraVENous PRN Niru Batres DO   10 mL at 10/08/19 1320  VANCOMYCIN INFORMATION NOTE   Other Rx Dosing/Monitoring Niru Batres DO      
 NOREPINephrine (LEVOPHED) 8 mg in 0.9% NS 250ml infusion  2-16 mcg/min IntraVENous TITRATE Corbin Holley R DO 15 mL/hr at 10/08/19 1600 8 mcg/min at 10/08/19 1600  [START ON 10/9/2019] meropenem (MERREM) 1 g in 0.9% sodium chloride (MBP/ADV) 50 mL MBP  1 g IntraVENous Q12H Afia Lopez MD      
 [START ON 10/9/2019] VANCOMYCIN INFORMATION NOTE   Other ONCE Dany Marti DO      
 
Current Outpatient Medications Medication Sig Dispense Refill  oxyCODONE-acetaminophen (PERCOCET) 5-325 mg per tablet Take 2 Tabs by mouth every six (6) hours as needed for Pain.  febuxostat (ULORIC) 80 mg tab tablet Take 80 mg by mouth daily.  desonide (TRIDESILON) 0.05 % cream Apply 1 Each to affected area two (2) times a day. TO FACE  nystatin (MYCOSTATIN) 100,000 unit/mL suspension Take 5 mL by mouth three (3) times daily. swish and spit 60 mL 0  
 zolpidem (AMBIEN) 5 mg tablet Take 5 mg by mouth nightly.  tamsulosin (FLOMAX) 0.4 mg capsule Take 1 Cap by mouth daily (after dinner). 90 Cap 3  
 apixaban (ELIQUIS) 5 mg tablet Take 1 Tab by mouth two (2) times a day.  60 Tab 0  
  albuterol (PROAIR HFA) 90 mcg/actuation inhaler Take  inhaled by mouth.  ALPHAGAN P 0.15 % ophthalmic solution Administer 1 Drop to both eyes three (3) times daily.  ipratropium-albuterol (COMBIVENT RESPIMAT)  mcg/actuation inhaler daily as needed  cyanocobalamin 1,000 mcg tablet Take 1,000 mcg by mouth daily.  furosemide (LASIX) 40 mg tablet Take 40 mg by mouth daily. 2 tab daily  loratadine (CLARITIN) 10 mg tablet Take 10 mg by mouth daily.  gabapentin (NEURONTIN) 600 mg tablet Take 600 mg by mouth two (2) times a day. 1 in A.M. 2 AT NIGHT  levothyroxine (SYNTHROID) 175 mcg tablet Take 175 mcg by mouth Daily (before breakfast).  bimatoprost (LUMIGAN) 0.03 % ophthalmic drops Administer 1 drop to both eyes every evening.  multivitamins-minerals-lutein (CENTRUM SILVER) Tab Take 1 Tab by mouth daily.  traZODone (DESYREL) 50 mg tablet Take 50 mg by mouth nightly as needed.  PYRIDOXINE HCL (VITAMIN B-6 PO) Take 1 Cap by mouth daily. Past History Past Medical History: 
Past Medical History:  
Diagnosis Date  Arthritis  Atrial fibrillation ([address] 07/2017 Dr Bonnie Soler cardio follows. chronic  Carcinoma of prostate ([address]  Cellulitis  Coarse tremor   
  hands, states \"my doctor is aware\"  Difficulty swallowing  Dysphagia  Edema  Encounter for colonoscopy due to history of adenomatous colonic polyps  Essential hypertension  Fall 10/07/2017 \"went to [facility] Emergency Room, CT Scan showed concussion, no bleeding\" per patient  Fatigue  GERD (gastroesophageal reflux disease) wo. esophagitis  Glaucoma  H/O joint replacement   
  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   
 acute/hist. of   
 
 
Past Surgical History: 
Past Surgical History:  
Procedure Laterality Date  COLONOSCOPY N/A 1/3/2017  HX CHOLECYSTECTOMY  HX KNEE REPLACEMENT Left 01/2003  HX SHOULDER REPLACEMENT Right 7/17, 10/17 Family History: 
History reviewed. No pertinent family history. Social History: 
Social History Tobacco Use  Smoking status: Former Smoker  Smokeless tobacco: Never Used Substance Use Topics  Alcohol use: Yes Comment: weekly  Drug use: No  
 
 
Allergies: Allergies Allergen Reactions  Adhesive Other (comments) Skin irritation  Darvon [Propoxyphene] Hives, Myalgia and Other (comments) Joint pain  Penicillins Rash and Itching Review of Systems Review of Systems Constitutional: Positive for fever. Neurological:  
     Confusion Physical Exam  
 
Visit Vitals /59 (BP 1 Location: Left arm, BP Patient Position: At rest;Supine) Pulse 98 Temp 98.7 °F (37.1 °C) Resp 18 Ht 5' 8\" (1.727 m) Wt 89.6 kg (197 lb 8 oz) SpO2 100% BMI 30.03 kg/m² Physical Exam  
Constitutional: He appears well-developed and well-nourished. No distress. HENT:  
Head: Normocephalic and atraumatic. Mouth/Throat: Oropharynx is clear and moist.  
Eyes: Pupils are equal, round, and reactive to light. Conjunctivae and EOM are normal. No scleral icterus. Neck: Normal range of motion. Neck supple. No tracheal deviation present. Cardiovascular: Intact distal pulses. An irregularly irregular rhythm present. Tachycardia present. No murmur heard. Pulmonary/Chest: Effort normal. No respiratory distress. Coarse breath sounds at bilateral bases Abdominal: Soft. He exhibits distension. There is no tenderness. There is no rebound and no guarding. Genitourinary:  
Genitourinary Comments: Suprapubic tenderness with very distended bladder Musculoskeletal: Normal range of motion. He exhibits edema. He exhibits no deformity. Neurological: He is alert. No cranial nerve deficit. No gross neuro deficit Alert to self and location. Skin: Skin is warm and dry. No rash noted. He is not diaphoretic. Erythema and warmth overlying the left lateral lower knee. Scabbed and crusted rash to the face Scattered petechiae and bruising Skin tear to right lower arm without evidence of surrounding infection. Psychiatric:  
Unable to assess Nursing note and vitals reviewed. Diagnostic Study Results Labs - Labs Reviewed METABOLIC PANEL, COMPREHENSIVE - Abnormal; Notable for the following components:  
    Result Value Sodium 133 (*) Chloride 94 (*) Glucose 120 (*) BUN 47 (*) Creatinine 2.30 (*)   
 GFR est AA 33 (*)   
 GFR est non-AA 27 (*) Calcium 7.5 (*) Bilirubin, total 1.3 (*) AST (SGOT) 57 (*) Alk. phosphatase 530 (*) Protein, total 4.8 (*) Albumin 1.5 (*) A-G Ratio 0.5 (*) All other components within normal limits CBC WITH AUTOMATED DIFF - Abnormal; Notable for the following components: WBC 29.5 (*)   
 RBC 2.71 (*) HGB 8.4 (*) HCT 25.7 (*)   
 RDW 16.2 (*) NEUTROPHILS 81 (*) BAND NEUTROPHILS 13 (*) LYMPHOCYTES 3 (*)   
 ABS. NEUTROPHILS 23.9 (*) All other components within normal limits CARDIAC PANEL,(CK, CKMB & TROPONIN) - Abnormal; Notable for the following components:  
 Troponin-I, QT 0.08 (*) All other components within normal limits TSH 3RD GENERATION - Abnormal; Notable for the following components: TSH 4.20 (*) All other components within normal limits POC LACTIC ACID - Abnormal; Notable for the following components:  
 Lactic Acid (POC) 4.05 (*) All other components within normal limits CULTURE, BLOOD CULTURE, URINE  
CULTURE, BLOOD URINALYSIS W/ RFLX MICROSCOPIC  
T4, FREE  
LACTIC ACID Radiologic Studies -  
XR CHEST PORT Final Result IMPRESSION:  
  
Central line in place. No pneumothorax. CT ABD PELV WO CONT Final Result IMPRESSION:  
  
1. Moderate mural thickening throughout the distal sigmoid colon, rectum, and  
anus, most likely represents reactive inflammatory changes related to adjacent  
prostate seeds. An acute infectious or inflammatory proctocolitis could feasibly  
have a similar appearance. 2. Bladder wall thickening is also probably secondary to adjacent radiation  
prostate seeds. Cystitis is thought unlikely. 3. Tiny left and right pleural effusions, each with adjacent basilar  
atelectasis. No visualized acute pulmonary infiltrate. CT HEAD WO CONT Final Result IMPRESSION:  
  
1. No acute intracranial pathology. XR CHEST PORT Final Result IMPRESSION:  
Low lung volumes. Possible trace pleural effusions. No acute pulmonary process otherwise identified. Medical Decision Making I am the first provider for this patient. I reviewed the vital signs, available nursing notes, past medical history, past surgical history, family history and social history. Vital Signs-Reviewed the patient's vital signs. Cardiac Monitor: 
Rate: 104 Rhythm:  Atrial Fibrillation with mild RVR 
 
EKG: Interpreted by the EP. Time 0484 31 29 02 Atrial fib with mild RVR rate 104 nonspecific STT wave changes. No acute ST elevation. Records Reviewed: Old Medical Records (Time of Review: 12:43 PM) MDM, Progress Notes, Reevaluation, and Consults: 
 
Chart review shows patient had an admission for syncopal episodes and hypothyroidism about 2 weeks ago. ED Course as of Oct 08 1808 Tue Oct 08, 2019  
1326 86M sent from home for fever, rigors, AMS, inability to walk (last week walking and driving) per wife when I called her after he was dropped off by EMS. Walking with PT Tuesday and Wed wife states \"he couldn't hold himself up\". EMS reported pt was given percocet for the first time last night, pt given narcan in route with some minimal inc alertness. [KG] [id number] Told wife pt is septic and she gave consent for central line placement over the phone. Patient also gives his verbal consent and seems to understand the risks and benefits though he is mildly confused. [KG] 56 Merino will be placed for urinary retention, with temp probe. [KG] 1359 Merino placed, patient persistently hypertensive while fluids are infusing, will start referral Levophed while I place the central line. Fever improved after Tylenol. [KG] 1413 Pharmacy recommends switching cefepime to meropenem and if the first dose has been started in the ER just switched to meropenem once his next dose is due. [KG] ED Course User Index [KG] Aravind Edmond DO Consult placed to pharmacist to discuss expanding coverage in addition to cefepime and vancomycin considering the patient's recent hospital admission. She will review and get back to me. Central Line 
Date/Time: 10/8/2019 3:05 PM 
Performed by: Aravind Edmond DO Authorized by: Aravind Edmond DO Consent:  
  Consent obtained:  Verbal 
  Consent given by:  Patient and spouse Risks discussed:  Bleeding, infection and arterial puncture Alternatives discussed:  No treatment and delayed treatment Pre-procedure details:  
  Hand hygiene: Hand hygiene performed prior to insertion Sterile barrier technique:  All elements of maximal sterile technique followed Skin preparation:  2% chlorhexidine Skin preparation agent: Skin preparation agent completely dried prior to procedure Anesthesia (see MAR for exact dosages): Anesthesia method:  Local infiltration Local anesthetic:  Lidocaine 1% w/o epi Procedure details: Location:  R internal jugular Patient position:  Trendelenburg Procedural supplies:  Triple lumen Catheter size:  7 Fr Ultrasound guidance: yes Sterile ultrasound techniques: Sterile gel and sterile probe covers were used Number of attempts:  1 Successful placement: yes Post-procedure details: Post-procedure:  Dressing applied and line sutured Assessment:  Blood return through all ports Patient tolerance of procedure: Tolerated well, no immediate complications Comments: Will follow XR to verify placement and rule out pneumo The patient was given: 
Medications  
sodium chloride (NS) flush 5-10 mL (10 mL IntraVENous Given 10/8/19 1320) VANCOMYCIN INFORMATION NOTE (has no administration in time range) NOREPINephrine (LEVOPHED) 8 mg in 0.9% NS 250ml infusion (8 mcg/min IntraVENous Rate Change 10/8/19 1600) meropenem (MERREM) 1 g in 0.9% sodium chloride (MBP/ADV) 50 mL MBP (has no administration in time range) VANCOMYCIN INFORMATION NOTE (has no administration in time range)  
lactated ringers bolus infusion 1,000 mL (0 mL IntraVENous IV Completed 10/8/19 1327) Followed by  
lactated ringers bolus infusion 1,000 mL (0 mL IntraVENous IV Completed 10/8/19 1411) Followed by  
lactated ringers bolus infusion 688 mL (0 mL IntraVENous IV Completed 10/8/19 1448) acetaminophen (TYLENOL) suppository 975 mg (975 mg Rectal Given 10/8/19 1315) vancomycin (VANCOCIN) 2000 mg in  ml infusion (2,000 mg IntraVENous New Bag 10/8/19 1330) aspirin chewable tablet 162 mg (162 mg Oral Given 10/8/19 1500) Patient admitted to the hospitalist.  CT head shows no acute intracranial abnormalities. CT chest abdomen pelvis pending to help identify potential source. Discussed the case with Dr. Alexandru Leung and expressed my concern for possible septic joint of the left knee if no other source could be identified as the patient does have some overlying cellulitis and history of inability to walk. Upon later discussion with the daughter who is now at bedside she states the patient was complaining of pain in his left knee. Recommended the inpatient team need to get orthopedics involved for an arthrocentesis but antibiotics had to be given upfront due to the patient's acuity. Procedures: [employer] For Hospitalized Patients: 
 
1. Hospitalization Decision Time: The decision to hospitalize the patient was made by Dr. Kiran Nevarez at 2:05 PM 
 on 10/8/2019 2. Aspirin: Aspirin was given CRITICAL CARE: 
12:43 PM 
I have spent 41 minutes of critical care time involved in lab review, consultations with specialist, family decision-making, and documentation. During this entire length of time I was immediately available to the patient. Critical Care: The reason for providing this level of medical care for this critically ill patient was due a critical illness that impaired one or more vital organ systems such that there was a high probability of imminent or life threatening deterioration in the patients condition. This care involved high complexity decision making to assess, manipulate, and support vital system functions, to treat this degreee vital organ system failure and to prevent further life threatening deterioration of the patients condition. Diagnosis Clinical Impression: 1. Severe sepsis ([facility] [facility] Disposition: Admit Follow-up Information None Patient's Medications Start Taking No medications on file Continue Taking ALBUTEROL (PROAIR HFA) 90 MCG/ACTUATION INHALER    Take  inhaled by mouth. ALPHAGAN P 0.15 % OPHTHALMIC SOLUTION    Administer 1 Drop to both eyes three (3) times daily. APIXABAN (ELIQUIS) 5 MG TABLET    Take 1 Tab by mouth two (2) times a day. BIMATOPROST (LUMIGAN) 0.03 % OPHTHALMIC DROPS    Administer 1 drop to both eyes every evening. CYANOCOBALAMIN 1,000 MCG TABLET    Take 1,000 mcg by mouth daily. DESONIDE (TRIDESILON) 0.05 % CREAM    Apply 1 Each to affected area two (2) times a day. TO FACE FEBUXOSTAT (ULORIC) 80 MG TAB TABLET    Take 80 mg by mouth daily. FUROSEMIDE (LASIX) 40 MG TABLET    Take 40 mg by mouth daily. 2 tab daily GABAPENTIN (NEURONTIN) 600 MG TABLET    Take 600 mg by mouth two (2) times a day. 1 in A.M. 2 AT NIGHT IPRATROPIUM-ALBUTEROL (COMBIVENT RESPIMAT)  MCG/ACTUATION INHALER    daily as needed LEVOTHYROXINE (SYNTHROID) 175 MCG TABLET    Take 175 mcg by mouth Daily (before breakfast). LORATADINE (CLARITIN) 10 MG TABLET    Take 10 mg by mouth daily. MULTIVITAMINS-MINERALS-LUTEIN (CENTRUM SILVER) TAB    Take 1 Tab by mouth daily. NYSTATIN (MYCOSTATIN) 100,000 UNIT/ML SUSPENSION    Take 5 mL by mouth three (3) times daily. swish and spit OXYCODONE-ACETAMINOPHEN (PERCOCET) 5-325 MG PER TABLET    Take 2 Tabs by mouth every six (6) hours as needed for Pain. PYRIDOXINE HCL (VITAMIN B-6 PO)    Take 1 Cap by mouth daily. TAMSULOSIN (FLOMAX) 0.4 MG CAPSULE    Take 1 Cap by mouth daily (after dinner). TRAZODONE (DESYREL) 50 MG TABLET    Take 50 mg by mouth nightly as needed. ZOLPIDEM (AMBIEN) 5 MG TABLET    Take 5 mg by mouth nightly. These Medications have changed No medications on file Stop Taking No medications on file  
 
_______________________________

## 2019-10-08 NOTE — CONSULTS
[facility] Pulmonary, Critical Care, and Sleep Medicine Name: Margie Pimentel MRN: [id number] : 1933 Hospital: [address] [place] [address] Date: 10/8/2019 Critical Care Consult IMPRESSION:  
· Acute Encephalopathy - s/p Narcan from EMS with slight improvement. Suspect largely 2/2 septic shock vs Wernicke's in known alcoholic. CT Head (-) for acute process. · Septic Shock with MSOF- Currently requiring vasopressor support. Suspect Skin vs septic arthritis vs GI source. CVL placed in ED. S/p 2.6L IVF in ED. Labs concerning for possible ascending cholangitis. Additionlly, pt has recent injury to L knee, concern for septic arthritis with an artificial joint. Consider Esophagitis also with recent thrush infection. (-) UA. · Leukocytosis with bandemia and L shift - 2/2 above process · Elevated LFT's, with elevated alk phos most concerning - ?ascending cholangitis vs hepatitis with ETOH- hx. Hx of cholecystectomy. CT abd/pelvis currently without bile duct dilation. · Lactic Acidosis - 2/2 above · Elevated TBili - 1.8 upon admission from 0.6 on . · MACKENZIE on CKD - Likely pre-renal, 2/2 above with dehydration · Anemia -  
· Hypoalbuminemia · L knee pain - Recent injury; Artifical joint (s/p Total knee replacement 2003) · Recent Trush infection - will monitor for possible esophagitis. · Opoid induced constipation - Recently resolved outpatient with fleets enema · Alcohol abuse - Pt's daughter states pt is alcoholic, last drink was about 1 wk ago. · Deconditioning · Recent hx of syncope and fall (2019) and (10/03/19) · Recent hx of Cellulitis - LLE, s/p ABX · Hx of Afib (on Eliquis), HTN 
· Hx of Prostate CA - Adenocarcinoma, s/p radiation and prostate seeds · Former Smoker Patient Active Problem List  
Diagnosis Code  Malignant neoplasm of prostate ([facility] C61  Hypertension I10  
 GERD (gastroesophageal reflux disease) K21.9  Glaucoma H40.9  Glaucoma H40.9  Dysphagia R13.10  Encounter for colonoscopy due to history of adenomatous colonic polyps Z12.11, Z86.010  
 Non-pressure chronic ulcer of right calf with fat layer exposed ([facility] [address] L93.208  Laceration of right lower leg with complication E26.515W  Edema R60.9  Pneumonia J18.9  Atrial fibrillation (HCC) I48.91  
 Hypothyroidism E03.9  Sepsis ([facility] [address] A41.9  Cellulitis L03.90  
 HTN (hypertension) L09  
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  
· Resp: Titrate supp O2 for SpO2 >90%; encourage IS when able. Con't Claritin. · I/D: Sepsis bundle per hospital protocol, f/u BxCx/UC, Trend LA q4hrs until normal. Con't broad sepc ABX. Received dose of Cefepime, Levaquin and Vanc in ED. Switched to [employer]. Deescalate ABX once Cx's finalize. Trend temp & WBC cruve · Hem/Onc: Daily CBC, check PT/INR. Monitor Hgb trend. · CVS:  Actively titrate Levophed, aim MAP >65mmHg. Consider adding Vasopressin if hypotension doesn't improve. Trend CE's. Continue volume resuscitation. Monitor AFib - Hold further Eliquis doses (1/2 life 12 hrs) for anticipated procedures in AM. · Metabolic: Daily BMP, Mag & Phos; monitor e-lytes; replace PRN. · Renal: Trend Renal indices; strict I/O's, monitor UOP. Merino placed in ED. · Endocrine: POC Glucose q6; con't Synthroid. Check ammonia level. High dose thiamine & folic acid. · GI: SUP, Trend LFTs, Zofran PRN for N/V. Keep NPO for possible procedures in AM. Check lipase and hepatitis labs. May repeat CT ABD pending LFT's and clinical course. · Musc/Skin: Closely monitor L knee and R hip for possible septic joint. STAT XR of R hip & L knee. Consult Ortho for probable joint aspiration. · Neuro: Avoid sedatives; closely monitor neuro status. Judicious use of Tylenol PRN for pain with current elevated LFT's. · Fluids: Albumin 5% 500cc bolus. Then start NS @ 100cc/hr · Code Status: Full · Discussed with Dr. Puckett Forward Best Practices/Safety Bundles: 
· Sepsis Bundle per Hospital Protocol · Glycemic control; avoid Hypoglycemia · IHI ICU Bundles: 
·  Central Line Bundle Followed  and Cervantes Bundle Followed · Pulmonary pts: · Aspiration Precautions - HOB >30' · Optimize bronchial hygiene · Stress ulcer prophylaxis: Protonix · DVT prophylaxis: SCD's; was ion Eliquis (last dose 10/07) · Need for Lines, cervantes assessed. Subjective/History: This patient has been seen and evaluated at the request of Dr. Foreign Sidhu for Septic Shock, unknown source, currently requiring vasopressor support 10/08/19 Patient is a 80 y.o. male with PMH of HTN, Prostate CA, Afib (on Eliquis), GERD and recent hx of syncope and falls (09/2019, 10/2019) who presented to [facility] ED via EMS from home, c/o rigors, fever and AMS with hypotension. Per chart, pt was able to participate in PT last week, but was having weakness, and \"wasn't able to hold himself up\". Prior to this, pt was walking and driving as of about 10 days prior. Per pt's daughter, currently at bedside, pt's overall health had started to deteriorate about 1 month ago and has progressively worsened. He has had multiple falls, syncope upon urination, and most recently fell again, with resultant neck strain and L knee pain (10/03/19). Daughter states that pt has had progressively worsening fatigue x1 week, leaving pt essentially bed-bound, with associated loss of appetite and increased BLE edema. Additionally, she reports rigors x2 episodes without fever on oral thermometer over past 2 days.  She also notes that he was given Percocet with the 10/03/19 ER visit, and subsequently developed constipation, resolved s/p home fleets enema. Daughter states pt stopped taking the Percocet about 4 days PTA (10/04), but did take one pill last night (10/07) 2/2 severe L knee pain. Pt does c/o (+) L knee pain  & R hip pain. L Knee is erythematous and swollen. Daughter states pt has denied any CALEB/SOB, cough, congestion, HA, blurry vision, abd pain (aside from some gas cramps, relieved with home enema), N/V/D. Of note, daughter does state pt is a \"functioning alcoholic\" and usually has about a 6pk of beer/day. Last drink per daughter's knowledge was about 1 wk ago; daughter does report pt has had DT's in the past, s/p L knee replacement. Last Eliquis does was 10/07. No recent travel, no sick contacts, outside of recent hospital admissions. Does have current flu vaccine. ED w/o significant for hypotension requiring CVL placement and vasopressor support, s/p 2.6L sepsis bolus; lactic acidosis, MACKENZIE, anemia, elevated LFT's and mild troponin elevation. Head CT (-) for acute process, CT Abd/Pelvis with some bowel inflammation but no acute source of infection; (-) duct dilation. BxCx drawn, started on broad spec ABX and was admitted to ICU with PCCM consulted for further management. 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 She states this cycle of radiation was done in , but now has transferred his care to [facility], she does not recall Oncology doctor's name. The patient is critically ill and can not provide additional history due to altered mental status and fatigue/sleepineness. Past Medical History:  
Diagnosis Date  Arthritis  Atrial fibrillation ([facility] 07/2017 Dr Brenda Jean cardio follows. chronic  Carcinoma of prostate ([facility] [address]  Cellulitis  Coarse tremor   
  hands, states \"my doctor is aware\"  Difficulty swallowing  Dysphagia  Edema  Encounter for colonoscopy due to history of adenomatous colonic polyps  Essential hypertension  Fall 10/07/2017 \"went to [facility] Emergency Room, CT Scan showed concussion, no bleeding\" per patient  Fatigue  GERD (gastroesophageal reflux disease) wo. esophagitis  Glaucoma  H/O joint replacement   
  left knee 2003  HLD (hyperlipidemia)  Hypertension  Hypertensive disorder  Hypothyroidism  Impotence  Laceration of right lower leg with complication  Malignant neoplasm of prostate ([address]   
  [id number] Adenocarcinoma of the Prostate, dx 11/3/2008, karlee 3+4, presenting PSA 5.5ng/mL. 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   
 acute/hist. of   
  
 
Past Surgical History:  
Procedure Laterality Date  COLONOSCOPY N/A 1/3/2017  HX CHOLECYSTECTOMY  HX KNEE REPLACEMENT Left 01/2003  HX SHOULDER REPLACEMENT Right 7/17, 10/17 Prior to Admission medications Medication Sig Start Date End Date Taking? Authorizing Provider  
oxyCODONE-acetaminophen (PERCOCET) 5-325 mg per tablet Take 2 Tabs by mouth every six (6) hours as needed for Pain. Provider, Historical  
febuxostat (ULORIC) 80 mg tab tablet Take 80 mg by mouth daily. Provider, Historical  
desonide (TRIDESILON) 0.05 % cream Apply 1 Each to affected area two (2) times a day. TO FACE    Provider, Historical  
nystatin (MYCOSTATIN) 100,000 unit/mL suspension Take 5 mL by mouth three (3) times daily. swish and spit 9/28/19   Cristian Becerril PA  
zolpidem (AMBIEN) 5 mg tablet Take 5 mg by mouth nightly.     Provider, Historical  
 tamsulosin (FLOMAX) 0.4 mg capsule Take 1 Cap by mouth daily (after dinner). 6/25/19   Ann-Marie Almaguer MD  
apixaban (ELIQUIS) 5 mg tablet Take 1 Tab by mouth two (2) times a day. 6/4/18   Arturo Beltre NP  
albuterol (PROAIR HFA) 90 mcg/actuation inhaler Take  inhaled by mouth. 6/5/18   Provider, Historical  
ALPHAGAN P 0.15 % ophthalmic solution Administer 1 Drop to both eyes three (3) times daily. 8/2/17   Provider, Historical  
ipratropium-albuterol (COMBIVENT RESPIMAT)  mcg/actuation inhaler daily as needed 3/29/17   Provider, Historical  
cyanocobalamin 1,000 mcg tablet Take 1,000 mcg by mouth daily. Provider, Historical  
furosemide (LASIX) 40 mg tablet Take 40 mg by mouth daily. 2 tab daily 6/20/17   Provider, Historical  
loratadine (CLARITIN) 10 mg tablet Take 10 mg by mouth daily. Provider, Historical  
gabapentin (NEURONTIN) 600 mg tablet Take 600 mg by mouth two (2) times a day. 1 in A.M. 2 AT NIGHT     Provider, Historical  
levothyroxine (SYNTHROID) 175 mcg tablet Take 175 mcg by mouth Daily (before breakfast). Provider, Historical  
bimatoprost (LUMIGAN) 0.03 % ophthalmic drops Administer 1 drop to both eyes every evening. Provider, Historical  
multivitamins-minerals-lutein (CENTRUM SILVER) Tab Take 1 Tab by mouth daily. Provider, Historical  
traZODone (DESYREL) 50 mg tablet Take 50 mg by mouth nightly as needed. Provider, Historical  
PYRIDOXINE HCL (VITAMIN B-6 PO) Take 1 Cap by mouth daily. Provider, Historical  
 
 
Current Facility-Administered Medications Medication Dose Route Frequency  VANCOMYCIN INFORMATION NOTE   Other Rx Dosing/Monitoring  NOREPINephrine (LEVOPHED) 8 mg in 0.9% NS 250ml infusion  2-16 mcg/min IntraVENous TITRATE  [START ON 10/9/2019] meropenem (MERREM) 1 g in 0.9% sodium chloride (MBP/ADV) 50 mL MBP  1 g IntraVENous Q12H  
 [START ON 10/9/2019] VANCOMYCIN INFORMATION NOTE   Other ONCE Allergies Allergen Reactions  Adhesive Other (comments) Skin irritation  Darvon [Propoxyphene] Hives, Myalgia and Other (comments) Joint pain  Penicillins Rash and Itching Social History Tobacco Use  Smoking status: Former Smoker  Smokeless tobacco: Never Used Substance Use Topics  Alcohol use: Yes Comment: weekly History reviewed. No pertinent family history. Review of Systems: 
Review of systems not obtained due to patient factors. Objective:  
Vital Signs:   
Visit Vitals BP (!) 89/51 (BP 1 Location: Left arm, BP Patient Position: At rest;Supine) Pulse 84 Temp 98.7 °F (37.1 °C) Resp 18 Ht 5' 8\" (1.727 m) Wt 89.6 kg (197 lb 8 oz) SpO2 100% BMI 30.03 kg/m² O2 Device: Nasal cannula O2 Flow Rate (L/min): 2 l/min Temp (24hrs), Av.6 °F (37.6 °C), Min:98.7 °F (37.1 °C), Max:100.5 °F (38.1 °C) Intake/Output:  
Last shift:      10/08 0701 - 10/08 1900 In: 2789.5 [I.V.:2789.5] Out: - Last 3 shifts: No intake/output data recorded. Intake/Output Summary (Last 24 hours) at 10/8/2019 1608 Last data filed at 10/8/2019 1448 Gross per 24 hour Intake 2789.49 ml Output  Net 2789.49 ml Physical Exam:  
 
General:  Awake but sleepy, AOx1-2, cooperative, NAD Head:  Normocephalic, without obvious abnormality, atraumatic. Eyes:  Conjunctivae/corneas clear. No icterus. PERRL Nose: Nares normal. Septum midline. Mucosa normal. No drainage or sinus tenderness. Throat: Lips, mucosa, and tongue normal. Teeth and gums normal.  
Neck: Supple, symmetrical, trachea midline, no adenopathy, no carotid bruit and no JVD. Lungs:   Symmetrical chest rise; good AE bilat; CTAB; no wheezes/rhonchi/rales noted. Heart:  RR, irregular rhythm, S1, S2 normal, no m/r/g Abdomen:   Soft, protuberant, non-tender. Bowel sounds normal. No masses,  No organomegaly. Extremities: LLE +erythema, hot to touch, +swelling with possible bogginess. +2 pitting edema BLE. Pulses: 2+ and symmetric all extremities. Skin: Skin color, texture, turgor normal. Multiple bruises to thorax area and BLE. Multiple skin lesions, which per daughter are forms of skin cancer. +Bandage to R forearm for skin tear. Neurologic: Grossly nonfocal  
Devices:  CVL - R Wilber OLVERA (10/08) Data:  
 
Recent Results (from the past 24 hour(s)) METABOLIC PANEL, COMPREHENSIVE Collection Time: 10/08/19  1:01 PM  
Result Value Ref Range Sodium 133 (L) 136 - 145 mmol/L Potassium 4.4 3.5 - 5.5 mmol/L Chloride 94 (L) 100 - 111 mmol/L  
 CO2 27 21 - 32 mmol/L Anion gap 12 3.0 - 18 mmol/L Glucose 120 (H) 74 - 99 mg/dL BUN 47 (H) 7.0 - 18 MG/DL Creatinine 2.30 (H) 0.6 - 1.3 MG/DL  
 BUN/Creatinine ratio 20 12 - 20 GFR est AA 33 (L) >60 ml/min/1.73m2 GFR est non-AA 27 (L) >60 ml/min/1.73m2 Calcium 7.5 (L) 8.5 - 10.1 MG/DL Bilirubin, total 1.3 (H) 0.2 - 1.0 MG/DL  
 ALT (SGPT) 45 16 - 61 U/L  
 AST (SGOT) 57 (H) 10 - 38 U/L Alk. phosphatase 530 (H) 45 - 117 U/L Protein, total 4.8 (L) 6.4 - 8.2 g/dL Albumin 1.5 (L) 3.4 - 5.0 g/dL Globulin 3.3 2.0 - 4.0 g/dL A-G Ratio 0.5 (L) 0.8 - 1.7    
CBC WITH AUTOMATED DIFF Collection Time: 10/08/19  1:01 PM  
Result Value Ref Range WBC 29.5 (H) 4.6 - 13.2 K/uL  
 RBC 2.71 (L) 4.70 - 5.50 M/uL HGB 8.4 (L) 13.0 - 16.0 g/dL HCT 25.7 (L) 36.0 - 48.0 % MCV 94.8 74.0 - 97.0 FL  
 MCH 31.0 24.0 - 34.0 PG  
 MCHC 32.7 31.0 - 37.0 g/dL  
 RDW 16.2 (H) 11.6 - 14.5 % PLATELET 700 566 - 991 K/uL MPV 10.3 9.2 - 11.8 FL  
 NEUTROPHILS 81 (H) 42 - 75 % BAND NEUTROPHILS 13 (H) 0 - 5 % LYMPHOCYTES 3 (L) 20 - 51 % MONOCYTES 3 2 - 9 % EOSINOPHILS 0 0 - 5 % BASOPHILS 0 0 - 3 %  
 ABS. NEUTROPHILS 23.9 (H) 1.8 - 8.0 K/UL  
 ABS. LYMPHOCYTES 0.9 0.8 - 3.5 K/UL  
 ABS. MONOCYTES 0.9 0 - 1.0 K/UL  
 ABS. EOSINOPHILS 0.0 0.0 - 0.4 K/UL  
 ABS. BASOPHILS 0.0 0.0 - 0.1 K/UL RBC COMMENTS NORMOCYTIC, NORMOCHROMIC    
 DF MANUAL CARDIAC PANEL,(CK, CKMB & TROPONIN) Collection Time: 10/08/19  1:01 PM  
Result Value Ref Range CK 89 39 - 308 U/L  
 CK - MB <1.0 <3.6 ng/ml CK-MB Index  0.0 - 4.0 % CALCULATION NOT PERFORMED WHEN RESULT IS BELOW LINEAR LIMIT Troponin-I, QT 0.08 (H) 0.0 - 0.045 NG/ML  
TSH 3RD GENERATION Collection Time: 10/08/19  1:01 PM  
Result Value Ref Range TSH 4.20 (H) 0.36 - 3.74 uIU/mL POC LACTIC ACID Collection Time: 10/08/19  1:06 PM  
Result Value Ref Range Lactic Acid (POC) 4.05 (HH) 0.40 - 2.00 mmol/L  
URINALYSIS W/ RFLX MICROSCOPIC Collection Time: 10/08/19  2:18 PM  
Result Value Ref Range Color YELLOW Appearance CLEAR Specific gravity 1.012 1.005 - 1.030    
 pH (UA) 6.5 5.0 - 8.0 Protein NEGATIVE  NEG mg/dL Glucose NEGATIVE  NEG mg/dL Ketone NEGATIVE  NEG mg/dL Bilirubin NEGATIVE  NEG Blood NEGATIVE  NEG Urobilinogen 1.0 0.2 - 1.0 EU/dL Nitrites NEGATIVE  NEG Leukocyte Esterase NEGATIVE  NEG No results for input(s): FIO2I, IFO2, HCO3I, IHCO3, HCOPOC, PCO2I, PCOPOC, IPHI, PHI, PHPOC, PO2I, PO2POC in the last 72 hours. No lab exists for component: IPOC2 Telemetry:AFIB Imaging: 
I have personally reviewed the patients radiographs and have reviewed the reports: 
 
CXR [10/08/19]: FINDINGS: 
 Frontal view of the chest demonstrate low lung volumes and possible trace pleural effusions. Cardiac silhouette is normal in size and contour. No acute bony or soft tissue abnormality. 
  
IMPRESSION: 
Low lung volumes. Possible trace pleural effusions. No acute pulmonary process otherwise identified. CT ABD/PELVIS WO CONT [10/08/19]: FINDINGS: 
  
LOWER THORAX:  Tiny left and right pleural effusions, each with adjacent respective basilar atelectasis. Mild global cardiomegaly.  Moderate atherosclerotic calcification throughout all coronary artery distributions. No pericardial effusion. Elevation of the right hemidiaphragm.   
  
LIVER AND BILIARY: No suspicious liver lesions on this unenhanced CT. No biliary dilation. Gallbladder appears surgically absent. 
  
SPLEEN:  Normal. 
  
PANCREAS:  Normal. 
  
ADRENALS:  Normal. 
  
KIDNEYS:  Senescent left and right perinephric inflammatory stranding. Left and right renal cortical thinning. No acute renal abnormality. . 
  
LYMPH NODES:  No mesenteric or retroperitoneal lymphadenopathy. 
  
GI TRACT:  Moderate circumferential mural thickening is seen throughout distal sigmoid colon and the majority of the rectum and anus. Subtle adjacent perirenal pelvic floor fat inflammatory stranding. No additional smaller large bowel mural thickening elsewhere. Normal caliber small and large bowel loops. No morphology of bowel obstruction. Normal appendix. 
  
PELVIC ORGANS:  Moderate heterogeneous mural thickening throughout the nondistended bladder. Merino catheter is adequately positioned. Several radiodense seeds are seen throughout the prostate.   
  
VASCULATURE:  Normal caliber lower thoracic and abdominal aorta with mild atherosclerotic vascular calcification 
  
OTHER:   No ascites or free intraperitoneal air. Mild left and right flank soft tissue anasarca. 
  
OSSEOUS STRUCTURES:  No acute osseous abnormality. Moderately severe multilevel degenerative changes throughout the thoracic spine and lumbar spine. 
  
_______________ 
  
IMPRESSION: 
1. Moderate mural thickening throughout the distal sigmoid colon, rectum, and anus, most likely represents reactive inflammatory changes related to adjacent prostate seeds. An acute infectious or inflammatory proctocolitis could feasibly have a similar appearance. 2. Bladder wall thickening is also probably secondary to adjacent radiation prostate seeds. Cystitis is thought unlikely.  
3. Tiny left and right pleural effusions, each with adjacent basilar atelectasis. No visualized acute pulmonary infiltrate CT HEAD WO CONT[10/08/19]: FINDINGS: Brain architecture is normal. No mass effect, midline shift or hemorrhage. Ventricles are normal in size, position and configuration. No abnormal extra-axial fluid collections are seen. Scattered and confluent foci of decreased attenuation noted of the periventricular white matter, nonspecific, but most likely sequelae of chronic microvascular disease. No territorial signs of infarct. The bony calvarium appears intact without acute displaced fracture. The visualized paranasal sinuses and mastoid air cells are aerated. 
  
Scattered tiny foci of air/gas noted within the soft tissues of the facial muscles bilaterally, and within the right orbital region. Findings of uncertain etiology but most likely iatrogenic from intravenous access and air within the line. 
  
IMPRESSION: 
1. No acute intracranial pathology. Total of  60   min critical care time spent at bedside during the course of care providing evaluation,management and care decisions and ordering appropriate treatment related to critical care problems exclusive of procedures. The reason for providing this level of medical care for this critically ill patient was due a critical illness that impaired one or more vital organ systems such that there was a high probability of imminent or life threatening deterioration in the patients condition. This care involved high complexity decision making to assess, manipulate, and support vital system functions, to treat this degree vital organ system failure and to prevent further life threatening deterioration of the patients condition. Heather Richey, PA-C Pulmonary Critical Care Medicine [facility]

## 2019-10-08 NOTE — PROGRESS NOTES
1900: Bedside and Verbal shift change report given to Krissy Dewey (oncoming nurse) by Castro Rea (offgoing nurse). Report included the following information SBAR, Kardex, Procedure Summary, Intake/Output, MAR, Accordion, Procedure Verification and Quality Measures. Pt is alert and oriented x3, no complaints of pain. 2120: pt transported downstairs to xray via stretcher, with IV pump, 2 L oxygen, 2 RNs  
 
0000: reassessment completed, no changes noted. 0400: Reassessment completed, no changes noted. 0500: lab called to notify that [address] had to be sent out to [facility]. 0700: Bedside and Verbal shift change report given to Lakesha Simental and Aditi RN (oncoming nurse) by Krissy Dewey (offgoing nurse). Report included the following information SBAR, Kardex, Intake/Output, MAR, Accordion and Quality Measures.

## 2019-10-08 NOTE — PROGRESS NOTES
Pharmacy Dosing Services: Vancomycin Indication: Intra-abdominal/Sepsis Day of therapy: 1 Other Antimicrobials (Include dose, start day & day of therapy): 
Meropenem 1 gm IV every 12 hours (Received a dose of cefepime + levofloxacin in ED) Loading dose (date given): 2000 mg 
Current Maintenance dose: None at this time Goal Vancomycin Level: 15-20 
(Trough 15-20 for most infections, 20 for meningitis/osteomyelitis, pre-HD level ~25) Vancomycin Level (if drawn): None at this time Significant Cultures: pending Renal function stable? (unstable defined as SCr increase of 0.5 mg/dL or > 50% increase from baseline, whichever is greater) (Y/N): N  
 
CAPD, Hemodialysis or Renal Replacement Therapy (Y/N): N Recent Labs 10/08/19 
1301 CREA 2.30* BUN 47* WBC 29.5* Temp (24hrs), Av.6 °F (37.6 °C), Min:98.7 °F (37.1 °C), Max:100.5 °F (38.1 °C) Creatinine Clearance (Creatinine Clearance (ml/min)): 25 ml/min Regimen assessment: New start - will dose per level given MACKENZIE Maintenance dose: Dose per level Next scheduled level: Random on 10/9 at 0400 Pharmacy will follow daily and adjust medications as appropriate for renal function and/or serum levels.  
 
Thank you, 
Namrata Virgen, PHARMD

## 2019-10-09 NOTE — PROGRESS NOTES
Problem: Discharge Planning Goal: *Discharge to safe environment Outcome: Progressing Towards Goal 
Plan: TBD

## 2019-10-09 NOTE — PROGRESS NOTES
Date of previous inpatient admission/ ED visit? 9/26/19-9/28/19 What brought the patient back to ED? Altered Mental Status Did patient decline recommended services during last admission/ ED visit (if yes, what)? No 
 
Has patient seen a provider since their last inpatient admission/ED visit (if yes, when)? PCP 10/4. Has been receiving XRT for skin cancer. CM Interventions: 
From previous inpatient admission/ED visit: Is active with [facility] with recent MSW visit for resources From current inpatient admission/ED visit: Case-management following Angela Peterson RN Outcomes Manager 
[phone]

## 2019-10-09 NOTE — PROGRESS NOTES
Pharmacy Dosing Services: Vancomycin Indication: Intra-abdominal 
 
Day of therapy: 2 Other Antimicrobials (Include dose, start day & day of therapy): 
Meropenem 1 gm IV every 12 hours (Received a dose of cefepime + levofloxacin in ED) Loading dose (date given): 2000 mg 
Current Maintenance dose: None at this time Goal Vancomycin Level: 15-20 
(Trough 15-20 for most infections, 20 for meningitis/osteomyelitis, pre-HD level ~25) Vancomycin Level (if drawn):  
10/9 - 16.9 ~14 hours post-dose Significant Cultures: pending Renal function stable? (unstable defined as SCr increase of 0.5 mg/dL or > 50% increase from baseline, whichever is greater) (Y/N): N  
 
CAPD, Hemodialysis or Renal Replacement Therapy (Y/N): N Recent Labs 10/09/19 
0300 10/08/19 
1301 CREA 1.63* 2.30* BUN 44* 47* WBC 23.4* 29.5* Temp (24hrs), Av.1 °F (37.3 °C), Min:97.9 °F (36.6 °C), Max:100.5 °F (38.1 °C) Creatinine Clearance (Creatinine Clearance (ml/min)): 35 ml/min Regimen assessment: Level therapeutic, will give  additional 1 gram dose and continue to dose per level Maintenance dose: Dose per level Next scheduled level: Random on 10/10 at 0400 Pharmacy will follow daily and adjust medications as appropriate for renal function and/or serum levels. Thank you, Marycruz Castillo, PHARMD

## 2019-10-09 NOTE — PROGRESS NOTES
conducted an initial consultation and Spiritual Assessment for Mario Berumen, who is a 80 y.o.,male. Patients Primary Language is: [place]. According to the patients EMR Oriental orthodox Affiliation is: Non Church.  
 
The reason the Patient came to the hospital is:  
Patient Active Problem List  
 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  
 HTN (hypertension) 07/10/2019  Pneumonia 06/02/2018  Atrial fibrillation ([address] 06/02/2018  Hypothyroidism 06/02/2018  Non-pressure chronic ulcer of right calf with fat layer exposed ([address] 01/19/2017  Laceration of right lower leg with complication 28/95/3385  Edema 01/19/2017  Encounter for colonoscopy due to history of adenomatous colonic polyps 01/03/2017  Dysphagia 06/17/2016  Hypertension  GERD (gastroesophageal reflux disease)  Glaucoma  Glaucoma  Malignant neoplasm of prostate ([facility] [address] 01/16/2012 The  provided the following Interventions: 
Initiated a relationship of care and support with patient in room 2606 today  . Listened  As patient talked about how he was feeling and how he was now waiting for a doctor to come and explain a few things to him. Patient seems  tired and has many blankets across him. I noticed his mouth was very dry as well. Provided information about Spiritual Care Services. Offered prayer and assurance of continued prayers on patients behalf. The following outcomes were achieved: 
Patient shared limited information about his medical narrative and spiritual journey/beliefs. ananth Assessment: 
Patient does not have any Sabianism/cultural needs that will affect patients preferences in health care. There are no further spiritual or Sabianism issues which require Spiritual Care Services interventions at this time. Plan: 
Chaplains will continue to follow and will provide pastoral care on an as needed/requested basis Ren Cook 3 Board Certified [employer] Spiritual Care  
[phone]

## 2019-10-09 NOTE — PROGRESS NOTES
Progress Note Patient: Tasneem Valdovinos               Sex: male          DOA: 10/8/2019 YOB: 1933      Age:  80 y.o.        LOS:  LOS: 1 day CHIEF COMPLAINT:  Altered mental status Assessment/Plan:  
 
Principal Problem: 
  Severe sepsis ([facility] [address] (10/8/2019) Active Problems: 
  Atrial fibrillation ([facility] [address] (6/2/2018) Hypothyroidism (6/2/2018) MACKENZIE (acute kidney injury) ([facility] [address] (9/27/2019) Altered mental status (10/8/2019) Cellulitis of left knee (10/8/2019) PLAN: 
· Severe sepsis - unknown source, suspect GI/cholangitis due to increased ALP, abdominal pain, fever, also left knee and right hip are possible sources. Cont broad spectrum IV abx. Blood cultures positive for GNR. Continue Levophed for MAP goal >65. Monitor WBC count. PCCM consulted, appreciate assistance. · Appreciate PCCM and Ortho recs · GI consulted · PCCM planning on right hip aspiration · MACKENZIE - IV fluid hydration, monitor closely. Improving · AMS - Resolving. Suspect 2/2 metabolic encephalopathy from sepsis. Cont to monitor. · AFIB - Cont Eliquis for AC. · Hypothyroidism - Cont home levothyroxine · Elevated ALP - unknown etiology, No suspicious liver lesions on unenhanced CT. No biliary dilation. Gallbladder appears surgically absent. Cont to monitor. Could be due to cholangitis, GI consulted. · DVT ppx - Already anticoagulated on Eliquis. Subjective:  
 
Pt is more alert today. He complains of diffuse pain throughout but states worst in his mid-abdomen Objective:  
 
Visit Vitals /82 Pulse (!) 113 Temp 98.4 °F (36.9 °C) Resp 28 Ht 5' 8\" (1.727 m) Wt 89.1 kg (196 lb 8 oz) SpO2 93% BMI 29.88 kg/m² Physical Exam: 
Gen:  oriented to person, place, not time Ears: hearing is intact Eyes: Icterus is not present Lungs: clear to auscultation bilaterally Heart: regular rate and rhythm, S1, S2 normal, no murmur, click, rub or gallop Gastrointestinal: soft, non-tender. Bowel sounds normal. No masses,  no organomegaly Neurological:  New Focal Deficits are not present Skin: diffuse ecchymosis. Face with small petechiae (per family is due to known actinic keratosis). Ext: skin overlying left knee erythematous and warm, tender to palpation Psychiatric:  Mood is stable Lab/Data Reviewed: 
CMP:  
Lab Results Component Value Date/Time  10/09/2019 03:00 AM  
 K 4.0 10/09/2019 03:00 AM  
  10/09/2019 03:00 AM  
 CO2 27 10/09/2019 03:00 AM  
 AGAP 8 10/09/2019 03:00 AM  
  (H) 10/09/2019 03:00 AM  
 BUN 44 (H) 10/09/2019 03:00 AM  
 CREA 1.63 (H) 10/09/2019 03:00 AM  
 GFRAA 49 (L) 10/09/2019 03:00 AM  
 GFRNA 40 (L) 10/09/2019 03:00 AM  
 CA 7.3 (L) 10/09/2019 03:00 AM  
 MG 2.2 10/09/2019 03:00 AM  
 PHOS 4.5 10/09/2019 03:00 AM  
 ALB 1.8 (L) 10/09/2019 09:58 AM  
 TP 4.6 (L) 10/09/2019 09:58 AM  
 GLOB 2.8 10/09/2019 09:58 AM  
 AGRAT 0.6 (L) 10/09/2019 09:58 AM  
 SGOT 52 (H) 10/09/2019 09:58 AM  
 ALT 43 10/09/2019 09:58 AM  
 
CBC:  
Lab Results Component Value Date/Time WBC 23.4 (H) 10/09/2019 03:00 AM  
 HGB 7.7 (L) 10/09/2019 03:00 AM  
 HCT 23.4 (L) 10/09/2019 03:00 AM  
  10/09/2019 03:00 AM  
 
 
Imaging Reviewed: 
Xr Hip Rt W Or Wo Pelv 2-3 Vws Result Date: 10/9/2019 
2 VIEW RIGHT HIP: INDICATION: Hip pain, evaluate for source of sepsis COMPARISON STUDY: None FINDINGS: No fracture, dislocation, or other acute abnormality. No osteolytic or blastic lesions. Radiation implant seeds in the prostate with a catheter present within the bladder. Vascular calcification. _______________ IMPRESSION: No acute abnormality in the hip. Xr Knee Lt Min 4 V Result Date: 10/9/2019 
4 VIEW LEFT KNEE: INDICATION: Severe sepsis with erythematous and tender left knee. COMPARISON STUDY: 10/3/2019 FINDINGS: Small-moderate joint effusion, decreased from the previous study.  The total knee arthroplasty is unchanged in position with no fracture and no signs of loosening. Vascular calcification. _______________ IMPRESSION: Small-moderate joint effusion decreased from the study of 5 days earlier. No acute bony abnormality. Ct Head Wo Cont Result Date: 10/8/2019 EXAMINATION:  CT head noncontrast COMPARISON: None HISTORY: Altered level of consciousness, decreased responsiveness TECHNIQUE: Noncontrasted axial images were obtained through the patient's brain from the vertex of the skull through the skull base. Bone and soft tissue windows were reviewed. One or more dose reduction techniques were used on this CT: automated exposure control, adjustment of the mAs and/or kVp according to patient size, and iterative reconstruction techniques. The specific techniques used on this CT exam have been documented in the patient's electronic medical record. Digital Imaging and Communications in Medicine (DICOM) format image data are available to nonaffiliated external healthcare facilities or entities on a secure, media free, reciprocally searchable basis with patient authorization for at least a 12-month period after this study. Vonda Montgomery FINDINGS: Brain architecture is normal. No mass effect, midline shift or hemorrhage. Ventricles are normal in size, position and configuration. No abnormal extra-axial fluid collections are seen. Scattered and confluent foci of decreased attenuation noted of the periventricular white matter, nonspecific, but most likely sequelae of chronic microvascular disease. No territorial signs of infarct. The bony calvarium appears intact without acute displaced fracture. The visualized paranasal sinuses and mastoid air cells are aerated. Scattered tiny foci of air/gas noted within the soft tissues of the facial muscles bilaterally, and within the right orbital region. Findings of uncertain etiology but most likely iatrogenic from intravenous access and air within the line. IMPRESSION: 1. No acute intracranial pathology. Ct Abd Pelv Wo Cont Result Date: 10/8/2019 EXAM: CT ABD PELV WO CONT CLINICAL INDICATION/HISTORY: Abdominal pain, sepsis COMPARISON: None. TECHNIQUE:   CT of the abdomen and pelvis without intravenous contrast administration. Coronal and sagittal reformats were generated and reviewed. One or more dose reduction techniques were used on this CT: automated exposure control, adjustment of the mAs and/or kVp according to patient size, and iterative reconstruction techniques. The specific techniques used on this CT exam have been documented in the patient's electronic medical record. Digital Imaging and Communications in Medicine (DICOM) format image data are available to nonaffiliated external healthcare facilities or entities on a secure, media free, reciprocally searchable basis with patient authorization for at least a 12-month period after this study. _______________ FINDINGS: LOWER THORAX:  Tiny left and right pleural effusions, each with adjacent respective basilar atelectasis. Mild global cardiomegaly. Moderate atherosclerotic calcification throughout all coronary artery distributions. No pericardial effusion. Elevation of the right hemidiaphragm. LIVER AND BILIARY: No suspicious liver lesions on this unenhanced CT. No biliary dilation. Gallbladder appears surgically absent. SPLEEN:  Normal. PANCREAS:  Normal. ADRENALS:  Normal. KIDNEYS:  Senescent left and right perinephric inflammatory stranding. Left and right renal cortical thinning. No acute renal abnormality. Gerldine Prost LYMPH NODES:  No mesenteric or retroperitoneal lymphadenopathy. GI TRACT:  Moderate circumferential mural thickening is seen throughout distal sigmoid colon and the majority of the rectum and anus. Subtle adjacent perirenal pelvic floor fat inflammatory stranding. No additional smaller large bowel mural thickening elsewhere.  Normal caliber small and large bowel loops. No morphology of bowel obstruction. Normal appendix. PELVIC ORGANS:  Moderate heterogeneous mural thickening throughout the nondistended bladder. Merino catheter is adequately positioned. Several radiodense seeds are seen throughout the prostate. VASCULATURE:  Normal caliber lower thoracic and abdominal aorta with mild atherosclerotic vascular calcification OTHER:   No ascites or free intraperitoneal air. Mild left and right flank soft tissue anasarca. OSSEOUS STRUCTURES:  No acute osseous abnormality. Moderately severe multilevel degenerative changes throughout the thoracic spine and lumbar spine. _______________ IMPRESSION: 1. Moderate mural thickening throughout the distal sigmoid colon, rectum, and anus, most likely represents reactive inflammatory changes related to adjacent prostate seeds. An acute infectious or inflammatory proctocolitis could feasibly have a similar appearance. 2. Bladder wall thickening is also probably secondary to adjacent radiation prostate seeds. Cystitis is thought unlikely. 3. Tiny left and right pleural effusions, each with adjacent basilar atelectasis. No visualized acute pulmonary infiltrate. Xr Chest [facility] Result Date: 10/8/2019 EXAM: One-view chest CLINICAL HISTORY: central line placement , COMPARISON: None FINDINGS: Frontal view of the chest demonstrate low lung volumes and possible trace effusions otherwise clear. No pneumothorax. Right approach central line tip in the mid SVC. . Cardiac silhouette is normal in size and contour. No acute bony or soft tissue abnormality. IMPRESSION: Central line in place. No pneumothorax. Xr Chest [facility] Result Date: 10/8/2019 EXAM: One-view chest CLINICAL HISTORY: Altered mental status, Sepsis , COMPARISON: None FINDINGS: Frontal view of the chest demonstrate low lung volumes and possible trace pleural effusions.  Cardiac silhouette is normal in size and contour. No acute bony or soft tissue abnormality. IMPRESSION: Low lung volumes. Possible trace pleural effusions. No acute pulmonary process otherwise identified.

## 2019-10-09 NOTE — PROGRESS NOTES
Problem: Discharge Planning Goal: *Discharge to safe environment Outcome: Progressing Towards Goal 
Plan: TBD Reason for Readmission:     Severe Sepsis RRAT Score and Risk Level:     18 Level of Readmission:     
   
Care Conference scheduled:   Not at this time Resources/supports as identified by patient/family:  Lives with wife. Has two daughters in area. Has Medicare parts a and b and [employer]. Top Challenges facing patient (as identified by patient/family and CM): Finances/Medication cost? Not at this time Transportation      Pt drives Support system or lack thereof? As above Living arrangements? Lives with wife Self-care/ADLs/Cognition? Alert. Oriented to self, month and place Current Advanced Directive/Advance Care Plan:  Not on file Plan for utilizing home health:   CM to follow and assess Transition of Care Plan:    Based on readmission, the patient's previous Plan of Care 
 has been evaluated and/or modified. The current Transition of Care Plan is:  
Dionne Alcaraz pt and verified all face sheet info. Pt states he lives with his wife in a one story home with 4 steps to enter. At baseline he is independent with ADL's. He uses a walker at home but no other DME. The pt does drive and is able to obtain meds at local pharmacy. Saw his PCP Dr Nery Jaime approx 3 days ago. The pt has never had a snf stay. Has utilized [employer] [facility] in the past.  Has 2 daughters. One is emergency contact - Mandy Jacobsen ([phone]) as well as his wife.

## 2019-10-09 NOTE — PROGRESS NOTES
0730 Bedside and Verbal shift change report given to [address] (oncoming nurse) by Mitchell Oquendo (offgoing nurse). Report included the following information SBAR, Kardex, Intake/Output, MAR and Recent Results. 0900 
1000 Participated in IDR. Plan to start pt on electrolyte protocol, and asiprate right hip to find source of infection, and to transfer pt to floor. 1100 Dr. Edna Jacques in room with pt updated with care. 1400 pt taken down to IR. Tolerated move well.  
 
1600 Pt is restless and attempting to get out of bed. Pt reposition and reoriented. 1750 Pt restarted on levo due to MAP less than 60 
  
1900 Bedside and Verbal report given to Mitchell Oquendo (oncoming nurse) by [address] (offgoing nurse). Report included the following information SBAR, Kardex, Intake/Output, MAR and Recent Results.

## 2019-10-09 NOTE — PROCEDURES
Vascular & Interventional Radiology Brief Procedure Note Interventional Radiologist: Venita Krabbe, MD 
 
Pre-operative Diagnosis:  Septic Arthritis Post-operative Diagnosis: Same as pre-op dx Procedure(s) Performed:  Fluoro Guided RT Hip Aspiration Anesthesia:  Local and Moderate Sedation Findings:  Initial attempts c 22 g needed yielded no fluid. Repeat c 18g needle yielded 2 cc of slightly viscous clear yellow fluid. Complications: None Estimated Blood Loss:  minimal 
 
Tubes and Drains: None Specimens: taken to lab Condition: Good Disposition:  Return to jacob for post procedure monitoring Plan: resume prior orders. Venita Krabbe, MD 
[facility] Vascular & Interventional Radiology 10/9/2019

## 2019-10-09 NOTE — PROGRESS NOTES
Problem: Pain Goal: *Control of Pain Outcome: Progressing Towards Goal 
Goal: *PALLIATIVE CARE:  Alleviation of Pain Outcome: Progressing Towards Goal 
  
Problem: Falls - Risk of 
Goal: *Absence of Falls Description Document Gilmar Robertson Fall Risk and appropriate interventions in the flowsheet. Outcome: Progressing Towards Goal 
Note:  
Fall Risk Interventions: 
  
 
Mentation Interventions: Adequate sleep, hydration, pain control Medication Interventions: Assess postural VS orthostatic hypotension, Patient to call before getting OOB, Evaluate medications/consider consulting pharmacy Elimination Interventions: Bed/chair exit alarm, Call light in reach, Toilet paper/wipes in reach History of Falls Interventions: Bed/chair exit alarm Problem: Patient Education: Go to Patient Education Activity Goal: Patient/Family Education Outcome: Progressing Towards Goal 
  
Problem: Pressure Injury - Risk of 
Goal: *Prevention of pressure injury Description Document Dionicio Scale and appropriate interventions in the flowsheet. Outcome: Progressing Towards Goal 
Note:  
Pressure Injury Interventions: 
Sensory Interventions: Assess changes in LOC, Assess need for specialty bed, Avoid rigorous massage over bony prominences, Check visual cues for pain, Discuss PT/OT consult with provider, Keep linens dry and wrinkle-free Moisture Interventions: Absorbent underpads, Apply protective barrier, creams and emollients, Check for incontinence Q2 hours and as needed, Contain wound drainage, Internal/External urinary devices, Offer toileting Q_hr Activity Interventions: PT/OT evaluation, Pressure redistribution bed/mattress(bed type), Assess need for specialty bed Mobility Interventions: Assess need for specialty bed, PT/OT evaluation, Pressure redistribution bed/mattress (bed type), Turn and reposition approx. every two hours(pillow and wedges) Nutrition Interventions: Document food/fluid/supplement intake, Offer support with meals,snacks and hydration, Discuss nutritional consult with provider Friction and Shear Interventions: Apply protective barrier, creams and emollients, Transfer aides:transfer board/[place] lift/ceiling lift, Lift team/patient mobility team 
 
  
 
 
 
  
Problem: Discharge Planning Goal: *Discharge to safe environment Outcome: Progressing Towards Goal

## 2019-10-09 NOTE — PROGRESS NOTES
Pulmonary progress note History 59-year-old male presented to the emergency room on 10/8/2019 with altered mental status. He was found to be in severe sepsis with shock. White blood cell count was elevated, but there was no clear source of infection. The most likely candidates remain his right hip and bile duct. The patient's abdominal pain was somewhat diffuse and poorly defined. There is no point tenderness. He recently had significant problems with constipation. His right hip appears more painful and merely lying on it is clearly very uncomfortable. He is off pressors and afebrile. Exam 
He is more alert and oriented. Lying supine he is slightly short of breath. His affect is closer to normal. 
Blood pressure 99/51, pulse (!) 119, temperature 98.4 °F (36.9 °C), resp. rate 23, height 5' 8\" (1.727 m), weight 89.1 kg (196 lb 8 oz), SpO2 99 %. Gaze is conjugate. Lungs are clear to auscultation Irregular rhythm but no appreciable murmur Abdomen without point tenderness. 2-3+ bilateral lower extremity edema No cyanosis or clubbing Right hip pain when repositioned to that side Labs 10/9/2019 WBC 23.4 with no bands. Hemoglobin 7.7 and platelets 553. Sodium 136, potassium 4.0, bicarb 27 and anion gap 8. BUN 44 and creatinine improved to 1.63. Albumin 1.9. Alkaline phosphatase improved to 401 from 530 yesterday. Lactic acid has normalized 2/2 blood cultures are showing gram-negative rods Right hip x-ray shows a likely hip joint effusion based on soft tissue bulging along the superior lateral aspect of the joint. Left knee shows a small to moderate joint effusion decreased from 5 days earlier. No fracture. Assessment Severe sepsis with shock Gram-negative willi bacteremia Right hip pain with associated effusion Elevated alkaline phosphatase with improvement. Biliary vs bone source Prostate cancer s/p seed implantation.   Last XRT 9/30 which may account for some of the bowel edema. Falls x3 within the last month. Etiology unclear Atrial fibrillation with rapid ventricular response Anticoagulation held Acute kidney injury improving Pitting bilateral lower extremity edema Hypoalbuminemia Plan 
Pressors successfully weaned to off 
Continue vancomycin and Merrem pending results of cultures Right hip aspiration Resume rate control therapy Continue to hold anticoagulation Continue volume resuscitation, but anticipate diuresis within the next 2 to 3 days. The patient remains critically ill and at high risk for organ failure. Total critical care time without physician overlap or procedure time included: 40 minutes

## 2019-10-09 NOTE — CONSULTS
Orthopaedic Consult CC:  Right hip pain and left knee pain HPI:  Raymundo Casillas is a 80 y.o. male who has been admitted for medical reasons. Patient found to be septic. Patient state history of chronic joint pain. States bilateral pain for years, left knee increased after recent fall. Remote history of left knee replacement by Dr. Lorraine Valera. Case reviewed with Dr. Melo Beard. History: 
Past Medical History:  
Diagnosis Date  Arthritis  Atrial fibrillation ([address] 07/2017 Dr Maxine Villalpando cardio follows. chronic  Carcinoma of prostate ([address]  Cellulitis  Coarse tremor   
  hands, states \"my doctor is aware\"  Difficulty swallowing  Dysphagia  Edema  Encounter for colonoscopy due to history of adenomatous colonic polyps  Essential hypertension  Fall 10/07/2017 \"went to [facility] Emergency Room, CT Scan showed concussion, no bleeding\" per patient  Fatigue  GERD (gastroesophageal reflux disease) wo. esophagitis  Glaucoma  H/O joint replacement   
  left knee 2003  HLD (hyperlipidemia)  Hypertension  Hypertensive disorder  Hypothyroidism  Impotence  Laceration of right lower leg with complication  Malignant neoplasm of prostate ([address]   
  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   
 acute/hist. of   
 
Past Surgical History:  
Procedure Laterality Date  COLONOSCOPY N/A 1/3/2017  HX CHOLECYSTECTOMY  HX KNEE REPLACEMENT Left 01/2003  HX SHOULDER REPLACEMENT Right 7/17, 10/17 Allergies Allergen Reactions  Adhesive Other (comments) Skin irritation  Darvon [Propoxyphene] Hives, Myalgia and Other (comments) Joint pain  Penicillins Rash and Itching Review of Systems:   
General: alert and oriented times 3 Musculoskeletal:  Right hip pain and left knee pain Physical Assessment: 
Blood pressure 105/82, pulse (!) 113, temperature 98.4 °F (36.9 °C), resp. rate 28, height 5' 8\" (1.727 m), weight 89.1 kg (196 lb 8 oz), SpO2 93 %. CBC w/Diff Lab Results Component Value Date/Time WBC 23.4 (H) 10/09/2019 03:00 AM  
 RBC 2.48 (L) 10/09/2019 03:00 AM  
 HGB 7.7 (L) 10/09/2019 03:00 AM  
 HCT 23.4 (L) 10/09/2019 03:00 AM  
 MCV 94.4 10/09/2019 03:00 AM  
 MCH 31.0 10/09/2019 03:00 AM  
 MCHC 32.9 10/09/2019 03:00 AM  
 RDW 16.1 (H) 10/09/2019 03:00 AM  
 Lab Results Component Value Date/Time BANDS 13 (H) 10/08/2019 01:01 PM  
 MONOS 2 (L) 10/09/2019 03:00 AM  
 EOS 0 10/09/2019 03:00 AM  
 BASOS 0 10/09/2019 03:00 AM  
 RDW 16.1 (H) 10/09/2019 03:00 AM  
  
 
 
Coagulation Lab Results Component Value Date INR 1.7 (H) 10/08/2019 Exam: 
General:   CAOx4 Extremities:  Patient describes longstanding chronic bilateral knee pain. LLE - Neurovascular intact LLE. Compartments soft and NT. Able to contract quadriceps. Knee with well healed incision. Small subacute superficial abrasion noted midline over the patella with surrounding erythema. No significant palpable suprapatellar effusion. Plantar and dorsiflexion intact. Palpable DP/PT pulses noted. Denies paresthesias RLE - pain with ROM of right hip. Pain with ROM of right knee, but states this is his baseline. NO palpable effusion noted. Neurovascular intact RLE. Compartments soft and NT. Able to contract quadriceps. Plantar and dorsiflexion intact. Palpable DP/PT pulses noted. Denies paresthesias Radiology:  
4 VIEW LEFT KNEE: 
  
 INDICATION: Severe sepsis with erythematous and tender left knee. 
  
COMPARISON STUDY: 10/3/2019 
  
FINDINGS: Small-moderate joint effusion, decreased from the previous study. The 
total knee arthroplasty is unchanged in position with no fracture and no signs 
of loosening. Vascular calcification. 
  
_______________ 
  
IMPRESSION IMPRESSION: 
  
Small-moderate joint effusion decreased from the study of 5 days earlier. No 
acute bony abnormality. RIGHT HIP 
2 VIEW RIGHT HIP: 
  
INDICATION: Hip pain, evaluate for source of sepsis 
  
COMPARISON STUDY: None 
  
FINDINGS: No fracture, dislocation, or other acute abnormality. No osteolytic or 
blastic lesions. Radiation implant seeds in the prostate with a catheter present 
within the bladder. Vascular calcification. 
  
_______________ 
  
IMPRESSION IMPRESSION: 
  
No acute abnormality in the hip. Assessment: 1. Chronic bilateral knee pain; low clinical suspicion of left knee septic arthritis, would not recommend aspiration in the presence of cellulitis 2. Right hip pain in the presence of sepsis of unknown source Plan: 1. Discussed with ICU physician, they will order aspiration of right hip to r/o infection 2. NPO pending aspiration results 3. Following 4. Continue IV antibiotics per IM Thank you for allowing us to assist in this patient's care. Mignon An DHSSHARIF bennett 
10/9/2019 Office: [phone]

## 2019-10-10 NOTE — WOUND CARE
Wound/Ostomy Nurse Progress Note Patient: Demario Ramirez ZDB:[id number] MRN: [id number] Situation: wound consult for pressure injury to sacrum/ buttocks Background: pt re-admitted for AMS, sepsis. Has been seen by wound care during the previous admission for multiple skin tears from fall. Assessment:  Stage 2 to left and right buttocks w/ scattered pink open areas. Wound bed is red, no drainage. TAPS system in place for turning and offloading. Overall, skin is very fragile and dry. Pt has scattered open, dry lesions to face and head, bilateral upper extremities have scattered ecchymosis and right arm has two open skin tears below elbow. Recommendation: Continue to offload bony prominences and turn Q 2 hours. Apply zinc paste to perineal area to prevent moisture associated skin breakdown. Apply mepilex to sacrum/buttocks and bilateral heels. Cleanse skin tears to right arm with NS and apply xeroform to open arease and wrap with Kerlex. Avoid applying tape to skin when possible. Change right arm dressing every other day beginning Thursday 10/10/19. Change sacral mepilex every other day or PRN if dressing becomes wet or soiled. 10/10/19 0913 Wound Buttocks Right 10/05/19 Date First Assessed: 10/05/19   Primary Wound Type: Pressure Injury  Location: Buttocks  Wound Location Orientation: Right  Date of First Observation: 10/05/19 Dressing Status Removed Dressing Type Foam  
Pressure Injury Stage 2 Wound Length (cm) 3.5 cm Wound Width (cm) 7.5 cm Wound Depth (cm) 0.2 cm Wound Volume (cm^3) 5.25 cm^3 Condition of Edges Open Assessment Pink;Red Tissue Type Percent Pink 70 Tissue Type Percent Red 30 Drainage Amount None Wound Odor None Charlene-wound Assessment Blanchable erythema Cleansing and Cleansing Agents  Normal saline Dressing Changed Changed/New Dressing Type Applied Foam 
(zinc paste applied) Wound Arm Right Date First Assessed: 10/01/19   Primary Wound Type: Skin Tear  Location: Arm  Wound Location Orientation: Right Dressing Status Removed Dressing Type Foam  
Incision Site Well Approximated No  
Shape skin tear x 2 Condition of Base Pink Condition of Edges Open Assessment Dry;Red Tissue Type Percent Pink 20 Tissue Type Percent Red 80 Drainage Amount None Charlene-wound Assessment Fragile; Purple;Red Cleansing and Cleansing Agents  Normal saline Dressing Changed Changed/New Dressing Type Applied Gauze wrap (corey); Xeroform

## 2019-10-10 NOTE — PROGRESS NOTES
Problem: Pain Goal: *Control of Pain 10/10/2019 0708 by Amira Raymond RN Outcome: Progressing Towards Goal 
10/9/2019 1901 by Amira Raymond RN Outcome: Progressing Towards Goal 
Goal: *PALLIATIVE CARE:  Alleviation of Pain 10/10/2019 0708 by Amira Raymond RN Outcome: Progressing Towards Goal 
10/9/2019 1901 by Amira Raymond RN Outcome: Progressing Towards Goal 
  
Problem: Falls - Risk of 
Goal: *Absence of Falls Description Document Melissa Haney Fall Risk and appropriate interventions in the flowsheet. 10/10/2019 0708 by Amira Raymond RN Outcome: Progressing Towards Goal 
Note:  
Fall Risk Interventions: 
  
 
Mentation Interventions: Bed/chair exit alarm, Adequate sleep, hydration, pain control Medication Interventions: Bed/chair exit alarm Elimination Interventions: Call light in reach, Bed/chair exit alarm History of Falls Interventions: Bed/chair exit alarm 10/9/2019 1901 by Amira Raymond RN Outcome: Progressing Towards Goal 
Note:  
Fall Risk Interventions: 
  
 
Mentation Interventions: Adequate sleep, hydration, pain control Medication Interventions: Assess postural VS orthostatic hypotension, Patient to call before getting OOB, Evaluate medications/consider consulting pharmacy Elimination Interventions: Bed/chair exit alarm, Call light in reach, Toilet paper/wipes in reach History of Falls Interventions: Bed/chair exit alarm Problem: Patient Education: Go to Patient Education Activity Goal: Patient/Family Education 10/10/2019 0708 by Amira Raymond RN Outcome: Progressing Towards Goal 
10/9/2019 1901 by Amira Raymond RN Outcome: Progressing Towards Goal 
  
Problem: Pressure Injury - Risk of 
Goal: *Prevention of pressure injury Description Document Dionicio Scale and appropriate interventions in the flowsheet. 10/10/2019 0708 by Amira Raymond RN Outcome: Progressing Towards Goal 
Note:  
Pressure Injury Interventions: Sensory Interventions: Assess changes in LOC, Keep linens dry and wrinkle-free Moisture Interventions: Apply protective barrier, creams and emollients Activity Interventions: Pressure redistribution bed/mattress(bed type) Mobility Interventions: Assess need for specialty bed Nutrition Interventions: Document food/fluid/supplement intake, Discuss nutritional consult with provider Friction and Shear Interventions: Apply protective barrier, creams and emollients 10/9/2019 1901 by Kate Scales RN Outcome: Progressing Towards Goal 
Note:  
Pressure Injury Interventions: 
Sensory Interventions: Assess changes in LOC, Assess need for specialty bed, Avoid rigorous massage over bony prominences, Check visual cues for pain, Discuss PT/OT consult with provider, Keep linens dry and wrinkle-free Moisture Interventions: Absorbent underpads, Apply protective barrier, creams and emollients, Check for incontinence Q2 hours and as needed, Contain wound drainage, Internal/External urinary devices, Offer toileting Q_hr Activity Interventions: PT/OT evaluation, Pressure redistribution bed/mattress(bed type), Assess need for specialty bed Mobility Interventions: Assess need for specialty bed, PT/OT evaluation, Pressure redistribution bed/mattress (bed type), Turn and reposition approx. every two hours(pillow and wedges) Nutrition Interventions: Document food/fluid/supplement intake, Offer support with meals,snacks and hydration, Discuss nutritional consult with provider Friction and Shear Interventions: Apply protective barrier, creams and emollients, Transfer aides:transfer board/[place] lift/ceiling lift, Lift team/patient mobility team 
 
  
 
 
 
  
Problem: Discharge Planning Goal: *Discharge to safe environment 10/10/2019 0708 by Kate Scales RN Outcome: Progressing Towards Goal 
10/9/2019 1901 by Kate Scales RN Outcome: Progressing Towards Goal 
  
Problem: Delirium Treatment Goal: *Level of consciousness restored to baseline Outcome: Progressing Towards Goal 
Goal: *Level of environmental perceptions restored to baseline Outcome: Progressing Towards Goal 
Goal: *Sensory perception restored to baseline Outcome: Progressing Towards Goal 
Goal: *Emotional stability restored to baseline Outcome: Progressing Towards Goal 
Goal: *Functional assessment restored to baseline Outcome: Progressing Towards Goal 
Goal: *Absence of falls Outcome: Progressing Towards Goal 
Goal: *Will remain free of delirium, CAM Score negative Outcome: Progressing Towards Goal 
Goal: *Cognitive status will be restored to baseline Outcome: Progressing Towards Goal 
Goal: Interventions Outcome: Progressing Towards Goal 
  
Problem: Patient Education: Go to Patient Education Activity Goal: Patient/Family Education Outcome: Progressing Towards Goal

## 2019-10-10 NOTE — PROGRESS NOTES
Gastrointestinal Progress Note Patient Name: Leanna Mean Today's Date: 10/10/2019 Admit Date: 10/8/2019 Assessment-Recommendation: 1. Abnormal liver enzymes - Enzymes and bilirubin trended upwards today. Patient denies abdominal pain. Improving medical status. Would continue to monitor liver enzymes. Differential includes hypoperfusion in the setting of sepsis. May improve as sepsis resolves. Patient had no biliary dilation on CT and has had cholecystectomy. Will consider RUQ US if liver enzymes do not improve. Subjective:  
 
Denies abdominal pain Current Facility-Administered Medications Medication Dose Route Frequency  HYDROmorphone (DILAUDID) syringe 0.25 mg  0.25 mg IntraVENous Q4H PRN  
 naloxone (NARCAN) injection 0.4 mg  0.4 mg IntraVENous PRN  
 ELECTROLYTE REPLACEMENT PROTOCOL - Potassium Renal Dosing  1 Each Other PRN  
 ELECTROLYTE REPLACEMENT PROTOCOL - Magnesium   1 Each Other PRN  
 ELECTROLYTE REPLACEMENT PROTOCOL  - Phosphorus Renal Dosing  1 Each Other PRN  
 ELECTROLYTE REPLACEMENT PROTOCOL - Calcium   1 Each Other PRN  pantoprazole (PROTONIX) tablet 40 mg  40 mg Oral ACB  magnesium citrate solution 296 mL  296 mL Oral NOW  melatonin tablet 3 mg  3 mg Oral QHS PRN  
 sodium chloride (NS) flush 5-10 mL  5-10 mL IntraVENous PRN  
 NOREPINephrine (LEVOPHED) 8 mg in 0.9% NS 250ml infusion  2-16 mcg/min IntraVENous TITRATE  acetaminophen (TYLENOL) tablet 650 mg  650 mg Oral Q4H PRN  
 [Held by provider] apixaban (ELIQUIS) tablet 2.5 mg  2.5 mg Oral BID  levothyroxine (SYNTHROID) tablet 175 mcg  175 mcg Oral ACB  meropenem (MERREM) 1 g in 0.9% sodium chloride (MBP/ADV) 50 mL MBP  1 g IntraVENous P48J  
 folic acid (FOLVITE) 1 mg, thiamine (B-1) 200 mg in 0.9% sodium chloride 50 mL ivpb   IntraVENous DAILY  0.9% sodium chloride infusion  100 mL/hr IntraVENous CONTINUOUS Objective:  
 
Physical Exam: 
Appears older than stated age. Alert OP clear CV tachycardia Abdomen distended, non tender, +BS Trace edema Data Review: 
 
Labs: Results:  
   
Chemistry Recent Labs 10/10/19 
0328 10/09/19 
0958 10/09/19 
0300 10/08/19 
1301 *  --  108* 120*   --  136 133* K 3.9  --  4.0 4.4   --  101 94* CO2 24  --  27 27 BUN 41*  --  44* 47* CREA 1.35*  --  1.63* 2.30* CA 7.7*  --  7.3* 7.5* AGAP 9  --  8 12 BUCR 30*  --  27* 20  
* 396* 401* 530* TP 4.8* 4.6* 4.7* 4.8* ALB 2.0* 1.8* 1.9* 1.5*  
GLOB 2.8 2.8 2.8 3.3 AGRAT 0.7* 0.6* 0.7* 0.5* CBC w/Diff Recent Labs 10/10/19 
0328 10/09/19 
0300 10/08/19 
1301 WBC 15.5* 23.4* 29.5*  
RBC 2.70* 2.48* 2.71* HGB 8.3* 7.7* 8.4* HCT 25.5* 23.4* 25.7*  
* 138 176 GRANS 94* 95* 81* LYMPH 3* 3* 3* EOS 0 0 0 Coagulation Recent Labs 10/08/19 
1301 PTP 19.4* INR 1.7* Liver Enzymes Recent Labs 10/10/19 
2238 TP 4.8* ALB 2.0*  
* SGOT 126* ALT 61 Aide Pina MD 
October 10, 2019

## 2019-10-10 NOTE — DIABETES MGMT
NUTRITIONAL ASSESSMENT GLYCEMIC CONTROL/ PLAN OF CARE Kelly Gonzales           80 y.o.           10/8/2019 1. Severe sepsis ([address] INTERVENTIONS/PLAN:  
1. Ensure Enlive TID (chocolate) 2. Monitor po intake, labs and weights. ASSESSMENT:  
Pt is  146% ideal weight. Pt appears well nourished but po intake is sub-optimal. Pt with hypoalbuminemia as evidenced by albumin of 2.0. Chart review indicates pt with lower extremity edema. Current weight is 13 kg > than July 2019. Nutrition Diagnoses:  
Inadequate oral food and beverage intkae due to poor appetite as evidenced by po intake < 100 calories at breakfast 10/10/19. Increased nutrient needs due to wound healing as evidenced by compromised skin integrity (stage 3 pressure injury right buttock, skin tear back of scrotum and right forearm). SUBJECTIVE/OBJECTIVE: Information obtained from: chart review, ICU rounds, pt, pt's son at bedside 10/10:  Pt reports his appetite is not good. His usual weight is 185 to 190 lbs. He denies issues with chewing or swallowing. No known food allergies. He likes chocolate Ensure and is trying to drink it but had only a few sips this morning with breakfast. 
 
Diet: regular, 1.5 gram Na, low fiber Patient Vitals for the past 100 hrs: 
 % Diet Eaten 10/10/19 1200 30 % 10/10/19 0800 10 % Medications: [x]                Reviewed IVF:  NS at 75 ml/hr Most Recent POC Glucose:  
Recent Labs 10/10/19 
0328 10/09/19 
0300 10/08/19 
1301 * 108* 120* Labs:  
No results found for: HBA1C, HGBE8, [id number] Lab Results Component Value Date/Time  Sodium 139 10/10/2019 03:28 AM  
 Potassium 3.7 10/10/2019 01:41 PM  
 Chloride 106 10/10/2019 03:28 AM  
 CO2 24 10/10/2019 03:28 AM  
 Anion gap 9 10/10/2019 03:28 AM  
 Glucose 106 (H) 10/10/2019 03:28 AM  
 BUN 41 (H) 10/10/2019 03:28 AM  
 Creatinine 1.35 (H) 10/10/2019 03:28 AM  
 Calcium 7.7 (L) 10/10/2019 03:28 AM  
 Magnesium 2.6 10/10/2019 03:28 AM  
 Phosphorus 3.4 10/10/2019 03:28 AM  
 Albumin 2.0 (L) 10/10/2019 03:28 AM  
 
 
Anthropometrics: IBW : 63.5 kg (140 lb), % IBW (Calculated): 146.36 %, BMI (calculated): 31.2 Wt Readings from Last 1 Encounters:  
10/09/19 92.9 kg (204 lb 14.4 oz) Ht Readings from Last 1 Encounters:  
10/09/19 5' 8\" (1.727 m)  
7/10/19 weight - 80.7 kg 
6/02/18 weight - 79.4 kg Estimated Nutrition Needs:  2219 Kcals/day, Protein (g): 105 g Fluid (ml): 2200 ml Based on:   [x]          Actual BW    []          ABW   []            Adjusted BW   
    
Nutrition Interventions: 
Ensure TID (chocolate) Goal: Pt will consume > 75% meals by 10/15/19. Weight maintenance (+/- 1-2 kg) by 10/20/19. Wound healing by 10/24/19. Nutrition Monitoring and Evaluation   
 
[x]     Monitor po intake on meal rounds 
[x]     Continue inpatient monitoring and intervention 
[]     Other: 
 
Nutrition Risk:  []   High     [x]  Moderate    []  Minimal/Uncompromised Heidi Keller RD, CDE Office:  [phone] [place] Pager:  [phone]

## 2019-10-10 NOTE — PROGRESS NOTES
Pulmonary progress note History 68-year-old male presented to the emergency room on 10/8/2019 with altered mental status. He was found to be in severe sepsis with shock. White blood cell count was elevated, but there was no clear source of infection. Aspiration of the painful right hip did not suggest septic arthritis. Gastroenterology doubts the possibility of cholangitis. He continues to complain of significant right hip pain. No chest pains or shortness of breath. Exam 
He is more alert and oriented. He seems uncomfortable with movements that involve flexing the leg or putting weight on his right hip. Blood pressure 112/72, pulse (!) 101, temperature 98.1 °F (36.7 °C), resp. rate 21, height 5' 8\" (1.727 m), weight 92.9 kg (204 lb 14.4 oz), SpO2 100 %. Gaze is conjugate. No accessory muscle use Irregular rhythm Sclera anicteric 3+ persistent right pedal edema. Perhaps 1+ left pedal edema No cyanosis or clubbing Multiple facial skin lesions Labs 10/10/2019 WBC decreased to 15.5 with no bands. Hemoglobin 8.3 and platelets 666. Bicarb 24 and anion gap 9. BUN 41 and creatinine improved to 1. 12. Albumin 2.0. Alkaline phosphatase increased to 596 from 396 yesterday. AST increased to 126 and ALT increased to 61. Total bilirubin increased to 1.6 
 
2/2 blood cultures are growing Pasteurella multocida Right hip is showing 23 WBCs with no crystals Net fluids in 4.1 L last 2 days Assessment Severe sepsis with shock, resolved Pasteurella multocida bacteremia No evidence of septic arthritis or gout and right hip Elevated alkaline phosphatase with slight increase in total bilirubin, AST and ALT. Falls x3 within the last month. Etiology unclear Atrial fibrillation with rapid ventricular response with improved rate control Anticoagulation held Acute kidney injury improving, resolved Pitting right lower extremity edema Hypoalbuminemia Plan Continue Merrem. Consider discontinuing vancomycin Resume anticoagulation Decrease IV fluids since likely adequately volume resuscitated Low-dose Lasix for lower extremity edema The patient was hospitalized within the last month with dehydration and cellulitis likely secondary to skin laceration from his cat. Apparently the inherently evil beast continues to haunt him.

## 2019-10-10 NOTE — CONSULTS
[address]  Name:  Nina Boyce 
MR#:   [id number] :  1933 ACCOUNT #:  [de-identified] DATE OF SERVICE:  10/09/2019 GASTROINTESTINAL CONSULTATION 
 
REFERRING PHYSICIAN:  Jessica Tarcey MD 
 
CONSULTING PHYSICIAN:  Charity Scott. MD Gautam 
 
REASON FOR CONSULTATION:  I was asked to see the patient because of possible cholangitis. HISTORY OF PRESENT ILLNESS:  The patient is an 80year-old patient, well known to me in the past.  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 He has a history of adenomatous colon cancer, gastroesophageal reflux without Ang's esophagus. He is up-to-date on routine health maintenance colonoscopies. His acid reflux symptoms are well controlled on PPI therapy. The patient and his son provide most of the history. He reports feeling fairly well until several days prior to admission. He was scratched on his right calf by his cat. Almost immediately, he developed pain in that area and by the following day, had significant erythema and swelling. He had been back and forth to the emergency room at [facility] for treatment with antibiotics and narcotic analgesics for pain. He developed significant constipation and was taking laxatives at home and enemas and finally, three days prior to admission, was moving his bowels fairly regularly. Because of persistent swelling and pain, weakness, and confusion, he was brought back to the emergency room for evaluation and admitted yesterday for what appeared to be sepsis. He has been given broad-spectrum antibiotics and intravenous fluids. His mental status has cleared. Urinalysis has shown no evidence of urosepsis. Despite the history of right lower extremity cellulitis, because of mild elevation of liver enzymes, we are asked to see the patient regarding further evaluation of possible cholangitis.   The patient did have some lower abdominal pain which he related to constipation that resolved with resolution of constipation prior to admission. He has some left lower quadrant discomfort in the abdomen described today, which he attributed this to the fact that he has not moved his bowels in several days again. He has no epigastric or right upper quadrant pain. He denies nausea, vomiting, or loss of appetite. He has had no fever, chills, or sweats. He does report continued alcohol ingestion in the form of beer, four to five daily on a consistent basis despite the fact that he has had alcohol-related pancreatitis as of four years or so ago. He has no history of peptic ulcer disease. He does have acid reflux disease as above and a history of adenomatous colon polyps. He is tolerating small amounts of liquid for his diet. He has not moved his bowels in a day or so, as above. 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  He has no history of Ang's esophagus. He has a history of hypertension, hypothyroidism, chronic atrial fibrillation, and arthritis. He has developed cellulitis in the past.  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 He has a history of adenocarcinoma of the prostate. ALLERGIES:  DARVON AND PENICILLINS. MEDICATIONS AT THE TIME OF ADMISSION:  Percocet two tablets every 6 hours as needed for pain, Uloric 80 mg once a day, Ambien 5 mg at night, Flomax 0.4 mg once a day, Eliquis one tablet twice a day, albuterol inhaler, Combivent inhaler, Claritin, Neurontin 600 mg twice a day, Synthroid 175 mcg once a day, multivitamins once a day, trazodone 50 mg at bedtime, and pyridoxine which is vitamin B6 one capsule daily. SOCIAL HISTORY:  The patient is . He has been a smoker in the past; none recent. Alcohol ingestion, four to five beers on a daily basis for many years. He was drinking beer and hard liquor previously. He is retired. Lives with his wife. FAMILY HISTORY:  Negative for colon cancer, polyps, inflammatory bowel disease, chronic liver disease. REVIEW OF SYSTEMS:  A complete review was taken, positive as per history of present illness, negatives will not be listed here. PHYSICAL EXAMINATION: 
GENERAL:  The patient is a well-appearing male, appearing to be his stated age of 80 with ecchymoses on his forearms and face, in no obvious distress. He is answering questions quickly appropriately and with insight. NECK:  Supple. No masses or thyroid palpable. NODES:  No supraclavicular, axillary, or cervical nodes palpable. CHEST:  Scattered expiratory rhonchi. Symmetric breath sounds. Good air movement. HEART:  First and second heart sounds are normal.  There are no murmurs, rubs, or gallops. ABDOMEN:  Soft. Minimal discomfort in the left lower quadrant to gentle palpation. Bowel sounds are normal.  There are no bruits. EXTREMITIES:  Right lower extremity is edematous with ecchymosis. He has inflatable hose on his lower extremities. RECTAL:  Examination is not performed. LABORATORY STUDIES:  CBC today showed a white count of 23.4 with a hemoglobin of 7.7, hematocrit 23.4, MCV 94.4, and a platelet count of 990. Today's BMP is normal except for glucose of 108, BUN of 44, and creatinine of 1.63. His GFR is estimated as 40. His total bilirubin is 1.1, direct fraction 0.8. Total protein of 4.7 with an albumin of 1.9. ALT normal at 46, AST elevated at 58, alkaline phosphatase elevated at 401. Lipase normal at 62.   Lactic acid level yesterday was 1.9 which is normal. 
 
The patient had a CT scan of the abdomen and pelvis yesterday at 03:54 in the afternoon. Tiny left and right pleural effusions with adjacent respective basilar atelectasis, mild global cardiomyopathy, moderate atherosclerotic calcification throughout the coronary artery distributions, no pericardial effusion, elevation of the right hemidiaphragm. No suspicious liver lesions. No bile duct dilatation. Gallbladder surgically absent. Spleen, pancreas, adrenals, and lymph nodes all normal.  Moderate circumferential mural thickening throughout the distal sigmoid colon, the majority of the rectum and anus with subtle adjacent perirectal and pelvic floor fat inflammatory stranding. No additional large bowel mural thickening elsewhere. Normal-caliber small and large bowel loops. No morphology of bowel obstruction. Normal appendix. Bladder wall thickening also probably secondary to adjacent radiation from prostate seeds, cystitis not unlikely. Tiny left and right pleural effusions. IMPRESSION: 
1. Sepsis, most likely secondary to cellulitis of right lower extremity on historical basis. I am also told by the family that the patient was found to have an effusion in his right hip that was aspirated earlier today and may have become infected from translocation from the cellulitis of right lower extremity. The likelihood that the patient has sepsis from the biliary tract is very unlikely in the absence of bile duct dilatation, evidence of stones, and the absence of right upper quadrant pain, nausea, or vomiting. 2.  Elevated liver enzymes, likely secondary to the combination of alcohol-related chronic liver disease and sepsis/antibiotics/medications. 3.  History of adenomatous polyps. The patient is up-to-date on routine maintenance colonoscopies. 4.  History of acid reflux disease, complicated by stricture of esophagus requiring dilatation in the past.  Stable acid reflux symptoms currently. 5.  Constipation, most likely secondary to narcotic analgesics.   Severe constipation with impaction within the last week, treated with laxatives and enemas with symptomatic control, worse now over the last day or so in the hospital with again narcotic analgesics. 6.  Nonspecific changes in the rectum and sigmoid colon on CT scan. The fact that the changes in the rectum and the sigmoid colon make diverticulitis unlikely but most likely colonic wall edema and inflammation related to fecal impaction/severe constipation itself. PLAN: 
1. From a GI standpoint, we would focus on counteracting the constipation effects of narcotics. I will give the patient a bottle of citrate of magnesia tonight and again tomorrow in an attempt to empty out his colon. 2.  We would monitor liver enzymes for the time being. 3.  Diet as tolerated. 4.  We would focus on evaluation for sepsis and the right lower extremity cellulitis and the effusion in the right hip which has now been aspirated. 5.  No plans for endoscopic intervention as this time. I do not believe that the patient needs MRCP or ERCP at this time. I think the likelihood of cholangitis is extremely small. 6.  I will order laboratory studies looking for the cause of his anemia. It may be sepsis and hydration issues. 7.  Further plans pending above and clinical course. He should be on aggressive antireflux regimen to prevent significant acidic damage to the esophagus while he is spending most of his time in the supine position, and elevate the head of bed 30 degrees at all times, and of course, continue his regular PPI regimen. Maryam Wilkinson MD 
 
 
SD/[id number] 
D:  10/09/2019 17:26 
T:  10/10/2019 1:25 
JOB #:  [id number] CC:  MD Juli Xiong MD

## 2019-10-10 NOTE — PROGRESS NOTES
1915 Bedside shift change report given to Dejon Aguilar RN (oncoming nurse) by Jonathan Christianson  (offgoing nurse). Report included the following information SBAR, Kardex, Intake/Output, MAR, Accordion, Recent Results, Med Rec Status, Alarm Parameters , Quality Measures and Dual Neuro Assessment. 0400 woke up confused pulling at lines. Reorientation. Improved. 0500 Mariano Cough at bedside with client. Bedside shift change report given to [address] RN (oncoming nurse) by Ernie Tamayo  (offgoing nurse). Report included the following information SBAR, Kardex, Procedure Summary, Intake/Output, MAR, Accordion, Recent Results, Med Rec Status and Quality Measures. Recommendation: 
-Hold morning dose of mag citrate. X6 BM since 10/9/19 dose 
-Ambien 5mg PRN for sleep. - Unsure of medication he was using at home. Melatonin 3mg given, did not assist client to sleep.  
-Mental health consult - hx of night terrors and PTSD. 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

## 2019-10-10 NOTE — PROGRESS NOTES
Progress Note Patient: Susana Escobedo               Sex: male          DOA: 10/8/2019 YOB: 1933      Age:  80 y.o.        LOS:  LOS: 2 days CHIEF COMPLAINT:  Altered mental status Assessment/Plan:  
 
Principal Problem: 
  Severe sepsis ([facility] [address] (10/8/2019) Active Problems: 
  Atrial fibrillation ([facility] [address] (6/2/2018) Hypothyroidism (6/2/2018) MACKENZIE (acute kidney injury) ([facility] [address] (9/27/2019) Altered mental status (10/8/2019) Cellulitis of left knee (10/8/2019) PLAN: 
· Severe sepsis - unknown source, ddx includes GI/cholangitis due to increasing LFT, abdominal pain, fever, also left knee and right hip are possible sources. Cont broad spectrum IV abx. Continue Levophed for MAP goal >65. Monitor WBC count. PCCM consulted, appreciate assistance. · Blood cx positive for GNR. Follow for speciation. · Appreciate PCCM and Ortho recs · GI consulted, they do not think there is cholangitis. However, LFTs continue to increase. · Consult ID for recs · Right hip aspiration is negative for evidence of septic arthritis · MACKENZIE - IV fluid hydration, monitor closely. Improving · AMS - Resolved. Suspect 2/2 metabolic encephalopathy from sepsis. Cont to monitor. · AFIB - Rate control, resume Elliquis for [facility] when there are no further procedures to be done · Hypothyroidism - Cont home levothyroxine · DVT ppx - SCD; resume Eliquis when able Subjective:  
 
Pt is alert and oriented, he continues to complain of diffuse pain throughout both knees and legs Objective:  
 
Visit Vitals /69 Pulse 95 Temp 98.3 °F (36.8 °C) Resp 25 Ht 5' 8\" (1.727 m) Wt 92.9 kg (204 lb 14.4 oz) SpO2 100% BMI 31.15 kg/m² Physical Exam: 
Gen:  oriented to person, place, not time Ears: hearing is intact Eyes: Icterus is not present Lungs: clear to auscultation bilaterally Heart: regular rate and rhythm, S1, S2 normal, no murmur, click, rub or gallop Gastrointestinal: soft, non-tender. Bowel sounds normal. No masses,  no organomegaly Neurological:  New Focal Deficits are not present Skin: diffuse ecchymosis. Face with small petechiae (per family is due to known actinic keratosis). Ext: skin overlying left knee is mildly erythematous improving Psychiatric:  Mood is stable Lab/Data Reviewed: 
CMP:  
Lab Results Component Value Date/Time  10/10/2019 03:28 AM  
 K 3.9 10/10/2019 03:28 AM  
  10/10/2019 03:28 AM  
 CO2 24 10/10/2019 03:28 AM  
 AGAP 9 10/10/2019 03:28 AM  
  (H) 10/10/2019 03:28 AM  
 BUN 41 (H) 10/10/2019 03:28 AM  
 CREA 1.35 (H) 10/10/2019 03:28 AM  
 GFRAA >60 10/10/2019 03:28 AM  
 GFRNA 50 (L) 10/10/2019 03:28 AM  
 CA 7.7 (L) 10/10/2019 03:28 AM  
 MG 2.6 10/10/2019 03:28 AM  
 PHOS 3.4 10/10/2019 03:28 AM  
 ALB 2.0 (L) 10/10/2019 03:28 AM  
 TP 4.8 (L) 10/10/2019 03:28 AM  
 GLOB 2.8 10/10/2019 03:28 AM  
 AGRAT 0.7 (L) 10/10/2019 03:28 AM  
 SGOT 126 (H) 10/10/2019 03:28 AM  
 ALT 61 10/10/2019 03:28 AM  
 
CBC:  
Lab Results Component Value Date/Time WBC 15.5 (H) 10/10/2019 03:28 AM  
 HGB 8.3 (L) 10/10/2019 03:28 AM  
 HCT 25.5 (L) 10/10/2019 03:28 AM  
  (L) 10/10/2019 03:28 AM  
 
 
Imaging Reviewed: 
Alba Barreto Guide Asp/bx/inj/loc Result Date: 10/9/2019 PROCEDURE: RIGHT HIP JOINT ASPIRATION INDICATION: Right hip pain, patient presented with severe sepsis without other source. Aspiration requested to evaluate for septic arthritis. STERILE TECHNIQUE: The procedure was performed following standard maximal barrier technique which includes, cap, mask, sterile gown, sterile gloves, sterile full body drape, hand hygiene with alcohol foam, and 2% chlorhexidine for cutaneous antisepsis . TECHNIQUE AND FINDINGS: Informed consent was obtained from  the patient's daughter prior to the procedure. Standard preprocedure timeout.  The right inguinal region was prepped and draped in usual sterile fashion. 1% lidocaine used for local anesthesia. Initially a 22-gauge needle was advanced until the lateral part of the femoral head and femoral neck was contacted. No fluid could be aspirated. The needle was repositioned multiple times but no fluid could be removed. The study was then repeated with a 22-gauge needle and 2 cc of slightly viscous yellow fluid was aspirated and sent for laboratory analysis. The patient tolerated the procedure well, without complications. Standard post procedure pause. GUIDANCE: Fluoroscopy guidance was used to position (and confirm the position of) the needles. Image(s) saved in PACS: Fluoroscopic spot images FLUOROSCOPY DETAILS: Time:  4.2 minutes. Dose (reference air kerma):  128.038 mGy. MODERATE SEDATION: None, local anesthesia only PRE-PROCEDURE PROPHYLACTIC ANTIBIOTIC: Not given as not clinically indicated. _______________ IMPRESSION: Fluoroscopy guided right hip aspiration yielded no fluid through the 22-gauge needle. 2 cc of slightly viscous clear yellow fluid was successfully aspirated through an 18-gauge needle, sent to the lab for requested analysis.

## 2019-10-10 NOTE — PROGRESS NOTES
Progress Note Assessment: 1. Chronic bilateral knee pain; low clinical suspicion of left knee septic arthritis, would not recommend aspiration in the presence of cellulitis - cellulitis has improved 2. Right hip pain; no current synovial fluid analysis results to suggest septic arthritis 3. Leukocytosis is improving PLAN:   
1. No current indication or plans for orthopaedic surgical intervention 2. Following HPI: Lorri Howard is a 80 y.o. male patient without new  orthopaedic changes. Blood pressure 130/69, pulse 95, temperature 98.3 °F (36.8 °C), resp. rate 25, height 5' 8\" (1.727 m), weight 92.9 kg (204 lb 14.4 oz), SpO2 100 %. CBC w/Diff Lab Results Component Value Date/Time WBC 15.5 (H) 10/10/2019 03:28 AM  
 RBC 2.70 (L) 10/10/2019 03:28 AM  
 HGB 8.3 (L) 10/10/2019 03:28 AM  
 HCT 25.5 (L) 10/10/2019 03:28 AM  
 MCV 94.4 10/10/2019 03:28 AM  
 MCH 30.7 10/10/2019 03:28 AM  
 MCHC 32.5 10/10/2019 03:28 AM  
 RDW 16.4 (H) 10/10/2019 03:28 AM  
 Lab Results Component Value Date/Time BANDS 13 (H) 10/08/2019 01:01 PM  
 MONOS 3 10/10/2019 03:28 AM  
 EOS 0 10/10/2019 03:28 AM  
 BASOS 0 10/10/2019 03:28 AM  
 RDW 16.4 (H) 10/10/2019 03:28 AM  
  
  
 
RIGHT HIP Results for Ronell Cockayne (MRN [id number]) as of 10/10/2019 09:55 Ref. Range 10/9/2019 14:00  
FLD NEUTROPHILS Latest Units: % 70 FLD BANDS Latest Units: % 0  
FLD LYMPHS Latest Units: % 12 FLD MONOCYTES Latest Units: % 0  
FLD EOSINS Latest Units: % 0  
MACROPHAGE Latest Units: % 18 SYNOVIAL FLD SITE Unknown HIP  
SYN FLUID APPEARANCE Latest Units:   HAZY  
SYN FLUID COLOR Latest Units:   YELLOW  
SYNOVIAL FLD WBC CT Latest Ref Range: 0 - 200 /cu mm 23 SYNOVIAL FLD RBC CT Latest Ref Range: 0 /cu mm 2,905 (H) FLUID TYPE(7) Latest Units:   SYNOVIAL FLUID Crystals, body fluid Latest Units:   NO CRYSTALS SEEN  
 
CULTURE, BODY FLUID W Sherrlyn Inch STAIN [[id number]] (Order [id number]) Microbiology Date: 10/9/2019 Department: [facility] [place] Released By/Authorizing: Bertha Hurley MD (auto-released) Specimen Information: Synovial Fluid  
    
Component Value Flag Ref Range Units Status Special Requests: HIP     Preliminary GRAM STAIN NO WBC'S SEEN      Preliminary GRAM STAIN NO ORGANISMS SEEN      Preliminary Culture result: PENDING Physical Assessment: 
General: alert and cooperative Wound: none ortho Extremities:  LLE - Neurovascular intact LLE. Compartments soft and NT. Able to contract quadriceps. Knee with well healed incision. Small subacute superficial abrasion noted midline over the patella with surrounding erythema - erythema has improved. No significant palpable suprapatellar effusion; no pain with gentle passive ROM of knee. Plantar and dorsiflexion intact. Palpable DP/PT pulses noted. Denies paresthesias Dressing:  none ortho DVT Exam:   No exam evidence to suggest DVT. Compartments soft and NT.  
  
 
 
 
    
 
- Nayely Severino DHSdonald, SHARIF 
10/10/2019 Office [phone]

## 2019-10-10 NOTE — CONSULTS
[facility] 
                                          (A Division of [address] [place] [address]) Consultation Note Date of Admission: 10/8/2019    Date of Consultation: 10/10/2019 Referred by: Dr. Luis Meadows Reason for Referral: Sepsis, GNR Current Antimicrobials: Prior Antimicrobials Meropenem 10/9 - 1 Vancomycin 10/8 - 2  Cefepime, Levoflox 10/8 - 0 Assessment: Plan:  
Gram- negative BSI 
- 1 of 2 blood cultures 10/8 + (per phone call to micro does not appear to be usual Enteric Gm negative- ? Pasteurella? Bartonella) - ? Source. No active cellulitis. No pneumonia, UTI or abdominal source on CTAP. W/ neck, back pain  - ?epidural abscess or discitis/OM? - ?endocarditis -> rpt blcx x 2 
-> continue meropenem pending identification, sens of GNR 
-> dc Vancomycin 
-> MRI cervical, thoracic, Lumbar spine if able to tolerate 
-> CARLOS if blcx still + Septic shock 
- due to above 
- improving. Vasopressors stopped today. - Fever, leukocytosis improved -> abx as above MACKENZIE 
- improving Worsening alkaline phosphatase 
 - from sepsis, ?bone infection / met (doubt) Recent R leg cellulitis following cat scratch HTN   
AF   
GERD   
DJD s/p L TKR Prostate CA Hypothyroidism   
h/o pancreatitis ETOH consumption Microbiology: 
 
10/8 blcx GNR 1 of 2 
 urcx NG 
10/9 R hip asp gs no wbc, NOS, cx IP Lines / Catheters: HPI: 
 
80year-old  male HTN, AF, GERD, DJD s/p L TKR, Prostate CA, Hypothyroidism, h/o pancreatitis, ETOH consumption admitted to [facility] on 10/8/2019 due to chills, weakness, altered mental status. He is usually quite functional, able to perform his daily activities including driving until around 4 weeks ago he developed worsening of his chronic neck pain after what he felt was excessive strain.   The pain has persisted since then and in the meantime he developed cellulitis on his right leg from a cat scratch 3 weeks ago which resolved with antibiotics. He returned to his baseline health but around two weeks ago had 2 episodes of micturition syncope in one day causing him to fall to the floor and another episode later in the week. He hurt his left knee during one of these falls and presented to the ED on 9/16, 9/23 and 9/26 and was admitted overnight and had one more visit 10/3. In the week PTA he became extremely weak and unable to walk. While being moved in bed by family members a few days PTA he started having right hip pain and on the day PTA severe, prolonged shaking chills. Taken to the ED the next day for persistent chills he was hypotensive in the ED with a temperature of 100.5 and WBC of 29.5 with 13% bandemia. Urinalysis showed no pyuria and CXR had no acute pulmonary process. Non contrast head CT showed no acute process and CTAP with oral contrast only showed moderate mural thickening throughout the distal sigmoid colon, rectum and anus, likely reactive changes to adjacent prostate seeds which may also explain bladder wall thickening. No biliary dilatation or suspicious liver lesions. Fever and leukocytosis have promptly responded to Vancomycin and Meropenem (after initial doses of Cefepime and Levofloxacin 10/8). Vasopressors required initially have just been discontinued. One of two blood cultures from admission is growing gram-negative rods. Right hip aspiration showed uninfected synovial fluid. Active Hospital Problems Diagnosis Date Noted  Altered mental status 10/08/2019  Severe sepsis ([address] 10/08/2019  Cellulitis of left knee 10/08/2019  MACKENZIE (acute kidney injury) ([address] 09/27/2019  Atrial fibrillation ([address] 06/02/2018  Hypothyroidism 06/02/2018 Past Medical History:  
Diagnosis Date  Arthritis  Atrial fibrillation ([address] 07/2017 Dr Luann Malin cardio follows. chronic  Carcinoma of prostate ([address]  Cellulitis  Coarse tremor   
  hands, states \"my doctor is aware\"  Difficulty swallowing  Dysphagia  Edema  Encounter for colonoscopy due to history of adenomatous colonic polyps  Essential hypertension  Fall 10/07/2017 \"went to [facility] Emergency Room, CT Scan showed concussion, no bleeding\" per patient  Fatigue  GERD (gastroesophageal reflux disease) wo. esophagitis  Glaucoma  H/O joint replacement   
  left knee 2003  HLD (hyperlipidemia)  Hypertension  Hypertensive disorder  Hypothyroidism  Impotence  Laceration of right lower leg with complication  Malignant neoplasm of prostate ([address]   
  [id number] Adenocarcinoma of the Prostate, dx 11/3/2008, karlee 3+4, presenting PSA 5.5ng/mL. 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   
 acute/hist. of   
 
Past Surgical History:  
Procedure Laterality Date  COLONOSCOPY N/A 1/3/2017  HX CHOLECYSTECTOMY  HX KNEE REPLACEMENT Left 01/2003  HX SHOULDER REPLACEMENT Right 7/17, 10/17 History reviewed. No pertinent family history. Social History Socioeconomic History  Marital status:  Spouse name: Not on file  Number of children: Not on file  Years of education: Not on file  Highest education level: Not on file Occupational History  Not on file Social Needs  Financial resource strain: Not on file  Food insecurity:  
  Worry: Not on file Inability: Not on file  Transportation needs:  
  Medical: Not on file Non-medical: Not on file Tobacco Use  Smoking status: Former Smoker  Smokeless tobacco: Never Used Substance and Sexual Activity  Alcohol use: Yes Comment: weekly  Drug use: No  
 Sexual activity: Not on file Lifestyle  Physical activity:  
  Days per week: Not on file Minutes per session: Not on file  Stress: Not on file Relationships  Social connections:  
  Talks on phone: Not on file Gets together: Not on file Attends Yazidi service: Not on file Active member of club or organization: Not on file Attends meetings of clubs or organizations: Not on file Relationship status: Not on file  Intimate partner violence:  
  Fear of current or ex partner: Not on file Emotionally abused: Not on file Physically abused: Not on file Forced sexual activity: Not on file Other Topics Concern  Not on file Social History Narrative  Not on file Allergies: 
Adhesive; Darvon [propoxyphene]; and Penicillins . Medications: 
Current Facility-Administered Medications Medication Dose Route Frequency  vancomycin (VANCOCIN) 1,000 mg in 0.9% sodium chloride (MBP/ADV) 250 mL adv  1,000 mg IntraVENous Q18H  
 VANCOMYCIN INFORMATION NOTE   Other ONCE  
 HYDROmorphone (DILAUDID) syringe 0.25 mg  0.25 mg IntraVENous Q4H PRN  
 naloxone (NARCAN) injection 0.4 mg  0.4 mg IntraVENous PRN  
 ELECTROLYTE REPLACEMENT PROTOCOL - Potassium Renal Dosing  1 Each Other PRN  
 ELECTROLYTE REPLACEMENT PROTOCOL - Magnesium   1 Each Other PRN  
 ELECTROLYTE REPLACEMENT PROTOCOL  - Phosphorus Renal Dosing  1 Each Other PRN  
 ELECTROLYTE REPLACEMENT PROTOCOL - Calcium   1 Each Other PRN  pantoprazole (PROTONIX) tablet 40 mg  40 mg Oral ACB  magnesium citrate solution 296 mL  296 mL Oral NOW  melatonin tablet 3 mg  3 mg Oral QHS PRN  
 sodium chloride (NS) flush 5-10 mL  5-10 mL IntraVENous PRN  
 VANCOMYCIN INFORMATION NOTE   Other Rx Dosing/Monitoring  NOREPINephrine (LEVOPHED) 8 mg in 0.9% NS 250ml infusion  2-16 mcg/min IntraVENous TITRATE  acetaminophen (TYLENOL) tablet 650 mg  650 mg Oral Q4H PRN  
 [Held by provider] apixaban (ELIQUIS) tablet 2.5 mg  2.5 mg Oral BID  levothyroxine (SYNTHROID) tablet 175 mcg  175 mcg Oral ACB  meropenem (MERREM) 1 g in 0.9% sodium chloride (MBP/ADV) 50 mL MBP  1 g IntraVENous H28H  
 folic acid (FOLVITE) 1 mg, thiamine (B-1) 200 mg in 0.9% sodium chloride 50 mL ivpb   IntraVENous DAILY  0.9% sodium chloride infusion  100 mL/hr IntraVENous CONTINUOUS  
  
 
ROS: 
A comprehensive review of systems was negative except for that written in the History of Present Illness. Physical Exam: 
Temp (24hrs), Av.1 °F (36.7 °C), Min:97.8 °F (36.6 °C), Max:98.3 °F (36.8 °C) Visit Vitals /72 Pulse (!) 101 Temp 98.3 °F (36.8 °C) Resp 21 Ht 5' 8\" (1.727 m) Wt 92.9 kg (204 lb 14.4 oz) SpO2 100% BMI 31.15 kg/m² General: Well developed, well nourished 80 y.o.  male in no acute distress. ENT: ENT exam normal, no neck nodes or sinus tenderness Head: normocephalic, without obvious abnormality Mouth:  mucous membranes moist, pharynx normal without lesions Neck: tender on direct pressure over spine and on karla and lateral flexion, and on turning head to either side Cardio:  irregularly irregular rhythm Chest: inspection normal - no chest wall deformities or tenderness, respiratory effort normal 
Lungs: rhonchi bilateral and no wheezes Abdomen: soft, non-tender. Bowel sounds normal. No masses, no organomegaly. Extremities:  no redness or tenderness in the calves or thighs, no edema Neuro: Grossly normal  
 
Lab results: 
 
Chemistry Recent Labs 10/10/19 
0328 10/09/19 
0958 10/09/19 
0300 10/08/19 
1301 *  --  108* 120*   --  136 133* K 3.9  --  4.0 4.4   --  101 94* CO2 24  --  27 27 BUN 41*  --  44* 47* CREA 1.35*  --  1.63* 2.30* CA 7.7*  --  7.3* 7.5* AGAP 9  --  8 12 BUCR 30*  --  27* 20  
* 396* 401* 530* TP 4.8* 4.6* 4.7* 4.8* ALB 2.0* 1.8* 1.9* 1.5*  
GLOB 2.8 2.8 2.8 3.3 AGRAT 0.7* 0.6* 0.7* 0.5* CBC w/ Diff Recent Labs 10/10/19 
0328 10/09/19 
0300 10/08/19 
1301 WBC 15.5* 23.4* 29.5*  
RBC 2.70* 2.48* 2.71* HGB 8.3* 7.7* 8.4* HCT 25.5* 23.4* 25.7*  
* 138 176 GRANS 94* 95* 81* LYMPH 3* 3* 3* EOS 0 0 0 Microbiology All Micro Results Procedure Component Value Units Date/Time CULTURE, URINE [[id number]] Collected:  10/08/19 1418 Order Status:  Completed Specimen:  Cath Urine Updated:  10/10/19 1016 Special Requests: NO SPECIAL REQUESTS Culture result: NO GROWTH 2 DAYS     
 CULTURE, BLOOD [[id number]] Collected:  10/08/19 2234 Order Status:  Completed Specimen:  Blood from Miscellaneous sample Updated:  10/10/19 0844 Special Requests: NO SPECIAL REQUESTS Culture result: NO GROWTH 1 DAY     
 CULTURE, BODY FLUID [[id number]] Collected:  10/09/19 1200 Order Status:  Completed Specimen:  Synovial Fluid Updated:  10/09/19 2111 Special Requests: HIP  
  GRAM STAIN NO WBC'S SEEN     
   NO ORGANISMS SEEN Culture result: PENDING  
 CULTURE, BLOOD [[id number]] Collected:  10/08/19 1301 Order Status:  Completed Specimen:  Blood Updated:  10/09/19 0343 Special Requests: --     
  NO SPECIAL REQUESTS 
RIGHT 
ARM 
  
  GRAM STAIN    
  AEROBIC BOTTLE GRAM NEGATIVE RODS SMEAR CALLED TO AND CORRECTLY REPEATED BY: DESHAWN COSBY RN,ICU, ON 10/9/19 AT 0335 TO [facility] Culture result:    
  CULTURE IN PROGRESS,FURTHER UPDATES TO FOLLOW [facility] for ID/Susceptibility if indicated Jeb Panda MD, [facility] Infectious Diseases 
[phone] 
10/10/2019  
11:11 AM

## 2019-10-10 NOTE — PROGRESS NOTES
Apixaban (Eliquis) Order Review Indication: Atrial Fibrillation Dose: Apixaban 2.5 mg bid Current labs: 
Weight 89.1 kg (196 lb 8 oz) Recent Labs 10/10/19 
0328 10/09/19 
0300 10/08/19 
1301 CREA 1.35* 1.63* 2.30* HGB 8.3* 7.7* 8.4* HCT 25.5* 23.4* 25.7*  
* 138 176 Estimated Creatinine Clearance Estimated Creatinine Clearance: 43.4 mL/min (A) (based on SCr of 1.35 mg/dL (H)). Estimated Creatinine Clearance (using IBW): 22.3 mL/min Drug Interactions: None Action: Change dose per protocol to 2.5mg PO BID on 10/8. Pt was on 5mg PO BID at home dose . Creatinine drop significantly over past 2 days. Once medication unheld, should be changed to Eliquis 5mg PO BID since they meet 2 or 3 criteria to be on 5mg dosing.  
 
Ronny Polo

## 2019-10-10 NOTE — PROGRESS NOTES
Problem: Pain Goal: *Control of Pain Outcome: Progressing Towards Goal 
Goal: *PALLIATIVE CARE:  Alleviation of Pain Outcome: Progressing Towards Goal 
  
Problem: Falls - Risk of 
Goal: *Absence of Falls Description Document Reynaldo Patel Fall Risk and appropriate interventions in the flowsheet. Outcome: Progressing Towards Goal 
Note:  
Fall Risk Interventions: 
  
 
Mentation Interventions: Bed/chair exit alarm, Adequate sleep, hydration, pain control Medication Interventions: Bed/chair exit alarm Elimination Interventions: Call light in reach, Bed/chair exit alarm History of Falls Interventions: Bed/chair exit alarm Problem: Patient Education: Go to Patient Education Activity Goal: Patient/Family Education Outcome: Progressing Towards Goal 
  
Problem: Pressure Injury - Risk of 
Goal: *Prevention of pressure injury Description Document Dionicio Scale and appropriate interventions in the flowsheet. Outcome: Progressing Towards Goal 
Note:  
Pressure Injury Interventions: 
Sensory Interventions: Assess changes in LOC, Keep linens dry and wrinkle-free Moisture Interventions: Apply protective barrier, creams and emollients Activity Interventions: Pressure redistribution bed/mattress(bed type) Mobility Interventions: Assess need for specialty bed Nutrition Interventions: Document food/fluid/supplement intake, Discuss nutritional consult with provider Friction and Shear Interventions: Apply protective barrier, creams and emollients Problem: Discharge Planning Goal: *Discharge to safe environment Outcome: Progressing Towards Goal

## 2019-10-10 NOTE — PROGRESS NOTES
[facility] UPDATE: 
 
1945: Bedside RN notified this provider that pt had developed some audible wheezing and decreased breath sounds on exam. Upon assessment, pt was would to have notable audible wheezes and mild increased WOB with some mild abdominal retractions. Faint end expiratory biapical wheezing noted upon ascultation. Pt is not in acute distress but does acknowledge some CALEB currently. BP stable; SpO2 >97% on 4LPM NC. Per chart, pt did receive Lasix earlier today for fluid overload with good UOP response. - Obtain CXR 
- Give jany-neb and monitor response - Suspect pt may require additional diuresis; net (+) since admission  
 
- Dr. Laureen Allan updated on status and agree with above plan. Ana Guaman PA-C Pulmonary Critical Care Medicine [facility]

## 2019-10-10 NOTE — PROGRESS NOTES
0700 Bedside and Verbal shift change report given to [address] (oncoming nurse) by Rahul Klein (offgoing nurse). Report included the following information SBAR, Kardex, Intake/Output, MAR and Recent Results. Pt shows no signs of distress. 1000 Participated in IDR, Plan to continue anticoagulation meds, take pt off of levo, discontinue vanc, and administer lasix for edema. 1900 Bedside and Verbal shift change report given to [address] (oncoming nurse) by [address] (offgoing nurse). Report included the following information SBAR, Kardex, Intake/Output and MAR.

## 2019-10-11 NOTE — PROGRESS NOTES
Problem: Pain Goal: *Control of Pain Outcome: Progressing Towards Goal 
Goal: *PALLIATIVE CARE:  Alleviation of Pain Outcome: Progressing Towards Goal 
  
Problem: Falls - Risk of 
Goal: *Absence of Falls Description Document Max Angeles Fall Risk and appropriate interventions in the flowsheet. Outcome: Progressing Towards Goal 
Note:  
Fall Risk Interventions: 
 Bed is locked and in lowest position, side rails up x 3, exit alarm activated, call bell and bedside table are within reach, family member present and attentive at the bedside Mentation Interventions: Bed/chair exit alarm, Door open when patient unattended, Evaluate medications/consider consulting pharmacy, Familiar objects from home, Family/sitter at bedside, Increase mobility, More frequent rounding, Reorient patient, Room close to nurse's station, Toileting rounds, Update white board Medication Interventions: Bed/chair exit alarm, Evaluate medications/consider consulting pharmacy, Patient to call before getting OOB Elimination Interventions: Bed/chair exit alarm, Patient to call for help with toileting needs, Toileting schedule/hourly rounds, Toilet paper/wipes in reach History of Falls Interventions: Bed/chair exit alarm, Room close to nurse's station, Evaluate medications/consider consulting pharmacy, Assess for delayed presentation/identification of injury for 48 hrs (comment for end date) Problem: Patient Education: Go to Patient Education Activity Goal: Patient/Family Education Outcome: Progressing Towards Goal 
  
Problem: Pressure Injury - Risk of 
Goal: *Prevention of pressure injury Description Document Dionicio Scale and appropriate interventions in the flowsheet. Outcome: Progressing Towards Goal 
Note:  
Pressure Injury Interventions: adequate hydration, frequent repositioning, use of wedges and pillows Sensory Interventions: Assess changes in LOC, Assess need for specialty bed, Avoid rigorous massage over bony prominences, Discuss PT/OT consult with provider, Minimize linen layers, Monitor skin under medical devices, Pad between skin to skin, Pressure redistribution bed/mattress (bed type), Turn and reposition approx. every two hours (pillows and wedges if needed) Moisture Interventions: Absorbent underpads, Apply protective barrier, creams and emollients, Check for incontinence Q2 hours and as needed, Internal/External urinary devices, Maintain skin hydration (lotion/cream), Moisture barrier, Offer toileting Q_hr, Minimize layers Activity Interventions: Pressure redistribution bed/mattress(bed type) Mobility Interventions: Pressure redistribution bed/mattress (bed type), Turn and reposition approx. every two hours(pillow and wedges) Nutrition Interventions: Document food/fluid/supplement intake, Discuss nutritional consult with provider, Offer support with meals,snacks and hydration Friction and Shear Interventions: Apply protective barrier, creams and emollients, Foam dressings/transparent film/skin sealants, Lift sheet, Lift team/patient mobility team, Minimize layers, Transferring/repositioning devices

## 2019-10-11 NOTE — PROGRESS NOTES
Gastrointestinal Progress Note Patient Name: Sreedhar Sanz Today's Date: 10/11/2019 Admit Date: 10/8/2019 Assessment-Recommendation: 1. Abnormal liver enzymes - Enzymes and bilirubin are normalizing. Patient denies abdominal pain. Would continue to monitor liver enzymes. Differential includes hypoperfusion in the setting of sepsis. May improve as sepsis resolves. Patient had no biliary dilation on CT and has had cholecystectomy. Consider RUQ US if liver enzymes worsen. Subjective:  
 
Denies abdominal pain Current Facility-Administered Medications Medication Dose Route Frequency  cefTRIAXone (ROCEPHIN) 2 g in 0.9% sodium chloride (MBP/ADV) 50 mL MBP  2 g IntraVENous Q24H  
 [START ON 70/81/4225] folic acid (FOLVITE) tablet 1 mg  1 mg Oral DAILY  [START ON 10/12/2019] thiamine mononitrate (B-1) tablet 100 mg  100 mg Oral DAILY  [START ON 10/12/2019] furosemide (LASIX) injection 40 mg  40 mg IntraVENous DAILY  apixaban (ELIQUIS) tablet 5 mg  5 mg Oral BID  sodium chloride (NS) flush 5-40 mL  5-40 mL IntraVENous Q8H  
 sodium chloride (NS) flush 5-40 mL  5-40 mL IntraVENous PRN  
 LORazepam (ATIVAN) tablet 1 mg  1 mg Oral Q1H PRN Or  
 LORazepam (ATIVAN) injection 1 mg  1 mg IntraVENous Q1H PRN  
 LORazepam (ATIVAN) tablet 2 mg  2 mg Oral Q1H PRN Or  
 LORazepam (ATIVAN) injection 2 mg  2 mg IntraVENous Q1H PRN  
 LORazepam (ATIVAN) injection 3 mg  3 mg IntraVENous Q15MIN PRN  
 desonide (TRIDESILON) 0.05 % cream - patient supplied (Patient Supplied)   Topical BID  albuterol-ipratropium (DUO-NEB) 2.5 MG-0.5 MG/3 ML  3 mL Nebulization Q6H PRN  
 HYDROmorphone (DILAUDID) syringe 0.25 mg  0.25 mg IntraVENous Q4H PRN  
 naloxone (NARCAN) injection 0.4 mg  0.4 mg IntraVENous PRN  
 ELECTROLYTE REPLACEMENT PROTOCOL - Potassium Renal Dosing  1 Each Other PRN  
 ELECTROLYTE REPLACEMENT PROTOCOL - Magnesium   1 Each Other PRN  
  ELECTROLYTE REPLACEMENT PROTOCOL  - Phosphorus Renal Dosing  1 Each Other PRN  
 ELECTROLYTE REPLACEMENT PROTOCOL - Calcium   1 Each Other PRN  pantoprazole (PROTONIX) tablet 40 mg  40 mg Oral ACB  melatonin tablet 3 mg  3 mg Oral QHS PRN  
 sodium chloride (NS) flush 5-10 mL  5-10 mL IntraVENous PRN  
 NOREPINephrine (LEVOPHED) 8 mg in 0.9% NS 250ml infusion  2-16 mcg/min IntraVENous TITRATE  acetaminophen (TYLENOL) tablet 650 mg  650 mg Oral Q4H PRN  
 levothyroxine (SYNTHROID) tablet 175 mcg  175 mcg Oral ACB  
 0.9% sodium chloride infusion  25 mL/hr IntraVENous CONTINUOUS Objective:  
 
Physical Exam: 
Appears older than stated age. Alert OP clear CV tachycardia Abdomen distended, non tender, +BS Trace edema Data Review: 
 
Labs: Results:  
   
Chemistry Recent Labs 10/11/19 
1102 10/11/19 
0345 10/10/19 
1341 10/10/19 
0328 10/09/19 
0958 10/09/19 
0300 GLU  --  103*  --  106*  --  108* NA  --  142  --  139  --  136  
K 3.9 3.6 3.7 3.9  --  4.0  
CL  --  110  --  106  --  101 CO2  --  27  --  24  --  27 BUN  --  38*  --  41*  --  44* CREA  --  1.05  --  1.35*  --  1.63* CA  --  7.3*  --  7.7*  --  7.3* AGAP  --  5  --  9  --  8  
BUCR  --  36*  --  30*  --  27* AP  --  479*  --  596* 396* 401* TP  --  4.3*  --  4.8* 4.6* 4.7* ALB  --  1.8*  --  2.0* 1.8* 1.9*  
GLOB  --  2.5  --  2.8 2.8 2.8 AGRAT  --  0.7*  --  0.7* 0.6* 0.7* CBC w/Diff Recent Labs 10/11/19 
0345 10/10/19 
0328 10/09/19 
0300 WBC 12.2 15.5* 23.4*  
RBC 2.58* 2.70* 2.48* HGB 7.9* 8.3* 7.7* HCT 23.9* 25.5* 23.4*  
* 102* 138 GRANS 90* 94* 95* LYMPH 4* 3* 3* EOS 1 0 0 Coagulation No results for input(s): PTP, INR, APTT, INREXT, INREXT in the last 72 hours. Liver Enzymes Recent Labs 10/11/19 
0345  
TP 4.3* ALB 1.8* * SGOT 66* ALT 48 Yasmine Polo MD 
October 11, 2019

## 2019-10-11 NOTE — PROGRESS NOTES
Physical Exam  
Skin:  
 
  
 
 
Dual verification and assessment with Remy Celis RN & Trinity Garcia RN

## 2019-10-11 NOTE — PROGRESS NOTES
1641 
Pt in a better position sitting at a 45 degree angle. Pt Brs clear and diminished in the bases, no wheezing auscultated before Duoneb administered.

## 2019-10-11 NOTE — PROGRESS NOTES
Orthopaedics Patient without new complaints status post No admission procedures for hospital encounter. for Severe sepsis ([facility] [A41.9, R65.20] Altered mental status [R41.82] 10/8/2019. Pt in ICU. Family present. Pt somnolent Visit Vitals /81 Pulse (!) 113 Temp 97.8 °F (36.6 °C) Resp (!) 32 Ht 5' 8\" (1.727 m) Wt 92 kg (202 lb 13.2 oz) SpO2 100% BMI 30.84 kg/m² CBC w/Diff Lab Results Component Value Date/Time WBC 12.2 10/11/2019 03:45 AM  
 RBC 2.58 (L) 10/11/2019 03:45 AM  
 HCT 23.9 (L) 10/11/2019 03:45 AM  
 MCV 92.6 10/11/2019 03:45 AM  
 MCH 30.6 10/11/2019 03:45 AM  
 MCHC 33.1 10/11/2019 03:45 AM  
 RDW 16.1 (H) 10/11/2019 03:45 AM  
 Lab Results Component Value Date/Time BANDS 13 (H) 10/08/2019 01:01 PM  
 MONOS 5 10/11/2019 03:45 AM  
 EOS 1 10/11/2019 03:45 AM  
 BASOS 0 10/11/2019 03:45 AM  
 RDW 16.1 (H) 10/11/2019 03:45 AM  
  
 
 
Physical exam:  NO evidence of clinical infection L knee. Cult R hip aspir no growth 2 days. Assessment:  Status post No admission procedures for hospital encounter. for Severe sepsis ([facility] [A41.9, R65.20] Altered mental status [R41.82] 10/8/2019, Does not appear that has pt sepsis of his r hip or l knee. PLAN:  Ortho will sign off but available if planned gallium scan indicates bony infection. Georgi Ojeda MD 
October 11, 2019

## 2019-10-11 NOTE — PROGRESS NOTES
0730-Received pt resting in bed with eyes closed, no sign of distress, vss on nasal canula, pressor has been off since 10/10 am, son at bedside, will continue to monitor 1030-Rounded on pt with treatment team 
1200-Resting in bed with eyes closed, continues to be drowsy and confused, family at bedside 1400-pt continues to be tachypneic with wheezing, pt status discussed with Dr. Puckett Forward, new orders received 1600-Pt seen by Stefanie Castano, pt status discussed 1830-Pt transferred to room with window to aid reorientation, respiratory status improved from this AM 
1935-Bedside and Verbal shift change report given to Diamond Bravo and Mendel Smiley RN (oncoming nurse) by Charmaine Luna RN (offgoing nurse).  Report included the following information SBAR, Kardex, Intake/Output, MAR, Recent Results and Cardiac Rhythm SR.

## 2019-10-11 NOTE — PROGRESS NOTES
Chart reviewed. Discharge plan will depend on pt's progress & treatment plan. Will cont to follow. Michelle Vazquez RN,ext [phone].

## 2019-10-11 NOTE — PROGRESS NOTES
[facility] 
                                             (A Division of [address] [place] [address]) Follow-up Note Date of Admission: 10/8/2019     Date of Note:  10/11/2019 Summary:   
 
79 y/o  male HTN, AF, GERD, DJD s/p L TKR, Prostate CA, Hypothyroidism, h/o pancreatitis, ETOH consumption adm 10/8/2019 w/ chills, weakness, altered mental status. Has neck pain x 1 mo. Had R leg cellulitis after cat scratch 2 1/2 mos PTA - resolved w/ abx. Then micturition syncope x 3, hurt knee, progressing generalized weakness, then 1 day PTA fever, chills. Adm w/ 100.5, WBC 29.5, 13% bands. No uti, pneumonia, unrevealing CTAP. Blcx +GNR 1 of 2 Interval History: 
 
Sleeping soundly. Finally fell asleep 1:30 am.  Did not awaken when examined. Afebrile. WBC normal now. Off vasopressors. Current Antimicrobials: Prior Antimicrobials Ceftriaxone 10/11 - 0 Vancomycin 10/8 - 2  Cefepime, Levoflox 10/8 - 0 Meropenem 10/9 - 2  
  
  
Assessment: Plan:  
Pasteurella BSI 
- 1 of 2 blood cultures 10/8 + P multocida sens TMP-SMX, Ceftriaxone, Levofloxacin, PCN, TCN 
- No active cellulitis. R leg cellulitis following cat scratch in July  resolved with TMP-SMX then Keflex 
- ?epidural abscess or discitis/OM?, R leg OM? 
- ?endocarditis -> monitor rpt blcx from 10/10 
-> dc meropenem 
-> Ceftriaxone 2 gm IV q 24 hours (tolerated Keflex) 
-> Ga scan (d/w Radiology) 
-> CARLOS if blcx still + Septic shock 
- due to above 
- improving. Vasopressors stopped 10/10.   
- resolved -> abx as above MACKENZIE 
- resolved Worsening alkaline phosphatase 
- from sepsis, ?bone infection / met (doubt) 
- improving Recent R leg cellulitis following cat scratch HTN    
AF    
GERD    
DJD s/p L TKR    
Prostate CA    
Hypothyroidism    
h/o pancreatitis    
ETOH consumption    
  
Microbiology: 
  
10/8      blcx Pasteurella multocida 1 of 2 
 urcx NG 
10/9      R hip asp gs no wbc, NOS, cx NGTD 
  
Lines / Catheters: RIJ CVL Patient Active Problem List  
Diagnosis Code  Malignant neoplasm of prostate ([facility] C61  Hypertension I10  
 GERD (gastroesophageal reflux disease) K21.9  Glaucoma H40.9  Glaucoma H40.9  Dysphagia R13.10  Encounter for colonoscopy due to history of adenomatous colonic polyps Z12.11, Z86.010  
 Non-pressure chronic ulcer of right calf with fat layer exposed ([facility] Y07.608  Laceration of right lower leg with complication K23.014Q  Edema R60.9  Pneumonia J18.9  Atrial fibrillation (HCC) I48.91  
 Hypothyroidism E03.9  Sepsis ([facility] A41.9  Cellulitis L03.90  
 HTN (hypertension) P02  
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  
 sodium chloride (NS) flush 5-40 mL  5-40 mL IntraVENous PRN  
 LORazepam (ATIVAN) tablet 1 mg  1 mg Oral Q1H PRN Or  
 LORazepam (ATIVAN) injection 1 mg  1 mg IntraVENous Q1H PRN  
 LORazepam (ATIVAN) tablet 2 mg  2 mg Oral Q1H PRN Or  
 LORazepam (ATIVAN) injection 2 mg  2 mg IntraVENous Q1H PRN  
 LORazepam (ATIVAN) injection 3 mg  3 mg IntraVENous Q15MIN PRN  
 desonide (TRIDESILON) 0.05 % cream - patient supplied (Patient Supplied)   Topical BID  albuterol-ipratropium (DUO-NEB) 2.5 MG-0.5 MG/3 ML  3 mL Nebulization Q6H PRN  
 HYDROmorphone (DILAUDID) syringe 0.25 mg  0.25 mg IntraVENous Q4H PRN  
 naloxone (NARCAN) injection 0.4 mg  0.4 mg IntraVENous PRN  
  ELECTROLYTE REPLACEMENT PROTOCOL - Potassium Renal Dosing  1 Each Other PRN  
 ELECTROLYTE REPLACEMENT PROTOCOL - Magnesium   1 Each Other PRN  
 ELECTROLYTE REPLACEMENT PROTOCOL  - Phosphorus Renal Dosing  1 Each Other PRN  
 ELECTROLYTE REPLACEMENT PROTOCOL - Calcium   1 Each Other PRN  pantoprazole (PROTONIX) tablet 40 mg  40 mg Oral ACB  melatonin tablet 3 mg  3 mg Oral QHS PRN  
 sodium chloride (NS) flush 5-10 mL  5-10 mL IntraVENous PRN  
 NOREPINephrine (LEVOPHED) 8 mg in 0.9% NS 250ml infusion  2-16 mcg/min IntraVENous TITRATE  acetaminophen (TYLENOL) tablet 650 mg  650 mg Oral Q4H PRN  
 levothyroxine (SYNTHROID) tablet 175 mcg  175 mcg Oral ACB  meropenem (MERREM) 1 g in 0.9% sodium chloride (MBP/ADV) 50 mL MBP  1 g IntraVENous P74T  
 folic acid (FOLVITE) 1 mg, thiamine (B-1) 200 mg in 0.9% sodium chloride 50 mL ivpb   IntraVENous DAILY  0.9% sodium chloride infusion  75 mL/hr IntraVENous CONTINUOUS Review of Systems - Negative except as in interval history Objective: 
Visit Vitals /71 Pulse (!) 106 Temp 96.1 °F (35.6 °C) Resp 24 Ht 5' 8\" (1.727 m) Wt 92 kg (202 lb 13.2 oz) SpO2 100% BMI 30.84 kg/m² Temp (24hrs), Av.4 °F (36.3 °C), Min:96.1 °F (35.6 °C), Max:98.1 °F (36.7 °C) General: Well developed, well nourished 80 y.o.  male sleeping soundly, not in distress. ENT: ENT exam normal, no neck nodes or sinus tenderness Head: normocephalic, without obvious abnormality Mouth:  mucous membranes moist, pharynx normal without lesions Neck: not examined today (yesterday: tender on direct pressure over spine and on karla and lateral flexion, and on turning head to either side) Cardio:  irregularly irregular rhythm Lungs: rhonchi bilateral and + expiratory wheezes Abdomen: soft, non-tender. Bowel sounds normal. No masses, no organomegaly. Extremities:  no redness or tenderness in the calves or thighs, no edema Lab results: 
 
Chemistry Recent Labs 10/11/19 
0345 10/10/19 
1341 10/10/19 
0328 10/09/19 
0958 10/09/19 
0300 *  --  106*  --  108*   --  139  --  136  
K 3.6 3.7 3.9  --  4.0  
  --  106  --  101 CO2 27  --  24  --  27 BUN 38*  --  41*  --  44* CREA 1.05  --  1.35*  --  1.63* CA 7.3*  --  7.7*  --  7.3* AGAP 5  --  9  --  8  
BUCR 36*  --  30*  --  27* *  --  596* 396* 401* TP 4.3*  --  4.8* 4.6* 4.7* ALB 1.8*  --  2.0* 1.8* 1.9*  
GLOB 2.5  --  2.8 2.8 2.8 AGRAT 0.7*  --  0.7* 0.6* 0.7* CBC w/ Diff Recent Labs 10/11/19 
0345 10/10/19 
0328 10/09/19 
0300 WBC 12.2 15.5* 23.4*  
RBC 2.58* 2.70* 2.48* HGB 7.9* 8.3* 7.7* HCT 23.9* 25.5* 23.4*  
* 102* 138 GRANS 90* 94* 95* LYMPH 4* 3* 3* EOS 1 0 0 Microbiology All Micro Results Procedure Component Value Units Date/Time CULTURE, BLOOD [[id number]]  (Abnormal)  (Susceptibility) Collected:  10/08/19 1301 Order Status:  Completed Specimen:  Blood Updated:  10/11/19 6743 Special Requests: --     
  NO SPECIAL REQUESTS 
RIGHT 
ARM 
  
  GRAM STAIN    
  AEROBIC BOTTLE GRAM NEGATIVE RODS SMEAR CALLED TO AND CORRECTLY REPEATED BY: DESHAWN COSBY RN,ICU, ON 10/9/19 AT 0335 TO JWS Culture result:    
  AEROBIC BOTTLE PASTEURELLA MULTOCIDA CULTURE, BLOOD [[id number]] Collected:  10/10/19 1237 Order Status:  Completed Specimen:  Blood Updated:  10/11/19 7465 Special Requests: PERIPHERAL Culture result: NO GROWTH AFTER 16 HOURS     
 CULTURE, BLOOD [[id number]] Collected:  10/10/19 1341 Order Status:  Completed Specimen:  Blood Updated:  10/11/19 0515 Special Requests: --     
  NO SPECIAL REQUESTS 
BLUE PORT Culture result: NO GROWTH AFTER 14 HOURS     
 CULTURE, BLOOD [[id number]] Collected:  10/08/19 2234 Order Status:  Completed Specimen:  Blood from Miscellaneous sample Updated:  10/11/19 0515 Special Requests: NO SPECIAL REQUESTS Culture result: NO GROWTH 2 DAYS     
 CULTURE, BODY FLUID [[id number]] Collected:  10/09/19 1200 Order Status:  Completed Specimen:  Synovial Fluid Updated:  10/10/19 1307 Special Requests: HIP  
  GRAM STAIN NO WBC'S SEEN     
   NO ORGANISMS SEEN Culture result: NO GROWTH AFTER 16 HOURS     
 CULTURE, URINE [[id number]] Collected:  10/08/19 1418 Order Status:  Completed Specimen:  Cath Urine Updated:  10/10/19 1016 Special Requests: NO SPECIAL REQUESTS Culture result: NO GROWTH 2 DAYS Matthew Scherer MD, [facility] 
[phone] 
10/11/2019  
9:02 AM

## 2019-10-11 NOTE — PROGRESS NOTES
1144: PT orders received and chart reviewed. Tachycardia while sleeping; 110s at rest. Not appropriate for mobility. Will follow up.  
1330: 2nd PT attempt. Remains with tachycardia at rest; 110s. Will follow up 10/12/2019. Thank you, Gabo Angel, PT, DPT Office Extension: [phone]

## 2019-10-11 NOTE — PROGRESS NOTES
Progress Note Patient: Tonya Werner               Sex: male          DOA: 10/8/2019 YOB: 1933      Age:  80 y.o.        LOS:  LOS: 3 days CHIEF COMPLAINT:  Altered mental status Assessment/Plan:  
 
Principal Problem: 
  Severe sepsis ([facility] (10/8/2019) Active Problems: 
  Atrial fibrillation ([facility] (6/2/2018) Hypothyroidism (6/2/2018) MACKENZIE (acute kidney injury) ([facility] (9/27/2019) Altered mental status (10/8/2019) Cellulitis of left knee (10/8/2019) PLAN: 
· Severe sepsis - secondary to Pasteurella multocida infection. H/o cat bite 4 weeks to RLE per daughter, when he came in with cellulitis and was treated. D/w Dr. Swapnil Cristina concern for persistent infection/abscess, he ordered nuclear gallium scan. · MACKENZIE - IV fluid hydration, monitor closely. Improving · AMS - Resolved. Suspect 2/2 metabolic encephalopathy from sepsis. Cont to monitor. · AFIB - Rate control, resume Elliquis for [facility] when there are no further procedures to be done · Elevated LFT - GI consulted, suspects 2/2 sepsis, appreciate recs · Hypothyroidism - Cont home levothyroxine · DVT ppx - SCD; resume Eliquis Subjective:  
 
Pt more somnolent today. Per daughter did not sleep well. He denies complaints. D/w daughter at bedside. Objective:  
 
Visit Vitals /88 Pulse (!) 104 Temp 97 °F (36.1 °C) Resp 23 Ht 5' 8\" (1.727 m) Wt 92 kg (202 lb 13.2 oz) SpO2 100% BMI 30.84 kg/m² Physical Exam: 
Gen:  oriented to person, place, not time Ears: hearing is intact Eyes: Icterus is not present Lungs: clear to auscultation bilaterally Heart: regular rate and rhythm, S1, S2 normal, no murmur, click, rub or gallop Gastrointestinal: soft, non-tender. Bowel sounds normal. No masses,  no organomegaly Neurological:  New Focal Deficits are not present Skin: diffuse ecchymosis. Face with small petechiae (per family is due to known actinic keratosis). Ext: skin overlying left knee is mildly erythematous improving Psychiatric:  Mood is stable Lab/Data Reviewed: 
CMP:  
Lab Results Component Value Date/Time  10/11/2019 03:45 AM  
 K 3.6 10/11/2019 03:45 AM  
  10/11/2019 03:45 AM  
 CO2 27 10/11/2019 03:45 AM  
 AGAP 5 10/11/2019 03:45 AM  
  (H) 10/11/2019 03:45 AM  
 BUN 38 (H) 10/11/2019 03:45 AM  
 CREA 1.05 10/11/2019 03:45 AM  
 GFRAA >60 10/11/2019 03:45 AM  
 GFRNA >60 10/11/2019 03:45 AM  
 CA 7.3 (L) 10/11/2019 03:45 AM  
 MG 2.5 10/11/2019 03:45 AM  
 PHOS 2.4 (L) 10/11/2019 03:45 AM  
 ALB 1.8 (L) 10/11/2019 03:45 AM  
 TP 4.3 (L) 10/11/2019 03:45 AM  
 GLOB 2.5 10/11/2019 03:45 AM  
 AGRAT 0.7 (L) 10/11/2019 03:45 AM  
 SGOT 66 (H) 10/11/2019 03:45 AM  
 ALT 48 10/11/2019 03:45 AM  
 
CBC:  
Lab Results Component Value Date/Time WBC 12.2 10/11/2019 03:45 AM  
 HGB 7.9 (L) 10/11/2019 03:45 AM  
 HCT 23.9 (L) 10/11/2019 03:45 AM  
  (L) 10/11/2019 03:45 AM  
 
 
Imaging Reviewed: 
Laura [facility] Result Date: 10/11/2019 EXAM: CHEST RADIOGRAPH, SINGLE VIEW CLINICAL INDICATION/HISTORY: Wheezing, shortness of breath COMPARISON: 10/8/2019 TECHNIQUE: Portable frontal view of the chest was obtained. _______________ FINDINGS: SUPPORT DEVICES: Right jugular central venous catheter, tip overlying distal SVC, unchanged. HEART AND MEDIASTINUM: Cardiomediastinal silhouette appears within normal limits. Normal caliber thoracic aorta. No central vascular congestion. LUNGS AND PLEURAL SPACES: Lungs are hypoinflated with bandlike subsegmental atelectasis throughout left lung base and right midlung. Probable small right pleural effusion with adjacent right basilar patchy opacity. No definite left pleural effusion. No pneumothorax. BONY THORAX AND SOFT TISSUES: No acute osseous abnormality. Partially visualized reverse right shoulder arthroplasty hardware. Degenerative changes of the left shoulder. _______________ IMPRESSION: Low lung volumes with small right pleural effusion and adjacent right basilar atelectasis/infiltrate. Initial preliminary report was provided to the ED by the on-call radiology resident.

## 2019-10-11 NOTE — PROGRESS NOTES
1124 
Auscultated upper airway wheezing upon pts chest field. Pt received a Duoneb wich had minimal effect on his wheezing. After treatment, pt was repositioned to more of a hyperextended airway. Pts wheezing slightly resolved.

## 2019-10-11 NOTE — PROGRESS NOTES
Pulmonary progress note History 80-year-old male presented to the emergency room on 10/8/2019 with altered mental status. He was found to be in severe sepsis with shock. White blood cell count was elevated, but there was no clear source of infection. Aspiration of the painful right hip did not suggest septic arthritis. Pasteurella is growing from a blood culture No new complaint. Denies chest pains Exam 
He is alert and oriented. Blood pressure 127/81, pulse (!) 113, temperature 97.8 °F (36.6 °C), resp. rate (!) 32, height 5' 8\" (1.727 m), weight 92 kg (202 lb 13.2 oz), SpO2 100 %. Gaze is conjugate. No accessory muscle use. Wheezing Irregular rhythm Sclera anicteric No cyanosis or clubbing Multiple facial and other skin lesions Labs 10/11/2019 WBC decreased to 12.2 with no bands. Bicarb 27 and anion gap 5. BUN 38 and creatinine improved to 1.05. Albumin 1.8. Alkaline phosphatase 479. AST 66 and ALT 48. Total bilirubin 1.2 
 
1/2 blood cultures are growing Pasteurella multocida Right hip is showing 23 WBCs with no crystals Net fluids in 5.8 L last 2 days. At home the patient takes Lasix daily Assessment Severe sepsis with shock, resolved Pasteurella multocida bacteremia No evidence of septic arthritis or gout and right hip Elevated alkaline phosphatase with slight increase in total bilirubin, AST and ALT.unlikely cholangitis Falls x3 within the last month. Etiology unclear Atrial fibrillation with rapid ventricular response On Eliquis Acute kidney injury resolved Hypoalbuminemia Volume overload Plan Ceftriaxone Decrease IV fluids Lasix daily. With albumin today. Consider continued use Activity as tolerated Consider rate control Continue CIWA given regular alcohol use. If the patient's delirium occurs principally at night, Haldol for sundowning

## 2019-10-12 NOTE — PROGRESS NOTES
Progress Note Patient: Sera Swann               Sex: male          DOA: 10/8/2019 YOB: 1933      Age:  80 y.o.        LOS:  LOS: 4 days CHIEF COMPLAINT:  Altered mental status Assessment/Plan:  
 
Principal Problem: 
  Severe sepsis ([facility] (10/8/2019) Active Problems: 
  Atrial fibrillation ([facility] (6/2/2018) Hypothyroidism (6/2/2018) MACKENZIE (acute kidney injury) ([facility] (9/27/2019) Altered mental status (10/8/2019) Cellulitis of left knee (10/8/2019) PLAN: 
· Severe sepsis - secondary to Pasteurella multocida infection. H/o cat bite 4 weeks to RLE per daughter, when he came in with cellulitis and was treated. D/w Dr. Randall Corley concern for persistent infection/abscess, he ordered nuclear gallium scan. · MACKENZIE - IV fluid hydration, monitor closely. Improving · AMS - Waxing and waning. Suspect 2/2 metabolic encephalopathy from sepsis. Cont to monitor. · AFIB - Rate control, resume Elliquis for [facility] when there are no further procedures to be done · Elevated LFT - GI consulted, suspects 2/2 sepsis, appreciate recs · Hypothyroidism - Cont home levothyroxine · ETOH abuse - CIWA ordered by PCCM. Per family last drink was 1 month ago. · DVT ppx - SCD; resume Eliquis Subjective:  
 
Pt appears uncomfortable, keeps eyes closed, mumbling he wants to go home. Family at bedside. Objective:  
 
Visit Vitals /68 Pulse (!) 109 Temp 99.1 °F (37.3 °C) Resp 28 Ht 5' 8\" (1.727 m) Wt 92 kg (202 lb 13.2 oz) SpO2 99% BMI 30.84 kg/m² Physical Exam: 
Ears: hearing is intact Eyes: Icterus is not present Lungs: clear to auscultation bilaterally Heart: regular rate and rhythm, S1, S2 normal, no murmur, click, rub or gallop Gastrointestinal: soft, non-tender. Bowel sounds normal. No masses,  no organomegaly Neurological:  New Focal Deficits are not present Skin: diffuse ecchymosis.  Face with small petechiae (per family is due to known actinic keratosis). Psychiatric:  Mood is stable Lab/Data Reviewed: 
CMP:  
Lab Results Component Value Date/Time  (H) 10/12/2019 04:00 AM  
 K 3.6 10/12/2019 04:00 AM  
  10/12/2019 04:00 AM  
 CO2 26 10/12/2019 04:00 AM  
 AGAP 9 10/12/2019 04:00 AM  
  (H) 10/12/2019 04:00 AM  
 BUN 31 (H) 10/12/2019 04:00 AM  
 CREA 0.88 10/12/2019 04:00 AM  
 GFRAA >60 10/12/2019 04:00 AM  
 GFRNA >60 10/12/2019 04:00 AM  
 CA 7.6 (L) 10/12/2019 04:00 AM  
 MG 2.1 10/12/2019 04:00 AM  
 PHOS 2.5 10/12/2019 04:00 AM  
 ALB 1.9 (L) 10/12/2019 04:00 AM  
 TP 4.6 (L) 10/12/2019 04:00 AM  
 GLOB 2.7 10/12/2019 04:00 AM  
 AGRAT 0.7 (L) 10/12/2019 04:00 AM  
 SGOT 55 (H) 10/12/2019 04:00 AM  
 ALT 42 10/12/2019 04:00 AM  
 
CBC:  
Lab Results Component Value Date/Time WBC 11.9 10/12/2019 04:00 AM  
 HGB 8.0 (L) 10/12/2019 04:00 AM  
 HCT 24.6 (L) 10/12/2019 04:00 AM  
  (L) 10/12/2019 04:00 AM  
 
 
Imaging Reviewed: 
No results found.

## 2019-10-12 NOTE — CONSULTS
[facility] Name: Cheryl Davalos : 1933 MRN: [id number] Date: 10/12/2019   
[]I have reviewed the flowsheet and previous days notes. []The patient is unable to give any meaningful history or review of systems because the patient is: 
[]Intubated []Sedated  
[]Unresponsive []The patient is critically ill on     
[]Mechanical ventilation []Pressors []BiPAP []  
S: He states he is uncomfortable and has mild abd discomfort. Interviewed with his daughter at bedside. Dr Tianna Moura joined us to see him ROS:Review of systems not obtained due to patient factors. Events and notes from last 24 hours reviewed. Care plan discussed on multidisciplinary rounds. Vital Signs:   
Visit Vitals /82 Pulse (!) 121 Temp 98 °F (36.7 °C) Resp 28 Ht 5' 8\" (1.727 m) Wt 92 kg (202 lb 13.2 oz) SpO2 100% BMI 30.84 kg/m² O2 Device: Room air O2 Flow Rate (L/min): 3 l/min Temp (24hrs), Av °F (37.2 °C), Min:97.7 °F (36.5 °C), Max:100.4 °F (38 °C) Intake/Output:  
Last shift:      10/12 0701 - 10/12 1900 In: 125 [I.V.:125] Out: 75 [Urine:75] Last 3 shifts: 10/10 1901 - 10/12 07 In: 1950 [I.V.:1950] Out: Rannie Blunt [[id number]] Intake/Output Summary (Last 24 hours) at 10/12/2019 1005 Last data filed at 10/12/2019 1395 Gross per 24 hour Intake 900 ml Output 3650 ml Net -2750 ml Hemodynamics:  
   
:   
 
Ventilator Settings: 
  
  
  
  
 
Physical Exam:  
 General: alert, afebrile and moderately ill HEENT: Normal 
 Neck: No abnormally enlarged lymph nodes. Chest: increased AP diameter Lungs: normal air entry  no dullness/ tenderness/ rash Heart: S1S2 present Abdomen: non distended, bowel sounds normoactive, tympanic, abdomen is soft without significant tenderness, masses, organomegaly or guarding Extremity: Trace edema Neuro: alert Skin: No changes/ multiple ecchymoses DATA:  
 Current Facility-Administered Medications Medication Dose Route Frequency  potassium chloride 10 mEq in 100 ml IVPB  10 mEq IntraVENous Q1H  
 cefTRIAXone (ROCEPHIN) 2 g in 0.9% sodium chloride (MBP/ADV) 50 mL MBP  2 g IntraVENous A29T  
 folic acid (FOLVITE) tablet 1 mg  1 mg Oral DAILY  thiamine mononitrate (B-1) tablet 100 mg  100 mg Oral DAILY  furosemide (LASIX) injection 40 mg  40 mg IntraVENous DAILY  albuterol-ipratropium (DUO-NEB) 2.5 MG-0.5 MG/3 ML  3 mL Nebulization QID RT  
 metoprolol tartrate (LOPRESSOR) tablet 12.5 mg  12.5 mg Oral BID  
 apixaban (ELIQUIS) tablet 5 mg  5 mg Oral BID  sodium chloride (NS) flush 5-40 mL  5-40 mL IntraVENous Q8H  
 sodium chloride (NS) flush 5-40 mL  5-40 mL IntraVENous PRN  
 LORazepam (ATIVAN) tablet 1 mg  1 mg Oral Q1H PRN Or  
 LORazepam (ATIVAN) injection 1 mg  1 mg IntraVENous Q1H PRN  
 LORazepam (ATIVAN) tablet 2 mg  2 mg Oral Q1H PRN Or  
 LORazepam (ATIVAN) injection 2 mg  2 mg IntraVENous Q1H PRN  
 LORazepam (ATIVAN) injection 3 mg  3 mg IntraVENous Q15MIN PRN  
 desonide (TRIDESILON) 0.05 % cream - patient supplied (Patient Supplied)   Topical BID  albuterol-ipratropium (DUO-NEB) 2.5 MG-0.5 MG/3 ML  3 mL Nebulization Q6H PRN  
 HYDROmorphone (DILAUDID) syringe 0.25 mg  0.25 mg IntraVENous Q4H PRN  
 naloxone (NARCAN) injection 0.4 mg  0.4 mg IntraVENous PRN  
 ELECTROLYTE REPLACEMENT PROTOCOL - Potassium Renal Dosing  1 Each Other PRN  
 ELECTROLYTE REPLACEMENT PROTOCOL - Magnesium   1 Each Other PRN  
 ELECTROLYTE REPLACEMENT PROTOCOL  - Phosphorus Renal Dosing  1 Each Other PRN  
 ELECTROLYTE REPLACEMENT PROTOCOL - Calcium   1 Each Other PRN  pantoprazole (PROTONIX) tablet 40 mg  40 mg Oral ACB  melatonin tablet 3 mg  3 mg Oral QHS PRN  
 sodium chloride (NS) flush 5-10 mL  5-10 mL IntraVENous PRN  
 NOREPINephrine (LEVOPHED) 8 mg in 0.9% NS 250ml infusion  2-16 mcg/min IntraVENous TITRATE  acetaminophen (TYLENOL) tablet 650 mg  650 mg Oral Q4H PRN  
 levothyroxine (SYNTHROID) tablet 175 mcg  175 mcg Oral ACB  
 0.9% sodium chloride infusion  25 mL/hr IntraVENous CONTINUOUS Telemetry: [x]Sinus []A-flutter []Paced []A-fib []Multiple PVCs Labs: 
Recent Labs 10/12/19 
0400 10/11/19 
0345 10/10/19 
5792 WBC 11.9 12.2 15.5* HGB 8.0* 7.9* 8.3* HCT 24.6* 23.9* 25.5*  
* 113* 102* Recent Labs 10/12/19 
0400 10/11/19 
1102 10/11/19 
0345  10/10/19 
5565 *  --  142  --  139  
K 3.6 3.9 3.6   < > 3.9   --  110  --  106 CO2 26  --  27  --  24 *  --  103*  --  106* BUN 31*  --  38*  --  41* CREA 0.88  --  1.05  --  1.35* CA 7.6*  --  7.3*  --  7.7* MG 2.1  --  2.5  --  2.6 PHOS 2.5 2.7 2.4*  --  3.4 ALB 1.9*  --  1.8*  --  2.0*  
SGOT 55*  --  66*  --  126* ALT 42  --  48  --  61  
 < > = values in this interval not displayed. No results for input(s): PH, PCO2, PO2, HCO3, FIO2 in the last 72 hours. Imaging: 
[]I have personally reviewed the patients radiographs []Radiographs reviewed with radiologist 
 [x] No CXR study available for review today []No change from prior, tubes and lines in adequate position []Improved   []Worsening IMPRESSION:  
Patient Active Problem List  
Diagnosis Code  Malignant neoplasm of prostate ([facility] [address] C61  Hypertension I10  
 GERD (gastroesophageal reflux disease) K21.9  Glaucoma H40.9  Glaucoma H40.9  Dysphagia R13.10  Encounter for colonoscopy due to history of adenomatous colonic polyps Z12.11, Z86.010  
 Non-pressure chronic ulcer of right calf with fat layer exposed ([facility] [address] L55.216  Laceration of right lower leg with complication O21.118N  Edema R60.9  Pneumonia J18.9  Atrial fibrillation (HCC) I48.91  
 Hypothyroidism E03.9  Sepsis ([facility] [address] A41.9  Cellulitis L03.90  
 HTN (hypertension) Y50  
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 Etiology unclear Atrial fibrillation with rapid ventricular response On Eliquis Acute kidney injury resolved Hypoalbuminemia Volume overload ·   
· PLAN:  
Ceftriaxone Decrease IV fluids Lasix daily. With albumin today. Consider continued use Activity as tolerated Consider rate control Continue CIWA given regular alcohol use. If the patient's delirium occurs principally at night, Haldol for Delirium: Patient's daughter educated on Delirium 
  The patient is: [x] acutely ill Risk of deterioration: [] moderate  
 [] critically ill  [x] high []See my orders for details My assessment, plan of care, findings, medications, side effects etc were discussed with: 
[x]nursing []PT/OT   
[]respiratory therapy [x] and [place] [x]family: Daughter []  
 
[x]Total critical care time exclusive of procedures  25 minutes Mike Dia MD

## 2019-10-12 NOTE — PROGRESS NOTES
4401: OT orders received and completed chart review. Pt with tachycardia (121) at rest at 8am, not appropriate for mobility at this time. Will follow up. 
 
1107: Pt continues with tachycardia at rest (109) at 11am. Will follow up tomorrow.  
 
Armando Heard, OTR/L

## 2019-10-12 NOTE — PROGRESS NOTES
[facility] 
                                             (A Division of [address] [place] [address]) Follow-up Note Date of Admission: 10/8/2019     Date of Note:  10/12/2019 Summary:   
 
81 y/o  male HTN, AF, GERD, DJD s/p L TKR, Prostate CA, Hypothyroidism, h/o pancreatitis, ETOH consumption adm 10/8/2019 w/ chills, weakness, altered mental status. Has neck pain x 1 mo. Had R leg cellulitis after cat bite (not scratch) 1 mo PTA - resolved w/ abx. Then micturition syncope x 3, hurt knee, progressing generalized weakness, then 1 day PTA fever, chills. Adm w/ 100.5, WBC 29.5, 13% bands. No uti, pneumonia, unrevealing CTAP. Blcx +GNR 1 of 2 Interval History: 
 
Awake but looks miserable. Acknowledges he is having abdominal pain but not speaking much. Current Antimicrobials: Prior Antimicrobials Ceftriaxone 10/11 - 1 Vancomycin 10/8 - 2  Cefepime, Levoflox 10/8 - 0 Meropenem 10/9 - 2  
  
  
Assessment: Plan:  
Pasteurella BSI 
- 1 of 2 blood cultures 10/8 + P multocida sens TMP-SMX, Ceftriaxone, Levofloxacin, PCN, TCN 
- No active cellulitis. R leg cellulitis following cat bite in Early September resolved with TMP-SMX then Keflex 
- ?epidural abscess or discitis/OM?, R leg OM? 
- ?endocarditis - rpt blcx 10/10 NTGD x 2 
- Gallium injected 10/11 -> monitor rpt blcx from 10/10 
-> Ceftriaxone 2 gm IV q 24 hours  
-> Ga scan in progress 
-> CARLOS if blcx still + Septic shock 
- due to above 
- improving. Vasopressors stopped 10/10.   
- resolved -> abx as above MACKENZIE 
- resolved Worsening alkaline phosphatase 
- from sepsis, ?bone infection / met (doubt) 
- improving H/o PCN allergy (rash)  
- tolerated keflex 
- tolerating Ceftriaxone Recent R leg cellulitis following cat bite (not scratch) HTN    
AF    
GERD    
DJD s/p L TKR    
Prostate CA    
Hypothyroidism    
h/o pancreatitis    
ETOH consumption    
  
 Microbiology: 
  
10/8      blcx Pasteurella multocida 1 of 2 
             urcx NG 
10/9      R hip asp gs no wbc, NOS, cx NGTD 
  
Lines / Catheters: RIJ CVL Patient Active Problem List  
Diagnosis Code  Malignant neoplasm of prostate ([facility] C61  Hypertension I10  
 GERD (gastroesophageal reflux disease) K21.9  Glaucoma H40.9  Glaucoma H40.9  Dysphagia R13.10  Encounter for colonoscopy due to history of adenomatous colonic polyps Z12.11, Z86.010  
 Non-pressure chronic ulcer of right calf with fat layer exposed ([facility] M64.576  Laceration of right lower leg with complication I09.478X  Edema R60.9  Pneumonia J18.9  Atrial fibrillation (HCC) I48.91  
 Hypothyroidism E03.9  Sepsis ([facility] A41.9  Cellulitis L03.90  
 HTN (hypertension) O30  
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  
 cefTRIAXone (ROCEPHIN) 2 g in 0.9% sodium chloride (MBP/ADV) 50 mL MBP  2 g IntraVENous U02Y  
 folic acid (FOLVITE) tablet 1 mg  1 mg Oral DAILY  thiamine mononitrate (B-1) tablet 100 mg  100 mg Oral DAILY  furosemide (LASIX) injection 40 mg  40 mg IntraVENous DAILY  albuterol-ipratropium (DUO-NEB) 2.5 MG-0.5 MG/3 ML  3 mL Nebulization QID RT  
 metoprolol tartrate (LOPRESSOR) tablet 12.5 mg  12.5 mg Oral BID  
 apixaban (ELIQUIS) tablet 5 mg  5 mg Oral BID  sodium chloride (NS) flush 5-40 mL  5-40 mL IntraVENous Q8H  
 sodium chloride (NS) flush 5-40 mL  5-40 mL IntraVENous PRN  
 LORazepam (ATIVAN) tablet 1 mg  1 mg Oral Q1H PRN  Or  
  LORazepam (ATIVAN) injection 1 mg  1 mg IntraVENous Q1H PRN  
 LORazepam (ATIVAN) tablet 2 mg  2 mg Oral Q1H PRN Or  
 LORazepam (ATIVAN) injection 2 mg  2 mg IntraVENous Q1H PRN  
 LORazepam (ATIVAN) injection 3 mg  3 mg IntraVENous Q15MIN PRN  
 desonide (TRIDESILON) 0.05 % cream - patient supplied (Patient Supplied)   Topical BID  albuterol-ipratropium (DUO-NEB) 2.5 MG-0.5 MG/3 ML  3 mL Nebulization Q6H PRN  
 HYDROmorphone (DILAUDID) syringe 0.25 mg  0.25 mg IntraVENous Q4H PRN  
 naloxone (NARCAN) injection 0.4 mg  0.4 mg IntraVENous PRN  
 ELECTROLYTE REPLACEMENT PROTOCOL - Potassium Renal Dosing  1 Each Other PRN  
 ELECTROLYTE REPLACEMENT PROTOCOL - Magnesium   1 Each Other PRN  
 ELECTROLYTE REPLACEMENT PROTOCOL  - Phosphorus Renal Dosing  1 Each Other PRN  
 ELECTROLYTE REPLACEMENT PROTOCOL - Calcium   1 Each Other PRN  pantoprazole (PROTONIX) tablet 40 mg  40 mg Oral ACB  melatonin tablet 3 mg  3 mg Oral QHS PRN  
 sodium chloride (NS) flush 5-10 mL  5-10 mL IntraVENous PRN  
 NOREPINephrine (LEVOPHED) 8 mg in 0.9% NS 250ml infusion  2-16 mcg/min IntraVENous TITRATE  acetaminophen (TYLENOL) tablet 650 mg  650 mg Oral Q4H PRN  
 levothyroxine (SYNTHROID) tablet 175 mcg  175 mcg Oral ACB  
 0.9% sodium chloride infusion  25 mL/hr IntraVENous CONTINUOUS Review of Systems - Negative except as in interval history Objective: 
Visit Vitals /82 Pulse (!) 121 Temp 98 °F (36.7 °C) Resp 28 Ht 5' 8\" (1.727 m) Wt 92 kg (202 lb 13.2 oz) SpO2 100% BMI 30.84 kg/m² Temp (24hrs), Av °F (37.2 °C), Min:97.7 °F (36.5 °C), Max:100.4 °F (38 °C) General: Well developed, well nourished 80 y.o.  male sleeping soundly, not in distress. ENT: ENT exam normal, no neck nodes or sinus tenderness Head: normocephalic, without obvious abnormality Mouth:  mucous membranes moist, pharynx normal without lesions Neck: not examined today (yesterday: tender on direct pressure over spine and on karla and lateral flexion, and on turning head to either side) Cardio:  irregularly irregular rhythm Lungs: rhonchi bilateral and + expiratory wheezes Abdomen: soft, non-tender. Bowel sounds normal. No masses, no organomegaly. Extremities:  no redness or tenderness in the calves or thighs, no edema Lab results: 
 
Chemistry Recent Labs 10/12/19 
0400 10/11/19 
1102 10/11/19 
0345  10/10/19 
3783 *  --  103*  --  106* *  --  142  --  139  
K 3.6 3.9 3.6   < > 3.9   --  110  --  106 CO2 26  --  27  --  24 BUN 31*  --  38*  --  41* CREA 0.88  --  1.05  --  1.35* CA 7.6*  --  7.3*  --  7.7* AGAP 9  --  5  --  9  
BUCR 35*  --  36*  --  30* *  --  479*  --  596* TP 4.6*  --  4.3*  --  4.8* ALB 1.9*  --  1.8*  --  2.0*  
GLOB 2.7  --  2.5  --  2.8 AGRAT 0.7*  --  0.7*  --  0.7*  
 < > = values in this interval not displayed. CBC w/ Diff Recent Labs 10/12/19 
0400 10/11/19 
0345 10/10/19 
3480 WBC 11.9 12.2 15.5*  
RBC 2.63* 2.58* 2.70* HGB 8.0* 7.9* 8.3* HCT 24.6* 23.9* 25.5*  
* 113* 102* GRANS 83* 90* 94* LYMPH 8* 4* 3*  
EOS 0 1 0 Microbiology All Micro Results Procedure Component Value Units Date/Time CULTURE, BLOOD [[id number]] Collected:  10/10/19 7574 Order Status:  Completed Specimen:  Blood Updated:  10/12/19 2382 Special Requests: PERIPHERAL Culture result: NO GROWTH 2 DAYS     
 CULTURE, BLOOD [[id number]] Collected:  10/10/19 1341 Order Status:  Completed Specimen:  Blood Updated:  10/12/19 9650 Special Requests: --     
  NO SPECIAL REQUESTS 
BLUE PORT Culture result: NO GROWTH 2 DAYS     
 CULTURE, BLOOD [[id number]] Collected:  10/08/19 2234 Order Status:  Completed Specimen:  Blood from Miscellaneous sample Updated:  10/12/19 5283 Special Requests: NO SPECIAL REQUESTS Culture result: NO GROWTH 3 DAYS     
 CULTURE, BODY FLUID [[id number]] Collected:  10/09/19 1200 Order Status:  Completed Specimen:  Synovial Fluid Updated:  10/12/19 0037 Special Requests: HIP  
  GRAM STAIN NO WBC'S SEEN     
   NO ORGANISMS SEEN Culture result: NO GROWTH 3 DAYS     
 CULTURE, BLOOD [[id number]]  (Abnormal)  (Susceptibility) Collected:  10/08/19 1301 Order Status:  Completed Specimen:  Blood Updated:  10/11/19 0825 Special Requests: --     
  NO SPECIAL REQUESTS 
RIGHT 
ARM 
  
  GRAM STAIN    
  AEROBIC BOTTLE GRAM NEGATIVE RODS SMEAR CALLED TO AND CORRECTLY REPEATED BY: DESHAWN COSBY RN,ICU, ON 10/9/19 AT 0335 TO [facility] Culture result:    
  AEROBIC BOTTLE PASTEURELLA MULTOCIDA CULTURE, URINE [[id number]] Collected:  10/08/19 1418 Order Status:  Completed Specimen:  Cath Urine Updated:  10/10/19 1016 Special Requests: NO SPECIAL REQUESTS Culture result: NO GROWTH 2 DAYS Dora Michael MD, [facility] Infect[facility] 
[phone] 
10/12/2019  
9:02 AM

## 2019-10-12 NOTE — PROGRESS NOTES
Physical Exam  
Skin:  
 
  
0800 Assessment completed. Opens eyes with verbal stimuli. Mumbles and with incomprehensible words. Moves all extremities. Afib on the monitor. On Room air. Lungs coarse and diminished. Abdomen soft; non tender. NPO. For swallow eval. 
Merino intact with adequate amount of urine. Skin: as above. 1245 Off unit for Gallium Scan. VSS during scan. Pt is restless at times. 1404 Back to unit. Connected to bedside monitor. 1600 Reassessment done. Pt is restless at times. 1714 Moaning and groaning. Dilaudid IVP given. 1800 Pt is resting  But easily awaken. 1920 Bedside and Verbal shift change report given to 1304 [facility] (oncoming nurse) by Juan M Aragon RN 
 (offgoing nurse). Report included the following information SBAR, Kardex, Intake/Output, MAR and Cardiac Rhythm AFIB.

## 2019-10-12 NOTE — PROGRESS NOTES
PROGRESS NOTE PATIENT:  Sera Swann MRN: [id number] [facility]/[address], [address] 
         10/12/2019, 11:59 AM 
   
I 
Mr. Angel Quijano is a 80 y.o. male who is being seen for elevated liver enzymes SUBJECTIVE: 
Pt resting. Daughter and nurse at bedside. No acute GI  issues per nurse. OBJECTIVE: 
Patient Vitals for the past 24 hrs: 
 Temp Pulse Resp BP SpO2  
10/12/19 1142     99 % 10/12/19 1100  (!) 109 28 109/68 99 % 10/12/19 1000  (!) 110 23 113/79 100 % 10/12/19 0900  (!) 115 22 99/80 99 % 10/12/19 0800 99.1 °F (37.3 °C) (!) 121 28 131/82 100 % 10/12/19 0735     100 % 10/12/19 0700  (!) 107 25 140/72 100 % 10/12/19 0600  (!) 107 (!) 35 137/60 100 % 10/12/19 0500  (!) 108 27 127/78 99 % 10/12/19 0400 98 °F (36.7 °C) (!) 101 28 (!) 134/93 100 % 10/12/19 0300  (!) 104 30 116/78 99 % 10/12/19 0200  (!) 103 25 119/74 99 % 10/12/19 0100  (!) 108 20 120/64 99 % 10/12/19 0043 97.7 °F (36.5 °C)      
10/12/19 0000 97.7 °F (36.5 °C) (!) 104 (!) 31 120/69 99 % 10/11/19 2300  (!) 104 16 107/68 97 % 10/11/19 2200  (!) 110 26 120/53 96 % 10/11/19 2107 100 °F (37.8 °C)      
10/11/19 2100  (!) 110 25 129/55 100 % 10/11/19 2000 100 °F (37.8 °C) (!) 105 (!) 34 129/55 100 % 10/11/19 1900  (!) 109 24 110/67 100 % 10/11/19 1819 100.4 °F (38 °C)      
10/11/19 1800  (!) 111 25 114/71 100 % 10/11/19 1649     100 % 10/11/19 1600 100.1 °F (37.8 °C) (!) 117 24 116/82 100 % 10/11/19 1501  (!) 124 26 110/86 100 % 10/11/19 1500  (!) 121 28  100 % 10/11/19 1400  (!) 106 26 129/68 100 % 10/11/19 1304  (!) 113  127/81   
10/11/19 1300  (!) 115 29 127/81 100 % 10/11/19 1200 97.8 °F (36.6 °C) (!) 113 (!) 32 119/64 100 % Intake/Output Summary (Last 24 hours) at 10/12/2019 1159 Last data filed at 10/12/2019 1138 Gross per 24 hour Intake 925 ml Output 4860 ml Net -3935 ml Gen: NAD, resting Heent: No pallor, icterus Abd  : Soft, non tender, BS +, No masses felt. Labs: Results:  
Chemistry Recent Labs 10/12/19 
0400 10/11/19 
1102 10/11/19 
0345  10/10/19 
6867 *  --  103*  --  106* *  --  142  --  139  
K 3.6 3.9 3.6   < > 3.9   --  110  --  106 CO2 26  --  27  --  24 BUN 31*  --  38*  --  41* CREA 0.88  --  1.05  --  1.35* CA 7.6*  --  7.3*  --  7.7* AGAP 9  --  5  --  9  
BUCR 35*  --  36*  --  30* *  --  479*  --  596* TP 4.6*  --  4.3*  --  4.8* ALB 1.9*  --  1.8*  --  2.0*  
GLOB 2.7  --  2.5  --  2.8 AGRAT 0.7*  --  0.7*  --  0.7*  
 < > = values in this interval not displayed. Estimated Creatinine Clearance: 66.3 mL/min (based on SCr of 0.88 mg/dL). CBC w/Diff Recent Labs 10/12/19 
0400 10/11/19 
0345 10/10/19 
9715 WBC 11.9 12.2 15.5*  
RBC 2.63* 2.58* 2.70* HGB 8.0* 7.9* 8.3* HCT 24.6* 23.9* 25.5*  
* 113* 102* GRANS 83* 90* 94* LYMPH 8* 4* 3*  
EOS 0 1 0 Cardiac Enzymes No results for input(s): CPK, CKND1, CHAYA in the last 72 hours. No lab exists for component: Corine Riis Coagulation No results for input(s): PTP, INR, APTT, INREXT in the last 72 hours. Hepatitis Panel Lab Results Component Value Date/Time Hepatitis A, IgM NEGATIVE  10/08/2019 01:01 PM  
 Hepatitis B surface Ag <0.10 10/08/2019 01:01 PM  
 Hepatitis B core, IgM NEGATIVE  10/08/2019 01:01 PM  
 Hepatitis C virus Ab 0.03 10/08/2019 01:01 PM  
  
Amylase Lipase Liver Enzymes Recent Labs 10/12/19 
0400 10/11/19 
0345 10/10/19 
8878 TP 4.6* 4.3* 4.8* ALB 1.9* 1.8* 2.0*  
TBILI 1.2* 1.2* 1.6* * 479* 596* SGOT 55* 66* 126* ALT 42 48 61 Thyroid Studies No results for input(s): T4, T3U, TSH, TSHEXT in the last 72 hours. No lab exists for component: T3RU Pathology pathology Allergies Allergen Reactions  Adhesive Other (comments) Skin irritation  Darvon [Propoxyphene] Hives, Myalgia and Other (comments) Joint pain  Penicillins Rash and Itching Current Facility-Administered Medications Medication Dose Route Frequency  metoprolol (LOPRESSOR) injection 1.25 mg  1.25 mg IntraVENous Q6H  
 pantoprazole (PROTONIX) injection 40 mg  40 mg IntraVENous DAILY  cefTRIAXone (ROCEPHIN) 2 g in 0.9% sodium chloride (MBP/ADV) 50 mL MBP  2 g IntraVENous H90P  
 folic acid (FOLVITE) tablet 1 mg  1 mg Oral DAILY  thiamine mononitrate (B-1) tablet 100 mg  100 mg Oral DAILY  furosemide (LASIX) injection 40 mg  40 mg IntraVENous DAILY  albuterol-ipratropium (DUO-NEB) 2.5 MG-0.5 MG/3 ML  3 mL Nebulization QID RT  
 apixaban (ELIQUIS) tablet 5 mg  5 mg Oral BID  sodium chloride (NS) flush 5-40 mL  5-40 mL IntraVENous Q8H  
 sodium chloride (NS) flush 5-40 mL  5-40 mL IntraVENous PRN  
 LORazepam (ATIVAN) tablet 1 mg  1 mg Oral Q1H PRN Or  
 LORazepam (ATIVAN) injection 1 mg  1 mg IntraVENous Q1H PRN  
 LORazepam (ATIVAN) tablet 2 mg  2 mg Oral Q1H PRN Or  
 LORazepam (ATIVAN) injection 2 mg  2 mg IntraVENous Q1H PRN  
 LORazepam (ATIVAN) injection 3 mg  3 mg IntraVENous Q15MIN PRN  
 desonide (TRIDESILON) 0.05 % cream - patient supplied (Patient Supplied)   Topical BID  albuterol-ipratropium (DUO-NEB) 2.5 MG-0.5 MG/3 ML  3 mL Nebulization Q6H PRN  
 HYDROmorphone (DILAUDID) syringe 0.25 mg  0.25 mg IntraVENous Q4H PRN  
 naloxone (NARCAN) injection 0.4 mg  0.4 mg IntraVENous PRN  
 ELECTROLYTE REPLACEMENT PROTOCOL - Potassium Renal Dosing  1 Each Other PRN  
 ELECTROLYTE REPLACEMENT PROTOCOL - Magnesium   1 Each Other PRN  
 ELECTROLYTE REPLACEMENT PROTOCOL  - Phosphorus Renal Dosing  1 Each Other PRN  
 ELECTROLYTE REPLACEMENT PROTOCOL - Calcium   1 Each Other PRN  
 melatonin tablet 3 mg  3 mg Oral QHS PRN  
 sodium chloride (NS) flush 5-10 mL  5-10 mL IntraVENous PRN  
  NOREPINephrine (LEVOPHED) 8 mg in 0.9% NS 250ml infusion  2-16 mcg/min IntraVENous TITRATE  acetaminophen (TYLENOL) tablet 650 mg  650 mg Oral Q4H PRN  
 levothyroxine (SYNTHROID) tablet 175 mcg  175 mcg Oral ACB  
 0.9% sodium chloride infusion  25 mL/hr IntraVENous CONTINUOUS  
 
 
ASSESSMENT: 
1. Elevated liver enzymes most likely secondary to sepsis. Coming down. CT shows no biliary obstruction. S/P cholecystectomy 2. Severe sepsis on antibiotics 3. RLE cellulitis RECOMMENDATIONS:  
1. Cont management of sepsis 2. No further GI tests recommended at this time 3. Monitor LFT's Will sign off . Please call us back as needed. Thank you.   
 
Beth Cary MD

## 2019-10-12 NOTE — PROGRESS NOTES
2100: received pt from [facility], [address] [facility], pt resting in bed quietly, side rails raised x3, bed in lowest position, VSS. 
 
2130: Pt alert to place only, decreased command following, temp=101, crushed tylenol given and ice packs applied. 0450: Pt more awake, slightly more understandable. Stated he \"wants to go home. \" and he \"will pay you to let him go home. \" 
 
0700: Bedside and Verbal shift change report given to Deforest Bennington (oncoming nurse) by Shavon De La Torre 
 (offgoing nurse). Report included the following information SBAR, Kardex, ED Summary, Intake/Output, MAR, Recent Results, Cardiac Rhythm AFIB and Alarm Parameters .

## 2019-10-12 NOTE — PROGRESS NOTES
Problem: Pain Goal: *Control of Pain Outcome: Progressing Towards Goal 
Goal: *PALLIATIVE CARE:  Alleviation of Pain Outcome: Progressing Towards Goal 
  
Problem: Falls - Risk of 
Goal: *Absence of Falls Description Document Dylan Goldstein Fall Risk and appropriate interventions in the flowsheet. Outcome: Progressing Towards Goal 
Note:  
Fall Risk Interventions: 
  
 
Mentation Interventions: Adequate sleep, hydration, pain control Medication Interventions: Bed/chair exit alarm Elimination Interventions: Bed/chair exit alarm History of Falls Interventions: Bed/chair exit alarm Problem: Patient Education: Go to Patient Education Activity Goal: Patient/Family Education Outcome: Progressing Towards Goal 
  
Problem: Pressure Injury - Risk of 
Goal: *Prevention of pressure injury Description Document Dionicio Scale and appropriate interventions in the flowsheet. Outcome: Progressing Towards Goal 
Note:  
Pressure Injury Interventions: 
Sensory Interventions: Assess changes in LOC Moisture Interventions: Absorbent underpads Activity Interventions: Pressure redistribution bed/mattress(bed type) Mobility Interventions: HOB 30 degrees or less Nutrition Interventions: Document food/fluid/supplement intake Friction and Shear Interventions: Lift sheet Problem: Discharge Planning Goal: *Discharge to safe environment Outcome: Progressing Towards Goal 
  
Problem: Delirium Treatment Goal: *Level of consciousness restored to baseline Outcome: Progressing Towards Goal 
Goal: *Level of environmental perceptions restored to baseline Outcome: Progressing Towards Goal 
Goal: *Sensory perception restored to baseline Outcome: Progressing Towards Goal 
Goal: *Emotional stability restored to baseline Outcome: Progressing Towards Goal 
Goal: *Functional assessment restored to baseline Outcome: Progressing Towards Goal 
Goal: *Absence of falls Outcome: Progressing Towards Goal 
 Goal: *Will remain free of delirium, CAM Score negative Outcome: Progressing Towards Goal 
Goal: *Cognitive status will be restored to baseline Outcome: Progressing Towards Goal 
Goal: Interventions Outcome: Progressing Towards Goal 
  
Problem: Patient Education: Go to Patient Education Activity Goal: Patient/Family Education Outcome: Progressing Towards Goal 
  
Problem: Nutrition Deficit Goal: *Adequate nutrition Outcome: Progressing Towards Goal

## 2019-10-12 NOTE — PROGRESS NOTES
PHYSICAL THERAPY NOTE 
 
10:31AM  Chart reviewed.  bpm taken at 8am. 
 
12:29PM Checked on patient. HR noted to be 105-113bpm at rest.  Will hold off PT today and check back on patient tomorrow. Lacie Scherer.  Lilyan Phalen

## 2019-10-12 NOTE — PROGRESS NOTES
SLP NOTE - 
 
9008: ST orders received and chart review completed. ST attempting eval; however, pt is TACO at nuclear medicine. SLP will follow up next date. Thank you for this referral. 
 
Poonam Christiansen M.S., CCC-SLP Speech-Language Pathologist

## 2019-10-13 NOTE — PROGRESS NOTES
TRANSFER - IN REPORT: 
 
Verbal report received from [address] RN(name) on [address]  being received from ICU (unit) for routine progression of care Report consisted of patients Situation, Background, Assessment and  
Recommendations(SBAR). Information from the following report(s) SBAR, Kardex, MAR and Quality Measures was reviewed with the receiving nurse. Opportunity for questions and clarification was provided. Assessment will be completed upon patients arrival to unit and care assumed. 0720: pt arrived on unit. Pt not verbal, not answering assessment questions. per [address] ICU RN  Report, this is pt's baseline with intermittent lucid episodes. Care assumed at this time. Visit Vitals /73 Pulse (!) 102 Temp 98.2 °F (36.8 °C) Resp 24 Ht 5' 8\" (1.727 m) Wt 91.1 kg (200 lb 12.8 oz) SpO2 93% BMI 30.53 kg/m²  
 
0305. Bedside shift change report given to [address] [place] [address] (oncoming nurse) by Torrie Philip RN (offgoing nurse). Report included the following information SBAR, Intake/Output, MAR and Quality Measures. Opportunity for questions and clarification provided.

## 2019-10-13 NOTE — PROGRESS NOTES
Problem: Pain Goal: *Control of Pain Outcome: Progressing Towards Goal 
Goal: *PALLIATIVE CARE:  Alleviation of Pain Outcome: Progressing Towards Goal 
  
Problem: Falls - Risk of 
Goal: *Absence of Falls Description Document Phillip Markell Fall Risk and appropriate interventions in the flowsheet. Outcome: Progressing Towards Goal 
Note:  
Fall Risk Interventions: 
  
 
Mentation Interventions: Door open when patient unattended, Evaluate medications/consider consulting pharmacy, Bed/chair exit alarm Medication Interventions: Bed/chair exit alarm, Evaluate medications/consider consulting pharmacy Elimination Interventions: Bed/chair exit alarm, Call light in reach History of Falls Interventions: Door open when patient unattended, Evaluate medications/consider consulting pharmacy Problem: Patient Education: Go to Patient Education Activity Goal: Patient/Family Education Outcome: Progressing Towards Goal 
  
Problem: Pressure Injury - Risk of 
Goal: *Prevention of pressure injury Description Document Dionicio Scale and appropriate interventions in the flowsheet. Outcome: Progressing Towards Goal 
Note:  
Pressure Injury Interventions: 
Sensory Interventions: Assess changes in LOC, Check visual cues for pain Moisture Interventions: Absorbent underpads, Apply protective barrier, creams and emollients Activity Interventions: Pressure redistribution bed/mattress(bed type), PT/OT evaluation Mobility Interventions: HOB 30 degrees or less, Pressure redistribution bed/mattress (bed type) Nutrition Interventions: Document food/fluid/supplement intake Friction and Shear Interventions: HOB 30 degrees or less, Foam dressings/transparent film/skin sealants Problem: Delirium Treatment Goal: *Level of consciousness restored to baseline Outcome: Progressing Towards Goal 
Goal: *Level of environmental perceptions restored to baseline Outcome: Progressing Towards Goal 
 Goal: *Sensory perception restored to baseline Outcome: Progressing Towards Goal 
Goal: *Emotional stability restored to baseline Outcome: Progressing Towards Goal 
Goal: *Functional assessment restored to baseline Outcome: Progressing Towards Goal 
Goal: *Absence of falls Outcome: Progressing Towards Goal 
Goal: *Will remain free of delirium, CAM Score negative Outcome: Progressing Towards Goal 
Goal: *Cognitive status will be restored to baseline Outcome: Progressing Towards Goal 
Goal: Interventions Outcome: Progressing Towards Goal 
  
Problem: Patient Education: Go to Patient Education Activity Goal: Patient/Family Education Outcome: Progressing Towards Goal 
  
Problem: Nutrition Deficit Goal: *Adequate nutrition Outcome: Progressing Towards Goal

## 2019-10-13 NOTE — PROGRESS NOTES
PHYSICAL THERAPY NOTE 
 
12:12PM  Chart reviewed. Attempted to see patient. Patient currently off the floor. 13:43PM  Attempted to see patient for the second time. HR up (noted to be as high as 119 bpm). Will hold off Eval.  Nurse Pat Rivera made aware. Cceile Parker.  Ben Steele

## 2019-10-13 NOTE — PROGRESS NOTES
[facility] 
                                             (A Division of [address] [place] [address]) Follow-up Note Date of Admission: 10/8/2019     Date of Note:  10/13/2019 Summary:   
 
81 y/o  male HTN, AF, GERD, DJD s/p L TKR, Prostate CA, Hypothyroidism, h/o pancreatitis, ETOH consumption adm 10/8/2019 w/ chills, weakness, altered mental status. Has neck pain x 1 mo. Had R leg cellulitis after cat bite (not scratch) 1 mo PTA - resolved w/ abx. Then micturition syncope x 3, hurt knee, progressing generalized weakness, then 1 day PTA fever, chills. Adm w/ 100.5, WBC 29.5, 13% bands. No uti, pneumonia, unrevealing CTAP. 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 Rpt blcx 10/10 NGTD x 2.  Gallium scan in progress. Interval History: 
 
Just back from nuclear medicine. Groaning but not speaking much. Was just given dilaudid. Current Antimicrobials: Prior Antimicrobials Ceftriaxone 10/11 - 2 Vancomycin 10/8 - 2  Cefepime, Levoflox 10/8 - 0 Meropenem 10/9 - 2  
  
  
Assessment: Plan:  
Pasteurella BSI 
- 1 of 2 blood cultures 10/8 + P multocida sens TMP-SMX, Ceftriaxone, Levofloxacin, PCN, TCN 
- No active cellulitis. R leg cellulitis following cat bite in Early September resolved with TMP-SMX then Keflex 
- ?epidural abscess or discitis/OM?, R leg OM? 
- ?endocarditis. Recent TTE 9/27: neg vegetations - rpt blcx 10/10 NTGD x 2 
- Gallium scan recommended by Radiology injected 10/11. Completed today -> monitor rpt blcx from 10/10 
-> continue Ceftriaxone  
-> await Ga scan report 
-> CARLOS if blcx still + Septic shock 
- due to above 
- improving. Vasopressors stopped 10/10.   
- resolved -> abx as above MACKENZIE 
- resolved Worsening alkaline phosphatase 
- from sepsis, ?bone infection / met (doubt) 
- improving H/o PCN allergy (rash)  
- tolerated keflex 
- tolerating Ceftriaxone Recent R leg cellulitis following cat bite (not scratch) R knee pain - s/p aspiration 10/9: 23 WBC, no crystals HTN    
AF    
GERD    
DJD s/p L TKR    
Prostate CA    
Hypothyroidism    
h/o pancreatitis    
ETOH consumption    
  
Microbiology: 
  
10/8      blcx Pasteurella multocida 1 of 2 
             urcx NG 
10/9      R hip asp gs no wbc, NOS, cx NG 
 
10/10 blcx NGTD x 2 
  
Lines / Catheters: RIJ CVL Patient Active Problem List  
Diagnosis Code  Malignant neoplasm of prostate ([facility] C61  Hypertension I10  
 GERD (gastroesophageal reflux disease) K21.9  Glaucoma H40.9  Glaucoma H40.9  Dysphagia R13.10  Encounter for colonoscopy due to history of adenomatous colonic polyps Z12.11, Z86.010  
 Non-pressure chronic ulcer of right calf with fat layer exposed ([facility] K80.934  Laceration of right lower leg with complication V39.101H  Edema R60.9  Pneumonia J18.9  Atrial fibrillation (HCC) I48.91  
 Hypothyroidism E03.9  Sepsis ([facility] A41.9  Cellulitis L03.90  
 HTN (hypertension) Q92  
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  
 albuterol-ipratropium (DUO-NEB) 2.5 MG-0.5 MG/3 ML  3 mL Nebulization Q4H PRN  
 metoprolol (LOPRESSOR) injection 1.25 mg  1.25 mg IntraVENous Q6H  
 pantoprazole (PROTONIX) injection 40 mg  40 mg IntraVENous DAILY  cefTRIAXone (ROCEPHIN) 2 g in 0.9% sodium chloride (MBP/ADV) 50 mL MBP  2 g IntraVENous T26J  
 folic acid (FOLVITE) tablet 1 mg  1 mg Oral DAILY  thiamine mononitrate (B-1) tablet 100 mg  100 mg Oral DAILY  furosemide (LASIX) injection 40 mg  40 mg IntraVENous DAILY  apixaban (ELIQUIS) tablet 5 mg  5 mg Oral BID  sodium chloride (NS) flush 5-40 mL  5-40 mL IntraVENous Q8H  
 sodium chloride (NS) flush 5-40 mL  5-40 mL IntraVENous PRN  
 LORazepam (ATIVAN) tablet 1 mg  1 mg Oral Q1H PRN Or  
 LORazepam (ATIVAN) injection 1 mg  1 mg IntraVENous Q1H PRN  
 LORazepam (ATIVAN) tablet 2 mg  2 mg Oral Q1H PRN Or  
 LORazepam (ATIVAN) injection 2 mg  2 mg IntraVENous Q1H PRN  
 LORazepam (ATIVAN) injection 3 mg  3 mg IntraVENous Q15MIN PRN  
 desonide (TRIDESILON) 0.05 % cream - patient supplied (Patient Supplied)   Topical BID  albuterol-ipratropium (DUO-NEB) 2.5 MG-0.5 MG/3 ML  3 mL Nebulization Q6H PRN  
 HYDROmorphone (DILAUDID) syringe 0.25 mg  0.25 mg IntraVENous Q4H PRN  
 naloxone (NARCAN) injection 0.4 mg  0.4 mg IntraVENous PRN  
 ELECTROLYTE REPLACEMENT PROTOCOL - Potassium Renal Dosing  1 Each Other PRN  
 melatonin tablet 3 mg  3 mg Oral QHS PRN  
 sodium chloride (NS) flush 5-10 mL  5-10 mL IntraVENous PRN  
 acetaminophen (TYLENOL) tablet 650 mg  650 mg Oral Q4H PRN  
 levothyroxine (SYNTHROID) tablet 175 mcg  175 mcg Oral ACB  
 0.9% sodium chloride infusion  25 mL/hr IntraVENous CONTINUOUS Review of Systems - Negative except as in interval history Objective: 
Visit Vitals /75 Pulse 100 Temp 97.8 °F (36.6 °C) Resp 18 Ht 5' 8\" (1.727 m) Wt 91.1 kg (200 lb 12.8 oz) SpO2 100% BMI 30.53 kg/m² Temp (24hrs), Av.9 °F (36.6 °C), Min:97.5 °F (36.4 °C), Max:99.2 °F (37.3 °C) General: Well developed, well nourished 80 y.o.  male awake but not verbalizing, not in distress. ENT: ENT exam normal, no neck nodes or sinus tenderness Head: normocephalic, without obvious abnormality Mouth:  mucous membranes moist, pharynx normal without lesions Neck: not examined today (yesterday: tender on direct pressure over spine and on krala and lateral flexion, and on turning head to either side) Cardio:  irregularly irregular rhythm Lungs: rhonchi bilateral and + expiratory wheezes Abdomen: soft, non-tender. Bowel sounds normal. No masses, no organomegaly. Extremities:  no redness or tenderness in the calves or thighs, no edema Lab results: 
 
Chemistry Recent Labs 10/13/19 
0330 10/12/19 
1600 10/12/19 
0400  10/11/19 
0345 *  --  106*  --  103* *  --  146*  --  142  
K 3.2* 3.4* 3.6   < > 3.6 *  --  111  --  110 CO2 29  --  26  --  27 BUN 24*  --  31*  --  38* CREA 0.75  --  0.88  --  1.05  
CA 7.7*  --  7.6*  --  7.3* AGAP 6  --  9  --  5  
BUCR 32*  --  35*  --  36* AP  --   --  484*  --  479* TP  --   --  4.6*  --  4.3* ALB  --   --  1.9*  --  1.8*  
GLOB  --   --  2.7  --  2.5 AGRAT  --   --  0.7*  --  0.7*  
 < > = values in this interval not displayed. CBC w/ Diff Recent Labs 10/13/19 
0330 10/12/19 
0400 10/11/19 
0345 WBC 9.6 11.9 12.2 RBC 2.50* 2.63* 2.58* HGB 7.7* 8.0* 7.9*  
HCT 23.3* 24.6* 23.9*  
 120* 113* GRANS 77* 83* 90* LYMPH 11* 8* 4* EOS 1 0 1 Microbiology All Micro Results Procedure Component Value Units Date/Time CULTURE, BODY FLUID [[id number]] Collected:  10/09/19 1200 Order Status:  Completed Specimen:  Synovial Fluid Updated:  10/13/19 1872 Special Requests: HIP  
  GRAM STAIN NO WBC'S SEEN     
   NO ORGANISMS SEEN Culture result: NO GROWTH 4 DAYS     
 CULTURE, BLOOD [[id number]] Collected:  10/10/19 1237 Order Status:  Completed Specimen:  Blood Updated:  10/13/19 0104 Special Requests: PERIPHERAL Culture result: NO GROWTH 3 DAYS     
 CULTURE, BLOOD [[id number]] Collected:  10/10/19 1341 Order Status:  Completed Specimen:  Blood Updated:  10/13/19 0104 Special Requests: --     
  NO SPECIAL REQUESTS BLUE PORT Culture result: NO GROWTH 3 DAYS     
 CULTURE, BLOOD [[id number]] Collected:  10/08/19 2234 Order Status:  Completed Specimen:  Blood from Miscellaneous sample Updated:  10/13/19 0103 Special Requests: NO SPECIAL REQUESTS Culture result: NO GROWTH 4 DAYS     
 CULTURE, BLOOD [[id number]]  (Abnormal)  (Susceptibility) Collected:  10/08/19 1301 Order Status:  Completed Specimen:  Blood Updated:  10/11/19 0825 Special Requests: --     
  NO SPECIAL REQUESTS 
RIGHT 
ARM 
  
  GRAM STAIN    
  AEROBIC BOTTLE GRAM NEGATIVE RODS SMEAR CALLED TO AND CORRECTLY REPEATED BY: DESHAWN COSBY RN,ICU, ON 10/9/19 AT 0335 TO S Culture result:    
  AEROBIC BOTTLE PASTEURELLA MULTOCIDA CULTURE, URINE [[id number]] Collected:  10/08/19 1418 Order Status:  Completed Specimen:  Cath Urine Updated:  10/10/19 1016 Special Requests: NO SPECIAL REQUESTS Culture result: NO GROWTH 2 DAYS Dora Michael MD, [facility] 
[phone] 
10/13/2019  
9:02 AM

## 2019-10-13 NOTE — PROGRESS NOTES
Rec'd pt resting in bed awake and alert though confused. Is occasionally restless, will reach for IV lines. Continue to redirect pt,responds momentarily, then reaches out again. Repostiiong Q 1 hour. Replaced mepilex to bilateral sacral pressure ulcer sites to lower buttocks. R/A sat 99%. Moves upper ext's with moderate weakness, lower ext weakness also, though moves on his own.  
 
2300  Remains occasionally restless, rec'd dilaudid . 25mg for grimacing/generalized discomfort. occasiolnally says \"ow\". Frequently reaches for face to scratch or pull off gown. Dressing changed to right CVP IJ site completed. Remains in a fib with avg rate of 85-90.   
2340 Less grimacing with dilaudid, though still fidgeting agitated. rec'd ativan 1mg IV per CIWA protocol. 0200 Appears comfortable, easily awakened, VSS. Remains on room air/ sat 95/99. Partial bath completed, all linen replaced.

## 2019-10-13 NOTE — PROGRESS NOTES
(0730) Received report from Florencia Patterson [place]. Assumed care of patient. VSS. Will continue to monitor. 
 
(0800) Family at bedside. Updated on care. Questions answered. (0830) Received verbal order to transfer patient to telemetry. [phone]) Report given to [facility], RN to assume care of patient.

## 2019-10-13 NOTE — PROGRESS NOTES
Progress Note Patient: Joann Tamayo               Sex: male          DOA: 10/8/2019 YOB: 1933      Age:  80 y.o.        LOS:  LOS: 5 days CHIEF COMPLAINT:  Altered mental status Assessment/Plan:  
 
Principal Problem: 
  Severe sepsis ([facility] (10/8/2019) Active Problems: 
  Atrial fibrillation ([facility] (6/2/2018) Hypothyroidism (6/2/2018) MACKENZIE (acute kidney injury) ([facility] (9/27/2019) Altered mental status (10/8/2019) Cellulitis of left knee (10/8/2019) PLAN: 
· Severe sepsis - secondary to Pasteurella multocida infection. H/o cat bite 4 weeks to RLE per daughter, when he came in with cellulitis and was treated. D/w Dr. Cindy Ascencio concern for persistent infection/abscess, he ordered nuclear gallium scan, which will take 72 hrs complete, in process now, results likely Monday · MACKENZIE - IV fluid hydration, monitor closely. Improving · AMS - Waxing and waning. Suspect 2/2 metabolic encephalopathy from sepsis. Cont to monitor. · AFIB - Rate control, resume Elliquis for [facility] when there are no further procedures to be done · Elevated LFT - GI consulted, suspects 2/2 sepsis, appreciate recs · Hypothyroidism - Cont home levothyroxine · ETOH abuse - CIWA ordered by PCCM. Per family last drink was 1 month ago. · DVT ppx - SCD; resume Eliquis Subjective:  
 
Pt received ativan overnight for agitation, this AM he is somnolent. Objective:  
 
Visit Vitals /62 Pulse 86 Temp 97.8 °F (36.6 °C) Resp 13 Ht 5' 8\" (1.727 m) Wt 91.1 kg (200 lb 12.8 oz) SpO2 100% BMI 30.53 kg/m² Physical Exam: 
Ears: hearing is intact Eyes: Icterus is not present Lungs: clear to auscultation bilaterally Heart: regular rate and rhythm, S1, S2 normal, no murmur, click, rub or gallop Gastrointestinal: soft, non-tender. Bowel sounds normal. No masses,  no organomegaly Neurological:  New Focal Deficits are not present Skin: diffuse ecchymosis. Face with small petechiae (per family is due to known actinic keratosis). Psychiatric:  Mood is stable Lab/Data Reviewed: 
CMP:  
Lab Results Component Value Date/Time  (H) 10/13/2019 03:30 AM  
 K 3.2 (L) 10/13/2019 03:30 AM  
  (H) 10/13/2019 03:30 AM  
 CO2 29 10/13/2019 03:30 AM  
 AGAP 6 10/13/2019 03:30 AM  
  (H) 10/13/2019 03:30 AM  
 BUN 24 (H) 10/13/2019 03:30 AM  
 CREA 0.75 10/13/2019 03:30 AM  
 GFRAA >60 10/13/2019 03:30 AM  
 GFRNA >60 10/13/2019 03:30 AM  
 CA 7.7 (L) 10/13/2019 03:30 AM  
 MG 1.9 10/13/2019 03:30 AM  
 PHOS 2.6 10/13/2019 03:30 AM  
 
CBC:  
Lab Results Component Value Date/Time WBC 9.6 10/13/2019 03:30 AM  
 HGB 7.7 (L) 10/13/2019 03:30 AM  
 HCT 23.3 (L) 10/13/2019 03:30 AM  
  10/13/2019 03:30 AM  
 
 
Imaging Reviewed: 
No results found.

## 2019-10-13 NOTE — PROGRESS NOTES
Speech Pathology Note Swallow eval completed b/s with recs of NPO, ice chips PRN for oral care/ comfort/ swallow stim when pt awake, alert, and participating, strict aspiration precautions, re-eval tomorrow by in-house SLP. Full report to follow Thank you, 
Shawna López MS, CCC/SLP Speech- Language Pathologist

## 2019-10-13 NOTE — PROGRESS NOTES
1100am- Bedside shift report received from [place], RN 
1206pm- Pt went down for Nuclear Medicine. Ok to go down for test w/o Telemetry as per Dr. Yue Faulkner. 1600pm-Transfer pt to Specialty [facility] Fitting 1733pm- 5 beats of V-tach noted. Potassium and Magnesium level sent to lab. Dr. Hayden Hsu is aware and notified.

## 2019-10-13 NOTE — CONSULTS
[facility] Name: Alyson St : 1933 MRN: [id number] Date: 10/13/2019   
[]I have reviewed the flowsheet and previous days notes. []The patient is unable to give any meaningful history or review of systems because the patient is: 
[]Intubated []Sedated  
[]Unresponsive []The patient is critically ill on     
[]Mechanical ventilation []Pressors []BiPAP []  
S: None ROS:Review of systems not obtained due to patient factors. Events and notes from last 24 hours reviewed. Care plan discussed on multidisciplinary rounds. Vital Signs:   
Visit Vitals /72 Pulse (!) 104 Temp 97.8 °F (36.6 °C) Resp 16 Ht 5' 8\" (1.727 m) Wt 91.1 kg (200 lb 12.8 oz) SpO2 100% BMI 30.53 kg/m² O2 Device: Room air O2 Flow Rate (L/min): 3 l/min Temp (24hrs), Av.2 °F (36.8 °C), Min:97.5 °F (36.4 °C), Max:99.4 °F (37.4 °C) Intake/Output:  
Last shift:      10/13 07 - 10/13 1900 In: 175 [I.V.:175] Out: 175 [Urine:175] Last 3 shifts: 10/11 1901 - 10/13 0700 In: 1850 [I.V.:1125] Out: 4140 [HNWWW:9487] Intake/Output Summary (Last 24 hours) at 10/13/2019 1043 Last data filed at 10/13/2019 1000 Gross per 24 hour Intake 1450 ml Output 2115 ml Net -665 ml Hemodynamics:  
   
:   
 
Ventilator Settings: 
  
  
  
  
 
Physical Exam:  
 General: in no respiratory distress and acyanotic and alert HEENT: Normal 
 Neck: No abnormally enlarged lymph nodes. Chest: increased AP diameter Lungs: decreased air exchange bilaterally, distant lung sounds and prolonged expiration  no dullness/ tenderness/ rash Heart: Regular rate and rhythm or S1S2 present Abdomen: non distended, bowel sounds normoactive, tympanic, abdomen is soft without significant tenderness, masses, organomegaly or guarding Extremity: 1+ edema Neuro: alert Skin: Skin color, texture, turgor normal. No rashes or lesions DATA:  
 Current Facility-Administered Medications Medication Dose Route Frequency  potassium chloride 20 mEq in 50 ml IVPB  20 mEq IntraVENous Q2H  
 metoprolol (LOPRESSOR) injection 1.25 mg  1.25 mg IntraVENous Q6H  
 pantoprazole (PROTONIX) injection 40 mg  40 mg IntraVENous DAILY  cefTRIAXone (ROCEPHIN) 2 g in 0.9% sodium chloride (MBP/ADV) 50 mL MBP  2 g IntraVENous C34T  
 folic acid (FOLVITE) tablet 1 mg  1 mg Oral DAILY  thiamine mononitrate (B-1) tablet 100 mg  100 mg Oral DAILY  furosemide (LASIX) injection 40 mg  40 mg IntraVENous DAILY  albuterol-ipratropium (DUO-NEB) 2.5 MG-0.5 MG/3 ML  3 mL Nebulization QID RT  
 apixaban (ELIQUIS) tablet 5 mg  5 mg Oral BID  sodium chloride (NS) flush 5-40 mL  5-40 mL IntraVENous Q8H  
 sodium chloride (NS) flush 5-40 mL  5-40 mL IntraVENous PRN  
 LORazepam (ATIVAN) tablet 1 mg  1 mg Oral Q1H PRN Or  
 LORazepam (ATIVAN) injection 1 mg  1 mg IntraVENous Q1H PRN  
 LORazepam (ATIVAN) tablet 2 mg  2 mg Oral Q1H PRN Or  
 LORazepam (ATIVAN) injection 2 mg  2 mg IntraVENous Q1H PRN  
 LORazepam (ATIVAN) injection 3 mg  3 mg IntraVENous Q15MIN PRN  
 desonide (TRIDESILON) 0.05 % cream - patient supplied (Patient Supplied)   Topical BID  albuterol-ipratropium (DUO-NEB) 2.5 MG-0.5 MG/3 ML  3 mL Nebulization Q6H PRN  
 HYDROmorphone (DILAUDID) syringe 0.25 mg  0.25 mg IntraVENous Q4H PRN  
 naloxone (NARCAN) injection 0.4 mg  0.4 mg IntraVENous PRN  
 ELECTROLYTE REPLACEMENT PROTOCOL - Potassium Renal Dosing  1 Each Other PRN  
 melatonin tablet 3 mg  3 mg Oral QHS PRN  
 sodium chloride (NS) flush 5-10 mL  5-10 mL IntraVENous PRN  
 acetaminophen (TYLENOL) tablet 650 mg  650 mg Oral Q4H PRN  
 levothyroxine (SYNTHROID) tablet 175 mcg  175 mcg Oral ACB  
 0.9% sodium chloride infusion  25 mL/hr IntraVENous CONTINUOUS Telemetry: [x]Sinus []A-flutter []Paced []A-fib []Multiple PVCs Labs: 
Recent Labs 10/13/19 
0330 10/12/19 
0400 10/11/19 
0345 WBC 9.6 11.9 12.2 HGB 7.7* 8.0* 7.9*  
HCT 23.3* 24.6* 23.9*  
 120* 113* Recent Labs 10/13/19 
0330 10/12/19 
1600 10/12/19 
0400 10/11/19 
1102 10/11/19 
0345 *  --  146*  --  142  
K 3.2* 3.4* 3.6 3.9 3.6 *  --  111  --  110 CO2 29  --  26  --  27 *  --  106*  --  103* BUN 24*  --  31*  --  38* CREA 0.75  --  0.88  --  1.05  
CA 7.7*  --  7.6*  --  7.3*  
MG 1.9  --  2.1  --  2.5 PHOS 2.6  --  2.5 2.7 2.4* ALB  --   --  1.9*  --  1.8* SGOT  --   --  55*  --  66* ALT  --   --  42  --  48 No results for input(s): PH, PCO2, PO2, HCO3, FIO2 in the last 72 hours. Imaging: 
[x]I have personally reviewed the patients radiographs []Radiographs reviewed with radiologist 
 [] No CXR study available for review today 
 [x]No change from prior, tubes and lines in adequate position []Improved   []Worsening IMPRESSION:  
Patient Active Problem List  
Diagnosis Code  Malignant neoplasm of prostate ([facility] C61  Hypertension I10  
 GERD (gastroesophageal reflux disease) K21.9  Glaucoma H40.9  Glaucoma H40.9  Dysphagia R13.10  Encounter for colonoscopy due to history of adenomatous colonic polyps Z12.11, Z86.010  
 Non-pressure chronic ulcer of right calf with fat layer exposed ([facility] N51.232  Laceration of right lower leg with complication M38.908P  Edema R60.9  Pneumonia J18.9  Atrial fibrillation (HCC) I48.91  
 Hypothyroidism E03.9  Sepsis ([facility] A41.9  Cellulitis L03.90  
 HTN (hypertension) N49  
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  Etiology unclear Atrial fibrillation with rapid ventricular response  
On Eliquis Acute kidney injury resolved Hypoalbuminemia · Volume overload · Swallow eval pending PLAN:  
[address] Citrate Scan in progress Ceftriaxone Decrease IV fluids Lasix daily. Activity as tolerated Consider rate control Continue CIWA given regular alcohol use.  If the patient's delirium occurs principally at night, Haldol for Delirium: Patient's daughter educated on Delirium 
  The patient is: [] acutely ill Risk of deterioration: [] moderate  
 [] critically ill  [] high  
 
[x]See my orders for details My assessment, plan of care, findings, medications, side effects etc were discussed with: 
[x]nursing []PT/OT [x]respiratory therapy [x] [x]family: Daughter []  
 
[x]Total critical care time exclusive of procedures 25 minutes Amy Machuca MD

## 2019-10-13 NOTE — PROGRESS NOTES
Transfer report given to Ms. Oliver Yousif, [facility] Transfer pt from Rm 2604 to Rm 3012 via bed x1 assistance. Pt is stable. Inform daughter, Ms. Julieth Anderson, via phone that her father has transferred to [address] [facility].

## 2019-10-13 NOTE — PROGRESS NOTES
Problem: Dysphagia (Adult) Goal: *Acute Goals and Plan of Care (Insert Text) Description Dysphagia Present: mod- severe oral, severe pharyngeal 
Aspiration: soft and silent s/sx during eval   
 
Recommendations: 
Diet: NPO, ice chips PRN for oral care/ comfort/ swallow stim, may want to consider short term alternate means nutrition/ hydration Meds: via alternate means Strict Aspiration Precautions Oral Care TID Other: repeat swallow eval tomorrow Goals:  Patient will: 1. Tolerate PO trials with no overt s/s aspiration or distress in 4/5 trials 2. Utilize compensatory swallow strategies/maneuvers (decrease bite/sip, size/rate, alt. liq/sol) with min cues in 4/5 trials 3. Perform oral-motor/laryngeal strengthening exercises with min cues to increase oropharyngeal swallow function 4. Complete an objective swallow study (i.e., MBSS) to assess swallow integrity, r/o aspiration, and determine of safest LRD, min A Outcome: Not Progressing Towards Goal 
 
 
SPEECH LANGUAGE PATHOLOGY BEDSIDE SWALLOW  
EVALUATION & TREATMENT Patient: Clarence Hernandez (30 y.o. male) Date: 10/13/2019 Primary Diagnosis: Severe sepsis ([facility] [A41.9, R65.20] Altered mental status [R41.82] Precautions: aspiration PLOF: as per H&P 
 
ASSESSMENT : 
Based on the objective data described below, the patient presents with moderate- severe oral, severe pharyngeal dysphagia in setting of severe sepsis. Pt recently given Dilaudid per nursing, pt lethargic, required max verbal/ tactile cues to maintain arousal for trials po. Pt followed occasional basic commands with max cues, nonverbal, moaning throughout session. Pt accepted ice chip from tsp with slow, weak labial seal, delayed oral onset with premature spillage into pharynx prior to swallow response, mod- severely delayed swallow with decreased, weak laryngeal elevation, suspected pharyngeal residue.   Pt accepted <1/4 tsp pudding with severely delayed oral initiation with pudding sitting on back of tongue >1 minute. Pt able to eventually initiate swallow response with >50% posterior lingual and palatal residue. Pt unable to initiate repeat swallow to clear oral residue, SLP utilized Yankauer to clear pudding residue. Pt with delayed gurgly vocal quality following puree. Pt not safe for po feeds at this time. Rec: continue NPO, ice chips PRN for oral care/ comfort/ swallow stim when pt awake, alert, and participating only with HOB @ least 45*, HOB >/= 30* at all other times. TREATMENT : 
Skilled therapy initiated; Educated pt's family (wife, daughter, and another) on strict aspiration precautions with HOB >/= 30* at all times and importance of compensatory swallow techniques to decrease aspiration risk with ice chip presentations (HOB @ least 45* for all ice chip presentations and ~30 minutes after, single presentation at a time, wait for pt to initiate swallow prior to giving additional), recently medication negatively impacting pt's current swallow function, prognostic indicators, and SLP POC with repeat eval tomorrow; pt's family verbalized comprehension, pt unable to participate in education. SLP to follow as indicated. Patient will benefit from skilled intervention to address the above impairments. Patient's rehabilitation potential is considered to be Fair Factors which may influence rehabilitation potential include: ? None noted ? Mental ability/status ? Medical condition ? Home/family situation and support systems ? Safety awareness ? Pain tolerance/management ? Other: PLAN : 
Recommendations and Planned Interventions: 
continue NPO, ice chips PRN for oral care/ comfort/ swallow stim when pt awake, alert, and participating only with HOB @ least 45*, HOB >/= 30* at all other times. Frequency/Duration: Patient will be followed by speech-language pathology 1-2 times per day/3-5 days per week to address goals. Discharge Recommendations: To Be Determined SUBJECTIVE:  
Patient stated: n/a OBJECTIVE:  
 
Past Medical History:  
Diagnosis Date Arthritis Atrial fibrillation ([address] 07/2017 Dr Ap Knowles cardio follows. chronic Carcinoma of prostate ([address] Cellulitis Coarse tremor   
  hands, states \"my doctor is aware\" Difficulty swallowing Dysphagia Edema Encounter for colonoscopy due to history of adenomatous colonic polyps Essential hypertension Fall 10/07/2017 \"went to [facility] Emergency Room, CT Scan showed concussion, no bleeding\" per patient Fatigue GERD (gastroesophageal reflux disease) wo. esophagitis Glaucoma H/O joint replacement   
  left knee 2003 HLD (hyperlipidemia) Hypertension Hypertensive disorder Hypothyroidism Impotence Laceration of right lower leg with complication Malignant neoplasm of prostate ([address]   
  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   
 acute/hist. of   
 
Past Surgical History:  
Procedure Laterality Date COLONOSCOPY N/A 1/3/2017 HX CHOLECYSTECTOMY HX KNEE REPLACEMENT Left 01/2003 HX SHOULDER REPLACEMENT Right 7/17, 10/17 Home Situation:  
Home Situation Home Environment: Private residence # Steps to Enter: 4 One/Two Story Residence: One story Living Alone: No 
Support Systems: Family member(s), Friends \ neighbors, Child(steffi) Patient Expects to be Discharged to[de-identified] Private residence Current DME Used/Available at Home: None Diet prior to admission: family reported regular/ thin Current Diet:  NPO Cognitive and Communication Status: 
Neurologic State: Lethargic(just given dilaudid for pain) Orientation Level: Unable to verbalize Cognition: Decreased command following Perception: (to be determined) Perseveration: No perseveration noted Safety/Judgement: Fall prevention Oral Assessment: 
Oral Assessment Labial: Decreased rate;Decreased seal 
Dentition: Natural;Limited Oral Hygiene: fair Lingual: Decreased rate;Decreased strength Velum: Unable to visualize Mandible: Restricted P.O. Trials: 
Patient Position: HOB 45* Vocal quality prior to P.O.: Aphonic(only moaning) Consistency Presented: Ice chips;Pudding How Presented: SLP-fed/presented;Spoon Bolus Acceptance: Impaired Bolus Formation/Control: Impaired Type of Impairment: Delayed; Anterior;Premature spillage;Lip closure Propulsion: Delayed (# of seconds); Discoordination Oral Residue: Greater than 50% of bolus; Lingual;Palatal 
Initiation of Swallow: Delayed (# of seconds) Laryngeal Elevation: Decreased;Weak Aspiration Signs/Symptoms: Change vocal quality; Decrease in O2 saturations; Increase in RR Pharyngeal Phase Characteristics: Easily fatigued ; Poor endurance; Suspected pharyngeal residue Effective Modifications: None Cues for Modifications: Maximal 
  
 
Oral Phase Severity: Moderate-severe Pharyngeal Phase Severity : Severe PAIN: 
Pain level pre-treatment: pt unable to rate, moaning throughout session Pain level post-treatment: decreased moaning when HOB decreased to 30* Pain Intervention(s): pt received Dilaudid prior to SLP eval 
 
After treatment:  
?            Patient left in no apparent distress sitting up in chair ? Patient left in no apparent distress in bed ? Call bell left within reach ?            Nursing notified ? Family present ? Caregiver present ? Bed alarm activated COMMUNICATION/EDUCATION:  
?            Aspiration precautions; swallow safety; compensatory techniques. ? Family have participated as able in goal setting and plan of care. ?            Patient/family agree to work toward stated goals and plan of care. ?            Patient understands intent and goals of therapy; neutral about participation. ? Patient unable to participate in goal setting/plan of care; educ ongoing with interdisciplinary staff ? Posted safety precautions in patient's room. Thank you for this referral. 
Ko Olivas MS, CCC/SLP Time Calculation: 38 mins Evaluation Time: 20 minutes Treatment Time: 18 minutes

## 2019-10-13 NOTE — PROGRESS NOTES
Respiratory Therapy Assessment Care Plan Patient: 
Aurea Sutton 80 y.o. male 10/13/2019 9:18 AM 
 
Severe sepsis ([address] [A41.9, R65.20] Altered mental status [R41.82] Chest X-RAY:  
Results from Hospital Encounter encounter on 10/08/19 XR CHEST PORT Impression IMPRESSION: 
 
Low lung volumes with small right pleural effusion and adjacent right basilar 
atelectasis/infiltrate. Initial preliminary report was provided to the ED by the on-call radiology 
resident. XR FLUORO GUIDE ASP/BX/INJ/LOC Impression IMPRESSION: 
 
Fluoroscopy guided right hip aspiration yielded no fluid through the 22-gauge 
needle. 2 cc of slightly viscous clear yellow fluid was successfully aspirated 
through an 18-gauge needle, sent to the lab for requested analysis. XR HIP RT W OR WO PELV 2-3 VWS Addendum Addendum: On further review there is likely a hip joint effusion evidenced  by soft tissue bulging along the superolateral aspect of the joint. Hip joint aspiration requested. Polina Hackett MD 10/9/2019 11:51 AM     Addendum: On further  review there is likely a hip joint effusion evidenced by soft tissue bulging along the superolateral aspect of the joint. Hip joint aspiration requested. Polina Hackett MD 10/9/2019 11:51 AM     Addendum: On further  review there is likely a hip joint effusion evidenced by soft tissue bulging along the superolateral aspect of the joint. Hip joint aspiration requested. Polina Hackett MD 10/9/2019 11:51 AM     Addendum: On further  review there is likely a hip joint effusion evidenced by soft tissue bulging along the superolateral aspect of the joint. Hip joint aspiration requested. Polina Hackett MD 10/9/2019 11:51 AM     Addendum: On further  review there is likely a hip joint effusion evidenced by soft tissue bulging along the superolateral aspect of the joint.   Hip joint aspiration requested. Luis Marcus MD 10/9/2019 11:51 AM        
 Impression IMPRESSION: 
 
No acute abnormality in the hip. Vital Signs:   Visit Vitals /62 Pulse 86 Temp 97.8 °F (36.6 °C) Resp 13 Ht 5' 8\" (1.727 m) Wt 91.1 kg (200 lb 12.8 oz) SpO2 100% BMI 30.53 kg/m² Indications for treatment:  
 
 
Plan of care: 
 
 
 
Goal: 
 
 
 Respiratory Therapy Assessment Care Plan Patient: 
Chela Hardwick 80 y.o. male 10/13/2019 9:19 AM 
 
Severe sepsis ([address] [A41.9, R65.20] Altered mental status [R41.82] Chest X-RAY:  
Results from Hospital Encounter encounter on 10/08/19 XR CHEST PORT Impression IMPRESSION: 
 
Low lung volumes with small right pleural effusion and adjacent right basilar 
atelectasis/infiltrate. Initial preliminary report was provided to the ED by the on-call radiology 
resident. XR FLUORO GUIDE ASP/BX/INJ/LOC Impression IMPRESSION: 
 
Fluoroscopy guided right hip aspiration yielded no fluid through the 22-gauge 
needle. 2 cc of slightly viscous clear yellow fluid was successfully aspirated 
through an 18-gauge needle, sent to the lab for requested analysis. XR HIP RT W OR WO PELV 2-3 VWS Addendum Addendum: On further review there is likely a hip joint effusion evidenced  by soft tissue bulging along the superolateral aspect of the joint. Hip joint aspiration requested. Luis Marcus MD 10/9/2019 11:51 AM     Addendum: On further  review there is likely a hip joint effusion evidenced by soft tissue bulging along the superolateral aspect of the joint. Hip joint aspiration requested. Luis Marcus MD 10/9/2019 11:51 AM     Addendum: On further  review there is likely a hip joint effusion evidenced by soft tissue bulging along the superolateral aspect of the joint. Hip joint aspiration requested. Luis Marcus MD 10/9/2019 11:51 AM     Addendum:  On further  review there is likely a hip joint effusion evidenced by soft tissue bulging along the superolateral aspect of the joint. Hip joint aspiration requested. Imani Barkley MD 10/9/2019 11:51 AM     Addendum: On further  review there is likely a hip joint effusion evidenced by soft tissue bulging along the superolateral aspect of the joint. Hip joint aspiration requested. Imani Barkley MD 10/9/2019 11:51 AM        
 Impression IMPRESSION: 
 
No acute abnormality in the hip. Vital Signs:   Visit Vitals /62 Pulse 86 Temp 97.8 °F (36.6 °C) Resp 13 Ht 5' 8\" (1.727 m) Wt 91.1 kg (200 lb 12.8 oz) SpO2 100% BMI 30.53 kg/m² Indications for treatment: MD order Plan of care: Natalya QID Goal: Maintain adequate gas exchange

## 2019-10-13 NOTE — PROGRESS NOTES
Physical Exam  
Skin: Ecchymosis noted. There is erythema. Primary Nurse Frank Esquivel RN and Ivan Fairchild RN performed a dual skin assessment on this patient Impairment noted- see wound doc flow sheet Dionicio score is 11.

## 2019-10-14 NOTE — PROGRESS NOTES
Speech Therapy Note: SLP made several attempts this day to see pt for follow up. Pt has been off floor for testing. D/w RN, Nithin Carmona who reports pt not able to attend to po task. Spoke with dtr at b/s re: POC; verbalized comprehension. Will follow up next am if appropriate. Naila Perla M.S., CCC-SLP Clinical Supervisor - Med-surg Rehab Ph: [phone]

## 2019-10-14 NOTE — PROGRESS NOTES
Problem: Pain Goal: *Control of Pain Outcome: Progressing Towards Goal 
Goal: *PALLIATIVE CARE:  Alleviation of Pain Outcome: Progressing Towards Goal 
  
Problem: Falls - Risk of 
Goal: *Absence of Falls Description Document Stephanie [place] Fall Risk and appropriate interventions in the flowsheet. Outcome: Progressing Towards Goal 
Note:  
Fall Risk Interventions: 
  
 
Mentation Interventions: More frequent rounding, Reorient patient, Toileting rounds Medication Interventions: Evaluate medications/consider consulting pharmacy Elimination Interventions: Toileting schedule/hourly rounds History of Falls Interventions: Investigate reason for fall, Evaluate medications/consider consulting pharmacy, Door open when patient unattended Problem: Patient Education: Go to Patient Education Activity Goal: Patient/Family Education Outcome: Progressing Towards Goal

## 2019-10-14 NOTE — PROGRESS NOTES
Discharge/Transition Planning Problem: Discharge Planning Goal: *Discharge to safe environment Outcome: Progressing Towards Goal 
Plan: SNF Spoke with daughter at bedside. She discusses she is very concerned and scared at how downhill father is going as a few weeks ago , he was independent and active and now confused, not able to get out of bed, unable to speak or recognize them. She states Dr Nimisha Ventura is getting scan of head. She states father will have to go to SNF when medically ready to go. Gave SNF list and explained we need to match to area of choice and she and mother discuss top choices. She would like [place] for mother to be close. Anton Cyr RN BSN Outcomes Manager Pager # [phone]

## 2019-10-14 NOTE — PROGRESS NOTES
1900  -- Bedside, Verbal and Written shift change report given to 2309 Edwin Beebe (oncoming nurse) by Cornelius Israel nurse). Allergy band placed on pt's wrist. Report included the following information SBAR, Kardex, Intake/Output, MAR and Recent Results.  
  
2212 -- PM medications administered, pt tolerated with ease, will continue to monitor. 
  
0000 -- Shift reassessment, pt condition unchanged, will continue to monitor. 
   
0400 --  Shift reassessment, pt condition unchanged, will continue to monitor.   
   
0700  -- Bedside, Verbal and Written shift change report given to Lisandro 31 nurse) by DEMI (offgoing nurse). Report included the following information SBAR, Kardex, Intake/Output, MAR and Recent Results. Skin assessment completed.

## 2019-10-14 NOTE — PROGRESS NOTES
Problem: Self Care Deficits Care Plan (Adult) Goal: *Acute Goals and Plan of Care (Insert Text) Description Occupational Therapy Goals Initiated 10/14/2019 within 7 day(s). Pt restless, confused, no command following. Not appropriate for skilled therapy at this time. Will implement Functional Maintenance Plan at this time to address BUE ROM, strength and skin integrity. 1. Patient will perform AAROM to BUEs for maintenance of ROM and strength. Outcome: Progressing Towards Goal 
  
OCCUPATIONAL THERAPY EVALUATION Patient: Renard Lincoln (89 y.o. male) Date: 10/14/2019 Primary Diagnosis: Severe sepsis ([facility] [address] [A41.9, R65.20] Altered mental status [R41.82] Precautions:  Fall, Skin PLOF: Pt unable to provide PLOF due to AMS. ASSESSMENT : 
Based on the objective data described below, the patient presents with impairments with regard to mental status, bed mobility and command following. Co-treated with PT to maximize pt safety & participation. Pt not interactive, no command following. Unable to obtain PLOF. Unable to formally assess BUE strength, AROM due to pt resisting. Pt turns to name occasionally, however, not consistently. Total A for bed mobility; pt with no effort. Constant support at EOB; not appropriate for further EOB/OOB activity. Pt returned supine, total A for positioning. At this time, pt would benefit from functional maintenance program to maintain/increase UE ROM and strength for ADL tasks. Rehab tech educated on and verbalized appropriate UE exercises for patient. Pt left supine with HOB elevated, bed alarm on. Angle Marcano RN aware of session. Recommend SNF upon d/c. Patient will benefit from skilled intervention to address the above impairments. Patient's rehabilitation potential is considered to be Fair Factors which may influence rehabilitation potential include: ? None noted ? Mental ability/status ? Medical condition ?             Home/family situation and support systems ? Safety awareness ? Pain tolerance/management ? Other: PLAN : 
Recommendations and Planned Interventions:  
?               Self Care Training                  ? Therapeutic Activities ? Functional Mobility Training   ? Cognitive Retraining 
? Therapeutic Exercises           ? Endurance Activities ? Balance Training                    ? Neuromuscular Re-Education ? Visual/Perceptual Training     ? Home Safety Training 
? Patient Education                   ? Family Training/Education ? Other (comment): Frequency/Duration: Patient will be followed by rehab tech 3-5 times a week to address goals, weekly by OT. Discharge Recommendations: Gaston Torres Further Equipment Recommendations for Discharge: bedside commode SUBJECTIVE:  
Patient nonverbal 
 
OBJECTIVE DATA SUMMARY:  
 
Past Medical History:  
Diagnosis Date Arthritis Atrial fibrillation ([facility] [address] 07/2017 Dr Santos Ibarra cardio follows. chronic Carcinoma of prostate ([facility] [address] Cellulitis Coarse tremor   
  hands, states \"my doctor is aware\" Difficulty swallowing Dysphagia Edema Encounter for colonoscopy due to history of adenomatous colonic polyps Essential hypertension Fall 10/07/2017 \"went to [facility] Emergency Room, CT Scan showed concussion, no bleeding\" per patient Fatigue GERD (gastroesophageal reflux disease) wo. esophagitis Glaucoma H/O joint replacement   
  left knee 2003 HLD (hyperlipidemia) Hypertension Hypertensive disorder Hypothyroidism Impotence Laceration of right lower leg with complication Malignant neoplasm of prostate ([facility] [address]   
  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   
 acute/hist. of   
 
Past Surgical History:  
Procedure Laterality Date COLONOSCOPY N/A 1/3/2017 HX CHOLECYSTECTOMY HX KNEE REPLACEMENT Left 01/2003 HX SHOULDER REPLACEMENT Right 7/17, 10/17 Barriers to Learning/Limitations: None Compensate with: visual, verbal, tactile, kinesthetic cues/model Home Situation:  
Home Situation Home Environment: Private residence # Steps to Enter: 4 One/Two Story Residence: One story Living Alone: No 
Support Systems: Family member(s), Friends \ neighbors, Child(steffi) Patient Expects to be Discharged to[de-identified] Private residence Current DME Used/Available at Home: None ? Right hand dominant   ? Left hand dominant Cognitive/Behavioral Status: 
Neurologic State: Drowsy; Restless Orientation Level: Unable to verbalize Cognition: No command following Safety/Judgement: Not assessed Skin: Intact (BUEs) Edema: None noted (BUEs) Vision/Perceptual:   
Acuity: (unable to assess) Coordination: BUE Coordination: Generally decreased, functional 
Fine Motor Skills-Upper: Right Intact; Left Intact Gross Motor Skills-Upper: Right Intact; Left Intact Balance: 
Sitting: Impaired Sitting - Static: Poor (constant support) Standing: (not assessed) Strength: BUE Strength: Generally decreased, functional(generally decreased strength as evident by pt resisting) Range of Motion: BUE 
AROM: Generally decreased, functional(pt resisting) PROM: Generally decreased, functional(pt resisting) Functional Mobility and Transfers for ADLs: 
Bed Mobility: 
Supine to Sit: Total assistance;Assist x2 
 Sit to Supine: Total assistance;Assist x2 Transfers: 
Sit to Stand: (not assessed) ADL Assessment:  
Feeding: Maximum assistance Oral Facial Hygiene/Grooming: Maximum assistance Bathing: Maximum assistance Upper Body Dressing: Maximum assistance Lower Body Dressing: Maximum assistance Toileting: Total assistance(cervantes catheter) Cognitive Retraining Safety/Judgement: Not assessed Pain: 
Pain level pre-treatment: /10 Pain level post-treatment: /10 Activity Tolerance:  Poor Please refer to the flowsheet for vital signs taken during this treatment. After treatment:  
? Patient left in no apparent distress sitting up in chair ? Patient left in no apparent distress in bed 
? Call bell left within reach ? Nursing notified ? Caregiver present ? Bed alarm activated COMMUNICATION/EDUCATION:  
? Role of Occupational Therapy in the acute care setting 
? Home safety education was provided and the patient/caregiver indicated understanding. ? Patient/family have participated as able in goal setting and plan of care. ? Patient/family agree to work toward stated goals and plan of care. ? Patient understands intent and goals of therapy, but is neutral about his/her participation. ? Patient is unable to participate in goal setting and plan of care. Thank you for this referral. 
Efrain Fisher MS OTR/L Time Calculation: 12 mins Eval Complexity: History: HIGH Complexity : Extensive review of history including physical, cognitive and psychosocial history ; Examination: HIGH Complexity : 5 or more performance deficits relating to physical, cognitive , or psychosocial skils that result in activity limitations and / or participation restrictions; Decision Making:HIGH Complexity : Patient presents with comorbidities that affect occupational performance.  Signifigant modification of tasks or assistance (eg, physical or verbal) with assessment (s) is necessary to enable patient to complete evaluation

## 2019-10-14 NOTE — PROGRESS NOTES
0715-  Bedside and Verbal shift change report given to Kenneth Brown RN (oncoming nurse) by Aniceto Chong RN (offgoing nurse). Report included the following information SBAR, Kardex, Intake/Output, MAR and Recent Results.

## 2019-10-14 NOTE — PROGRESS NOTES
Problem: Pain Goal: *Control of Pain Outcome: Progressing Towards Goal 
Goal: *PALLIATIVE CARE:  Alleviation of Pain Outcome: Progressing Towards Goal 
  
Problem: Falls - Risk of 
Goal: *Absence of Falls Description Document Sumaya Velasquez Fall Risk and appropriate interventions in the flowsheet. Outcome: Progressing Towards Goal 
Note:  
Fall Risk Interventions: 
  
 
Mentation Interventions: More frequent rounding, Reorient patient, Toileting rounds Medication Interventions: Evaluate medications/consider consulting pharmacy Elimination Interventions: Toileting schedule/hourly rounds History of Falls Interventions: Investigate reason for fall, Evaluate medications/consider consulting pharmacy, Door open when patient unattended Problem: Pressure Injury - Risk of 
Goal: *Prevention of pressure injury Description Document Dionicio Scale and appropriate interventions in the flowsheet. Outcome: Progressing Towards Goal 
Note:  
Pressure Injury Interventions: 
Sensory Interventions: Assess need for specialty bed, Discuss PT/OT consult with provider, Avoid rigorous massage over bony prominences, Minimize linen layers, Keep linens dry and wrinkle-free, Float heels Moisture Interventions: Check for incontinence Q2 hours and as needed, Internal/External urinary devices, Limit adult briefs, Minimize layers Activity Interventions: Pressure redistribution bed/mattress(bed type) Mobility Interventions: Pressure redistribution bed/mattress (bed type), HOB 30 degrees or less, Float heels Nutrition Interventions: Discuss nutritional consult with provider, Offer support with meals,snacks and hydration Friction and Shear Interventions: Minimize layers, Lift team/patient mobility team, HOB 30 degrees or less

## 2019-10-14 NOTE — PROGRESS NOTES
[facility] 
                                             (A Division of [address] [place] [address]) Follow-up Note Date of Admission: 10/8/2019     Date of Note:  10/14/2019 Summary:   
 
81 y/o  male HTN, AF, GERD, DJD s/p L TKR, Prostate CA, Hypothyroidism, h/o pancreatitis, ETOH consumption adm 10/8/2019 w/ chills, weakness, altered mental status. Has neck pain x 1 mo. Had R leg cellulitis after cat bite (not scratch) 1 mo PTA - resolved w/ abx. Then micturition syncope x 3, hurt knee, progressing generalized weakness, then 1 day PTA fever, chills. Adm w/ 100.5, WBC 29.5, 13% bands. No uti, pneumonia, unrevealing CTAP. 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 Rpt blcx 10/10 NGTD x 2.  Gallium scan in progress. Interval History: 
 
Out of ICU. Afebrile > 48 hours now. WBC count remains normal but very lethargic and restless today. Had received dilaudid q 4h RTC yesterday and Ativan early am today Current Antimicrobials: Prior Antimicrobials Ceftriaxone 10/11 - 3  abx 10/8 - 6 Vancomycin 10/8 - 2  Cefepime, Levoflox 10/8 - 0 Meropenem 10/9 - 2  
  
  
Assessment: Plan:  
Encephalopathy - worse since admission - ? Medication-related: has been receiving dilaudid q 4 hours and Ativan this am 
- Head CT 10/14: no acute intracranial abnormality 
- not related to infection which is under control at this time -> per primary team  
Pasteurella BSI 
- 1 of 2 blood cultures 10/8 + P multocida sens TMP-SMX, Ceftriaxone, Levofloxacin, PCN, TCN 
- No active cellulitis. R leg cellulitis following cat bite in Early September resolved with TMP-SMX then Keflex - current source unclear: ?epidural abscess or discitis/OM?, R leg OM? 
- ?endocarditis. Recent TTE 9/27: neg vegetations - rpt blcx 10/10 NTGD x 2 
- Gallium scan completed. Awaiting report -> monitor rpt blcx from 10/10 
-> continue Ceftriaxone  
-> await Ga scan report -> if no evidence of spinal abscess/osteomyelitis will switch to oral levofloxacin x 1 week Septic shock 
- due to above 
- improving. Vasopressors stopped 10/10.   
- resolved -> abx as above MACKENZIE 
- resolved Worsening alkaline phosphatase 
- from sepsis, ?bone infection / met (doubt) 
- improving H/o PCN allergy (rash)  
- tolerated keflex 
- tolerating Ceftriaxone Recent R leg cellulitis following cat bite (not scratch) R knee pain - s/p aspiration 10/9: 23 WBC, no crystals HTN    
AF    
GERD    
DJD s/p L TKR    
Prostate CA    
Hypothyroidism    
h/o pancreatitis    
ETOH consumption    
  
Microbiology: 
  
10/8      blcx Pasteurella multocida 1 of 2 
             urcx NG 
10/9      R hip asp gs no wbc, NOS, cx NG 
 
10/10 blcx NGTD x 2 
  
Lines / Catheters: RIJ CVL Patient Active Problem List  
Diagnosis Code  Malignant neoplasm of prostate ([facility] C61  Hypertension I10  
 GERD (gastroesophageal reflux disease) K21.9  Glaucoma H40.9  Glaucoma H40.9  Dysphagia R13.10  Encounter for colonoscopy due to history of adenomatous colonic polyps Z12.11, Z86.010  
 Non-pressure chronic ulcer of right calf with fat layer exposed ([facility] [address] I67.940  Laceration of right lower leg with complication H92.449U  Edema R60.9  Pneumonia J18.9  Atrial fibrillation (HCC) I48.91  
 Hypothyroidism E03.9  Sepsis ([facility] [address] A41.9  Cellulitis L03.90  
 HTN (hypertension) E86  
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  
  albuterol-ipratropium (DUO-NEB) 2.5 MG-0.5 MG/3 ML  3 mL Nebulization Q4H PRN  
 ELECTROLYTE REPLACEMENT PROTOCOL - Potassium Standard  Dosing Details for Fluid Restricted Patients  1 Each Other PRN  
 ELECTROLYTE REPLACEMENT PROTOCOL - Magnesium   1 Each Other PRN  
 metoprolol (LOPRESSOR) injection 1.25 mg  1.25 mg IntraVENous Q6H  
 pantoprazole (PROTONIX) injection 40 mg  40 mg IntraVENous DAILY  cefTRIAXone (ROCEPHIN) 2 g in 0.9% sodium chloride (MBP/ADV) 50 mL MBP  2 g IntraVENous O80R  
 folic acid (FOLVITE) tablet 1 mg  1 mg Oral DAILY  thiamine mononitrate (B-1) tablet 100 mg  100 mg Oral DAILY  furosemide (LASIX) injection 40 mg  40 mg IntraVENous DAILY  apixaban (ELIQUIS) tablet 5 mg  5 mg Oral BID  sodium chloride (NS) flush 5-40 mL  5-40 mL IntraVENous Q8H  
 sodium chloride (NS) flush 5-40 mL  5-40 mL IntraVENous PRN  
 LORazepam (ATIVAN) tablet 1 mg  1 mg Oral Q1H PRN Or  
 LORazepam (ATIVAN) injection 1 mg  1 mg IntraVENous Q1H PRN  
 LORazepam (ATIVAN) tablet 2 mg  2 mg Oral Q1H PRN Or  
 LORazepam (ATIVAN) injection 2 mg  2 mg IntraVENous Q1H PRN  
 LORazepam (ATIVAN) injection 3 mg  3 mg IntraVENous Q15MIN PRN  
 desonide (TRIDESILON) 0.05 % cream - patient supplied (Patient Supplied)   Topical BID  albuterol-ipratropium (DUO-NEB) 2.5 MG-0.5 MG/3 ML  3 mL Nebulization Q6H PRN  
 HYDROmorphone (DILAUDID) syringe 0.25 mg  0.25 mg IntraVENous Q4H PRN  
 naloxone (NARCAN) injection 0.4 mg  0.4 mg IntraVENous PRN  
 ELECTROLYTE REPLACEMENT PROTOCOL - Potassium Renal Dosing  1 Each Other PRN  
 melatonin tablet 3 mg  3 mg Oral QHS PRN  
 sodium chloride (NS) flush 5-10 mL  5-10 mL IntraVENous PRN  
 acetaminophen (TYLENOL) tablet 650 mg  650 mg Oral Q4H PRN  
 levothyroxine (SYNTHROID) tablet 175 mcg  175 mcg Oral ACB Review of Systems - Negative except as in interval history Objective: 
Visit Vitals /68 (BP 1 Location: Right arm, BP Patient Position: At rest) Pulse 97 Temp 98.3 °F (36.8 °C) Resp 18 Ht 5' 8\" (1.727 m) Wt 74.8 kg (165 lb) SpO2 98% BMI 25.09 kg/m² Temp (24hrs), Av.2 °F (36.8 °C), Min:97.6 °F (36.4 °C), Max:98.6 °F (37 °C) General: Well developed, well nourished 80 y.o.  male lethargic, not verbalizing, restless HEENT: no scleral icterus, mucous membranes moist, pharynx normal without lesions Neck: resistant to flexion in all directions but more flexible than previous Cardio:  irregularly irregular rhythm Lungs: rhonchi bilateral and + expiratory wheezes Abdomen: soft, non-tender. Bowel sounds normal. No masses, no organomegaly. Extremities:  no redness or tenderness in the calves or thighs, no edema Lab results: 
 
Chemistry Recent Labs 10/14/19 
0420 10/13/19 
1814 10/13/19 
0330  10/12/19 
0400 GLU 97  --  106*  --  106* *  --  147*  --  146*  
K 3.9 4.7 3.2*   < > 3.6 *  --  112*  --  111 CO2 29  --  29  --  26 BUN 21*  --  24*  --  31* CREA 0.72  --  0.75  --  0.88 CA 8.0*  --  7.7*  --  7.6* AGAP 6  --  6  --  9  
BUCR 29*  --  32*  --  35* *  --   --   --  484* TP 5.0*  --   --   --  4.6* ALB 1.9*  --   --   --  1.9*  
GLOB 3.1  --   --   --  2.7 AGRAT 0.6*  --   --   --  0.7*  
 < > = values in this interval not displayed. CBC w/ Diff Recent Labs 10/14/19 
0420 10/13/19 
0330 10/12/19 
0400 WBC 11.7 9.6 11.9 RBC 2.83* 2.50* 2.63* HGB 8.7* 7.7* 8.0*  
HCT 27.2* 23.3* 24.6*  
 135 120* GRANS 74 77* 83* LYMPH 18* 11* 8* EOS 0 1 0 Microbiology All Micro Results Procedure Component Value Units Date/Time CULTURE, BODY FLUID [[id number]] Collected:  10/09/19 1200 Order Status:  Completed Specimen:  Synovial Fluid Updated:  10/14/19 5232   Special Requests: HIP  
  GRAM STAIN NO WBC'S SEEN     
   NO ORGANISMS SEEN     
 Culture result: NO GROWTH 5 DAYS     
 CULTURE, BLOOD [[id number]] Collected:  10/10/19 1237 Order Status:  Completed Specimen:  Blood Updated:  10/14/19 0749 Special Requests: PERIPHERAL Culture result: NO GROWTH 4 DAYS     
 CULTURE, BLOOD [[id number]] Collected:  10/10/19 1341 Order Status:  Completed Specimen:  Blood Updated:  10/14/19 0749 Special Requests: --     
  NO SPECIAL REQUESTS 
BLUE PORT Culture result: NO GROWTH 4 DAYS     
 CULTURE, BLOOD [[id number]] Collected:  10/08/19 2234 Order Status:  Completed Specimen:  Blood from Miscellaneous sample Updated:  10/14/19 0749 Special Requests: NO SPECIAL REQUESTS Culture result: NO GROWTH 5 DAYS     
 CULTURE, BLOOD [[id number]]  (Abnormal)  (Susceptibility) Collected:  10/08/19 1301 Order Status:  Completed Specimen:  Blood Updated:  10/11/19 0825 Special Requests: --     
  NO SPECIAL REQUESTS 
RIGHT 
ARM 
  
  GRAM STAIN    
  AEROBIC BOTTLE GRAM NEGATIVE RODS SMEAR CALLED TO AND CORRECTLY REPEATED BY: DESHAWN COSBY RN,ICU, ON 10/9/19 AT 0335 TO [facility] Culture result:    
  AEROBIC BOTTLE PASTEURELLA MULTOCIDA CULTURE, URINE [[id number]] Collected:  10/08/19 1418 Order Status:  Completed Specimen:  Cath Urine Updated:  10/10/19 1016 Special Requests: NO SPECIAL REQUESTS Culture result: NO GROWTH 2 DAYS Ida Israel MD, [facility] 
[phone] 
10/14/2019  
9:02 AM

## 2019-10-14 NOTE — PROGRESS NOTES
Problem: Mobility Impaired (Adult and Pediatric) Goal: *Acute Goals and Plan of Care (Insert Text) Description Physical Therapy Goals Initiated 10/14/2019 and to be accomplished within 7 day(s) 1. Patient will roll side to side in bed with total assistance to aid in positioning. 2.  Patient will maintain BLE ROM/strength via functional maintenance program to prepare for OOB activity. Outcome: Progressing Towards Goal 
PHYSICAL THERAPY EVALUATION Patient: Nima Gil (89 y.o. male) Date: 10/14/2019 Primary Diagnosis: Severe sepsis ([facility] [address] [A41.9, R65.20] Altered mental status [R41.82] Precautions: Fall, Skin PLOF: Unknown ASSESSMENT : 
Co-treated with OT to maximize patient safety and participation. RN Clearance Romberg cleared patient for treatment. No command following. Eyes open less than 90% of session. Total Ax2 for supine to sit and sit to supine. No active participation in mobility. Constant support in sitting. Grimaces to PROM on LLE and resisting to further mobility. PROM BLE decreased. Heels soft; prevalon boots donned. In no acute distress at end of session. Call bell in reach. Would benefit from functional maintenance program to maintain BLE ROM/strength to aid in positioning and preparation for out of bed activity. Rehab tech educated on proper technique and demonstrated understanding. Patient will benefit from skilled intervention to address the above impairments. Patient's rehabilitation potential is considered to be Guarded Factors which may influence rehabilitation potential include:  
? None noted ? Mental ability/status ? Medical condition ? Home/family situation and support systems ? Safety awareness 
? Pain tolerance/management 
? Other: PLAN : 
Recommendations and Planned Interventions:  
?           Bed Mobility Training             ? Neuromuscular Re-Education ?           Transfer Training                   ? Orthotic/Prosthetic Training 
? Gait Training                          ? Modalities ? Therapeutic Exercises           ? Edema Management/Control ? Therapeutic Activities            ? Family Training/Education ? Patient Education ? Other (comment): Frequency/Duration: Patient will be followed 3-5x/week via rehab tech for functional maintenance program, weekly by PT for re-assessment. Discharge Recommendations: Gaston Torres Further Equipment Recommendations for Discharge: TBD next level of care SUBJECTIVE:  
nonverbal 
 
OBJECTIVE DATA SUMMARY:  
 
Past Medical History:  
Diagnosis Date Arthritis Atrial fibrillation ([address] 07/2017 Dr Charisma Jonas cardio follows. chronic Carcinoma of prostate ([address] Cellulitis Coarse tremor   
  hands, states \"my doctor is aware\" Difficulty swallowing Dysphagia Edema Encounter for colonoscopy due to history of adenomatous colonic polyps Essential hypertension Fall 10/07/2017 \"went to [facility] Emergency Room, CT Scan showed concussion, no bleeding\" per patient Fatigue GERD (gastroesophageal reflux disease) wo. esophagitis Glaucoma H/O joint replacement   
  left knee 2003 HLD (hyperlipidemia) Hypertension Hypertensive disorder Hypothyroidism Impotence Laceration of right lower leg with complication Malignant neoplasm of prostate ([address]   
  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   
 acute/hist. of   
 
Past Surgical History:  
Procedure Laterality Date COLONOSCOPY N/A 1/3/2017 HX CHOLECYSTECTOMY HX KNEE REPLACEMENT Left 01/2003 HX SHOULDER REPLACEMENT Right 7/17, 10/17 Barriers to Learning/Limitations: yes;  cognitive and altered mental status (i.e.Sedation, Confusion) Compensate with: Visual Cues, Verbal Cues, Tactile Cues and Kinesthetic Cues Home Situation: 
Home Situation Home Environment: Private residence # Steps to Enter: 4 One/Two Story Residence: One story Living Alone: No 
Support Systems: Family member(s), Friends \ neighbors, Child(steffi) Patient Expects to be Discharged to[de-identified] Private residence Current DME Used/Available at Home: None Critical Behavior: 
Neurologic State: Drowsy; Restless Orientation Level: Unable to verbalize Cognition: No command following Safety/Judgement: Not assessed Strength:   
Unable Range Of Motion: PROM BLE decreased No AROM BLE Functional Mobility: 
Bed Mobility: 
Supine to Sit: Total assistance;Assist x2 Sit to Supine: Total assistance;Assist x2 Transfers: 
Sit to Stand: (not assessed) Balance:  
Sitting: Impaired Sitting - Static: Poor (constant support) Standing: (not assessed) Pain: 
Pain level pre-treatment: 0/10 Pain level post-treatment: 0/10 Activity Tolerance:  
Poor After treatment:  
?         Patient left in no apparent distress sitting up in chair ? Patient left in no apparent distress in bed 
? Call bell left within reach ? Nursing notified ? Caregiver present ? Bed alarm activated ? SCDs applied COMMUNICATION/EDUCATION:  
?         Role of physical therapy and plan of care in the acute care setting. ?         Fall prevention education was provided and the patient/caregiver indicated understanding. ? Patient/family have participated as able in goal setting and plan of care. ?         Patient/family agree to work toward stated goals and plan of care. ?         Patient understands intent and goals of therapy, but is neutral about his/her participation. ? Patient is unable to participate in goal setting/plan of care: ongoing with therapy staff. Thank you for this referral. 
Darshan Simon, PT Time Calculation: 10 mins Eval Complexity: History: MEDIUM  Complexity : 1-2 comorbidities / personal factors will impact the outcome/ POC Exam:MEDIUM Complexity : 3 Standardized tests and measures addressing body structure, function, activity limitation and / or participation in recreation  Presentation: MEDIUM Complexity : Evolving with changing characteristics  Clinical Decision Making:Medium Complexity clinical judgement; ROM, MMT, functional mobility  Overall Complexity:MEDIUM

## 2019-10-14 NOTE — PROGRESS NOTES
conducted a Follow up consultation and Spiritual Assessment for Susana Escobedo, who is a 80 y.o.,male. The  provided the following Interventions: 
Continued the relationship of care and support. Listened empathically. Offered prayer and assurance of continued prayer on patients behalf. Chart reviewed. The following outcomes were achieved: 
Patient expressed gratitude for pastoral care visit. Assessment: 
There are no further spiritual or Jainism issues which require Spiritual Care Services interventions at this time. Plan: 
Chaplains will continue to follow and will provide pastoral care on an as needed/requested basis.  recommends bedside caregivers page  on duty if patient shows signs of acute spiritual or emotional distress.

## 2019-10-14 NOTE — PROGRESS NOTES
Problem: Pain Goal: *Control of Pain 10/14/2019 0319 by Nikole Zapata RN Outcome: Progressing Towards Goal 
10/14/2019 0318 by Nikole Zapata RN Outcome: Progressing Towards Goal 
Goal: *PALLIATIVE CARE:  Alleviation of Pain 10/14/2019 0319 by Nikole Zapata RN Outcome: Progressing Towards Goal 
10/14/2019 0318 by Nikole Zapata RN Outcome: Progressing Towards Goal

## 2019-10-15 NOTE — PROGRESS NOTES
1900  -- Bedside, Verbal and Written shift change report given to 2309 Edwin Beebe (oncoming nurse) [facility] Report included the following information SBAR, Kardex, Intake/Output, MAR and Recent Results.  
   
 2207-- PM medications administered, pt tolerated with ease, will continue to monitor. 
  
0030 -- Shift reassessment, pt condition unchanged, will continue to monitor. 1014 -- PRN pain  medication administered, pt tolerated with ease, will continue to monitor. 
 
 0400 --  Shift reassessment, pt condition unchanged, will continue to monitor.   
   
 0700 -- Bedside, Verbal and Written shift change report given to [facility]) by DEMI (offgoing nurse). Report included the following information SBAR, Kardex, Intake/Output, MAR and Recent Results. Skin assessment completed.

## 2019-10-15 NOTE — PROGRESS NOTES
Met with pt's daughter @ bedside. Discussed SNF choices, states hasn't had a chance to go over it with her mother. Explained to her that we need choices so when MD states pt ready for discharge, we have bed available. States ok to send out to [facility]. Posted in e-discharge & cc link in epic. Will cont to follow. Michelle Vazquez RN,ext [phone].

## 2019-10-15 NOTE — PROGRESS NOTES
Speech Therapy Note: SLP attempted follow up this am however, pt continues inappropriate for dysphagia tx today. Pt's dtr at b/s; discussed possibility of alternate nutritional source. After d/w dtr, she reports pt's wife will be in at 56 to discuss NG tube. SLP will continue to follow as indicated. D/W RN, Daniel Dawson. YULISSA HughesS., CCC-SLP Clinical Supervisor - Med-surg Rehab Ph: [phone]

## 2019-10-15 NOTE — PROGRESS NOTES
Pulmonary progress note History 59-year-old male presented to the emergency room on 10/19 with AMS. Blood cultures ultimately grew Pasteurella. The possible source was a somewhat aggressive pet cat. The patient's mental status appears to have declined since transfer out of the ICU on 10/12. He has been receiving Ativan per the CIWA protocol. He has not had a drink now in 1 month. High-dose thiamine was started earlier today. The patient underwent arthrocentesis of the left knee based on gallium scan findings. Chemistries of the fluid are pending, but the fluid is described as cloudy and viscous. While he has a history of gout, this also could imply a septic joint. Exam 
The patient does not answer questions. He is encephalopathic. Blood pressure 121/85, pulse 99, temperature 98.1 °F (36.7 °C), resp. rate 18, height 5' 8\" (1.727 m), weight 80.2 kg (176 lb 12.8 oz), SpO2 97 %. Gaze is conjugate. No roving eye movements. No accessory muscle use. No paradoxical abdominal movement Sclera are anicteric Labs 10/15 WBC 10.3 with no bands Sodium 149, BUN 15 and creatinine 0.78. Calcium is 8.4 with albumin of 1.9. Ammonia level is less than 10. CT head without contrast on 10/14 showed no change to 10/8/2019 Pleural effusions and atelectasis Assessment Pasteurella bacteremia Metabolic encephalopathy Possible septic left knee The etiology of the patient's encephalopathy is unclear. His sodium is only slightly raised and his calcium level is only slightly depressed. Renal function is normal.  Hepatic function, while showing subtle abnormalities, is only mildly impaired. He is no longer showing signs of overt alcohol withdrawal.  He has been started on high-dose thiamine. CT scan of the brain shows no shift or change to roughly 1 week ago.   Blood cultures are negative and suggest reasonable source control of any infection. There is no finding to strongly suggest stroke or seizure. Of his medications, Dilaudid and Ativan are the 2 most likely sources of any mental status change. Given his time from his last drink, stopping the CIWA protocol is reasonable. Starting Haldol at this point in his hospital stay is likely more appropriate. Scheduling Tylenol 325 to 500 mg every 6 hours may provide better pain control with less altered mental status than as needed Dilaudid. Scheduled Tylenol can be discontinued when his mental status improves. Plan Await results of the left knee arthrocentesis Continue antibiotics Recommend discontinuing CIWA protocol and starting Haldol Recommend scheduled non-narcotic analgesic to minimize Dilaudid use.

## 2019-10-15 NOTE — PROGRESS NOTES
[facility] 
                                             (A Division of [address] [place] [address]) Follow-up Note Date of Admission: 10/8/2019     Date of Note:  10/15/2019 Summary:   
 
79 y/o  male HTN, AF, GERD, DJD s/p L TKR, Prostate CA, Hypothyroidism, h/o pancreatitis, ETOH consumption adm 10/8/2019 w/ chills, weakness, altered mental status. Has neck pain x 1 mo. Had R leg cellulitis after cat bite (not scratch) 1 mo PTA - resolved w/ abx. Then micturition syncope x 3, hurt knee, progressing generalized weakness, then 1 day PTA fever, chills. Adm w/ 100.5, WBC 29.5, 13% bands. No uti, pneumonia, unrevealing CTAP. 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 Rpt blcx 10/10 NGTD x 2.  Gallium scan 10/14: intense periprosthetic uptake L knee, distal femur. Interval History: 
 
Just back from US guided arthrocentesis Left knee. Very lethargic. Opens eyes to name but no verbal output. Current Antimicrobials: Prior Antimicrobials Ceftriaxone 10/11 - 3  abx 10/8 - 6 Vancomycin 10/8 - 2  Cefepime, Levoflox 10/8 - 0 Meropenem 10/9 - 2  
  
  
Assessment: Plan:  
Encephalopathy/ ETOH Withdrawal 
- worse since admission - ? Medication-related: has been receiving dilaudid q 4 hours and Ativan this am 
- Head CT 10/14: no acute intracranial abnormality 
- not related to infection which appears under control at this time -> per primary team. D/w Dr. Starla Dunn Pasteurella BSI 
- 1 of 2 blood cultures 10/8 + P multocida sens TMP-SMX, Ceftriaxone, Levofloxacin, PCN, TCN 
- No active cellulitis. R leg cellulitis following cat bite in Early September resolved with TMP-SMX then Keflex - current source unclear: ?epidural abscess or discitis/OM?, R leg OM? 
- ?endocarditis. Recent TTE 9/27: neg vegetations - rpt blcx 10/10 NTGD x 2 
- Gallium scan 10/14: intense periprosthetic uptake L knee, distal femur. -> monitor rpt blcx from 10/10 -> continue Ceftriaxone  
-> US guided L knee aspiration ordered by Ortho Left knee uptake on Ga scan 
- r/o septic arthritis L TKR 
- s/p US guided arthrocentesis 10/15 -> await synovial fluid analysis 
-> if found to be infected may not be candidate for surgical intervention. 6 weeks IV abx then lifelong po abx suppression may be best option Septic shock 
- due to above 
- improving. Vasopressors stopped 10/10.   
- resolved -> abx as above MACKENZIE 
- resolved Worsening alkaline phosphatase 
- from sepsis, ?bone infection / met (doubt) 
- improving H/o PCN allergy (rash)  
- tolerated keflex 
- tolerating Ceftriaxone Recent R leg cellulitis following cat bite (not scratch) R hip pain - s/p aspiration 10/9: 23 WBC, no crystals HTN    
AF    
GERD    
DJD s/p L TKR    
Prostate CA    
Hypothyroidism    
h/o pancreatitis    
ETOH consumption    
  
Microbiology: 
  
10/8      blcx Pasteurella multocida 1 of 2 
             urcx NG 
10/9      R hip asp gs no wbc, NOS, cx NG 
 
10/10 blcx NGTD x 2 
  
Lines / Catheters: RIJ CVL Patient Active Problem List  
Diagnosis Code  Malignant neoplasm of prostate ([facility] [address] C61  Hypertension I10  
 GERD (gastroesophageal reflux disease) K21.9  Glaucoma H40.9  Glaucoma H40.9  Dysphagia R13.10  Encounter for colonoscopy due to history of adenomatous colonic polyps Z12.11, Z86.010  
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  
 Hypothyroidism E03.9  Sepsis ([facility] [address] A41.9  Cellulitis L03.90  
 HTN (hypertension) P86  
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  
  Severe sepsis (HCC) A41.9, R65.20  Cellulitis of left knee L03.116 Current Facility-Administered Medications Medication Dose Route Frequency  HYDROmorphone (DILAUDID) syringe 0.25 mg  0.25 mg IntraVENous Q8H PRN  
 LORazepam (ATIVAN) tablet 0.5 mg  0.5 mg Oral Q1H PRN Or  
 LORazepam (ATIVAN) injection 2 mg  2 mg IntraVENous Q1H PRN  
 LORazepam (ATIVAN) injection 1 mg  1 mg IntraVENous Q4H PRN  
 dextrose 5% - 0.45% NaCl with KCl 10 mEq/L infusion  100 mL/hr IntraVENous CONTINUOUS  
 albuterol-ipratropium (DUO-NEB) 2.5 MG-0.5 MG/3 ML  3 mL Nebulization Q4H PRN  
 metoprolol (LOPRESSOR) injection 1.25 mg  1.25 mg IntraVENous Q6H  
 pantoprazole (PROTONIX) injection 40 mg  40 mg IntraVENous DAILY  cefTRIAXone (ROCEPHIN) 2 g in 0.9% sodium chloride (MBP/ADV) 50 mL MBP  2 g IntraVENous U00B  
 folic acid (FOLVITE) tablet 1 mg  1 mg Oral DAILY  thiamine mononitrate (B-1) tablet 100 mg  100 mg Oral DAILY  furosemide (LASIX) injection 40 mg  40 mg IntraVENous DAILY  apixaban (ELIQUIS) tablet 5 mg  5 mg Oral BID  sodium chloride (NS) flush 5-40 mL  5-40 mL IntraVENous Q8H  
 sodium chloride (NS) flush 5-40 mL  5-40 mL IntraVENous PRN  
 desonide (TRIDESILON) 0.05 % cream - patient supplied (Patient Supplied)   Topical BID  naloxone (NARCAN) injection 0.4 mg  0.4 mg IntraVENous PRN  
 melatonin tablet 3 mg  3 mg Oral QHS PRN  
 sodium chloride (NS) flush 5-10 mL  5-10 mL IntraVENous PRN  
 acetaminophen (TYLENOL) tablet 650 mg  650 mg Oral Q4H PRN  
 levothyroxine (SYNTHROID) tablet 175 mcg  175 mcg Oral ACB Review of Systems - Negative except as in interval history Objective: 
Visit Vitals /90 Pulse 88 Temp 99.7 °F (37.6 °C) Resp 18 Ht 5' 8\" (1.727 m) Wt 72 kg (158 lb 12.8 oz) SpO2 99% BMI 24.15 kg/m² Temp (24hrs), Av.9 °F (37.2 °C), Min:98.1 °F (36.7 °C), Max:99.8 °F (37.7 °C) General: Well developed, well nourished 80 y.o.  male lethargic, not verbalizing, restless HEENT: no scleral icterus, mucous membranes moist, pharynx normal without lesions Neck: resistant to flexion in all directions but more flexible than previous Cardio:  irregularly irregular rhythm Lungs: rhonchi bilateral and + expiratory wheezes Abdomen: soft, non-tender. Bowel sounds normal. No masses, no organomegaly. Extremities:  no redness or tenderness in the calves or thighs, no edema Lab results: 
 
Chemistry Recent Labs 10/15/19 
0525 10/14/19 
0420 10/13/19 
1814 10/13/19 
0330 * 97  --  106* * 149*  --  147* K 3.2* 3.9 4.7 3.2*  
 114*  --  112* CO2 33* 29  --  29 BUN 15 21*  --  24* CREA 0.78 0.72  --  0.75 CA 8.4* 8.0*  --  7.7* AGAP 6 6  --  6  
BUCR 19 29*  --  32* * 355*  --   --   
TP 5.1* 5.0*  --   --   
ALB 1.9* 1.9*  --   --   
GLOB 3.2 3.1  --   --   
AGRAT 0.6* 0.6*  --   --   
 
 
CBC w/ Diff Recent Labs 10/15/19 
0525 10/14/19 
0420 10/13/19 
0330 WBC 10.3 11.7 9.6  
RBC 2.77* 2.83* 2.50* HGB 8.5* 8.7* 7.7* HCT 27.0* 27.2* 23.3*  
 185 135 GRANS 74* 74 77* LYMPH 14* 18* 11* EOS 1 0 1 Microbiology All Micro Results Procedure Component Value Units Date/Time CULTURE, BODY FLUID W Calvin Asya [[id number]] Order Status:  Sent Specimen:  Synovial Fluid CULTURE, BLOOD [[id number]] Collected:  10/10/19 1237 Order Status:  Completed Specimen:  Blood Updated:  10/15/19 8666 Special Requests: PERIPHERAL Culture result: NO GROWTH 5 DAYS     
 CULTURE, BLOOD [[id number]] Collected:  10/10/19 1341 Order Status:  Completed Specimen:  Blood Updated:  10/15/19 0756 Special Requests: --     
  NO SPECIAL REQUESTS 
BLUE PORT Culture result: NO GROWTH 5 DAYS     
 CULTURE, BLOOD [[id number]] Collected:  10/08/19 1679 Order Status:  Completed Specimen:  Blood from Miscellaneous sample Updated:  10/15/19 0754 Special Requests: NO SPECIAL REQUESTS Culture result: NO GROWTH 6 DAYS     
 CULTURE, BODY FLUID [[id number]] Collected:  10/09/19 1200 Order Status:  Completed Specimen:  Synovial Fluid Updated:  10/14/19 8425 Special Requests: HIP  
  GRAM STAIN NO WBC'S SEEN     
   NO ORGANISMS SEEN Culture result: NO GROWTH 5 DAYS     
 CULTURE, BLOOD [[id number]]  (Abnormal)  (Susceptibility) Collected:  10/08/19 1301 Order Status:  Completed Specimen:  Blood Updated:  10/11/19 0825 Special Requests: --     
  NO SPECIAL REQUESTS 
RIGHT 
ARM 
  
  GRAM STAIN    
  AEROBIC BOTTLE GRAM NEGATIVE RODS SMEAR CALLED TO AND CORRECTLY REPEATED BY: DESHAWN COSBY RN,ICU, ON 10/9/19 AT 0335 TO [facility] Culture result:    
  AEROBIC BOTTLE PASTEURELLA MULTOCIDA CULTURE, URINE [[id number]] Collected:  10/08/19 1418 Order Status:  Completed Specimen:  Cath Urine Updated:  10/10/19 1016 Special Requests: NO SPECIAL REQUESTS Culture result: NO GROWTH 2 DAYS Chang Dill MD, [facility] Infectious Diseases 
[phone] 
10/15/2019  
9:02 AM

## 2019-10-15 NOTE — PROGRESS NOTES
1345 ngt inserted to 59cms, waiting on KUB 
 
1830 pt started on tube feed osmo 1.2 at 25ml/hr 1930 Bedside and Verbal shift change report given to Qasim rn (oncoming nurse) by Gricel Stark RN 
 (offgoing nurse). Report included the following information Kardex, MAR and Recent Results.

## 2019-10-15 NOTE — PROGRESS NOTES
NUTRITION FOLLOW UP/PLAN OF CARE   
 
RECOMMENDATIONS:  
1. Continue NPO - Rec: Osmolite 1.2 @ 70 mL/hr x 22 hrs to provide 1540 mL, 1848 kcal, 85 g pro, 1262 mL free water  
-start Osmolite 1.2 @ 25 mL/hr and advance as tolerated by 20 mL every 6 hours to goal rate of 70 mL/hr x 22 hours 2. Monitor labs, weight and TF tolerance 3. RD to follow GOALS:  
1. TF tolerance/TF meets >75% of protein/calorie needs by 10/18/19 2. Weight Maintenance (+/- 1-2 lb) by 10/18/19 ASSESSMENT:  
Wt status is classified as overweight per BMI of 26.9 Pt is at nutrition risk related to NPO status per SLP 10/13/19. NGT placed today. Stage 2 PU to R buttock per wound LDA. Diagnosis: encephalopathy, sepsis, L knee infection, MACKENZIE (improved), afib, elevated LFT, hypothyroid, hypocalcemia, hypernatremia, ETOH abuse. Nutrition recommendations listed. RD to follow. Nutrition Diagnoses:  
Remains appropriate/now with NGT: Inadequate oral food and beverage intkae due to poor appetite as evidenced by po intake < 100 calories at breakfast 10/10/19. Remains appropriate: Increased nutrient needs due to wound healing as evidenced by compromised skin integrity (stage 3 pressure injury right buttock, skin tear back of scrotum and right forearm). Nutrition Risk:  [x] High  [] Moderate []  Low SUBJECTIVE/OBJECTIVE:  
Pt resting in bed during time of assessment with daughter at bedside. Per speech on 10/13/19 rec'd NPO and ice chips PRN for oral care/comfort, pt has been NPO since. RN placed NGT this afternoon. Reassessed needs on todays bed scale weight 176.8 lb. Noted weight have been all over the place since admission - admit 10/8/19: 197.8 lb then 10/13/19: 165 lb. -190 per family reports. Recommend: Osmolite 1.2 @ 70 mL/hr x 22 hrs to provide 1540 mL, 1848 kcal, 85 g pro, 1262 mL free water . Spoke with RN/attending MD. Tiolindsey Chadwick with daughter about recommendations and answered her questions regarding his nutrition. Information Obtained from:  
 [x] Chart Review 
 [] Patient [x] Family/Caregiver [x] Nurse/Physician 
 [] Interdisciplinary Meeting/Rounds Diet: NPO Medications: [x] Reviewed Noted: rocephin, D5-0.45% NaCl w KCl 156 mL/hr, folic acid, lasix, dilaudid PRN, synthroid, ativan PRN, lopressor, protonix, NS flus, thiamine Allergies: [x] Reviewed Encounter Diagnoses ICD-10-CM ICD-9-CM 1. Severe sepsis ([facility] [address] A41.9 038.9  
 R65.20 995.92 Past Medical History:  
Diagnosis Date  Arthritis  Atrial fibrillation ([facility] [address] 07/2017 Dr Jihan Yi cardio follows. chronic  Carcinoma of prostate ([facility] [address]  Cellulitis  Coarse tremor   
  hands, states \"my doctor is aware\"  Difficulty swallowing  Dysphagia  Edema  Encounter for colonoscopy due to history of adenomatous colonic polyps  Essential hypertension  Fall 10/07/2017 \"went to [facility] Emergency Room, CT Scan showed concussion, no bleeding\" per patient  Fatigue  GERD (gastroesophageal reflux disease) wo. esophagitis  Glaucoma  H/O joint replacement   
  left knee 2003  HLD (hyperlipidemia)  Hypertension  Hypertensive disorder  Hypothyroidism  Impotence  Laceration of right lower leg with complication  Malignant neoplasm of prostate ([address]   
  [id number] Adenocarcinoma of the Prostate, dx 11/3/2008, karlee 3+4, presenting PSA 5.5ng/mL. 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   
 acute/hist. of   
  
Labs:   
Lab Results Component Value Date/Time  Sodium 149 (H) 10/15/2019 05:25 AM  
 Potassium 3.2 (L) 10/15/2019 05:25 AM  
 Chloride 110 10/15/2019 05:25 AM  
 CO2 33 (H) 10/15/2019 05:25 AM  
 Anion gap 6 10/15/2019 05:25 AM  
 Glucose 109 (H) 10/15/2019 05:25 AM  
 BUN 15 10/15/2019 05:25 AM  
 Creatinine 0.78 10/15/2019 05:25 AM  
 Calcium 8.4 (L) 10/15/2019 05:25 AM  
 Magnesium 1.9 10/15/2019 05:25 AM  
 Phosphorus 2.9 10/15/2019 05:25 AM  
 Albumin 1.9 (L) 10/15/2019 05:25 AM  
 
Lab Results Component Value Date/Time  (H) 10/15/2019 05:25 AM  
 
Anthropometrics:BMI: 26.9 (based on newest bed scale weight) Last 3 Recorded Weights in this Encounter 10/12/19 1435 10/13/19 2323 10/15/19 0105 Weight: 91.1 kg (200 lb 12.8 oz) 74.8 kg (165 lb) 72 kg (158 lb 12.8 oz) Ht Readings from Last 1 Encounters:  
10/09/19 5' 8\" (1.727 m) Documented Weight History: 
Weight Metrics 10/15/2019 10/8/2019 10/3/2019 9/27/2019 9/26/2019 9/23/2019 9/16/2019 Weight 158 lb 12.8 oz - 180 lb 180 lb - 190 lb 190 lb BMI - 24.15 kg/m2 27.37 kg/m2 - 27.37 kg/m2 28.89 kg/m2 28.89 kg/m2 Bedscale weight 10/15/19: 176.8 lb, placed in flowsheet noted sheets on bed. IBW: 154 lb %IBW: 115% UBW: 185-190 lb 
[] Weight Loss [] Weight Gain [x] Weight fluctuations - weights all over the place 
-Basing needs off of most recent bed scale weight 176.8 lb Estimated Nutrition Needs: [x] MSJ  [] Other: 
Calories: 7399-7739 kcal (x1.2 AF - x 1.1 SF) Based on:   [x] Actual BW   
Protein:   80-97 g (1-1.2 g/kg) Based on:   [x] Actual BW Fluid:     1 mL/kcal  
 
 [x] No Cultural, Synagogue or ethnic dietary need identified. [] Cultural, Synagogue and ethnic food preferences identified and addressed Wt Status:  [] Normal (18.6 - 24.9) [] Underweight (<18.5) [x] Overweight (25 - 29.9) [] Mild Obesity (30 - 34.9)  [] Moderate Obesity (35 - 39.9) [] Morbid Obesity (40+) Nutrition Problems Identified:  
[x] Suboptimal PO intake  
[] Food Allergies [x] Difficulty chewing/swallowing/poor dentition 
[] Constipation/Diarrhea  
[] Nausea/Vomiting  
[] None 
[] Other: Plan:  
[x] Enteral feeding 
[]  Obtained/adjusted food preferences/tolerances and/or snacks options  
[]  Supplements added  
[x] SLP following for feeding techniques []  HS snack added  
[]  Modify diet texture  
[]  Modify diet for food allergies []  Educate patient  
[]  Assist with menu selection []  Monitor PO intake on meal rounds  
[x]  Continue inpatient monitoring and intervention  
[x]  Participated in discharge planning/Interdisciplinary rounds/Team meetings  
[]  Other:  
 
Education Needs: 
 [] Not appropriate for teaching at this time due to:   
 [x] Identified and addressed spoke with family about TF recommendations and answered questions Nutrition Monitoring and Evaluation: 
[x] Continue ongoing monitoring and intervention 
[] Other Maurizio Cooney

## 2019-10-15 NOTE — CONSULTS
Consult Note Assessment: 1. Status post remote left total knee replacement, rule out septic joint 2. Left knee cellulitis resolved PLAN:   
1. Ultrasound guided aspiration left knee with fluid analysis for cell count with diff, crystals, gram stain with culture and anaerobic culture. Will follow results with further treatment recommendations HPI: Alexsandra Tony is a 80 y.o. male patient with a history of alcoholism presents with left knee pain. He was admitted 10/8/19 with sepsis. He has a remote history of a left total knee replacement per his wife at [address]  [facility]. She does not recall the surgeon or year. He also has a history of a recent fall which precipitated his knee pain. Prior to this he was ambulatory and well functioning. Xrays were negative for acute fracture. He had an elevated white count at the time of admission which is trending down on IV abx. I was contacted this morning for re-consultation in this matter. Previous synovial analysis of septic arthritis of his hip has been negative. Past Medical History:  
Diagnosis Date  Arthritis  Atrial fibrillation ([facility] [address] 07/2017 Dr Marjorie Bryant cardio follows. chronic  Carcinoma of prostate ([facility] [address]  Cellulitis  Coarse tremor   
  hands, states \"my doctor is aware\"  Difficulty swallowing  Dysphagia  Edema  Encounter for colonoscopy due to history of adenomatous colonic polyps  Essential hypertension  Fall 10/07/2017 \"went to [facility] Emergency Room, CT Scan showed concussion, no bleeding\" per patient  Fatigue  GERD (gastroesophageal reflux disease) wo. esophagitis  Glaucoma  H/O joint replacement   
  left knee 2003  HLD (hyperlipidemia)  Hypertension  Hypertensive disorder  Hypothyroidism  Impotence  Laceration of right lower leg with complication  Malignant neoplasm of prostate ([facility] [address] [id number] Adenocarcinoma of the Prostate, dx 11/3/2008, karlee 3+4, presenting PSA 5.5ng/mL. 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   
 acute/hist. of   
 
Past Surgical History:  
Procedure Laterality Date  COLONOSCOPY N/A 1/3/2017  HX CHOLECYSTECTOMY  HX KNEE REPLACEMENT Left 01/2003  HX SHOULDER REPLACEMENT Right 7/17, 10/17 Prior to Admission medications Medication Sig Start Date End Date Taking? Authorizing Provider  
oxyCODONE-acetaminophen (PERCOCET) 5-325 mg per tablet Take 2 Tabs by mouth every six (6) hours as needed for Pain. Yes Provider, Historical  
febuxostat (ULORIC) 80 mg tab tablet Take 80 mg by mouth daily. Yes Provider, Historical  
desonide (TRIDESILON) 0.05 % cream Apply 1 Each to affected area two (2) times a day. TO FACE   Yes Provider, Historical  
nystatin (MYCOSTATIN) 100,000 unit/mL suspension Take 5 mL by mouth three (3) times daily. swish and spit 9/28/19  Yes Camelia Becerril PA  
zolpidem (AMBIEN) 5 mg tablet Take 5 mg by mouth nightly. Yes Provider, Historical  
tamsulosin (FLOMAX) 0.4 mg capsule Take 1 Cap by mouth daily (after dinner). 6/25/19  Yes Ayala Bowen MD  
apixaban (ELIQUIS) 5 mg tablet Take 1 Tab by mouth two (2) times a day. 6/4/18  Yes Mauricio Mcbride NP  
albuterol (PROAIR HFA) 90 mcg/actuation inhaler Take  inhaled by mouth. 6/5/18  Yes Provider, Historical  
ALPHAGAN P 0.15 % ophthalmic solution Administer 1 Drop to both eyes three (3) times daily.  8/2/17  Yes Provider, Historical  
ipratropium-albuterol (COMBIVENT RESPIMAT)  mcg/actuation inhaler daily as needed 3/29/17  Yes Provider, Historical  
 cyanocobalamin 1,000 mcg tablet Take 1,000 mcg by mouth daily. Yes Provider, Historical  
furosemide (LASIX) 40 mg tablet Take 40 mg by mouth daily. 2 tab daily 6/20/17  Yes Provider, Historical  
loratadine (CLARITIN) 10 mg tablet Take 10 mg by mouth daily. Yes Provider, Historical  
gabapentin (NEURONTIN) 600 mg tablet Take 600 mg by mouth two (2) times a day. 1 in A.M. 2 AT NIGHT    Yes Provider, Historical  
levothyroxine (SYNTHROID) 175 mcg tablet Take 175 mcg by mouth Daily (before breakfast). Yes Provider, Historical  
bimatoprost (LUMIGAN) 0.03 % ophthalmic drops Administer 1 drop to both eyes every evening. Yes Provider, Historical  
multivitamins-minerals-lutein (CENTRUM SILVER) Tab Take 1 Tab by mouth daily. Yes Provider, Historical  
traZODone (DESYREL) 50 mg tablet Take 50 mg by mouth nightly as needed for Sleep. Yes Provider, Historical  
PYRIDOXINE HCL (VITAMIN B-6 PO) Take 1 Cap by mouth daily. Yes Provider, Historical  
albuterol (PROVENTIL HFA, VENTOLIN HFA, PROAIR HFA) 90 mcg/actuation inhaler Take 2 Puffs by inhalation every four (4) hours as needed for Shortness of Breath or Wheezing. Provider, Historical  
ipratropium-albuterol (COMBIVENT RESPIMAT)  mcg/actuation inhaler Take 1 Puff by inhalation daily as needed for Shortness of Breath or Wheezing. Provider, Historical  
 
Allergies Allergen Reactions  Adhesive Other (comments) Skin irritation  Darvon [Propoxyphene] Hives, Myalgia and Other (comments) Joint pain  Penicillins Rash and Itching Social History Socioeconomic History  Marital status:  Spouse name: Not on file  Number of children: Not on file  Years of education: Not on file  Highest education level: Not on file Occupational History  Not on file Social Needs  Financial resource strain: Not on file  Food insecurity:  
  Worry: Not on file Inability: Not on file  Transportation needs: Medical: Not on file Non-medical: Not on file Tobacco Use  Smoking status: Former Smoker  Smokeless tobacco: Never Used Substance and Sexual Activity  Alcohol use: Yes Comment: weekly  Drug use: No  
 Sexual activity: Not on file Lifestyle  Physical activity:  
  Days per week: Not on file Minutes per session: Not on file  Stress: Not on file Relationships  Social connections:  
  Talks on phone: Not on file Gets together: Not on file Attends Mosque service: Not on file Active member of club or organization: Not on file Attends meetings of clubs or organizations: Not on file Relationship status: Not on file  Intimate partner violence:  
  Fear of current or ex partner: Not on file Emotionally abused: Not on file Physically abused: Not on file Forced sexual activity: Not on file Other Topics Concern  Not on file Social History Narrative  Not on file Blood pressure 142/90, pulse 88, temperature 99.7 °F (37.6 °C), resp. rate 18, height 5' 8\" (1.727 m), weight 72 kg (158 lb 12.8 oz), SpO2 99 %. CBC w/Diff Lab Results Component Value Date/Time WBC 10.3 10/15/2019 05:25 AM  
 RBC 2.77 (L) 10/15/2019 05:25 AM  
 HCT 27.0 (L) 10/15/2019 05:25 AM  
  
 
 
Physical Assessment: 
General: in no apparent distress. Somnolent and unable to participate in exam  
Wound: Well healed anterior linear incision Extremities: No overlying erythema left knee. Mild effusion. Grimace with passive motion from 0-80 degrees. Thighs and calves soft Dressing:  None ortho DVT Exam:   No exam evidence to suggest DVT. Compartments soft and NT. Radiology:  
Left knee xrays: FINDINGS: Small-moderate joint effusion, decreased from the previous study. The 
total knee arthroplasty is unchanged in position with no fracture and no signs 
of loosening. Vascular calcification. 
    
 
- Selene Merino PA-C 
10/15/2019 Office [phone] Cell [phone]

## 2019-10-15 NOTE — PROGRESS NOTES
Internal Medicine Progress Note NAME: Jamie Davis :  1933 MRM:  [id number] Date/Time: 10/15/2019 ASSESSMENT/PLAN: 
 
 
# Encephalopathy. Possible sepsis related or metabolic Alcohol related with his ho alcohol abuse. Was on opioid and benzo that been consolidated. Ammonia level normal , CT head negative. Place ngt for meds ( didn't get po meds for few days ) . 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 H/o cat bite 4 weeks to RLE per daughter, when he came in with cellulitis and was treated. D/w Dr. Estelle Biggs concern for persistent infection/abscess, he ordered nuclear gallium scan . Blood culture repeat on 10/10 negative to date. # L knee infection. Orthopedic. Knee tapping and CS. #  MACKENZIE - improved with IV fluid hydration, monitor closely. Monitor labs. Avoid nephrotoxins. Renally dosing medications. Monitor urine out put. #  AFIB - Rate control, resume Elliquis for [facility] when there are no further procedures to be done # Elevated LFT - GI consulted, suspects 2/2 sepsis, appreciate recs. # Hypothyroidism - Cont home levothyroxine # Hypocalcemia. Hypokalemia. Replete and trend #Hypernatremia. D/c NS IVF. D5 ivf . Serial labs to monitor level. # ETOH abuse - CIWA ordered by [facility]. Per family last drink was 1 month ago. Thiamine. # NGT for meds and feeding DVT ppx - SCD; resume Eliquis. 
 
-Code status : FULL Lab Review:  
 
Recent Labs 10/15/19 
0525 10/14/19 
0420 10/13/19 
0330 WBC 10.3 11.7 9.6 HGB 8.5* 8.7* 7.7* HCT 27.0* 27.2* 23.3*  
 185 135 Recent Labs 10/15/19 
0525 10/14/19 
0420 10/13/19 
1814 10/13/19 
0330 * 149*  --  147* K 3.2* 3.9 4.7 3.2*  
 114*  --  112* CO2 33* 29  --  29 * 97  --  106* BUN 15 21*  --  24* CREA 0.78 0.72  --  0.75 CA 8.4* 8.0*  --  7.7* MG 1.9 2.1 1.8 1.9 PHOS 2.9 2.3*  --  2.6 ALB 1.9* 1.9*  --   --   
 TBILI 1.5* 1.3*  --   --   
SGOT 40* 40*  --   --   
ALT 25 30  --   --   
 
Lab Results Component Value Date/Time Glucose (POC) 117 (H) 10/12/2019 11:35 AM  
 Glucose (POC) 111 (H) 10/12/2019 03:00 AM  
 Glucose (POC) 130 (H) 07/11/2019 04:30 PM  
 Glucose, POC 83 01/03/2017 10:19 AM  
 
No results for input(s): PH, PCO2, PO2, HCO3, FIO2 in the last 72 hours. No results for input(s): INR, INREXT, INREXT in the last 72 hours. No results found for: SDES Lab Results Component Value Date/Time Culture result: NO GROWTH 5 DAYS 10/10/2019 01:41 PM  
 Culture result: NO GROWTH 5 DAYS 10/10/2019 12:37 PM  
 Culture result: NO GROWTH 5 DAYS 10/09/2019 12:00 PM  
 
 
All Cardiac Markers in the last 24 hours: No results found for: CPK, CK, CKMMB, CKMB, RCK3, CKMBT, CKNDX, CKND1, CHAYA, TROPT, TROIQ, BRENDAN, TROPT, TNIPOC, BNP, BNPP Subjective: Chief Complaint:     
Non verbal , mumbling ROS: 
Non historian due to his medical condition Objective:  
 
Vitals: 
Last 24hrs VS reviewed since prior progress note. Most recent are: 
 
Visit Vitals /75 (BP 1 Location: Left arm, BP Patient Position: At rest) Pulse 76 Temp 98.9 °F (37.2 °C) Resp 20 Ht 5' 8\" (1.727 m) Wt 72 kg (158 lb 12.8 oz) SpO2 98% BMI 24.15 kg/m² SpO2 Readings from Last 6 Encounters:  
10/15/19 98% 10/08/19 95% 10/08/19 91% 10/07/19 98% 10/03/19 100% 10/03/19 90% O2 Flow Rate (L/min): 3 l/min Intake/Output Summary (Last 24 hours) at 10/15/2019 0820 Last data filed at 10/15/2019 0400 Gross per 24 hour Intake 1011.67 ml Output 3630 ml Net -2618.33 ml Physical Exam:  
Ears: hearing is intact Eyes: Icterus is not present Lungs: clear to auscultation bilaterally Heart: regular rate and rhythm, S1, S2 normal, no murmur, click, rub or gallop Gastrointestinal: soft, non-tender. Bowel sounds normal. No masses,  no organomegaly Neurological:  New Focal Deficits are not present. NON VERBAL. Mumbling , closing eyes,   
Skin: diffuse ecchymosis. Face with small petechiae (per family is due to known actinic keratosis). Psychiatric:  Mood is stable Medications Reviewed: (see below) Lab Data Reviewed: (see below) 
 
______________________________________________________________________ Medications:  
 
Current Facility-Administered Medications Medication Dose Route Frequency  HYDROmorphone (DILAUDID) syringe 0.25 mg  0.25 mg IntraVENous Q8H PRN  
 LORazepam (ATIVAN) tablet 0.5 mg  0.5 mg Oral Q1H PRN Or  
 LORazepam (ATIVAN) injection 2 mg  2 mg IntraVENous Q1H PRN  
 LORazepam (ATIVAN) injection 1 mg  1 mg IntraVENous Q4H PRN  
 dextrose 5% - 0.45% NaCl with KCl 10 mEq/L infusion  100 mL/hr IntraVENous CONTINUOUS  
 albuterol-ipratropium (DUO-NEB) 2.5 MG-0.5 MG/3 ML  3 mL Nebulization Q4H PRN  
 metoprolol (LOPRESSOR) injection 1.25 mg  1.25 mg IntraVENous Q6H  
 pantoprazole (PROTONIX) injection 40 mg  40 mg IntraVENous DAILY  cefTRIAXone (ROCEPHIN) 2 g in 0.9% sodium chloride (MBP/ADV) 50 mL MBP  2 g IntraVENous P44U  
 folic acid (FOLVITE) tablet 1 mg  1 mg Oral DAILY  thiamine mononitrate (B-1) tablet 100 mg  100 mg Oral DAILY  furosemide (LASIX) injection 40 mg  40 mg IntraVENous DAILY  apixaban (ELIQUIS) tablet 5 mg  5 mg Oral BID  sodium chloride (NS) flush 5-40 mL  5-40 mL IntraVENous Q8H  
 sodium chloride (NS) flush 5-40 mL  5-40 mL IntraVENous PRN  
 desonide (TRIDESILON) 0.05 % cream - patient supplied (Patient Supplied)   Topical BID  naloxone (NARCAN) injection 0.4 mg  0.4 mg IntraVENous PRN  
 melatonin tablet 3 mg  3 mg Oral QHS PRN  
 sodium chloride (NS) flush 5-10 mL  5-10 mL IntraVENous PRN  
 acetaminophen (TYLENOL) tablet 650 mg  650 mg Oral Q4H PRN  
 levothyroxine (SYNTHROID) tablet 175 mcg  175 mcg Oral ACB Total time spent with patient: 35  minutes Care Plan discussed with: Care Manager, Nursing Staff, Consultant/Specialist and >50% of time spent in counseling and coordination of care Discussed:  Care Plan Attending Physician: Saurabh Parekh MD

## 2019-10-16 NOTE — CONSULTS
Consult Note Assessment: 1. Status post remote left total knee replacement, no evidence of acute septic arthritis 2. Left knee cellulitis resolved PLAN:   
1. Synovial analysis with no organisms and only 7200 white cels. Results not consistent with an acute septic arthritis which would require I&D and explantation of his total knee replacement. Discussed with ID yesterday and in any event, he is more of an appropriate candidate for chronic antibiotic suppression. Would be happy to re-consult if symptoms change or worsen. [phone] HPI: Joann Tamayo is a 80 y.o. male patient with no new complaints for left knee pain. Aspiration performed yesterday. Past Medical History:  
Diagnosis Date  Arthritis  Atrial fibrillation ([address] 07/2017 Dr Nathan Lee cardio follows. chronic  Carcinoma of prostate ([address]  Cellulitis  Coarse tremor   
  hands, states \"my doctor is aware\"  Difficulty swallowing  Dysphagia  Edema  Encounter for colonoscopy due to history of adenomatous colonic polyps  Essential hypertension  Fall 10/07/2017 \"went to [facility] Emergency Room, CT Scan showed concussion, no bleeding\" per patient  Fatigue  GERD (gastroesophageal reflux disease) wo. esophagitis  Glaucoma  H/O joint replacement   
  left knee 2003  HLD (hyperlipidemia)  Hypertension  Hypertensive disorder  Hypothyroidism  Impotence  Laceration of right lower leg with complication  Malignant neoplasm of prostate ([address]   
  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   
 acute/hist. of   
 
Past Surgical History:  
Procedure Laterality Date  COLONOSCOPY N/A 1/3/2017  HX CHOLECYSTECTOMY  HX KNEE REPLACEMENT Left 01/2003  HX SHOULDER REPLACEMENT Right 7/17, 10/17 Prior to Admission medications Medication Sig Start Date End Date Taking? Authorizing Provider  
oxyCODONE-acetaminophen (PERCOCET) 5-325 mg per tablet Take 2 Tabs by mouth every six (6) hours as needed for Pain. Yes Provider, Historical  
febuxostat (ULORIC) 80 mg tab tablet Take 80 mg by mouth daily. Yes Provider, Historical  
desonide (TRIDESILON) 0.05 % cream Apply 1 Each to affected area two (2) times a day. TO FACE   Yes Provider, Historical  
nystatin (MYCOSTATIN) 100,000 unit/mL suspension Take 5 mL by mouth three (3) times daily. swish and spit 9/28/19  Yes LupisglTianna gage PA  
zolpidem (AMBIEN) 5 mg tablet Take 5 mg by mouth nightly. Yes Provider, Historical  
tamsulosin (FLOMAX) 0.4 mg capsule Take 1 Cap by mouth daily (after dinner). 6/25/19  Yes Werner Winter MD  
apixaban (ELIQUIS) 5 mg tablet Take 1 Tab by mouth two (2) times a day. 6/4/18  Yes Westley Telles NP  
albuterol (PROAIR HFA) 90 mcg/actuation inhaler Take  inhaled by mouth. 6/5/18  Yes Provider, Historical  
ALPHAGAN P 0.15 % ophthalmic solution Administer 1 Drop to both eyes three (3) times daily. 8/2/17  Yes Provider, Historical  
ipratropium-albuterol (COMBIVENT RESPIMAT)  mcg/actuation inhaler daily as needed 3/29/17  Yes Provider, Historical  
cyanocobalamin 1,000 mcg tablet Take 1,000 mcg by mouth daily. Yes Provider, Historical  
furosemide (LASIX) 40 mg tablet Take 40 mg by mouth daily. 2 tab daily 6/20/17  Yes Provider, Historical  
loratadine (CLARITIN) 10 mg tablet Take 10 mg by mouth daily. Yes Provider, Historical  
gabapentin (NEURONTIN) 600 mg tablet Take 600 mg by mouth two (2) times a day.  1 in A.M. 2 AT NIGHT    Yes Provider, Historical  
 levothyroxine (SYNTHROID) 175 mcg tablet Take 175 mcg by mouth Daily (before breakfast). Yes Provider, Historical  
bimatoprost (LUMIGAN) 0.03 % ophthalmic drops Administer 1 drop to both eyes every evening. Yes Provider, Historical  
multivitamins-minerals-lutein (CENTRUM SILVER) Tab Take 1 Tab by mouth daily. Yes Provider, Historical  
traZODone (DESYREL) 50 mg tablet Take 50 mg by mouth nightly as needed for Sleep. Yes Provider, Historical  
PYRIDOXINE HCL (VITAMIN B-6 PO) Take 1 Cap by mouth daily. Yes Provider, Historical  
albuterol (PROVENTIL HFA, VENTOLIN HFA, PROAIR HFA) 90 mcg/actuation inhaler Take 2 Puffs by inhalation every four (4) hours as needed for Shortness of Breath or Wheezing. Provider, Historical  
ipratropium-albuterol (COMBIVENT RESPIMAT)  mcg/actuation inhaler Take 1 Puff by inhalation daily as needed for Shortness of Breath or Wheezing. Provider, Historical  
 
Allergies Allergen Reactions  Adhesive Other (comments) Skin irritation  Darvon [Propoxyphene] Hives, Myalgia and Other (comments) Joint pain  Penicillins Rash and Itching Social History Socioeconomic History  Marital status:  Spouse name: Not on file  Number of children: Not on file  Years of education: Not on file  Highest education level: Not on file Occupational History  Not on file Social Needs  Financial resource strain: Not on file  Food insecurity:  
  Worry: Not on file Inability: Not on file  Transportation needs:  
  Medical: Not on file Non-medical: Not on file Tobacco Use  Smoking status: Former Smoker  Smokeless tobacco: Never Used Substance and Sexual Activity  Alcohol use: Yes Comment: weekly  Drug use: No  
 Sexual activity: Not on file Lifestyle  Physical activity:  
  Days per week: Not on file Minutes per session: Not on file  Stress: Not on file Relationships  Social connections:  
  Talks on phone: Not on file Gets together: Not on file Attends Yarsani service: Not on file Active member of club or organization: Not on file Attends meetings of clubs or organizations: Not on file Relationship status: Not on file  Intimate partner violence:  
  Fear of current or ex partner: Not on file Emotionally abused: Not on file Physically abused: Not on file Forced sexual activity: Not on file Other Topics Concern  Not on file Social History Narrative  Not on file Blood pressure 119/67, pulse 67, temperature 98 °F (36.7 °C), resp. rate 18, height 5' 8\" (1.727 m), weight 80.2 kg (176 lb 12.8 oz), SpO2 94 %. CBC w/Diff Lab Results Component Value Date/Time WBC 8.5 10/16/2019 04:05 AM  
 RBC 2.56 (L) 10/16/2019 04:05 AM  
 HCT 24.9 (L) 10/16/2019 04:05 AM  
  
 
 
Physical Assessment: 
General: in no apparent distress. Somnolent and unable to participate in exam  
Wound: Well healed anterior linear incision Extremities: No overlying erythema left knee. Mild effusion. Grimace with passive motion from 0-80 degrees. Thighs and calves soft Dressing:  None ortho DVT Exam:   No exam evidence to suggest DVT. Compartments soft and NT. Radiology:  
Left knee xrays: FINDINGS: Small-moderate joint effusion, decreased from the previous study. The 
total knee arthroplasty is unchanged in position with no fracture and no signs 
of loosening. Vascular calcification. Results for Brandt Sher (MRN [id number]) as of 10/16/2019 11:23 Ref. Range 10/15/2019 10:30 BODY FLUID TYPE Latest Units:   LEFT KNEE  
FLUID APPEARANCE Latest Units:   CLOUDY FLUID COLOR Latest Units:   YELLOW  
FLUID RBC CT. Latest Ref Range: NRRE /cu mm 3,525 (A) FLUID NUCLEATED CELLS Latest Ref Range: NRRE /cu mm 7,200 (A) FLD NEUTROPHILS Latest Units: % 97 FLD BANDS Latest Units: % 0  
FLD LYMPHS Latest Units: % 0  
 FLD MONOCYTES Latest Units: % 0  
FLD EOSINS Latest Units: % 0  
MACROPHAGE Latest Units: % 3 FLUID TYPE(7) Latest Units:   SYNOVIAL FLUID Crystals, body fluid Latest Units:   NO CRYSTALS SEEN Component Value Flag Ref Range Units Status Special Requests: LEFT KNEE      Preliminary GRAM STAIN FEW WBC'S      Preliminary GRAM STAIN NO ORGANISMS SEEN      Preliminary Culture result: PENDING       
 
 
    
 
- Selene Merino PA-C 
10/16/2019 Office [phone] Cell [phone]

## 2019-10-16 NOTE — PROGRESS NOTES
Problem: Pain Goal: *Control of Pain Outcome: Progressing Towards Goal 
Goal: *PALLIATIVE CARE:  Alleviation of Pain Outcome: Progressing Towards Goal 
  
Problem: Pressure Injury - Risk of 
Goal: *Prevention of pressure injury Description Document Dionicio Scale and appropriate interventions in the flowsheet. Outcome: Progressing Towards Goal 
Note:  
Pressure Injury Interventions: 
Sensory Interventions: Assess changes in LOC Moisture Interventions: Internal/External urinary devices Activity Interventions: Pressure redistribution bed/mattress(bed type) Mobility Interventions: Pressure redistribution bed/mattress (bed type) Nutrition Interventions: Document food/fluid/supplement intake Friction and Shear Interventions: Foam dressings/transparent film/skin sealants Problem: Delirium Treatment Goal: *Level of consciousness restored to baseline Outcome: Progressing Towards Goal 
Goal: *Level of environmental perceptions restored to baseline Outcome: Progressing Towards Goal 
Goal: *Sensory perception restored to baseline Outcome: Progressing Towards Goal 
Goal: *Emotional stability restored to baseline Outcome: Progressing Towards Goal 
Goal: *Functional assessment restored to baseline Outcome: Progressing Towards Goal 
Goal: *Absence of falls Outcome: Progressing Towards Goal 
Goal: *Will remain free of delirium, CAM Score negative Outcome: Progressing Towards Goal 
Goal: *Cognitive status will be restored to baseline Outcome: Progressing Towards Goal 
Goal: Interventions Outcome: Progressing Towards Goal 
  
Problem: Nutrition Deficit Goal: *Adequate nutrition Outcome: Progressing Towards Goal 
Goal: *Optimize nutritional status Outcome: Progressing Towards Goal

## 2019-10-16 NOTE — PROGRESS NOTES
0715-  Bedside and Verbal shift change report given to Jessica Soler RN (oncoming nurse) by Bell Chilel RN (offgoing nurse). Report included the following information SBAR, Kardex, Intake/Output, MAR and Recent Results.

## 2019-10-16 NOTE — PROGRESS NOTES
Pt resting in bed during time of assessment with daughter at bedside. Daughter states pt is already looking a little better to her and more alert/awake. TF running via NGT Osmolite 1.2 @ goal rate of 70 mL/hr x 22 hours providing 1540 mL, 1848 kcal, 85 g pro, 1262 mL free water. Pt receiving D5 @ 60 mL/hr. Noted elevated Na: 151 10/16/19 Continue current TF at goal rate. Flushes started: 150 mL Q 6hrs Will continue to monitor TF tolerance, weight, labs closely Sierra Herrera RDN

## 2019-10-16 NOTE — PROGRESS NOTES
Internal Medicine Progress Note NAME: Gio Dodge :  1933 MRM:  [id number] Date/Time: 10/16/2019 ASSESSMENT/PLAN: 
 
 
# Encephalopathy. Possible sepsis related or metabolic/ Alcohol related with his ho alcohol abuse. Was on opioid and benzo that been consolidated. Reduce sedative to minimal strongly needed. Ammonia level normal , CT head negative. Place ngt for meds ( didn't get po meds for few days ) . 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 H/o cat bite 4 weeks to RLE per daughter, when he came in with cellulitis and was treated. D/w Dr. Johnye Rubinstein concern for persistent infection/abscess, he ordered nuclear gallium scan . Blood culture repeat on 10/10 negative to date. # L knee infection. Orthopedic. Knee tapping and CS. #  MACKENZIE - improved with IV fluid hydration, monitor closely. Monitor labs. Avoid nephrotoxins. Renally dosing medications. Monitor urine out put. #  AFIB - Rate control, resume Elliquis for AC . Dc iv lopressor , tab NGT. Titrate as needed # Elevated LFT - GI consulted, suspects 2/2 sepsis, appreciate recs. # Hypothyroidism - Cont home levothyroxine. # Hypocalcemia. Hypokalemia. Replete and trend. #Hypernatremia. free water NGT . D5 ivf . Serial labs to monitor level. # ETOH abuse - initially placed on  CIWA, DC. Per family last drink was 1 month ago. Thiamine. # NGT for meds and feeding  , free water DVT ppx - SCD; resume Eliquis. 
 
-Code status : FULL Lab Review:  
 
Recent Labs 10/16/19 
0405 10/15/19 
0525 10/14/19 
6293 WBC 8.5 10.3 11.7 HGB 7.9* 8.5* 8.7* HCT 24.9* 27.0* 27.2*  
 213 185 Recent Labs 10/16/19 
0405 10/15/19 
0525 10/14/19 
5055 * 149* 149*  
K 3.1* 3.2* 3.9  110 114* CO2 34* 33* 29  
GLU 96 109* 97 BUN 17 15 21* CREA 0.95 0.78 0.72  
CA 8.1* 8.4* 8.0*  
MG 1.9 1.9 2.1 PHOS 3.4 2.9 2.3*  
 ALB 1.8* 1.9* 1.9* TBILI 0.9 1.5* 1.3*  
SGOT 74* 40* 40* ALT 33 25 30 Lab Results Component Value Date/Time Glucose (POC) 111 (H) 10/16/2019 06:12 AM  
 Glucose (POC) 154 (H) 10/16/2019 12:13 AM  
 Glucose (POC) 117 (H) 10/12/2019 11:35 AM  
 Glucose (POC) 111 (H) 10/12/2019 03:00 AM  
 Glucose (POC) 130 (H) 07/11/2019 04:30 PM  
 Glucose, POC 83 01/03/2017 10:19 AM  
 
No results for input(s): PH, PCO2, PO2, HCO3, FIO2 in the last 72 hours. No results for input(s): INR, INREXT, INREXT in the last 72 hours. No results found for: SDES Lab Results Component Value Date/Time Culture result: PENDING 10/15/2019 10:30 AM  
 Culture result: NO GROWTH 6 DAYS 10/10/2019 01:41 PM  
 Culture result: NO GROWTH 6 DAYS 10/10/2019 12:37 PM  
 
 
All Cardiac Markers in the last 24 hours: No results found for: CPK, CK, CKMMB, CKMB, RCK3, CKMBT, CKNDX, CKND1, CHAYA, TROPT, TROIQ, BRENDAN, TROPT, TNIPOC, BNP, BNPP Subjective: Chief Complaint:     
Mumbling, seem touch better arousal than yesterda ROS: 
Non historian due to his medical condition Objective:  
 
Vitals: 
Last 24hrs VS reviewed since prior progress note. Most recent are: 
 
Visit Vitals /67 (BP 1 Location: Left arm, BP Patient Position: At rest) Pulse 67 Temp 98 °F (36.7 °C) Resp 18 Ht 5' 8\" (1.727 m) Wt 80.2 kg (176 lb 12.8 oz) SpO2 94% BMI 26.88 kg/m² SpO2 Readings from Last 6 Encounters:  
10/16/19 94% 10/08/19 95% 10/08/19 91% 10/07/19 98% 10/03/19 100% 10/03/19 90% O2 Flow Rate (L/min): 2 l/min Intake/Output Summary (Last 24 hours) at 10/16/2019 2421 Last data filed at 10/16/2019 8242 Gross per 24 hour Intake 3216.68 ml Output 2300 ml Net 916.68 ml Physical Exam:  
Ears: hearing is intact Eyes: Icterus is not present Lungs: clear to auscultation bilaterally Heart: regular rate and rhythm, S1, S2 normal, no murmur, click, rub or gallop Gastrointestinal: soft, non-tender. Bowel sounds normal. No masses,  no organomegaly Neurological:  New Focal Deficits are not present. NON VERBAL. Mumbling , OPEN  Eyes to voice and touch ,  
Skin: diffuse ecchymosis. Face with small petechiae (per family is due to known actinic keratosis). Psychiatric:  Mood is stable Medications Reviewed: (see below) Lab Data Reviewed: (see below) 
 
______________________________________________________________________ Medications:  
 
Current Facility-Administered Medications Medication Dose Route Frequency  dextrose 5% with KCl 20 mEq/L infusion   IntraVENous CONTINUOUS  
 ketorolac (TORADOL) injection 15 mg  15 mg IntraVENous Q8H PRN  thiamine (B-1) 250 mg in 0.9% sodium chloride 50 mL IVPB  250 mg IntraVENous DAILY  [START ON 10/18/2019] thiamine mononitrate (B-1) tablet 100 mg  100 mg Oral DAILY  acetaminophen (TYLENOL) tablet 500 mg  500 mg Oral Q6H PRN  
 acetaminophen (TYLENOL) solution 325 mg  325 mg Per NG tube Q6H  
 haloperidol lactate (HALDOL) injection 2 mg  2 mg IntraVENous Q8H PRN  
 insulin lispro (HUMALOG) injection   SubCUTAneous Q6H  
 glucose chewable tablet 16 g  16 g Oral PRN  
 glucagon (GLUCAGEN) injection 1 mg  1 mg IntraMUSCular PRN  
 dextrose 10 % infusion 125-250 mL  125-250 mL IntraVENous PRN  
 albuterol-ipratropium (DUO-NEB) 2.5 MG-0.5 MG/3 ML  3 mL Nebulization Q4H PRN  
 metoprolol (LOPRESSOR) injection 1.25 mg  1.25 mg IntraVENous Q6H  
 pantoprazole (PROTONIX) injection 40 mg  40 mg IntraVENous DAILY  cefTRIAXone (ROCEPHIN) 2 g in 0.9% sodium chloride (MBP/ADV) 50 mL MBP  2 g IntraVENous I84U  
 folic acid (FOLVITE) tablet 1 mg  1 mg Oral DAILY  furosemide (LASIX) injection 40 mg  40 mg IntraVENous DAILY  apixaban (ELIQUIS) tablet 5 mg  5 mg Oral BID  sodium chloride (NS) flush 5-40 mL  5-40 mL IntraVENous Q8H  
 sodium chloride (NS) flush 5-40 mL  5-40 mL IntraVENous PRN  
  desonide (TRIDESILON) 0.05 % cream - patient supplied (Patient Supplied)   Topical BID  naloxone (NARCAN) injection 0.4 mg  0.4 mg IntraVENous PRN  
 melatonin tablet 3 mg  3 mg Oral QHS PRN  
 sodium chloride (NS) flush 5-10 mL  5-10 mL IntraVENous PRN  
 levothyroxine (SYNTHROID) tablet 175 mcg  175 mcg Oral ACB Total time spent with patient: 35  minutes Care Plan discussed with: Care Manager, Nursing Staff, Consultant/Specialist and >50% of time spent in counseling and coordination of care Discussed:  Care Plan Attending Physician: Alexa Vargas MD

## 2019-10-16 NOTE — PROGRESS NOTES
Problem: Pain Goal: *Control of Pain Outcome: Progressing Towards Goal 
Goal: *PALLIATIVE CARE:  Alleviation of Pain Outcome: Progressing Towards Goal

## 2019-10-16 NOTE — PROGRESS NOTES
[facility] 
                                             (A Division of [address] [place] [address]) Follow-up Note Date of Admission: 10/8/2019     Date of Note:  10/16/2019 Summary:   
 
81 y/o  male HTN, AF, GERD, DJD s/p L TKR, Prostate CA, Hypothyroidism, h/o pancreatitis, ETOH consumption adm 10/8/2019 w/ chills, weakness, altered mental status. Has neck pain x 1 mo. Had R leg cellulitis after cat bite (not scratch) 1 mo PTA - resolved w/ abx. Then micturition syncope x 3, hurt knee, progressing generalized weakness, then 1 day PTA fever, chills. Adm w/ 100.5, WBC 29.5, 13% bands. No uti, pneumonia, unrevealing CTAP. 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 Rpt blcx 10/10 NGTD x 2.  Gallium scan 10/14: intense periprosthetic uptake L knee, distal femur. Interval History: More awake today. Squeezes with hands and opens mouth on command. Answers some questions. Denies any pain. Current Antimicrobials: Prior Antimicrobials Ceftriaxone 10/11 - 5  abx 10/8 - 8 Vancomycin 10/8 - 2  Cefepime, Levoflox 10/8 - 0 Meropenem 10/9 - 2  
  
  
Assessment: Plan:  
Encephalopathy/ ETOH Withdrawal 
- worse since admission - ? Medication-related: has been receiving dilaudid q 4 hours and Ativan this am 
- Head CT 10/14: no acute intracranial abnormality 
- not related to infection which appears under control at this time 
- improving. Conversing a little today -> per primary team. D/w Dr. Corean Abbott Pasteurella BSI 
- 1 of 2 blood cultures 10/8 + P multocida sens TMP-SMX, Ceftriaxone, Levofloxacin, PCN, TCN 
- No active cellulitis. R leg cellulitis following cat bite in Early September resolved with TMP-SMX then Keflex - current source unclear: ?epidural abscess or discitis/OM?, R leg OM? 
- ?endocarditis. Recent TTE 9/27: neg vegetations - rpt blcx 10/10 NG x 2 
 - Gallium scan 10/14: intense periprosthetic uptake L knee, distal femur. 
- L knee aspiration 10/15: 7,200 WBC, no crystals - not suggestive of septic arthritis -> monitor rpt blcx from 10/10 
-> continue Ceftriaxone  
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 
-> PICC prior to discharge Left knee uptake on Ga scan 
- r/o septic arthritis L TKR 
- s/p US guided arthrocentesis 10/15 -> abx as above Septic shock 
- due to above 
- improving. Vasopressors stopped 10/10.   
- resolved -> abx as above MACKENZIE 
- resolved Worsening alkaline phosphatase 
- from sepsis, ?bone infection / met (doubt) 
- improving H/o PCN allergy (rash)  
- tolerated keflex 
- tolerating Ceftriaxone Recent R leg cellulitis following cat bite (not scratch) R hip pain - s/p aspiration 10/9: 23 WBC, no crystals HTN    
AF    
GERD    
DJD s/p L TKR    
Prostate CA    
Hypothyroidism    
h/o pancreatitis    
ETOH consumption    
  
Microbiology: 
  
10/8      blcx Pasteurella multocida 1 of 2 
             urcx NG 
10/9      R hip asp gs no wbc, NOS, cx NG 
 
10/10 blcx NGTD x 2 
  
Lines / Catheters: RIJ CVL Patient Active Problem List  
Diagnosis Code  Malignant neoplasm of prostate ([facility] [address] C61  Hypertension I10  
 GERD (gastroesophageal reflux disease) K21.9  Glaucoma H40.9  Glaucoma H40.9  Dysphagia R13.10  Encounter for colonoscopy due to history of adenomatous colonic polyps Z12.11, Z86.010  
 Non-pressure chronic ulcer of right calf with fat layer exposed ([facility] [address] J49.669  Laceration of right lower leg with complication S38.661A  Edema R60.9  Pneumonia J18.9  Atrial fibrillation (HCC) I48.91  
 Hypothyroidism E03.9  Sepsis ([facility] [address] A41.9  Cellulitis L03.90  
 HTN (hypertension) I16  
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  
 haloperidol lactate (HALDOL) injection 1 mg  1 mg IntraVENous Q8H PRN  
 furosemide (LASIX) tablet 40 mg  40 mg Oral DAILY  dextrose 5% infusion  60 mL/hr IntraVENous CONTINUOUS  
 metoprolol tartrate (LOPRESSOR) tablet 12.5 mg  12.5 mg Per NG tube BID  thiamine (B-1) 250 mg in 0.9% sodium chloride 50 mL IVPB  250 mg IntraVENous DAILY  [START ON 10/18/2019] thiamine mononitrate (B-1) tablet 100 mg  100 mg Oral DAILY  acetaminophen (TYLENOL) tablet 500 mg  500 mg Oral Q6H PRN  
 acetaminophen (TYLENOL) solution 325 mg  325 mg Per NG tube Q6H  
 insulin lispro (HUMALOG) injection   SubCUTAneous Q6H  
 glucose chewable tablet 16 g  16 g Oral PRN  
 glucagon (GLUCAGEN) injection 1 mg  1 mg IntraMUSCular PRN  
 dextrose 10 % infusion 125-250 mL  125-250 mL IntraVENous PRN  
 albuterol-ipratropium (DUO-NEB) 2.5 MG-0.5 MG/3 ML  3 mL Nebulization Q4H PRN  pantoprazole (PROTONIX) injection 40 mg  40 mg IntraVENous DAILY  cefTRIAXone (ROCEPHIN) 2 g in 0.9% sodium chloride (MBP/ADV) 50 mL MBP  2 g IntraVENous A57F  
 folic acid (FOLVITE) tablet 1 mg  1 mg Oral DAILY  apixaban (ELIQUIS) tablet 5 mg  5 mg Oral BID  sodium chloride (NS) flush 5-40 mL  5-40 mL IntraVENous Q8H  
 sodium chloride (NS) flush 5-40 mL  5-40 mL IntraVENous PRN  
 desonide (TRIDESILON) 0.05 % cream - patient supplied (Patient Supplied)   Topical BID  naloxone (NARCAN) injection 0.4 mg  0.4 mg IntraVENous PRN  
 melatonin tablet 3 mg  3 mg Oral QHS PRN  
  sodium chloride (NS) flush 5-10 mL  5-10 mL IntraVENous PRN  
 levothyroxine (SYNTHROID) tablet 175 mcg  175 mcg Oral ACB Review of Systems - Negative except as in interval history Objective: 
Visit Vitals /62 (BP 1 Location: Left arm, BP Patient Position: At rest) Pulse 90 Temp 97.3 °F (36.3 °C) Resp 18 Ht 5' 8\" (1.727 m) Wt 80.2 kg (176 lb 12.8 oz) SpO2 95% BMI 26.88 kg/m² Temp (24hrs), Av.1 °F (36.7 °C), Min:97.3 °F (36.3 °C), Max:98.7 °F (37.1 °C) General: Well developed, well nourished 80 y.o.  male more alert, follws simple commands, answers some questions. HEENT: no scleral icterus, mucous membranes moist, pharynx normal without lesions Neck: resistant to flexion in all directions but more flexible than previous Cardio:  irregularly irregular rhythm Lungs: rhonchi bilateral and + expiratory wheezes Abdomen: soft, non-tender. Bowel sounds normal. No masses, no organomegaly. Extremities:  no redness or tenderness in the calves or thighs, no edema Lab results: 
 
Chemistry Recent Labs 10/16/19 
0405 10/15/19 
0525 10/14/19 
3011 GLU 96 109* 97 * 149* 149*  
K 3.1* 3.2* 3.9  110 114* CO2 34* 33* 29 BUN 17 15 21* CREA 0.95 0.78 0.72  
CA 8.1* 8.4* 8.0* AGAP 8 6 6 BUCR 18 19 29* * 293* 355* TP 4.7* 5.1* 5.0* ALB 1.8* 1.9* 1.9*  
GLOB 2.9 3.2 3.1 AGRAT 0.6* 0.6* 0.6* CBC w/ Diff Recent Labs 10/16/19 
0405 10/15/19 
0525 10/14/19 
4639 WBC 8.5 10.3 11.7 RBC 2.56* 2.77* 2.83* HGB 7.9* 8.5* 8.7* HCT 24.9* 27.0* 27.2*  
 213 185 GRANS 78* 74* 74  
LYMPH 12* 14* 18* EOS 1 1 0 Microbiology All Micro Results Procedure Component Value Units Date/Time CULTURE, BLOOD [[id number]] Collected:  10/10/19 1341 Order Status:  Completed Specimen:  Blood Updated:  10/16/19 0042   Special Requests: --     
  NO SPECIAL REQUESTS 
BLUE PORT 
  
 Culture result: NO GROWTH 6 DAYS     
 CULTURE, BLOOD [[id number]] Collected:  10/10/19 1237 Order Status:  Completed Specimen:  Blood Updated:  10/16/19 0042 Special Requests: PERIPHERAL Culture result: NO GROWTH 6 DAYS     
 CULTURE, BODY FLUID LULI Sarah [[id number]] Collected:  10/15/19 1030 Order Status:  Completed Specimen:  Synovial Fluid Updated:  10/15/19 1707 Special Requests: LEFT KNEE     
  GRAM STAIN FEW WBC'S     
   NO ORGANISMS SEEN Culture result: PENDING  
 CULTURE, BLOOD [[id number]] Collected:  10/08/19 2234 Order Status:  Completed Specimen:  Blood from Miscellaneous sample Updated:  10/15/19 0754 Special Requests: NO SPECIAL REQUESTS Culture result: NO GROWTH 6 DAYS     
 CULTURE, BODY FLUID [[id number]] Collected:  10/09/19 1200 Order Status:  Completed Specimen:  Synovial Fluid Updated:  10/14/19 8178 Special Requests: HIP  
  GRAM STAIN NO WBC'S SEEN     
   NO ORGANISMS SEEN Culture result: NO GROWTH 5 DAYS     
 CULTURE, BLOOD [[id number]]  (Abnormal)  (Susceptibility) Collected:  10/08/19 1301 Order Status:  Completed Specimen:  Blood Updated:  10/11/19 0825 Special Requests: --     
  NO SPECIAL REQUESTS 
RIGHT 
ARM 
  
  GRAM STAIN    
  AEROBIC BOTTLE GRAM NEGATIVE RODS SMEAR CALLED TO AND CORRECTLY REPEATED BY: DESHAWN COSBY RN,ICU, ON 10/9/19 AT 0335 TO [facility] Culture result:    
  AEROBIC BOTTLE PASTEURELLA MULTOCIDA CULTURE, URINE [[id number]] Collected:  10/08/19 1418 Order Status:  Completed Specimen:  Cath Urine Updated:  10/10/19 1016 Special Requests: NO SPECIAL REQUESTS Culture result: NO GROWTH 2 DAYS Jhon Singh MD, [facility] Infectious Diseases 
[phone] 
10/16/2019  
9:02 AM

## 2019-10-17 NOTE — PROGRESS NOTES
NUTRITION FOLLOW UP/PLAN OF CARE   
 
RECOMMENDATIONS:  
1. Continue Osmolite 1.2 @ 70 mL/hr x 22 hrs to provide 1540 mL, 1848 kcal, 85 g pro, 1262 mL free water with 150 mL flush Q 6 
2. Monitor labs, weight and TF tolerance 3. Rec: daily weights GOALS:  
1. TF tolerance/TF meets >75% of protein/calorie needs by 10/18/19 2. Weight Maintenance (+/- 1-2 lb) by 10/18/19 ASSESSMENT:  
Wt status is classified as overweight per BMI of 27.4. Pt is at nutrition risk related to Stage 2 PU to R buttock per wound LDA. Diagnosis: encephalopathy, sepsis, L knee infection, MACKENZIE (improved), afib, elevated LFT, hypothyroid, hypocalcemia, hypernatremia, ETOH abuse. With TF via NGT. Nutrition recommendations listed. RD to follow. Nutrition Diagnoses:  
Resolved with TF regimen: Inadequate oral food and beverage intkae due to poor appetite as evidenced by po intake < 100 calories at breakfast 10/10/19. Remains appropriate/now with TF running: Increased nutrient needs due to wound healing as evidenced by compromised skin integrity Nutrition Risk:  [] High  [x] Moderate []  Low SUBJECTIVE/OBJECTIVE:  
(10/17/19) Pt resting in bed with NGT in placed and TF running at goal rate Osmolite 1.2 @ 70 mL/hr x 22 hrs to provide 1540 mL, 1848 kcal, 85 g pro, 1262 mL free water with 150 mL flush Q 6hrs. 0 residuals reported. Daughter at bedside states pt had a good day yesterday until he pulled the NGT out and was given dilaudid - so a little more drowsy today. Tube dislodged and feedings resumed @ 224am. Loose BM yesterday per reports. Noted low K, elevated BUN. Took beds sera weight while in room 180.4 lb - placed in flowsheets. Noted pt on lasix possibly contributing to weight fluctuations. Skin: stage 2 right buttock, ecchymosis, abrasion noted. Will continue to monitor TF tolerance, weight, labs.  
 
(10/15/19) Pt resting in bed during time of assessment with daughter at bedside. Per speech on 10/13/19 rec'd NPO and ice chips PRN for oral care/comfort, pt has been NPO since. RN placed NGT this afternoon. Reassessed needs on todays bed scale weight 176.8 lb. Noted weight have been all over the place since admission - admit 10/8/19: 197.8 lb then 10/13/19: 165 lb. -190 per family reports. Recommend: Osmolite 1.2 @ 70 mL/hr x 22 hrs to provide 1540 mL, 1848 kcal, 85 g pro, 1262 mL free water . Spoke with RN/attending MD. Tony Chadwick with daughter about recommendations and answered her questions regarding his nutrition. Information Obtained from:  
 [x] Chart Review 
 [] Patient [x] Family/Caregiver 
 [] Nurse/Physician 
 [] Interdisciplinary Meeting/Rounds Diet: NPO Medications: [x] Reviewed Noted: rocephin, D5 60 mL/hr, lasix, humalog every 6 hrs, synthroid, lopressor, prptonix, NS flush, B1, PRN haldol, toradol. Dilaudid given 10/16/19 Allergies: [x] Reviewed Encounter Diagnoses ICD-10-CM ICD-9-CM 1. Severe sepsis ([facility] [address] A41.9 038.9  
 R65.20 995.92 Past Medical History:  
Diagnosis Date  Arthritis  Atrial fibrillation ([facility] [address] 07/2017 Dr Jacinta Parker cardio follows. chronic  Carcinoma of prostate ([facility] [address]  Cellulitis  Coarse tremor   
  hands, states \"my doctor is aware\"  Difficulty swallowing  Dysphagia  Edema  Encounter for colonoscopy due to history of adenomatous colonic polyps  Essential hypertension  Fall 10/07/2017 \"went to [facility] Emergency Room, CT Scan showed concussion, no bleeding\" per patient  Fatigue  GERD (gastroesophageal reflux disease) wo. esophagitis  Glaucoma  H/O joint replacement   
  left knee 2003  HLD (hyperlipidemia)  Hypertension  Hypertensive disorder  Hypothyroidism  Impotence  Laceration of right lower leg with complication  Malignant neoplasm of prostate ([facility] [address]   
  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   
 acute/hist. of   
  
Labs:   
Lab Results Component Value Date/Time Sodium 144 10/17/2019 04:00 AM  
 Potassium 3.2 (L) 10/17/2019 04:00 AM  
 Chloride 105 10/17/2019 04:00 AM  
 CO2 32 10/17/2019 04:00 AM  
 Anion gap 7 10/17/2019 04:00 AM  
 Glucose 95 10/17/2019 04:00 AM  
 BUN 24 (H) 10/17/2019 04:00 AM  
 Creatinine 1.04 10/17/2019 04:00 AM  
 Calcium 7.8 (L) 10/17/2019 04:00 AM  
 Magnesium 1.9 10/17/2019 04:00 AM  
 Phosphorus 2.9 10/17/2019 04:00 AM  
 Albumin 2.0 (L) 10/17/2019 04:00 AM  
 
Lab Results Component Value Date/Time GLU 95 10/17/2019 04:00 AM  
 GLUCPOC 120 (H) 10/17/2019 05:41 AM  
 GLUCPOC 100 10/16/2019 11:07 PM  
 
Anthropometrics:BMI: 27.4 (based on newest bed scale weight) Last 3 Recorded Weights in this Encounter 10/15/19 0105 10/15/19 1436 10/17/19 0032 Weight: 72 kg (158 lb 12.8 oz) 80.2 kg (176 lb 12.8 oz) 85.1 kg (187 lb 9.6 oz) Ht Readings from Last 1 Encounters:  
10/09/19 5' 8\" (1.727 m) Documented Weight History: 
Weight Metrics 10/17/2019 10/8/2019 10/3/2019 9/27/2019 9/26/2019 9/23/2019 9/16/2019 Weight 187 lb 9.6 oz - 180 lb 180 lb - 190 lb 190 lb BMI - 28.52 kg/m2 27.37 kg/m2 - 27.37 kg/m2 28.89 kg/m2 28.89 kg/m2 Bedscale weight 10/17/19: 180.4 lb - placed in flow sheets 10/15/19: 176.8 lb IBW: 154 lb  UBW: 185-190 lb 
[] Weight Loss [] Weight Gain [x] Weight fluctuations - weights all over the place 
-Basing needs off of most recent bed scale weight 176.8 lb Estimated Nutrition Needs: [x] MSJ  [] Other: Calories: 7875-9231 kcal (x1.2 AF - x 1.1 SF) Based on:   [x] Actual BW   
Protein:   80-97 g (1-1.2 g/kg) Based on:   [x] Actual BW Fluid:     1 mL/kcal  
 
 [x] No Cultural, [place] or ethnic dietary need identified. [] Cultural, [place] and ethnic food preferences identified and addressed Wt Status:  [] Normal (18.6 - 24.9) [] Underweight (<18.5) [x] Overweight (25 - 29.9) [] Mild Obesity (30 - 34.9)  [] Moderate Obesity (35 - 39.9) [] Morbid Obesity (40+) Nutrition Problems Identified:  
[] Suboptimal PO intake  
[] Food Allergies [x] Difficulty chewing/swallowing/poor dentition 
[] Constipation/Diarrhea  
[] Nausea/Vomiting  
[] None 
[] Other:  
 
Plan:  
[x] Enteral feeding 
[]  Obtained/adjusted food preferences/tolerances and/or snacks options  
[]  Supplements added  
[x] SLP following for feeding techniques []  HS snack added  
[]  Modify diet texture  
[]  Modify diet for food allergies []  Educate patient  
[]  Assist with menu selection []  Monitor PO intake on meal rounds  
[x]  Continue inpatient monitoring and intervention  
[x]  Participated in discharge planning/Interdisciplinary rounds/Team meetings  
[]  Other:  
 
Education Needs: 
 [] Not appropriate for teaching at this time due to:   
 [x] Identified and addressed spoke with family about TF recommendations and answered questions Nutrition Monitoring and Evaluation: 
[x] Continue ongoing monitoring and intervention 
[] Vanessa Montejo

## 2019-10-17 NOTE — PROGRESS NOTES
Pulmonary progress note History 42-year-old male presented to the emergency room on 10/19 with AMS. Blood cultures ultimately grew Pasteurella. The possible source was a somewhat aggressive pet cat. I saw the patient later in the afternoon today. Unlike his somnolent and still encephalopathic state this morning he was considerably more alert and coherent. According to his daughter he received medications overnight that led to him being more sedate and confused this morning. He denies chest pains. Occasional mild shortness of breath, but nothing significant. His right hip and left knee pain are both improving, and he does not believe that narcotics are necessary for pain control of these areas Exam 
He is alert. Answers questions appropriately. His affect is returning to the baseline I saw when he left the ICU Blood pressure 101/56, pulse 65, temperature 97.4 °F (36.3 °C), resp. rate 18, height 5' 8\" (1.727 m), weight 81.6 kg (180 lb), SpO2 90 %. Gaze is conjugate. No roving eye movements. No accessory muscle use. No paradoxical abdominal movement. Lungs are clear Irregular rhythm Sclera are anicteric Multiple ecchymoses No cyanosis or clubbing Labs 10/17 Sodium 144, BUN 24 and creatinine 1.04. Calcium is 7.8 with albumin of 2.0. Assessment Pasteurella bacteremia Metabolic encephalopathy improved Hypernatremia resolved Plan Haldol for mental status changes. Discontinue Ativan Continue non-narcotic analgesia Discontinue Dilaudid. Hold Lasix Thiamine for concerns of Wernicke's encephalopathy I suspect that mental changes in the evening and overnight are sundowning, which will be best treated with Haldol. His critical illness related issues are resolving.

## 2019-10-17 NOTE — PROGRESS NOTES
1900  -- Bedside, Verbal and Written shift change report given to [id number] Edwin Beebe (oncoming nurse) by Chela Lawrence nurse). Report included the following information SBAR, Kardex, Intake/Output, MAR and Recent Results. 2000 -- Wound care, full bath provided. 
   
2216 -- PM medications administered, pt tolerated with ease, will continue to monitor. 2345 -- Pt's NG tube has become dislodged. RN reinserted, MD paged. KUB at bedside ordered. 
  
0000 -- Shift reassessment, pt condition unchanged, will continue to monitor. 0208 -- NG tube needs to be advanced 5-10cm more. KUB reordered. 0218 -- Per MD Perlita Pederson, restart feeding. 
   
0400 --  Shift reassessment, pt condition unchanged, will continue to monitor.   
   
0700  -- Bedside, Verbal and Written shift change report given to [facility]) by DEMI (offgoing nurse). Report included the following information SBAR, Kardex, Intake/Output, MAR and Recent Results. Skin assessment completed.

## 2019-10-17 NOTE — PROGRESS NOTES
0715-  Bedside and Verbal shift change report given to Joss Gautam RN (oncoming nurse) by Leticia Underwood RN (offgoing nurse). Report included the following information SBAR, Kardex, Intake/Output, MAR and Recent Results. 1915-  Bedside and Verbal shift change report given to Annie Molina RN (oncoming nurse) by Joss Gautam RN (offgoing nurse). Report included the following information SBAR, Kardex, Intake/Output, MAR and Recent Results.

## 2019-10-17 NOTE — PROGRESS NOTES
Problem: Pain Goal: *Control of Pain Outcome: Progressing Towards Goal 
Goal: *PALLIATIVE CARE:  Alleviation of Pain Outcome: Progressing Towards Goal 
  
Problem: Falls - Risk of 
Goal: *Absence of Falls Description Document Miguel A [facility] Fall Risk and appropriate interventions in the flowsheet. Outcome: Progressing Towards Goal 
Note:  
Fall Risk Interventions: 
  
 
Mentation Interventions: Bed/chair exit alarm, Door open when patient unattended, More frequent rounding, Reorient patient, Update white board Medication Interventions: Bed/chair exit alarm, Patient to call before getting OOB Elimination Interventions: Call light in reach, Bed/chair exit alarm, Toileting schedule/hourly rounds History of Falls Interventions: Bed/chair exit alarm, Door open when patient unattended Problem: Pressure Injury - Risk of 
Goal: *Prevention of pressure injury Description Document Dionicio Scale and appropriate interventions in the flowsheet. Outcome: Progressing Towards Goal 
Note:  
Pressure Injury Interventions: 
Sensory Interventions: Assess changes in LOC Moisture Interventions: Internal/External fecal devices Activity Interventions: Pressure redistribution bed/mattress(bed type) Mobility Interventions: Assess need for specialty bed Nutrition Interventions: Document food/fluid/supplement intake Friction and Shear Interventions: Foam dressings/transparent film/skin sealants, HOB 30 degrees or less

## 2019-10-17 NOTE — PROGRESS NOTES
Internal Medicine Progress Note NAME: Radha Everett :  1933 MRM:  [id number] Date/Time: 10/17/2019 ASSESSMENT/PLAN: no significant improvement in his mental state. # Acute Delirious state , Encephalopathy. Possible sepsis related or metabolic/ Alcohol related with his ho alcohol abuse. Was on opioid and benzo that been consolidated. Reduce sedative to minimal strongly needed. Ammonia level normal , CT head negative. Place ngt for meds ( didn't get po meds for few days ) . 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 H/o cat bite 4 weeks to RLE per daughter, when he came in with cellulitis and was treated. D/w Dr. Greta Mosquera concern for persistent infection/abscess, he ordered nuclear gallium scan . Blood culture repeat on 10/10 negative to date. # L knee infection. Orthopedic. Knee tapping and CS. Fluids CS negative. 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 Monitor labs. Avoid nephrotoxins. Renally dosing medications. Monitor urine out put. #  AFIB - Rate control, resume Elliquis for AC . Dc iv lopressor , tab NGT. Titrate as needed # Elevated LFT - GI consulted, suspects 2/2 sepsis, appreciate recs. # Hypothyroidism - Cont home levothyroxine. # Hypocalcemia. Hypokalemia. Replete and trend. #Hypernatremia. Improved. free water NGT . D5 ivf . Serial labs to monitor level. # ETOH abuse - initially placed on  CIWA, DC. Per family last drink was 1 month ago. Thiamine. # NGT for meds and feeding  , free water DVT ppx - SCD; resume Eliquis. 
 
-Code status : FULL Lab Review:  
 
Recent Labs 10/16/19 
0405 10/15/19 
8882 WBC 8.5 10.3 HGB 7.9* 8.5* HCT 24.9* 27.0*  
 213 Recent Labs 10/17/19 0400 10/16/19 
0405 10/15/19 
0525  151* 149*  
K 3.2* 3.1* 3.2*  
 109 110 CO2 32 34* 33* GLU 95 96 109* BUN 24* 17 15 CREA 1.04 0.95 0.78  
CA 7.8* 8.1* 8.4* MG 1.9 1.9 1.9 PHOS 2.9 3.4 2.9 ALB 2.0* 1.8* 1.9* TBILI 1.0 0.9 1.5* SGOT 48* 74* 40* ALT 30 33 25 Lab Results Component Value Date/Time Glucose (POC) 120 (H) 10/17/2019 05:41 AM  
 Glucose (POC) 100 10/16/2019 11:07 PM  
 Glucose (POC) 111 (H) 10/16/2019 06:12 AM  
 Glucose (POC) 154 (H) 10/16/2019 12:13 AM  
 Glucose (POC) 117 (H) 10/12/2019 11:35 AM  
 Glucose, POC 83 01/03/2017 10:19 AM  
 
No results for input(s): PH, PCO2, PO2, HCO3, FIO2 in the last 72 hours. No results for input(s): INR, INREXT, INREXT in the last 72 hours. No results found for: SDES Lab Results Component Value Date/Time Culture result: NO GROWTH 2 DAYS 10/15/2019 10:30 AM  
 Culture result: NO GROWTH 6 DAYS 10/10/2019 01:41 PM  
 Culture result: NO GROWTH 6 DAYS 10/10/2019 12:37 PM  
 
 
All Cardiac Markers in the last 24 hours: No results found for: CPK, CK, CKMMB, CKMB, RCK3, CKMBT, CKNDX, CKND1, CHAYA, TROPT, TROIQ, BRENDAN, TROPT, TNIPOC, BNP, BNPP Subjective: Chief Complaint:     
Open eyes to his daughter voice , Mumbling,  
Brieve improvement yesterday Pass large BM per nursing. Pulled his tube out last night and given dilaudid. ROS: 
Non historian due to his medical condition Objective:  
 
Vitals: 
Last 24hrs VS reviewed since prior progress note. Most recent are: 
 
Visit Vitals /65 (BP 1 Location: Left arm, BP Patient Position: At rest) Pulse 90 Temp 97.7 °F (36.5 °C) Resp 18 Ht 5' 8\" (1.727 m) Wt 85.1 kg (187 lb 9.6 oz) SpO2 90% BMI 28.52 kg/m² SpO2 Readings from Last 6 Encounters:  
10/17/19 90% 10/08/19 95% 10/08/19 91% 10/07/19 98% 10/03/19 100% 10/03/19 90% O2 Flow Rate (L/min): 2 l/min Intake/Output Summary (Last 24 hours) at 10/17/2019 1055 Last data filed at 10/17/2019 4346 Gross per 24 hour Intake 2293 ml Output 1500 ml Net 793 ml Physical Exam:  
Ears: hearing is intact Eyes: Icterus is not present Lungs: clear to auscultation bilaterally Heart: regular rate and rhythm, S1, S2 normal, no murmur, click, rub or gallop Gastrointestinal: soft, non-tender. Bowel sounds normal. No masses,  no organomegaly Neurological:  New Focal Deficits are not present. NON VERBAL. Mumbling , OPEN  Eyes to voice and touch ,  
Skin: diffuse ecchymosis. Face with small petechiae (per family is due to known actinic keratosis). Psychiatric:  Mood is stable Medications Reviewed: (see below) Lab Data Reviewed: (see below) 
 
______________________________________________________________________ Medications:  
 
Current Facility-Administered Medications Medication Dose Route Frequency  ketorolac (TORADOL) injection 15 mg  15 mg IntraVENous Q8H PRN  
 haloperidol lactate (HALDOL) injection 1 mg  1 mg IntraVENous Q8H PRN  
 [Held by provider] furosemide (LASIX) tablet 40 mg  40 mg Oral DAILY  dextrose 5% infusion  60 mL/hr IntraVENous CONTINUOUS  
 metoprolol tartrate (LOPRESSOR) tablet 12.5 mg  12.5 mg Per NG tube BID  thiamine (B-1) 250 mg in 0.9% sodium chloride 50 mL IVPB  250 mg IntraVENous DAILY  [START ON 10/18/2019] thiamine mononitrate (B-1) tablet 100 mg  100 mg Oral DAILY  acetaminophen (TYLENOL) tablet 500 mg  500 mg Oral Q6H PRN  
 acetaminophen (TYLENOL) solution 325 mg  325 mg Per NG tube Q6H  
 insulin lispro (HUMALOG) injection   SubCUTAneous Q6H  
 glucose chewable tablet 16 g  16 g Oral PRN  
 glucagon (GLUCAGEN) injection 1 mg  1 mg IntraMUSCular PRN  
 dextrose 10 % infusion 125-250 mL  125-250 mL IntraVENous PRN  
 albuterol-ipratropium (DUO-NEB) 2.5 MG-0.5 MG/3 ML  3 mL Nebulization Q4H PRN  pantoprazole (PROTONIX) injection 40 mg  40 mg IntraVENous DAILY  cefTRIAXone (ROCEPHIN) 2 g in 0.9% sodium chloride (MBP/ADV) 50 mL MBP  2 g IntraVENous E53V  
 folic acid (FOLVITE) tablet 1 mg  1 mg Oral DAILY  apixaban (ELIQUIS) tablet 5 mg  5 mg Oral BID  sodium chloride (NS) flush 5-40 mL  5-40 mL IntraVENous Q8H  
 sodium chloride (NS) flush 5-40 mL  5-40 mL IntraVENous PRN  
 desonide (TRIDESILON) 0.05 % cream - patient supplied (Patient Supplied)   Topical BID  naloxone (NARCAN) injection 0.4 mg  0.4 mg IntraVENous PRN  
 melatonin tablet 3 mg  3 mg Oral QHS PRN  
 sodium chloride (NS) flush 5-10 mL  5-10 mL IntraVENous PRN  
 levothyroxine (SYNTHROID) tablet 175 mcg  175 mcg Oral ACB Total time spent with patient: 35  minutes Care Plan discussed with: Care Manager, Nursing Staff, Consultant/Specialist and >50% of time spent in counseling and coordination of care Discussed:  Care Plan Attending Physician: Mirtha Davey MD

## 2019-10-17 NOTE — PROGRESS NOTES
Pulmonary progress note History 51-year-old male presented to the emergency room on 10/19 with AMS. Blood cultures ultimately grew Pasteurella. The possible source was a somewhat aggressive pet cat. While the patient is not as alert as he was at the time that he left the ICU, his sensorium does not appear as clouded as yesterday Exam 
The patient does not answer questions. Blood pressure 90/56, pulse (!) 53, temperature 97.5 °F (36.4 °C), resp. rate 18, height 5' 8\" (1.727 m), weight 80.2 kg (176 lb 12.8 oz), SpO2 90 %. Gaze is conjugate. No roving eye movements. No accessory muscle use. No paradoxical abdominal movement. Lungs are clear Irregular rhythm Sclera are anicteric Multiple ecchymoses No cyanosis or clubbing Labs 10/16 WBC 8.5 with no bands Sodium 151, BUN 17 and creatinine 0.95. Calcium is 8.1 with albumin of 1.8. Left knee arthrocentesis shows an inflamed but not likely infected joint. Assessment Pasteurella bacteremia Metabolic encephalopathy Hypernatremia Plan Haldol for mental status changes. Attempt to minimize Ativan Continue non-narcotic analgesia and minimize Dilaudid use. Consider free water via NG tube Hold Lasix Thiamine for concerns of Wernicke's encephalopathy

## 2019-10-17 NOTE — PROGRESS NOTES
Patient completed ROM as follows. BUE X 10 (shoulder flexion, int/ext rotation, biceps curls, elbow extension, ab/add) Pt participation was: 
(BUE) AROM/ AAROM 
() PROM Pain Level Before FMP: 0 Pain Level After FMP: 0 Non Verbal Pain Scale :  
 
(x) Alerted Nursing. (x) Call bell within patient reach. (x) Pt resting in no apparent distress in bed. NOTE:  
Pt received in bed with family present at bedside. Pt awake, verbal, and followed all commands for ROM exercises. Pt provided name and . Pt requiring frequent cues to stay awake during exercises. No s/s of distress to report. [address] [facility]

## 2019-10-17 NOTE — PROGRESS NOTES
Patient completed ROM as follows. BLE X 10 (DF/PF, SLR, AB/ADD, INT/EXT ROTATION, SAQ, AB/ADD, HEEL SLIDES) Pt participation was: 
(BLE) AROM/ AAROM 
() PROM Pain Level Before FMP: 0 Pain Level After FMP: 0 Non Verbal Pain Scale :  
 
(x) Alerted Nursing. (x) Call bell within patient reach. (x) Pt resting in no apparent distress in bed. NOTE:  
Pt received in bed with family present at bedside. Pt awake, verbal, and followed all commands for ROM exercises. Pt provided name and . Pt requiring frequent cues to stay awake during exercises. No s/s of distress to report. [facility], [facility]

## 2019-10-17 NOTE — PROGRESS NOTES
[facility] 
                                             (A Division of [address] [place] [address]) Follow-up Note Date of Admission: 10/8/2019     Date of Note:  10/17/2019 Summary:   
 
79 y/o  male HTN, AF, GERD, DJD s/p L TKR, Prostate CA, Hypothyroidism, h/o pancreatitis, ETOH consumption adm 10/8/2019 w/ chills, weakness, altered mental status. Has neck pain x 1 mo. Had R leg cellulitis after cat bite (not scratch) 1 mo PTA - resolved w/ abx. Then micturition syncope x 3, hurt knee, progressing generalized weakness, then 1 day PTA fever, chills. Adm w/ 100.5, WBC 29.5, 13% bands. No uti, pneumonia, unrevealing CTAP. 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 Rpt blcx 10/10 NGTD x 2.  Gallium scan 10/14: intense periprosthetic uptake L knee, distal femur. L knee synovial fluid 10/15- not s/o septic arthritis. Presume distal L femur OM. Interval History: 
 
Still somewhat lethargic. Confused and in restraints. Tried to pull out NGT last night. Answers some questions and follows commands appropriately. Denies any pain. Current Antimicrobials: Prior Antimicrobials Ceftriaxone 10/11 - 5  abx 10/8 - 8 Vancomycin 10/8 - 2  Cefepime, Levoflox 10/8 - 0 Meropenem 10/9 - 2  
  
  
Assessment: Plan:  
Left knee uptake on Ga scan 
- r/o septic arthritis L TKR 
- s/p US guided arthrocentesis 10/15 - negative for infection 
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  
-> PICC prior to discharge. Pasteurella BSI 
- 1 of 2 blood cultures 10/8 + P multocida sens TMP-SMX, Ceftriaxone, Levofloxacin, PCN, TCN 
- No active cellulitis. R leg cellulitis following cat bite in Early September resolved with TMP-SMX then Keflex - current source unclear: ?epidural abscess or discitis/OM?, R leg OM? 
- Recent TTE 9/27: neg vegetations - rpt blcx 10/10 NG x 2 
 - Gallium scan 10/14: intense periprosthetic uptake L knee, distal femur. 
- L knee aspiration 10/15: 7,200 WBC, no crystals - not suggestive of septic arthritis -> monitor rpt blcx from 10/10 
-> continue Ceftriaxone as above Encephalopathy/ ETOH Withdrawal 
- worse since admission - ? Medication-related: has been receiving dilaudid q 4 hours and Ativan this am 
- Head CT 10/14: no acute intracranial abnormality 
- not related to infection which appears under control at this time 
- improving. Conversing a little today -> per primary team. D/w Dr. Alex Marrufo Septic shock 
- due to above 
- improving. Vasopressors stopped 10/10.   
- resolved -> abx as above MACKENZIE 
- resolved Worsening alkaline phosphatase 
- from sepsis, ?bone infection / met (doubt) 
- improving H/o PCN allergy (rash)  
- tolerated keflex 
- tolerating Ceftriaxone Recent R leg cellulitis following cat bite (not scratch) R hip pain - s/p aspiration 10/9: 23 WBC, no crystals HTN    
AF    
GERD    
DJD s/p L TKR    
Prostate CA    
Hypothyroidism    
h/o pancreatitis    
ETOH consumption    
  
Microbiology: 
  
10/8      blcx Pasteurella multocida 1 of 2 
             urcx NG 
10/9      R hip asp gs no wbc, NOS, cx NG 
 
10/10 blcx NGTD x 2 
  
Lines / Catheters: RIJ CVL Patient Active Problem List  
Diagnosis Code  Malignant neoplasm of prostate ([facility] C61  Hypertension I10  
 GERD (gastroesophageal reflux disease) K21.9  Glaucoma H40.9  Glaucoma H40.9  Dysphagia R13.10  Encounter for colonoscopy due to history of adenomatous colonic polyps Z12.11, Z86.010  
 Non-pressure chronic ulcer of right calf with fat layer exposed ([facility] Q31.430  Laceration of right lower leg with complication O92.647P  Edema R60.9  Pneumonia J18.9  Atrial fibrillation (HCC) I48.91  
 Hypothyroidism E03.9  Sepsis ([facility] A41.9  Cellulitis L03.90  
 HTN (hypertension) I10  
 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  
 haloperidol lactate (HALDOL) injection 1 mg  1 mg IntraVENous Q8H PRN  
 [Held by provider] furosemide (LASIX) tablet 40 mg  40 mg Oral DAILY  dextrose 5% infusion  60 mL/hr IntraVENous CONTINUOUS  
 metoprolol tartrate (LOPRESSOR) tablet 12.5 mg  12.5 mg Per NG tube BID  thiamine (B-1) 250 mg in 0.9% sodium chloride 50 mL IVPB  250 mg IntraVENous DAILY  [START ON 10/18/2019] thiamine mononitrate (B-1) tablet 100 mg  100 mg Oral DAILY  acetaminophen (TYLENOL) tablet 500 mg  500 mg Oral Q6H PRN  
 acetaminophen (TYLENOL) solution 325 mg  325 mg Per NG tube Q6H  
 insulin lispro (HUMALOG) injection   SubCUTAneous Q6H  
 glucose chewable tablet 16 g  16 g Oral PRN  
 glucagon (GLUCAGEN) injection 1 mg  1 mg IntraMUSCular PRN  
 dextrose 10 % infusion 125-250 mL  125-250 mL IntraVENous PRN  
 albuterol-ipratropium (DUO-NEB) 2.5 MG-0.5 MG/3 ML  3 mL Nebulization Q4H PRN  pantoprazole (PROTONIX) injection 40 mg  40 mg IntraVENous DAILY  cefTRIAXone (ROCEPHIN) 2 g in 0.9% sodium chloride (MBP/ADV) 50 mL MBP  2 g IntraVENous Z50X  
 folic acid (FOLVITE) tablet 1 mg  1 mg Oral DAILY  apixaban (ELIQUIS) tablet 5 mg  5 mg Oral BID  sodium chloride (NS) flush 5-40 mL  5-40 mL IntraVENous Q8H  
 sodium chloride (NS) flush 5-40 mL  5-40 mL IntraVENous PRN  
 desonide (TRIDESILON) 0.05 % cream - patient supplied (Patient Supplied)   Topical BID  naloxone (NARCAN) injection 0.4 mg  0.4 mg IntraVENous PRN  
  melatonin tablet 3 mg  3 mg Oral QHS PRN  
 sodium chloride (NS) flush 5-10 mL  5-10 mL IntraVENous PRN  
 levothyroxine (SYNTHROID) tablet 175 mcg  175 mcg Oral ACB Review of Systems - Negative except as in interval history Objective: 
Visit Vitals /51 (BP 1 Location: Left arm, BP Patient Position: At rest) Pulse 97 Temp 98 °F (36.7 °C) Resp 18 Ht 5' 8\" (1.727 m) Wt 85.1 kg (187 lb 9.6 oz) SpO2 90% BMI 28.52 kg/m² Temp (24hrs), Av.6 °F (36.4 °C), Min:97.1 °F (36.2 °C), Max:98 °F (36.7 °C) General: Well developed, well nourished 80 y.o.  male more alert, follws simple commands, answers some questions. HEENT: no scleral icterus, mucous membranes moist, pharynx normal without lesions Neck: resistant to flexion in all directions but more flexible than previous Cardio:  irregularly irregular rhythm Lungs: rhonchi bilateral and + expiratory wheezes Abdomen: soft, non-tender. Bowel sounds normal. No masses, no organomegaly. Extremities:  no redness or tenderness in the calves or thighs, no edema Lab results: 
 
Chemistry Recent Labs 10/17/19 
0400 10/16/19 
0405 10/15/19 
7191 GLU 95 96 109*  151* 149*  
K 3.2* 3.1* 3.2*  
 109 110 CO2 32 34* 33*  
BUN 24* 17 15 CREA 1.04 0.95 0.78  
CA 7.8* 8.1* 8.4* AGAP 7 8 6 BUCR 23* 18 19 * 269* 293* TP 5.3* 4.7* 5.1* ALB 2.0* 1.8* 1.9*  
GLOB 3.3 2.9 3.2 AGRAT 0.6* 0.6* 0.6* CBC w/ Diff Recent Labs 10/16/19 
0405 10/15/19 
6398 WBC 8.5 10.3 RBC 2.56* 2.77* HGB 7.9* 8.5* HCT 24.9* 27.0*  
 213 GRANS 78* 74* LYMPH 12* 14* EOS 1 1 Microbiology All Micro Results Procedure Component Value Units Date/Time CULTURE, BODY FLUID Renate Whalen [[id number]] Collected:  10/15/19 1030 Order Status:  Completed Specimen:  Synovial Fluid Updated:  10/17/19 0957   Special Requests: LEFT KNEE     
  GRAM STAIN FEW WBC'S     
 NO ORGANISMS SEEN Culture result: NO GROWTH 2 DAYS     
 CULTURE, BLOOD [[id number]] Collected:  10/10/19 1341 Order Status:  Completed Specimen:  Blood Updated:  10/16/19 0042 Special Requests: --     
  NO SPECIAL REQUESTS 
BLUE PORT Culture result: NO GROWTH 6 DAYS     
 CULTURE, BLOOD [[id number]] Collected:  10/10/19 1237 Order Status:  Completed Specimen:  Blood Updated:  10/16/19 0042 Special Requests: PERIPHERAL Culture result: NO GROWTH 6 DAYS     
 CULTURE, BLOOD [[id number]] Collected:  10/08/19 2234 Order Status:  Completed Specimen:  Blood from Miscellaneous sample Updated:  10/15/19 0754 Special Requests: NO SPECIAL REQUESTS Culture result: NO GROWTH 6 DAYS     
 CULTURE, BODY FLUID [[id number]] Collected:  10/09/19 1200 Order Status:  Completed Specimen:  Synovial Fluid Updated:  10/14/19 2474 Special Requests: HIP  
  GRAM STAIN NO WBC'S SEEN     
   NO ORGANISMS SEEN Culture result: NO GROWTH 5 DAYS     
 CULTURE, BLOOD [[id number]]  (Abnormal)  (Susceptibility) Collected:  10/08/19 1301 Order Status:  Completed Specimen:  Blood Updated:  10/11/19 0825 Special Requests: --     
  NO SPECIAL REQUESTS 
RIGHT 
ARM 
  
  GRAM STAIN    
  AEROBIC BOTTLE GRAM NEGATIVE RODS SMEAR CALLED TO AND CORRECTLY REPEATED BY: DESHAWN COSBY RN,ICU, ON 10/9/19 AT 0335 TO [facility] Culture result:    
  AEROBIC BOTTLE PASTEURELLA MULTOCIDA CULTURE, URINE [[id number]] Collected:  10/08/19 1418 Order Status:  Completed Specimen:  Cath Urine Updated:  10/10/19 1016 Special Requests: NO SPECIAL REQUESTS Culture result: NO GROWTH 2 DAYS Buelah Lundborg, MD, [facility] 
[phone] 
10/17/2019  
9:02 AM

## 2019-10-17 NOTE — PROGRESS NOTES
UAI successfully processed in EPAS. Printed. Will obtain MD signature before making copies. John Moore RN Outcomes Manager

## 2019-10-18 NOTE — PROGRESS NOTES
Internal Medicine Progress Note NAME: Cassie Camacho :  1933 MRM:  [id number] Date/Time: 10/18/2019 ASSESSMENT/PLAN: significant  improvement in his mental state. Try to dc restrains. Repeat SLP. Add tramadol for pain control. Nebs for wheezes in place # Acute Delirious state , Encephalopathy. Possible sepsis related or metabolic/ Alcohol related with his ho alcohol abuse. Was on opioid and benzo that been consolidated. Reduce sedative to minimal strongly needed. Ammonia level normal , CT head negative. Place ngt for meds ( didn't get po meds for few days ) . -significant  improvement in his mental state. 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 H/o cat bite 4 weeks to RLE per daughter, when he came in with cellulitis and was treated. D/w Dr. Johnny Kothari concern for persistent infection/abscess, he ordered nuclear gallium scan . Blood culture repeat on 10/10 negative to date. # Possible L knee infection. Orthopedic. Knee tapping and CS. Fluids CS negative. 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 I called lab and radiology if the can run the knee fluid test for Crystals . I am informed that fluids from his hip on 10/9 and knee on 10/15 both tested for crystals and was negative - Sever L knee pains - control pains. #  MACKENZIE - improved with IV fluid hydration, monitor closely. Monitor labs. Avoid nephrotoxins. Renally dosing medications. Monitor urine out put. #  AFIB - Rate control, resume Elliquis for AC . Dc iv lopressor , tab NGT. Titrate as needed # Elevated LFT - GI consulted, suspects 2/2 sepsis, appreciate recs. ( Elevated liver enzymes most likely secondary to sepsis. Coming down. CT shows no biliary obstruction. S/P cholecystectomy ) # Hypothyroidism - Cont home levothyroxine. # Hypocalcemia. Hypokalemia. Replete and trend. #Hypernatremia. Improved. free water NGT . D5 ivf . Serial labs to monitor level. # ETOH abuse - initially placed on  CIWA, DC. Per family last drink was 1 month ago. Thiamine. # NGT for meds and feeding  , free water DVT ppx - SCD; resume Eliquis. 
 
-Code status : FULL Lab Review:  
 
Recent Labs 10/18/19 
9647 10/16/19 
0405 WBC 12.6 8.5 HGB 8.0* 7.9*  
HCT 25.2* 24.9*  
 213 Recent Labs 10/18/19 
0621 10/17/19 
0400 10/16/19 
0405  144 151*  
K 3.7 3.2* 3.1*  
 105 109 CO2 30 32 34* * 95 96 BUN 26* 24* 17  
CREA 1.00 1.04 0.95  
CA 7.5* 7.8* 8.1*  
MG 2.1 1.9 1.9 PHOS 2.7 2.9 3.4 ALB 1.8* 2.0* 1.8* TBILI 0.6 1.0 0.9 SGOT 37 48* 74* ALT 23 30 33 INR 1.9*  --   --   
 
Lab Results Component Value Date/Time Glucose (POC) 150 (H) 10/18/2019 06:05 AM  
 Glucose (POC) 172 (H) 10/18/2019 12:03 AM  
 Glucose (POC) 120 (H) 10/17/2019 05:41 AM  
 Glucose (POC) 100 10/16/2019 11:07 PM  
 Glucose (POC) 111 (H) 10/16/2019 06:12 AM  
 Glucose, POC 83 01/03/2017 10:19 AM  
 
No results for input(s): PH, PCO2, PO2, HCO3, FIO2 in the last 72 hours. Recent Labs 10/18/19 
3718 INR 1.9* No results found for: SDES Lab Results Component Value Date/Time Culture result: NO GROWTH 3 DAYS 10/15/2019 10:30 AM  
 Culture result: NO GROWTH 6 DAYS 10/10/2019 01:41 PM  
 Culture result: NO GROWTH 6 DAYS 10/10/2019 12:37 PM  
 
 
All Cardiac Markers in the last 24 hours: No results found for: CPK, CK, CKMMB, CKMB, RCK3, CKMBT, CKNDX, CKND1, CHAYA, TROPT, TROIQ, BRENDAN, TROPT, TNIPOC, BNP, BNPP Subjective: Chief Complaint:     
More alert L knee pains Slept well last night per nursing Willing to co-operate - eat ROS: 
Non historian due to his medical condition Objective:  
 
Vitals: 
Last 24hrs VS reviewed since prior progress note. Most recent are: Visit Vitals /72 (BP 1 Location: Left arm, BP Patient Position: At rest) Pulse (!) 107 Temp 98.3 °F (36.8 °C) Resp 18 Ht 5' 8\" (1.727 m) Wt 88 kg (194 lb) SpO2 96% BMI 29.50 kg/m² SpO2 Readings from Last 6 Encounters:  
10/18/19 96% 10/08/19 95% 10/08/19 91% 10/07/19 98% 10/03/19 100% 10/03/19 90% O2 Flow Rate (L/min): 2 l/min Intake/Output Summary (Last 24 hours) at 10/18/2019 1102 Last data filed at 10/18/2019 8373 Gross per 24 hour Intake 1382 ml Output 540 ml Net 842 ml Physical Exam:  
Ears: hearing is intact  [id number] Eyes: Icterus is not present Lungs: clear to auscultation bilaterally Heart: regular rate and rhythm, S1, S2 normal, no murmur, click, rub or gallop Gastrointestinal: soft, non-tender. Bowel sounds normal. No masses,  no organomegaly Neurological:  New Focal Deficits are not present. More alert and coherent today Skin: diffuse ecchymosis. Face with small petechiae (per family is due to known actinic keratosis). Psychiatric:  Mood is stable Medications Reviewed: (see below) Lab Data Reviewed: (see below) 
 
______________________________________________________________________ Medications:  
 
Current Facility-Administered Medications Medication Dose Route Frequency  traMADol (ULTRAM) tablet 25 mg  25 mg Oral Q6H PRN  
 ketorolac (TORADOL) injection 15 mg  15 mg IntraVENous Q8H PRN  
 haloperidol lactate (HALDOL) injection 1 mg  1 mg IntraVENous Q8H PRN  
 [Held by provider] furosemide (LASIX) tablet 40 mg  40 mg Oral DAILY  dextrose 5% infusion  60 mL/hr IntraVENous CONTINUOUS  
 metoprolol tartrate (LOPRESSOR) tablet 12.5 mg  12.5 mg Per NG tube BID  thiamine mononitrate (B-1) tablet 100 mg  100 mg Oral DAILY  acetaminophen (TYLENOL) tablet 500 mg  500 mg Oral Q6H PRN  
 acetaminophen (TYLENOL) solution 325 mg  325 mg Per NG tube Q6H  
 insulin lispro (HUMALOG) injection   SubCUTAneous Q6H  
  glucose chewable tablet 16 g  16 g Oral PRN  
 glucagon (GLUCAGEN) injection 1 mg  1 mg IntraMUSCular PRN  
 dextrose 10 % infusion 125-250 mL  125-250 mL IntraVENous PRN  
 albuterol-ipratropium (DUO-NEB) 2.5 MG-0.5 MG/3 ML  3 mL Nebulization Q4H PRN  pantoprazole (PROTONIX) injection 40 mg  40 mg IntraVENous DAILY  cefTRIAXone (ROCEPHIN) 2 g in 0.9% sodium chloride (MBP/ADV) 50 mL MBP  2 g IntraVENous X11U  
 folic acid (FOLVITE) tablet 1 mg  1 mg Oral DAILY  apixaban (ELIQUIS) tablet 5 mg  5 mg Oral BID  sodium chloride (NS) flush 5-40 mL  5-40 mL IntraVENous Q8H  
 desonide (TRIDESILON) 0.05 % cream - patient supplied (Patient Supplied)   Topical BID  naloxone (NARCAN) injection 0.4 mg  0.4 mg IntraVENous PRN  
 melatonin tablet 3 mg  3 mg Oral QHS PRN  
 sodium chloride (NS) flush 5-10 mL  5-10 mL IntraVENous PRN  
 levothyroxine (SYNTHROID) tablet 175 mcg  175 mcg Oral ACB Total time spent with patient: 35  minutes Care Plan discussed with: Care Manager, Nursing Staff, Consultant/Specialist and >50% of time spent in counseling and coordination of care Discussed:  Care Plan Attending Physician: Melchor Hall MD

## 2019-10-18 NOTE — PROGRESS NOTES
[facility] 
                                             (A Division of [address] [place] [address]) Follow-up Note Date of Admission: 10/8/2019     Date of Note:  10/18/2019 Summary:   
 
81 y/o  male HTN, AF, GERD, DJD s/p L TKR, Prostate CA, Hypothyroidism, h/o pancreatitis, ETOH consumption adm 10/8/2019 w/ chills, weakness, altered mental status. Has neck pain x 1 mo. Had R leg cellulitis after cat bite (not scratch) 1 mo PTA - resolved w/ abx. Then micturition syncope x 3, hurt knee, progressing generalized weakness, then 1 day PTA fever, chills. Adm w/ 100.5, WBC 29.5, 13% bands. No uti, pneumonia, unrevealing CTAP. 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 Rpt blcx 10/10 NGTD x 2.  Gallium scan 10/14: intense periprosthetic uptake L knee, distal femur. L knee synovial fluid 10/15- not s/o septic arthritis. Presume distal L femur OM. Interval History: 
 
Alert and interacting appropriately. Can say that he is in hospital.  Josi Reyez. No longer in restraints Current Antimicrobials: Prior Antimicrobials Ceftriaxone 10/11 - 7  abx 10/8 - 10 Vancomycin 10/8 - 2  Cefepime, Levoflox 10/8 - 0 Meropenem 10/9 - 2  
  
  
Assessment: Plan:  
Left knee uptake on Ga scan - s/p US guided arthrocentesis 10/15 - negative for infection 
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  
-> PICC prior to discharge. Pasteurella BSI 
- 1 of 2 blood cultures 10/8 + P multocida sens TMP-SMX, Ceftriaxone, Levofloxacin, PCN, TCN 
- No active cellulitis. R leg cellulitis following cat bite in Early September resolved with TMP-SMX then Keflex - current source unclear: ?epidural abscess or discitis/OM?, R leg OM? 
- Recent TTE 9/27: neg vegetations - rpt blcx 10/10 NG x 2 
- Gallium scan 10/14: intense periprosthetic uptake L knee, distal femur. - L knee aspiration 10/15: 7,200 WBC, no crystals - not suggestive of septic arthritis -> monitor rpt blcx from 10/10 
-> continue Ceftriaxone as above Encephalopathy/ ETOH Withdrawal 
- worse since admission - ? Medication-related: has been receiving dilaudid q 4 hours and Ativan this am 
- Head CT 10/14: no acute intracranial abnormality 
- not related to infection which appears under control at this time 
- improving.  -> per primary team. D/w Dr. Kimani Watson Septic shock 
- due to above 
- improving. Vasopressors stopped 10/10.   
- resolved -> abx as above MACKENZIE 
- resolved Worsening alkaline phosphatase 
- from sepsis, ?bone infection / met (doubt) 
- improving H/o PCN allergy (rash)  
- tolerated keflex 
- tolerating Ceftriaxone Recent R leg cellulitis following cat bite (not scratch) R hip pain - s/p aspiration 10/9: 23 WBC, no crystals HTN    
AF    
GERD    
DJD s/p L TKR    
Prostate CA    
Hypothyroidism    
h/o pancreatitis    
ETOH consumption    
  
Microbiology: 
  
10/8      blcx Pasteurella multocida 1 of 2 
             urcx NG 
10/9      R hip asp gs no wbc, NOS, cx NG 
 
10/10 blcx NGTD x 2 
  
Lines / Catheters: RIJ CVL Patient Active Problem List  
Diagnosis Code  Malignant neoplasm of prostate ([facility] C61  Hypertension I10  
 GERD (gastroesophageal reflux disease) K21.9  Glaucoma H40.9  Glaucoma H40.9  Dysphagia R13.10  Encounter for colonoscopy due to history of adenomatous colonic polyps Z12.11, Z86.010  
 Non-pressure chronic ulcer of right calf with fat layer exposed ([facility] N07.357  Laceration of right lower leg with complication H43.747X  Edema R60.9  Pneumonia J18.9  Atrial fibrillation (HCC) I48.91  
 Hypothyroidism E03.9  Sepsis ([facility] A41.9  Cellulitis L03.90  
 HTN (hypertension) M98  
 Neutrophilic leukocytosis U75.0  Acute gout M10.9  CKD (chronic kidney disease) stage 2, GFR 60-89 ml/min N18.2  Syncope R55  MACKENZIE (acute kidney injury) (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  
 ketorolac (TORADOL) injection 15 mg  15 mg IntraVENous Q8H PRN  
 haloperidol lactate (HALDOL) injection 1 mg  1 mg IntraVENous Q8H PRN  
 [Held by provider] furosemide (LASIX) tablet 40 mg  40 mg Oral DAILY  metoprolol tartrate (LOPRESSOR) tablet 12.5 mg  12.5 mg Per NG tube BID  thiamine mononitrate (B-1) tablet 100 mg  100 mg Oral DAILY  acetaminophen (TYLENOL) tablet 500 mg  500 mg Oral Q6H PRN  
 acetaminophen (TYLENOL) solution 325 mg  325 mg Per NG tube Q6H  
 insulin lispro (HUMALOG) injection   SubCUTAneous Q6H  
 glucose chewable tablet 16 g  16 g Oral PRN  
 glucagon (GLUCAGEN) injection 1 mg  1 mg IntraMUSCular PRN  
 dextrose 10 % infusion 125-250 mL  125-250 mL IntraVENous PRN  
 albuterol-ipratropium (DUO-NEB) 2.5 MG-0.5 MG/3 ML  3 mL Nebulization Q4H PRN  pantoprazole (PROTONIX) injection 40 mg  40 mg IntraVENous DAILY  cefTRIAXone (ROCEPHIN) 2 g in 0.9% sodium chloride (MBP/ADV) 50 mL MBP  2 g IntraVENous R37T  
 folic acid (FOLVITE) tablet 1 mg  1 mg Oral DAILY  apixaban (ELIQUIS) tablet 5 mg  5 mg Oral BID  sodium chloride (NS) flush 5-40 mL  5-40 mL IntraVENous Q8H  
 desonide (TRIDESILON) 0.05 % cream - patient supplied (Patient Supplied)   Topical BID  naloxone (NARCAN) injection 0.4 mg  0.4 mg IntraVENous PRN  
 melatonin tablet 3 mg  3 mg Oral QHS PRN  
 sodium chloride (NS) flush 5-10 mL  5-10 mL IntraVENous PRN  
 levothyroxine (SYNTHROID) tablet 175 mcg  175 mcg Oral ACB Review of Systems - Negative except as in interval history Objective: 
Visit Vitals /81 (BP 1 Location: Left arm, BP Patient Position: At rest) Pulse 82 Temp 97.6 °F (36.4 °C) Resp 18 Ht 5' 8\" (1.727 m) Wt 88 kg (194 lb) SpO2 93% BMI 29.50 kg/m² Temp (24hrs), Av.5 °F (36.4 °C), Min:97 °F (36.1 °C), Max:98.3 °F (36.8 °C) General: Well developed, well nourished 80 y.o.  male more alert, follws simple commands, answers some questions. HEENT: no scleral icterus, mucous membranes moist, pharynx normal without lesions, NGT still in place Neck: resistant to flexion in all directions but more flexible than previous Cardio:  irregularly irregular rhythm Lungs: rhonchi bilateral and + expiratory wheezes Abdomen: soft, non-tender. Bowel sounds normal. No masses, no organomegaly. Extremities:  no redness or tenderness in the calves or thighs, no edema Lab results: 
 
Chemistry Recent Labs 10/18/19 
0621 10/17/19 
0400 10/16/19 
0405 * 95 96  144 151*  
K 3.7 3.2* 3.1*  
 105 109 CO2 30 32 34* BUN 26* 24* 17  
CREA 1.00 1.04 0.95  
CA 7.5* 7.8* 8.1* AGAP 8 7 8 BUCR 26* 23* 18  
* 260* 269* TP 5.1* 5.3* 4.7* ALB 1.8* 2.0* 1.8*  
GLOB 3.3 3.3 2.9 AGRAT 0.5* 0.6* 0.6* CBC w/ Diff Recent Labs 10/18/19 
4942 10/16/19 
0405 WBC 12.6 8.5  
RBC 2.62* 2.56* HGB 8.0* 7.9*  
HCT 25.2* 24.9*  
 213 GRANS 82* 78* LYMPH 9* 12* EOS 1 1 Microbiology All Micro Results Procedure Component Value Units Date/Time CULTURE, BODY FLUID Maite Salas [[id number]] Collected:  10/15/19 1030 Order Status:  Completed Specimen:  Synovial Fluid Updated:  10/18/19 9238 Special Requests: LEFT KNEE     
  GRAM STAIN FEW WBC'S     
   NO ORGANISMS SEEN Culture result: NO GROWTH 3 DAYS     
 CULTURE, BLOOD [[id number]] Collected:  10/10/19 1341 Order Status:  Completed Specimen:  Blood Updated:  10/16/19 0042   Special Requests: --     
  NO SPECIAL REQUESTS 
[place] 
  
 Culture result: NO GROWTH 6 DAYS     
 CULTURE, BLOOD [[id number]] Collected:  10/10/19 1237 Order Status:  Completed Specimen:  Blood Updated:  10/16/19 0042 Special Requests: PERIPHERAL Culture result: NO GROWTH 6 DAYS     
 CULTURE, BLOOD [[id number]] Collected:  10/08/19 2234 Order Status:  Completed Specimen:  Blood from Miscellaneous sample Updated:  10/15/19 0754 Special Requests: NO SPECIAL REQUESTS Culture result: NO GROWTH 6 DAYS     
 CULTURE, BODY FLUID [[id number]] Collected:  10/09/19 1200 Order Status:  Completed Specimen:  Synovial Fluid Updated:  10/14/19 1286 Special Requests: HIP  
  GRAM STAIN NO WBC'S SEEN     
   NO ORGANISMS SEEN Culture result: NO GROWTH 5 DAYS     
 CULTURE, BLOOD [[id number]]  (Abnormal)  (Susceptibility) Collected:  10/08/19 1301 Order Status:  Completed Specimen:  Blood Updated:  10/11/19 0825 Special Requests: --     
  NO SPECIAL REQUESTS 
RIGHT 
ARM 
  
  GRAM STAIN    
  AEROBIC BOTTLE GRAM NEGATIVE RODS SMEAR CALLED TO AND CORRECTLY REPEATED BY: DESHAWN COSBY RN,ICU, ON 10/9/19 AT 0335 TO [facility] Culture result:    
  AEROBIC BOTTLE PASTEURELLA MULTOCIDA CULTURE, URINE [[id number]] Collected:  10/08/19 1418 Order Status:  Completed Specimen:  Cath Urine Updated:  10/10/19 1016 Special Requests: NO SPECIAL REQUESTS Culture result: NO GROWTH 2 DAYS Buelah Lundborg, MD, [facility] Infectious Diseases 
[phone] 
10/18/2019  
9:02 AM

## 2019-10-18 NOTE — ANCILLARY DISCHARGE INSTRUCTIONS
Core Measures Patient, RRAT Score is 21 - patient is discharged to [address] [place] [address].

## 2019-10-18 NOTE — ROUTINE PROCESS
Assumed care of pt report received from [facility] MELANIE. Pt alert but confused. Daughter at the bedside. Right Nare NGT with TF Osmolite 1.2 infusing well. Pt denies any pain at this time. No verbal distress noted. Bed in lowest position & wheels locked. Call bell within reach. 2020 - janice care provided. Daughter at the bedside. 2200 - turned pt for comfort. Denies pain. 10/18/19 
 
0137 - Bathed pt , turned for comfort. 0143 - Patient restless. Reorient pt and repositioned for comfort. 3209 -  Bedside and Verbal shift change report given to Abbi Garvin (oncoming nurse) by Kari Thomas RN BSN ( off going Nurse ) inclusive of SBAR and plan of care, Intake & Output.

## 2019-10-18 NOTE — PROGRESS NOTES
Problem: Dysphagia (Adult) Goal: *Acute Goals and Plan of Care (Insert Text) Description Dysphagia Present: mod- severe oral, severe pharyngeal 
Aspiration: soft and silent s/sx during eval   
 
Recommendations: 
Diet: NPO, ice chips PRN for oral care/ comfort/ swallow stim, may want to consider short term alternate means nutrition/ hydration Meds: via alternate means Strict Aspiration Precautions Oral Care TID Other: repeat swallow eval tomorrow Goals:  Patient will: 1. Tolerate PO trials with no overt s/s aspiration or distress in 4/5 trials 2. Utilize compensatory swallow strategies/maneuvers (decrease bite/sip, size/rate, alt. liq/sol) with min cues in 4/5 trials 3. Perform oral-motor/laryngeal strengthening exercises with min cues to increase oropharyngeal swallow function 4. Complete an objective swallow study (i.e., MBSS) to assess swallow integrity, r/o aspiration, and determine of safest LRD, min A Outcome: Progressing Towards Goal 
 
SPEECH LANGUAGE PATHOLOGY BEDSIDE SWALLOW EVALUATION Patient: Clarence Hernandez (89 y.o. male) Date: 10/18/2019 Primary Diagnosis: Severe sepsis ([facility] [address] [facility] [A41.9, R65.20] Altered mental status [R41.82] Precautions: Aspiration  Fall, Skin PLOF: Independent ASSESSMENT : 
Patient re-evaluated at MD request. Based on the objective data described below, the patient presents with mod-severe oral dysphagia and suspected severe pharyngeal dysphagia. Patient A&Ox2. Multiple family members at bedside. NG tube in place. Accepted SLP-fed ice chips, NTL and HTL via spoon presentations. Patient demo poor oral bolus control, delayed throat clear and weak cough following all trials. Patient with facial grimace with all swallows, reporting pain. At this time, recommend patient remain NPO with strict aspiration precautions. Ice chips ok following through oral care.  Risks of aspiration discussed at length with family, verbalized comprehension. SLP will continue to follow as indicated. Patient will benefit from skilled intervention to address the above impairments. Patient's rehabilitation potential is considered to be Fair Factors which may influence rehabilitation potential include: ? None noted ? Mental ability/status ? Medical condition ? Home/family situation and support systems ? Safety awareness ? Pain tolerance/management ? Other: PLAN : 
Recommendations and Planned Interventions: NPO Frequency/Duration: Patient will be followed by speech-language pathology 1-2 times per day/4-7 days per week to address goals. Discharge Recommendations: To Be Determined SUBJECTIVE:  
Patient stated It hurts to swallow. OBJECTIVE:  
 
Past Medical History:  
Diagnosis Date Arthritis Atrial fibrillation ([address] 07/2017 Dr Aldo Hedrick cardio follows. chronic Carcinoma of prostate ([address] Cellulitis Coarse tremor   
  hands, states \"my doctor is aware\" Difficulty swallowing Dysphagia Edema Encounter for colonoscopy due to history of adenomatous colonic polyps Essential hypertension Fall 10/07/2017 \"went to [facility] Emergency Room, CT Scan showed concussion, no bleeding\" per patient Fatigue GERD (gastroesophageal reflux disease) wo. esophagitis Glaucoma H/O joint replacement   
  left knee 2003 HLD (hyperlipidemia) Hypertension Hypertensive disorder Hypothyroidism Impotence Laceration of right lower leg with complication Malignant neoplasm of prostate ([address]   
  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   
 acute/hist. of   
 
Past Surgical History:  
Procedure Laterality Date COLONOSCOPY N/A 1/3/2017 HX CHOLECYSTECTOMY HX KNEE REPLACEMENT Left 01/2003 HX SHOULDER REPLACEMENT Right 7/17, 10/17 Home Situation:  
Home Situation Home Environment: Private residence # Steps to Enter: 4 One/Two Story Residence: One story Living Alone: No 
Support Systems: Family member(s), Friends \ neighbors, Child(steffi) Patient Expects to be Discharged to[de-identified] Private residence Current DME Used/Available at Home: None Diet prior to admission: Regular/thin Current Diet:  NPO Cognitive and Communication Status: 
Neurologic State: Confused Orientation Level: Oriented to person, Disoriented to place, Disoriented to situation, Disoriented to time Cognition: Impaired decision making Perception: (unable to assess) Perseveration: No perseveration noted Safety/Judgement: Not assessed PAIN: 
Pain level pre-treatment: 0/10 Pain level post-treatment: 0/10 After treatment:  
?            Patient left in no apparent distress sitting up in chair ? Patient left in no apparent distress in bed ? Call bell left within reach ? Nursing notified ? Family present ? Caregiver present ? Bed alarm activated COMMUNICATION/EDUCATION:  
?            Aspiration precautions; swallow safety; compensatory techniques. ?            Patient/family have participated as able in goal setting and plan of care. ?            Patient/family agree to work toward stated goals and plan of care. ?            Patient understands intent and goals of therapy; neutral about participation. ? Patient unable to participate in goal setting/plan of care; educ ongoing with interdisciplinary staff ?         Posted safety precautions in patient's room. Thank you for this referral, Laura Mcclellan, SLP Time Calculation: 21 mins

## 2019-10-18 NOTE — PROGRESS NOTES
attempted to complete a follow up visit with and Spiritual assessment of patient in room 3013 today but found him engaged with family and seems to be doing ok but rather weak at present. Jeanna Carmona will continue to follow and will provide pastoral care on an as needed/requested basis Joyce 3 Board Certified [employer] Spiritual Care  
[phone]

## 2019-10-18 NOTE — ANCILLARY DISCHARGE INSTRUCTIONS
Patient and/or next of kin has been given the [facility] Important Message From Medicare About Your Rights\" letter and all questions were answered.

## 2019-10-18 NOTE — PROGRESS NOTES
Problem: Pain Goal: *Control of Pain Outcome: Progressing Towards Goal 
  
Problem: Falls - Risk of 
Goal: *Absence of Falls Description Document Drake Romero Fall Risk and appropriate interventions in the flowsheet. Outcome: Progressing Towards Goal 
Note:  
Fall Risk Interventions: 
  
 
Mentation Interventions: Adequate sleep, hydration, pain control, Door open when patient unattended, More frequent rounding, Toileting rounds, Update white board Medication Interventions: Evaluate medications/consider consulting pharmacy, Teach patient to arise slowly Elimination Interventions: Call light in reach, Patient to call for help with toileting needs, Toileting schedule/hourly rounds History of Falls Interventions: Door open when patient unattended, Evaluate medications/consider consulting pharmacy, Assess for delayed presentation/identification of injury for 48 hrs (comment for end date), Vital signs minimum Q4HRs X 24 hrs (comment for end date) Problem: Delirium Treatment Goal: *Level of consciousness restored to baseline Outcome: Progressing Towards Goal 
Goal: *Level of environmental perceptions restored to baseline Outcome: Progressing Towards Goal 
Goal: *Absence of falls Outcome: Progressing Towards Goal 
  
Problem: Non-Violent Restraints Goal: *Removal from restraints as soon as assessed to be safe Outcome: Progressing Towards Goal 
Goal: *Patient's dignity will be maintained Outcome: Progressing Towards Goal

## 2019-10-18 NOTE — ROUTINE PROCESS
Received verbal bedside report from Jeffrey Mata, assumed care. Pt awake, alert and oriented x 1, denies pain,, No signs of distress noted at this time. Call bell within reach, bed in the lowest locked position. 1945 - Bedside and Verbal shift change report given to Rebekah Gentile RN (oncoming nurse) by Joanna Mobley RN (offgoing nurse). Report included the following information SBAR, Kardex, MAR, Accordion and Recent Results.

## 2019-10-18 NOTE — PROGRESS NOTES
Chart reviewed. Plan remains SNF, has been accepted to [facility], [facility] which is first choice. Will cont to follow. Michelle Vazquez RN,ext [phone].

## 2019-10-19 NOTE — PROGRESS NOTES
Gastrointestinal Progress Note Patient Name: Maira Correa Today's Date: 10/19/2019 Admit Date: 10/8/2019 Impression:  
-Oropharyngeal dysphagia in the setting of severe illness and encephalopathy; currently receiving tube feeding 
-Encephalopathy, likely multifactorial (medications, severe illness/sepsis, ?related to history of ETOH); slowly improving by history 
-Sepsis secondary to cellulitis and suspected left femur osteomyelitis; pasteurella bacteremia; improved on ABX although WBC trending up last several days 
-Elevated liver enzymes; ALP persistently high/stable, possibly due to underlying osteomyelitis 
-Atrial fibrillation on anticoagulation with eliquis 
-History of esophageal dysphagia/stricture; normal esophagus with empiric dilations in 2016 and 2017 Mr Deborah Vasquez has ongoing encephalopathy and oropharyngeal dysphagia with aspiration risk. He is tolerating tube feeding and slowly improving. I spoke with his wife who states patient/family would be amenable to PEG placement if it is felt to be needed. Question is whether he will have a significant recovery over the next week or so to obviate the need for PEG. He will need to be off eliquis for 48 hours prior to PEG placement. Mrs Hernández's initial impression was to give him a bit more time to recover before placing a PEG and this seems reasonable. Recommendations:  
-Continue tube feeding 
-Await ongoing improvement in encephalopathy 
-He will need eliquis held for 48 hours prior to PEG placement if this is ultimately what is decided. If he is felt to be at low risk for thrombosis/stroke, the eliquis can be held now in anticipation of possible PEG early next week but if he is at moderate/high risk, I would recommend continuing eliquis for now until a definitive plan for PEG placement is made to minimize his time off anticoagulation 
-Will send alkaline phosphatase isoenzymes 
-Will follow Subjective: Margo Soliz is a 80 y.o. Male with history of atrial fibrillation on eliquis, HTN, Prostate Cancer, Chronic ETOH abuse with history of alcoholic pancreatitis, GERD with prior dilation of esophageal stricture, admitted 10/8/19 for weakness and altered mental status. Found to have sepsis secondary to pasteurella multocida bacteremia and possible left femur osteomyelitis related to cellulitis from cat bite prior to admission. Our service was previously consulted on 10/9 for elevated liver enzymes which were felt to be related to combination of ETOH related chronic liver disease and sepsis. His course has been complicated by encephalopathy which per chart review was felt to be multifactorial from sepsis, sundowning, medication (opioid/bendiazepine), prior ETOH use/abuse (last drink was apparently 1 month ago). CT head was negative. He was evaluated on 10/13 and again on 10/18 by speech therapy and felt to be at risk of aspiration due to pharyngeal dysphagia and he has been maintained on enteral nutrition via an NG tube for the past 5 days or so. Our service is being re-consulted to consider PEG placement. His liver enzymes have improved from admission although ALP remains elevated at 271 (was as high as 530). He remains on antibiotics with guidance from Dr Collette Aliment and his last blood cultures from 10/10 were negative. His last abdominal imaging with CT scan on 10/8 shows no ascites. His recent platelet count is 604 and INR is 1.9 (on eliquis). As noted above, he has a history of dysphagia and has been followed in the office by Dr Timur Harris with stricture dilation in the past. His most recent EGD's in 2016 and 2017 showed a normal esophagus with empiric dilations to 54Fr and 60Fr on respective EGD's. He is able to mumble and nod yes and no but not able to able more extensive questions for me. He has no abdominal pain.  His nurse tells me he has been doing better recently with his mental status and I spoke with his wife who reports he has been making steady improvements in his strength and mental status. His daughter Leatha Chamorro) is apparently his medical decision maker but I was unable to reach her by phone today. Current Facility-Administered Medications Medication Dose Route Frequency  albuterol-ipratropium (DUO-NEB) 2.5 MG-0.5 MG/3 ML  3 mL Nebulization QID RT  
 traMADol (ULTRAM) tablet 25 mg  25 mg Oral Q6H PRN  
 albuterol (PROVENTIL VENTOLIN) nebulizer solution 1.25 mg  1.25 mg Nebulization Q4H PRN  
 melatonin tablet 6 mg  6 mg Oral QHS PRN  
 ketorolac (TORADOL) injection 15 mg  15 mg IntraVENous Q8H PRN  
 haloperidol lactate (HALDOL) injection 1 mg  1 mg IntraVENous Q8H PRN  
 [Held by provider] furosemide (LASIX) tablet 40 mg  40 mg Oral DAILY  metoprolol tartrate (LOPRESSOR) tablet 12.5 mg  12.5 mg Per NG tube BID  thiamine mononitrate (B-1) tablet 100 mg  100 mg Oral DAILY  acetaminophen (TYLENOL) tablet 500 mg  500 mg Oral Q6H PRN  
 acetaminophen (TYLENOL) solution 325 mg  325 mg Per NG tube Q6H  
 insulin lispro (HUMALOG) injection   SubCUTAneous Q6H  
 glucose chewable tablet 16 g  16 g Oral PRN  
 glucagon (GLUCAGEN) injection 1 mg  1 mg IntraMUSCular PRN  
 dextrose 10 % infusion 125-250 mL  125-250 mL IntraVENous PRN  pantoprazole (PROTONIX) injection 40 mg  40 mg IntraVENous DAILY  cefTRIAXone (ROCEPHIN) 2 g in 0.9% sodium chloride (MBP/ADV) 50 mL MBP  2 g IntraVENous P52C  
 folic acid (FOLVITE) tablet 1 mg  1 mg Oral DAILY  apixaban (ELIQUIS) tablet 5 mg  5 mg Oral BID  sodium chloride (NS) flush 5-40 mL  5-40 mL IntraVENous Q8H  
 desonide (TRIDESILON) 0.05 % cream - patient supplied (Patient Supplied)   Topical BID  naloxone (NARCAN) injection 0.4 mg  0.4 mg IntraVENous PRN  
 sodium chloride (NS) flush 5-10 mL  5-10 mL IntraVENous PRN  
 levothyroxine (SYNTHROID) tablet 175 mcg  175 mcg Oral ACB Objective:  
 
Physical Exam: Visit Vitals /74 (BP 1 Location: Left arm, BP Patient Position: At rest) Pulse 96 Temp 97.4 °F (36.3 °C) Resp 20 Ht 5' 8\" (1.727 m) Wt 88 kg (194 lb) SpO2 94% BMI 29.50 kg/m² Gen: Elderly, chronically appearing male in no distress HEENT: NGT tube in place with tube feeds running CV: Regular, no murmur Pulm: Mild tachypnea, scattered end-expiratory wheeze Abd: Soft, nontender, cholecystectomy scar noted RUQ, active bowel sounds Extr: Anterior left knee surgical scar noted; left knee is larger than right but no obvious erythema or warmth or tenderness, no significant edema. Data Review: 
 
Labs: Results:  
   
Chemistry Recent Labs 10/18/19 
0621 10/17/19 
0400 * 95  144  
K 3.7 3.2*  
 105 CO2 30 32 BUN 26* 24* CREA 1.00 1.04  
CA 7.5* 7.8* AGAP 8 7 BUCR 26* 23* * 260* TP 5.1* 5.3* ALB 1.8* 2.0*  
GLOB 3.3 3.3 AGRAT 0.5* 0.6* CBC w/Diff Recent Labs 10/19/19 
0505 10/18/19 
4195 WBC 13.1 12.6 RBC 2.54* 2.62* HGB 7.8* 8.0*  
HCT 24.7* 25.2*  
 258 GRANS 79* 82* LYMPH 12* 9* EOS 1 1 Coagulation Recent Labs 10/19/19 
0505 10/18/19 
2739 PTP 20.7* 21.3* INR 1.8* 1.9* Liver Enzymes Recent Labs 10/18/19 
3296 TP 5.1* ALB 1.8* * SGOT 37 ALT 23  
  
 
  
 
Beth Paredes MD 
October 19, 2019

## 2019-10-19 NOTE — PROGRESS NOTES
[facility] 
                                             (A Division of [address] [place] [address]) Follow-up Note Date of Admission: 10/8/2019     Date of Note:  10/19/2019 Summary:   
 
79 y/o  male HTN, AF, GERD, DJD s/p L TKR, Prostate CA, Hypothyroidism, h/o pancreatitis, ETOH consumption adm 10/8/2019 w/ chills, weakness, altered mental status. Has neck pain x 1 mo. Had R leg cellulitis after cat bite (not scratch) 1 mo PTA - resolved w/ abx. Then micturition syncope x 3, hurt knee, progressing generalized weakness, then 1 day PTA fever, chills. Adm w/ 100.5, WBC 29.5, 13% bands. No uti, pneumonia, unrevealing CTAP. 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 Rpt blcx 10/10 NGTD x 2.  Gallium scan 10/14: intense periprosthetic uptake L knee, distal femur. L knee synovial fluid 10/15- not s/o septic arthritis. Presume distal L femur OM. Interval History: More talkative today. Remains alert. NGT still in place. Afebrile. Denies any pain. Being evaluated by GI for possible PEG. Current Antimicrobials: Prior Antimicrobials Ceftriaxone 10/11 - 8  abx 10/8 - 11 Vancomycin 10/8 - 2  Cefepime, Levoflox 10/8 - 0 Meropenem 10/9 - 2  
  
  
Assessment: Plan:  
Left knee uptake on Ga scan - s/p US guided arthrocentesis 10/15 - negative for infection 
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  
-> PICC prior to discharge. Pasteurella BSI 
- 1 of 2 blood cultures 10/8 + P multocida sens TMP-SMX, Ceftriaxone, Levofloxacin, PCN, TCN 
- No active cellulitis. R leg cellulitis following cat bite in Early September resolved with TMP-SMX then Keflex - current source unclear: ?epidural abscess or discitis/OM?, R leg OM? 
- Recent TTE 9/27: neg vegetations - rpt blcx 10/10 NG x 2 
 - Gallium scan 10/14: intense periprosthetic uptake L knee, distal femur. 
- L knee aspiration 10/15: 7,200 WBC, no crystals - not suggestive of septic arthritis -> monitor rpt blcx from 10/10 
-> continue Ceftriaxone as above Encephalopathy/ ETOH Withdrawal 
- worse since admission - ? Medication-related: has been receiving dilaudid q 4 hours and Ativan this am 
- Head CT 10/14: no acute intracranial abnormality 
- not related to infection which appears under control at this time 
- improving.  -> per primary team. D/w Dr. Rosario Randle Septic shock 
- due to above 
- improving. Vasopressors stopped 10/10.   
- resolved -> abx as above MACKENZIE 
- resolved Worsening alkaline phosphatase 
- from sepsis, ?bone infection / met (doubt) 
- improving H/o PCN allergy (rash)  
- tolerated keflex 
- tolerating Ceftriaxone Recent R leg cellulitis following cat bite (not scratch) R hip pain - s/p aspiration 10/9: 23 WBC, no crystals HTN    
AF    
GERD    
DJD s/p L TKR    
Prostate CA    
Hypothyroidism    
h/o pancreatitis    
ETOH consumption    
  
Microbiology: 
  
10/8      blcx Pasteurella multocida 1 of 2 
             urcx NG 
10/9      R hip asp gs no wbc, NOS, cx NG 
 
10/10 blcx NGTD x 2 
  
Lines / Catheters: RIJ CVL Patient Active Problem List  
Diagnosis Code  Malignant neoplasm of prostate ([facility] [address] C61  Hypertension I10  
 GERD (gastroesophageal reflux disease) K21.9  Glaucoma H40.9  Glaucoma H40.9  Dysphagia R13.10  Encounter for colonoscopy due to history of adenomatous colonic polyps Z12.11, Z86.010  
 Non-pressure chronic ulcer of right calf with fat layer exposed ([facility] [address] P72.408  Laceration of right lower leg with complication O46.541Y  Edema R60.9  Pneumonia J18.9  Atrial fibrillation (HCC) I48.91  
 Hypothyroidism E03.9  Sepsis ([facility] [address] A41.9  Cellulitis L03.90  
 HTN (hypertension) A65  
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  
 traMADol (ULTRAM) tablet 25 mg  25 mg Oral Q6H PRN  
 albuterol (PROVENTIL VENTOLIN) nebulizer solution 1.25 mg  1.25 mg Nebulization Q4H PRN  
 melatonin tablet 6 mg  6 mg Oral QHS PRN  
 ketorolac (TORADOL) injection 15 mg  15 mg IntraVENous Q8H PRN  
 haloperidol lactate (HALDOL) injection 1 mg  1 mg IntraVENous Q8H PRN  
 [Held by provider] furosemide (LASIX) tablet 40 mg  40 mg Oral DAILY  metoprolol tartrate (LOPRESSOR) tablet 12.5 mg  12.5 mg Per NG tube BID  thiamine mononitrate (B-1) tablet 100 mg  100 mg Oral DAILY  acetaminophen (TYLENOL) tablet 500 mg  500 mg Oral Q6H PRN  
 acetaminophen (TYLENOL) solution 325 mg  325 mg Per NG tube Q6H  
 insulin lispro (HUMALOG) injection   SubCUTAneous Q6H  
 glucose chewable tablet 16 g  16 g Oral PRN  
 glucagon (GLUCAGEN) injection 1 mg  1 mg IntraMUSCular PRN  
 dextrose 10 % infusion 125-250 mL  125-250 mL IntraVENous PRN  pantoprazole (PROTONIX) injection 40 mg  40 mg IntraVENous DAILY  cefTRIAXone (ROCEPHIN) 2 g in 0.9% sodium chloride (MBP/ADV) 50 mL MBP  2 g IntraVENous D51D  
 folic acid (FOLVITE) tablet 1 mg  1 mg Oral DAILY  apixaban (ELIQUIS) tablet 5 mg  5 mg Oral BID  sodium chloride (NS) flush 5-40 mL  5-40 mL IntraVENous Q8H  
 desonide (TRIDESILON) 0.05 % cream - patient supplied (Patient Supplied)   Topical BID  naloxone (NARCAN) injection 0.4 mg  0.4 mg IntraVENous PRN  
 sodium chloride (NS) flush 5-10 mL  5-10 mL IntraVENous PRN  
  levothyroxine (SYNTHROID) tablet 175 mcg  175 mcg Oral ACB Review of Systems - Negative except as in interval history Objective: 
Visit Vitals /77 (BP 1 Location: Left arm, BP Patient Position: At rest) Pulse 77 Temp 98.3 °F (36.8 °C) Resp 20 Ht 5' 8\" (1.727 m) Wt 88 kg (194 lb) SpO2 94% BMI 29.50 kg/m² Temp (24hrs), Av.9 °F (36.6 °C), Min:97.4 °F (36.3 °C), Max:98.7 °F (37.1 °C) General: Well developed, well nourished 80 y.o.  male more alert, follws simple commands, answers some questions. HEENT: no scleral icterus, mucous membranes moist, pharynx normal without lesions, NGT still in place Neck: resistant to flexion in all directions but more flexible than previous Cardio:  irregularly irregular rhythm Lungs: rhonchi bilateral and + expiratory wheezes Abdomen: soft, non-tender. Bowel sounds normal. No masses, no organomegaly. Extremities:  no redness or tenderness in the calves or thighs, no edema Lab results: 
 
Chemistry Recent Labs 10/19/19 
0505 10/18/19 
0621 10/17/19 
0400 * 155* 95  139 144  
K 3.8 3.7 3.2*  
CL 99* 101 105 CO2 29 30 32 BUN 25* 26* 24* CREA 0.85 1.00 1.04  
CA 7.5* 7.5* 7.8* AGAP 8 8 7 BUCR 29* 26* 23* * 271* 260* TP 5.1* 5.1* 5.3* ALB 1.8* 1.8* 2.0*  
GLOB 3.3 3.3 3.3 AGRAT 0.5* 0.5* 0.6* CBC w/ Diff Recent Labs 10/19/19 
0505 10/18/19 
2729 WBC 13.1 12.6 RBC 2.54* 2.62* HGB 7.8* 8.0*  
HCT 24.7* 25.2*  
 258 GRANS 79* 82* LYMPH 12* 9* EOS 1 1 Microbiology All Micro Results Procedure Component Value Units Date/Time CULTURE, BODY FLUID John Phipps [[id number]] Collected:  10/15/19 1030 Order Status:  Completed Specimen:  Synovial Fluid Updated:  10/19/19 0701 Special Requests: LEFT KNEE     
  GRAM STAIN FEW WBC'S     
   NO ORGANISMS SEEN Culture result: NO GROWTH 4 DAYS CULTURE, BLOOD [[id number]] Collected:  10/10/19 1341 Order Status:  Completed Specimen:  Blood Updated:  10/16/19 0042 Special Requests: --     
  NO SPECIAL REQUESTS 
BLUE PORT Culture result: NO GROWTH 6 DAYS     
 CULTURE, BLOOD [[id number]] Collected:  10/10/19 1237 Order Status:  Completed Specimen:  Blood Updated:  10/16/19 0042 Special Requests: PERIPHERAL Culture result: NO GROWTH 6 DAYS     
 CULTURE, BLOOD [[id number]] Collected:  10/08/19 2234 Order Status:  Completed Specimen:  Blood from Miscellaneous sample Updated:  10/15/19 0754 Special Requests: NO SPECIAL REQUESTS Culture result: NO GROWTH 6 DAYS     
 CULTURE, BODY FLUID [[id number]] Collected:  10/09/19 1200 Order Status:  Completed Specimen:  Synovial Fluid Updated:  10/14/19 8605 Special Requests: HIP  
  GRAM STAIN NO WBC'S SEEN     
   NO ORGANISMS SEEN Culture result: NO GROWTH 5 DAYS     
 CULTURE, BLOOD [[id number]]  (Abnormal)  (Susceptibility) Collected:  10/08/19 1301 Order Status:  Completed Specimen:  Blood Updated:  10/11/19 0825 Special Requests: --     
  NO SPECIAL REQUESTS 
RIGHT 
ARM 
  
  GRAM STAIN    
  AEROBIC BOTTLE GRAM NEGATIVE RODS SMEAR CALLED TO AND CORRECTLY REPEATED BY: DESHAWN COSBY RN,ICU, ON 10/9/19 AT 0335 TO [facility] Culture result:    
  AEROBIC BOTTLE PASTEURELLA MULTOCIDA CULTURE, URINE [[id number]] Collected:  10/08/19 1418 Order Status:  Completed Specimen:  Cath Urine Updated:  10/10/19 1016 Special Requests: NO SPECIAL REQUESTS Culture result: NO GROWTH 2 DAYS Dilip Moctezuma MD, [facility] 
[phone] 
10/19/2019  
9:02 AM

## 2019-10-19 NOTE — PROGRESS NOTES
Problem: Pain Goal: *Control of Pain 10/18/2019 2145 by Tameka Goldstein RN Outcome: Progressing Towards Goal 
10/18/2019 2145 by Tameka Goldstein RN Outcome: Progressing Towards Goal 
  
Problem: Pressure Injury - Risk of 
Goal: *Prevention of pressure injury Description Document Dionicio Scale and appropriate interventions in the flowsheet. 10/18/2019 2145 by Tameka Goldstein RN Outcome: Progressing Towards Goal 
Note:  
Pressure Injury Interventions: 
Sensory Interventions: Assess changes in LOC, Keep linens dry and wrinkle-free, Minimize linen layers, Pressure redistribution bed/mattress (bed type) Moisture Interventions: Absorbent underpads, Internal/External urinary devices Activity Interventions: Pressure redistribution bed/mattress(bed type) Mobility Interventions: Pressure redistribution bed/mattress (bed type), Turn and reposition approx. every two hours(pillow and wedges) Nutrition Interventions: Document food/fluid/supplement intake Friction and Shear Interventions: Apply protective barrier, creams and emollients, Lift sheet, Transferring/repositioning devices 10/18/2019 2145 by Tameka Goldstein RN Outcome: Progressing Towards Goal 
Note:  
Pressure Injury Interventions: 
Sensory Interventions: Assess changes in LOC, Keep linens dry and wrinkle-free, Minimize linen layers, Pressure redistribution bed/mattress (bed type) Moisture Interventions: Absorbent underpads, Internal/External urinary devices Activity Interventions: Pressure redistribution bed/mattress(bed type) Mobility Interventions: Pressure redistribution bed/mattress (bed type), Turn and reposition approx. every two hours(pillow and wedges) Nutrition Interventions: Document food/fluid/supplement intake Friction and Shear Interventions: Apply protective barrier, creams and emollients, Lift sheet, Transferring/repositioning devices

## 2019-10-19 NOTE — PROGRESS NOTES
Pulmonary progress note History 80-year-old male presented to the emergency room on 10/19 with AMS. Blood cultures ultimately grew Pasteurella. The possible source was a somewhat aggressive pet cat. The patient was more alert. His speech was garbled but his answers made sense. He told me that he takes nebulizers at home when he gets short of breath. His daughter states that he appears less agitated after removal of his wrist restraints. Exam 
No respiratory distress at rest. 
Blood pressure 116/73, pulse 78, temperature 97.5 °F (36.4 °C), resp. rate 18, height 5' 8\" (1.727 m), weight 88 kg (194 lb), SpO2 100 %. Gaze is conjugate. No roving eye movements. No accessory muscle use. Breath sounds are slightly diminished. Possible paradoxical abdominal muscle movement Irregular rhythm Sclera are anicteric Multiple ecchymoses No cyanosis or clubbing Minimal edema Labs 10/18 WBC 12.6, hemoglobin 8.0 and platelets 337. INR 1.9 Sodium 139, bicarb 30, anion gap 8, creatinine 1.00, albumin 1.8 Assessment Pasteurella bacteremia Metabolic encephalopathy improved Hypernatremia resolved Hypoalbuminemia Dysphasia requiring tube feeds Plan Haldol for mental status changes. Discontinue Ativan Continue non-narcotic analgesia Minimal Ultram use due to encephalopathy risk Hold Lasix Thiamine for concerns of Wernicke's encephalopathy His daughter believes that he will do better without wrist restraints. We will still need to minimize the risk of removing his NG tube, and mitts appear a reasonable mid ground Duo nebs 3 times daily with as needed albuterol His critical illness related issues are resolving.

## 2019-10-19 NOTE — PROGRESS NOTES
Problem: Falls - Risk of 
Goal: *Absence of Falls Description Document [facility] Fall Risk and appropriate interventions in the flowsheet. Outcome: Progressing Towards Goal 
Note:  
Fall Risk Interventions: 
  
 
Mentation Interventions: Bed/chair exit alarm, Door open when patient unattended, Family/sitter at bedside, Reorient patient, More frequent rounding Medication Interventions: Bed/chair exit alarm Elimination Interventions: Bed/chair exit alarm, Toileting schedule/hourly rounds History of Falls Interventions: Bed/chair exit alarm, Door open when patient unattended Problem: Pressure Injury - Risk of 
Goal: *Prevention of pressure injury Description Document Dionicio Scale and appropriate interventions in the flowsheet. Outcome: Progressing Towards Goal 
Note:  
Pressure Injury Interventions: 
Sensory Interventions: Float heels, Keep linens dry and wrinkle-free, Minimize linen layers, Monitor skin under medical devices, Pad between skin to skin, Pressure redistribution bed/mattress (bed type), Suspension boots, Turn and reposition approx. every two hours (pillows and wedges if needed) Moisture Interventions: Absorbent underpads, Apply protective barrier, creams and emollients, Assess need for specialty bed, Check for incontinence Q2 hours and as needed, Contain wound drainage, Internal/External urinary devices, Minimize layers, Moisture barrier Activity Interventions: Pressure redistribution bed/mattress(bed type), PT/OT evaluation Mobility Interventions: Assess need for specialty bed, Pressure redistribution bed/mattress (bed type), Suspension boots, Turn and reposition approx. every two hours(pillow and wedges) Nutrition Interventions: Discuss nutritional consult with provider Friction and Shear Interventions: Apply protective barrier, creams and emollients, Foam dressings/transparent film/skin sealants, Feet elevated on foot rest, Minimize layers

## 2019-10-19 NOTE — PROGRESS NOTES
Problem: Breathing Pattern - Ineffective Goal: *Absence of hypoxia Outcome: Progressing Towards Goal 
 Respiratory Therapy Assessment Care Plan Patient: 
Clarence Basss 80 y.o. male 10/19/2019 8:19 AM 
 
Severe sepsis ([address] [A41.9, R65.20] Altered mental status [R41.82] Chest X-RAY:  
Results from Hospital Encounter encounter on 10/08/19 XR ABD PORT  1 V Addendum Addendum: Initial preliminary report was provided by the on-call radiology resident. Danish Carreon MD 10/17/2019  8:37 AM        
 Impression IMPRESSION: 
 
Tip of the nasogastric tube is in the proximal body of the stomach, recommend 
advancing. XR ABD PORT  1 V Addendum Addendum: Initial preliminary report was provided by the on-call radiology resident. Danish Carreon MD 10/17/2019  8:16 AM        
 Impression IMPRESSION: 
 
Tip of the nasogastric tube is in the fundus/proximal body of the stomach, 
recommend advancing. XR ABD PORT  1 V Impression IMPRESSION: 
 
Nasogastric tube in satisfactory position. Vital Signs:   Visit Vitals /74 (BP 1 Location: Left arm, BP Patient Position: At rest) Pulse 96 Temp 97.4 °F (36.3 °C) Resp 20 Ht 5' 8\" (1.727 m) Wt 88 kg (194 lb) SpO2 94% BMI 29.50 kg/m² Indications for treatment: increased WOB, patient uses home inhalers. Plan of care: Nebs  Q6H Goal: improve WOB

## 2019-10-19 NOTE — PROGRESS NOTES
Problem: Pain Goal: *Control of Pain Outcome: Progressing Towards Goal 
  
Problem: Falls - Risk of 
Goal: *Absence of Falls Description Document Cleopatranella Muniz Fall Risk and appropriate interventions in the flowsheet. Outcome: Progressing Towards Goal 
Note:  
Fall Risk Interventions: 
  
 
Mentation Interventions: Bed/chair exit alarm, Door open when patient unattended, Family/sitter at bedside, Reorient patient, More frequent rounding Medication Interventions: Bed/chair exit alarm Elimination Interventions: Bed/chair exit alarm, Toileting schedule/hourly rounds History of Falls Interventions: Bed/chair exit alarm, Door open when patient unattended Problem: Patient Education: Go to Patient Education Activity Goal: Patient/Family Education Outcome: Progressing Towards Goal 
  
Problem: Pressure Injury - Risk of 
Goal: *Prevention of pressure injury Description Document Dionicio Scale and appropriate interventions in the flowsheet. Outcome: Progressing Towards Goal 
Note:  
Pressure Injury Interventions: 
Sensory Interventions: Float heels, Keep linens dry and wrinkle-free, Minimize linen layers, Monitor skin under medical devices, Pad between skin to skin, Pressure redistribution bed/mattress (bed type), Suspension boots, Turn and reposition approx. every two hours (pillows and wedges if needed) Moisture Interventions: Absorbent underpads, Apply protective barrier, creams and emollients, Assess need for specialty bed, Check for incontinence Q2 hours and as needed, Contain wound drainage, Internal/External urinary devices, Minimize layers, Moisture barrier Activity Interventions: Pressure redistribution bed/mattress(bed type), PT/OT evaluation Mobility Interventions: Assess need for specialty bed, Pressure redistribution bed/mattress (bed type), Suspension boots, Turn and reposition approx. every two hours(pillow and wedges) Nutrition Interventions: Discuss nutritional consult with provider Friction and Shear Interventions: Apply protective barrier, creams and emollients, Foam dressings/transparent film/skin sealants, Feet elevated on foot rest, Minimize layers Problem: Delirium Treatment Goal: *Level of consciousness restored to baseline Outcome: Progressing Towards Goal 
Goal: *Level of environmental perceptions restored to baseline Outcome: Progressing Towards Goal 
Goal: *Sensory perception restored to baseline Outcome: Progressing Towards Goal 
Goal: *Emotional stability restored to baseline Outcome: Progressing Towards Goal 
Goal: *Functional assessment restored to baseline Outcome: Progressing Towards Goal 
Goal: *Absence of falls Outcome: Progressing Towards Goal 
Goal: *Will remain free of delirium, CAM Score negative Outcome: Progressing Towards Goal 
Goal: *Cognitive status will be restored to baseline Outcome: Progressing Towards Goal 
Goal: Interventions Outcome: Progressing Towards Goal 
  
Problem: Patient Education: Go to Patient Education Activity Goal: Patient/Family Education Outcome: Progressing Towards Goal 
  
Problem: Patient Education: Go to Patient Education Activity Goal: Patient/Family Education Outcome: Progressing Towards Goal 
  
Problem: Non-Violent Restraints Goal: *Removal from restraints as soon as assessed to be safe Outcome: Progressing Towards Goal 
Goal: *No harm/injury to patient while restraints in use Outcome: Progressing Towards Goal 
Goal: *Patient's dignity will be maintained Outcome: Progressing Towards Goal 
Goal: *Patient Specific Goal (EDIT GOAL, INSERT TEXT) Outcome: Progressing Towards Goal 
Goal: Non-violent Restaints:Standard Interventions Outcome: Progressing Towards Goal 
Goal: Non-violent Restraints:Patient Interventions Outcome: Progressing Towards Goal 
Goal: Patient/Family Education Outcome: Progressing Towards Goal

## 2019-10-19 NOTE — PROGRESS NOTES
Internal Medicine Progress Note NAME: Nima Gil :  1933 MRM:  [id number] Date/Time: 10/19/2019 ASSESSMENT/PLAN:   
 
# Noted tachypnea . Deny complain. ABG. CXR. Nebs. Tele . EKG. Trop . # Acute Delirious state , Encephalopathy. Possible sepsis related or metabolic/ Alcohol related with his ho alcohol abuse. Was on opioid and benzo that been consolidated. Reduce sedative to minimal strongly needed. Ammonia level normal , CT head negative. Place ngt for meds ( didn't get po meds for few days ) . 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 H/o cat bite 4 weeks to RLE per daughter, when he came in with cellulitis and was treated. D/w Dr. Brittany He concern for persistent infection/abscess, he ordered nuclear gallium scan . Blood culture repeat on 10/10 negative to date. # Possible L knee infection. Orthopedic. Knee tapping and CS. Fluids CS negative. 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 I called lab and radiology if the can run the knee fluid test for Crystals . I am informed that fluids from his hip on 10/9 and knee on 10/15 both tested for crystals and was negative - Sever L knee pains - control pains. #  MACKENZIE - improved with IV fluid hydration, monitor closely. Monitor labs. Avoid nephrotoxins. Renally dosing medications. Monitor urine out put. #  AFIB - Rate control, resume Elliquis for AC . Dc iv lopressor , tab NGT. Titrate as needed # Elevated LFT - GI consulted, suspects 2/2 sepsis, appreciate recs. ( Elevated liver enzymes most likely secondary to sepsis. Coming down. CT shows no biliary obstruction. S/P cholecystectomy ) # Hypothyroidism - Cont home levothyroxine. # Hypocalcemia. Hypokalemia. Replete and trend. #Hypernatremia. Improved. free water NGT . D5 ivf . Serial labs to monitor level. # ETOH abuse - initially placed on  CIWA, DC. Per family last drink was 1 month ago. Thiamine. # Dysphagia, failed SLP twice. GI consulted for  ? PEGT  
- NGT for meds and feeding  , free water DVT ppx - SCD; resume Eliquis. 
 
-Code status : FULL Lab Review:  
 
Recent Labs 10/19/19 
0505 10/18/19 
4696 WBC 13.1 12.6 HGB 7.8* 8.0*  
HCT 24.7* 25.2*  
 258 Recent Labs 10/19/19 
0505 10/18/19 
0621 10/17/19 
0400 NA  --  139 144 K  --  3.7 3.2*  
CL  --  101 105 CO2  --  30 32 GLU  --  155* 95 BUN  --  26* 24* CREA  --  1.00 1.04  
CA  --  7.5* 7.8*  
MG  --  2.1 1.9 PHOS  --  2.7 2.9 ALB  --  1.8* 2.0*  
TBILI  --  0.6 1.0  
SGOT  --  37 48* ALT  --  23 30 INR 1.8* 1.9*  --   
 
Lab Results Component Value Date/Time Glucose (POC) 132 (H) 10/19/2019 06:50 AM  
 Glucose (POC) 136 (H) 10/19/2019 12:27 AM  
 Glucose (POC) 170 (H) 10/18/2019 06:03 PM  
 Glucose (POC) 150 (H) 10/18/2019 06:05 AM  
 Glucose (POC) 172 (H) 10/18/2019 12:03 AM  
 Glucose, POC 83 01/03/2017 10:19 AM  
 
No results for input(s): PH, PCO2, PO2, HCO3, FIO2 in the last 72 hours. Recent Labs 10/19/19 
0505 10/18/19 
9812 INR 1.8* 1.9* No results found for: SDES Lab Results Component Value Date/Time Culture result: NO GROWTH 4 DAYS 10/15/2019 10:30 AM  
 Culture result: NO GROWTH 6 DAYS 10/10/2019 01:41 PM  
 Culture result: NO GROWTH 6 DAYS 10/10/2019 12:37 PM  
 
 
All Cardiac Markers in the last 24 hours: No results found for: CPK, CK, CKMMB, CKMB, RCK3, CKMBT, CKNDX, CKND1, CHAYA, TROPT, TROIQ, BRENDAN, TROPT, TNIPOC, BNP, BNPP Subjective: Chief Complaint:     
More alert , deny  Complain Noted tachypnea ROS: 
Deny fever, CP/SOB/N/V/D. Objective:  
 
Vitals: 
Last 24hrs VS reviewed since prior progress note. Most recent are: 
 
Visit Vitals /74 (BP 1 Location: Left arm, BP Patient Position: At rest) Pulse 96 Temp 97.4 °F (36.3 °C) Resp 20 Ht 5' 8\" (1.727 m) Wt 88 kg (194 lb) SpO2 94% BMI 29.50 kg/m² SpO2 Readings from Last 6 Encounters:  
10/19/19 94% 10/08/19 95% 10/08/19 91% 10/07/19 98% 10/03/19 100% 10/03/19 90% O2 Flow Rate (L/min): 2 l/min Intake/Output Summary (Last 24 hours) at 10/19/2019 0621 Last data filed at 10/18/2019 1859 Gross per 24 hour Intake 1130 ml Output  Net 1130 ml Physical Exam:  
Ears: hearing is intact  [id number]. Noted tachypnea Eyes: Icterus is not present Lungs: clear to auscultation bilaterally Heart: regular rate and rhythm, S1, S2 normal, no murmur, click, rub or gallop Gastrointestinal: soft, non-tender. Bowel sounds normal. No masses,  no organomegaly Neurological:  New Focal Deficits are not present. More alert and coherent today Skin: diffuse ecchymosis. Face with small petechiae (per family is due to known actinic keratosis). Psychiatric:  Mood is stable Medications Reviewed: (see below) Lab Data Reviewed: (see below) 
 
______________________________________________________________________ Medications:  
 
Current Facility-Administered Medications Medication Dose Route Frequency  traMADol (ULTRAM) tablet 25 mg  25 mg Oral Q6H PRN  
 albuterol-ipratropium (DUO-NEB) 2.5 MG-0.5 MG/3 ML  3 mL Nebulization TID RT  
 albuterol (PROVENTIL VENTOLIN) nebulizer solution 1.25 mg  1.25 mg Nebulization Q4H PRN  
 melatonin tablet 6 mg  6 mg Oral QHS PRN  
 ketorolac (TORADOL) injection 15 mg  15 mg IntraVENous Q8H PRN  
 haloperidol lactate (HALDOL) injection 1 mg  1 mg IntraVENous Q8H PRN  
 [Held by provider] furosemide (LASIX) tablet 40 mg  40 mg Oral DAILY  metoprolol tartrate (LOPRESSOR) tablet 12.5 mg  12.5 mg Per NG tube BID  thiamine mononitrate (B-1) tablet 100 mg  100 mg Oral DAILY  acetaminophen (TYLENOL) tablet 500 mg  500 mg Oral Q6H PRN  
 acetaminophen (TYLENOL) solution 325 mg  325 mg Per NG tube Q6H  
 insulin lispro (HUMALOG) injection   SubCUTAneous Q6H  
 glucose chewable tablet 16 g  16 g Oral PRN  
 glucagon (GLUCAGEN) injection 1 mg  1 mg IntraMUSCular PRN  
 dextrose 10 % infusion 125-250 mL  125-250 mL IntraVENous PRN  pantoprazole (PROTONIX) injection 40 mg  40 mg IntraVENous DAILY  cefTRIAXone (ROCEPHIN) 2 g in 0.9% sodium chloride (MBP/ADV) 50 mL MBP  2 g IntraVENous D19B  
 folic acid (FOLVITE) tablet 1 mg  1 mg Oral DAILY  apixaban (ELIQUIS) tablet 5 mg  5 mg Oral BID  sodium chloride (NS) flush 5-40 mL  5-40 mL IntraVENous Q8H  
 desonide (TRIDESILON) 0.05 % cream - patient supplied (Patient Supplied)   Topical BID  naloxone (NARCAN) injection 0.4 mg  0.4 mg IntraVENous PRN  
 sodium chloride (NS) flush 5-10 mL  5-10 mL IntraVENous PRN  
 levothyroxine (SYNTHROID) tablet 175 mcg  175 mcg Oral ACB Total time spent with patient: 35  minutes Care Plan discussed with: Care Manager, Nursing Staff, Consultant/Specialist and >50% of time spent in counseling and coordination of care Discussed:  Care Plan Attending Physician: Alexa Vargas MD

## 2019-10-20 NOTE — PROGRESS NOTES
NUTRITION FOLLOW UP/PLAN OF CARE   
 
RECOMMENDATIONS:  
1. Continue Osmolite 1.2 @ 70 mL/hr x 22 hrs to provide 1540 mL, 1848 kcal, 85 g pro, 1262 mL free water with 150 mL flush Q 6 
2. Monitor labs, weight and TF tolerance 3. daily weights GOALS:  
1. Met/ongoing: TF tolerance/TF meets >75% of protein/calorie needs by 10/25/19 
2. Ongoing: Without significant weight fluctuations by 10/27/19 ASSESSMENT:  
Wt status is classified as overweight per BMI of 27.4. Pt is at nutrition risk related to Stage 2 PU to R buttock per wound LDA. Diagnosis: encephalopathy, sepsis, L knee infection, MACKENZIE (improved), afib, elevated LFT, hypothyroid, hypocalcemia, hypernatremia, ETOH abuse. With TF via NGT. Labs reviewed Nutrition recommendations listed. RD to follow. Nutrition Diagnoses:  
Remains appropriate: Increased nutrient needs due to wound healing as evidenced by compromised skin integrity Nutrition Risk:  [] High  [x] Moderate []  Low SUBJECTIVE/OBJECTIVE:  
(10/20/19) Pt resting in bed, awake with family at bedside during time of assessment. Pt with TF infusing via NGT at time of assessment at goal rate. SLP louisa completed 10/18/19 with recs of NPO, ice chips. Gi wants another speech eval completed this week - talks of possible PEG placement. Tolerating current TF per reports. Per I/Os BM 10/17/19. CBW: 194 lb 10/18/19. Fluctuations noted - will monitor closely for need to decrease TF rate. Will continue to monitor TF/tolerance, weight, labs, POC.  
 
(10/17/19) Pt resting in bed with NGT in placed and TF running at goal rate Osmolite 1.2 @ 70 mL/hr x 22 hrs to provide 1540 mL, 1848 kcal, 85 g pro, 1262 mL free water with 150 mL flush Q 6hrs. 0 residuals reported. Daughter at bedside states pt had a good day yesterday until he pulled the NGT out and was given dilaudid - so a little more drowsy today. Tube dislodged and feedings resumed @ 224am. Loose BM yesterday per reports. Noted low K, elevated BUN. Took beds sera weight while in room 180.4 lb - placed in flowsheets. Noted pt on lasix possibly contributing to weight fluctuations. Skin: stage 2 right buttock, ecchymosis, abrasion noted. Will continue to monitor TF tolerance, weight, labs. (10/15/19) Pt resting in bed during time of assessment with daughter at bedside. Per speech on 10/13/19 rec'd NPO and ice chips PRN for oral care/comfort, pt has been NPO since. RN placed NGT this afternoon. Reassessed needs on todays bed scale weight 176.8 lb. Noted weight have been all over the place since admission - admit 10/8/19: 197.8 lb then 10/13/19: 165 lb. -190 per family reports. Recommend: Osmolite 1.2 @ 70 mL/hr x 22 hrs to provide 1540 mL, 1848 kcal, 85 g pro, 1262 mL free water . Spoke with RN/attending MD. Keegan Cortés with daughter about recommendations and answered her questions regarding his nutrition. Information Obtained from:  
 [x] Chart Review 
 [] Patient [x] Family/Caregiver 
 [] Nurse/Physician 
 [] Interdisciplinary Meeting/Rounds Diet: NPO Medications: [x] Reviewed Noted: eliquis, rocephin, desonide, folvite, lasix 40 mg, humalog every 6 hrs, synthroid, lopressor, protonix, NS flush, B1 PRN: colace Allergies: [x] Reviewed Encounter Diagnoses ICD-10-CM ICD-9-CM 1. Severe sepsis ([facility] A41.9 038.9  
 R65.20 995.92 Past Medical History:  
Diagnosis Date  Arthritis  Atrial fibrillation ([facility] 07/2017 Dr Beverly Doll cardio follows. chronic  Carcinoma of prostate ([facility]  Cellulitis  Coarse tremor   
  hands, states \"my doctor is aware\"  Difficulty swallowing  Dysphagia  Edema  Encounter for colonoscopy due to history of adenomatous colonic polyps  Essential hypertension  Fall 10/07/2017 \"went to [facility] Emergency Room, CT Scan showed concussion, no bleeding\" per patient  Fatigue  GERD (gastroesophageal reflux disease) wo. esophagitis  Glaucoma  H/O joint replacement   
  left knee 2003  HLD (hyperlipidemia)  Hypertension  Hypertensive disorder  Hypothyroidism  Impotence  Laceration of right lower leg with complication  Malignant neoplasm of prostate ([address]   
  [id number] Adenocarcinoma of the Prostate, dx 11/3/2008, karlee 3+4, presenting PSA 5.5ng/mL. 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   
 acute/hist. of   
  
Labs:   
Lab Results Component Value Date/Time Sodium 136 10/19/2019 05:05 AM  
 Potassium 3.8 10/19/2019 05:05 AM  
 Chloride 99 (L) 10/19/2019 05:05 AM  
 CO2 29 10/19/2019 05:05 AM  
 Anion gap 8 10/19/2019 05:05 AM  
 Glucose 135 (H) 10/19/2019 05:05 AM  
 BUN 25 (H) 10/19/2019 05:05 AM  
 Creatinine 0.85 10/19/2019 05:05 AM  
 Calcium 7.5 (L) 10/19/2019 05:05 AM  
 Magnesium 2.3 10/19/2019 05:05 AM  
 Phosphorus 3.5 10/19/2019 05:05 AM  
 Albumin 1.8 (L) 10/19/2019 05:05 AM  
 
Lab Results Component Value Date/Time GLUCPOC 110 10/20/2019 07:26 AM  
 GLUCPOC 128 (H) 10/20/2019 12:48 AM  
 GLUCPOC 134 (H) 10/19/2019 06:01 PM  
POC: 10/20/19 110, 128, 10/19/19: 134 Anthropometrics: 
Last 3 Recorded Weights in this Encounter 10/17/19 0032 10/17/19 1423 10/18/19 6008 Weight: 85.1 kg (187 lb 9.6 oz) 81.6 kg (180 lb) 88 kg (194 lb) Ht Readings from Last 1 Encounters:  
10/09/19 5' 8\" (1.727 m) Documented Weight History: 
Weight Metrics 10/18/2019 10/8/2019 10/3/2019 9/27/2019 9/26/2019 9/23/2019 9/16/2019 Weight 194 lb - 180 lb 180 lb - 190 lb 190 lb BMI - 29.5 kg/m2 27.37 kg/m2 - 27.37 kg/m2 28.89 kg/m2 28.89 kg/m2 Bedscale weight 10/17/19: 180.4 lb - placed in flow sheets 10/15/19: 176.8 lb IBW: 154 lb  UBW: 185-190 lb 
[] Weight Loss [] Weight Gain [x] Weight fluctuations - weights all over the place Estimated Nutrition Needs: [x] MSJ  [] Other: 
Calories: 5169-2900 kcal (x1.2 AF - x 1.1 SF) Based on:   [x] Actual BW   
Protein:   80-97 g (1-1.2 g/kg) Based on:   [x] Actual BW Fluid:     1 mL/kcal  
 
 [x] No Cultural, Christian or ethnic dietary need identified. [] Cultural, Christian and ethnic food preferences identified and addressed Wt Status:  [] Normal (18.6 - 24.9) [] Underweight (<18.5) [x] Overweight (25 - 29.9) [] Mild Obesity (30 - 34.9)  [] Moderate Obesity (35 - 39.9) [] Morbid Obesity (40+) Nutrition Problems Identified:  
[] Suboptimal PO intake  
[] Food Allergies [x] Difficulty chewing/swallowing/poor dentition 
[] Constipation/Diarrhea  
[] Nausea/Vomiting  
[] None 
[] Other:  
 
Plan:  
[x] Enteral feeding 
[]  Obtained/adjusted food preferences/tolerances and/or snacks options  
[]  Supplements added  
[x] SLP following for feeding techniques []  HS snack added  
[]  Modify diet texture  
[]  Modify diet for food allergies []  Educate patient  
[]  Assist with menu selection []  Monitor PO intake on meal rounds  
[x]  Continue inpatient monitoring and intervention  
[x]  Participated in discharge planning/Interdisciplinary rounds/Team meetings  
[]  Other:  
 
Education Needs: 
 [] Not appropriate for teaching at this time due to:   
 [x] Identified and addressed spoke with family about TF recommendations and answered questions Nutrition Monitoring and Evaluation: 
[x] Continue ongoing monitoring and intervention 
[] Other Joshua Gregorio

## 2019-10-20 NOTE — PROGRESS NOTES
Gastrointestinal Progress Note Patient Name: Sreedhar Sanz Today's Date: 10/20/2019 Admit Date: 10/8/2019 Impression:  
-Oropharyngeal dysphagia in the setting of severe illness and encephalopathy; currently receiving tube feeding 
-Encephalopathy, likely multifactorial (medications, severe illness/sepsis, ?related to history of ETOH); slowly improving  
-Sepsis secondary to cellulitis and suspected left femur osteomyelitis; pasteurella bacteremia; on ABX per ID  
-Elevated liver enzymes; ALP persistently high/stable, possibly due to underlying osteomyelitis; isoenzymes pending 
-Atrial fibrillation on anticoagulation with eliquis 
-History of esophageal dysphagia/stricture; normal esophagus with empiric dilations in 2016 and 2017 
  
-I spoke with the nursing staff and the family and Mr Rene Gonzalez. He has had slow but steady improvement in his mentation. He is not yet ready for discharge regardless of his nutrition/feeding question and would be reasonable to allow another few days to see if he will continue to improve to the degree that we could avoid PEG placement. Recommend continuation of tube feeding. Continue eliquis for now. Would plan for repeat speech evaluation on Tuesday or Wednesday and tentatively hold Wednesday's eliquis so that if he fails the next speech study, we could plan for PEG placement next Friday.  
   
Recommendations:  
-Continue tube feeding 
-Await ongoing improvement in encephalopathy 
-Repeat Speech study Tuesday or Wednesday 
-Tentatively hold eliquis on Wednesday so that PEG placement could proceed on Friday if needed 
-Await alkaline phosphatase isoenzymes 
-Please call on Tuesday/Wed with results of speech study so we can plan for PEG placement next week if indicated; otherwise will not actively follow Subjective:  
 
Sreedhar Sanz is a 80 y.o. Male seen for oropharyngeal dysphagia. No new complaints. Tolerating nasogastric tube feeding without difficulty.  No abdominal pain. Spoke with patient and his son at the bedside. Current Facility-Administered Medications Medication Dose Route Frequency  docusate sodium (COLACE) capsule 100 mg  100 mg Oral BID PRN  
 albuterol-ipratropium (DUO-NEB) 2.5 MG-0.5 MG/3 ML  3 mL Nebulization QID RT  
 traMADol (ULTRAM) tablet 25 mg  25 mg Oral Q6H PRN  
 albuterol (PROVENTIL VENTOLIN) nebulizer solution 1.25 mg  1.25 mg Nebulization Q4H PRN  
 melatonin tablet 6 mg  6 mg Oral QHS PRN  
 haloperidol lactate (HALDOL) injection 1 mg  1 mg IntraVENous Q8H PRN  
 [Held by provider] furosemide (LASIX) tablet 40 mg  40 mg Oral DAILY  metoprolol tartrate (LOPRESSOR) tablet 12.5 mg  12.5 mg Per NG tube BID  thiamine mononitrate (B-1) tablet 100 mg  100 mg Oral DAILY  acetaminophen (TYLENOL) tablet 500 mg  500 mg Oral Q6H PRN  
 acetaminophen (TYLENOL) solution 325 mg  325 mg Per NG tube Q6H  
 insulin lispro (HUMALOG) injection   SubCUTAneous Q6H  
 glucose chewable tablet 16 g  16 g Oral PRN  
 glucagon (GLUCAGEN) injection 1 mg  1 mg IntraMUSCular PRN  
 dextrose 10 % infusion 125-250 mL  125-250 mL IntraVENous PRN  pantoprazole (PROTONIX) injection 40 mg  40 mg IntraVENous DAILY  cefTRIAXone (ROCEPHIN) 2 g in 0.9% sodium chloride (MBP/ADV) 50 mL MBP  2 g IntraVENous R17U  
 folic acid (FOLVITE) tablet 1 mg  1 mg Oral DAILY  apixaban (ELIQUIS) tablet 5 mg  5 mg Oral BID  sodium chloride (NS) flush 5-40 mL  5-40 mL IntraVENous Q8H  
 desonide (TRIDESILON) 0.05 % cream - patient supplied (Patient Supplied)   Topical BID  naloxone (NARCAN) injection 0.4 mg  0.4 mg IntraVENous PRN  
 sodium chloride (NS) flush 5-10 mL  5-10 mL IntraVENous PRN  
 levothyroxine (SYNTHROID) tablet 175 mcg  175 mcg Oral ACB Objective:  
 
Physical Exam: 
 
Visit Vitals /70 Pulse 97 Temp 97.5 °F (36.4 °C) Resp (!) 38 Ht 5' 8\" (1.727 m) Wt 88 kg (194 lb) SpO2 100% BMI 29.50 kg/m² Gen: Elderly, chronically appearing male in no distress HEENT: NGT tube in place with tube feeds running CV: Regular, no murmur Pulm: Mild tachypnea, scattered end-expiratory wheeze Abd: Soft, nontender, active bowel sounds Data Review: 
 
Labs: Results:  
   
Chemistry Recent Labs 10/19/19 
0505 10/18/19 
6887 * 155*  139  
K 3.8 3.7 CL 99* 101 CO2 29 30 BUN 25* 26* CREA 0.85 1.00  
CA 7.5* 7.5* AGAP 8 8 BUCR 29* 26* * 271* TP 5.1* 5.1* ALB 1.8* 1.8*  
GLOB 3.3 3.3 AGRAT 0.5* 0.5* CBC w/Diff Recent Labs 10/19/19 
0505 10/18/19 
8777 WBC 13.1 12.6 RBC 2.54* 2.62* HGB 7.8* 8.0*  
HCT 24.7* 25.2*  
 258 GRANS 79* 82* LYMPH 12* 9* EOS 1 1 Coagulation Recent Labs 10/19/19 
0505 10/18/19 
0125 PTP 20.7* 21.3* INR 1.8* 1.9* Liver Enzymes Recent Labs 10/19/19 
0505 TP 5.1* ALB 1.8* * SGOT 44* ALT 23  
  
 
  
 
 
Mike Katz MD 
October 20, 2019

## 2019-10-20 NOTE — PROGRESS NOTES
0700 verbal bedside report given by off going RN. Pt awake and alert in bed, answers questions appropriately. Family at bedside. No needs voiced at this time. Will continue to monitor. 1000 Per Dr Casey Oshea continue tube feeding via NG tube @70ml/H until nutrition re-assesses Monday. 1330 Pt given sup, attempted to have bowel movement, was not successful. Wound care completed; pt tolerated well. Family at bedside. Assessment unchanged. 1800 Pt repositioned. Alert and answering questions. Family at bedside. No needs voiced.

## 2019-10-20 NOTE — PROGRESS NOTES
Internal Medicine Progress Note NAME: Raghu Cost :  1933 MRM:  [id number] Date/Time: 10/20/2019 ASSESSMENT/PLAN:  PEGT on hold till further mental improvement, continue Eliquis for now. GI/ID input appreciated . Mental improvement steadily but slowly. # Acute Delirious state , Encephalopathy. Possible sepsis related or metabolic/ Alcohol related with his ho alcohol abuse. Was on opioid and benzo that been consolidated. Reduce sedative to minimal strongly needed. Ammonia level normal , CT head negative. Place ngt for meds ( didn't get po meds for few days ) . -off wrist restrain, significant  improvement in his mental state , 
-Noted tachypnea yesterday. Now breathing better. Deny complain. ABG. CXR. Nebs. Tele . EKG. Trop . 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 H/o cat bite 4 weeks to RLE per daughter, when he came in with cellulitis and was treated. D/w Dr. Janet Yu concern for persistent infection/abscess, he ordered nuclear gallium scan . Blood culture repeat on 10/10 negative to date.  
- lactobacillus added # Possible L knee infection. CUTLTURE of aspirate negative. Orthopedic. Knee tapping and CS. Fluids CS negative. 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 I called lab and radiology if the can run the knee fluid test for Crystals . I am informed that fluids from his hip on 10/9 and knee on 10/15 both tested for crystals and was negative - Sever L knee pains - control pains. #  MACKENZIE - improved with IV fluid hydration, monitor closely. Monitor labs. Avoid nephrotoxins. Renally dosing medications. Monitor urine out put. #  AFIB - Rate control, resume Elliquis for AC . Dc iv lopressor , tab NGT. Titrate as needed # Elevated LFT - GI consulted, suspects 2/2 sepsis, appreciate recs.   ( Elevated liver enzymes most likely secondary to sepsis. Coming down. CT shows no biliary obstruction. S/P cholecystectomy ) # Hypothyroidism - Cont home levothyroxine. # Hypocalcemia. Hypokalemia. Replete and trend. # Hypernatremia. Improved. free water NGT . D5 ivf . Serial labs to monitor level. # ETOH abuse - initially placed on  CIWA, DC. Per family last drink was 1 month ago. Thiamine. # Dysphagia, failed SLP twice. GI consulted for  ? PEGT  
- NGT for meds and feeding  , free water DVT ppx - SCD; resume Eliquis. 
 
-Code status : FULL Lab Review:  
 
Recent Labs 10/19/19 
0505 10/18/19 
5524 WBC 13.1 12.6 HGB 7.8* 8.0*  
HCT 24.7* 25.2*  
 258 Recent Labs 10/19/19 
0505 10/18/19 
0061  139  
K 3.8 3.7 CL 99* 101 CO2 29 30 * 155* BUN 25* 26* CREA 0.85 1.00  
CA 7.5* 7.5* MG 2.3 2.1 PHOS 3.5 2.7 ALB 1.8* 1.8* TBILI 0.5 0.6 SGOT 44* 37 ALT 23 23 INR 1.8* 1.9* Lab Results Component Value Date/Time Glucose (POC) 110 10/20/2019 07:26 AM  
 Glucose (POC) 128 (H) 10/20/2019 12:48 AM  
 Glucose (POC) 134 (H) 10/19/2019 06:01 PM  
 Glucose (POC) 175 (H) 10/19/2019 12:09 PM  
 Glucose (POC) 132 (H) 10/19/2019 06:50 AM  
 Glucose, POC 83 01/03/2017 10:19 AM  
 
No results for input(s): PH, PCO2, PO2, HCO3, FIO2 in the last 72 hours. Recent Labs 10/19/19 
0505 10/18/19 
1969 INR 1.8* 1.9* No results found for: SDES Lab Results Component Value Date/Time Culture result: NO GROWTH 5 DAYS 10/15/2019 10:30 AM  
 Culture result: NO GROWTH 6 DAYS 10/10/2019 01:41 PM  
 Culture result: NO GROWTH 6 DAYS 10/10/2019 12:37 PM  
 
 
All Cardiac Markers in the last 24 hours:  
Lab Results Component Value Date/Time TROIQ 0.03 10/19/2019 10:28 PM  
 TROIQ 0.03 10/19/2019 02:17 PM  
 
 
 
 
  
Subjective: Chief Complaint:     
  deny  Complain Family in the room. ROS: 
Deny fever, CP/SOB/N/V/D. Objective:  
 
Vitals: 
Last 24hrs VS reviewed since prior progress note. Most recent are: 
 
Visit Vitals /70 Pulse 97 Temp 97.5 °F (36.4 °C) Resp (!) 38 Ht 5' 8\" (1.727 m) Wt 88 kg (194 lb) SpO2 100% BMI 29.50 kg/m² SpO2 Readings from Last 6 Encounters:  
10/20/19 100% 10/08/19 95% 10/08/19 91% 10/07/19 98% 10/03/19 100% 10/03/19 90% O2 Flow Rate (L/min): 2 l/min Intake/Output Summary (Last 24 hours) at 10/20/2019 1025 Last data filed at 10/20/2019 0700 Gross per 24 hour Intake 2390 ml Output 1370 ml Net 1020 ml Physical Exam:  
Ears: hearing is intact  [id number]. Noted tachypnea Eyes: Icterus is not present Lungs: clear to auscultation bilaterally Heart: regular rate and rhythm, S1, S2 normal, no murmur, click, rub or gallop Gastrointestinal: soft, non-tender. Bowel sounds normal. No masses,  no organomegaly Neurological:  New Focal Deficits are not present. Slightly  Lethargic  and coherent today Skin: diffuse ecchymosis. Face with small petechiae (per family is due to known actinic keratosis). Psychiatric:  Mood is stable Medications Reviewed: (see below) Lab Data Reviewed: (see below) 
 
______________________________________________________________________ Medications:  
 
Current Facility-Administered Medications Medication Dose Route Frequency  docusate sodium (COLACE) capsule 100 mg  100 mg Oral BID PRN  
 bisacodyl (DULCOLAX) suppository 10 mg  10 mg Rectal DAILY  albuterol-ipratropium (DUO-NEB) 2.5 MG-0.5 MG/3 ML  3 mL Nebulization QID RT  
 traMADol (ULTRAM) tablet 25 mg  25 mg Oral Q6H PRN  
 albuterol (PROVENTIL VENTOLIN) nebulizer solution 1.25 mg  1.25 mg Nebulization Q4H PRN  
 melatonin tablet 6 mg  6 mg Oral QHS PRN  
 haloperidol lactate (HALDOL) injection 1 mg  1 mg IntraVENous Q8H PRN  
 [Held by provider] furosemide (LASIX) tablet 40 mg  40 mg Oral DAILY  metoprolol tartrate (LOPRESSOR) tablet 12.5 mg  12.5 mg Per NG tube BID  thiamine mononitrate (B-1) tablet 100 mg  100 mg Oral DAILY  acetaminophen (TYLENOL) tablet 500 mg  500 mg Oral Q6H PRN  
 acetaminophen (TYLENOL) solution 325 mg  325 mg Per NG tube Q6H  
 insulin lispro (HUMALOG) injection   SubCUTAneous Q6H  
 glucose chewable tablet 16 g  16 g Oral PRN  
 glucagon (GLUCAGEN) injection 1 mg  1 mg IntraMUSCular PRN  
 dextrose 10 % infusion 125-250 mL  125-250 mL IntraVENous PRN  pantoprazole (PROTONIX) injection 40 mg  40 mg IntraVENous DAILY  cefTRIAXone (ROCEPHIN) 2 g in 0.9% sodium chloride (MBP/ADV) 50 mL MBP  2 g IntraVENous A41Y  
 folic acid (FOLVITE) tablet 1 mg  1 mg Oral DAILY  apixaban (ELIQUIS) tablet 5 mg  5 mg Oral BID  sodium chloride (NS) flush 5-40 mL  5-40 mL IntraVENous Q8H  
 desonide (TRIDESILON) 0.05 % cream - patient supplied (Patient Supplied)   Topical BID  naloxone (NARCAN) injection 0.4 mg  0.4 mg IntraVENous PRN  
 sodium chloride (NS) flush 5-10 mL  5-10 mL IntraVENous PRN  
 levothyroxine (SYNTHROID) tablet 175 mcg  175 mcg Oral ACB Total time spent with patient: 35  minutes Care Plan discussed with: Care Manager, Nursing Staff, Consultant/Specialist and >50% of time spent in counseling and coordination of care Discussed:  Care Plan Attending Physician: Mirtha Davey MD

## 2019-10-20 NOTE — PROGRESS NOTES
Problem: Breathing Pattern - Ineffective Goal: *Absence of hypoxia Outcome: Progressing Towards Goal 
 Respiratory Therapy Assessment Care Plan Patient: 
Raymundo Casillas 80 y.o. male 10/20/2019 8:31 AM 
 
Severe sepsis ([address] [A41.9, R65.20] Altered mental status [R41.82] Chest X-RAY:  
Results from Hospital Encounter encounter on 10/08/19 XR CHEST PORT Impression IMPRESSION: 
 
 
1. Right IJ approach central venous catheter in stable position. 2. Nasogastric tube below the diaphragm, tip collimated from view. 3. Low lung volumes and bibasilar atelectasis with likely small right pleural 
effusion not significantly changed from prior. XR ABD PORT  1 V Addendum Addendum: Initial preliminary report was provided by the on-call radiology resident. Garrett Mccormick MD 10/17/2019  8:37 AM        
 Impression IMPRESSION: 
 
Tip of the nasogastric tube is in the proximal body of the stomach, recommend 
advancing. XR ABD PORT  1 V Addendum Addendum: Initial preliminary report was provided by the on-call radiology resident. Garrett Mccormick MD 10/17/2019  8:16 AM        
 Impression IMPRESSION: 
 
Tip of the nasogastric tube is in the fundus/proximal body of the stomach, 
recommend advancing. Vital Signs:   Visit Vitals /80 Pulse 88 Temp 98.2 °F (36.8 °C) Resp (!) 33 Ht 5' 8\" (1.727 m) Wt 88 kg (194 lb) SpO2 100% BMI 29.50 kg/m² Indications for treatment: increased WOB, patient uses home inhalers. Plan of care: Nebs  Q6H Goal: improve WOB

## 2019-10-20 NOTE — PROGRESS NOTES
Problem: Pain Goal: *Control of Pain Outcome: Progressing Towards Goal 
  
Problem: Falls - Risk of 
Goal: *Absence of Falls Description Document Master Cherry Fall Risk and appropriate interventions in the flowsheet. Outcome: Progressing Towards Goal 
Note:  
Fall Risk Interventions: 
  
 
Mentation Interventions: Bed/chair exit alarm, Door open when patient unattended, Family/sitter at bedside, More frequent rounding, Reorient patient Medication Interventions: Bed/chair exit alarm Elimination Interventions: Call light in reach, Toileting schedule/hourly rounds History of Falls Interventions: Bed/chair exit alarm, Consult care management for discharge planning, Door open when patient unattended, Vital signs minimum Q4HRs X 24 hrs (comment for end date) Problem: Patient Education: Go to Patient Education Activity Goal: Patient/Family Education Outcome: Progressing Towards Goal 
  
Problem: Pressure Injury - Risk of 
Goal: *Prevention of pressure injury Description Document Dionicio Scale and appropriate interventions in the flowsheet. Outcome: Progressing Towards Goal 
Note:  
Pressure Injury Interventions: 
Sensory Interventions: Discuss PT/OT consult with provider, Float heels, Keep linens dry and wrinkle-free, Minimize linen layers, Monitor skin under medical devices, Pad between skin to skin, Pressure redistribution bed/mattress (bed type), Suspension boots Moisture Interventions: Apply protective barrier, creams and emollients, Assess need for specialty bed, Check for incontinence Q2 hours and as needed, Contain wound drainage, Internal/External urinary devices, Minimize layers, Moisture barrier Activity Interventions: Pressure redistribution bed/mattress(bed type), PT/OT evaluation Mobility Interventions: Pressure redistribution bed/mattress (bed type), PT/OT evaluation Nutrition Interventions: Document food/fluid/supplement intake, Discuss nutritional consult with provider, Offer support with meals,snacks and hydration Friction and Shear Interventions: Apply protective barrier, creams and emollients, Feet elevated on foot rest, Foam dressings/transparent film/skin sealants, Minimize layers Problem: Discharge Planning Goal: *Discharge to safe environment Outcome: Progressing Towards Goal 
  
Problem: Nutrition Deficit Goal: *Adequate nutrition Outcome: Progressing Towards Goal 
Goal: *Optimize nutritional status Outcome: Progressing Towards Goal 
  
Problem: Patient Education: Go to Patient Education Activity Goal: Patient/Family Education Outcome: Progressing Towards Goal 
  
Problem: Breathing Pattern - Ineffective Goal: *Absence of hypoxia Outcome: Progressing Towards Goal 
Goal: *Use of effective breathing techniques Outcome: Progressing Towards Goal

## 2019-10-20 NOTE — PROGRESS NOTES
Problem: Pain Goal: *Control of Pain Outcome: Progressing Towards Goal 
Goal: *PALLIATIVE CARE:  Alleviation of Pain Outcome: Progressing Towards Goal 
  
Problem: Patient Education: Go to Patient Education Activity Goal: Patient/Family Education Outcome: Progressing Towards Goal 
  
Problem: Pressure Injury - Risk of 
Goal: *Prevention of pressure injury Description Document Dionicio Scale and appropriate interventions in the flowsheet. Outcome: Progressing Towards Goal 
Note:  
Pressure Injury Interventions: 
Sensory Interventions: Assess changes in LOC, Check visual cues for pain, Keep linens dry and wrinkle-free Moisture Interventions: Absorbent underpads, Apply protective barrier, creams and emollients, Check for incontinence Q2 hours and as needed Activity Interventions: Pressure redistribution bed/mattress(bed type) Mobility Interventions: HOB 30 degrees or less, Pressure redistribution bed/mattress (bed type) Nutrition Interventions: Discuss nutritional consult with provider Friction and Shear Interventions: Foam dressings/transparent film/skin sealants, HOB 30 degrees or less Problem: Discharge Planning Goal: *Discharge to safe environment Outcome: Progressing Towards Goal

## 2019-10-20 NOTE — PROGRESS NOTES
[facility] 
                                             (A Division of [address] [place] [address]) Follow-up Note Date of Admission: 10/8/2019     Date of Note:  10/20/2019 Summary:   
 
81 y/o  male HTN, AF, GERD, DJD s/p L TKR, Prostate CA, Hypothyroidism, h/o pancreatitis, ETOH consumption adm 10/8/2019 w/ chills, weakness, altered mental status. Has neck pain x 1 mo. Had R leg cellulitis after cat bite (not scratch) 1 mo PTA - resolved w/ abx. Then micturition syncope x 3, hurt knee, progressing generalized weakness, then 1 day PTA fever, chills. Adm w/ 100.5, WBC 29.5, 13% bands. No uti, pneumonia, unrevealing CTAP. 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 Rpt blcx 10/10 NGTD x 2.  Gallium scan 10/14: intense periprosthetic uptake L knee, distal femur. L knee synovial fluid 10/15- not s/o septic arthritis. Presume distal L femur OM. Interval History: 
 
Alert. Responds appropriately to questions. Denies pain except when moved. NGT still in. Current Antimicrobials: Prior Antimicrobials Ceftriaxone 10/11 - 9  abx 10/8 - 12 Vancomycin 10/8 - 2  Cefepime, Levoflox 10/8 - 0 Meropenem 10/9 - 2  
  
  
Assessment: Plan:  
Left knee uptake on Ga scan - s/p US guided arthrocentesis 10/15 - negative for infection 
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  
-> PICC prior to discharge. (still has R IJ CVL) Pasteurella BSI 
- 1 of 2 blood cultures 10/8 + P multocida sens TMP-SMX, Ceftriaxone, Levofloxacin, PCN, TCN 
- No active cellulitis. R leg cellulitis following cat bite in Early September resolved with TMP-SMX then Keflex - current source unclear: ?epidural abscess or discitis/OM?, R leg OM? 
- Recent TTE 9/27: neg vegetations - rpt blcx 10/10 NG x 2 
- Gallium scan 10/14: intense periprosthetic uptake L knee, distal femur. - L knee aspiration 10/15: 7,200 WBC, no crystals - not suggestive of septic arthritis -> continue Ceftriaxone as above Encephalopathy/ ETOH Withdrawal 
- worse since admission - ? Medication-related: has been receiving dilaudid q 4 hours and Ativan this am 
- Head CT 10/14: no acute intracranial abnormality 
- not related to infection which appears under control at this time 
- improving.  -> per primary team. D/w Dr. Mimi Villa Septic shock 
- due to above 
- improving. Vasopressors stopped 10/10.   
- resolved -> abx as above MACKENZIE 
- resolved Worsening alkaline phosphatase 
- from sepsis, ?bone infection / met (doubt) 
- improving H/o PCN allergy (rash)  
- tolerated keflex 
- tolerating Ceftriaxone Recent R leg cellulitis following cat bite (not scratch) R hip pain - s/p aspiration 10/9: 23 WBC, no crystals HTN    
AF    
GERD    
DJD s/p L TKR    
Prostate CA    
Hypothyroidism    
h/o pancreatitis    
ETOH consumption    
  
Microbiology: 
  
10/8      blcx Pasteurella multocida 1 of 2 
             urcx NG 
10/9      R hip asp gs no wbc, NOS, cx NG 
 
10/10 blcx NGTD x 2 
  
Lines / Catheters: RIJ CVL Patient Active Problem List  
Diagnosis Code  Malignant neoplasm of prostate ([facility] C61  Hypertension I10  
 GERD (gastroesophageal reflux disease) K21.9  Glaucoma H40.9  Glaucoma H40.9  Dysphagia R13.10  Encounter for colonoscopy due to history of adenomatous colonic polyps Z12.11, Z86.010  
 Non-pressure chronic ulcer of right calf with fat layer exposed ([address] B67.477  Laceration of right lower leg with complication Z62.103A  Edema R60.9  Pneumonia J18.9  Atrial fibrillation (HCC) I48.91  
 Hypothyroidism E03.9  Sepsis ([address] A41.9  Cellulitis L03.90  
 HTN (hypertension) Y26  
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  
 albuterol-ipratropium (DUO-NEB) 2.5 MG-0.5 MG/3 ML  3 mL Nebulization QID RT  
 traMADol (ULTRAM) tablet 25 mg  25 mg Oral Q6H PRN  
 albuterol (PROVENTIL VENTOLIN) nebulizer solution 1.25 mg  1.25 mg Nebulization Q4H PRN  
 melatonin tablet 6 mg  6 mg Oral QHS PRN  
 haloperidol lactate (HALDOL) injection 1 mg  1 mg IntraVENous Q8H PRN  
 [Held by provider] furosemide (LASIX) tablet 40 mg  40 mg Oral DAILY  metoprolol tartrate (LOPRESSOR) tablet 12.5 mg  12.5 mg Per NG tube BID  thiamine mononitrate (B-1) tablet 100 mg  100 mg Oral DAILY  acetaminophen (TYLENOL) tablet 500 mg  500 mg Oral Q6H PRN  
 acetaminophen (TYLENOL) solution 325 mg  325 mg Per NG tube Q6H  
 insulin lispro (HUMALOG) injection   SubCUTAneous Q6H  
 glucose chewable tablet 16 g  16 g Oral PRN  
 glucagon (GLUCAGEN) injection 1 mg  1 mg IntraMUSCular PRN  
 dextrose 10 % infusion 125-250 mL  125-250 mL IntraVENous PRN  pantoprazole (PROTONIX) injection 40 mg  40 mg IntraVENous DAILY  cefTRIAXone (ROCEPHIN) 2 g in 0.9% sodium chloride (MBP/ADV) 50 mL MBP  2 g IntraVENous R73Q  
 folic acid (FOLVITE) tablet 1 mg  1 mg Oral DAILY  apixaban (ELIQUIS) tablet 5 mg  5 mg Oral BID  sodium chloride (NS) flush 5-40 mL  5-40 mL IntraVENous Q8H  
 desonide (TRIDESILON) 0.05 % cream - patient supplied (Patient Supplied)   Topical BID  naloxone (NARCAN) injection 0.4 mg  0.4 mg IntraVENous PRN  
 sodium chloride (NS) flush 5-10 mL  5-10 mL IntraVENous PRN  
 levothyroxine (SYNTHROID) tablet 175 mcg  175 mcg Oral ACB Review of Systems - Negative except as in interval history Objective: 
Visit Vitals /70 Pulse 97 Temp 97.5 °F (36.4 °C) Resp (!) 38 Ht 5' 8\" (1.727 m) Wt 88 kg (194 lb) SpO2 100% BMI 29.50 kg/m² Temp (24hrs), Av.9 °F (36.6 °C), Min:97.4 °F (36.3 °C), Max:98.3 °F (36.8 °C) General: Well developed, well nourished 80 y.o.  male more alert, follws simple commands, answers some questions. HEENT: no scleral icterus, mucous membranes moist, pharynx normal without lesions, NGT still in place Neck: resistant to flexion in all directions but more flexible than previous Cardio:  irregularly irregular rhythm Lungs: rhonchi bilateral and + expiratory wheezes Abdomen: soft, non-tender. Bowel sounds normal. No masses, no organomegaly. Extremities:  no redness or tenderness in the calves or thighs, no edema Lab results: 
 
Chemistry Recent Labs 10/19/19 
0505 10/18/19 
2924 * 155*  139  
K 3.8 3.7 CL 99* 101 CO2 29 30 BUN 25* 26* CREA 0.85 1.00  
CA 7.5* 7.5* AGAP 8 8 BUCR 29* 26* * 271* TP 5.1* 5.1* ALB 1.8* 1.8*  
GLOB 3.3 3.3 AGRAT 0.5* 0.5* CBC w/ Diff Recent Labs 10/19/19 
0505 10/18/19 
5232 WBC 13.1 12.6 RBC 2.54* 2.62* HGB 7.8* 8.0*  
HCT 24.7* 25.2*  
 258 GRANS 79* 82* LYMPH 12* 9* EOS 1 1 Microbiology All Micro Results Procedure Component Value Units Date/Time CULTURE, BODY FLUID Jeromy Samayoame [[id number]] Collected:  10/15/19 1030 Order Status:  Completed Specimen:  Synovial Fluid Updated:  10/20/19 8398 Special Requests: LEFT KNEE     
  GRAM STAIN FEW WBC'S     
   NO ORGANISMS SEEN Culture result: NO GROWTH 5 DAYS ANAEROBE ID REFLEX [[id number]] Collected:  10/15/19 1030 Order Status:  Completed Specimen:  MICROBIAL ISOLATE Updated:  10/19/19 1536 Source PENDING Result 1 Comment Comment: (NOTE) No anaerobes recovered in 48 hours. Performed At: [facility] [address] [place], [place] [id number] Fátima Bo MD [id number] CULTURE, BLOOD [[id number]] Collected:  10/10/19 1341 Order Status:  Completed Specimen:  Blood Updated:  10/16/19 0042 Special Requests: --     
  NO SPECIAL REQUESTS 
BLUE PORT Culture result: NO GROWTH 6 DAYS     
 CULTURE, BLOOD [[id number]] Collected:  10/10/19 1237 Order Status:  Completed Specimen:  Blood Updated:  10/16/19 0042 Special Requests: PERIPHERAL Culture result: NO GROWTH 6 DAYS     
 CULTURE, BLOOD [[id number]] Collected:  10/08/19 2234 Order Status:  Completed Specimen:  Blood from Miscellaneous sample Updated:  10/15/19 0754 Special Requests: NO SPECIAL REQUESTS Culture result: NO GROWTH 6 DAYS     
 CULTURE, BODY FLUID [[id number]] Collected:  10/09/19 1200 Order Status:  Completed Specimen:  Synovial Fluid Updated:  10/14/19 3044 Special Requests: HIP  
  GRAM STAIN NO WBC'S SEEN     
   NO ORGANISMS SEEN Culture result: NO GROWTH 5 DAYS     
 CULTURE, BLOOD [[id number]]  (Abnormal)  (Susceptibility) Collected:  10/08/19 1301 Order Status:  Completed Specimen:  Blood Updated:  10/11/19 0825 Special Requests: --     
  NO SPECIAL REQUESTS 
RIGHT 
ARM 
  
  GRAM STAIN    
  AEROBIC BOTTLE GRAM NEGATIVE RODS SMEAR CALLED TO AND CORRECTLY REPEATED BY: DESHAWN COSBY RN,ICU, ON 10/9/19 AT 0335 TO [facility] Culture result:    
  AEROBIC BOTTLE PASTEURELLA MULTOCIDA CULTURE, URINE [[id number]] Collected:  10/08/19 1418 Order Status:  Completed Specimen:  Cath Urine Updated:  10/10/19 1016 Special Requests: NO SPECIAL REQUESTS Culture result: NO GROWTH 2 DAYS Chang Dill MD, [facility] Infectious Diseases 
[phone] 
10/20/2019  
9:02 AM

## 2019-10-20 NOTE — PROGRESS NOTES
Assumed care of pt. Pt resting in bed. AxOx2-gargled speech. Daughter at the bedside. NGT in place-osmolite 1.2 infusing at 70ml/hr. No concerns at this time. Will continue to monitor. 0000- reassessment complete. No changes noted. 0400- reassessment complete. No changed noted. Pt's skin tear above the left elbow cleaned and new mepalex applied. mepalexes applied to WINNIE heels. Skin man updated Report given to Josy Galicia

## 2019-10-21 NOTE — PROGRESS NOTES
Acknowledged duplicate PT orders. Patient already on PT caseload. Re-evaluated this AM please refer to PT re-evaluation for updated discharge recommendations and plan of care. Thank you, Juancho Sutton, PT, DPT Office Extension: [phone]

## 2019-10-21 NOTE — PROGRESS NOTES
Problem: Self Care Deficits Care Plan (Adult) Goal: *Acute Goals and Plan of Care (Insert Text) Description Occupational Therapy Goals Initiated 10/21/2019 within 7 day(s). 1.  Patient will perform bathing UB with maximal assistance 2. Patient will perform grooming with maximal assistance. 3.  Patient will perform upper body dressing with maximal assistance. 4.  Patient will perform bed mobility to rolling with moderate assistance. 5.  Given maximal assistance to perform supine to/from sitting, patient will sit EOB given maximal support for 2-5 minutes. 6.  Patient will participate in upper extremity therapeutic exercise/activities with moderate assistance  for 5 minutes, 2 times per treatment session. Outcome: Progressing Towards Goal 
  
Problem: Self Care Deficits Care Plan (Adult) Goal: *Acute Goals and Plan of Care (Insert Text) Outcome: Progressing Towards Goal 
 OCCUPATIONAL THERAPY EVALUATION Patient: Radha Everett (41 y.o. male) Date: 10/21/2019 Primary Diagnosis: Severe sepsis ([facility] [A41.9, R65.20] Altered mental status [R41.82] Precautions:   Fall, Skin, Aspiration PLOF: Family reports that patient was I w/ ADLs and IADLs and was in process of remodeling home prior to recent hospitalization. ASSESSMENT : 
Based on the objective data described below, the patient presents with sepsis. Pt supine in bed with HOB elevated when therapist arrived. Spouse and daughter in room when therapists arrived. Patient nodded agreement to participation in treatment today. Daughter reports patient as non-verbal.  Pt was able to verbalize discomfort with sitting EOB today and desire to lay back in bed. Pt has pain (unable to quantify today) in neck, L knee, and RUE as evidenced by grimacing and verbalization of discomfort with movements. Patient is noted to have decreased UB ROM and strength, poor bed mobility, and decreased toleration of movements. Patient was able to follow commands today provided with extended time for response and patient responded well to gentle touch and reassurance with tasks. Patient will benefit from skilled intervention to address the above impairments. Patient's rehabilitation potential is considered to be Fair Factors which may influence rehabilitation potential include: ? None noted ? Mental ability/status ? Medical condition ? Home/family situation and support systems ? Safety awareness ? Pain tolerance/management ? Other: PLAN : 
Recommendations and Planned Interventions:  
?               Self Care Training                  ? Therapeutic Activities ? Functional Mobility Training   ? Cognitive Retraining 
? Therapeutic Exercises           ? Endurance Activities ? Balance Training                    ? Neuromuscular Re-Education ? Visual/Perceptual Training     ? Home Safety Training 
? Patient Education                   ? Family Training/Education ? Other (comment): Frequency/Duration: Patient will be followed by occupational therapy 1-2 times per day/4-7 days per week to address goals. Discharge Recommendations: Gaston Torres Further Equipment Recommendations for Discharge: TBD at discharge SUBJECTIVE:  
Patient stated yes.  (In response to do you want to lie back down.) OBJECTIVE DATA SUMMARY:  
 
Past Medical History:  
Diagnosis Date Arthritis Atrial fibrillation ([facility] 07/2017 Dr Mario Cortés cardio follows. chronic Carcinoma of prostate ([facility] Cellulitis Coarse tremor   
  hands, states \"my doctor is aware\" Difficulty swallowing Dysphagia Edema Encounter for colonoscopy due to history of adenomatous colonic polyps Essential hypertension Fall 10/07/2017 \"went to [facility] Emergency Room, CT Scan showed concussion, no bleeding\" per patient Fatigue GERD (gastroesophageal reflux disease) wo. esophagitis Glaucoma H/O joint replacement   
  left knee 2003 HLD (hyperlipidemia) Hypertension Hypertensive disorder Hypothyroidism Impotence Laceration of right lower leg with complication Malignant neoplasm of prostate ([address]   
  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   
 acute/hist. of   
 
Past Surgical History:  
Procedure Laterality Date COLONOSCOPY N/A 1/3/2017 HX CHOLECYSTECTOMY HX KNEE REPLACEMENT Left 01/2003 HX SHOULDER REPLACEMENT Right 7/17, 10/17 Barriers to Learning/Limitations: yes;  altered mental status (i.e.Sedation, Confusion) Compensate with: visual, verbal, tactile, kinesthetic cues/model Home Situation:  
Home Situation Home Environment: Private residence # Steps to Enter: 5 One/Two Story Residence: One story Living Alone: No 
Support Systems: Family member(s), Friends \ neighbors, Child(steffi) Patient Expects to be Discharged to[de-identified] Private residence Current DME Used/Available at Home: Shower chair Tub or Shower Type: Shower ? Right hand dominant   ? Left hand dominant Cognitive/Behavioral Status: 
Neurologic State: Alert;Eyes open spontaneously; Agitated Orientation Level: Unable to verbalize Cognition: Follows commands Skin: Bruising and open sores BUEs and face. Edema: RUE, moderate Vision/Perceptual:   
 
Acuity: Within Defined Limits Coordination: BUE Coordination: Generally decreased, functional 
 
Balance: 
Sitting: Impaired; With support Sitting - Static: Poor (constant support) Strength: BUE Strength:  Generally decreased,non-functional 
 
Tone & Sensation: BUE 
[facility] Range of Motion: BUE 
 
AROM: Grossly decreased, non-functional 
PROM: Grossly decreased, non-functional 
 
Functional Mobility and Transfers for ADLs: 
Bed Mobility: 
  
Supine to Sit: Total assistance;Assist x2 Sit to Supine: Total assistance;Assist x2 ADL Assessment:  
Feeding: Other (comment)(NG tube) Oral Facial Hygiene/Grooming: Total assistance Bathing: Total assistance Upper Body Dressing: Total assistance Lower Body Dressing: Total assistance Toileting: Total assistance(catheter) Pain: 
Pain level pre-treatment: No apparent visible distress Pain level post-treatment: Pt demonstrated pain as evidenced by verbalization of discomfort and grimacing, unable to quantify. Pointed to neck. Pain Intervention(s): Medication (see MAR); Rest, Ice, Repositioning Response to intervention: Nurse notified, See doc flow Activity Tolerance: Pt with severely limited activity tolerance today and increased difficulty d/t pain levels today. Please refer to the flowsheet for vital signs taken during this treatment. After treatment:  
? Patient left in no apparent distress sitting up in chair ? Patient left in no apparent distress in bed 
? Call bell left within reach ? Nursing notified ? Caregiver present ? Bed alarm activated COMMUNICATION/EDUCATION:  
? Role of Occupational Therapy in the acute care setting 
? Home safety education was provided and the patient/caregiver indicated understanding. ? Patient/family have participated as able in goal setting and plan of care. ? Patient/family agree to work toward stated goals and plan of care. ? Patient understands intent and goals of therapy, but is neutral about his/her participation. ? Patient is unable to participate in goal setting and plan of care. Thank you for this referral. 
Maki Mary OTR/L Time Calculation: 22 mins Eval Complexity: History: HIGH Complexity : Extensive review of history including physical, cognitive and psychosocial history ; Examination: HIGH Complexity : 5 or more performance deficits relating to physical, cognitive , or psychosocial skils that result in activity limitations and / or participation restrictions; Decision Making:HIGH Complexity : Patient presents with comorbidities that affect occupational performance. Signifigant modification of tasks or assistance (eg, physical or verbal) with assessment (s) is necessary to enable patient to complete evaluation

## 2019-10-21 NOTE — PROGRESS NOTES
Problem: Mobility Impaired (Adult and Pediatric) Goal: *Acute Goals and Plan of Care (Insert Text) Description Physical Therapy Goals Re-assessed and remain appropriate for functional maintenance program 10/21/2019 Initiated 10/14/2019 and to be accomplished within 7 day(s) 1. Patient will roll side to side in bed with total assistance to aid in positioning. 2.  Patient will maintain BLE ROM/strength via functional maintenance program to prepare for OOB activity. Outcome: Progressing Towards Goal 
 PHYSICAL THERAPY RE-EVALUATION Patient: Joann Tamayo (6 y.o. male) Date: 10/21/2019 Primary Diagnosis: Severe sepsis ([facility] [A41.9, R65.20] Altered mental status [R41.82] Precautions: Fall, Skin, Aspiration PLOF: Independent per family at bedside ASSESSMENT : 
Re-evaluation s/p 1 week on functional maintenance program for BLE PROM/AROM; goals reviewed and updated as indicated. Eyes open upon entry. Per family (daughter and wife), \"not a good day\"; was \"better a few days ago\". Non-verbal. Unable to shake head yes/no or use thumbs up to communicate while seated in bed. Wiggles toes on command. Facial grimacing to removal of prevalon boots and light touch to BLE. Heels with mepilex bilaterally. Guard on LLE limiting PROM assessment as noted below. RLE AROM decreased; PROM 50% of full. Total A x2 for supine to sit. Unable to assist in mobility. Resists flexion at trunk for seated posture. Constant support for seated less than 30 seconds. Verbalizes \"yes\" when asked if he wants to lay back down. Total Ax2 for sit to supine. Would continue to benefit from functional maintenance program to maintain BLE ROM/strength to aid in positioning and preparation for out of bed activity. Rehab tech educated on proper technique and demonstrated understanding. Call bell in reach. PLAN : 
Recommendations and Planned Interventions: 
?           Bed Mobility Training             ? Neuromuscular Re-Education ?           Transfer Training                   ? Orthotic/Prosthetic Training 
? Gait Training                          ? Modalities ? Therapeutic Exercises           ? Edema Management/Control ? Therapeutic Activities            ? Family Training/Education ? Patient Education ? Other (comment): Frequency/Duration: Patient will continue to be followed 3-5x/week via rehab tech for functional maintenance program, weekly by PT for re-assessment. Discharge Recommendations: Gaston Torres Further Equipment Recommendations for Discharge: TBD with OOB SUBJECTIVE:  
Patient stated Yes.  OBJECTIVE DATA SUMMARY:  
 
Past Medical History:  
Diagnosis Date Arthritis Atrial fibrillation ([address] 07/2017 Dr Zina Harris cardio follows. chronic Carcinoma of prostate ([address] Cellulitis Coarse tremor   
  hands, states \"my doctor is aware\" Difficulty swallowing Dysphagia Edema Encounter for colonoscopy due to history of adenomatous colonic polyps Essential hypertension Fall 10/07/2017 \"went to [facility] Emergency Room, CT Scan showed concussion, no bleeding\" per patient Fatigue GERD (gastroesophageal reflux disease) wo. esophagitis Glaucoma H/O joint replacement   
  left knee 2003 HLD (hyperlipidemia) Hypertension Hypertensive disorder Hypothyroidism Impotence Laceration of right lower leg with complication Malignant neoplasm of prostate ([address]   
  [id number] Adenocarcinoma of the Prostate, dx 11/3/2008, karlee 3+4, presenting PSA 5.5ng/mL. 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   
 acute/hist. of   
 
Past Surgical History:  
Procedure Laterality Date COLONOSCOPY N/A 1/3/2017 HX CHOLECYSTECTOMY HX KNEE REPLACEMENT Left 01/2003 HX SHOULDER REPLACEMENT Right 7/17, 10/17 Critical Behavior: 
Neurologic State: Alert Orientation Level: Unable to verbalize Cognition: Impaired decision making;Decreased attention/concentration Psychosocial 
Patient Behaviors: Anxious Strength:   
Unable to assess d/t poor tolerance for ROM Range Of Motion: Demonstrates appropriate PROM for seated posture at BLE 
RLE: Limited no-functional AROM; PROM decreased 50% LLE: guarded in PROM assessment; limited non-functional d/t guarding of left knee Functional Mobility: 
Bed Mobility: 
Supine to Sit: Total assistance;Assist x2 Sit to Supine: Total assistance;Assist x2 Balance:  
Sitting: Impaired Sitting - Static: Poor (constant support) Pain: Unable to verbalize; grimaces to all mobility of BLE Pain level pre-treatment: 5/10 Pain level post-treatment: 5/10 Activity Tolerance:  
Poor After treatment:  
?         Patient left in no apparent distress sitting up in chair ? Patient left in no apparent distress in bed 
? Call bell left within reach ? Nursing notified ? Caregiver present ? Bed alarm activated ? SCDs applied COMMUNICATION/EDUCATION:  
?         Role of physical therapy in the acute care setting. ?         Fall prevention education was provided and the patient/caregiver indicated understanding. ? Patient/family have participated as able in goal setting and plan of care. ?         Patient/family agree to work toward stated goals and plan of care. ?         Patient understands intent and goals of therapy, but is neutral about his/her participation. ? Patient is unable to participate in goal setting/plan of care: ongoing with therapy staff. Thank you for this referral. 
Rocio Leonard, PT Time Calculation: 16 mins

## 2019-10-21 NOTE — PROGRESS NOTES
Patient undergoing PT, OT and SLP. Hoping for recovery of his oropharyngeal dysphagia with resolution of his acute medical issues. Hold  Eliquis on Wednesday in anticipation of EGD with PEG placement on Friday if his swallowing and oral intake does not improve. Génesis Servin MD 
[facility]

## 2019-10-21 NOTE — PROGRESS NOTES
Problem: Pain Goal: *Control of Pain Outcome: Progressing Towards Goal 
Goal: *PALLIATIVE CARE:  Alleviation of Pain Outcome: Progressing Towards Goal 
  
Problem: Falls - Risk of 
Goal: *Absence of Falls Description Document Dylan Goldstein Fall Risk and appropriate interventions in the flowsheet. Outcome: Progressing Towards Goal 
Note:  
Fall Risk Interventions: 
  
 
Mentation Interventions: Adequate sleep, hydration, pain control, Door open when patient unattended Medication Interventions: Evaluate medications/consider consulting pharmacy Elimination Interventions: Call light in reach History of Falls Interventions: Door open when patient unattended

## 2019-10-21 NOTE — PROGRESS NOTES
[facility] 
                                             (A Division of [address] [place] [address]) Follow-up Note Date of Admission: 10/8/2019     Date of Note:  10/21/2019 Summary:   
 
81 y/o  male HTN, AF, GERD, DJD s/p L TKR, Prostate CA, Hypothyroidism, h/o pancreatitis, ETOH consumption adm 10/8/2019 w/ chills, weakness, altered mental status. Has neck pain x 1 mo. Had R leg cellulitis after cat bite (not scratch) 1 mo PTA - resolved w/ abx. Then micturition syncope x 3, hurt knee, progressing generalized weakness, then 1 day PTA fever, chills. Adm w/ 100.5, WBC 29.5, 13% bands. No uti, pneumonia, unrevealing CTAP. 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 Rpt blcx 10/10 NGTD x 2.  Gallium scan 10/14: intense periprosthetic uptake L knee, distal femur. L knee synovial fluid 10/15- not s/o septic arthritis. Presume distal L femur OM. Interval History: 
 
Had a bad day today per daughter. Worked with PT resulting in pains all over. Presently lethargic and not responding to questions of commands. No fever. No CBC today. Current Antimicrobials: Prior Antimicrobials Meropenem 10/21 - 0  abx 10/8 - 12 Vancomycin 10/8 - 2  Cefepime, Levoflox 10/8 - 0 Meropenem 10/9 - 2 Ceftriaxone 10/11 - 10  
  
  
Assessment: Plan: NEW R LL opacity 
- atelectasis vs infiltrate. - afebrile but temp trending up -> will change Ceftriaxone to meropenem and continue for 7 days if convincing for pneumonia then change back to Ceftriaxone to complete OM rx  
-> CBC in am  
Left knee uptake on Ga scan - s/p US guided arthrocentesis 10/15 - negative for infection 
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 
-> then complete 6 wks abx with Ceftriaxone (target end date: 11/19/2019)  +/- subsequent po abx suppression  
-> PICC prior to discharge. (still has R IJ CVL) Pasteurella BSI - 1 of 2 blood cultures 10/8 + P multocida sens TMP-SMX, Ceftriaxone, Levofloxacin, PCN, TCN 
- No active cellulitis. R leg cellulitis following cat bite in Early September resolved with TMP-SMX then Keflex - current source unclear: ?epidural abscess or discitis/OM?, R leg OM? 
- Recent TTE 9/27: neg vegetations - rpt blcx 10/10 NG x 2 
- Gallium scan 10/14: intense periprosthetic uptake L knee, distal femur. 
- L knee aspiration 10/15: 7,200 WBC, no crystals - not suggestive of septic arthritis -> continue Ceftriaxone as above Encephalopathy/ ETOH Withdrawal 
- worse since admission - ? Medication-related: has been receiving dilaudid q 4 hours and Ativan this am 
- Head CT 10/14: no acute intracranial abnormality 
- not related to infection which appears under control at this time 
- improving.  -> per primary team. D/w Dr. Dayo Mills Septic shock 
- due to above 
- improving. Vasopressors stopped 10/10.   
- resolved -> abx as above MACKENZIE 
- resolved Worsening alkaline phosphatase 
- from sepsis, ?bone infection / met (doubt) 
- improving H/o PCN allergy (rash)  
- tolerated keflex 
- tolerating Ceftriaxone Recent R leg cellulitis following cat bite (not scratch) R hip pain - s/p aspiration 10/9: 23 WBC, no crystals HTN    
AF    
GERD    
DJD s/p L TKR    
Prostate CA    
Hypothyroidism    
h/o pancreatitis    
ETOH consumption    
  
Microbiology: 
  
10/8      blcx Pasteurella multocida 1 of 2 
             urcx NG 
10/9      R hip asp gs no wbc, NOS, cx NG 
 
10/10 blcx NGTD x 2 
  
Lines / Catheters: RIJ CVL Patient Active Problem List  
Diagnosis Code  Malignant neoplasm of prostate ([facility] C61  Hypertension I10  
 GERD (gastroesophageal reflux disease) K21.9  Glaucoma H40.9  Glaucoma H40.9  Dysphagia R13.10  Encounter for colonoscopy due to history of adenomatous colonic polyps Z12.11, Z86.010  
  Non-pressure chronic ulcer of right calf with fat layer exposed ([facility] O31.467  Laceration of right lower leg with complication K86.613M  Edema R60.9  Pneumonia J18.9  Atrial fibrillation (HCC) I48.91  
 Hypothyroidism E03.9  Sepsis ([facility] A41.9  Cellulitis L03.90  
 HTN (hypertension) H33  
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  
 bisacodyl (DULCOLAX) suppository 10 mg  10 mg Rectal DAILY  Lactobacillus Acidoph & Bulgar [facility]) tablet 2 Tab  2 Tab Oral BID  albuterol-ipratropium (DUO-NEB) 2.5 MG-0.5 MG/3 ML  3 mL Nebulization QID RT  
 traMADol (ULTRAM) tablet 25 mg  25 mg Oral Q6H PRN  
 albuterol (PROVENTIL VENTOLIN) nebulizer solution 1.25 mg  1.25 mg Nebulization Q4H PRN  
 melatonin tablet 6 mg  6 mg Oral QHS PRN  
 haloperidol lactate (HALDOL) injection 1 mg  1 mg IntraVENous Q8H PRN  
 [Held by provider] furosemide (LASIX) tablet 40 mg  40 mg Oral DAILY  metoprolol tartrate (LOPRESSOR) tablet 12.5 mg  12.5 mg Per NG tube BID  thiamine mononitrate (B-1) tablet 100 mg  100 mg Oral DAILY  acetaminophen (TYLENOL) tablet 500 mg  500 mg Oral Q6H PRN  
 acetaminophen (TYLENOL) solution 325 mg  325 mg Per NG tube Q6H  
 insulin lispro (HUMALOG) injection   SubCUTAneous Q6H  
 glucose chewable tablet 16 g  16 g Oral PRN  
 glucagon (GLUCAGEN) injection 1 mg  1 mg IntraMUSCular PRN  
 dextrose 10 % infusion 125-250 mL  125-250 mL IntraVENous PRN  pantoprazole (PROTONIX) injection 40 mg  40 mg IntraVENous DAILY  cefTRIAXone (ROCEPHIN) 2 g in 0.9% sodium chloride (MBP/ADV) 50 mL MBP  2 g IntraVENous W67H  
 folic acid (FOLVITE) tablet 1 mg  1 mg Oral DAILY  apixaban (ELIQUIS) tablet 5 mg  5 mg Oral BID  sodium chloride (NS) flush 5-40 mL  5-40 mL IntraVENous Q8H  
 desonide (TRIDESILON) 0.05 % cream - patient supplied (Patient Supplied)   Topical BID  naloxone (NARCAN) injection 0.4 mg  0.4 mg IntraVENous PRN  
 sodium chloride (NS) flush 5-10 mL  5-10 mL IntraVENous PRN  
 levothyroxine (SYNTHROID) tablet 175 mcg  175 mcg Oral ACB Review of Systems - Negative except as in interval history Objective: 
Visit Vitals /74 (BP 1 Location: Left arm) Pulse 85 Temp 99.7 °F (37.6 °C) Resp (!) 36 Ht 5' 8\" (1.727 m) Wt 79.4 kg (175 lb 0.7 oz) SpO2 92% BMI 26.62 kg/m² Temp (24hrs), Av.5 °F (36.9 °C), Min:97.6 °F (36.4 °C), Max:99.7 °F (37.6 °C) General: Well developed, well nourished 80 y.o.  male more alert, follws simple commands, answers some questions. HEENT: no scleral icterus, mucous membranes moist, pharynx normal without lesions, NGT still in place Neck: resistant to flexion in all directions but more flexible than previous Cardio:  irregularly irregular rhythm Lungs: rhonchi bilateral and + expiratory wheezes Abdomen: soft, non-tender. Bowel sounds normal. No masses, no organomegaly. Extremities:  no redness or tenderness in the calves or thighs, no edema Lab results: 
 
Chemistry Recent Labs 10/19/19 
0505 *   
K 3.8 CL 99* CO2 29 BUN 25* CREA 0.85 CA 7.5* AGAP 8  
BUCR 29* * TP 5.1* ALB 1.8*  
GLOB 3.3 AGRAT 0.5* CBC w/ Diff Recent Labs 10/19/19 
0505 WBC 13.1 RBC 2.54* HGB 7.8* HCT 24.7*  
 GRANS 79* LYMPH 12* EOS 1 Microbiology All Micro Results Procedure Component Value Units Date/Time ANAEROBE ID REFLEX [[id number]] Collected:  10/15/19 1030 Order Status:  Completed Specimen:  MICROBIAL ISOLATE Updated:  10/20/19 1835 Source PENDING Result 1 Comment Comment: (NOTE) No anaerobic growth in 72 hours. Performed At: [address] [id number] Luz Newman MD DR:[id number] CULTURE, BODY FLUID Vilma Given [[id number]] Collected:  10/15/19 1030 Order Status:  Completed Specimen:  Synovial Fluid Updated:  10/20/19 8561 Special Requests: LEFT KNEE     
  GRAM STAIN FEW WBC'S     
   NO ORGANISMS SEEN Culture result: NO GROWTH 5 DAYS     
 CULTURE, BLOOD [[id number]] Collected:  10/10/19 1341 Order Status:  Completed Specimen:  Blood Updated:  10/16/19 0042 Special Requests: --     
  NO SPECIAL REQUESTS 
BLUE PORT Culture result: NO GROWTH 6 DAYS     
 CULTURE, BLOOD [[id number]] Collected:  10/10/19 1237 Order Status:  Completed Specimen:  Blood Updated:  10/16/19 0042 Special Requests: PERIPHERAL Culture result: NO GROWTH 6 DAYS     
 CULTURE, BLOOD [[id number]] Collected:  10/08/19 2234 Order Status:  Completed Specimen:  Blood from Miscellaneous sample Updated:  10/15/19 0754 Special Requests: NO SPECIAL REQUESTS Culture result: NO GROWTH 6 DAYS     
 CULTURE, BODY FLUID [[id number]] Collected:  10/09/19 1200 Order Status:  Completed Specimen:  Synovial Fluid Updated:  10/14/19 9695 Special Requests: HIP  
  GRAM STAIN NO WBC'S SEEN     
   NO ORGANISMS SEEN Culture result: NO GROWTH 5 DAYS     
 CULTURE, BLOOD [[id number]]  (Abnormal)  (Susceptibility) Collected:  10/08/19 1301 Order Status:  Completed Specimen:  Blood Updated:  10/11/19 0825 Special Requests: --     
  NO SPECIAL REQUESTS 
RIGHT 
ARM 
  
  GRAM STAIN    
  AEROBIC BOTTLE GRAM NEGATIVE RODS SMEAR CALLED TO AND CORRECTLY REPEATED BY: DESHAWN COSBY RN,ICU, ON 10/9/19 AT 0335 TO S Culture result: AEROBIC BOTTLE PASTEURELLA MULTOCIDA CULTURE, URINE [[id number]] Collected:  10/08/19 1418 Order Status:  Completed Specimen:  Cath Urine Updated:  10/10/19 1016 Special Requests: NO SPECIAL REQUESTS Culture result: NO GROWTH 2 DAYS Migdalia Gerber MD, [facility] 
[phone] 
10/21/2019  
9:02 AM

## 2019-10-21 NOTE — PROGRESS NOTES
Respiratory Therapy Assessment Care Plan Patient: 
Tonya Werner 80 y.o. male 10/21/2019 8:37 AM 
 
Severe sepsis ([address] [A41.9, R65.20] Altered mental status [R41.82] Chest X-RAY:  
Results from Hospital Encounter encounter on 10/08/19 XR CHEST PORT Impression IMPRESSION: 
 
 
1. Right IJ approach central venous catheter in stable position. 2. Nasogastric tube below the diaphragm, tip collimated from view. 3. Low lung volumes and bibasilar atelectasis with likely small right pleural 
effusion not significantly changed from prior. XR ABD PORT  1 V Addendum Addendum: Initial preliminary report was provided by the on-call radiology resident. Jose Angel Mejia MD 10/17/2019  8:37 AM        
 Impression IMPRESSION: 
 
Tip of the nasogastric tube is in the proximal body of the stomach, recommend 
advancing. XR ABD PORT  1 V Addendum Addendum: Initial preliminary report was provided by the on-call radiology resident. Jose Angel Mejia MD 10/17/2019  8:16 AM        
 Impression IMPRESSION: 
 
Tip of the nasogastric tube is in the fundus/proximal body of the stomach, 
recommend advancing. Vital Signs:   Visit Vitals /69 Pulse 77 Temp 97.6 °F (36.4 °C) Resp (!) 38 Ht 5' 8\" (1.727 m) Wt 79.4 kg (175 lb 0.7 oz) SpO2 99% BMI 26.62 kg/m² Indications for treatment: Patient presents with increased WOB and wheezing. Plan of care: Continue Duoneb 4x daily as ordered per physician. Continue oxygen therapy as needed per patient. Goal: Continue to improve and maintain oxygenation and ventilation. Titrate oxygen as tolerated per patient.

## 2019-10-21 NOTE — PROGRESS NOTES
Chart reviewed. Plan remains SNF, has been accepted to [facility] which is first choice. Will cont to follow. Michelle Vazquez RN,ext [phone].

## 2019-10-21 NOTE — PROGRESS NOTES
1864: Patient found on 3 lpm nasal cannula. SpO2 99%. 7929: Patient's FiO2 increased from 3 lpm to 4 lpm to decrease WOB. Patient has subcostal retractions. 1650: RT spoke with MD regarding patient's increased WOB. RT explained the increase in liter flow from 3 lpm to 4 lpm and possibly placing patient on HFNC due to subcostal retractions and increased WOB. Per MD patient to be placed back at 3 lpm as a result of SpO2 >92%.

## 2019-10-21 NOTE — PROGRESS NOTES
Speech Therapy Note: Follow up attempted. Could not progress with ST intervention because patient:   
 
[]  Lethargic, unable to be alerted enough for safe PO intake [x]  Declined participation due to labored breathing. Pt minimally interactive. Pt's daughter states \"It hasn't been a good day\". []  Off the unit []  NPO for procedure 
[]  Other: SLP will re-attempt treatment next day or as medically indicated. Will determine if MBSS is indicated at that time to further assess swallow integrity. Yoel Arce M.S., CCC-SLP Speech Language Pathologist

## 2019-10-21 NOTE — PROGRESS NOTES
Internal Medicine Progress Note NAME: Sreedhar Sanz :  1933 MRM:  [id number] Date/Time: 10/21/2019 ASSESSMENT/PLAN:  PEGT on hold till further mental improvement, continue Eliquis for now. GI/ID input appreciated . Mental improvement steadily but slowly. # Dysphagia, failed SLP twice. GI consulted for  ? PEGT  
- NGT for meds and feeding  , free water - Appreciate GI recs, per Dr. Mylene Reyna note, plan for repeat SLP eval Tue or Wed, and if fails need to hold Eliquis for plan possible PEG on Friday. # Acute Delirious state , Encephalopathy. Possible sepsis related or metabolic/ Alcohol related with his ho alcohol abuse. Was on opioid and benzo that been consolidated. Reduce sedative to minimal strongly needed. Ammonia level normal , CT head negative. Place ngt for meds ( didn't get po meds for few days ) . -off wrist restrain, significant  improvement in his mental state , 
-Intermittently tachypneic . 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 H/o cat bite 4 weeks to RLE per daughter, when he came in with cellulitis and was treated. D/w Dr. Colunga Rosalio concern for persistent infection/abscess, he ordered nuclear gallium scan . Blood culture repeat on 10/10 negative to date.  
- lactobacillus added # Possible L knee infection. CUTLTURE of aspirate negative. Orthopedic. Knee tapping and CS. Fluids CS negative. 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 I called lab and radiology if the can run the knee fluid test for Crystals . I am informed that fluids from his hip on 10/9 and knee on 10/15 both tested for crystals and was negative - Sever L knee pains - control pains. #  MACKENZIE - improved with IV fluid hydration, monitor closely. Monitor labs. Avoid nephrotoxins. Renally dosing medications. Monitor urine out put. #  AFIB - Rate control, resume Elliquis for AC . Dc iv lopressor , tab NGT. Titrate as needed # Elevated LFT - GI consulted, suspects 2/2 sepsis, appreciate recs. ( Elevated liver enzymes most likely secondary to sepsis. Coming down. CT shows no biliary obstruction. S/P cholecystectomy ) # Hypothyroidism - Cont home levothyroxine. # Hypocalcemia. Hypokalemia. Replete and trend. # Hypernatremia. Improved. free water NGT . D5 ivf . Serial labs to monitor level. # ETOH abuse - initially placed on  CIWA, DC. Per family last drink was 1 month ago. Thiamine. DVT ppx - SCD; resume Eliquis. 
 
-Code status : FULL Lab Review:  
 
Recent Labs 10/19/19 
0505 WBC 13.1 HGB 7.8* HCT 24.7*  
 Recent Labs 10/19/19 
0505   
K 3.8 CL 99* CO2 29 * BUN 25* CREA 0.85 CA 7.5* MG 2.3 PHOS 3.5 ALB 1.8* TBILI 0.5 SGOT 44* ALT 23 INR 1.8* Lab Results Component Value Date/Time Glucose (POC) 140 (H) 10/21/2019 11:36 AM  
 Glucose (POC) 120 (H) 10/21/2019 05:48 AM  
 Glucose (POC) 190 (H) 10/20/2019 11:21 PM  
 Glucose (POC) 110 10/20/2019 05:55 PM  
 Glucose (POC) 142 (H) 10/20/2019 12:47 PM  
 Glucose, POC 83 01/03/2017 10:19 AM  
 
No results for input(s): PH, PCO2, PO2, HCO3, FIO2 in the last 72 hours. Recent Labs 10/19/19 
0505 INR 1.8* No results found for: SDES Lab Results Component Value Date/Time Culture result: NO GROWTH 5 DAYS 10/15/2019 10:30 AM  
 Culture result: NO GROWTH 6 DAYS 10/10/2019 01:41 PM  
 Culture result: NO GROWTH 6 DAYS 10/10/2019 12:37 PM  
 
 
All Cardiac Markers in the last 24 hours:  
No results found for: CPK, CK, CKMMB, CKMB, RCK3, CKMBT, CKNDX, CKND1, CHAYA, TROPT, TROIQ, BRENDAN, TROPT, TNIPOC, BNP, BNPP Subjective:  
 
Pt is anxious appearing and appears uncomfortable after working with PT. Per family pt did not sleep well last night. Objective:  
 
Vitals: Last 24hrs VS reviewed since prior progress note. Most recent are: 
 
Visit Vitals /80 (BP 1 Location: Left arm) Pulse 86 Temp 98.8 °F (37.1 °C) Resp (!) 40 Ht 5' 8\" (1.727 m) Wt 79.4 kg (175 lb 0.7 oz) SpO2 98% BMI 26.62 kg/m² SpO2 Readings from Last 6 Encounters:  
10/21/19 98% 10/08/19 95% 10/08/19 91% 10/07/19 98% 10/03/19 100% 10/03/19 90% O2 Flow Rate (L/min): 4 l/min Intake/Output Summary (Last 24 hours) at 10/21/2019 1411 Last data filed at 10/21/2019 1150 Gross per 24 hour Intake 2130 ml Output 1550 ml Net 580 ml Physical Exam:  
Ears: hearing is intact  AX0X3. Noted tachypnea Eyes: Icterus is not present Lungs: clear to auscultation bilaterally Heart: regular rate and rhythm, S1, S2 normal, no murmur, click, rub or gallop Gastrointestinal: soft, non-tender. Bowel sounds normal. No masses,  no organomegaly Neurological:  New Focal Deficits are not present. Slightly  Lethargic  and coherent today Skin: diffuse ecchymosis. Face with small petechiae (per family is due to known actinic keratosis). Psychiatric:  Mood is stable Medications Reviewed: (see below) Lab Data Reviewed: (see below) 
 
______________________________________________________________________ Medications:  
 
Current Facility-Administered Medications Medication Dose Route Frequency  docusate sodium (COLACE) capsule 100 mg  100 mg Oral BID PRN  
 bisacodyl (DULCOLAX) suppository 10 mg  10 mg Rectal DAILY  Lactobacillus Acidoph & Bulgar [facility]) tablet 2 Tab  2 Tab Oral BID  albuterol-ipratropium (DUO-NEB) 2.5 MG-0.5 MG/3 ML  3 mL Nebulization QID RT  
 traMADol (ULTRAM) tablet 25 mg  25 mg Oral Q6H PRN  
 albuterol (PROVENTIL VENTOLIN) nebulizer solution 1.25 mg  1.25 mg Nebulization Q4H PRN  
 melatonin tablet 6 mg  6 mg Oral QHS PRN  
 haloperidol lactate (HALDOL) injection 1 mg  1 mg IntraVENous Q8H PRN  
  [Held by provider] furosemide (LASIX) tablet 40 mg  40 mg Oral DAILY  metoprolol tartrate (LOPRESSOR) tablet 12.5 mg  12.5 mg Per NG tube BID  thiamine mononitrate (B-1) tablet 100 mg  100 mg Oral DAILY  acetaminophen (TYLENOL) tablet 500 mg  500 mg Oral Q6H PRN  
 acetaminophen (TYLENOL) solution 325 mg  325 mg Per NG tube Q6H  
 insulin lispro (HUMALOG) injection   SubCUTAneous Q6H  
 glucose chewable tablet 16 g  16 g Oral PRN  
 glucagon (GLUCAGEN) injection 1 mg  1 mg IntraMUSCular PRN  
 dextrose 10 % infusion 125-250 mL  125-250 mL IntraVENous PRN  pantoprazole (PROTONIX) injection 40 mg  40 mg IntraVENous DAILY  cefTRIAXone (ROCEPHIN) 2 g in 0.9% sodium chloride (MBP/ADV) 50 mL MBP  2 g IntraVENous K26F  
 folic acid (FOLVITE) tablet 1 mg  1 mg Oral DAILY  apixaban (ELIQUIS) tablet 5 mg  5 mg Oral BID  sodium chloride (NS) flush 5-40 mL  5-40 mL IntraVENous Q8H  
 desonide (TRIDESILON) 0.05 % cream - patient supplied (Patient Supplied)   Topical BID  naloxone (NARCAN) injection 0.4 mg  0.4 mg IntraVENous PRN  
 sodium chloride (NS) flush 5-10 mL  5-10 mL IntraVENous PRN  
 levothyroxine (SYNTHROID) tablet 175 mcg  175 mcg Oral ACB Total time spent with patient: 35  minutes Care Plan discussed with: Care Manager, Nursing Staff, Consultant/Specialist and >50% of time spent in counseling and coordination of care Discussed:  Care Plan Attending Physician: Adrian Donnelly MD

## 2019-10-21 NOTE — PROGRESS NOTES
Assumed care of pt. Pt resting in bed. AxOx3-gargled speech. Daughter at the bedside. NGT in place-osmolite 1.2 infusing at 70ml/hr. No concerns at this time. Will continue to monitor. 2040- intontinence care provided. Pt had moderate amount of loose brown stool. Partial bath provided. Bed pad, gown, and linens changed. No other needs at this time. Will continue to monitor. 2343- reassessment complete. No changes noted. 7870- reassessment complete. No changes noted. Pt respirations 38 and having auditory wheezing. Prn breathing tx given Report given to Kimberly Donald

## 2019-10-22 NOTE — PROGRESS NOTES
0700 verbal bedside report received from off going RN. Pt awake in bed, appears comfortable. Family at bedside. 1130 Pt appears to be in pain after PT this AM and repositioning. Spoke with family regarding pain medications and appropriately treating. Family requesting pt be given pain medication. Pt agreeable. Will continue to monitor. 1400 Pt resting at this time. Family at bedside. 1600 Family requesting breathing treatment and pt appears to be short of breath. Assessment unchanged from previous; respiratory therapy informed. Dr Margareth Araujo at bedside discussing pt condition. Will await new orders 1700 Chest Xray completed 1900 Pt condition remains unchanged. Drowsy/lethargic, wakes to verbal stimuli. States no pain, however appears to be grimacing with movement. NGT to right nare in place; TF Osmolite @70ml/H. Labs obtained per order. Triple lumen IJ flushing with ease; brisk blood return. Family at bedside. Verbal bedside report given to oncoming RN.

## 2019-10-22 NOTE — PROGRESS NOTES
Per nursing, pt not appropriate for FMP today d/t increased respirations at rest.  
 
Will follow-up tomorrow 3342 [facility]

## 2019-10-22 NOTE — PROGRESS NOTES
Assumed care of pt. Pt resting in bed. Daughter at bedside. No behavioral indications of pain. Pt appears to be having difficult time breathing. Pt using accessory muscles, resp rate elevated in the 30s. Per report, pt was seen by MD and aware of  Pt's respiratory status. Will continue to monitor respiratory status closely. 2345- reassessment complete. Pt's status unchanged. Will continue to monitor. 0345- reassessment complete. No change. Pt washed up. Tolerated well. Will continue to monitor.   
 
Report given to Saran Raymundo

## 2019-10-22 NOTE — PROGRESS NOTES
1745 pt put on specialty bed. 1930 Bedside and Verbal shift change report given to una (oncoming nurse) by Pete Pimentel RN 
 (offgoing nurse). Report included the following information Kardex, MAR and Recent Results.

## 2019-10-22 NOTE — PROGRESS NOTES
Internal Medicine Progress Note NAME: Raymundo Casillas :  1933 MRM:  [id number] Date/Time: 10/22/2019 ASSESSMENT/PLAN:    
 
# Dysphagia, failed SLP twice. GI consulted for  ? PEGT  
- NGT for meds and feeding  , free water - Appreciate GI recs, per Dr. Amber Joshi note, plan for repeat SLP eval Tue or Wed, and if fails need to hold Eliquis for plan possible PEG on Friday. Acute respiratory failure - pt tachypneic with increased WOB since 10/21. SpO2 92 on 3L - CXR showed right base new opacity. D/w ID started on vanc and merrem on 10/21 
- gave IV lasix 40gm with good response, tachypnea/WOB improved. Will schedule additional dose of IV lasix 40mg and then resume PO 40mg every day starting tomorrow - ABG wnl # Acute Delirious state , Encephalopathy. Possible sepsis related or metabolic/ Alcohol related with his ho alcohol abuse. Was on opioid and benzo that been consolidated. Reduce sedative to minimal strongly needed. Ammonia level normal , CT head negative. Place ngt for meds ( didn't get po meds for few days ) . -off wrist restrain, significant  improvement in his mental state , 
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 H/o cat bite 4 weeks to RLE per daughter, when he came in with cellulitis and was treated. D/w Dr. Estrella Choi concern for persistent infection/abscess, he ordered nuclear gallium scan . Blood culture repeat on 10/10 negative to date.  
- lactobacillus added # Possible L knee infection. CUTLTURE of aspirate negative. Orthopedic. Knee tapping and CS. Fluids CS negative. 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 I called lab and radiology if the can run the knee fluid test for Crystals .  I am informed that fluids from his hip on 10/9 and knee on 10/15 both tested for crystals and was negative - Sever L knee pains - control pains. #  MACKENZIE - improved with IV fluid hydration, monitor closely. Monitor labs. Avoid nephrotoxins. Renally dosing medications. Monitor urine out put. #  AFIB - Rate control, resume Elliquis for AC . Dc iv lopressor , tab NGT. Titrate as needed # Elevated LFT - GI consulted, suspects 2/2 sepsis, appreciate recs. ( Elevated liver enzymes most likely secondary to sepsis. Coming down. CT shows no biliary obstruction. S/P cholecystectomy ) # Hypothyroidism - Cont home levothyroxine. # Hypocalcemia. Hypokalemia. Replete and trend. # Hypernatremia. Improved. free water NGT . D5 ivf . Serial labs to monitor level. # ETOH abuse - initially placed on  CIWA, DC. Per family last drink was 1 month ago. Thiamine. DVT ppx - SCD; resume Eliquis. 
 
-Code status : FULL Lab Review:  
 
Recent Labs 10/22/19 
0700 10/21/19 
1843 WBC 12.0 11.7 HGB 7.9* 7.5* HCT 25.5* 23.9*  
* 440* Recent Labs 10/22/19 
0700 10/21/19 
1843 * 136  
K 5.4 5.0  
 102 CO2 29 31 * 111* BUN 17 18 CREA 0.67 0.69 CA 8.1* 7.9*  
MG 2.5  --   
PHOS 4.0  -- Lab Results Component Value Date/Time Glucose (POC) 137 (H) 10/22/2019 12:09 PM  
 Glucose (POC) 132 (H) 10/22/2019 06:00 AM  
 Glucose (POC) 148 (H) 10/22/2019 12:30 AM  
 Glucose (POC) 137 (H) 10/21/2019 06:14 PM  
 Glucose (POC) 186 (H) 10/21/2019 04:52 PM  
 Glucose, POC 83 01/03/2017 10:19 AM  
 
No results for input(s): PH, PCO2, PO2, HCO3, FIO2 in the last 72 hours. No results for input(s): INR, INREXT, INREXT in the last 72 hours. No results found for: SDES Lab Results Component Value Date/Time  Culture result: NO GROWTH AFTER 12 HOURS 10/21/2019 07:25 PM  
 Culture result: NO GROWTH AFTER 12 HOURS 10/21/2019 06:59 PM  
 Culture result: NO GROWTH 5 DAYS 10/15/2019 10:30 AM  
 
 
 All Cardiac Markers in the last 24 hours:  
No results found for: CPK, CK, CKMMB, CKMB, RCK3, CKMBT, CKNDX, CKND1, CHAYA, TROPT, TROIQ, BRENDAN, TROPT, TNIPOC, BNP, BNPP Subjective:  
 
Pt had increased WOB and tachypnea this AM 
Improved after lasix. Eyes open, tracking, but not answering questions. Objective:  
 
Vitals: 
Last 24hrs VS reviewed since prior progress note. Most recent are: 
 
Visit Vitals /74 (BP 1 Location: Left arm, BP Patient Position: At rest) Pulse 88 Temp 98.3 °F (36.8 °C) Resp (!) 41 Ht 5' 8\" (1.727 m) Wt 79.4 kg (175 lb 0.7 oz) SpO2 97% BMI 26.62 kg/m² SpO2 Readings from Last 6 Encounters:  
10/22/19 97% 10/08/19 95% 10/08/19 91% 10/07/19 98% 10/03/19 100% 10/03/19 90% O2 Flow Rate (L/min): 3 l/min Intake/Output Summary (Last 24 hours) at 10/22/2019 1404 Last data filed at 10/22/2019 7496 Gross per 24 hour Intake 4645 ml Output 800 ml Net 3845 ml Physical Exam:  
Ears: hearing is intact  AX0X3. Noted tachypnea Eyes: Icterus is not present Lungs: coarse breath sounds bilaterally, tachypneic Heart: regular rate and rhythm, S1, S2 normal, no murmur, click, rub or gallop Gastrointestinal: soft, non-tender. Bowel sounds normal. No masses,  no organomegaly Neurological:  New Focal Deficits are not present. Skin: diffuse ecchymosis. Face with small petechiae (per family is due to known actinic keratosis). Psychiatric:  Mood is stable Medications Reviewed: (see below) Lab Data Reviewed: (see below) 
 
______________________________________________________________________ Medications:  
 
Current Facility-Administered Medications Medication Dose Route Frequency  vancomycin (VANCOCIN) 1,000 mg in 0.9% sodium chloride (MBP/ADV) 250 mL adv  1,000 mg IntraVENous Q12H  
 [START ON 10/23/2019] VANCOMYCIN INFORMATION NOTE   Other ONCE  
 furosemide (LASIX) injection 40 mg  40 mg IntraVENous ONCE  
  meropenem (MERREM) 1 g in 0.9% sodium chloride (MBP/ADV) 50 mL MBP  1 g IntraVENous Q8H  
 VANCOMYCIN INFORMATION NOTE   Other Rx Dosing/Monitoring  docusate sodium (COLACE) capsule 100 mg  100 mg Oral BID PRN  
 bisacodyl (DULCOLAX) suppository 10 mg  10 mg Rectal DAILY  Lactobacillus Acidoph & Bulgar [facility]) tablet 2 Tab  2 Tab Oral BID  albuterol-ipratropium (DUO-NEB) 2.5 MG-0.5 MG/3 ML  3 mL Nebulization QID RT  
 traMADol (ULTRAM) tablet 25 mg  25 mg Oral Q6H PRN  
 albuterol (PROVENTIL VENTOLIN) nebulizer solution 1.25 mg  1.25 mg Nebulization Q4H PRN  
 melatonin tablet 6 mg  6 mg Oral QHS PRN  
 haloperidol lactate (HALDOL) injection 1 mg  1 mg IntraVENous Q8H PRN  
 furosemide (LASIX) tablet 40 mg  40 mg Oral DAILY  metoprolol tartrate (LOPRESSOR) tablet 12.5 mg  12.5 mg Per NG tube BID  thiamine mononitrate (B-1) tablet 100 mg  100 mg Oral DAILY  acetaminophen (TYLENOL) tablet 500 mg  500 mg Oral Q6H PRN  
 acetaminophen (TYLENOL) solution 325 mg  325 mg Per NG tube Q6H  
 insulin lispro (HUMALOG) injection   SubCUTAneous Q6H  
 glucose chewable tablet 16 g  16 g Oral PRN  
 glucagon (GLUCAGEN) injection 1 mg  1 mg IntraMUSCular PRN  
 dextrose 10 % infusion 125-250 mL  125-250 mL IntraVENous PRN  pantoprazole (PROTONIX) injection 40 mg  40 mg IntraVENous DAILY  folic acid (FOLVITE) tablet 1 mg  1 mg Oral DAILY  apixaban (ELIQUIS) tablet 5 mg  5 mg Oral BID  sodium chloride (NS) flush 5-40 mL  5-40 mL IntraVENous Q8H  
 desonide (TRIDESILON) 0.05 % cream - patient supplied (Patient Supplied)   Topical BID  naloxone (NARCAN) injection 0.4 mg  0.4 mg IntraVENous PRN  
 sodium chloride (NS) flush 5-10 mL  5-10 mL IntraVENous PRN  
 levothyroxine (SYNTHROID) tablet 175 mcg  175 mcg Oral ACB Total time spent with patient: 35  minutes Care Plan discussed with: Care Manager, Nursing Staff, Consultant/Specialist and >50% of time spent in counseling and coordination of care Discussed:  Care Plan Attending Physician: Tamara Roach MD

## 2019-10-22 NOTE — PROGRESS NOTES
Problem: Pain Goal: *Control of Pain Outcome: Progressing Towards Goal 
Goal: *PALLIATIVE CARE:  Alleviation of Pain Outcome: Progressing Towards Goal 
  
Problem: Falls - Risk of 
Goal: *Absence of Falls Description Document Aaliyah Beard Fall Risk and appropriate interventions in the flowsheet. Outcome: Progressing Towards Goal 
Note:  
Fall Risk Interventions: 
  
 
Mentation Interventions: Bed/chair exit alarm, Adequate sleep, hydration, pain control, Door open when patient unattended Medication Interventions: Evaluate medications/consider consulting pharmacy Elimination Interventions: Call light in reach, Bed/chair exit alarm, Toileting schedule/hourly rounds History of Falls Interventions: Bed/chair exit alarm, Door open when patient unattended Problem: Patient Education: Go to Patient Education Activity Goal: Patient/Family Education Outcome: Progressing Towards Goal

## 2019-10-22 NOTE — PROGRESS NOTES
Discussed with Dr. Marisa Camilo and patient's Pneumonia is making a turn for the worse. Still tentatively set for EGD and PEG tube placement on Friday if dysphagia does not improve and provided he is medically stable to undergo the procedure. Following with you.  
 
Liam Marie MD

## 2019-10-22 NOTE — PROGRESS NOTES
[facility] 
                                             (A Division of [address] [place] [address]) Follow-up Note Date of Admission: 10/8/2019     Date of Note:  10/22/2019 Summary:   
 
79 y/o  male HTN, AF, GERD, DJD s/p L TKR, Prostate CA, Hypothyroidism, h/o pancreatitis, ETOH consumption adm 10/8/2019 w/ chills, weakness, altered mental status. Has neck pain x 1 mo. Had R leg cellulitis after cat bite (not scratch) 1 mo PTA - resolved w/ abx. Then micturition syncope x 3, hurt knee, progressing generalized weakness, then 1 day PTA fever, chills. Adm w/ 100.5, WBC 29.5, 13% bands. No uti, pneumonia, unrevealing CTAP. 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 Rpt blcx 10/10 NGTD x 2.  Gallium scan 10/14: intense periprosthetic uptake L knee, distal femur. L knee synovial fluid 10/15- not s/o septic arthritis. Presume distal L femur OM. Meropenem, Vanc started 10/21 for ? HAP Interval History: 
 
Remains afebrile with normal WBC. Asleep but arousable and stayed awake during visit. Did not speak or follow commands. Current Antimicrobials: Prior Antimicrobials Meropenem, Vanc 10/21 - 1  abx 10/8 - 12 Vancomycin 10/8 - 2  Cefepime, Levoflox 10/8 - 0 Meropenem 10/9 - 2 Ceftriaxone 10/11 - 10  
  
  
Assessment: Plan: NEW R LL opacity 
- atelectasis vs infiltrate. - afebrile but temp trending up ->  Continue Vanc, meropenem for 7 days then change back to Ceftriaxone to complete OM rx  
-> if MRSA screen is negative dc vancomycin Left knee uptake on Ga scan - s/p US guided arthrocentesis 10/15 - negative for infection 
- will presume distal L femur osteomyelitis -> abx as above then 
-> then complete 6 wks abx with Ceftriaxone (target end date: 11/19/2019)  +/- subsequent po abx suppression  
-> PICC prior to discharge. (still has R IJ CVL) Pasteurella BSI - 1 of 2 blood cultures 10/8 + P multocida sens TMP-SMX, Ceftriaxone, Levofloxacin, PCN, TCN 
- No active cellulitis. R leg cellulitis following cat bite in Early September resolved with TMP-SMX then Keflex - current source unclear: ?epidural abscess or discitis/OM?, R leg OM? 
- Recent TTE 9/27: neg vegetations - rpt blcx 10/10 NG x 2 
- Gallium scan 10/14: intense periprosthetic uptake L knee, distal femur. 
- L knee aspiration 10/15: 7,200 WBC, no crystals - not suggestive of septic arthritis -> continue Ceftriaxone as above Encephalopathy/ ETOH Withdrawal 
- worse since admission - ? Medication-related: has been receiving dilaudid q 4 hours and Ativan this am 
- Head CT 10/14: no acute intracranial abnormality 
- not related to infection which appears under control at this time 
- improving.  -> per primary team.   
Septic shock 
- due to above 
- improving. Vasopressors stopped 10/10.   
- resolved -> abx as above MACKENZIE 
- resolved Worsening alkaline phosphatase 
- from sepsis, ?bone infection / met (doubt) 
- improving H/o PCN allergy (rash)  
- tolerated keflex 
- tolerating Ceftriaxone Recent R leg cellulitis following cat bite (not scratch) R hip pain - s/p aspiration 10/9: 23 WBC, no crystals HTN    
AF    
GERD    
DJD s/p L TKR    
Prostate CA    
Hypothyroidism    
h/o pancreatitis    
ETOH consumption    
  
Microbiology: 
  
10/8      blcx Pasteurella multocida 1 of 2 
             urcx NG 
10/9      R hip asp gs no wbc, NOS, cx NG 
 
10/10 blcx NGTD x 2 
  
Lines / Catheters: RIJ CVL Patient Active Problem List  
Diagnosis Code  Malignant neoplasm of prostate ([facility] C61  Hypertension I10  
 GERD (gastroesophageal reflux disease) K21.9  Glaucoma H40.9  Glaucoma H40.9  Dysphagia R13.10  Encounter for colonoscopy due to history of adenomatous colonic polyps Z12.11, Z86.010  
 Non-pressure chronic ulcer of right calf with fat layer exposed ([facility] H09.258  Laceration of right lower leg with complication U31.287U  Edema R60.9  Pneumonia J18.9  Atrial fibrillation (HCC) I48.91  
 Hypothyroidism E03.9  Sepsis ([facility] A41.9  Cellulitis L03.90  
 HTN (hypertension) F97  
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) A41.9, R65.20  Cellulitis of left knee L03.116 Current Facility-Administered Medications Medication Dose Route Frequency  vancomycin (VANCOCIN) 1,000 mg in 0.9% sodium chloride (MBP/ADV) 250 mL adv  1,000 mg IntraVENous Q12H  
 [START ON 10/23/2019] VANCOMYCIN INFORMATION NOTE   Other ONCE  
 meropenem (MERREM) 1 g in 0.9% sodium chloride (MBP/ADV) 50 mL MBP  1 g IntraVENous Q8H  
 VANCOMYCIN INFORMATION NOTE   Other Rx Dosing/Monitoring  docusate sodium (COLACE) capsule 100 mg  100 mg Oral BID PRN  
 bisacodyl (DULCOLAX) suppository 10 mg  10 mg Rectal DAILY  Lactobacillus Acidoph & Bulgar [facility]) tablet 2 Tab  2 Tab Oral BID  albuterol-ipratropium (DUO-NEB) 2.5 MG-0.5 MG/3 ML  3 mL Nebulization QID RT  
 traMADol (ULTRAM) tablet 25 mg  25 mg Oral Q6H PRN  
 albuterol (PROVENTIL VENTOLIN) nebulizer solution 1.25 mg  1.25 mg Nebulization Q4H PRN  
 melatonin tablet 6 mg  6 mg Oral QHS PRN  
 haloperidol lactate (HALDOL) injection 1 mg  1 mg IntraVENous Q8H PRN  
 [Held by provider] furosemide (LASIX) tablet 40 mg  40 mg Oral DAILY  metoprolol tartrate (LOPRESSOR) tablet 12.5 mg  12.5 mg Per NG tube BID  thiamine mononitrate (B-1) tablet 100 mg  100 mg Oral DAILY  acetaminophen (TYLENOL) tablet 500 mg  500 mg Oral Q6H PRN  
 acetaminophen (TYLENOL) solution 325 mg  325 mg Per NG tube Q6H  
 insulin lispro (HUMALOG) injection   SubCUTAneous Q6H  
  glucose chewable tablet 16 g  16 g Oral PRN  
 glucagon (GLUCAGEN) injection 1 mg  1 mg IntraMUSCular PRN  
 dextrose 10 % infusion 125-250 mL  125-250 mL IntraVENous PRN  pantoprazole (PROTONIX) injection 40 mg  40 mg IntraVENous DAILY  folic acid (FOLVITE) tablet 1 mg  1 mg Oral DAILY  apixaban (ELIQUIS) tablet 5 mg  5 mg Oral BID  sodium chloride (NS) flush 5-40 mL  5-40 mL IntraVENous Q8H  
 desonide (TRIDESILON) 0.05 % cream - patient supplied (Patient Supplied)   Topical BID  naloxone (NARCAN) injection 0.4 mg  0.4 mg IntraVENous PRN  
 sodium chloride (NS) flush 5-10 mL  5-10 mL IntraVENous PRN  
 levothyroxine (SYNTHROID) tablet 175 mcg  175 mcg Oral ACB Review of Systems - Negative except as in interval history Objective: 
Visit Vitals /74 (BP 1 Location: Left arm, BP Patient Position: At rest) Pulse 88 Temp 98.3 °F (36.8 °C) Resp (!) 41 Ht 5' 8\" (1.727 m) Wt 79.4 kg (175 lb 0.7 oz) SpO2 97% BMI 26.62 kg/m² Temp (24hrs), Av.7 °F (37.1 °C), Min:98 °F (36.7 °C), Max:99.7 °F (37.6 °C) General: Well developed, well nourished 80 y.o.  male more alert, follws simple commands, answers some questions. HEENT: no scleral icterus, mucous membranes moist, pharynx normal without lesions, NGT still in place Neck: resistant to flexion in all directions but more flexible than previous Cardio:  irregularly irregular rhythm Lungs: rhonchi bilateral and + expiratory wheezes Abdomen: soft, non-tender. Bowel sounds normal. No masses, no organomegaly. Extremities:  no redness or tenderness in the calves or thighs, no edema Lab results: 
 
Chemistry Recent Labs 10/22/19 
0700 10/21/19 
1843 * 111* * 136  
K 5.4 5.0  
 102 CO2 29 31 BUN 17 18 CREA 0.67 0.69 CA 8.1* 7.9* AGAP 5 3 BUCR 25* 26* CBC w/ Diff Recent Labs 10/22/19 
0700 10/21/19 
1843 WBC 12.0 11.7 RBC 2.57* 2.45* HGB 7.9* 7.5* HCT 25.5* 23.9*  
* 440* GRANS 68 67 LYMPH 20* 21 EOS 1 1 Microbiology All Micro Results Procedure Component Value Units Date/Time CULTURE, BLOOD [[id number]] Collected:  10/21/19 1859 Order Status:  Completed Specimen:  Blood Updated:  10/22/19 0749 Special Requests: --     
  RIGHT 
RED PORT Culture result: NO GROWTH AFTER 12 HOURS     
 CULTURE, BLOOD [[id number]] Collected:  10/21/19 1925 Order Status:  Completed Specimen:  Blood Updated:  10/22/19 0749 Special Requests: NO SPECIAL REQUESTS Culture result: NO GROWTH AFTER 12 HOURS     
 MRSA SCREEN - PCR (NASAL) [[id number]] Order Status:  Sent Specimen:  Nasal   
 ANAEROBE ID REFLEX [[id number]] Collected:  10/15/19 1030 Order Status:  Completed Specimen:  MICROBIAL ISOLATE Updated:  10/20/19 1835 Source PENDING Result 1 Comment Comment: (NOTE) No anaerobic growth in 72 hours. Performed At: [address] [id number] Lynn Westfall MD [id number] CULTURE, BODY FLUID Pati Bloom [[id number]] Collected:  10/15/19 1030 Order Status:  Completed Specimen:  Synovial Fluid Updated:  10/20/19 7256 Special Requests: LEFT KNEE     
  GRAM STAIN FEW WBC'S     
   NO ORGANISMS SEEN Culture result: NO GROWTH 5 DAYS     
 CULTURE, BLOOD [[id number]] Collected:  10/10/19 1341 Order Status:  Completed Specimen:  Blood Updated:  10/16/19 0042 Special Requests: --     
  NO SPECIAL REQUESTS 
BLUE PORT Culture result: NO GROWTH 6 DAYS     
 CULTURE, BLOOD [[id number]] Collected:  10/10/19 1237 Order Status:  Completed Specimen:  Blood Updated:  10/16/19 0042 Special Requests: PERIPHERAL Culture result: NO GROWTH 6 DAYS     
 CULTURE, BLOOD [[id number]] Collected:  10/08/19 5034 Order Status:  Completed Specimen:  Blood from Miscellaneous sample Updated:  10/15/19 3669 Special Requests: NO SPECIAL REQUESTS Culture result: NO GROWTH 6 DAYS     
 CULTURE, BODY FLUID [[id number]] Collected:  10/09/19 1200 Order Status:  Completed Specimen:  Synovial Fluid Updated:  10/14/19 4860 Special Requests: HIP  
  GRAM STAIN NO WBC'S SEEN     
   NO ORGANISMS SEEN Culture result: NO GROWTH 5 DAYS     
 CULTURE, BLOOD [[id number]]  (Abnormal)  (Susceptibility) Collected:  10/08/19 1301 Order Status:  Completed Specimen:  Blood Updated:  10/11/19 0825 Special Requests: --     
  NO SPECIAL REQUESTS 
RIGHT 
ARM 
  
  GRAM STAIN    
  AEROBIC BOTTLE GRAM NEGATIVE RODS SMEAR CALLED TO AND CORRECTLY REPEATED BY: DESHAWN COSBY RN,ICU, ON 10/9/19 AT 0335 TO [facility] Culture result:    
  AEROBIC BOTTLE PASTEURELLA MULTOCIDA CULTURE, URINE [[id number]] Collected:  10/08/19 1418 Order Status:  Completed Specimen:  Cath Urine Updated:  10/10/19 1016 Special Requests: NO SPECIAL REQUESTS Culture result: NO GROWTH 2 DAYS Marina Middleton MD, [facility] Infectious Diseases 
[phone] 
10/22/2019  
9:02 AM

## 2019-10-22 NOTE — PROGRESS NOTES
Respiratory Therapy Assessment Care Plan Patient: 
Clarence Hernandez 80 y.o. male 10/22/2019 7:26 AM 
 
Severe sepsis ([address] [A41.9, R65.20] Altered mental status [R41.82] Chest X-RAY:  
Results from Hospital Encounter encounter on 10/08/19 XR CHEST PORT Impression IMPRESSION:  
 
1. Stable support lines and tubes. 2. Bibasilar atelectasis/airspace disease, likely with pleural effusions, right 
greater than left. This has slightly worsened in the right base. XR CHEST PORT Impression IMPRESSION: 
 
 
1. Right IJ approach central venous catheter in stable position. 2. Nasogastric tube below the diaphragm, tip collimated from view. 3. Low lung volumes and bibasilar atelectasis with likely small right pleural 
effusion not significantly changed from prior. XR ABD PORT  1 V Addendum Addendum: Initial preliminary report was provided by the on-call radiology resident. Danish Carreon MD 10/17/2019  8:37 AM        
 Impression IMPRESSION: 
 
Tip of the nasogastric tube is in the proximal body of the stomach, recommend 
advancing. Vital Signs:    
Visit Vitals /77 Pulse 99 Temp 98.8 °F (37.1 °C) Resp (!) 36 Ht 5' 8\" (1.727 m) Wt 79.4 kg (175 lb 0.7 oz) SpO2 95% BMI 26.62 kg/m² Indications for treatment: Atelectasis/Pleural Effusions / pneumonia hx /  
 
 
Plan of care: Assess for more RT . No pulmonary hx. Age / sepsis / cough and WOB a concern / O2 at 3 lpm to assist WOB and acute hypoxia Goal: Reduced WOB , improved cough . Patient to understand

## 2019-10-22 NOTE — PROGRESS NOTES
conducted a Follow up consultation and Spiritual Assessment for Raghu Denny, who is a 80 y.o.,male. The  provided the following Interventions: 
Continued the relationship of care and support. Listened empathically. Offered prayer and assurance of continued prayer on patients behalf. Chart reviewed. The following outcomes were achieved: 
Patient expressed gratitude for pastoral care visit. Assessment: 
There are no further spiritual or Buddhist issues which require Spiritual Care Services interventions at this time. Plan: 
Chaplains will continue to follow and will provide pastoral care on an as needed/requested basis.  recommends bedside caregivers page  on duty if patient shows signs of acute spiritual or emotional distress. [address] [facility] Staff  Spiritual Care  
[phone]

## 2019-10-22 NOTE — PROGRESS NOTES
Pharmacy Dosing Services: Vancomycin Indication: HAP Day of therapy: 0 Other Antimicrobials (Include dose, start day & day of therapy): 
Meropenem 1 gram every 8 hours Loading dose (date given): 2,000 mg 
Current Maintenance dose: New start Goal Vancomycin Level: 15 - 20 mcg/mL (Trough 15-20 for most infections, 20 for meningitis/osteomyelitis, pre-HD level ~25) Vancomycin Level (if drawn): New start Significant Cultures: New start Renal function stable? (unstable defined as SCr increase of 0.5 mg/dL or > 50% increase from baseline, whichever is greater) (Y/N): Y  
 
CAPD, Hemodialysis or Renal Replacement Therapy (Y/N): N Recent Labs 10/21/19 
1843 10/19/19 
0505 CREA 0.69 0.85 BUN 18 25* WBC 11.7 13.1 Temp (24hrs), Av.7 °F (37.1 °C), Min:97.6 °F (36.4 °C), Max:99.7 °F (37.6 °C) Creatinine Clearance (Creatinine Clearance (ml/min)): Estimated Creatinine Clearance: 73.3 mL/min (by C-G formula based on SCr of 0.69 mg/dL). Regimen assessment: New start Maintenance dose: 1,000 mg every 12 hours Next scheduled level: Trough 10-23 at 2030 Pharmacy will follow daily and adjust medications as appropriate for renal function and/or serum levels. Thank you, GRACE Marshall

## 2019-10-23 NOTE — PROGRESS NOTES
Respiratory Therapy Assessment Care Plan Patient: 
Susana Escobedo 80 y.o. male 10/23/2019 8:49 AM 
 
Severe sepsis ([address] [A41.9, R65.20] Altered mental status [R41.82] Chest X-RAY:  
Results from Hospital Encounter encounter on 10/08/19 XR CHEST PORT Impression IMPRESSION:  
 
1. Stable support lines and tubes. 2. Bibasilar atelectasis/airspace disease, likely with pleural effusions, right 
greater than left. This has slightly worsened in the right base. XR CHEST PORT Impression IMPRESSION: 
 
 
1. Right IJ approach central venous catheter in stable position. 2. Nasogastric tube below the diaphragm, tip collimated from view. 3. Low lung volumes and bibasilar atelectasis with likely small right pleural 
effusion not significantly changed from prior. XR ABD PORT  1 V Addendum Addendum: Initial preliminary report was provided by the on-call radiology resident. Dameon Cagle MD 10/17/2019  8:37 AM        
 Impression IMPRESSION: 
 
Tip of the nasogastric tube is in the proximal body of the stomach, recommend 
advancing. Vital Signs:    
Visit Vitals /55 (BP 1 Location: Left arm, BP Patient Position: At rest) Pulse 91 Temp 98.3 °F (36.8 °C) Resp (!) 36 Ht 5' 8\" (1.727 m) Wt 79.4 kg (175 lb 0.7 oz) SpO2 95% BMI 26.62 kg/m² Indications for treatment:  Pneumonia / Bed Ridden / WOB / Prevention Plan of care: ELVA ( DUONEB )  QID . Cough and deep breath importance to patient and family  , O2 to keep SPO2 > 90% Goal: Reduction and need for Respiratory Therapy interventions

## 2019-10-23 NOTE — PROGRESS NOTES
Problem: Dysphagia (Adult) Goal: *Acute Goals and Plan of Care (Insert Text) Description Recommendations: 
Diet: NPO; PEG vs QOL puree/htl diet Meds: via alternate means Strict Aspiration Precautions Oral Care TID Goals:  Patient will: 1. Tolerate PO trials with no overt s/s aspiration or distress in 4/5 trials 2. Utilize compensatory swallow strategies/maneuvers (decrease bite/sip, size/rate, alt. liq/sol) with min cues in 4/5 trials 3. Perform oral-motor/laryngeal strengthening exercises with min cues to increase oropharyngeal swallow function 4. Complete an objective swallow study (i.e., MBSS) to assess swallow integrity, r/o aspiration, and determine of safest LRD, min A - completed 10/23/19 Outcome: Progressing Towards Goal 
 
SPEECH PATHOLOGY MODIFIED BARIUM SWALLOW STUDY & TREATMENT Patient: Margie Pimentel (36 y.o. male) Date: 10/23/2019 Primary Diagnosis: Severe sepsis ([facility] [A41.9, R65.20] Altered mental status [R41.82] Precautions:   Fall, Skin, Aspiration PLOF: regular/thin ASSESSMENT : 
MBS completed with overt aspiration at the time of, and after swallow, of all study trials, to include: thin via tsp; nectar via tsp; honey via tsp; pudding. Pt with minimally delayed oral bolus cohesion and A-P transit; however, adequate swallow timing appreciated. Severely decreased hyolaryngeal excursion with no epiglottic inversion resulting in airway comprise with both silent and overt aspiration, as well as large volume residuals in valleculae and pyriforms. Pt with poorly productive cough and is unable to effectively clear throat despite max tactile cues, to clear residuals. Pt presents with mild oral/severe pharyngeal dysphagia, as evidenced above and is not safe for po intake at this time. Pt is at very high risk for aspiration despite diet modification to puree with honey-thick liquids.  Rec QOL/comfort diet vs PEG; however, given advanced age and multiple comorbidities, preferred recommendation is initiation of QOL/comfort diet. Treatment: 
Skilled therapy initiated: Educated pt on MBS results, aspiration precautions, and importance of compensatory swallow techniques to decrease aspiration risk (decrease rate of intake & sip/bite size, upright @HOB for all po intake and ~30 minutes after po); requires reinforcement. Results d/w MD. SLP to follow as indicated. Patient will benefit from skilled intervention to address the above impairments. Patient's rehabilitation potential is considered to be Guarded Factors which may influence rehabilitation potential include:  
? None noted ? Mental ability/status ? Medical condition ? Home/family situation and support systems ? Safety awareness ? Pain tolerance/management ? Other: PLAN : 
Recommendations and Planned Interventions: PEG vs QOL puree/htl diet Frequency/Duration: Patient will be followed by speech-language pathology 1-2 times per day/4-7 days per week to address goals. Discharge Recommendations: Gaston Torres SUBJECTIVE:  
Patient stated nonverbal. OBJECTIVE:  
 
Past Medical History:  
Diagnosis Date Arthritis Atrial fibrillation ([facility] 07/2017 Dr Zina Harris cardio follows. chronic Carcinoma of prostate ([facility] Cellulitis Coarse tremor   
  hands, states \"my doctor is aware\" Difficulty swallowing Dysphagia Edema Encounter for colonoscopy due to history of adenomatous colonic polyps Essential hypertension Fall 10/07/2017 \"went to [facility] Emergency Room, CT Scan showed concussion, no bleeding\" per patient Fatigue GERD (gastroesophageal reflux disease) wo. esophagitis Glaucoma H/O joint replacement   
  left knee 2003 HLD (hyperlipidemia) Hypertension Hypertensive disorder Hypothyroidism Impotence Laceration of right lower leg with complication Malignant neoplasm of prostate ([address]   
  [id number] Adenocarcinoma of the Prostate, dx 11/3/2008, karlee 3+4, presenting PSA 5.5ng/mL. 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   
 acute/hist. of   
 
Past Surgical History:  
Procedure Laterality Date COLONOSCOPY N/A 1/3/2017 HX CHOLECYSTECTOMY HX KNEE REPLACEMENT Left 01/2003 HX SHOULDER REPLACEMENT Right 7/17, 10/17 Home Situation:  
Home Situation Home Environment: Private residence # Steps to Enter: 5 One/Two Story Residence: One story Living Alone: No 
Support Systems: Family member(s), Friends \ neighbors, Child(steffi) Patient Expects to be Discharged to[de-identified] Private residence Current DME Used/Available at Home: Shower chair Tub or Shower Type: Shower Diet prior to admission: regular/thin Current Diet:  PEG vs Comfort Care diet Radiologist: [facility] Film Views: Lateral 
Patient Position: 90 
 
8-point Penetration-Aspiration Scale: Score 8 PAIN: 
Pain level pre-treatment: 0/10 Pain level post-treatment: 0/10 COMMUNICATION/EDUCATION:  
?  Patient educated regarding MBS results and diet recommendations. ?  Patient/family have participated as able in goal setting and plan of care. ?  Patient/family agree to work toward stated goals and plan of care. ?  Patient understands intent and goals of therapy, but is neutral about his/her participation. ? Patient is unable to participate in goal setting and plan of care; ongoing with interdisciplinary staff. Thank you for this referral. 
Elfrapoorva Lacy, SLP Time Calculation: 29 mins MBS Time: 20 minutes Treatment Time: 9 minutes

## 2019-10-23 NOTE — PROGRESS NOTES
Speech Therapy Note: MBS completed with aspiration across all study trials. Pt is at very high risk for aspiration despite diet modification to puree with honey-thick liquids. Rec QOL/comfort diet vs PEG; however, given advanced age and multiple comorbidities, preferred recommendation is initiation of QOL/comfort diet. Full report to follow. Rec:  
Puree diet with honey-thick liquids Consider PEG due to high aspiration risk, see above Aspiration precautions Oral care TID Meds crushed HOB > 45 for all po Naila Sanchez M.S., CCC-SLP Clinical Supervisor - Med-surg Rehab Ph: [phone]

## 2019-10-23 NOTE — PROGRESS NOTES
Mental status remains poor. SLP reevaluation c/w high risk for aspiration. New RLL infiltrate possibly aspiration and receiving Vanco and Meropenem. Will hold Eliquis today in anticipation of EGD with PEG on Friday if medically stable and with consent from [address] [place] [address]. Maile Flores MD 
[facility]

## 2019-10-23 NOTE — PROGRESS NOTES
Bedside and Verbal shift change report given to FLYNN Desir (oncoming nurse) by Sherren Flattery, (offgoing nurse). Report included the following information SBAR, Kardex, MAR and Recent Results. Patient home med located 1230. It was placed in the trash. 1100Patient left the floor for swallow study. Patient failed study  Is likely to get PEG tube placed. Bedside and Verbal shift change report given to Courtney Fowler (oncoming nurse) by  Zoë Olivares (offgoing nurse). Report included the following information SBAR, Kardex, MAR and Recent Results.

## 2019-10-23 NOTE — PROGRESS NOTES
[facility] 
                                             (A Division of [address] [place] [address]) Follow-up Note Date of Admission: 10/8/2019     Date of Note:  10/23/2019 Summary:   
 
81 y/o  male HTN, AF, GERD, DJD s/p L TKR, Prostate CA, Hypothyroidism, h/o pancreatitis, ETOH consumption adm 10/8/2019 w/ chills, weakness, altered mental status. Has neck pain x 1 mo. Had R leg cellulitis after cat bite (not scratch) 1 mo PTA - resolved w/ abx. Then micturition syncope x 3, hurt knee, progressing generalized weakness, then 1 day PTA fever, chills. Adm w/ 100.5, WBC 29.5, 13% bands. No uti, pneumonia, unrevealing CTAP. 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 Rpt blcx 10/10 NGTD x 2.  Gallium scan 10/14: intense periprosthetic uptake L knee, distal femur. L knee synovial fluid 10/15- not s/o septic arthritis. Presume distal L femur OM. Meropenem, Vanc started 10/21 for ? HAP Interval History: 
 
Just back from Swallow study. Alert and responds weakly to some questions. Denies pain. Remains afebrile. Current Antimicrobials: Prior Antimicrobials Meropenem, Vanc 10/21 - 2  abx 10/8 - 15 Vancomycin 10/8 - 2  Cefepime, Levoflox 10/8 - 0 Meropenem 10/9 - 2 Ceftriaxone 10/11 - 10  
  
  
Assessment: Plan: NEW R LL opacity 
- atelectasis vs infiltrate. - afebrile but temp trending up ->  Continue Vanc, meropenem for 7 days then change back to Ceftriaxone to complete OM rx  
-> if MRSA screen not done - will order again Left knee uptake on Ga scan - s/p US guided arthrocentesis 10/15 - negative for infection 
- will presume distal L femur osteomyelitis -> abx as above then 
-> then complete 6 wks abx with Ceftriaxone (target end date: 11/19/2019)  +/- subsequent po abx suppression  
-> PICC prior to discharge. (still has R IJ CVL) Pasteurella BSI 
- 1 of 2 blood cultures 10/8 + P multocida sens TMP-SMX, Ceftriaxone, Levofloxacin, PCN, TCN 
- No active cellulitis. R leg cellulitis following cat bite in Early September resolved with TMP-SMX then Keflex - current source unclear: ?epidural abscess or discitis/OM?, R leg OM? 
- Recent TTE 9/27: neg vegetations - rpt blcx 10/10 NG x 2 
- Gallium scan 10/14: intense periprosthetic uptake L knee, distal femur. 
- L knee aspiration 10/15: 7,200 WBC, no crystals - not suggestive of septic arthritis -> continue Meropenem as above Encephalopathy/ ETOH Withdrawal 
- worse since admission - ? Medication-related: has been receiving dilaudid q 4 hours and Ativan this am 
- Head CT 10/14: no acute intracranial abnormality 
- not related to infection which appears under control at this time 
- improving.  -> per primary team.   
Septic shock 
- due to above 
- improving. Vasopressors stopped 10/10.   
- resolved -> abx as above MACKENZIE 
- resolved Worsening alkaline phosphatase 
- from sepsis, ?bone infection / met (doubt) 
- improving H/o PCN allergy (rash)  
- tolerated keflex 
- tolerating Ceftriaxone Recent R leg cellulitis following cat bite (not scratch) R hip pain - s/p aspiration 10/9: 23 WBC, no crystals HTN    
AF    
GERD    
DJD s/p L TKR    
Prostate CA    
Hypothyroidism    
h/o pancreatitis    
ETOH consumption    
  
Microbiology: 
  
10/8      blcx Pasteurella multocida 1 of 2 
             urcx NG 
10/9      R hip asp gs no wbc, NOS, cx NG 
 
10/10 blcx NGTD x 2 
  
Lines / Catheters: RIJ CVL Patient Active Problem List  
Diagnosis Code  Malignant neoplasm of prostate ([facility] C61  Hypertension I10  
 GERD (gastroesophageal reflux disease) K21.9  Glaucoma H40.9  Glaucoma H40.9  Dysphagia R13.10  Encounter for colonoscopy due to history of adenomatous colonic polyps Z12.11, Z86.010  
 Non-pressure chronic ulcer of right calf with fat layer exposed ([facility] Q39.003  Laceration of right lower leg with complication J69.173I  Edema R60.9  Pneumonia J18.9  Atrial fibrillation (HCC) I48.91  
 Hypothyroidism E03.9  Sepsis ([facility] A41.9  Cellulitis L03.90  
 HTN (hypertension) V29  
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  
 barium sulfate (VARIBAR PUDDING) 40 % (w/v), 30% (w/w) contrast oral paste 15 mL  15 mL Oral RAD ONCE  
 barium sulfate (VARIBAR THIN) 40 % (w/v) oral powder 30 mL  30 mL Oral RAD ONCE  
 barium sulfate (VARIBAR HONEY) 40 % (w/v) 29% (w/w) contrast suspension 15 mL  15 mL Oral RAD ONCE  
 barium sulfate (E-Z-DISK) contrast tablet 700 mg  700 mg Oral RAD ONCE  
 vancomycin (VANCOCIN) 1,000 mg in 0.9% sodium chloride (MBP/ADV) 250 mL adv  1,000 mg IntraVENous Q12H  VANCOMYCIN INFORMATION NOTE   Other ONCE  
 meropenem (MERREM) 1 g in 0.9% sodium chloride (MBP/ADV) 50 mL MBP  1 g IntraVENous Q8H  
 VANCOMYCIN INFORMATION NOTE   Other Rx Dosing/Monitoring  docusate sodium (COLACE) capsule 100 mg  100 mg Oral BID PRN  
 Lactobacillus Acidoph & Bulgar [facility]) tablet 2 Tab  2 Tab Oral BID  albuterol-ipratropium (DUO-NEB) 2.5 MG-0.5 MG/3 ML  3 mL Nebulization QID RT  
 albuterol (PROVENTIL VENTOLIN) nebulizer solution 1.25 mg  1.25 mg Nebulization Q4H PRN  
 melatonin tablet 6 mg  6 mg Oral QHS PRN  
 haloperidol lactate (HALDOL) injection 1 mg  1 mg IntraVENous Q8H PRN  
 furosemide (LASIX) tablet 40 mg  40 mg Oral DAILY  metoprolol tartrate (LOPRESSOR) tablet 12.5 mg  12.5 mg Per NG tube BID  
  thiamine mononitrate (B-1) tablet 100 mg  100 mg Oral DAILY  acetaminophen (TYLENOL) tablet 500 mg  500 mg Oral Q6H PRN  
 acetaminophen (TYLENOL) solution 325 mg  325 mg Per NG tube Q6H  
 insulin lispro (HUMALOG) injection   SubCUTAneous Q6H  
 glucose chewable tablet 16 g  16 g Oral PRN  
 glucagon (GLUCAGEN) injection 1 mg  1 mg IntraMUSCular PRN  
 dextrose 10 % infusion 125-250 mL  125-250 mL IntraVENous PRN  pantoprazole (PROTONIX) injection 40 mg  40 mg IntraVENous DAILY  folic acid (FOLVITE) tablet 1 mg  1 mg Oral DAILY  apixaban (ELIQUIS) tablet 5 mg  5 mg Oral BID  sodium chloride (NS) flush 5-40 mL  5-40 mL IntraVENous Q8H  
 desonide (TRIDESILON) 0.05 % cream - patient supplied (Patient Supplied)   Topical BID  naloxone (NARCAN) injection 0.4 mg  0.4 mg IntraVENous PRN  
 sodium chloride (NS) flush 5-10 mL  5-10 mL IntraVENous PRN  
 levothyroxine (SYNTHROID) tablet 175 mcg  175 mcg Oral ACB Review of Systems - Negative except as in interval history Objective: 
Visit Vitals /64 (BP 1 Location: Left arm, BP Patient Position: At rest) Pulse 96 Temp 99 °F (37.2 °C) Resp 16 Ht 5' 8\" (1.727 m) Wt 79.4 kg (175 lb 0.7 oz) SpO2 94% BMI 26.62 kg/m² Temp (24hrs), Av.4 °F (36.9 °C), Min:97.7 °F (36.5 °C), Max:99 °F (37.2 °C) General: Well developed, well nourished 80 y.o.  male more alert, follws simple commands, answers some questions. HEENT: no scleral icterus, mucous membranes moist, pharynx normal without lesions, NGT still in place Neck: resistant to flexion in all directions but more flexible than previous Cardio:  irregularly irregular rhythm Lungs: rhonchi bilateral and + expiratory wheezes Abdomen: soft, non-tender. Bowel sounds normal. No masses, no organomegaly. Extremities:  no redness or tenderness in the calves or thighs, no edema Lab results: 
 
Chemistry Recent Labs 10/23/19 
0400 10/22/19 0700 10/21/19 
1843 GLU 78 115* 111*  135* 136  
K 4.8 5.4 5.0  
CL 99* 101 102 CO2 31 29 31 BUN 17 17 18 CREA 0.66 0.67 0.69 CA 8.0* 8.1* 7.9* AGAP 6 5 3 BUCR 26* 25* 26* CBC w/ Diff Recent Labs 10/23/19 
0400 10/22/19 
0700 10/21/19 
1843 WBC 12.6 12.0 11.7 RBC 2.58* 2.57* 2.45* HGB 7.9* 7.9* 7.5* HCT 25.6* 25.5* 23.9*  
* 466* 440* GRANS 64 68 67 LYMPH 21 20* 21 EOS 2 1 1 Microbiology All Micro Results Procedure Component Value Units Date/Time CULTURE, BLOOD [[id number]] Collected:  10/21/19 1925 Order Status:  Completed Specimen:  Blood Updated:  10/23/19 9084 Special Requests: NO SPECIAL REQUESTS Culture result: NO GROWTH 2 DAYS     
 CULTURE, BLOOD [[id number]] Collected:  10/21/19 1859 Order Status:  Completed Specimen:  Blood Updated:  10/23/19 7814 Special Requests: --     
  RIGHT 
RED PORT Culture result: NO GROWTH 2 DAYS     
 MRSA SCREEN - PCR (NASAL) [[id number]] Order Status:  Sent Specimen:  Nasal   
 ANAEROBE ID REFLEX [[id number]] Collected:  10/15/19 1030 Order Status:  Completed Specimen:  MICROBIAL ISOLATE Updated:  10/20/19 1835 Source PENDING Result 1 Comment Comment: (NOTE) No anaerobic growth in 72 hours. Performed At: [address] [id number] Ritika Beard MD [id number] CULTURE, BODY FLUID Maite Josue [[id number]] Collected:  10/15/19 1030 Order Status:  Completed Specimen:  Synovial Fluid Updated:  10/20/19 9447 Special Requests: LEFT KNEE     
  GRAM STAIN FEW WBC'S     
   NO ORGANISMS SEEN Culture result: NO GROWTH 5 DAYS     
 CULTURE, BLOOD [[id number]] Collected:  10/10/19 1341 Order Status:  Completed Specimen:  Blood Updated:  10/16/19 0042 Special Requests: --     
  NO SPECIAL REQUESTS 
BLUE PORT Culture result: NO GROWTH 6 DAYS     
 CULTURE, BLOOD [[id number]] Collected:  10/10/19 1960 Order Status:  Completed Specimen:  Blood Updated:  10/16/19 0042 Special Requests: PERIPHERAL Culture result: NO GROWTH 6 DAYS     
 CULTURE, BLOOD [[id number]] Collected:  10/08/19 2234 Order Status:  Completed Specimen:  Blood from Miscellaneous sample Updated:  10/15/19 0754 Special Requests: NO SPECIAL REQUESTS Culture result: NO GROWTH 6 DAYS     
 CULTURE, BODY FLUID [[id number]] Collected:  10/09/19 1200 Order Status:  Completed Specimen:  Synovial Fluid Updated:  10/14/19 1715 Special Requests: HIP  
  GRAM STAIN NO WBC'S SEEN     
   NO ORGANISMS SEEN Culture result: NO GROWTH 5 DAYS     
 CULTURE, BLOOD [[id number]]  (Abnormal)  (Susceptibility) Collected:  10/08/19 1301 Order Status:  Completed Specimen:  Blood Updated:  10/11/19 0825 Special Requests: --     
  NO SPECIAL REQUESTS 
RIGHT 
ARM 
  
  GRAM STAIN    
  AEROBIC BOTTLE GRAM NEGATIVE RODS SMEAR CALLED TO AND CORRECTLY REPEATED BY: DESHAWN COSBY RN,ICU, ON 10/9/19 AT 0335 TO S Culture result:    
  AEROBIC BOTTLE PASTEURELLA MULTOCIDA CULTURE, URINE [[id number]] Collected:  10/08/19 1418 Order Status:  Completed Specimen:  Cath Urine Updated:  10/10/19 1016 Special Requests: NO SPECIAL REQUESTS Culture result: NO GROWTH 2 DAYS Venice Coronado MD, [facility] 
[phone] 
10/23/2019  
9:02 AM

## 2019-10-23 NOTE — PROGRESS NOTES
OT arrived to treat patient @ 1:14 pm.  Daughter informed therapist that pt had just returned from swallow study and was not up for treatment today. Pt appeared lethargic in bed.  
 
Marylene Bos, MS, OT/ALMA

## 2019-10-23 NOTE — PROGRESS NOTES
Problem: Falls - Risk of 
Goal: *Absence of Falls Description Document Cleopatra Muniz Fall Risk and appropriate interventions in the flowsheet. Outcome: Progressing Towards Goal 
Note:  
Fall Risk Interventions: 
  
 
Mentation Interventions: Door open when patient unattended, Evaluate medications/consider consulting pharmacy, More frequent rounding, Toileting rounds, Update white board Medication Interventions: Assess postural VS orthostatic hypotension, Evaluate medications/consider consulting pharmacy, Patient to call before getting OOB Elimination Interventions: Bed/chair exit alarm, Call light in reach, Patient to call for help with toileting needs, Toileting schedule/hourly rounds History of Falls Interventions: Door open when patient unattended, Evaluate medications/consider consulting pharmacy, Assess for delayed presentation/identification of injury for 48 hrs (comment for end date) Problem: Patient Education: Go to Patient Education Activity Goal: Patient/Family Education Outcome: Progressing Towards Goal 
  
Problem: Discharge Planning Goal: *Discharge to safe environment Outcome: Progressing Towards Goal 
  
Problem: Delirium Treatment Goal: *Level of consciousness restored to baseline Outcome: Progressing Towards Goal

## 2019-10-23 NOTE — PROGRESS NOTES
Chart reviewed. Met with pt/family, states they now want [facility] as first choice & [facility] as 2nd choice, posted in e-discharge to [facility]. Made them aware that [facility] is currently full & when pt medically ready for discharge will have to go to available SNF, verbalized understanding. Michelle VazquezRN,ext [phone].

## 2019-10-23 NOTE — PROGRESS NOTES
Internal Medicine Progress Note NAME: Susana Escobedo :  1933 MRM:  [id number] Date/Time: 10/23/2019 ASSESSMENT/PLAN:    
 
# Dysphagia, failed SLP twice. GI consulted for  ? PEGT  
- NGT for meds and feeding  , free water - Appreciate GI recs, per Dr. Ria Jane note, plan for repeat SLP eval Tue or Wed, and if fails need to hold Eliquis for plan possible PEG on Friday. - Discussed with SLP, MBS today, depending on results of MBS may need PEG. Acute respiratory failure - pt tachypneic with increased WOB since 10/21. SpO2 92 on 3L - CXR showed right base new opacity. D/w ID started on vanc and merrem on 10/21 
- resume PO lasix - ABG wnl # Acute Delirious state , Encephalopathy. Possible sepsis related or metabolic/ Alcohol related with his ho alcohol abuse. Was on opioid and benzo that been consolidated. Reduce sedative to minimal strongly needed. Ammonia level normal , CT head negative. Place ngt for meds ( didn't get po meds for few days ) . -off wrist restrain, significant  improvement in his mental state , 
- 
 
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 H/o cat bite 4 weeks to RLE per daughter, when he came in with cellulitis and was treated. D/w Dr. Nicole  concern for persistent infection/abscess, he ordered nuclear gallium scan . Blood culture repeat on 10/10 negative to date.  
- lactobacillus added # Possible L knee infection. CUTLTURE of aspirate negative. Orthopedic. Knee tapping and CS. Fluids CS negative. 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 I called lab and radiology if the can run the knee fluid test for Crystals . I am informed that fluids from his hip on 10/9 and knee on 10/15 both tested for crystals and was negative - Sever L knee pains - control pains. #  MACKENZIE - improved with IV fluid hydration, monitor closely. Monitor labs. Avoid nephrotoxins. Renally dosing medications. Monitor urine out put. #  AFIB - Rate control, resume Elliquis for AC . Dc iv lopressor , tab NGT. Titrate as needed # Elevated LFT - GI consulted, suspects 2/2 sepsis, appreciate recs. ( Elevated liver enzymes most likely secondary to sepsis. Coming down. CT shows no biliary obstruction. S/P cholecystectomy ) # Hypothyroidism - Cont home levothyroxine. # Hypocalcemia. Hypokalemia. Replete and trend. # Hypernatremia. Improved. free water NGT . D5 ivf . Serial labs to monitor level. # ETOH abuse - initially placed on  CIWA, DC. Per family last drink was 1 month ago. Thiamine. DVT ppx - SCD; resume Eliquis. 
 
-Code status : FULL Lab Review:  
 
Recent Labs 10/23/19 
0400 10/22/19 
0700 10/21/19 
1843 WBC 12.6 12.0 11.7 HGB 7.9* 7.9* 7.5* HCT 25.6* 25.5* 23.9*  
* 466* 440* Recent Labs 10/23/19 
0400 10/22/19 
0700 10/21/19 
1843  135* 136  
K 4.8 5.4 5.0  
CL 99* 101 102 CO2 31 29 31 GLU 78 115* 111* BUN 17 17 18 CREA 0.66 0.67 0.69 CA 8.0* 8.1* 7.9*  
MG 2.0 2.5  --   
PHOS 3.7 4.0  -- Lab Results Component Value Date/Time Glucose (POC) 86 10/23/2019 05:26 AM  
 Glucose (POC) 132 (H) 10/22/2019 11:50 PM  
 Glucose (POC) 130 (H) 10/22/2019 05:55 PM  
 Glucose (POC) 137 (H) 10/22/2019 12:09 PM  
 Glucose (POC) 132 (H) 10/22/2019 06:00 AM  
 Glucose, POC 83 01/03/2017 10:19 AM  
 
No results for input(s): PH, PCO2, PO2, HCO3, FIO2 in the last 72 hours. No results for input(s): INR, INREXT, INREXT in the last 72 hours. No results found for: SDES Lab Results Component Value Date/Time Culture result: NO GROWTH 2 DAYS 10/21/2019 07:25 PM  
 Culture result: NO GROWTH 2 DAYS 10/21/2019 06:59 PM  
 Culture result: NO GROWTH 5 DAYS 10/15/2019 10:30 AM  
 
 
All Cardiac Markers in the last 24 hours: No results found for: CPK, CK, CKMMB, CKMB, RCK3, CKMBT, CKNDX, CKND1, CHAYA, TROPT, TROIQ, BRENDAN, TROPT, TNIPOC, BNP, BNPP Subjective:  
 
Pt this AM was awake and watching TV. WOB improved. But he is not answering questions. Not speaking. Objective:  
 
Vitals: 
Last 24hrs VS reviewed since prior progress note. Most recent are: 
 
Visit Vitals /64 (BP 1 Location: Left arm, BP Patient Position: At rest) Pulse 96 Temp 99 °F (37.2 °C) Resp 16 Ht 5' 8\" (1.727 m) Wt 79.4 kg (175 lb 0.7 oz) SpO2 94% BMI 26.62 kg/m² SpO2 Readings from Last 6 Encounters:  
10/23/19 94% 10/08/19 95% 10/08/19 91% 10/07/19 98% 10/03/19 100% 10/03/19 90% O2 Flow Rate (L/min): 3 l/min Intake/Output Summary (Last 24 hours) at 10/23/2019 1155 Last data filed at 10/23/2019 0210 Gross per 24 hour Intake 2210 ml Output 1925 ml Net 285 ml Physical Exam:  
Ears: hearing is intact  [id number]. Noted tachypnea Eyes: Icterus is not present Lungs: coarse breath sounds bilaterally, tachypneic Heart: regular rate and rhythm, S1, S2 normal, no murmur, click, rub or gallop Gastrointestinal: soft, non-tender. Bowel sounds normal. No masses,  no organomegaly Neurological:  New Focal Deficits are not present. Skin: diffuse ecchymosis. Face with small petechiae (per family is due to known actinic keratosis). Psychiatric:  Mood is stable Medications Reviewed: (see below) Lab Data Reviewed: (see below) 
 
______________________________________________________________________ Medications:  
 
Current Facility-Administered Medications Medication Dose Route Frequency  barium sulfate (VARIBAR NECTAR) 40 % (w/v) contrast suspension 15 mL  15 mL Oral RAD ONCE  
 barium sulfate (VARIBAR PUDDING) 40 % (w/v), 30% (w/w) contrast oral paste 15 mL  15 mL Oral RAD ONCE  
 barium sulfate (VARIBAR THIN) 40 % (w/v) oral powder 30 mL  30 mL Oral RAD ONCE  
  barium sulfate (VARIBAR HONEY) 40 % (w/v) 29% (w/w) contrast suspension 15 mL  15 mL Oral RAD ONCE  
 barium sulfate (E-Z-DISK) contrast tablet 700 mg  700 mg Oral RAD ONCE  
 vancomycin (VANCOCIN) 1,000 mg in 0.9% sodium chloride (MBP/ADV) 250 mL adv  1,000 mg IntraVENous Q12H  VANCOMYCIN INFORMATION NOTE   Other ONCE  
 meropenem (MERREM) 1 g in 0.9% sodium chloride (MBP/ADV) 50 mL MBP  1 g IntraVENous Q8H  
 VANCOMYCIN INFORMATION NOTE   Other Rx Dosing/Monitoring  docusate sodium (COLACE) capsule 100 mg  100 mg Oral BID PRN  
 Lactobacillus Acidoph & Bulgar [facility]) tablet 2 Tab  2 Tab Oral BID  albuterol-ipratropium (DUO-NEB) 2.5 MG-0.5 MG/3 ML  3 mL Nebulization QID RT  
 albuterol (PROVENTIL VENTOLIN) nebulizer solution 1.25 mg  1.25 mg Nebulization Q4H PRN  
 melatonin tablet 6 mg  6 mg Oral QHS PRN  
 haloperidol lactate (HALDOL) injection 1 mg  1 mg IntraVENous Q8H PRN  
 furosemide (LASIX) tablet 40 mg  40 mg Oral DAILY  metoprolol tartrate (LOPRESSOR) tablet 12.5 mg  12.5 mg Per NG tube BID  thiamine mononitrate (B-1) tablet 100 mg  100 mg Oral DAILY  acetaminophen (TYLENOL) tablet 500 mg  500 mg Oral Q6H PRN  
 acetaminophen (TYLENOL) solution 325 mg  325 mg Per NG tube Q6H  
 insulin lispro (HUMALOG) injection   SubCUTAneous Q6H  
 glucose chewable tablet 16 g  16 g Oral PRN  
 glucagon (GLUCAGEN) injection 1 mg  1 mg IntraMUSCular PRN  
 dextrose 10 % infusion 125-250 mL  125-250 mL IntraVENous PRN  pantoprazole (PROTONIX) injection 40 mg  40 mg IntraVENous DAILY  folic acid (FOLVITE) tablet 1 mg  1 mg Oral DAILY  apixaban (ELIQUIS) tablet 5 mg  5 mg Oral BID  sodium chloride (NS) flush 5-40 mL  5-40 mL IntraVENous Q8H  
 desonide (TRIDESILON) 0.05 % cream - patient supplied (Patient Supplied)   Topical BID  naloxone (NARCAN) injection 0.4 mg  0.4 mg IntraVENous PRN  
 sodium chloride (NS) flush 5-10 mL  5-10 mL IntraVENous PRN  
  levothyroxine (SYNTHROID) tablet 175 mcg  175 mcg Oral ACB Total time spent with patient: 35  minutes Care Plan discussed with: Care Manager, Nursing Staff, Consultant/Specialist and >50% of time spent in counseling and coordination of care Discussed:  Care Plan Attending Physician: Eric Castillo MD

## 2019-10-23 NOTE — ROUTINE PROCESS
[facility] care of [facility]. Pt just open eyes from verbal stimuli. Daughter at the bedside. Right nare NGT with TF Osmolite 1.2 samy at 70 ml/hour. Bed in lowest position & wheels locked. Call bell within reach. 0110 -  Bathed pt. Turned & repositioned pt for comfort. 7512 - Bedside and Verbal shift change report given to FLYNN Desir (oncoming nurse) by Isis Castro RN BSN ( off going Nurse ) inclusive of SBAR and plan of care, Intake & Output.

## 2019-10-24 NOTE — PROGRESS NOTES
[facility] 
                                             (A Division of [address] [place] [address]) Follow-up Note Date of Admission: 10/8/2019     Date of Note:  10/24/2019 Summary:   
 
65 [place] [facility] y/o  male HTN, AF, GERD, DJD s/p L TKR, Prostate CA, Hypothyroidism, h/o pancreatitis, ETOH consumption adm 10/8/2019 w/ chills, weakness, altered mental status. Has neck pain x 1 mo. Had R leg cellulitis after cat bite (not scratch) 1 mo PTA - resolved w/ abx. Then micturition syncope x 3, hurt knee, progressing generalized weakness, then 1 day PTA fever, chills. Adm w/ 100.5, WBC 29.5, 13% bands. No uti, pneumonia, unrevealing CTAP. 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 Rpt blcx 10/10 NGTD x 2.  Gallium scan 10/14: intense periprosthetic uptake L knee, distal femur. L knee synovial fluid 10/15- not s/o septic arthritis. Presume distal L femur OM. Meropenem, Vanc started 10/21 for ? HAP Interval History: 
 
Aspiration with al consistencies on swallow study yesterday. New fever today. Very lethargic still but when roused was able to respond verbally to questions. Denied pain but neck hurt on anteflexion, not as much on lateral flexion - daughter states \"that's how it's always been\". Current Antimicrobials: Prior Antimicrobials Vanc 10/21 - 3  Levofloxacin 10/24 - 0 abx 10/8 - 16 Vancomycin 10/8 - 2  Cefepime, Levoflox 10/8 - 0 Meropenem 10/9 - 2 Ceftriaxone 10/11 - 10 [id number]/21 - 3  
  
  
Assessment: Plan: Low grade fever - 100.5 today - WBC normal ?drug fever -> dc Meropenem 
-> Levofloxacin 750 mg x 4 days 
-> continue Vancomycin pending MRSA screen NEW R LL opacity 
- atelectasis vs infiltrate. - afebrile but temp trending up -> abx as above 
-> MRSA screen still not done - d/w RN to do it now Left knee uptake on Ga scan - s/p US guided arthrocentesis 10/15 - negative for infection - will presume distal L femur osteomyelitis -> abx as above then 
-> then complete 6 wks abx with Ceftriaxone (target end date: 11/19/2019)  +/- subsequent po abx suppression  
-> PICC prior to discharge. (still has R IJ CVL) Pasteurella BSI 
- 1 of 2 blood cultures 10/8 + P multocida sens TMP-SMX, Ceftriaxone, Levofloxacin, PCN, TCN 
- No active cellulitis. R leg cellulitis following cat bite in early September resolved w/ TMP-SMX then Keflex 
- Recent TTE 9/27: neg vegetations - rpt blcx 10/10 NG x 2 
- Gallium scan 10/14: intense periprosthetic uptake L knee, distal femur. 
- L knee aspiration 10/15: 7,200 WBC, no crystals - not suggestive of septic arthritis - presuming source is L distal femoral OM 
- should be treated by now -> change Meropenem to Levofloxacin as above Encephalopathy/ ETOH Withdrawal 
- worse since admission - ? Medication-related: has been receiving dilaudid q 4 hours and Ativan this am 
- Head CT 10/14: no acute intracranial abnormality 
- not related to infection which appears under control at this time 
- improving.  -> per primary team.   
Septic shock 
- due to above 
- improving. Vasopressors stopped 10/10.   
- resolved -> abx as above MACKENZIE 
- resolved Worsening alkaline phosphatase 
- from sepsis, ?bone infection / met (doubt) 
- improving H/o PCN allergy (rash)  
- tolerated keflex 
- tolerating Ceftriaxone Recent R leg cellulitis following cat bite (not scratch) R hip pain - s/p aspiration 10/9: 23 WBC, no crystals HTN    
AF    
GERD    
DJD s/p L TKR    
Prostate CA    
Hypothyroidism    
h/o pancreatitis    
ETOH consumption    
  
Microbiology: 
  
10/8      blcx Pasteurella multocida 1 of 2 
             urcx NG 
10/9      R hip asp gs no wbc, NOS, cx NG 
 
10/10 blcx NGTD x 2 
  
Lines / Catheters: RIJ CVL Patient Active Problem List  
Diagnosis Code  Malignant neoplasm of prostate ([facility] C61  Hypertension I10  
  GERD (gastroesophageal reflux disease) K21.9  Glaucoma H40.9  Glaucoma H40.9  Dysphagia R13.10  Encounter for colonoscopy due to history of adenomatous colonic polyps Z12.11, Z86.010  
 Non-pressure chronic ulcer of right calf with fat layer exposed ([facility] [address] Z87.016  Laceration of right lower leg with complication O41.192V  Edema R60.9  Pneumonia J18.9  Atrial fibrillation (HCC) I48.91  
 Hypothyroidism E03.9  [facility]eps[facility] A41.9  Cellulitis L03.90  
 HTN (hypertension) E29  
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  
 meropenem (MERREM) 1 g in 0.9% sodium chloride (MBP/ADV) 50 mL MBP  1 g IntraVENous Q8H  
 VANCOMYCIN INFORMATION NOTE   Other Rx Dosing/Monitoring  docusate sodium (COLACE) capsule 100 mg  100 mg Oral BID PRN  
 Lactobacillus Acidoph & Bulgar [facility]) tablet 2 Tab  2 Tab Oral BID  albuterol-ipratropium (DUO-NEB) 2.5 MG-0.5 MG/3 ML  3 mL Nebulization QID RT  
 albuterol (PROVENTIL VENTOLIN) nebulizer solution 1.25 mg  1.25 mg Nebulization Q4H PRN  
 melatonin tablet 6 mg  6 mg Oral QHS PRN  
 haloperidol lactate (HALDOL) injection 1 mg  1 mg IntraVENous Q8H PRN  
 furosemide (LASIX) tablet 40 mg  40 mg Oral DAILY  metoprolol tartrate (LOPRESSOR) tablet 12.5 mg  12.5 mg Per NG tube BID  thiamine mononitrate (B-1) tablet 100 mg  100 mg Oral DAILY  acetaminophen (TYLENOL) tablet 500 mg  500 mg Oral Q6H PRN  
 acetaminophen (TYLENOL) solution 325 mg  325 mg Per NG tube Q6H  
 insulin lispro (HUMALOG) injection   SubCUTAneous Q6H  
  glucose chewable tablet 16 g  16 g Oral PRN  
 glucagon (GLUCAGEN) injection 1 mg  1 mg IntraMUSCular PRN  
 dextrose 10 % infusion 125-250 mL  125-250 mL IntraVENous PRN  pantoprazole (PROTONIX) injection 40 mg  40 mg IntraVENous DAILY  folic acid (FOLVITE) tablet 1 mg  1 mg Oral DAILY  sodium chloride (NS) flush 5-40 mL  5-40 mL IntraVENous Q8H  
 desonide (TRIDESILON) 0.05 % cream - patient supplied  (Patient Supplied)   Topical BID  naloxone (NARCAN) injection 0.4 mg  0.4 mg IntraVENous PRN  
 sodium chloride (NS) flush 5-10 mL  5-10 mL IntraVENous PRN  
 levothyroxine (SYNTHROID) tablet 175 mcg  175 mcg Oral ACB Review of Systems - Negative except as in interval history Objective: 
Visit Vitals /64 (BP 1 Location: Left arm, BP Patient Position: At rest) Pulse 91 Temp (!) 100.5 °F (38.1 °C) Resp 18 Ht 5' 8\" (1.727 m) Wt 87.5 kg (193 lb) SpO2 92% BMI 29.35 kg/m² Temp (24hrs), Av.3 °F (36.8 °C), Min:97.3 °F (36.3 °C), Max:100.5 °F (38.1 °C) General: Well developed, well nourished 80 y.o.  male more alert, follws simple commands, answers some questions. HEENT: no scleral icterus, mucous membranes moist, pharynx normal without lesions, NGT still in place Neck: resistant to flexion in all directions but more flexible than previous Cardio:  irregularly irregular rhythm Lungs: rhonchi bilateral and + expiratory wheezes Abdomen: soft, non-tender. Bowel sounds normal. No masses, no organomegaly. Extremities:  no redness or tenderness in the calves or thighs, no edema Lab results: 
 
Chemistry Recent Labs 10/24/19 
0100 10/23/19 
0400 10/22/19 
0700 * 78 115*  136 135* K 4.6 4.8 5.4  99* 101 CO2 31 31 29 BUN 19* 17 17 CREA 0.73 0.66 0.67 CA 7.8* 8.0* 8.1* AGAP 5 6 5 BUCR 26* 26* 25* CBC w/ Diff Recent Labs 10/24/19 
0100 10/23/19 
0400 10/22/19 
0700 WBC 10.9 12.6 12.0 RBC 2.34* 2.58* 2.57* HGB 7.2* 7.9* 7.9*  
HCT 23.1* 25.6* 25.5*  
* 547* 466* GRANS 65 64 68 LYMPH 20* 21 20* EOS 2 2 1 Microbiology All Micro Results Procedure Component Value Units Date/Time CULTURE, BLOOD [[id number]] Collected:  10/21/19 1859 Order Status:  Completed Specimen:  Blood Updated:  10/24/19 6693 Special Requests: --     
  RIGHT 
RED PORT Culture result: NO GROWTH 3 DAYS     
 CULTURE, BLOOD [[id number]] Collected:  10/21/19 1925 Order Status:  Completed Specimen:  Blood Updated:  10/24/19 9748 Special Requests: NO SPECIAL REQUESTS Culture result: NO GROWTH 3 DAYS     
 MRSA SCREEN - PCR (NASAL) [[id number]] Order Status:  Sent Specimen:  Nasal from Nares MRSA SCREEN - PCR (NASAL) [[id number]] Collected:  10/21/19 1830 Order Status:  Canceled Specimen:  Nasal   
 ANAEROBE ID REFLEX [[id number]] Collected:  10/15/19 1030 Order Status:  Completed Specimen:  MICROBIAL ISOLATE Updated:  10/20/19 1835 Source PENDING Result 1 Comment Comment: (NOTE) No anaerobic growth in 72 hours. Performed At: [address] [id number] Brock Christianson MD UL:[id number] CULTURE, BODY FLUID John Phipps [[id number]] Collected:  10/15/19 1030 Order Status:  Completed Specimen:  Synovial Fluid Updated:  10/20/19 2839 Special Requests: LEFT KNEE     
  GRAM STAIN FEW WBC'S     
   NO ORGANISMS SEEN Culture result: NO GROWTH 5 DAYS     
 CULTURE, BLOOD [[id number]] Collected:  10/10/19 1341 Order Status:  Completed Specimen:  Blood Updated:  10/16/19 0042 Special Requests: --     
  NO SPECIAL REQUESTS 
BLUE PORT Culture result: NO GROWTH 6 DAYS     
 CULTURE, BLOOD [[id number]] Collected:  10/10/19 1237 Order Status:  Completed Specimen:  Blood Updated:  10/16/19 0042 Special Requests: PERIPHERAL Culture result: NO GROWTH 6 DAYS CULTURE, BLOOD [[id number]] Collected:  10/08/19 2234 Order Status:  Completed Specimen:  Blood from Miscellaneous sample Updated:  10/15/19 0754 Special Requests: NO SPECIAL REQUESTS Culture result: NO GROWTH 6 DAYS     
 CULTURE, BODY FLUID [[id number]] Collected:  10/09/19 1200 Order Status:  Completed Specimen:  Synovial Fluid Updated:  10/14/19 4561 Special Requests: HIP  
  GRAM STAIN NO WBC'S SEEN     
   NO ORGANISMS SEEN Culture result: NO GROWTH 5 DAYS     
 CULTURE, BLOOD [[id number]]  (Abnormal)  (Susceptibility) Collected:  10/08/19 1301 Order Status:  Completed Specimen:  Blood Updated:  10/11/19 0825 Special Requests: --     
  NO SPECIAL REQUESTS 
RIGHT 
ARM 
  
  GRAM STAIN    
  AEROBIC BOTTLE GRAM NEGATIVE RODS SMEAR CALLED TO AND CORRECTLY REPEATED BY: DESHAWN COSBY RN,ICU, ON 10/9/19 AT 0335 TO [facility] Culture result:    
  AEROBIC BOTTLE PASTEURELLA MULTOCIDA CULTURE, URINE [[id number]] Collected:  10/08/19 1418 Order Status:  Completed Specimen:  Cath Urine Updated:  10/10/19 1016 Special Requests: NO SPECIAL REQUESTS Culture result: NO GROWTH 2 DAYS Tanja Eason MD, [facility] 
[phone] 
10/24/2019  
9:02 AM

## 2019-10-24 NOTE — PROGRESS NOTES
Respiratory Therapy Assessment Care Plan Patient: 
Gertrude Olivas 80 y.o. male 10/24/2019 9:06 AM 
 
Severe sepsis ([address] [A41.9, R65.20] Altered mental status [R41.82] Chest X-RAY:  
Results from Hospital Encounter encounter on 10/08/19 XR SWALLOW FUNC VIDEO Impression Impression: 1. Aspiration with all consistencies. Please see speech pathology report for complete details. XR CHEST PORT Impression IMPRESSION:  
 
1. Stable support lines and tubes. 2. Bibasilar atelectasis/airspace disease, likely with pleural effusions, right 
greater than left. This has slightly worsened in the right base. XR CHEST PORT Impression IMPRESSION: 
 
 
1. Right IJ approach central venous catheter in stable position. 2. Nasogastric tube below the diaphragm, tip collimated from view. 3. Low lung volumes and bibasilar atelectasis with likely small right pleural 
effusion not significantly changed from prior. Vital Signs:   Visit Vitals /78 (BP 1 Location: Left arm, BP Patient Position: At rest) Pulse 78 Temp 98.3 °F (36.8 °C) Resp 18 Ht 5' 8\" (1.727 m) Wt 87.5 kg (193 lb) SpO2 99% BMI 29.35 kg/m² Indications for treatment: Increased WOB. History of Pneumonia Plan of care: Duoneb Q4H. Continue oxygen therapy as needed per patient. Goal: Continue to improve and maintain patient's oxygenation and ventilation. Titrate oxygen as tolerated per patient.

## 2019-10-24 NOTE — PROGRESS NOTES
PROGRESS NOTE PATIENT:  Linette Dinero MRN: [id number] [facility]/[address], [address] 
         10/24/2019, 2:11 PM 
   
I 
Mr. Roly Langford is a 80 y.o. male who is being seen for oropharyngeal dysphagia SUBJECTIVE: 
Pt has no complaints. Has NGT. Wife and daughter at bedside. OBJECTIVE: 
Patient Vitals for the past 24 hrs: 
 Temp Pulse Resp BP SpO2  
10/24/19 1249 (!) 100.5 °F (38.1 °C) 91 18 128/64 92 % 10/24/19 1147     92 % 10/24/19 0852 98.3 °F (36.8 °C) 78 18 109/78 99 % 10/24/19 0822     99 % 10/23/19 2350 97.4 °F (36.3 °C) 93 18 122/80 99 % 10/23/19 2013 97.9 °F (36.6 °C) 95 18 97/61 100 % 10/23/19 1543 97.3 °F (36.3 °C) 83 17 135/67 (!) 81 % No intake or output data in the 24 hours ending 10/24/19 1411 Gen: NAD Heent: No pallor, icterus Abd  : Soft, non tender, BS +, No masses felt. Labs: Results:  
Chemistry Recent Labs 10/24/19 
0100 10/23/19 
0400 10/22/19 
0700 * 78 115*  136 135* K 4.6 4.8 5.4  99* 101 CO2 31 31 29 BUN 19* 17 17 CREA 0.73 0.66 0.67 CA 7.8* 8.0* 8.1* AGAP 5 6 5 BUCR 26* 26* 25* Estimated Creatinine Clearance: 78.1 mL/min (based on SCr of 0.73 mg/dL). CBC w/Diff Recent Labs 10/24/19 
0100 10/23/19 
0400 10/22/19 
0700 WBC 10.9 12.6 12.0  
RBC 2.34* 2.58* 2.57* HGB 7.2* 7.9* 7.9*  
HCT 23.1* 25.6* 25.5*  
* 547* 466* GRANS 65 64 68 LYMPH 20* 21 20* EOS 2 2 1 Cardiac Enzymes No results for input(s): CPK, CKND1, CHAYA in the last 72 hours. No lab exists for component: Ritu Canard Coagulation No results for input(s): PTP, INR, APTT, INREXT in the last 72 hours. Hepatitis Panel Lab Results Component Value Date/Time Hepatitis A, IgM NEGATIVE  10/08/2019 01:01 PM  
 Hepatitis B surface Ag <0.10 10/08/2019 01:01 PM  
 Hepatitis B core, IgM NEGATIVE  10/08/2019 01:01 PM  
 Hepatitis C virus Ab 0.03 10/08/2019 01:01 PM  
  
Amylase Lipase Liver Enzymes No results for input(s): TP, ALB, TBILI, AP, SGOT, ALT in the last 72 hours. No lab exists for component: DBIL Thyroid Studies No results for input(s): T4, T3U, TSH, TSHEXT in the last 72 hours. No lab exists for component: T3RU Pathology pathology Allergies Allergen Reactions  Adhesive Other (comments) Skin irritation  Darvon [Propoxyphene] Hives, Myalgia and Other (comments) Joint pain  Penicillins Rash and Itching Current Facility-Administered Medications Medication Dose Route Frequency  alteplase (CATHFLO) injection 2 mg  2 mg InterCATHeter ONCE  
 meropenem (MERREM) 1 g in 0.9% sodium chloride (MBP/ADV) 50 mL MBP  1 g IntraVENous Q8H  
 VANCOMYCIN INFORMATION NOTE   Other Rx Dosing/Monitoring  docusate sodium (COLACE) capsule 100 mg  100 mg Oral BID PRN  
 Lactobacillus Acidoph & Lynngar [facility]) tablet 2 Tab  2 Tab Oral BID  albuterol-ipratropium (DUO-NEB) 2.5 MG-0.5 MG/3 ML  3 mL Nebulization QID RT  
 albuterol (PROVENTIL VENTOLIN) nebulizer solution 1.25 mg  1.25 mg Nebulization Q4H PRN  
 melatonin tablet 6 mg  6 mg Oral QHS PRN  
 haloperidol lactate (HALDOL) injection 1 mg  1 mg IntraVENous Q8H PRN  
 furosemide (LASIX) tablet 40 mg  40 mg Oral DAILY  metoprolol tartrate (LOPRESSOR) tablet 12.5 mg  12.5 mg Per NG tube BID  thiamine mononitrate (B-1) tablet 100 mg  100 mg Oral DAILY  acetaminophen (TYLENOL) tablet 500 mg  500 mg Oral Q6H PRN  
 acetaminophen (TYLENOL) solution 325 mg  325 mg Per NG tube Q6H  
 insulin lispro (HUMALOG) injection   SubCUTAneous Q6H  
 glucose chewable tablet 16 g  16 g Oral PRN  
 glucagon (GLUCAGEN) injection 1 mg  1 mg IntraMUSCular PRN  
 dextrose 10 % infusion 125-250 mL  125-250 mL IntraVENous PRN  pantoprazole (PROTONIX) injection 40 mg  40 mg IntraVENous DAILY  folic acid (FOLVITE) tablet 1 mg  1 mg Oral DAILY  sodium chloride (NS) flush 5-40 mL  5-40 mL IntraVENous Q8H  
 desonide (TRIDESILON) 0.05 % cream - patient supplied  (Patient Supplied)   Topical BID  naloxone (NARCAN) injection 0.4 mg  0.4 mg IntraVENous PRN  
 sodium chloride (NS) flush 5-10 mL  5-10 mL IntraVENous PRN  
 levothyroxine (SYNTHROID) tablet 175 mcg  175 mcg Oral ACB ASSESSMENT: 
1. Oropharyngeal dysphagia Failed swallow eval 
2. Sepsis, RLE cellulitis on Antibiotics per ID 3. Fever on antibiotics RECOMMENDATIONS:  
1. Will plan for EGD with peg placement tomorrow if medically stable 2. Cont current antibiotics 3. Discussed with Pt's wife and daughter at bedside who consented to proceed. 4. Eliquis jm yesterday Beth Cary MD

## 2019-10-24 NOTE — PROGRESS NOTES
Problem: Pain Goal: *Control of Pain Outcome: Progressing Towards Goal 
Goal: *PALLIATIVE CARE:  Alleviation of Pain Outcome: Progressing Towards Goal 
  
Problem: Falls - Risk of 
Goal: *Absence of Falls Description Document Jeannette Cruz Fall Risk and appropriate interventions in the flowsheet. Outcome: Progressing Towards Goal 
Note:  
Fall Risk Interventions: 
  
 
Mentation Interventions: Bed/chair exit alarm Medication Interventions: Patient to call before getting OOB Elimination Interventions: Call light in reach, Stay With Me (per policy) History of Falls Interventions: Door open when patient unattended, Evaluate medications/consider consulting pharmacy Problem: Patient Education: Go to Patient Education Activity Goal: Patient/Family Education Outcome: Progressing Towards Goal 
  
Problem: Pressure Injury - Risk of 
Goal: *Prevention of pressure injury Description Document Dionicio Scale and appropriate interventions in the flowsheet. Outcome: Progressing Towards Goal 
Note:  
Pressure Injury Interventions: 
Sensory Interventions: Assess need for specialty bed Moisture Interventions: Check for incontinence Q2 hours and as needed Activity Interventions: Chair cushion Mobility Interventions: Assess need for specialty bed, HOB 30 degrees or less Nutrition Interventions: Document food/fluid/supplement intake Friction and Shear Interventions: Foam dressings/transparent film/skin sealants, HOB 30 degrees or less Problem: Discharge Planning Goal: *Discharge to safe environment Outcome: Progressing Towards Goal 
  
Problem: Delirium Treatment Goal: *Level of consciousness restored to baseline Outcome: Progressing Towards Goal 
Goal: *Level of environmental perceptions restored to baseline Outcome: Progressing Towards Goal 
Goal: *Sensory perception restored to baseline Outcome: Progressing Towards Goal 
Goal: *Emotional stability restored to baseline Outcome: Progressing Towards Goal 
Goal: *Functional assessment restored to baseline Outcome: Progressing Towards Goal 
Goal: *Absence of falls Outcome: Progressing Towards Goal 
Goal: *Will remain free of delirium, CAM Score negative Outcome: Progressing Towards Goal 
Goal: *Cognitive status will be restored to baseline Outcome: Progressing Towards Goal 
Goal: Interventions Outcome: Progressing Towards Goal 
  
Problem: Patient Education: Go to Patient Education Activity Goal: Patient/Family Education Outcome: Progressing Towards Goal 
  
Problem: Nutrition Deficit Goal: *Adequate nutrition Outcome: Progressing Towards Goal 
Goal: *Optimize nutritional status Outcome: Progressing Towards Goal 
  
Problem: Patient Education: Go to Patient Education Activity Goal: Patient/Family Education Outcome: Progressing Towards Goal 
  
Problem: Patient Education: Go to Patient Education Activity Goal: Patient/Family Education Outcome: Progressing Towards Goal 
  
Problem: Patient Education: Go to Patient Education Activity Goal: Patient/Family Education Outcome: Progressing Towards Goal 
  
Problem: Non-Violent Restraints Goal: *Removal from restraints as soon as assessed to be safe Outcome: Progressing Towards Goal 
Goal: *No harm/injury to patient while restraints in use Outcome: Progressing Towards Goal 
Goal: *Patient's dignity will be maintained Outcome: Progressing Towards Goal 
Goal: *Patient Specific Goal (EDIT GOAL, INSERT TEXT) Outcome: Progressing Towards Goal 
Goal: Non-violent Restaints:Standard Interventions Outcome: Progressing Towards Goal 
Goal: Non-violent Restraints:Patient Interventions Outcome: Progressing Towards Goal 
Goal: Patient/Family Education Outcome: Progressing Towards Goal 
  
Problem: Breathing Pattern - Ineffective Goal: *Absence of hypoxia Outcome: Progressing Towards Goal 
Goal: *Use of effective breathing techniques Outcome: Progressing Towards Goal 
 Goal: *PALLIATIVE CARE:  Alleviation of Dyspnea Outcome: Progressing Towards Goal 
  
Problem: Patient Education: Go to Patient Education Activity Goal: Patient/Family Education Outcome: Progressing Towards Goal

## 2019-10-24 NOTE — PROGRESS NOTES
Bedside and Verbal shift change report given to FLYNN Desir (oncoming nurse) by Patricia Rivera (offgoing nurse). Report included the following information SBAR, Kardex, MAR and Recent Results. Patient has been speaking in short phrases per wife. 1515 PICC line was occluded in distal Lumen, placed cathflo in  
 
1420 removed cathflo and  Changed sterile dressing. 1850 patient sleeping family at bedside.

## 2019-10-24 NOTE — PROGRESS NOTES
Internal Medicine Progress Note NAME: Raymundo Casillas :  1933 MRM:  [id number] Date/Time: 10/24/2019 ASSESSMENT/PLAN:    
 
# Dysphagia, failed SLP twice. GI consulted for  ? PEGT  
- NGT for meds and feeding  , free water  
- Failed swallow study - GI planning on PEG on  Acute respiratory failure - pt tachypneic with increased WOB since 10/21. SpO2 92 on 3L - CXR showed right base new opacity. D/w ID started on vanc and merrem on 10/21 
- resume PO lasix - ABG wnl # Acute Delirious state , Encephalopathy. Possible sepsis related or metabolic/ Alcohol related with his ho alcohol abuse. Was on opioid and benzo that been consolidated. Reduce sedative to minimal strongly needed. Ammonia level normal , CT head negative. 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 H/o cat bite 4 weeks to RLE per daughter, when he came in with cellulitis and was treated. D/w Dr. Estrella Choi concern for persistent infection/abscess, he ordered nuclear gallium scan . Blood culture repeat on 10/10 negative to date.  
- lactobacillus added # Possible L knee infection. CUTLTURE of aspirate negative. Orthopedic. Knee tapping and CS. Fluids CS negative. 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 I called lab and radiology if the can run the knee fluid test for Crystals . I am informed that fluids from his hip on 10/9 and knee on 10/15 both tested for crystals and was negative - Sever L knee pains - control pains. #  MACKENZIE - improved with IV fluid hydration, monitor closely. Monitor labs. Avoid nephrotoxins. Renally dosing medications. Monitor urine out put. #  AFIB - Rate control, resume Elliquis for AC . Dc iv lopressor , tab NGT. Titrate as needed # Elevated LFT - GI consulted, suspects 2/2 sepsis, appreciate recs.   ( Elevated liver enzymes most likely secondary to sepsis. Coming down. CT shows no biliary obstruction. S/P cholecystectomy ) # Hypothyroidism - Cont home levothyroxine. # Hypocalcemia. Hypokalemia. Replete and trend. # Hypernatremia. Improved. free water NGT . D5 ivf . Serial labs to monitor level. # ETOH abuse - initially placed on  CIWA, DC. Per family last drink was 1 month ago. Thiamine. DVT ppx - SCD; resume Eliquis after PEG 
 
-Code status : FULL Lab Review:  
 
Recent Labs 10/24/19 
0100 10/23/19 
0400 10/22/19 
0700 WBC 10.9 12.6 12.0 HGB 7.2* 7.9* 7.9*  
HCT 23.1* 25.6* 25.5*  
* 547* 466* Recent Labs 10/24/19 
0100 10/23/19 
0400 10/22/19 
0700  136 135* K 4.6 4.8 5.4  99* 101 CO2 31 31 29 * 78 115* BUN 19* 17 17 CREA 0.73 0.66 0.67 CA 7.8* 8.0* 8.1*  
MG 1.8 2.0 2.5 PHOS 3.5 3.7 4.0 Lab Results Component Value Date/Time Glucose (POC) 128 (H) 10/24/2019 12:52 PM  
 Glucose (POC) 116 (H) 10/24/2019 06:38 AM  
 Glucose (POC) 150 (H) 10/23/2019 11:55 PM  
 Glucose (POC) 87 10/23/2019 12:58 PM  
 Glucose (POC) 86 10/23/2019 05:26 AM  
 Glucose, POC 83 01/03/2017 10:19 AM  
 
No results for input(s): PH, PCO2, PO2, HCO3, FIO2 in the last 72 hours. No results for input(s): INR, INREXT, INREXT in the last 72 hours. No results found for: SDES Lab Results Component Value Date/Time Culture result: NO GROWTH 3 DAYS 10/21/2019 07:25 PM  
 Culture result: NO GROWTH 3 DAYS 10/21/2019 06:59 PM  
 Culture result: NO GROWTH 5 DAYS 10/15/2019 10:30 AM  
 
 
All Cardiac Markers in the last 24 hours:  
No results found for: CPK, CK, CKMMB, CKMB, RCK3, CKMBT, CKNDX, CKND1, CHAYA, TROPT, TROIQ, BRENDAN, TROPT, TNIPOC, BNP, BNPP Subjective:  
 
Pt this AM was asleep. Still not talking or answering questions. Objective:  
 
Vitals: 
Last 24hrs VS reviewed since prior progress note. Most recent are: 
 
Visit Vitals /64 (BP 1 Location: Left arm, BP Patient Position: At rest) Pulse 91 Temp (!) 100.5 °F (38.1 °C) Resp 18 Ht 5' 8\" (1.727 m) Wt 87.5 kg (193 lb) SpO2 92% BMI 29.35 kg/m² SpO2 Readings from Last 6 Encounters:  
10/24/19 92% 10/08/19 95% 10/08/19 91% 10/07/19 98% 10/03/19 100% 10/03/19 90% O2 Flow Rate (L/min): 6 l/min No intake or output data in the 24 hours ending 10/24/19 1308 Physical Exam:  
Ears: hearing is intact  AX0X3. Eyes: Icterus is not present Lungs: coarse breath sounds bilaterally Heart: regular rate and rhythm, S1, S2 normal, no murmur, click, rub or gallop Gastrointestinal: soft, non-tender. Bowel sounds normal. No masses,  no organomegaly Neurological:  New Focal Deficits are not present. Skin: diffuse ecchymosis. Face with small petechiae (per family is due to known actinic keratosis). Psychiatric:  Mood is stable Medications Reviewed: (see below) Lab Data Reviewed: (see below) 
 
______________________________________________________________________ Medications:  
 
Current Facility-Administered Medications Medication Dose Route Frequency  VANCOMYCIN INFORMATION NOTE   Other ONCE  
 meropenem (MERREM) 1 g in 0.9% sodium chloride (MBP/ADV) 50 mL MBP  1 g IntraVENous Q8H  
 VANCOMYCIN INFORMATION NOTE   Other Rx Dosing/Monitoring  docusate sodium (COLACE) capsule 100 mg  100 mg Oral BID PRN  
 Lactobacillus Acidoph & Lynngar [facility]) tablet 2 Tab  2 Tab Oral BID  albuterol-ipratropium (DUO-NEB) 2.5 MG-0.5 MG/3 ML  3 mL Nebulization QID RT  
 albuterol (PROVENTIL VENTOLIN) nebulizer solution 1.25 mg  1.25 mg Nebulization Q4H PRN  
 melatonin tablet 6 mg  6 mg Oral QHS PRN  
 haloperidol lactate (HALDOL) injection 1 mg  1 mg IntraVENous Q8H PRN  
 furosemide (LASIX) tablet 40 mg  40 mg Oral DAILY  metoprolol tartrate (LOPRESSOR) tablet 12.5 mg  12.5 mg Per NG tube BID  
  thiamine mononitrate (B-1) tablet 100 mg  100 mg Oral DAILY  acetaminophen (TYLENOL) tablet 500 mg  500 mg Oral Q6H PRN  
 acetaminophen (TYLENOL) solution 325 mg  325 mg Per NG tube Q6H  
 insulin lispro (HUMALOG) injection   SubCUTAneous Q6H  
 glucose chewable tablet 16 g  16 g Oral PRN  
 glucagon (GLUCAGEN) injection 1 mg  1 mg IntraMUSCular PRN  
 dextrose 10 % infusion 125-250 mL  125-250 mL IntraVENous PRN  pantoprazole (PROTONIX) injection 40 mg  40 mg IntraVENous DAILY  folic acid (FOLVITE) tablet 1 mg  1 mg Oral DAILY  sodium chloride (NS) flush 5-40 mL  5-40 mL IntraVENous Q8H  
 desonide (TRIDESILON) 0.05 % cream - patient supplied  (Patient Supplied)   Topical BID  naloxone (NARCAN) injection 0.4 mg  0.4 mg IntraVENous PRN  
 sodium chloride (NS) flush 5-10 mL  5-10 mL IntraVENous PRN  
 levothyroxine (SYNTHROID) tablet 175 mcg  175 mcg Oral ACB Total time spent with patient: 35  minutes Care Plan discussed with: Care Manager, Nursing Staff, Consultant/Specialist and >50% of time spent in counseling and coordination of care Discussed:  Care Plan Attending Physician: Navid Navas MD

## 2019-10-24 NOTE — WOUND CARE
Wound/Ostomy Nurse Progress Note Patient: Lorri Howard AGUSTO:[birth year] MRN: [id number] Situation: wound care follow up for sacral wounds Background: pt's son requested this RN to re-assess pt's sacral wound. Kirstie Brown his father was complaining of pain to \"his backside\". Pt previously assessed for two stage 2 pressure injuries on admission to right and left buttocks. Pt resting on specialty bed, bordered Mepilex in place to sacrum. Assessment: wound to right buttocks is healed, red and blanchable. Wound to left buttocks is smaller, has linear open area measuring 0.9 x 0.2 with no drainage and pink blanchable periwound skin. Recommendation: zinc paste to gluteal fold, Mepilex to sacral area. Offload bony prominences with wedges, Prevelon boots, and specialty bed. 10/24/19 7927 Wound Buttocks Right 10/05/19 Date First Assessed: 10/05/19   Primary Wound Type: Pressure Injury  Location: Buttocks  Wound Location Orientation: Right  Date of First Observation: 10/05/19 Condition of Base Epithelializing Wound Buttocks Left stage 2 10/20/19 Date First Assessed/Time First Assessed: 10/20/19 1300   Primary Wound Type: Pressure Injury  Location: Buttocks  Wound Location Orientation: Left  Wound Description: stage 2  Date of First Observation: 10/20/19 Dressing Status Removed Dressing Type Foam  
Pressure Injury Stage 2 Wound Length (cm) 0.9 cm Wound Width (cm) 0.2 cm Condition of Base Epithelializing Tissue Type Percent Pink 100 Drainage Amount None Dressing Changed Changed/New Dressing Type Applied Foam;Zinc based paste

## 2019-10-24 NOTE — PROGRESS NOTES
Pharmacy Dosing Services: Vancomycin Indication: HAP Day of therapy: 3 Other Antimicrobials (Include dose, start day & day of therapy): 
Meropenem 1 gram every 8 hours Loading dose (date given): 2,000 mg 
Current Maintenance dose: 1000 mg IV every 12 hours Goal Vancomycin Level: 15 - 20 mcg/mL (Trough 15-20 for most infections, 20 for meningitis/osteomyelitis, pre-HD level ~25) Vancomycin Level (if drawn):  
10/23 - 25.1 (11.4 hours post dose) Significant Cultures:  
Pending Renal function stable? (unstable defined as SCr increase of 0.5 mg/dL or > 50% increase from baseline, whichever is greater) (Y/N): Y  
 
CAPD, Hemodialysis or Renal Replacement Therapy (Y/N): N Recent Labs 10/23/19 
0400 10/22/19 
0700 10/21/19 
1843 CREA 0.66 0.67 0.69 BUN 17 17 18 WBC 12.6 12.0 11.7 Temp (24hrs), Av.2 °F (36.8 °C), Min:97.3 °F (36.3 °C), Max:99 °F (37.2 °C) Creatinine Clearance (Creatinine Clearance (ml/min)): Estimated Creatinine Clearance: 81.4 mL/min (by C-G formula based on SCr of 0.66 mg/dL). Regimen assessment:  
- Critical vancomycin trough level of 25.1 mcg/mL taken 11.4 hrs post dose - Verified with nurse that vancomycin was not running at the time the sample was collected - Patient received ~750 mg of the next dose prior to trough results returning; contacted nurse to discontinue current infusion - No significant change in renal function since the initiation of vancomycin therapy - Will get a random level ~15hrs post last dose prior to adjusting/continuing maintenance dosing Maintenance dose: pending random level Next scheduled level: Random on 10/24 at 1200 Pharmacy will follow daily and adjust medications as appropriate for renal function and/or serum levels. Thank you, Pat Reese

## 2019-10-24 NOTE — PROGRESS NOTES
Problem: Pain Goal: *Control of Pain Outcome: Progressing Towards Goal 
Goal: *PALLIATIVE CARE:  Alleviation of Pain Outcome: Progressing Towards Goal 
  
Problem: Falls - Risk of 
Goal: *Absence of Falls Description Document Reynaldo Patel Fall Risk and appropriate interventions in the flowsheet. Outcome: Progressing Towards Goal 
Note:  
Fall Risk Interventions: 
  
 
Mentation Interventions: Bed/chair exit alarm Medication Interventions: Bed/chair exit alarm, Patient to call before getting OOB Elimination Interventions: Bed/chair exit alarm History of Falls Interventions: Bed/chair exit alarm Problem: Patient Education: Go to Patient Education Activity Goal: Patient/Family Education Outcome: Progressing Towards Goal 
  
Problem: Pressure Injury - Risk of 
Goal: *Prevention of pressure injury Description Document Dionicio Scale and appropriate interventions in the flowsheet. Outcome: Progressing Towards Goal 
Note:  
Pressure Injury Interventions: 
Sensory Interventions: Pressure redistribution bed/mattress (bed type) Moisture Interventions: Check for incontinence Q2 hours and as needed Activity Interventions: Pressure redistribution bed/mattress(bed type) Mobility Interventions: Pressure redistribution bed/mattress (bed type) Nutrition Interventions: Document food/fluid/supplement intake Friction and Shear Interventions: Transferring/repositioning devices Problem: Discharge Planning Goal: *Discharge to safe environment Outcome: Progressing Towards Goal 
  
Problem: Delirium Treatment Goal: *Level of consciousness restored to baseline Outcome: Progressing Towards Goal 
Goal: *Level of environmental perceptions restored to baseline Outcome: Progressing Towards Goal 
Goal: *Sensory perception restored to baseline Outcome: Progressing Towards Goal 
Goal: *Emotional stability restored to baseline Outcome: Progressing Towards Goal 
 Goal: *Functional assessment restored to baseline Outcome: Progressing Towards Goal 
Goal: *Absence of falls Outcome: Progressing Towards Goal 
Goal: *Will remain free of delirium, CAM Score negative Outcome: Progressing Towards Goal 
Goal: *Cognitive status will be restored to baseline Outcome: Progressing Towards Goal 
Goal: Interventions Outcome: Progressing Towards Goal 
  
Problem: Patient Education: Go to Patient Education Activity Goal: Patient/Family Education Outcome: Progressing Towards Goal 
  
Problem: Nutrition Deficit Goal: *Adequate nutrition Outcome: Progressing Towards Goal 
Goal: *Optimize nutritional status Outcome: Progressing Towards Goal 
  
Problem: Breathing Pattern - Ineffective Goal: *Absence of hypoxia Outcome: Progressing Towards Goal 
Goal: *Use of effective breathing techniques Outcome: Progressing Towards Goal 
Goal: *PALLIATIVE CARE:  Alleviation of Dyspnea Outcome: Progressing Towards Goal

## 2019-10-24 NOTE — PROGRESS NOTES
NUTRITION FOLLOW UP/PLAN OF CARE   
 
RECOMMENDATIONS:  
1. Continue Osmolite 1.2 @ 70 mL/hr x 22 hrs to provide 1540 mL, 1848 kcal, 85 g pro, 1262 mL free water with 150 mL flush Q 6 
2. Monitor labs, weight and TF tolerance 3. Daily weights GOALS:  
1. Met/ongoing: TF tolerance/TF meets >75% of protein/calorie needs by 10/29/19 
2. Ongoing: Without significant weight fluctuations by 10/31/19 ASSESSMENT:  
Wt status is classified as overweight per BMI of 27.4. Pt is at nutrition risk related to Stage 2 PU to L buttock per wound RN. Diagnosis: encephalopathy, sepsis, L knee infection, MACKENZIE (improved), afib, elevated LFT, hypothyroid, hypocalcemia, hypernatremia, ETOH abuse. With TF via NGT. Labs reviewed. Nutrition recommendations listed. RD to follow. Nutrition Diagnoses:  
Remains appropriate: Increased nutrient needs due to wound healing as evidenced by compromised skin integrity Nutrition Risk:  [] High  [x] Moderate []  Low SUBJECTIVE/OBJECTIVE:  
(10/24/19) Pt resting in bed with TF infusing during time of assessment, family at bedside. Tolerating TF at goal rate per reports. Per I/Os BM 10/22/19. Seen by SLP 10/23/19 - failed MBS with recs for QOL/comfort diet (puree with HTL) vs PEG. Eliquis on hold for anticipation of EGD with PEG placement 10/25/19. Noted pt continues with weight fluctuations - unsure of accuracy, lasix possibly contributing. CBW: 193 lb 10/23/19. 175 lb 10/21/19, 194 lb 10/18/19. Family asking about PEG placement and eventually getting pt to bolus feeds. Spoke with family about this but to continue continuous feeds at this time until after placement/healing underway. Skin: per wound RN notes 10/24/19 Stage 2 to R buttock healed and stage 2 to L buttock with improvement. Continue current TF.  Will continue to monitor TF tolerance, weight, labs, POC. 
 
(10/20/19) Pt resting in bed, awake with family at bedside during time of assessment. Pt with TF infusing via NGT at time of assessment at goal rate. SLP louisa completed 10/18/19 with recs of NPO, ice chips. Gi wants another speech eval completed this week - talks of possible PEG placement. Tolerating current TF per reports. Per I/Os BM 10/17/19. CBW: 194 lb 10/18/19. Fluctuations noted - will monitor closely for need to decrease TF rate. Will continue to monitor TF/tolerance, weight, labs, POC.  
 
(10/17/19) Pt resting in bed with NGT in placed and TF running at goal rate Osmolite 1.2 @ 70 mL/hr x 22 hrs to provide 1540 mL, 1848 kcal, 85 g pro, 1262 mL free water with 150 mL flush Q 6hrs. 0 residuals reported. Daughter at bedside states pt had a good day yesterday until he pulled the NGT out and was given dilaudid - so a little more drowsy today. Tube dislodged and feedings resumed @ 224am. Loose BM yesterday per reports. Noted low K, elevated BUN. Took beds sera weight while in room 180.4 lb - placed in flowsheets. Noted pt on lasix possibly contributing to weight fluctuations. Skin: stage 2 right buttock, ecchymosis, abrasion noted. Will continue to monitor TF tolerance, weight, labs. (10/15/19) Pt resting in bed during time of assessment with daughter at bedside. Per speech on 10/13/19 rec'd NPO and ice chips PRN for oral care/comfort, pt has been NPO since. RN placed NGT this afternoon. Reassessed needs on todays bed scale weight 176.8 lb. Noted weight have been all over the place since admission - admit 10/8/19: 197.8 lb then 10/13/19: 165 lb. -190 per family reports. Recommend: Osmolite 1.2 @ 70 mL/hr x 22 hrs to provide 1540 mL, 1848 kcal, 85 g pro, 1262 mL free water . Spoke with RN/attending MD. Michael Elmad with daughter about recommendations and answered her questions regarding his nutrition. Information Obtained from:  
 [x] Chart Review 
 [] Patient [x] Family/Caregiver 
 [] Nurse/Physician 
 [] Interdisciplinary Meeting/Rounds Diet: NPO  
 Medications: [x] Reviewed Noted: folvite, lasix 40 mg, humalog every 6 hrs, synthroid, floranex, magnesium sulfate, merrem, lopressor, protonix, vancomycin, B1 PRN: colace, haldol Allergies: [x] Reviewed Encounter Diagnoses ICD-10-CM ICD-9-CM 1. Severe sepsis ([facility] A41.9 038.9  
 R65.20 995.92 Past Medical History:  
Diagnosis Date  Arthritis  Atrial fibrillation ([facility] 07/2017 Dr Aldo Hedrick cardio follows. chronic  Carcinoma of prostate ([facility]  Cellulitis  Coarse tremor   
  hands, states \"my doctor is aware\"  Difficulty swallowing  Dysphagia  Edema  Encounter for colonoscopy due to history of adenomatous colonic polyps  Essential hypertension  Fall 10/07/2017 \"went to [facility] Emergency Room, CT Scan showed concussion, no bleeding\" per patient  Fatigue  GERD (gastroesophageal reflux disease) wo. esophagitis  Glaucoma  H/O joint replacement   
  left knee 2003  HLD (hyperlipidemia)  Hypertension  Hypertensive disorder  Hypothyroidism  Impotence  Laceration of right lower leg with complication  Malignant neoplasm of prostate ([facility]   
  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  Malignant tumor of prostate ([address]  Nocturia  Non-pressure chronic ulcer of right calf ([address] with fat layer exposed  Pancreatitis  Pneumonia  Primary osteoarthritis, right shoulder  Rotator cuff tear, right  Sepsis ([facility]  Shoulder dislocation, right, initial encounter  Syncope  Thyroid disease  Urinary retention   
 acute/hist. of   
  
Labs:   
Lab Results Component Value Date/Time  Sodium 136 10/24/2019 01:00 AM  
 Potassium 4.6 10/24/2019 01:00 AM  
 Chloride 100 10/24/2019 01:00 AM  
 CO2 31 10/24/2019 01:00 AM  
 Anion gap 5 10/24/2019 01:00 AM  
 Glucose 138 (H) 10/24/2019 01:00 AM  
 BUN 19 (H) 10/24/2019 01:00 AM  
 Creatinine 0.73 10/24/2019 01:00 AM  
 Calcium 7.8 (L) 10/24/2019 01:00 AM  
 Magnesium 1.8 10/24/2019 01:00 AM  
 Phosphorus 3.5 10/24/2019 01:00 AM  
 Albumin 1.8 (L) 10/19/2019 05:05 AM  
 
Lab Results Component Value Date/Time  (H) 10/24/2019 01:00 AM  
 GLUCPOC 128 (H) 10/24/2019 12:52 PM  
 GLUCPOC 116 (H) 10/24/2019 06:38 AM  
 GLUCPOC 150 (H) 10/23/2019 11:55 PM  
 
Anthropometrics: 
Last 3 Recorded Weights in this Encounter 10/18/19 6631 10/21/19 0351 10/23/19 2158 Weight: 88 kg (194 lb) 79.4 kg (175 lb 0.7 oz) 87.5 kg (193 lb) Ht Readings from Last 1 Encounters:  
10/09/19 5' 8\" (1.727 m) Documented Weight History: 
Weight Metrics 10/23/2019 10/8/2019 10/3/2019 9/27/2019 9/26/2019 9/23/2019 9/16/2019 Weight 193 lb - 180 lb 180 lb - 190 lb 190 lb BMI - 29.35 kg/m2 27.37 kg/m2 - 27.37 kg/m2 28.89 kg/m2 28.89 kg/m2 IBW: 154 lb  UBW: 185-190 lb 
[] Weight Loss [] Weight Gain [x] Weight fluctuations - weights all over the place Estimated Nutrition Needs: [x] MSJ  [] Other: 
Calories: 5716-6720 kcal (x1.2 AF - x 1.1 SF) Based on:   [x] Actual BW   
Protein:   80-97 g (1-1.2 g/kg) Based on:   [x] Actual BW Fluid:     1 mL/kcal  
 
 [x] No Cultural, Pentecostalism or ethnic dietary need identified. [] Cultural, Pentecostalism and ethnic food preferences identified and addressed Wt Status:  [] Normal (18.6 - 24.9) [] Underweight (<18.5) [x] Overweight (25 - 29.9) [] Mild Obesity (30 - 34.9)  [] Moderate Obesity (35 - 39.9) [] Morbid Obesity (40+) Nutrition Problems Identified:  
[] Suboptimal PO intake  
[] Food Allergies [x] Difficulty chewing/swallowing/poor dentition 
[] Constipation/Diarrhea  
[] Nausea/Vomiting  
[] None 
[] Other:  
 
Plan:  
[x] Enteral feeding 
[]  Obtained/adjusted food preferences/tolerances and/or snacks options  
[]  Supplements added  
[x] SLP following for feeding techniques []  HS snack added  
[]  Modify diet texture []  Modify diet for food allergies []  Educate patient  
[]  Assist with menu selection  
[x]  Monitor TF tolerance [x]  Continue inpatient monitoring and intervention  
[x]  Participated in discharge planning/Interdisciplinary rounds/Team meetings  
[]  Other:  
 
Education Needs: 
 [] Not appropriate for teaching at this time due to:   
 [x] Identified and addressed spoke with family about TF recommendations and answered questions Nutrition Monitoring and Evaluation: 
[x] Continue ongoing monitoring and intervention 
[] Other Salvador Mcdonald

## 2019-10-24 NOTE — PROGRESS NOTES
2000 Bedside, Verbal and Written shift change report given to 1921 Lila Velasquez (oncoming nurse) by Alondra Sawant (offgoing nurse). Report included the following information SBAR, Kardex, Intake/Output, MAR and Recent Results. Patient drowsy but opens eyes to speech, non-verbal, unable to communicate with communication board. Family at bedside, no visual indication of pain or distress. Will continue to monitor. 2200 PM medications administered, pt tolerated with ease, will continue to monitor. 0000 Shift reassessment, pt condition unchanged, will continue to monitor. 0400  Shift reassessment, pt sleeping between care, will continue to monitor. 0745 Bedside, Verbal and Written shift change report given to Per Frank RN (oncoming nurse) by Taisha Worthington RN (offgoing nurse). Report included the following information SBAR, Kardex, Intake/Output, MAR and Recent Results. Skin assessment completed.

## 2019-10-24 NOTE — PROGRESS NOTES
Speech Therapy Note: SLP following up this day with GI consult indicated EGD with PEG next am. Will follow up with ice chip trials for pleasure once pt is tolerating PEG. Rossana A. Vanna Bernheim, M.S., CCC-SLP Clinical Supervisor - Med-surg Rehab Ph: [phone]

## 2019-10-24 NOTE — PROGRESS NOTES
Bedside and Verbal shift change report given to *** (oncoming nurse) by   Emil colon). Report included the following information {SBAR REPORTS JDNM:[id number]}.

## 2019-10-25 NOTE — PROGRESS NOTES
Pt TACO for PEG placement - Will follow up. [address] [facility]

## 2019-10-25 NOTE — PROGRESS NOTES
Respiratory Therapy Assessment Care Plan Patient: 
Celia Chang 80 y.o. male 10/25/2019 3:49 PM 
 
Severe sepsis ([address] [A41.9, R65.20] Altered mental status [R41.82] Chest X-RAY:  
Results from Hospital Encounter encounter on 10/08/19 XR SWALLOW FUNC VIDEO Impression Impression: 1. Aspiration with all consistencies. Please see speech pathology report for complete details. XR CHEST PORT Impression IMPRESSION:  
 
1. Stable support lines and tubes. 2. Bibasilar atelectasis/airspace disease, likely with pleural effusions, right 
greater than left. This has slightly worsened in the right base. XR CHEST PORT Impression IMPRESSION: 
 
 
1. Right IJ approach central venous catheter in stable position. 2. Nasogastric tube below the diaphragm, tip collimated from view. 3. Low lung volumes and bibasilar atelectasis with likely small right pleural 
effusion not significantly changed from prior. Vital Signs:   Visit Vitals /60 (BP 1 Location: Left arm, BP Patient Position: At rest) Pulse 99 Temp 98 °F (36.7 °C) Resp 22 Ht 5' 8\" (1.727 m) Wt 82 kg (180 lb 12.8 oz) SpO2 100% BMI 27.49 kg/m² Indications for treatment: Increased WOB. History of Pneumonia Plan of care: Duoneb Q4H. Continue oxygen therapy as needed per patient. Continue to improve and maintain patient's oxygenation and ventilation. Goal: Titrate oxygen as tolerated per patient.

## 2019-10-25 NOTE — ANESTHESIA PREPROCEDURE EVALUATION
Relevant Problems No relevant active problems Anesthetic History No history of anesthetic complications Review of Systems / Medical History Patient summary reviewed and pertinent labs reviewed Pulmonary Neuro/Psych Within defined limits Cardiovascular Hypertension Dysrhythmias : atrial fibrillation Exercise tolerance: >4 METS 
  
GI/Hepatic/Renal 
Within defined limits Endo/Other Hypothyroidism Other Findings Physical Exam 
 
Airway Mallampati: III 
TM Distance: 4 - 6 cm Neck ROM: normal range of motion Mouth opening: Normal 
 
 Cardiovascular Regular rate and rhythm,  S1 and S2 normal,  no murmur, click, rub, or gallop Rhythm: regular Rate: normal 
 
 
 
 Dental 
 
Dentition: Upper partial plate Pulmonary Breath sounds clear to auscultation Abdominal 
GI exam deferred Other Findings Anesthetic Plan ASA: 4 Anesthesia type: MAC Induction: Intravenous Anesthetic plan and risks discussed with: Patient and Son / Daughter

## 2019-10-25 NOTE — PROGRESS NOTES
attempted to complete a follow up visit with patient in room 3013 this morning but found him resting peacefully. at this time and unable to communicate. Chaplains will continue to follow and will provide pastoral care on an as needed/requested basis Reiseñor 3 Board Certified [employer] [facility]  
[phone]

## 2019-10-25 NOTE — PROGRESS NOTES
Assumed care of patient, patient in bed alert and orient x4. Patient TPN was turned off at midnight, he is due to have a peg placed today. Call light within reach. Patient was turned on his left side. Sbar report received from [facility].  
 
 
0817:Ng tube, hold meds 1300: patient off unit to have Peg placed. 1510: Patient return from surgery .

## 2019-10-25 NOTE — ADDENDUM NOTE
Addendum  created 10/25/19 1444 by Liseth Nuno MD  
 Attestation recorded in [address] [place] [address], [facility] 97 filed, Sign clinical note

## 2019-10-25 NOTE — PROGRESS NOTES
Speech Therapy Note: SLP attempted follow-up this day; pt off floor for PEG. Will follow up as indicated per POC. Naila Boyce M.S., CCC-SLP Clinical Supervisor - Med-surg Rehab Ph: [phone]

## 2019-10-25 NOTE — PROCEDURES
Endoscopy Procedure Note Patient: Evelin Rodriguez MRN: [id number]  SSN: [id number] YOB: 1933  Age: 80 y.o. Sex: male Date/Time:  10/25/2019 2:30 PM 
 
Esophagogastroduodenoscopy (EGD) with PEG Tube Placement Procedure Note Procedure: Esophagogastroduodenoscopy with peg tube placement IMPRESSION:  
1. Normal EGD RECOMMENDATIONS: 
1. Typical peg tube care per nursing. 2. Flush peg tube with 20cc NS qshift. 3. Can use tube for medications and fluids now, Can start tube feeds in 6 hours. Indication: Oropharyngeal dysphagia :  Alma Glez MD 
Assistants: Endoscopy Technician-1: Jodi Mane RN-1: Chantal Tee RN Referring Provider:   Jose Brown MD 
History: The history and physical exam were reviewed and updated. Endoscope: Olympus GIF-190 diagnostic endoscope Extent of Exam: second portion of the duodenum ASA: ASA 3 - Patient with moderate systemic disease with functional limitations Anethesia/Sedation:  MAC anesthesia Description of the procedure: The procedure was discussed with the patient including risks, benefits, alternatives including risks of iv sedation, bleeding, perforation and aspiration. A safety timeout was performed. The patient was placed in the left lateral decubitus position. A bite block was placed. The patient was given incremental doses of intravenous sedation until moderate sedation was achieved . The patients vital signs were monitored at all times including heart rate/rhythm, blood pressure and oxygen saturation. The endoscope was then passed under direct visualization to the second portion of the duodenum. The endoscope was then slowly withdrawn while visualizing the mucosa. In the stomach a retroflexion was performed and gastric fundus and cardia visualized. The endoscope was then slowly withdrawn.   The endoscope then in proximal body and evaluation for position of peg tube placement evaluated. This was accomplished with 1:1 abdominal pressure with finger and transillumination. When adequate area found the area was cleaned with betadyne and sterile drape placed. The area was locally anesthetized with 5cc of 1% lidocaine. A small 1cm incision was then made using an 11 blade. A Needle/catheter system was passed to the incision into gastric lumen. This was grasped via the endoscope using a snare. The needle was removed and guidewire passed via the catheter and grasped with the snare. The scope and guidewire were withdrawn from the patient. A 20Fr peg tube was attached to the oral side of the guidewire. It was pulled into place via the percutaneous side. The final bumper position was at 3 cm at skin surface. The area was then cleaned and dressed in usual fashion. The endoscope was then passed again to the stomach. The bumper location was noted. The endoscope was removed. The patient was then transferred to recovery in stable condition. Findings:  
 Esophagus: The esophageal mucosa was normal with no ulceration, mass or stricture. There was no evidence of Ang's esophagus or reflux esophagitis. Z line at 40 cm. Stomach: The gastric mucosa was normal with no ulceration, mass, stricture. The peg tube bumper was in proper location. Duodenum: The duodenum mucosa was normal with no ulceration, mass, stricture and no evidence of villous atrophy. Therapies:  Peg tube placement. Specimens:None Complications:   None; patient tolerated the procedure well. EBL:5 cc Yvette Shah MD 
October 25, 2019 
2:30 PM

## 2019-10-25 NOTE — ANESTHESIA POSTPROCEDURE EVALUATION
Procedure(s): ESOPHAGOGASTRODUODENOSCOPY (EGD) PERCUTANEOUS ENDOSCOPIC GASTROSTOMY TUBE INSERTION. general 
 
Anesthesia Post Evaluation Multimodal analgesia: multimodal analgesia not used between 6 hours prior to anesthesia start to PACU discharge Patient location during evaluation: bedside Patient participation: complete - patient participated Level of consciousness: awake Pain score: 0 Pain management: adequate Airway patency: patent Anesthetic complications: no 
Cardiovascular status: acceptable Respiratory status: acceptable Hydration status: acceptable Post anesthesia nausea and vomiting:  none Vitals Value Taken Time BP 99/52 10/25/2019  2:28 PM  
Temp 36.7 °C (98 °F) 10/25/2019  2:28 PM  
Pulse 91 10/25/2019  2:28 PM  
Resp 16 10/25/2019  2:28 PM  
SpO2 99 % 10/25/2019  2:28 PM

## 2019-10-25 NOTE — PROGRESS NOTES
1857: Patient on 6 lpm nasal cannula. SpO2 100%. 1543: Patient found on 4 lpm nasal cannula. SpO2 100%.

## 2019-10-25 NOTE — PROGRESS NOTES
Internal Medicine Progress Note NAME: Raymundo Casillas :  1933 MRM:  [id number] Date/Time: 10/25/2019 ASSESSMENT/PLAN:    
 
# Dysphagia, failed SLP twice. GI consulted for  ? PEGT  
- NGT for meds and feeding  , free water  
- Failed swallow study - GI placing PEG today Acute respiratory failure - pt tachypneic with increased WOB since 10/21. SpO2 92 on 3L - CXR showed right base new opacity. D/w ID started on vanc and merrem on 10/21 
- resume PO lasix - ABG wnl # Acute Delirious state , Encephalopathy. Possible sepsis related or metabolic/ Alcohol related with his ho alcohol abuse. Was on opioid and benzo that been consolidated. Reduce sedative to minimal strongly needed. Ammonia level normal , CT head negative. Place ngt for meds 
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 H/o cat bite 4 weeks to RLE per daughter, when he came in with cellulitis and was treated. D/w Dr. Estrella Choi concern for persistent infection/abscess, he ordered nuclear gallium scan . Blood culture repeat on 10/10 negative to date.  
- lactobacillus added # Possible L knee infection. CUTLTURE of aspirate negative. Orthopedic. Knee tapping and CS. Fluids CS negative. 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 I called lab and radiology if the can run the knee fluid test for Crystals . I am informed that fluids from his hip on 10/9 and knee on 10/15 both tested for crystals and was negative - Sever L knee pains - control pains. #  MACKENZIE - improved with IV fluid hydration, monitor closely. Monitor labs. Avoid nephrotoxins. Renally dosing medications. Monitor urine out put. #  AFIB - Rate control, resume Elliquis for AC . Dc iv lopressor , tab NGT. Titrate as needed # Elevated LFT - GI consulted, suspects 2/2 sepsis, appreciate recs. ( Elevated liver enzymes most likely secondary to sepsis. Coming down. CT shows no biliary obstruction. S/P cholecystectomy ) # Hypothyroidism - Cont home levothyroxine. # Hypocalcemia. Hypokalemia. Replete and trend. # Hypernatremia. Improved. free water NGT . D5 ivf . Serial labs to monitor level. # ETOH abuse - initially placed on  CIWA, DC. Per family last drink was 1 month ago. Thiamine. DVT ppx - SCD; resume Eliquis after PEG tomorrow 
 
-Code status : FULL Lab Review:  
 
Recent Labs 10/25/19 
0541 10/24/19 
0100 10/23/19 
0400 WBC 11.3 10.9 12.6 HGB 7.2* 7.2* 7.9*  
HCT 23.3* 23.1* 25.6*  
* 486* 547* Recent Labs 10/25/19 
0541 10/24/19 
0100 10/23/19 
0400  136 136  
K 4.8 4.6 4.8  
 100 99* CO2 32 31 31 GLU 72* 138* 78 BUN 20* 19* 17  
CREA 0.62 0.73 0.66  
CA 8.0* 7.8* 8.0*  
MG 2.1 1.8 2.0 PHOS 3.0 3.5 3.7 Lab Results Component Value Date/Time Glucose (POC) 77 10/25/2019 12:24 PM  
 Glucose (POC) 79 10/25/2019 08:28 AM  
 Glucose (POC) 78 10/25/2019 07:00 AM  
 Glucose (POC) 123 (H) 10/25/2019 12:12 AM  
 Glucose (POC) 128 (H) 10/24/2019 12:52 PM  
 Glucose, POC 83 01/03/2017 10:19 AM  
 
No results for input(s): PH, PCO2, PO2, HCO3, FIO2 in the last 72 hours. No results for input(s): INR, INREXT, INREXT in the last 72 hours. No results found for: SDES Lab Results Component Value Date/Time Culture result: NO GROWTH 4 DAYS 10/21/2019 07:25 PM  
 Culture result: NO GROWTH 4 DAYS 10/21/2019 06:59 PM  
 Culture result: NO GROWTH 5 DAYS 10/15/2019 10:30 AM  
 
 
All Cardiac Markers in the last 24 hours:  
No results found for: CPK, CK, CKMMB, CKMB, RCK3, CKMBT, CKNDX, CKND1, CHAYA, TROPT, TROIQ, BRENDAN, TROPT, TNIPOC, BNP, BNPP Subjective:  
 
Pt this AM was awake and alert, talking, interactive. Objective:  
 
Vitals: Last 24hrs VS reviewed since prior progress note. Most recent are: 
 
Visit Vitals /60 (BP 1 Location: Left arm, BP Patient Position: At rest) Pulse 99 Temp 98 °F (36.7 °C) Resp 22 Ht 5' 8\" (1.727 m) Wt 82 kg (180 lb 12.8 oz) SpO2 100% BMI 27.49 kg/m² SpO2 Readings from Last 6 Encounters:  
10/25/19 100% 10/08/19 95% 10/08/19 91% 10/07/19 98% 10/03/19 100% 10/03/19 90% O2 Flow Rate (L/min): 4 l/min Intake/Output Summary (Last 24 hours) at 10/25/2019 1538 Last data filed at 10/25/2019 1425 Gross per 24 hour Intake 3450 ml Output  Net 3450 ml Physical Exam:  
Ears: hearing is intact  AX0X3. Eyes: Icterus is not present Lungs: coarse breath sounds bilaterally Heart: regular rate and rhythm, S1, S2 normal, no murmur, click, rub or gallop Gastrointestinal: soft, non-tender. Bowel sounds normal. No masses,  no organomegaly Neurological:  New Focal Deficits are not present. Skin: diffuse ecchymosis. Face with small petechiae (per family is due to known actinic keratosis). Psychiatric:  Mood is stable Medications Reviewed: (see below) Lab Data Reviewed: (see below) 
 
______________________________________________________________________ Medications:  
 
Current Facility-Administered Medications Medication Dose Route Frequency  dextrose 5% infusion  50 mL/hr IntraVENous CONTINUOUS  
 0.9% sodium chloride infusion  25 mL/hr IntraVENous CONTINUOUS  
 sodium chloride (NS) flush 5-40 mL  5-40 mL IntraVENous Q8H  
 sodium chloride (NS) flush 5-40 mL  5-40 mL IntraVENous PRN  
 vancomycin (VANCOCIN) 1,000 mg in 0.9% sodium chloride (MBP/ADV) 250 mL adv  1,000 mg IntraVENous Q18H  
 [START ON 10/26/2019] VANCOMYCIN INFORMATION NOTE   Other ONCE  
 levoFLOXacin (LEVAQUIN) 750 mg in D5W IVPB  750 mg IntraVENous Q24H  VANCOMYCIN INFORMATION NOTE   Other Rx Dosing/Monitoring  docusate sodium (COLACE) capsule 100 mg  100 mg Oral BID PRN  
  Lactobacillus Acidoph & Bulgar [facility]) tablet 2 Tab  2 Tab Oral BID  albuterol-ipratropium (DUO-NEB) 2.5 MG-0.5 MG/3 ML  3 mL Nebulization QID RT  
 albuterol (PROVENTIL VENTOLIN) nebulizer solution 1.25 mg  1.25 mg Nebulization Q4H PRN  
 melatonin tablet 6 mg  6 mg Oral QHS PRN  
 haloperidol lactate (HALDOL) injection 1 mg  1 mg IntraVENous Q8H PRN  
 furosemide (LASIX) tablet 40 mg  40 mg Oral DAILY  metoprolol tartrate (LOPRESSOR) tablet 12.5 mg  12.5 mg Per NG tube BID  thiamine mononitrate (B-1) tablet 100 mg  100 mg Oral DAILY  acetaminophen (TYLENOL) tablet 500 mg  500 mg Oral Q6H PRN  
 acetaminophen (TYLENOL) solution 325 mg  325 mg Per NG tube Q6H  
 insulin lispro (HUMALOG) injection   SubCUTAneous Q6H  
 glucose chewable tablet 16 g  16 g Oral PRN  
 glucagon (GLUCAGEN) injection 1 mg  1 mg IntraMUSCular PRN  
 dextrose 10 % infusion 125-250 mL  125-250 mL IntraVENous PRN  pantoprazole (PROTONIX) injection 40 mg  40 mg IntraVENous DAILY  folic acid (FOLVITE) tablet 1 mg  1 mg Oral DAILY  sodium chloride (NS) flush 5-40 mL  5-40 mL IntraVENous Q8H  
 desonide (TRIDESILON) 0.05 % cream - patient supplied  (Patient Supplied)   Topical BID  naloxone (NARCAN) injection 0.4 mg  0.4 mg IntraVENous PRN  
 sodium chloride (NS) flush 5-10 mL  5-10 mL IntraVENous PRN  
 levothyroxine (SYNTHROID) tablet 175 mcg  175 mcg Oral ACB Total time spent with patient: 35  minutes Care Plan discussed with: Care Manager, Nursing Staff, Consultant/Specialist and >50% of time spent in counseling and coordination of care Discussed:  Care Plan Attending Physician: Steven Gerber MD

## 2019-10-25 NOTE — PROGRESS NOTES
1940 Bedside, Verbal and Written shift change report given to 1921 Lila Velasquez (oncoming nurse) by - RN (offgoing nurse). Report included the following information SBAR, Kardex, Intake/Output, MAR and Recent Results. Patient drowsy, responds to voice, family at bedside, plan of care reviewed with family. 2200 PM medications administered, pt tolerated with ease, will continue to monitor. 0000 NGT feeding osmolite 1.2 samy stopped. Pt now NPO for anticipated PEG tube placement today. Shift reassessment, pt condition unchanged, will continue to monitor. 0400  Shift reassessment, pt sleeping between care, will continue to monitor. 0715 Bedside, Verbal and Written shift change report given to [facility] (oncoming nurse) by Derrick Archuleta RN (offgoing nurse). Report included the following information SBAR, Kardex, Intake/Output, MAR and Recent Results. Skin assessment completed.

## 2019-10-25 NOTE — PROGRESS NOTES
Problem: Pain Goal: *Control of Pain Outcome: Not Progressing Towards Goal 
Goal: *PALLIATIVE CARE:  Alleviation of Pain Outcome: Not Progressing Towards Goal 
  
Problem: Falls - Risk of 
Goal: *Absence of Falls Description Document Elin Jacobs Fall Risk and appropriate interventions in the flowsheet. Outcome: Not Progressing Towards Goal 
Note:  
Fall Risk Interventions: 
  
 
Mentation Interventions: Bed/chair exit alarm, Family/sitter at bedside Medication Interventions: Bed/chair exit alarm, Patient to call before getting OOB Elimination Interventions: Bed/chair exit alarm, Call light in reach History of Falls Interventions: Bed/chair exit alarm, Door open when patient unattended Problem: Patient Education: Go to Patient Education Activity Goal: Patient/Family Education Outcome: Not Progressing Towards Goal 
  
Problem: Pressure Injury - Risk of 
Goal: *Prevention of pressure injury Description Document Dionicio Scale and appropriate interventions in the flowsheet. Outcome: Not Progressing Towards Goal 
Note:  
Pressure Injury Interventions: 
Sensory Interventions: Pressure redistribution bed/mattress (bed type) Moisture Interventions: Check for incontinence Q2 hours and as needed, Internal/External urinary devices Activity Interventions: Pressure redistribution bed/mattress(bed type) Mobility Interventions: Pressure redistribution bed/mattress (bed type) Nutrition Interventions: Document food/fluid/supplement intake Friction and Shear Interventions: Foam dressings/transparent film/skin sealants, Transferring/repositioning devices Problem: Discharge Planning Goal: *Discharge to safe environment Outcome: Not Progressing Towards Goal 
  
Problem: Delirium Treatment Goal: *Level of consciousness restored to baseline Outcome: Not Progressing Towards Goal 
Goal: *Level of environmental perceptions restored to baseline Outcome: Not Progressing Towards Goal 
 Goal: *Sensory perception restored to baseline Outcome: Not Progressing Towards Goal 
Goal: *Emotional stability restored to baseline Outcome: Not Progressing Towards Goal 
Goal: *Functional assessment restored to baseline Outcome: Not Progressing Towards Goal 
Goal: *Absence of falls Outcome: Not Progressing Towards Goal 
Goal: *Will remain free of delirium, CAM Score negative Outcome: Not Progressing Towards Goal 
Goal: *Cognitive status will be restored to baseline Outcome: Not Progressing Towards Goal 
Goal: Interventions Outcome: Not Progressing Towards Goal 
  
Problem: Patient Education: Go to Patient Education Activity Goal: Patient/Family Education Outcome: Not Progressing Towards Goal 
  
Problem: Nutrition Deficit Goal: *Adequate nutrition Outcome: Not Progressing Towards Goal 
Goal: *Optimize nutritional status Outcome: Not Progressing Towards Goal 
  
Problem: Patient Education: Go to Patient Education Activity Goal: Patient/Family Education Outcome: Not Progressing Towards Goal 
  
Problem: Breathing Pattern - Ineffective Goal: *Absence of hypoxia Outcome: Not Progressing Towards Goal 
Goal: *Use of effective breathing techniques Outcome: Not Progressing Towards Goal 
Goal: *PALLIATIVE CARE:  Alleviation of Dyspnea Outcome: Not Progressing Towards Goal

## 2019-10-25 NOTE — PERIOP NOTES
Abdominal binder given to pt. Instructions for use given by Gary Capps to daughters. Acknowledged understanding.

## 2019-10-25 NOTE — ROUTINE PROCESS
Bedside / verbal shift change report given to Marcela Sandhu (oncoming nurse) by Patricio Garcia RN (offgoing nurse). Report included the following information SBAR, Kardex, Intake/Output, MAR and Recent Results.

## 2019-10-25 NOTE — PROGRESS NOTES
[facility] 
                                             (A Division of [address] [place] [address]) Follow-up Note Date of Admission: 10/8/2019     Date of Note:  10/25/2019 Summary:   
 
81 y/o  male HTN, AF, GERD, DJD s/p L TKR, Prostate CA, Hypothyroidism, h/o pancreatitis, ETOH consumption adm 10/8/2019 w/ chills, weakness, altered mental status. Has neck pain x 1 mo. Had R leg cellulitis after cat bite (not scratch) 1 mo PTA - resolved w/ abx. Then micturition syncope x 3, hurt knee, progressing generalized weakness, then 1 day PTA fever, chills. Adm w/ 100.5, WBC 29.5, 13% bands. No uti, pneumonia, unrevealing CTAP. 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 Rpt blcx 10/10 NGTD x 2.  Gallium scan 10/14: intense periprosthetic uptake L knee, distal femur. L knee synovial fluid 10/15- not s/o septic arthritis. Presume distal L femur OM. Meropenem, Vanc started 10/21 for ? HAP. Low grade fever 10/24 100.5 WBC normal. Meropenem changed to levofloxacin Interval History: 
 
Just back from PEG. When roused, answers appropriately and verbalizes more clearly but still lapses quickly back to sleep mid-conversation. Afebrile. Current Antimicrobials: Prior Antimicrobials Vanc 10/21 - 4  Levofloxacin 10/24 - 1 abx 10/8 - 17 Vancomycin 10/8 - 2  Cefepime, Levoflox 10/8 - 0 Meropenem 10/9 - 2 Ceftriaxone 10/11 - 10 Ermfmgntz36/21 - 3  
  
  
Assessment: Plan:  
Aspiration 
- failed swallow study 10/24 
- s/p PEG 10/25 -> per others Low grade fever - 100.5 today - WBC normal ?drug fever -> continue Levofloxacin 750 mg x 3 more days 
-> continue Vancomycin pending MRSA screen NEW R LL opacity 
- atelectasis vs infiltrate. 
- had isolated low grade fever 10/24 - afebrile now - MRSA screen still not done (after  5 days) -> abx as above 
-> dc Vancomycin if MRSA screen negative Left knee uptake on Ga scan - s/p US guided arthrocentesis 10/15 - negative for infection - presume distal L femur osteomyelitis -> Levofloxacin as above then 
-> then change back to Ceftriaxone to complete 6 weeks (target end date: 11/19/2019)  +/- subsequent po abx suppression  
-> PICC prior to discharge. (still has R IJ CVL) Pasteurella BSI 
- 1 of 2 blood cultures 10/8 + P multocida sens TMP-SMX, Ceftriaxone, Levofloxacin, PCN, TCN 
- No active cellulitis. R leg cellulitis following cat bite in early September resolved w/ TMP-SMX then Keflex 
- Recent TTE 9/27: neg vegetations - rpt blcx 10/10 NG x 2 
- Gallium scan 10/14: intense periprosthetic uptake L knee, distal femur. 
- L knee aspiration 10/15: 7,200 WBC, no crystals - not suggestive of septic arthritis - presuming source is L distal femoral OM 
- should be treated by now -> change Meropenem to Levofloxacin as above Encephalopathy/ ETOH Withdrawal 
- worse since admission - ? Medication-related: has been receiving dilaudid q 4 hours and Ativan this am 
- Head CT 10/14: no acute intracranial abnormality 
- not related to infection which appears under control at this time 
- improving.  -> per primary team.   
Septic shock 
- due to above 
- improving. Vasopressors stopped 10/10.   
- resolved -> abx as above MACKENZIE 
- resolved Worsening alkaline phosphatase 
- from sepsis, ?bone infection / met (doubt) 
- improving H/o PCN allergy (rash)  
- tolerated keflex 
- tolerating Ceftriaxone Recent R leg cellulitis following cat bite (not scratch) R hip pain - s/p aspiration 10/9: 23 WBC, no crystals HTN    
AF    
GERD    
DJD s/p L TKR    
Prostate CA    
Hypothyroidism    
h/o pancreatitis    
ETOH consumption    
  
Microbiology: 
  
10/8      blcx Pasteurella multocida 1 of 2 
             urcx NG 
10/9      R hip asp gs no wbc, NOS, cx NG 
 
10/10 blcx NGTD x 2 
  
Lines / Catheters: RIJ CVL Patient Active Problem List  
Diagnosis Code  Malignant neoplasm of prostate ([facility] C61  Hypertension I10  
 GERD (gastroesophageal reflux disease) K21.9  Glaucoma H40.9  Glaucoma H40.9  Dysphagia R13.10  Encounter for colonoscopy due to history of adenomatous colonic polyps Z12.11, Z86.010  
 Non-pressure chronic ulcer of right calf with fat layer exposed ([facility] X92.327  Laceration of right lower leg with complication S97.968Y  Edema R60.9  Pneumonia J18.9  Atrial fibrillation (HCC) I48.91  
 Hypothyroidism E03.9  Sepsis ([facility] A41.9  Cellulitis L03.90  
 HTN (hypertension) G36  
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  
 0.9% sodium chloride infusion  25 mL/hr IntraVENous CONTINUOUS  
 sodium chloride (NS) flush 5-40 mL  5-40 mL IntraVENous Q8H  
 sodium chloride (NS) flush 5-40 mL  5-40 mL IntraVENous PRN  
 [START ON 10/26/2019] apixaban (ELIQUIS) tablet 5 mg  5 mg Oral BID  vancomycin (VANCOCIN) 1,000 mg in 0.9% sodium chloride (MBP/ADV) 250 mL adv  1,000 mg IntraVENous Q18H  
 [START ON 10/26/2019] VANCOMYCIN INFORMATION NOTE   Other ONCE  
 levoFLOXacin (LEVAQUIN) 750 mg in D5W IVPB  750 mg IntraVENous Q24H  VANCOMYCIN INFORMATION NOTE   Other Rx Dosing/Monitoring  docusate sodium (COLACE) capsule 100 mg  100 mg Oral BID PRN  
 Lactobacillus Acidoph & Bulgar [facility]) tablet 2 Tab  2 Tab Oral BID  albuterol-ipratropium (DUO-NEB) 2.5 MG-0.5 MG/3 ML  3 mL Nebulization QID RT  
 albuterol (PROVENTIL VENTOLIN) nebulizer solution 1.25 mg  1.25 mg Nebulization Q4H PRN  
  melatonin tablet 6 mg  6 mg Oral QHS PRN  
 haloperidol lactate (HALDOL) injection 1 mg  1 mg IntraVENous Q8H PRN  
 furosemide (LASIX) tablet 40 mg  40 mg Oral DAILY  metoprolol tartrate (LOPRESSOR) tablet 12.5 mg  12.5 mg Per NG tube BID  thiamine mononitrate (B-1) tablet 100 mg  100 mg Oral DAILY  acetaminophen (TYLENOL) tablet 500 mg  500 mg Oral Q6H PRN  
 acetaminophen (TYLENOL) solution 325 mg  325 mg Per NG tube Q6H  
 insulin lispro (HUMALOG) injection   SubCUTAneous Q6H  
 glucose chewable tablet 16 g  16 g Oral PRN  
 glucagon (GLUCAGEN) injection 1 mg  1 mg IntraMUSCular PRN  
 dextrose 10 % infusion 125-250 mL  125-250 mL IntraVENous PRN  pantoprazole (PROTONIX) injection 40 mg  40 mg IntraVENous DAILY  folic acid (FOLVITE) tablet 1 mg  1 mg Oral DAILY  sodium chloride (NS) flush 5-40 mL  5-40 mL IntraVENous Q8H  
 desonide (TRIDESILON) 0.05 % cream - patient supplied  (Patient Supplied)   Topical BID  naloxone (NARCAN) injection 0.4 mg  0.4 mg IntraVENous PRN  
 sodium chloride (NS) flush 5-10 mL  5-10 mL IntraVENous PRN  
 levothyroxine (SYNTHROID) tablet 175 mcg  175 mcg Oral ACB Review of Systems - Negative except as in interval history Objective: 
Visit Vitals /60 (BP 1 Location: Left arm, BP Patient Position: At rest) Pulse 99 Temp 98 °F (36.7 °C) Resp 22 Ht 5' 8\" (1.727 m) Wt 82 kg (180 lb 12.8 oz) SpO2 100% BMI 27.49 kg/m² Temp (24hrs), Av.1 °F (36.7 °C), Min:97.9 °F (36.6 °C), Max:98.4 °F (36.9 °C) General: Well developed, well nourished 80 y.o.  male still lethargic, follows simple commands, answers questions appropriately. HEENT: no scleral icterus, mucous membranes moist, pharynx normal without lesions, NGT still in place Neck: resistant to flexion in all directions but more flexible than previous Cardio:  irregularly irregular rhythm Lungs: rhonchi bilateral and + expiratory wheezes Abdomen: soft, non-tender. Bowel sounds normal. No masses, no organomegaly. PEG in place Extremities:  no redness or tenderness in the calves or thighs, no edema Lab results: 
 
Chemistry Recent Labs 10/25/19 
0541 10/24/19 
0100 10/23/19 
0400 GLU 72* 138* 78  136 136  
K 4.8 4.6 4.8  
 100 99* CO2 32 31 31 BUN 20* 19* 17  
CREA 0.62 0.73 0.66  
CA 8.0* 7.8* 8.0* AGAP 4 5 6 BUCR 32* 26* 26* CBC w/ Diff Recent Labs 10/25/19 
0541 10/24/19 
0100 10/23/19 
0400 WBC 11.3 10.9 12.6 RBC 2.35* 2.34* 2.58* HGB 7.2* 7.2* 7.9*  
HCT 23.3* 23.1* 25.6*  
* 486* 547* GRANS 67 65 64 LYMPH 20* 20* 21 EOS 2 2 2 Microbiology All Micro Results Procedure Component Value Units Date/Time CULTURE, BLOOD [[id number]] Collected:  10/21/19 1859 Order Status:  Completed Specimen:  Blood Updated:  10/25/19 0818 Special Requests: --     
  RIGHT 
RED PORT Culture result: NO GROWTH 4 DAYS     
 CULTURE, BLOOD [[id number]] Collected:  10/21/19 1925 Order Status:  Completed Specimen:  Blood Updated:  10/25/19 0818 Special Requests: NO SPECIAL REQUESTS Culture result: NO GROWTH 4 DAYS     
 MRSA SCREEN - PCR (NASAL) [[id number]] Order Status:  Sent Specimen:  Nasal from Nares MRSA SCREEN - PCR (NASAL) [[id number]] Collected:  10/21/19 1830 Order Status:  Canceled Specimen:  Nasal   
 ANAEROBE ID REFLEX [[id number]] Collected:  10/15/19 1030 Order Status:  Completed Specimen:  MICROBIAL ISOLATE Updated:  10/20/19 1835 Source PENDING Result 1 Comment Comment: (NOTE) No anaerobic growth in 72 hours. Performed At: [address] [id number] Jovana Jamison MD [id number] CULTURE, BODY FLUID Cleo Arias [[id number]] Collected:  10/15/19 1030 Order Status:  Completed Specimen:  Synovial Fluid Updated:  10/20/19 5498   Special Requests: LEFT KNEE     
 GRAM STAIN FEW WBC'S     
   NO ORGANISMS SEEN Culture result: NO GROWTH 5 DAYS     
 CULTURE, BLOOD [[id number]] Collected:  10/10/19 1341 Order Status:  Completed Specimen:  Blood Updated:  10/16/19 0042 Special Requests: --     
  NO SPECIAL REQUESTS 
BLUE PORT Culture result: NO GROWTH 6 DAYS     
 CULTURE, BLOOD [[id number]] Collected:  10/10/19 1237 Order Status:  Completed Specimen:  Blood Updated:  10/16/19 0042 Special Requests: PERIPHERAL Culture result: NO GROWTH 6 DAYS     
 CULTURE, BLOOD [[id number]] Collected:  10/08/19 2234 Order Status:  Completed Specimen:  Blood from Miscellaneous sample Updated:  10/15/19 0754 Special Requests: NO SPECIAL REQUESTS Culture result: NO GROWTH 6 DAYS     
 CULTURE, BODY FLUID [[id number]] Collected:  10/09/19 1200 Order Status:  Completed Specimen:  Synovial Fluid Updated:  10/14/19 7680 Special Requests: HIP  
  GRAM STAIN NO WBC'S SEEN     
   NO ORGANISMS SEEN Culture result: NO GROWTH 5 DAYS     
 CULTURE, BLOOD [[id number]]  (Abnormal)  (Susceptibility) Collected:  10/08/19 1301 Order Status:  Completed Specimen:  Blood Updated:  10/11/19 0825 Special Requests: --     
  NO SPECIAL REQUESTS 
RIGHT 
ARM 
  
  GRAM STAIN    
  AEROBIC BOTTLE GRAM NEGATIVE RODS SMEAR CALLED TO AND CORRECTLY REPEATED BY: DESHAWN COSBY RN,ICU, ON 10/9/19 AT 0335 TO [facility] Culture result:    
  AEROBIC BOTTLE PASTEURELLA MULTOCIDA CULTURE, URINE [[id number]] Collected:  10/08/19 1418 Order Status:  Completed Specimen:  Cath Urine Updated:  10/10/19 1016 Special Requests: NO SPECIAL REQUESTS Culture result: NO GROWTH 2 DAYS Edita Marquez MD, [facility] Infectious Diseases 
[phone] 
10/25/2019  
9:02 AM

## 2019-10-26 NOTE — PROGRESS NOTES
1922: Bedside report received from [address] [place] [address] 
 
2007: pt ib bed, alert and oriented to self, place and time, disoriented to situation, on 4L O2 nc, denies pain or sob, peg tube in place, infusing at 70 ml /hr, IVF infusing well, bed locked and low position, call bell within reach 2014: Antibiotic administered 2206: Vanco 1000 mg IV administered, infusing well 
 
0005: resting in bed, no sign of distress, no change from previous assessment 
 
0400: pt sleeping, reassessment done, no changes 0500: bathed, cervantes care done, linen, pad and gown changed, sacral dressing done, oral care given Bedside and Verbal shift change report given to Marco Antonio Bloom RN (oncoming nurse) by Paola Beard RN (offgoing nurse). Report included the following information SBAR, Kardex, ED Summary, Intake/Output, MAR, Recent Results and Cardiac Rhythm Afib on tele # 13.

## 2019-10-26 NOTE — PROGRESS NOTES
Tube feeding stated, pt refuse abdominal binder d/t skin issue. Will monitor pt's tolerance to tube feeding, peg site clean, dry and intact. Pt tolerate tube feeding, no episode of nausea or vomiting observed, no c/o CP voiced. Bedside shift change report given to Michell Rg RN (oncoming nurse) by Lindy Tavares RN (offgoing nurse). Report included the following information SBAR, Procedure Summary, Intake/Output, MAR and Recent Results.

## 2019-10-26 NOTE — PROGRESS NOTES
Assumed care of patient, patient in bed resting , Feed rate currently at 45 ml/hr increase due at 0900. Call light within reach. No visual signs of pain at this time. Sbar report received from [facility].

## 2019-10-26 NOTE — PROGRESS NOTES
Pharmacy Dosing Services: Vancomycin Indication: HAP Day of therapy: 6 Other Antimicrobials (Include dose, start day & day of therapy): Levofloxacin 750 mg PO every 24 hours Previous: 
Meropenem 1 gram every 8 hours Loading dose (date given): 2,000 mg 
Current Maintenance dose: 1000 mg IV every 18 hours Goal Vancomycin Level: 15 - 20 mcg/mL (Trough 15-20 for most infections, 20 for meningitis/osteomyelitis, pre-HD level ~25) Vancomycin Level (if drawn):  
10/23 - 25.1 (11.4 hours post dose) 10/24 - 17.5 (Random 16 hours post partial dose) 10/26 - 20.9 (16.3 hrs post dose) Significant Cultures:  
10/21 - blood culture - pending 10/25 - MRSA screen - DNA detected; presumptive positive for colonization Renal function stable? (unstable defined as SCr increase of 0.5 mg/dL or > 50% increase from baseline, whichever is greater) (Y/N): Y  
 
CAPD, Hemodialysis or Renal Replacement Therapy (Y/N): N Recent Labs 10/26/19 
0335 10/25/19 
0541 10/24/19 
0100 CREA 0.65 0.62 0.73 BUN 17 20* 19* WBC 8.1 11.3 10.9 Temp (24hrs), Av.1 °F (36.7 °C), Min:97.7 °F (36.5 °C), Max:98.8 °F (37.1 °C) Creatinine Clearance (Creatinine Clearance (ml/min)): Estimated Creatinine Clearance: 80.9 mL/min (by C-G formula based on SCr of 0.65 mg/dL). Regimen assessment: - Extrapolated 18hr level therapeutic at 19.4 
- Will delay the next dose by 2 hrs to give patient more time to clear vancomycin since it's near the upper end of the therapeutic range 
- Continue current regimen Maintenance dose: 1000 mg IV every  18 hours Next scheduled level: Trough on 10/28 at 1230 Pharmacy will follow daily and adjust medications as appropriate for renal function and/or serum levels. Thank you, Pat Kohler

## 2019-10-26 NOTE — PROGRESS NOTES
Gastrointestinal Progress Note Patient Name: Joann Tamayo Today's Date: 10/26/2019 Admit Date: 10/8/2019 Subjective:  
 
Joann Tamayo is a 80 y.o. Male with sepsis that had associated enephalopathy and oropharyngeal dysphagia. Failed swallow evaluation. He had EGD with peg tube placement yesterday with no complication. He is tolerating tube feeds. No pain. No nausea or vomiting. Current Facility-Administered Medications Medication Dose Route Frequency  [START ON 10/28/2019] VANCOMYCIN INFORMATION NOTE   Other ONCE  
 dextrose 5% infusion  50 mL/hr IntraVENous CONTINUOUS  
 sodium chloride (NS) flush 5-40 mL  5-40 mL IntraVENous Q8H  
 sodium chloride (NS) flush 5-40 mL  5-40 mL IntraVENous PRN  
 apixaban (ELIQUIS) tablet 5 mg  5 mg Oral BID  vancomycin (VANCOCIN) 1,000 mg in 0.9% sodium chloride (MBP/ADV) 250 mL adv  1,000 mg IntraVENous Q18H  
 levoFLOXacin (LEVAQUIN) 750 mg in D5W IVPB  750 mg IntraVENous Q24H  VANCOMYCIN INFORMATION NOTE   Other Rx Dosing/Monitoring  docusate sodium (COLACE) capsule 100 mg  100 mg Oral BID PRN  
 Lactobacillus Acidoph & Bulgar [facility]) tablet 2 Tab  2 Tab Oral BID  albuterol-ipratropium (DUO-NEB) 2.5 MG-0.5 MG/3 ML  3 mL Nebulization QID RT  
 albuterol (PROVENTIL VENTOLIN) nebulizer solution 1.25 mg  1.25 mg Nebulization Q4H PRN  
 melatonin tablet 6 mg  6 mg Oral QHS PRN  
 haloperidol lactate (HALDOL) injection 1 mg  1 mg IntraVENous Q8H PRN  
 furosemide (LASIX) tablet 40 mg  40 mg Oral DAILY  metoprolol tartrate (LOPRESSOR) tablet 12.5 mg  12.5 mg Per NG tube BID  thiamine mononitrate (B-1) tablet 100 mg  100 mg Oral DAILY  acetaminophen (TYLENOL) tablet 500 mg  500 mg Oral Q6H PRN  
 acetaminophen (TYLENOL) solution 325 mg  325 mg Per NG tube Q6H  
 insulin lispro (HUMALOG) injection   SubCUTAneous Q6H  
 glucose chewable tablet 16 g  16 g Oral PRN  
  glucagon (GLUCAGEN) injection 1 mg  1 mg IntraMUSCular PRN  
 dextrose 10 % infusion 125-250 mL  125-250 mL IntraVENous PRN  pantoprazole (PROTONIX) injection 40 mg  40 mg IntraVENous DAILY  folic acid (FOLVITE) tablet 1 mg  1 mg Oral DAILY  sodium chloride (NS) flush 5-40 mL  5-40 mL IntraVENous Q8H  
 desonide (TRIDESILON) 0.05 % cream - patient supplied  (Patient Supplied)   Topical BID  naloxone (NARCAN) injection 0.4 mg  0.4 mg IntraVENous PRN  
 sodium chloride (NS) flush 5-10 mL  5-10 mL IntraVENous PRN  
 levothyroxine (SYNTHROID) tablet 175 mcg  175 mcg Oral ACB Objective:  
 
Physical Exam: 
 
Visit Vitals /76 (BP 1 Location: Left arm, BP Patient Position: At rest) Pulse 80 Temp 98.3 °F (36.8 °C) Resp 15 Ht 5' 8\" (1.727 m) Wt 86.2 kg (190 lb 1.6 oz) SpO2 100% BMI 28.90 kg/m² General appearance: alert, cooperative, no distress, appears stated age Abdomen: soft, non-tender. Bowel sounds normal. No masses,  no organomegaly, peg site clean with no erythema or bleeding, bumper spins without problem. Data Review: 
 
Labs: Results:  
   
Chemistry Recent Labs 10/26/19 
0335 10/25/19 
0541 10/24/19 
0100 GLU 97 72* 138* * 136 136  
K 4.2 4.8 4.6  100 100 CO2 31 32 31 BUN 17 20* 19* CREA 0.65 0.62 0.73 CA 8.4* 8.0* 7.8* AGAP 4 4 5 BUCR 26* 32* 26* CBC w/Diff Recent Labs 10/26/19 
0335 10/25/19 
0541 10/24/19 
0100 WBC 8.1 11.3 10.9 RBC 2.61* 2.35* 2.34* HGB 8.1* 7.2* 7.2* HCT 26.2* 23.3* 23.1*  
 503* 486* GRANS 69 67 65 LYMPH 17* 20* 20* EOS 3 2 2 Coagulation No results for input(s): PTP, INR, APTT, INREXT in the last 72 hours. Liver Enzymes No results for input(s): TP, ALB, TBIL, AP, SGOT, ALT in the last 72 hours. No lab exists for component: DBIL Assessment:  
1.  Oropharyngeal dysphagia:  Patient with dysphagia and failed swallow evaluation yesterday. He had EGD with peg tube placement. No complications noted. Tolerating tube feeds. Continue current peg tube care. Recommendation: 1. Continue tube feeds. 2. Continue peg tube nursing care. 3.Will sign off, please call with questions. Celina Ashraf MD 
October 26, 2019

## 2019-10-26 NOTE — PROGRESS NOTES
Internal Medicine Progress Note NAME: Jamie Davis :  1933 MRM:  [id number] Date/Time: 10/26/2019 ASSESSMENT/PLAN:    
 
# Dysphagia, failed SLP twice. GI consulted for  PEG 
- NGT for meds and feeding  , free water  
- Failed swallow study 
- PEG placed 10/25 - Tube feeds started through PEG Acute respiratory failure - pt tachypneic with increased WOB since 10/21. SpO2 92 on 3L - CXR showed right base new opacity. D/w ID started on vanc and merrem on 10/21 
- resume PO lasix - ABG wnl 
- MRSA screen positive. Per ID will need vancomycin 2 more days, and levaquin 750 x2 more days. # Acute Delirious state , Encephalopathy. Possible sepsis related or metabolic/ Alcohol related with his ho alcohol abuse. Was on opioid and benzo that been consolidated. Reduce sedative to minimal strongly needed. Ammonia level normal , CT head negative. Place ngt for meds 
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 H/o cat bite 4 weeks to RLE per daughter, when he came in with cellulitis and was treated. D/w Dr. Estelle Biggs concern for persistent infection/abscess, he ordered nuclear gallium scan . Blood culture repeat on 10/10 negative to date.  
- lactobacillus added # Possible L knee infection. CUTLTURE of aspirate negative. Orthopedic. Knee tapping and CS. Fluids CS negative. Per ID: distal femur inflammation on Ga scan, will treat presumptively as distal femur osteomyelitis with 6 wks Ceftriaxone +/- subsequent po abx suppression - Pt will need RIJ removed and PICC placed on Monday # HO Gout. I called lab and radiology if the can run the knee fluid test for Crystals . I am informed that fluids from his hip on 10/9 and knee on 10/15 both tested for crystals and was negative - Sever L knee pains - control pains. #  MACKENZIE - improved with IV fluid hydration, monitor closely. Monitor labs. Avoid nephrotoxins. Renally dosing medications. Monitor urine out put. #  AFIB - Rate control, resume Elliquis for AC . Dc iv lopressor , tab NGT. Titrate as needed # Elevated LFT - GI consulted, suspects 2/2 sepsis, appreciate recs. ( Elevated liver enzymes most likely secondary to sepsis. Coming down. CT shows no biliary obstruction. S/P cholecystectomy ) # Hypothyroidism - Cont home levothyroxine. # Hypocalcemia. Hypokalemia. Replete and trend. # Hypernatremia. Improved. free water NGT . D5 ivf . Serial labs to monitor level. # ETOH abuse - initially placed on  CIWA, DC. Per family last drink was 1 month ago. Thiamine. DVT ppx - SCD; resume Eliquis DISPO - plan for DC Monday to SNF, will need PICC and IV ceftriaxone 
 
-Code status : FULL Lab Review:  
 
Recent Labs 10/26/19 
0335 10/25/19 
0541 10/24/19 
0100 WBC 8.1 11.3 10.9 HGB 8.1* 7.2* 7.2* HCT 26.2* 23.3* 23.1*  
 503* 486* Recent Labs 10/26/19 
0335 10/25/19 
0541 10/24/19 
0100 * 136 136  
K 4.2 4.8 4.6  100 100 CO2 31 32 31 GLU 97 72* 138* BUN 17 20* 19* CREA 0.65 0.62 0.73 CA 8.4* 8.0* 7.8*  
MG 2.1 2.1 1.8 PHOS 3.4 3.0 3.5 Lab Results Component Value Date/Time Glucose (POC) 92 10/26/2019 12:07 PM  
 Glucose (POC) 95 10/26/2019 06:03 AM  
 Glucose (POC) 85 10/26/2019 12:27 AM  
 Glucose (POC) 88 10/25/2019 04:10 PM  
 Glucose (POC) 77 10/25/2019 12:24 PM  
 Glucose, POC 83 01/03/2017 10:19 AM  
 
No results for input(s): PH, PCO2, PO2, HCO3, FIO2 in the last 72 hours. No results for input(s): INR, INREXT, INREXT in the last 72 hours. No results found for: SDES Lab Results Component Value Date/Time Culture result: (A) 10/25/2019 06:41 PM  
  MRSA target DNA is detected (presumptive positive for MRSA colonization).   
 Culture result: NO GROWTH 5 DAYS 10/21/2019 07:25 PM  
 Culture result: NO GROWTH 5 DAYS 10/21/2019 06:59 PM  
 
 
 All Cardiac Markers in the last 24 hours:  
No results found for: CPK, CK, CKMMB, CKMB, RCK3, CKMBT, CKNDX, CKND1, CHAYA, TROPT, TROIQ, BRENDAN, TROPT, TNIPOC, BNP, BNPP Subjective: This AM pt was sleeping, but arousable and talking. Per daughter he was more alert earlier in the morning, and then went to sleep. Objective:  
 
Vitals: 
Last 24hrs VS reviewed since prior progress note. Most recent are: 
 
Visit Vitals /76 (BP 1 Location: Left arm, BP Patient Position: At rest) Pulse 80 Temp 98.3 °F (36.8 °C) Resp 15 Ht 5' 8\" (1.727 m) Wt 86.2 kg (190 lb 1.6 oz) SpO2 100% BMI 28.90 kg/m² SpO2 Readings from Last 6 Encounters:  
10/26/19 100% 10/08/19 95% 10/08/19 91% 10/07/19 98% 10/03/19 100% 10/03/19 90% O2 Flow Rate (L/min): 4 l/min Intake/Output Summary (Last 24 hours) at 10/26/2019 1312 Last data filed at 10/26/2019 0600 Gross per 24 hour Intake 745 ml Output 1050 ml Net -305 ml Physical Exam:  
Ears: hearing is intact  [id number]. Eyes: Icterus is not present Lungs: coarse breath sounds bilaterally Heart: regular rate and rhythm, S1, S2 normal, no murmur, click, rub or gallop Gastrointestinal: soft, non-tender. Bowel sounds normal. No masses,  no organomegaly Neurological:  New Focal Deficits are not present. Skin: diffuse ecchymosis. Face with small petechiae (per family is due to known actinic keratosis). Psychiatric:  Mood is stable Medications Reviewed: (see below) Lab Data Reviewed: (see below) 
 
______________________________________________________________________ Medications:  
 
Current Facility-Administered Medications Medication Dose Route Frequency  [START ON 10/28/2019] VANCOMYCIN INFORMATION NOTE   Other ONCE  
 dextrose 5% infusion  50 mL/hr IntraVENous CONTINUOUS  
 sodium chloride (NS) flush 5-40 mL  5-40 mL IntraVENous Q8H  
 sodium chloride (NS) flush 5-40 mL  5-40 mL IntraVENous PRN  
  apixaban (ELIQUIS) tablet 5 mg  5 mg Oral BID  vancomycin (VANCOCIN) 1,000 mg in 0.9% sodium chloride (MBP/ADV) 250 mL adv  1,000 mg IntraVENous Q18H  
 levoFLOXacin (LEVAQUIN) 750 mg in D5W IVPB  750 mg IntraVENous Q24H  VANCOMYCIN INFORMATION NOTE   Other Rx Dosing/Monitoring  docusate sodium (COLACE) capsule 100 mg  100 mg Oral BID PRN  
 Lactobacillus Acidoph & Bulgar [facility]) tablet 2 Tab  2 Tab Oral BID  albuterol-ipratropium (DUO-NEB) 2.5 MG-0.5 MG/3 ML  3 mL Nebulization QID RT  
 albuterol (PROVENTIL VENTOLIN) nebulizer solution 1.25 mg  1.25 mg Nebulization Q4H PRN  
 melatonin tablet 6 mg  6 mg Oral QHS PRN  
 haloperidol lactate (HALDOL) injection 1 mg  1 mg IntraVENous Q8H PRN  
 furosemide (LASIX) tablet 40 mg  40 mg Oral DAILY  metoprolol tartrate (LOPRESSOR) tablet 12.5 mg  12.5 mg Per NG tube BID  thiamine mononitrate (B-1) tablet 100 mg  100 mg Oral DAILY  acetaminophen (TYLENOL) tablet 500 mg  500 mg Oral Q6H PRN  
 acetaminophen (TYLENOL) solution 325 mg  325 mg Per NG tube Q6H  
 insulin lispro (HUMALOG) injection   SubCUTAneous Q6H  
 glucose chewable tablet 16 g  16 g Oral PRN  
 glucagon (GLUCAGEN) injection 1 mg  1 mg IntraMUSCular PRN  
 dextrose 10 % infusion 125-250 mL  125-250 mL IntraVENous PRN  pantoprazole (PROTONIX) injection 40 mg  40 mg IntraVENous DAILY  folic acid (FOLVITE) tablet 1 mg  1 mg Oral DAILY  sodium chloride (NS) flush 5-40 mL  5-40 mL IntraVENous Q8H  
 desonide (TRIDESILON) 0.05 % cream - patient supplied  (Patient Supplied)   Topical BID  naloxone (NARCAN) injection 0.4 mg  0.4 mg IntraVENous PRN  
 sodium chloride (NS) flush 5-10 mL  5-10 mL IntraVENous PRN  
 levothyroxine (SYNTHROID) tablet 175 mcg  175 mcg Oral ACB Total time spent with patient: 35  minutes Care Plan discussed with: Care Manager, Nursing Staff, Consultant/Specialist and >50% of time spent in counseling and coordination of care Discussed:  Care Plan Attending Physician: Earnest Feliz MD

## 2019-10-26 NOTE — PROGRESS NOTES
In signout, Dr Lindsay Garcia indicated that he had signed off on this patient. Please reconsult us should further pulmonary/critical care consult be required. Nilda Leonard MD 
Pulmonary & Critical Care

## 2019-10-26 NOTE — PROGRESS NOTES
NUTRITION FOLLOW UP/PLAN OF CARE   
 
RECOMMENDATIONS:  
1. Advance to goal of Osmolite 1.2 @ 70 mL/hr x 22 hrs to provide 1540 mL, 1848 kcal, 85 g pro, 1262 mL free water with 150 mL flush Q 6 
2. Monitor labs, weight and TF tolerance 3. Daily weights GOALS:  
1. Met/ongoing: TF tolerance/TF meets >75% of protein/calorie needs by 10/31/19 
2. Ongoing: Without significant weight fluctuations by 11/2/19 ASSESSMENT:  
Wt status is classified as overweight per BMI of 27.4. Pt is at nutrition risk related to Stage 2 PU to L buttock per wound RN. Diagnosis: encephalopathy, sepsis, L knee infection, MACKENZIE (improved), afib, elevated LFT, hypothyroid, hypocalcemia, hypernatremia, ETOH abuse. With TF now with PEG. Labs reviewed. Nutrition recommendations listed. RD to follow. Nutrition Diagnoses:  
Remains appropriate: Increased nutrient needs due to wound healing as evidenced by compromised skin integrity Nutrition Risk:  [] High  [x] Moderate []  Low SUBJECTIVE/OBJECTIVE:  
(10/26/19) Pt resting in bed with TF infusing @ 65 mL/hr during time of assessment with family at bedside. Pt with PEG placement 10/25/19. Tolerating feeds/TF advancement per RN reports. No reports n/v/d/c. CBW: 190 lb 10/26/19, continued weight fluctuations. Skin: stage 2 L buttock, wound to R heel. L arm. Advance as tolerated to goal rate Osmolite 1.2 @ 70 mL/hr x 22 hrs to provide 1540 mL, 1848 kcal, 85 g pro, 1262 mL free water with 150 mL flush Q 6. Will continue to monitor TF rate/tolerance, weight, labs, POC.  
 
(10/24/19) Pt resting in bed with TF infusing during time of assessment, family at bedside. Tolerating TF at goal rate per reports. Per I/Os BM 10/22/19. Seen by SLP 10/23/19 - failed MBS with recs for QOL/comfort diet (puree with HTL) vs PEG. Eliquis on hold for anticipation of EGD with PEG placement 10/25/19.  Noted pt continues with weight fluctuations - unsure of accuracy, lasix possibly contributing. CBW: 193 lb 10/23/19. 175 lb 10/21/19, 194 lb 10/18/19. Family asking about PEG placement and eventually getting pt to bolus feeds. Spoke with family about this but to continue continuous feeds at this time until after placement/healing underway. Skin: per wound RN notes 10/24/19 Stage 2 to R buttock healed and stage 2 to L buttock with improvement. Continue current TF. Will continue to monitor TF tolerance, weight, labs, POC. 
 
(10/20/19) Pt resting in bed, awake with family at bedside during time of assessment. Pt with TF infusing via NGT at time of assessment at goal rate. SLP louisa completed 10/18/19 with recs of NPO, ice chips. Gi wants another speech eval completed this week - talks of possible PEG placement. Tolerating current TF per reports. Per I/Os BM 10/17/19. CBW: 194 lb 10/18/19. Fluctuations noted - will monitor closely for need to decrease TF rate. Will continue to monitor TF/tolerance, weight, labs, POC.  
 
(10/17/19) Pt resting in bed with NGT in placed and TF running at goal rate Osmolite 1.2 @ 70 mL/hr x 22 hrs to provide 1540 mL, 1848 kcal, 85 g pro, 1262 mL free water with 150 mL flush Q 6hrs. 0 residuals reported. Daughter at bedside states pt had a good day yesterday until he pulled the NGT out and was given dilaudid - so a little more drowsy today. Tube dislodged and feedings resumed @ 224am. Loose BM yesterday per reports. Noted low K, elevated BUN. Took beds sera weight while in room 180.4 lb - placed in flowsheets. Noted pt on lasix possibly contributing to weight fluctuations. Skin: stage 2 right buttock, ecchymosis, abrasion noted. Will continue to monitor TF tolerance, weight, labs. (10/15/19) Pt resting in bed during time of assessment with daughter at bedside. Per speech on 10/13/19 rec'd NPO and ice chips PRN for oral care/comfort, pt has been NPO since. RN placed NGT this afternoon. Reassessed needs on todays bed scale weight 176.8 lb. Noted weight have been all over the place since admission - admit 10/8/19: 197.8 lb then 10/13/19: 165 lb. -190 per family reports. Recommend: Osmolite 1.2 @ 70 mL/hr x 22 hrs to provide 1540 mL, 1848 kcal, 85 g pro, 1262 mL free water . Spoke with RN/attending MD. Chaitanya Kyaw with daughter about recommendations and answered her questions regarding his nutrition. Information Obtained from:  
 [x] Chart Review 
 [] Patient [x] Family/Caregiver 
 [] Nurse/Physician 
 [] Interdisciplinary Meeting/Rounds Diet: NPO Medications: [x] Reviewed Noted: eliquis, tridesilon, D5 @ 50 mL, folvite, lasix 40 mg, humalog every 6 hrs, synthroid, floranex, lopressor, protonix, vancomycin, B1, levaquin, PRN: colace Allergies: [x] Reviewed Encounter Diagnoses ICD-10-CM ICD-9-CM 1. [facility] A41.9 038.9  
 R65.20 995.92 Past Medical History:  
Diagnosis Date  Arthritis  Atrial fibrillation ([facility] 07/2017 Dr [facility] cardio follows. chronic  Carcinoma of prostate ([facility]  Cellulitis  Coarse tremor   
  hands, states \"my doctor is aware\"  Difficulty swallowing  Dysphagia  Edema  Encounter for colonoscopy due to history of adenomatous colonic polyps  Essential hypertension  Fall 10/07/2017 \"went to [facility] Emergency Room, CT Scan showed concussion, no bleeding\" per patient  Fatigue  GERD (gastroesophageal reflux disease) wo. esophagitis  Glaucoma  H/O joint replacement   
  left knee 2003  HLD (hyperlipidemia)  Hypertension  Hypertensive disorder  Hypothyroidism  Impotence  Laceration of right lower leg with complication  Malignant neoplasm of prostate ([facility]   
  [id number] Adenocarcinoma of the Prostate, dx 11/3/2008, karlee 3+4, presenting PSA 5.5ng/mL. 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   
 acute/hist. of   
  
Labs:   
Lab Results Component Value Date/Time Sodium 135 (L) 10/26/2019 03:35 AM  
 Potassium 4.2 10/26/2019 03:35 AM  
 Chloride 100 10/26/2019 03:35 AM  
 CO2 31 10/26/2019 03:35 AM  
 Anion gap 4 10/26/2019 03:35 AM  
 Glucose 97 10/26/2019 03:35 AM  
 BUN 17 10/26/2019 03:35 AM  
 Creatinine 0.65 10/26/2019 03:35 AM  
 Calcium 8.4 (L) 10/26/2019 03:35 AM  
 Magnesium 2.1 10/26/2019 03:35 AM  
 Phosphorus 3.4 10/26/2019 03:35 AM  
 Albumin 1.8 (L) 10/19/2019 05:05 AM  
 
Lab Results Component Value Date/Time GLU 97 10/26/2019 03:35 AM  
 GLUCPOC 95 10/26/2019 06:03 AM  
 GLUCPOC 85 10/26/2019 12:27 AM  
 GLUCPOC 88 10/25/2019 04:10 PM  
 
Anthropometrics: 
Last 3 Recorded Weights in this Encounter 10/23/19 2158 10/25/19 0014 10/26/19 8802 Weight: 87.5 kg (193 lb) 82 kg (180 lb 12.8 oz) 86.2 kg (190 lb 1.6 oz) Ht Readings from Last 1 Encounters:  
10/09/19 5' 8\" (1.727 m) Documented Weight History: 
Weight Metrics 10/26/2019 10/8/2019 10/3/2019 9/27/2019 9/26/2019 9/23/2019 9/16/2019 Weight 190 lb 1.6 oz - 180 lb 180 lb - 190 lb 190 lb BMI - 28.9 kg/m2 27.37 kg/m2 - 27.37 kg/m2 28.89 kg/m2 28.89 kg/m2 IBW: 154 lb  UBW: 185-190 lb 
[] Weight Loss [] Weight Gain [x] Weight fluctuations Estimated Nutrition Needs: [x] MSJ  [] Other: 
Calories: 7861-5859 kcal (x1.2 AF - x 1.1 SF) Based on:   [x] Actual BW   
Protein:   80-97 g (1-1.2 g/kg) Based on:   [x] Actual BW Fluid:     1 mL/kcal  
 
 [x] No Cultural, Yarsanism or ethnic dietary need identified. [] Cultural, Yarsanism and ethnic food preferences identified and addressed Wt Status:  [] Normal (18.6 - 24.9) [] Underweight (<18.5) [x] Overweight (25 - 29.9) [] Mild Obesity (30 - 34.9)  [] Moderate Obesity (35 - 39.9) [] Morbid Obesity (40+) Nutrition Problems Identified:  
[] Suboptimal PO intake  
[] Food Allergies [x] Difficulty chewing/swallowing/poor dentition 
[] Constipation/Diarrhea  
[] Nausea/Vomiting  
[] None 
[] Other:  
 
Plan:  
[x] Enteral feeding 
[]  Obtained/adjusted food preferences/tolerances and/or snacks options  
[]  Supplements added  
[x] SLP following for feeding techniques []  HS snack added  
[]  Modify diet texture  
[]  Modify diet for food allergies []  Educate patient  
[]  Assist with menu selection  
[x]  Monitor TF tolerance [x]  Continue inpatient monitoring and intervention  
[x]  Participated in discharge planning/Interdisciplinary rounds/Team meetings  
[]  Other:  
 
Education Needs: 
 [] Not appropriate for teaching at this time due to:   
 [x] Identified and addressed spoke with family about TF recommendations and answered questions Nutrition Monitoring and Evaluation: 
[x] Continue ongoing monitoring and intervention 
[] Other Salvador Mcdonald

## 2019-10-27 NOTE — PROGRESS NOTES
Respiratory Therapy Assessment Care Plan Patient: 
Demario Ramirez 80 y.o. male 10/27/2019 5:19 PM 
 
Severe sepsis ([address] [A41.9, R65.20] Altered mental status [R41.82] Chest X-RAY:  
Results from Hospital Encounter encounter on 10/08/19 XR CHEST PORT Impression IMPRESSION: 
 
Stable bibasilar opacities likely a combination of atelectasis and small pleural 
effusions XR SWALLOW FUNC VIDEO Impression Impression: 1. Aspiration with all consistencies. Please see speech pathology report for complete details. XR CHEST PORT Impression IMPRESSION:  
 
1. Stable support lines and tubes. 2. Bibasilar atelectasis/airspace disease, likely with pleural effusions, right 
greater than left. This has slightly worsened in the right base. Vital Signs:   Visit Vitals /76 (BP 1 Location: Left arm, BP Patient Position: Supine) Pulse 95 Temp 98 °F (36.7 °C) Resp 16 Ht 5' 8\" (1.727 m) Wt 88.2 kg (194 lb 6.4 oz) SpO2 94% BMI 29.56 kg/m² Indications for treatment: Hx: Pneumonia Plan of care: Duoneb qid. Oxygen Therapy Goal:Decreased work of breathing, Decrease Fio2

## 2019-10-27 NOTE — PROGRESS NOTES
Respiratory Therapy Assessment Care Plan Patient: 
Linette Dinero 80 y.o. male 10/27/2019 5:55 PM 
 
Severe sepsis ([address] [A41.9, R65.20] Altered mental status [R41.82] Chest X-RAY:  
Results from Hospital Encounter encounter on 10/08/19 XR CHEST PORT Impression IMPRESSION: 
 
Stable bibasilar opacities likely a combination of atelectasis and small pleural 
effusions XR SWALLOW FUNC VIDEO Impression Impression: 1. Aspiration with all consistencies. Please see speech pathology report for complete details. XR CHEST PORT Impression IMPRESSION:  
 
1. Stable support lines and tubes. 2. Bibasilar atelectasis/airspace disease, likely with pleural effusions, right 
greater than left. This has slightly worsened in the right base. Vital Signs:   Visit Vitals /76 (BP 1 Location: Left arm, BP Patient Position: Supine) Pulse 95 Temp 98 °F (36.7 °C) Resp 16 Ht 5' 8\" (1.727 m) Wt 88.2 kg (194 lb 6.4 oz) SpO2 94% BMI 29.56 kg/m² Indications for treatment: Hx of pneumonia Plan of care:Bronchodilator, Oxygen Therapy Goal:Decrease work of breathing

## 2019-10-27 NOTE — PROGRESS NOTES
[facility] 
                                             (A Division of [address] [place] [address]) Follow-up Note Date of Admission: 10/8/2019     Date of Note:  10/27/2019 Summary:   
 
79 y/o  male HTN, AF, GERD, DJD s/p L TKR, Prostate CA, Hypothyroidism, h/o pancreatitis, ETOH consumption adm 10/8/2019 w/ chills, weakness, altered mental status. Has neck pain x 1 mo. Had R leg cellulitis after cat bite (not scratch) 1 mo PTA - resolved w/ abx. Then micturition syncope x 3, hurt knee, progressing generalized weakness, then 1 day PTA fever, chills adm w/ 100.5, WBC 29.5, 13% bands. No uti, pneumonia, unrevealing CTAP. 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 Rpt blcx 10/10 NGTD x 2.  Gallium scan 10/14: intense periprosthetic uptake L knee, distal femur. L knee synovial fluid 10/15- not s/o septic arthritis. Presume distal L femur OM. Meropenem, Vanc started 10/21 for ? HAP. Low grade fever 10/24 100.5 WBC normal. Meropenem changed to levofloxacin Interval History: 
 
Alert and awake. Slowly and weakly responds verbally to questions. Afebrile Current Antimicrobials: Prior Antimicrobials Vanc 10/21 - 6  Levofloxacin 10/24 - 3 abx 10/8 - 19 Vancomycin 10/8 - 2  Cefepime, Levoflox 10/8 - 0 Meropenem 10/9 - 2 Ceftriaxone 10/11 - 10 Ldeggilfl74/21 - 3  
  
  
Assessment: Plan:  
Aspiration 
- failed swallow study 10/24 
- s/p PEG 10/25 -> per others Low grade fever - 100.5 10/24 - resolved - WBC normal ?drug fever -> montior NEW R LL opacity 
- atelectasis vs infiltrate. 
- had isolated low grade fever 10/24 - afebrile now - MRSA screen + -> dc Levofloxacin and Vancomycin tomorrow Left knee uptake on Ga scan - s/p US guided arthrocentesis 10/15 - negative for infection - presume distal L femur osteomyelitis -> change back to Ceftriaxone tomorrow to complete 6 weeks (target end date: 11/19/2019)  +/- subsequent po abx suppression  
-> PICC tomorrow. (still has R IJ CVL) 
-> d/w Dr. Penelope Lee Pasteurella BSI 
- 1 of 2 blood cultures 10/8 + P multocida sens TMP-SMX, Ceftriaxone, Levofloxacin, PCN, TCN 
- No active cellulitis. R leg cellulitis following cat bite in early September resolved w/ TMP-SMX then Keflex 
- Recent TTE 9/27: neg vegetations - rpt blcx 10/10 NG x 2 
- Gallium scan 10/14: intense periprosthetic uptake L knee, distal femur. 
- L knee aspiration 10/15: 7,200 WBC, no crystals - not suggestive of septic arthritis - presuming source is L distal femoral OM 
- should be treated by now -> abx as above Encephalopathy/ ETOH Withdrawal 
- worse since admission - ? Medication-related: has been receiving dilaudid q 4 hours and Ativan this am 
- Head CT 10/14: no acute intracranial abnormality 
- not related to infection which appears under control at this time 
- improving but still overly lethargic -> per primary team.   
Septic shock 
- due to above 
- improving. Vasopressors stopped 10/10.   
- resolved MACKENZIE 
- resolved Worsening alkaline phosphatase 
- from sepsis, ?bone infection / met (doubt) 
- improving H/o PCN allergy (rash)  
- tolerated keflex 
- tolerating Ceftriaxone Recent R leg cellulitis following cat bite (not scratch) R hip pain - s/p aspiration 10/9: 23 WBC, no crystals HTN    
AF    
GERD    
DJD s/p L TKR    
Prostate CA    
Hypothyroidism    
h/o pancreatitis    
ETOH consumption    
  
Microbiology: 
  
10/8      blcx Pasteurella multocida 1 of 2 
             urcx NG 
10/9      R hip asp gs no wbc, NOS, cx NG 
 
10/10 blcx NGTD x 2 
  
Lines / Catheters: RIJ CVL Patient Active Problem List  
Diagnosis Code  Malignant neoplasm of prostate ([facility] C61  Hypertension I10  
 GERD (gastroesophageal reflux disease) K21.9  Glaucoma H40.9  Glaucoma H40.9  Dysphagia R13.10  Encounter for colonoscopy due to history of adenomatous colonic polyps Z12.11, Z86.010  
 Non-pressure chronic ulcer of right calf with fat layer exposed ([facility] V90.383  Laceration of right lower leg with complication N62.397J  Edema R60.9  Pneumonia J18.9  Atrial fibrillation (HCC) I48.91  
 Hypothyroidism E03.9  Sepsis ([facility] A41.9  Cellulitis L03.90  
 HTN (hypertension) I63  
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  
 [START ON 10/28/2019] VANCOMYCIN INFORMATION NOTE   Other ONCE  
 dextrose 5% infusion  50 mL/hr IntraVENous CONTINUOUS  
 sodium chloride (NS) flush 5-40 mL  5-40 mL IntraVENous Q8H  
 sodium chloride (NS) flush 5-40 mL  5-40 mL IntraVENous PRN  
 apixaban (ELIQUIS) tablet 5 mg  5 mg Oral BID  vancomycin (VANCOCIN) 1,000 mg in 0.9% sodium chloride (MBP/ADV) 250 mL adv  1,000 mg IntraVENous Q18H  
 levoFLOXacin (LEVAQUIN) 750 mg in D5W IVPB  750 mg IntraVENous Q24H  VANCOMYCIN INFORMATION NOTE   Other Rx Dosing/Monitoring  docusate sodium (COLACE) capsule 100 mg  100 mg Oral BID PRN  
 Lactobacillus Acidoph & Bulgar [facility]) tablet 2 Tab  2 Tab Oral BID  albuterol-ipratropium (DUO-NEB) 2.5 MG-0.5 MG/3 ML  3 mL Nebulization QID RT  
 albuterol (PROVENTIL VENTOLIN) nebulizer solution 1.25 mg  1.25 mg Nebulization Q4H PRN  
 melatonin tablet 6 mg  6 mg Oral QHS PRN  
 haloperidol lactate (HALDOL) injection 1 mg  1 mg IntraVENous Q8H PRN  
 furosemide (LASIX) tablet 40 mg  40 mg Oral DAILY  metoprolol tartrate (LOPRESSOR) tablet 12.5 mg  12.5 mg Per NG tube BID  
  thiamine mononitrate (B-1) tablet 100 mg  100 mg Oral DAILY  acetaminophen (TYLENOL) tablet 500 mg  500 mg Oral Q6H PRN  
 acetaminophen (TYLENOL) solution 325 mg  325 mg Per NG tube Q6H  
 insulin lispro (HUMALOG) injection   SubCUTAneous Q6H  
 glucose chewable tablet 16 g  16 g Oral PRN  
 glucagon (GLUCAGEN) injection 1 mg  1 mg IntraMUSCular PRN  
 dextrose 10 % infusion 125-250 mL  125-250 mL IntraVENous PRN  pantoprazole (PROTONIX) injection 40 mg  40 mg IntraVENous DAILY  folic acid (FOLVITE) tablet 1 mg  1 mg Oral DAILY  sodium chloride (NS) flush 5-40 mL  5-40 mL IntraVENous Q8H  
 desonide (TRIDESILON) 0.05 % cream - patient supplied  (Patient Supplied)   Topical BID  naloxone (NARCAN) injection 0.4 mg  0.4 mg IntraVENous PRN  
 sodium chloride (NS) flush 5-10 mL  5-10 mL IntraVENous PRN  
 levothyroxine (SYNTHROID) tablet 175 mcg  175 mcg Oral ACB Review of Systems - Negative except as in interval history Objective: 
Visit Vitals /88 (BP 1 Location: Left arm, BP Patient Position: Supine) Pulse 90 Temp 99 °F (37.2 °C) Resp 18 Ht 5' 8\" (1.727 m) Wt 88.2 kg (194 lb 6.4 oz) SpO2 94% BMI 29.56 kg/m² Temp (24hrs), Av.5 °F (36.9 °C), Min:98 °F (36.7 °C), Max:99 °F (37.2 °C) General: Well developed, well nourished 80 y.o.  male still lethargic, follows simple commands, no verbal ouput today HEENT: no scleral icterus, mucous membranes moist, pharynx normal without lesions, NGT still in place Neck: resistant to flexion in all directions but more flexible than previous Cardio:  irregularly irregular rhythm Lungs: rhonchi bilateral  
Abdomen: soft, non-tender. Bowel sounds normal. No masses, no organomegaly. PEG in place Extremities:  no redness or tenderness in the calves or thighs, no edema Lab results: 
 
Chemistry Recent Labs 10/27/19 
0400 10/26/19 
0335 10/25/19 
0541 * 97 72* * 135* 136  
 K 4.3 4.2 4.8  
CL 98* 100 100 CO2 31 31 32 BUN 16 17 20* CREA 0.63 0.65 0.62  
CA 8.0* 8.4* 8.0* AGAP 6 4 4 BUCR 25* 26* 32* CBC w/ Diff Recent Labs 10/27/19 
0400 10/26/19 
0335 10/25/19 
0541 WBC 9.0 8.1 11.3 RBC 2.26* 2.61* 2.35* HGB 7.0* 8.1* 7.2* HCT 22.4* 26.2* 23.3*  
* 407 503* GRANS 69 69 67 LYMPH 18* 17* 20* EOS 2 3 2 Microbiology All Micro Results Procedure Component Value Units Date/Time CULTURE, BLOOD [[id number]] Collected:  10/21/19 1925 Order Status:  Completed Specimen:  Blood Updated:  10/27/19 0741 Special Requests: NO SPECIAL REQUESTS Culture result: NO GROWTH 6 DAYS     
 CULTURE, BLOOD [[id number]] Collected:  10/21/19 1859 Order Status:  Completed Specimen:  Blood Updated:  10/27/19 0741 Special Requests: --     
  RIGHT 
RED PORT Culture result: NO GROWTH 6 DAYS     
 MRSA SCREEN - PCR (NASAL) [[id number]] Collected:  10/23/19 1315 Order Status:  Canceled Specimen:  Nasal from Nares MRSA SCREEN - PCR (NASAL) [[id number]] Collected:  10/21/19 1830 Order Status:  Canceled Specimen:  Nasal   
 ANAEROBE ID REFLEX [[id number]] Collected:  10/15/19 1030 Order Status:  Completed Specimen:  MICROBIAL ISOLATE Updated:  10/20/19 1835 Source PENDING Result 1 Comment Comment: (NOTE) No anaerobic growth in 72 hours. Performed At: [address] [id number] Bertha Salomon MD [id number] CULTURE, BODY FLUID Jonatan Diaz [[id number]] Collected:  10/15/19 1030 Order Status:  Completed Specimen:  Synovial Fluid Updated:  10/20/19 3231 Special Requests: LEFT KNEE     
  GRAM STAIN FEW WBC'S     
   NO ORGANISMS SEEN Culture result: NO GROWTH 5 DAYS     
 CULTURE, BLOOD [[id number]] Collected:  10/10/19 1341 Order Status:  Completed Specimen:  Blood Updated:  10/16/19 0042   Special Requests: --     
  NO SPECIAL REQUESTS 
BLUE PORT 
  
 Culture result: NO GROWTH 6 DAYS     
 CULTURE, BLOOD [[id number]] Collected:  10/10/19 1237 Order Status:  Completed Specimen:  Blood Updated:  10/16/19 0042 Special Requests: PERIPHERAL Culture result: NO GROWTH 6 DAYS     
 CULTURE, BLOOD [[id number]] Collected:  10/08/19 2234 Order Status:  Completed Specimen:  Blood from Miscellaneous sample Updated:  10/15/19 0754 Special Requests: NO SPECIAL REQUESTS Culture result: NO GROWTH 6 DAYS     
 CULTURE, BODY FLUID [[id number]] Collected:  10/09/19 1200 Order Status:  Completed Specimen:  Synovial Fluid Updated:  10/14/19 3043 Special Requests: HIP  
  GRAM STAIN NO WBC'S SEEN     
   NO ORGANISMS SEEN Culture result: NO GROWTH 5 DAYS     
 CULTURE, BLOOD [[id number]]  (Abnormal)  (Susceptibility) Collected:  10/08/19 1301 Order Status:  Completed Specimen:  Blood Updated:  10/11/19 0825 Special Requests: --     
  NO SPECIAL REQUESTS 
RIGHT 
ARM 
  
  GRAM STAIN    
  AEROBIC BOTTLE GRAM NEGATIVE RODS SMEAR CALLED TO AND CORRECTLY REPEATED BY: DESHAWN COSBY RN,ICU, ON 10/9/19 AT 0335 TO [facility] Culture result:    
  AEROBIC BOTTLE PASTEURELLA MULTOCIDA CULTURE, URINE [[id number]] Collected:  10/08/19 1418 Order Status:  Completed Specimen:  Cath Urine Updated:  10/10/19 1016 Special Requests: NO SPECIAL REQUESTS Culture result: NO GROWTH 2 DAYS Venice Coronado MD, [facility] 
[phone] 
10/27/2019  
9:02 AM

## 2019-10-27 NOTE — PROGRESS NOTES
Internal Medicine Progress Note NAME: Jamie Davis :  1933 MRM:  [id number] Date/Time: 10/27/2019 ASSESSMENT/PLAN:    
 
# Dysphagia, failed SLP twice. GI consulted for  PEG 
- NGT for meds and feeding  , free water  
- Failed swallow study 
- PEG placed 10/25 - Tube feeds started through PEG Acute respiratory failure - pt tachypneic with increased WOB since 10/21. SpO2 92 on 3L - CXR showed right base new opacity. D/w ID started on vanc and merrem on 10/21 
- resume PO lasix - ABG wnl 
- MRSA screen positive. Per ID will need vancomycin 2 more days, and levaquin 750 x2 more days. - 10/27:  Pt with slightly increased WOB, is net positive, will give one dose IV lasix 40mg today, and resume maintenance PO 40mg tomorrow # Acute Delirious state , Encephalopathy. Possible sepsis related or metabolic/ Alcohol related with his ho alcohol abuse. Was on opioid and benzo that been consolidated. Reduce sedative to minimal strongly needed. Ammonia level normal , CT head negative. Place ngt for meds 
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 H/o cat bite 4 weeks to RLE per daughter, when he came in with cellulitis and was treated. D/w Dr. Estelle Biggs concern for persistent infection/abscess, he ordered nuclear gallium scan . Blood culture repeat on 10/10 negative to date.  
- lactobacillus added # Possible L knee infection. CUTLTURE of aspirate negative. Orthopedic. Knee tapping and CS. Fluids CS negative. Per ID: distal femur inflammation on Ga scan, will treat presumptively as distal femur osteomyelitis with 6 wks Ceftriaxone +/- subsequent po abx suppression - Pt will need RIJ removed and PICC placed on Monday. PICC order placed. Plan for 6 weeks total ceftriaxone per ID, see note by Dr. Estelle Biggs # HO Gout.  I called lab and radiology if the can run the knee fluid test for Crystals . I am informed that fluids from his hip on 10/9 and knee on 10/15 both tested for crystals and was negative - Sever L knee pains - control pains. #  MACKENZIE - improved with IV fluid hydration, monitor closely. Monitor labs. Avoid nephrotoxins. Renally dosing medications. Monitor urine out put. #  AFIB - Rate control, resume Elliquis for AC . Dc iv lopressor , tab NGT. Titrate as needed # Elevated LFT - GI consulted, suspects 2/2 sepsis, appreciate recs. ( Elevated liver enzymes most likely secondary to sepsis. Coming down. CT shows no biliary obstruction. S/P cholecystectomy ) # Hypothyroidism - Cont home levothyroxine. # Hypocalcemia. Hypokalemia. Replete and trend. # Hypernatremia. Improved. free water NGT . D5 ivf . Serial labs to monitor level. # ETOH abuse - initially placed on  [facility], [place]. Per family last drink was 1 month ago. Thiamine. DVT ppx - SCD; resume Eliquis DISPO - plan for DC Monday to SNF, will need PICC and IV ceftriaxone 
 
-Code status : FULL Lab Review:  
 
Recent Labs 10/27/19 
0400 10/26/19 
0335 10/25/19 
0541 WBC 9.0 8.1 11.3 HGB 7.0* 8.1* 7.2* HCT 22.4* 26.2* 23.3*  
* 407 503* Recent Labs 10/27/19 
0400 10/26/19 
0335 10/25/19 
0541 * 135* 136  
K 4.3 4.2 4.8  
CL 98* 100 100 CO2 31 31 32 * 97 72* BUN 16 17 20* CREA 0.63 0.65 0.62  
CA 8.0* 8.4* 8.0*  
MG 1.8 2.1 2.1 PHOS 3.4 3.4 3.0 Lab Results Component Value Date/Time Glucose (POC) 112 (H) 10/27/2019 05:16 AM  
 Glucose (POC) 94 10/27/2019 12:02 AM  
 Glucose (POC) 123 (H) 10/26/2019 06:57 PM  
 Glucose (POC) 92 10/26/2019 12:07 PM  
 Glucose (POC) 95 10/26/2019 06:03 AM  
 Glucose, POC 83 01/03/2017 10:19 AM  
 
No results for input(s): PH, PCO2, PO2, HCO3, FIO2 in the last 72 hours. No results for input(s): INR, INREXT, INREXT in the last 72 hours. No results found for: SDES Lab Results Component Value Date/Time Culture result: (A) 10/25/2019 06:41 PM  
  MRSA target DNA is detected (presumptive positive for MRSA colonization). Culture result: NO GROWTH 6 DAYS 10/21/2019 07:25 PM  
 Culture result: NO GROWTH 6 DAYS 10/21/2019 06:59 PM  
 
 
All Cardiac Markers in the last 24 hours:  
No results found for: CPK, CK, CKMMB, CKMB, RCK3, CKMBT, CKNDX, CKND1, CHAYA, TROPT, TROIQ, BRENDAN, TROPT, TNIPOC, BNP, BNPP Subjective: This AM pt was sound asleep, but arousable. Objective:  
 
Vitals: 
Last 24hrs VS reviewed since prior progress note. Most recent are: 
 
Visit Vitals /74 (BP 1 Location: Left arm, BP Patient Position: Head of bed elevated (Comment degrees)) Pulse 93 Temp 98.1 °F (36.7 °C) Resp 18 Ht 5' 8\" (1.727 m) Wt 88.2 kg (194 lb 6.4 oz) SpO2 100% BMI 29.56 kg/m² SpO2 Readings from Last 6 Encounters:  
10/27/19 100% 10/08/19 95% 10/08/19 91% 10/07/19 98% 10/03/19 100% 10/03/19 90% O2 Flow Rate (L/min): 4 l/min Intake/Output Summary (Last 24 hours) at 10/27/2019 1023 Last data filed at 10/27/2019 0116 Gross per 24 hour Intake 2585.1 ml Output 1460 ml Net 1125.1 ml Physical Exam:  
Ears: hearing is intact  AX0X3. Eyes: Icterus is not present Lungs: coarse breath sounds bilaterally Heart: regular rate and rhythm, S1, S2 normal, no murmur, click, rub or gallop Gastrointestinal: soft, non-tender. Bowel sounds normal. No masses,  no organomegaly Neurological:  New Focal Deficits are not present. Skin: diffuse ecchymosis. Face with small petechiae (per family is due to known actinic keratosis). Psychiatric:  Mood is stable Medications Reviewed: (see below) Lab Data Reviewed: (see below) 
 
______________________________________________________________________ Medications:  
 
Current Facility-Administered Medications Medication Dose Route Frequency  [START ON 10/28/2019] VANCOMYCIN INFORMATION NOTE   Other ONCE  
  dextrose 5% infusion  50 mL/hr IntraVENous CONTINUOUS  
 sodium chloride (NS) flush 5-40 mL  5-40 mL IntraVENous Q8H  
 sodium chloride (NS) flush 5-40 mL  5-40 mL IntraVENous PRN  
 apixaban (ELIQUIS) tablet 5 mg  5 mg Oral BID  vancomycin (VANCOCIN) 1,000 mg in 0.9% sodium chloride (MBP/ADV) 250 mL adv  1,000 mg IntraVENous Q18H  
 levoFLOXacin (LEVAQUIN) 750 mg in D5W IVPB  750 mg IntraVENous Q24H  VANCOMYCIN INFORMATION NOTE   Other Rx Dosing/Monitoring  docusate sodium (COLACE) capsule 100 mg  100 mg Oral BID PRN  
 Lactobacillus Acidoph & [facility]) tablet 2 Tab  2 Tab Oral BID  albuterol-ipratropium (DUO-NEB) 2.5 MG-0.5 MG/3 ML  3 mL Nebulization QID RT  
 albuterol (PROVENTIL VENTOLIN) nebulizer solution 1.25 mg  1.25 mg Nebulization Q4H PRN  
 melatonin tablet 6 mg  6 mg Oral QHS PRN  
 haloperidol lactate (HALDOL) injection 1 mg  1 mg IntraVENous Q8H PRN  
 furosemide (LASIX) tablet 40 mg  40 mg Oral DAILY  metoprolol tartrate (LOPRESSOR) tablet 12.5 mg  12.5 mg Per NG tube BID  thiamine mononitrate (B-1) tablet 100 mg  100 mg Oral DAILY  acetaminophen (TYLENOL) tablet 500 mg  500 mg Oral Q6H PRN  
 acetaminophen (TYLENOL) solution 325 mg  325 mg Per NG tube Q6H  
 insulin lispro (HUMALOG) injection   SubCUTAneous Q6H  
 glucose chewable tablet 16 g  16 g Oral PRN  
 glucagon (GLUCAGEN) injection 1 mg  1 mg IntraMUSCular PRN  
 dextrose 10 % infusion 125-250 mL  125-250 mL IntraVENous PRN  pantoprazole (PROTONIX) injection 40 mg  40 mg IntraVENous DAILY  folic acid (FOLVITE) tablet 1 mg  1 mg Oral DAILY  sodium chloride (NS) flush 5-40 mL  5-40 mL IntraVENous Q8H  
 desonide (TRIDESILON) 0.05 % cream - patient supplied  (Patient Supplied)   Topical BID  naloxone (NARCAN) injection 0.4 mg  0.4 mg IntraVENous PRN  
 sodium chloride (NS) flush 5-10 mL  5-10 mL IntraVENous PRN  
 levothyroxine (SYNTHROID) tablet 175 mcg  175 mcg Oral ACB Total time spent with patient: 35  minutes Care Plan discussed with: Care Manager, Nursing Staff, Consultant/Specialist and >50% of time spent in counseling and coordination of care Discussed:  Care Plan Attending Physician: Steven Gerber MD

## 2019-10-27 NOTE — PROGRESS NOTES
Problem: Pain Goal: *Control of Pain Outcome: Progressing Towards Goal 
  
Problem: Falls - Risk of 
Goal: *Absence of Falls Description Document [facility] Fall Risk and appropriate interventions in the flowsheet. Outcome: Progressing Towards Goal 
Note:  
Fall Risk Interventions: 
  
 
Mentation Interventions: Adequate sleep, hydration, pain control, Door open when patient unattended Medication Interventions: Bed/chair exit alarm Elimination Interventions: Call light in reach, Patient to call for help with toileting needs History of Falls Interventions: Door open when patient unattended Problem: Patient Education: Go to Patient Education Activity Goal: Patient/Family Education Outcome: Progressing Towards Goal 
  
Problem: Pressure Injury - Risk of 
Goal: *Prevention of pressure injury Description Document Dionicio Scale and appropriate interventions in the flowsheet. Outcome: Progressing Towards Goal 
Note:  
Pressure Injury Interventions: 
Sensory Interventions: Assess changes in LOC, Check visual cues for pain Moisture Interventions: Internal/External urinary devices, Absorbent underpads Activity Interventions: Pressure redistribution bed/mattress(bed type) Mobility Interventions: Pressure redistribution bed/mattress (bed type) Nutrition Interventions: Document food/fluid/supplement intake Friction and Shear Interventions: Apply protective barrier, creams and emollients, Lift sheet, Foam dressings/transparent film/skin sealants

## 2019-10-27 NOTE — PROGRESS NOTES
Bedside report received from [id number] [facility],[address] 
 
2029: pt resting in bed, alert and oriented to person, and place disoriented to situation and time, denies pain, shift assessment done, Peg tube in place, infusing well, bed locked and low position, call bell within reach 2049: Antibiotic administered, infusing well 
 
2100: incontinence care given 2349: , No insulin coverage given 0025: resting in bed, no change from previous assessment 
 
0221: pt awake, no distress, continue to monitor 0445: reassessment completed, no changes 8920: , no insulin coverage given Bedside and Verbal shift change report given to [address] (oncoming nurse) by Cj Matamoros RN (offgoing nurse). Report included the following information SBAR, Kardex, Intake/Output, MAR, Recent Results and Cardiac Rhythm Afib on tele # [phone].

## 2019-10-28 NOTE — PROGRESS NOTES
Respiratory Therapy Assessment Care Plan Patient: 
Alexsandra Tony 80 y.o. male 10/28/2019 7:57 AM 
 
Severe sepsis ([address] [A41.9, R65.20] Altered mental status [R41.82] Chest X-RAY:  
Results from Hospital Encounter encounter on 10/08/19 XR CHEST PORT Impression IMPRESSION: 
 
Stable bibasilar opacities likely a combination of atelectasis and small pleural 
effusions XR SWALLOW FUNC VIDEO Impression Impression: 1. Aspiration with all consistencies. Please see speech pathology report for complete details. XR CHEST PORT Impression IMPRESSION:  
 
1. Stable support lines and tubes. 2. Bibasilar atelectasis/airspace disease, likely with pleural effusions, right 
greater than left. This has slightly worsened in the right base. Vital Signs:   Visit Vitals /55 (BP 1 Location: Left arm, BP Patient Position: At rest) Pulse 97 Temp 97.5 °F (36.4 °C) Resp 18 Ht 5' 8\" (1.727 m) Wt 88.2 kg (194 lb 6.4 oz) SpO2 98% BMI 29.56 kg/m² Indications for treatment: Patient presents with a history of pneumonia. Plan of care: Duoneb 4x daily. Continue oxygen therapy as needed per patient. Goal: Titrate oxygen as tolerated per patient. Continue to improve and maintain oxygenation and ventilation.

## 2019-10-28 NOTE — ROUTINE PROCESS
4 Fr single lumen Power PICC Solo placed in LUE basilic vein with tip seen in lower SVC on PCXR. Released for use, FLYNN Marrufo notified. 2 ml 1% lidocaine injected SC at insertion site for local anesthetic.

## 2019-10-28 NOTE — DISCHARGE INSTRUCTIONS
DISCHARGE SUMMARY from Nurse    PATIENT INSTRUCTIONS:    After general anesthesia or intravenous sedation, for 24 hours or while taking prescription Narcotics:  · Limit your activities  · Do not drive and operate hazardous machinery  · Do not make important personal or business decisions  · Do  not drink alcoholic beverages  · If you have not urinated within 8 hours after discharge, please contact your surgeon on call. Report the following to your surgeon:  · Excessive pain, swelling, redness or odor of or around the surgical area  · Temperature over 100.5  · Nausea and vomiting lasting longer than 4 hours or if unable to take medications  · Any signs of decreased circulation or nerve impairment to extremity: change in color, persistent  numbness, tingling, coldness or increase pain  · Any questions    What to do at Home:  Recommended activity: Activity as tolerated and PT/OT Eval and Treat    If you experience any of the following symptoms fever, chills, or pain, please follow up with rehab staff. *  Please give a list of your current medications to your Primary Care Provider. *  Please update this list whenever your medications are discontinued, doses are      changed, or new medications (including over-the-counter products) are added. *  Please carry medication information at all times in case of emergency situations. These are general instructions for a healthy lifestyle:    No smoking/ No tobacco products/ Avoid exposure to second hand smoke  Surgeon General's Warning:  Quitting smoking now greatly reduces serious risk to your health.     Obesity, smoking, and sedentary lifestyle greatly increases your risk for illness    A healthy diet, regular physical exercise & weight monitoring are important for maintaining a healthy lifestyle    You may be retaining fluid if you have a history of heart failure or if you experience any of the following symptoms:  Weight gain of 3 pounds or more overnight or 5 pounds in a week, increased swelling in our hands or feet or shortness of breath while lying flat in bed. Please call your doctor as soon as you notice any of these symptoms; do not wait until your next office visit. The discharge information has been reviewed with the patient and caregiver. The patient and caregiver verbalized understanding. Discharge medications reviewed with the patient and caregiver and appropriate educational materials and side effects teaching were provided. Patient discharged without removing armband and transfered to another healthcare acute, sub acute , or extended care facility. Informed of privacy risks if armband lost or stolen.

## 2019-10-28 NOTE — DISCHARGE SUMMARY
. 
 Discharge Summary [facility] service Patient ID: 
John Paul Chase, 80 y.o., male : 1933 Admit Date: 10/8/2019 Discharge Date:  10/28/2019 Length of stay: 20 day(s) PCP:  Dash Witt MD 
 
Chief Complaint Patient presents with  Altered mental status Discharge Diagnosis:  
 
Hospital Problems  Date Reviewed: 10/25/2019 Codes Class Noted POA Altered mental status ICD-10-CM: R41.82 
ICD-9-CM: 780.97  10/8/2019 Yes * (Principal) Severe sepsis ([facility] ICD-10-CM: A41.9, R65.20 ICD-9-CM: 038.9, 995.92  10/8/2019 Yes Cellulitis of left knee ICD-10-CM: L03.116 ICD-9-CM: 682.6  10/8/2019 Yes MACKENZIE (acute kidney injury) ([facility] ICD-10-CM: N17.9 ICD-9-CM: 584.9  2019 Yes Atrial fibrillation ([facility] ICD-10-CM: I48.91 
ICD-9-CM: 427.31  2018 Yes Hypothyroidism ICD-10-CM: E03.9 ICD-9-CM: 244.9  2018 Yes Discharge instructions: #  Discharge Diet: 
          Diet: PEG feeding at 70 ml/hr # Discharge activity and restrictions: Activity as tolerated and PT/OT Eval and Treat # Follow-up appointments: Follow-up Information Follow up With Specialties Details Why Contact Info Dash Witt MD Family Practice In 3 days  Critical access hospital [address] [facility] [id number] [phone] HPI on Admission (per admitting physician): 
John Paul Chase is a 80 y.o. male with PMH as below who presents from home due to AMS, fevers and hypotension. Pt is lethargic, h/o taken from family at bedside. They state that starting 6 days ago he has been confused, lethargic and unable to ambulate, per family he was walking and driving last week. They state he has also been falling. He presented to the ED multiple times in the last month for syncope, fall and dehydration. He presented to the ED on 10/3 with left knee pain after a fall.  Per family he continues to have left knee pain but improved from 2 days ago. He has h/o knee replacement of left knee. In the ED, he presented with fever and hypotension. He was found to have severe sepsis, unknown source. Central line was placed due to hypotension. Levophed started with stable BP. Broad spectrum atbx started, and pt is to be admitted for further management. For further details and initial management please refer to H/P. Hospital Course:   
 
Continue to be stable, mentally at new baseline, daughter happy with progress. Going to [facility] today. Atbx to be changed to ID. PICC line to be placed and jugular line to be removed. By Problems :  
  
# Dysphagia, failed SLP twice. GI consulted for  PEG and placed last week. tolerating PEGTF . Before that placed on NGT for meds and feeding  , free water . Failed swallow study . PEG placed 10/25. Tube feeds started through PEG 
  
# Acute respiratory failure - pt tachypneic with increased WOB since 10/21. SpO2 92 on 3L. CXR showed right base new opacity. D/w ID started on vanc and merrem on 10/21. resume PO lasix. ABG wnl. MRSA screen positive. Per ID will need vancomycin 2 more days, and levaquin 750 x2 more days. 10/27:  Pt with slightly increased WOB, is net positive,  given one dose IV lasix 40mg  , and resume maintenance PO 40mg   
  
# Acute Delirious state , Encephalopathy. Possible sepsis related or metabolic/ Alcohol related with his ho alcohol abuse. Was on opioid and benzo that been consolidated. Reduce sedative to minimal strongly needed. Ammonia level normal , CT head negative. Place ngt for meds 
   
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 H/o cat bite 4 weeks to RLE per daughter, when he came in with cellulitis and was treated. D/w Dr. Pereira Sobtevin concern for persistent infection/abscess, he ordered nuclear gallium scan . Blood culture repeat on 10/10 negative to date.   lactobacillus added  
  
 # Possible L knee infection. CUTLTURE of aspirate negative. Orthopedic. Knee tapping and CS. Fluids CS negative. 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 
  
  
# HO Gout. I called lab and radiology if the can run the knee fluid test for Crystals . I am informed that fluids from his hip on 10/9 and knee on 10/15 both tested for crystals and was negative. Continue home preventive meds. .  Sever L knee pains , improved. control pains. Currently on tylenol and daughter report that enough for him but might need stronger meds at times.  
  
#  MACKENZIE - improved with IV fluid hydration, monitor closely. Monitor labs. Avoid nephrotoxins. Renally dosing medications. Monitor urine out put.  
  
#  AFIB - Rate control, resume Elliquis for AC . Dc iv lopressor , tab NGT. Titrate as needed  
  
# Elevated LFT - GI consulted, suspects 2/2 sepsis, appreciate recs. ( Elevated liver enzymes most likely secondary to sepsis. Coming down. CT shows no biliary obstruction. S/P cholecystectomy )  
  
# Hypothyroidism - Cont home levothyroxine.  
  
# Hypocalcemia. Hypokalemia. Replete and trend.   
  
 # Hypernatremia. Improved. free water NGT . D5 ivf . Serial labs to monitor level.   
  
# ETOH abuse - initially placed on  CIWA, then stopped. Per family last drink was about  1 month ago prior to admission. Vitamins,  Thiamine.  
  
Discharge condition:  improved and stable Disposition:  SNF Procedures:  
Procedure(s): ESOPHAGOGASTRODUODENOSCOPY (EGD) PERCUTANEOUS ENDOSCOPIC GASTROSTOMY TUBE INSERTION Consultants: Pulmonary/Intensive care, ID, GI and Orthopedic Surgery Physical Exam on Discharge: 
Visit Vitals /55 (BP 1 Location: Left arm, BP Patient Position: At rest) Pulse 97 Temp 97.5 °F (36.4 °C) Resp 18 Ht 5' 8\" (1.727 m) Wt 88.2 kg (194 lb 6.4 oz) SpO2 98% BMI 29.56 kg/m² Ears: hearing is intact  AX0X3. Eyes: Icterus is not present Lungs: coarse breath sounds bilaterally Heart: regular rate and rhythm, S1, S2 normal, no  gallop Gastrointestinal: soft, non-tender. Bowel sounds normal. No masses,  no organomegaly Neurological:  New Focal Deficits are not present. Skin: diffuse ecchymosis. Face with small petechiae (per family is due to known actinic keratosis). Psychiatric:  Mood is stable Hospitalization and discharge instructions d/c in details with : patient , family , Nursing/CM . Discharge medications : 
Current Discharge Medication List  
  
START taking these medications Details  
albuterol-ipratropium (DUO-NEB) 2.5 mg-0.5 mg/3 ml nebu 3 mL by Nebulization route every six (6) hours as needed (SOB/WHEEZES). Qty: 30 Nebule, Refills: 0  
  
acetaminophen (TYLENOL) 500 mg tablet Take 1 Tab by mouth every six (6) hours as needed for Pain or Fever. Qty: 30 Tab, Refills: 0  
  
docusate sodium (COLACE) 100 mg capsule Take 1 Cap by mouth two (2) times daily as needed for Constipation for up to 90 days. Indications: constipation 
Qty: 15 Cap, Refills: 0  
  
folic acid (FOLVITE) 1 mg tablet Take 1 Tab by mouth daily. Qty: 30 Tab, Refills: 0 Lactobacillus Acidoph & Bulgar (FLORANEX) 1 million cell tab tablet Take 2 Tabs by mouth two (2) times a day. Qty: 30 Tab, Refills: 0  
  
metoprolol tartrate (LOPRESSOR) 25 mg tablet 0.5 Tabs by Per NG tube route two (2) times a day. Qty: 30 Tab, Refills: 0  
  
thiamine mononitrate (B-1) 100 mg tablet Take 1 Tab by mouth daily. Qty: 30 Tab, Refills: 0 CONTINUE these medications which have CHANGED Details  
multivitamins-minerals-lutein (CENTRUM SILVER) tab tablet Take 1 Tab by mouth every other day. Qty: 30 Tab, Refills: 0 CONTINUE these medications which have NOT CHANGED Details  
omeprazole (PRILOSEC OTC) 20 mg tablet Take 1 Tab by mouth daily. febuxostat (ULORIC) 80 mg tab tablet Take 80 mg by mouth daily. desonide (TRIDESILON) 0.05 % cream Apply 1 Each to affected area two (2) times a day. TO FACE  
  
tamsulosin (FLOMAX) 0.4 mg capsule Take 1 Cap by mouth daily (after dinner). Qty: 90 Cap, Refills: 3 Associated Diagnoses: Malignant neoplasm of prostate ([address] History of urinary retention  
  
apixaban (ELIQUIS) 5 mg tablet Take 1 Tab by mouth two (2) times a day. Qty: 60 Tab, Refills: 0  
  
albuterol (PROAIR HFA) 90 mcg/actuation inhaler Take  inhaled by mouth. Associated Diagnoses: Malignant neoplasm of prostate ([address] Urinary retention; Nocturia ALPHAGAN P 0.15 % ophthalmic solution Administer 1 Drop to both eyes three (3) times daily. Associated Diagnoses: Malignant neoplasm of prostate ([address] Urinary retention; Nocturia  
  
cyanocobalamin 1,000 mcg tablet Take 1,000 mcg by mouth daily. Associated Diagnoses: Malignant neoplasm of prostate ([address] Urinary retention; Nocturia  
  
furosemide (LASIX) 40 mg tablet Take 40 mg by mouth daily. 2 tab daily Associated Diagnoses: Malignant neoplasm of prostate ([address] Urinary retention; Nocturia  
  
levothyroxine (SYNTHROID) 175 mcg tablet Take 175 mcg by mouth Daily (before breakfast). bimatoprost (LUMIGAN) 0.03 % ophthalmic drops Administer 1 drop to both eyes every evening. STOP taking these medications  
  
 oxyCODONE-acetaminophen (PERCOCET) 5-325 mg per tablet Comments:  
Reason for Stopping:   
   
 nystatin (MYCOSTATIN) 100,000 unit/mL suspension Comments:  
Reason for Stopping:   
   
 zolpidem (AMBIEN) 5 mg tablet Comments:  
Reason for Stopping:   
   
 ipratropium-albuterol (COMBIVENT RESPIMAT)  mcg/actuation inhaler Comments:  
Reason for Stopping:   
   
 loratadine (CLARITIN) 10 mg tablet Comments:  
Reason for Stopping:   
   
 gabapentin (NEURONTIN) 600 mg tablet Comments:  
Reason for Stopping:   
   
 traZODone (DESYREL) 50 mg tablet Comments: Reason for Stopping:   
   
 PYRIDOXINE HCL (VITAMIN B-6 PO) Comments:  
Reason for Stopping:   
   
 ipratropium-albuterol (COMBIVENT RESPIMAT)  mcg/actuation inhaler Comments:  
Reason for Stopping: Most Recent Labs: 
  
 
Recent Labs 10/28/19 
0410 10/27/19 
0400 10/26/19 
0335 WBC 10.6 9.0 8.1 HGB 7.0* 7.0* 8.1* HCT 22.8* 22.4* 26.2*  
* 439* 407 Recent Labs 10/28/19 
0410 10/27/19 
0400 10/26/19 
0335 * 135* 135* K 4.2 4.3 4.2 CL 95* 98* 100 CO2 32 31 31 * 119* 97 BUN 18 16 17 CREA 0.59* 0.63 0.65 CA 7.5* 8.0* 8.4* MG 1.7 1.8 2.1 PHOS 3.4 3.4 3.4 Lab Results Component Value Date/Time Glucose (POC) 119 (H) 10/28/2019 05:14 AM  
 Glucose (POC) 117 (H) 10/27/2019 11:49 PM  
 Glucose (POC) 138 (H) 10/27/2019 05:59 PM  
 Glucose (POC) 132 (H) 10/27/2019 12:26 PM  
 Glucose (POC) 112 (H) 10/27/2019 05:16 AM  
 Glucose, POC 83 01/03/2017 10:19 AM  
 
No results for input(s): PH, PCO2, PO2, HCO3, FIO2 in the last 72 hours. No results for input(s): INR, INREXT in the last 72 hours. No results found for: SDES Lab Results Component Value Date/Time Culture result: (A) 10/25/2019 06:41 PM  
  MRSA target DNA is detected (presumptive positive for MRSA colonization). Culture result: NO GROWTH 6 DAYS 10/21/2019 07:25 PM  
 Culture result: NO GROWTH 6 DAYS 10/21/2019 06:59 PM  
 
  
XR Results: 
Results from Hospital Encounter encounter on 10/08/19 XR CHEST PORT Narrative EXAM: CHEST RADIOGRAPH 
 
CLINICAL INDICATION/HISTORY: follow up RUL infiltrate 
-Additional: None COMPARISON: October 21, 2019 TECHNIQUE: Portable frontal view of the chest 
 
_______________ FINDINGS: 
 
SUPPORT DEVICES: A right IJ central venous catheter is seen in the right 
atrium. [place] HEART AND MEDIASTINUM: Heart size and mediastinal contour are unchanged. LUNGS AND PLEURAL SPACES: Lung volumes are hypoinflated. There are similar appearing hazy opacities at both lung bases and blunting of the costophrenic 
sulci. No pneumothorax. BONY THORAX AND SOFT TISSUES: Total right shoulder arthroplasty. . 
 
_______________ Impression IMPRESSION: 
 
Stable bibasilar opacities likely a combination of atelectasis and small pleural 
effusions CT Results: 
Results from Hospital Encounter encounter on 10/08/19 CT HEAD WO CONT Narrative EXAM: CT head INDICATION: Altered mental status COMPARISON: CT head 10/8/2019 TECHNIQUE: Axial CT imaging of the head was performed without intravenous 
contrast. 
 
One or more dose reduction techniques were used on this CT: automated exposure 
control, adjustment of the mAs and/or kVp according to patient size, and 
iterative reconstruction techniques. The specific techniques used on this CT 
exam have been documented in the patient's electronic medical record. Digital 
Imaging and Communications in Medicine (DICOM) format image data are available 
to nonaffiliated external healthcare facilities or entities on a secure, media 
free, reciprocally searchable basis with patient authorization for at least a 
12-month period after this study. FINDINGS: 
 
BRAIN AND POSTERIOR FOSSA: The sulci, folia, ventricles and basal cisterns are 
within normal limits for the patient?s age. There is no intra-axial hemorrhage. No mass effect or midline shift. . Gray-white matter differentiation remains 
within normal limits. Minor periventricular white matter low-attenuation, unchanged EXTRA-AXIAL SPACES AND MENINGES: There are no abnormal extra-axial fluid 
collections. CALVARIUM: Intact. SINUSES: Clear. OTHER: Ocular lenses are surgically replaced bilaterally. _______________ Impression IMPRESSION: 
 
 
1. No acute intracranial abnormality. Stable examination. MRI Results: 
Results from Hospital Encounter encounter on 05/23/19 MRI BRAIN W WO CONT Narrative EXAM: MRI brain without and with gadolinium CLINICAL INDICATION/HISTORY: R55.9, R26.89, C61, I48.91 
  > Additional: Prostate cancer, syncope, atrial fibrillation, limp COMPARISON: None. > Reference Exam: CT head 10/9/2017 TECHNIQUE: Sagittal FLAIRT1, axial T1, T2, FLAIR, susceptibility weighted, and 
diffusion sequences obtained of the brain. Following gadolinium administration, 
axial and coronal T1 sequences obtained. Additional axial T1 post gadolinium 
magnetization transfer sequence obtained. Imaging performed on wide bore Discovery [id number] [employer] suite 3T magnet at [facility] [place] [facility]\"".  
 
_______________ FINDINGS: 
 
Diffusion:  There are no areas of restricted diffusion, no evidence of an acute 
infarction. Brain parenchyma: There is mild confluent and multifocal increased T2 and FLAIR 
signal periventricular and deep cerebral white matter and central nataly in a 
nonspecific pattern. Several small foci low gradient signal left temporal lobe 
and bilateral anterior frontal lobes. No evidence of intracranial mass or mass 
effect. No cortical signal abnormality. Mildly prominent likely perivascular 
spaces basal ganglia. Ventricles and sulci:  Mild diffuse central and cortical volume loss. Extra-axial:  No extra-axial fluid collection or mass is noted. Contrast:There is no abnormal enhancement. Brain vasculature:  No vascular abnormality is appreciated on this routine brain MR examination. Craniocervical junction:  Cervical medullary junction unremarkable prominent 
synovial thickening surrounding anterior C1/2 compatible with likely 
degenerative/arthritic changes without deformity cervicomedullary junction. [place] Skull base, extracranial and calvarium:  Previous bilateral lens implants. Mild 
paranasal sinus mucosal thickening. IACs and mastoids, sella and skull 
unremarkable.  Incidental left posterior frontal scalp thinning likely related to 
 chronic granulation/fibrosis. _______________ Impression 1. No evidence of acute infarct or other acute intracranial finding. 2. Mild bilateral deep white matter signal changes nonspecific likely chronic 
microvascular ischemic disease. Mild diffuse volume loss. 3. Several small chronic microbleeds likely related to hypertension. Nuclear Medicine Results: 
Results from Hospital Encounter encounter on 10/08/19 NM INFLAM PROC WH BODY Narrative EXAM:  GALLIUM SCAN OF THE LUMBAR SPINE CLINICAL INDICATION/HISTORY:  Patchy relative bacteremia with neck and back pain 
as well as right leg cellulitis. > Additional:  None. TECHNIQUE: Imaging of the whole body was performed after the intravenous 
administration of 13.09 mCi of Ga-67. Imaging was performed up to 72 hours. > Injection site:  Right internal jugular vein. COMPARISON:  Correlation with CT of the abdomen pelvis dated 10/8/2019 as well 
as the left knee radiographs from 10/8/2019 
 
 
____________________________________ FINDINGS:   
 
Images demonstrate increased radiotracer localization to the right midabdomen 
with slight differences and configuration on the 48 and 72 hour delayed images. Given the morphology and configuration, most likely represents an area of bowel. This persists with coned-down imaging of the abdomen with the patient's arms to 
the side. There is no corresponding abnormality on the most recent CT of the 
abdomen and pelvis localizing to this region. In addition, there is photopenia in the left knee related to prior total knee 
arthroplasty. However, intense periprosthetic radiotracer uptake is seen 
surrounding left knee including the distal femur. When correlation with the 
prior radiographs, there is a moderate size knee joint effusion. ____________________________________ Impression IMPRESSION: 
 
 
1.  Nonspecific increased radiotracer activity in the right midabdomen most 
 likely representing a loop of bowel. However, no corresponding abnormality is 
seen on the most recent CT of abdomen pelvis. Findings are nonspecific. Consider 
repeat CT of the abdomen pelvis for further evaluation and to assess for any 
interval changes. 2. Abnormal periprosthetic radiotracer activity surrounding the left knee 
prosthesis. Given the presence of the joint effusion, recommend arthrocentesis 
if not already performed to exclude infection. US Results: No results found for this or any previous visit. IR Results: No results found for this or any previous visit. VAS/US Results: 
Results from Hospital Encounter encounter on 06/07/17 DUPLEX CAROTID BILATERAL Total discharge time 35 minutes. Kaitlin Burns MD 
Hospitalist 
[facility], [facility]. medical group. Hospitalist Division.  
October 28, 2019 
10:51 AM

## 2019-10-28 NOTE — PROGRESS NOTES
Speech Therapy Note: Pt with discharge summary completed and plans for d/c this afternoon to SNF. SLP will d/c pt and POC as indicated. Naila Perla M.S., CCC-SLP Office: [phone] Pager: [phone]

## 2019-10-28 NOTE — PROGRESS NOTES
0700 Verbal bedside report received from off going RN. Pt resting comfortably in bed. Awake and alert, answering questions appropriately. Will continue to monitor. 1100 Pt resting comfortably with family at bedside. Assessment unchanged. Will continue to monitor. 1500 Triple lumen IJ D/C'd per protocol. Single Lumen PICC to ISAC patent. Merino catheter D/C'd per order. Family at bedside 1600 Report given to TCC RN. Pt to transfer to room 3102 when ready. 1800 TCC called to see if room ready for pt transfer. No answer, will try again. 1830 TCC called again to see if ready to pt. Took RNs name and number and informed they would call when ready for patient. Family at bedside, pt resting, appears comfortable. 1930 verbal bedside report given to oncoming RN Xavier Burgess.

## 2019-10-28 NOTE — PROGRESS NOTES
Problem: Discharge Planning Goal: *Discharge to safe environment Outcome: Resolved/Met SNF Pt accepted to [facility] for today. Met with pt/family & made aware, agreeable to transfer. Nursing made aware of above. Michelle VazquezRN,ext [phone]. Care Management Interventions PCP Verified by CM: Yes Last Visit to PCP: 10/04/19 Mode of Transport at Discharge: Self Transition of Care Consult (CM Consult): SNF Partner SNF: Yes Current Support Network: Lives with Spouse Confirm Follow Up Transport: Family Plan discussed with Pt/Family/Caregiver: Yes Freedom of Choice Offered: Yes Discharge Location Discharge Placement: Skilled nursing facility

## 2019-10-28 NOTE — PROGRESS NOTES
Problem: Pain Goal: *Control of Pain Outcome: Progressing Towards Goal 
  
Problem: Falls - Risk of 
Goal: *Absence of Falls Description Document Levar Renee Fall Risk and appropriate interventions in the flowsheet. Outcome: Progressing Towards Goal 
Note:  
Fall Risk Interventions: 
  
 
Mentation Interventions: Adequate sleep, hydration, pain control, Door open when patient unattended Medication Interventions: Bed/chair exit alarm Elimination Interventions: Call light in reach, Patient to call for help with toileting needs History of Falls Interventions: Door open when patient unattended Problem: Pressure Injury - Risk of 
Goal: *Prevention of pressure injury Description Document Dionicio Scale and appropriate interventions in the flowsheet. Outcome: Progressing Towards Goal 
Note:  
Pressure Injury Interventions: 
Sensory Interventions: Assess changes in LOC, Check visual cues for pain Moisture Interventions: Internal/External urinary devices Activity Interventions: Pressure redistribution bed/mattress(bed type) Mobility Interventions: Pressure redistribution bed/mattress (bed type) Nutrition Interventions: Document food/fluid/supplement intake Friction and Shear Interventions: Apply protective barrier, creams and emollients

## 2019-10-28 NOTE — ANCILLARY DISCHARGE INSTRUCTIONS
Patient and/or next of kin has been given the [facility] Important Message From Medicare About Your Rights\" letter and all questions were answered.

## 2019-10-28 NOTE — PROGRESS NOTES
[facility] 
                                             (A Division of [address] [place] [address]) Follow-up Note Date of Admission: 10/8/2019     Date of Note:  10/28/2019 Summary:   
 
79 y/o  male HTN, AF, GERD, DJD s/p L TKR, Prostate CA, Hypothyroidism, h/o pancreatitis, ETOH consumption adm 10/8/2019 w/ chills, weakness, altered mental status. Has neck pain x 1 mo. Had R leg cellulitis after cat bite (not scratch) 1 mo PTA - resolved w/ abx. Then micturition syncope x 3, hurt knee, progressing generalized weakness, then 1 day PTA fever, chills adm w/ 100.5, WBC 29.5, 13% bands. No uti, pneumonia, unrevealing CTAP. 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 Rpt blcx 10/10 NGTD x 2.  Gallium scan 10/14: intense periprosthetic uptake L knee, distal femur. L knee synovial fluid 10/15- not s/o septic arthritis. Presume distal L femur OM. Meropenem, Vanc started 10/21 for ? HAP. Low grade fever 10/24 100.5 WBC normal. Meropenem changed to levofloxacin. Abx changed back to ceftriaxone 10/28 Interval History: 
 
Alert and awake. Slowly and weakly responds verbally to questions. Afebrile Current Antimicrobials: Prior Antimicrobials Ceftriaxone 10/28 - 0 Vancomycin 10/8 - 2  Cefepime, Levoflox 10/8 - 0 Meropenem 10/9 - 2 Ceftriaxone 10/11 - 10 [id number]/21 - 3 Vanc 10/21 - 7  Levofloxacin 10/24 - 4 abx 10/8 - 20  
  
  
Assessment: Plan:  
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  
-> PICC placed today 
-> OPAT orders entered 
-> I can follow up at [facility] if able to come. Call [phone] for appt in 2 weeks 
-> CBC, BMP, ESR, CRP q Monday Aspiration 
- failed swallow study 10/24 - s/p PEG 10/25 -> per others Isolated Low grade fever - 100.5 10/24 - resolved - WBC normal ?drug fever NEW R LL opacity 
- atelectasis vs infiltrate. 
- had isolated low grade fever 10/24 - afebrile now - MRSA screen + -> dc Levofloxacin and Vancomycin Pasteurella BSI 
- 1 of 2 blood cultures 10/8 + P multocida sens TMP-SMX, Ceftriaxone, Levofloxacin, PCN, TCN 
- No active cellulitis. R leg cellulitis following cat bite in early September resolved w/ TMP-SMX then Keflex 
- Recent TTE 9/27: neg vegetations - rpt blcx 10/10 NG x 2 
- Gallium scan 10/14: intense periprosthetic uptake L knee, distal femur. 
- L knee aspiration 10/15: 7,200 WBC, no crystals - not suggestive of septic arthritis - presuming source is L distal femoral OM 
- fully treated -> abx as above Encephalopathy/ ETOH Withdrawal 
- worse since admission - ? Medication-related: has been receiving dilaudid q 4 hours and Ativan this am 
- Head CT 10/14: no acute intracranial abnormality 
- not related to infection which appears under control at this time 
- improving -> per primary team.   
Septic shock 
- due to above 
- improving. Vasopressors stopped 10/10.   
- resolved MACKENZIE 
- resolved Worsening alkaline phosphatase 
- from sepsis, ?bone infection / met (doubt) 
- improving H/o PCN allergy (rash)  
- tolerated keflex 
- tolerating Ceftriaxone Recent R leg cellulitis following cat bite (not scratch) R hip pain - s/p aspiration 10/9: 23 WBC, no crystals HTN    
AF    
GERD    
DJD s/p L TKR    
Prostate CA    
Hypothyroidism    
h/o pancreatitis    
ETOH consumption    
  
Microbiology: 
  
10/8      blcx Pasteurella multocida 1 of 2 
             urcx NG 
10/9      R hip asp gs no wbc, NOS, cx NG 
 
10/10 blcx NGTD x 2 
  
Lines / Catheters: RIJ CVL Patient Active Problem List  
Diagnosis Code  Malignant neoplasm of prostate ([facility] C61  Hypertension I10  
  GERD (gastroesophageal reflux disease) K21.9  Glaucoma H40.9  Glaucoma H40.9  Dysphagia R13.10  Encounter for colonoscopy due to history of adenomatous colonic polyps Z12.11, Z86.010  
 Non-pressure chronic ulcer of right calf with fat layer exposed ([facility] L49.736  Laceration of right lower leg with complication S94.727X  Edema R60.9  Pneumonia J18.9  Atrial fibrillation (HCC) I48.91  
 Hypothyroidism E03.9  Sepsis ([facility] A41.9  Cellulitis L03.90  
 HTN (hypertension) T27  
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  
 sodium chloride (NS) flush 10-30 mL  10-30 mL InterCATHeter PRN  
 sodium chloride (NS) flush 10 mL  10 mL InterCATHeter Q24H  
 sodium chloride (NS) flush 10 mL  10 mL InterCATHeter PRN  
 sodium chloride (NS) flush 10-40 mL  10-40 mL InterCATHeter Q8H  
 sodium chloride (NS) flush 20 mL  20 mL InterCATHeter Q24H  
 dextrose 5% infusion  50 mL/hr IntraVENous CONTINUOUS  
 sodium chloride (NS) flush 5-40 mL  5-40 mL IntraVENous Q8H  
 sodium chloride (NS) flush 5-40 mL  5-40 mL IntraVENous PRN  
 apixaban (ELIQUIS) tablet 5 mg  5 mg Oral BID  vancomycin (VANCOCIN) 1,000 mg in 0.9% sodium chloride (MBP/ADV) 250 mL adv  1,000 mg IntraVENous Q18H  
 levoFLOXacin (LEVAQUIN) 750 mg in D5W IVPB  750 mg IntraVENous Q24H  VANCOMYCIN INFORMATION NOTE   Other Rx Dosing/Monitoring  docusate sodium (COLACE) capsule 100 mg  100 mg Oral BID PRN  
 Lactobacillus Acidoph & Bulgar [facility]) tablet 2 Tab  2 Tab Oral BID  
  albuterol-ipratropium (DUO-NEB) 2.5 MG-0.5 MG/3 ML  3 mL Nebulization QID RT  
 albuterol (PROVENTIL VENTOLIN) nebulizer solution 1.25 mg  1.25 mg Nebulization Q4H PRN  
 melatonin tablet 6 mg  6 mg Oral QHS PRN  
 haloperidol lactate (HALDOL) injection 1 mg  1 mg IntraVENous Q8H PRN  
 furosemide (LASIX) tablet 40 mg  40 mg Oral DAILY  metoprolol tartrate (LOPRESSOR) tablet 12.5 mg  12.5 mg Per NG tube BID  thiamine mononitrate (B-1) tablet 100 mg  100 mg Oral DAILY  acetaminophen (TYLENOL) tablet 500 mg  500 mg Oral Q6H PRN  
 acetaminophen (TYLENOL) solution 325 mg  325 mg Per NG tube Q6H  
 insulin lispro (HUMALOG) injection   SubCUTAneous Q6H  
 glucose chewable tablet 16 g  16 g Oral PRN  
 glucagon (GLUCAGEN) injection 1 mg  1 mg IntraMUSCular PRN  
 dextrose 10 % infusion 125-250 mL  125-250 mL IntraVENous PRN  pantoprazole (PROTONIX) injection 40 mg  40 mg IntraVENous DAILY  folic acid (FOLVITE) tablet 1 mg  1 mg Oral DAILY  sodium chloride (NS) flush 5-40 mL  5-40 mL IntraVENous Q8H  
 desonide (TRIDESILON) 0.05 % cream - patient supplied  (Patient Supplied)   Topical BID  naloxone (NARCAN) injection 0.4 mg  0.4 mg IntraVENous PRN  
 sodium chloride (NS) flush 5-10 mL  5-10 mL IntraVENous PRN  
 levothyroxine (SYNTHROID) tablet 175 mcg  175 mcg Oral ACB Review of Systems - Negative except as in interval history Objective: 
Visit Vitals /71 (BP 1 Location: Left arm, BP Patient Position: At rest) Pulse 95 Temp 99 °F (37.2 °C) Resp 18 Ht 5' 8\" (1.727 m) Wt 88.2 kg (194 lb 6.4 oz) SpO2 96% BMI 29.56 kg/m² Temp (24hrs), Av.1 °F (36.7 °C), Min:97.5 °F (36.4 °C), Max:99 °F (37.2 °C) General: Well developed, well nourished 80 y.o.  male still lethargic, follows simple commands, no verbal ouput today HEENT: no scleral icterus, mucous membranes moist, pharynx normal without lesions, NGT still in place Neck: resistant to flexion in all directions but more flexible than previous Cardio:  irregularly irregular rhythm Lungs: rhonchi bilateral  
Abdomen: soft, non-tender. Bowel sounds normal. No masses, no organomegaly. PEG in place Extremities:  no redness or tenderness in the calves or thighs, no edema Lab results: 
 
Chemistry Recent Labs 10/28/19 
0410 10/27/19 
0400 10/26/19 
0335 * 119* 97 * 135* 135* K 4.2 4.3 4.2 CL 95* 98* 100 CO2 32 31 31 BUN 18 16 17 CREA 0.59* 0.63 0.65 CA 7.5* 8.0* 8.4* AGAP 5 6 4 BUCR 31* 25* 26* CBC w/ Diff Recent Labs 10/28/19 
0410 10/27/19 
0400 10/26/19 
0335 WBC 10.6 9.0 8.1 RBC 2.31* 2.26* 2.61* HGB 7.0* 7.0* 8.1* HCT 22.8* 22.4* 26.2*  
* 439* 407 GRANS 68 69 69 LYMPH 19* 18* 17* EOS 2 2 3 Microbiology All Micro Results Procedure Component Value Units Date/Time CULTURE, BLOOD [[id number]] Collected:  10/21/19 1925 Order Status:  Completed Specimen:  Blood Updated:  10/27/19 0741 Special Requests: NO SPECIAL REQUESTS Culture result: NO GROWTH 6 DAYS     
 CULTURE, BLOOD [[id number]] Collected:  10/21/19 1859 Order Status:  Completed Specimen:  Blood Updated:  10/27/19 0741 Special Requests: --     
  RIGHT 
RED PORT Culture result: NO GROWTH 6 DAYS     
 MRSA SCREEN - PCR (NASAL) [[id number]] Collected:  10/23/19 1315 Order Status:  Canceled Specimen:  Nasal from Nares MRSA SCREEN - PCR (NASAL) [[id number]] Collected:  10/21/19 1830 Order Status:  Canceled Specimen:  Nasal   
 ANAEROBE ID REFLEX [[id number]] Collected:  10/15/19 1030 Order Status:  Completed Specimen:  MICROBIAL ISOLATE Updated:  10/20/19 1835 Source PENDING Result 1 Comment Comment: (NOTE) No anaerobic growth in 72 hours. Performed At: [address] [place] [id number] Ray Avendaño MD GZ:[id number] CULTURE, BODY FLUID [facility] Payer [[id number]] Collected:  10/15/19 1030 Order Status:  Completed Specimen:  Synovial Fluid Updated:  10/20/19 7482 Special Requests: LEFT KNEE     
  GRAM STAIN FEW WBC'S     
   NO ORGANISMS SEEN Culture result: NO GROWTH 5 DAYS     
 CULTURE, BLOOD [[id number]] Collected:  10/10/19 1341 Order Status:  Completed Specimen:  Blood Updated:  10/16/19 0042 Special Requests: --     
  NO SPECIAL REQUESTS 
BLUE PORT Culture result: NO GROWTH 6 DAYS     
 CULTURE, BLOOD [[id number]] Collected:  10/10/19 1237 Order Status:  Completed Specimen:  Blood Updated:  10/16/19 0042 Special Requests: PERIPHERAL Culture result: NO GROWTH 6 DAYS     
 CULTURE, BLOOD [[id number]] Collected:  10/08/19 2234 Order Status:  Completed Specimen:  Blood from Miscellaneous sample Updated:  10/15/19 0754 Special Requests: NO SPECIAL REQUESTS Culture result: NO GROWTH 6 DAYS     
 CULTURE, BODY FLUID [[id number]] Collected:  10/09/19 1200 Order Status:  Completed Specimen:  Synovial Fluid Updated:  10/14/19 4227 Special Requests: HIP  
  GRAM STAIN NO WBC'S SEEN     
   NO ORGANISMS SEEN Culture result: NO GROWTH 5 DAYS     
 CULTURE, BLOOD [[id number]]  (Abnormal)  (Susceptibility) Collected:  10/08/19 1301 Order Status:  Completed Specimen:  Blood Updated:  10/11/19 0825 Special Requests: --     
  NO SPECIAL REQUESTS 
RIGHT 
ARM 
  
  GRAM STAIN    
  AEROBIC BOTTLE GRAM NEGATIVE RODS SMEAR CALLED TO AND CORRECTLY REPEATED BY: DESHAWN COSBY RN,ICU, ON 10/9/19 AT 0335 TO S Culture result:    
  AEROBIC BOTTLE PASTEURELLA MULTOCIDA CULTURE, URINE [[id number]] Collected:  10/08/19 1418 Order Status:  Completed Specimen:  Cath Urine Updated:  10/10/19 1016 Special Requests: NO SPECIAL REQUESTS Culture result: NO GROWTH 2 DAYS Foreign Manning MD, [facility] [phone] 
10/28/2019  
9:02 AM

## 2019-10-28 NOTE — PROGRESS NOTES
Problem: Pain Goal: *Control of Pain Outcome: Resolved/Met Goal: *PALLIATIVE CARE:  Alleviation of Pain Outcome: Resolved/Met Problem: Falls - Risk of 
Goal: *Absence of Falls Description Document Rebbecca Persons Fall Risk and appropriate interventions in the flowsheet. Outcome: Resolved/Met Note:  
Fall Risk Interventions: 
  
 
Mentation Interventions: Bed/chair exit alarm, Door open when patient unattended, Family/sitter at bedside, More frequent rounding Medication Interventions: Bed/chair exit alarm Elimination Interventions: Toileting schedule/hourly rounds History of Falls Interventions: Bed/chair exit alarm, Door open when patient unattended Problem: Patient Education: Go to Patient Education Activity Goal: Patient/Family Education Outcome: Resolved/Met Problem: Pressure Injury - Risk of 
Goal: *Prevention of pressure injury Description Document Dionicio Scale and appropriate interventions in the flowsheet. Outcome: Resolved/Met Note:  
Pressure Injury Interventions: 
Sensory Interventions: Discuss PT/OT consult with provider, Float heels, Keep linens dry and wrinkle-free, Minimize linen layers, Monitor skin under medical devices, Pad between skin to skin, Pressure redistribution bed/mattress (bed type), Suspension boots, Turn and reposition approx. every two hours (pillows and wedges if needed) Moisture Interventions: Absorbent underpads, Internal/External urinary devices, Maintain skin hydration (lotion/cream), Minimize layers Activity Interventions: Pressure redistribution bed/mattress(bed type), PT/OT evaluation Mobility Interventions: Float heels, PT/OT evaluation, Pressure redistribution bed/mattress (bed type) Nutrition Interventions: Discuss nutritional consult with provider Friction and Shear Interventions: Apply protective barrier, creams and emollients Problem: Delirium Treatment Goal: *Level of consciousness restored to baseline Outcome: Resolved/Met Goal: *Level of environmental perceptions restored to baseline Outcome: Resolved/Met Goal: *Sensory perception restored to baseline Outcome: Resolved/Met Goal: *Emotional stability restored to baseline Outcome: Resolved/Met Goal: *Functional assessment restored to baseline Outcome: Resolved/Met Goal: *Absence of falls Outcome: Resolved/Met Goal: *Will remain free of delirium, CAM Score negative Outcome: Resolved/Met Goal: *Cognitive status will be restored to baseline Outcome: Resolved/Met Goal: Interventions Outcome: Resolved/Met Problem: Patient Education: Go to Patient Education Activity Goal: Patient/Family Education Outcome: Resolved/Met Problem: Nutrition Deficit Goal: *Adequate nutrition Outcome: Resolved/Met Goal: *Optimize nutritional status Outcome: Resolved/Met Problem: Patient Education: Go to Patient Education Activity Goal: Patient/Family Education Outcome: Resolved/Met Problem: Patient Education: Go to Patient Education Activity Goal: Patient/Family Education Outcome: Resolved/Met Problem: Patient Education: Go to Patient Education Activity Goal: Patient/Family Education Outcome: Resolved/Met Problem: Breathing Pattern - Ineffective Goal: *Absence of hypoxia Outcome: Resolved/Met Goal: *Use of effective breathing techniques Outcome: Resolved/Met Goal: *PALLIATIVE CARE:  Alleviation of Dyspnea Outcome: Resolved/Met Problem: Patient Education: Go to Patient Education Activity Goal: Patient/Family Education Outcome: Resolved/Met

## 2019-10-29 NOTE — ROUTINE PROCESS
Patient continues on peg tube feedings of osmolite 1.2 @ 70 ml/hr, site is intact with 0 s/s infection. PICC line patent beth IVABT Rocephin every 24 hours, pt still retaining urine in bladder, staff to insert Merino cath this shift. Family member at bedside, will continue to monitor.

## 2019-10-29 NOTE — PROGRESS NOTES
conducted an initial consultation and Spiritual Assessment for Tasneem Valdovinos, who is a 80 y.o.,male. Patients Primary Language is: [place]. According to the patients EMR Cheondoism Affiliation is: Non Christian.  
 
The reason the Patient came to the hospital is:  
Patient Active Problem List  
 Diagnosis Date Noted  Altered mental status 10/08/2019  Severe sepsis ([facility] 10/08/2019  Cellulitis of left knee 10/08/2019  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  Syncope 09/27/2019  MACKENZIE (acute kidney injury) ([facility] 09/27/2019  Neutrophilic leukocytosis 60/17/7996  Acute gout 07/11/2019  CKD (chronic kidney disease) stage 2, GFR 60-89 ml/min 07/11/2019  Sepsis ([facility] 07/10/2019  Cellulitis 07/10/2019  
 HTN (hypertension) 07/10/2019  Pneumonia 06/02/2018  Atrial fibrillation ([address] 06/02/2018  Hypothyroidism 06/02/2018  Non-pressure chronic ulcer of right calf with fat layer exposed ([address] 01/19/2017  Laceration of right lower leg with complication 84/73/5362  Edema 01/19/2017  Encounter for colonoscopy due to history of adenomatous colonic polyps 01/03/2017  Dysphagia 06/17/2016  Hypertension  GERD (gastroesophageal reflux disease)  Glaucoma  Glaucoma  Malignant neoplasm of prostate ([facility] 01/16/2012 The  provided the following Interventions: 
Initiated a relationship of care and support. Explored issues of desire, spirituality and/or Uatsdin needs while hospitalized. Listened empathically. Provided chaplaincy education. Provided information about Spiritual Care Services. Offered prayer and assurance of continued prayers on patient's behalf. Chart reviewed. The following outcomes were achieved: 
Patient shared some information about their medical narrative and spiritual journey/beliefs. Patient processed feeling about current hospitalization. Patient expressed gratitude for the 's visit. Assessment: 
Patient did not indicate any spiritual or Oriental orthodox issues which require Spiritual Care Services interventions at this time. Patient does not have any Oriental orthodox/cultural needs that will affect patients preferences in health care. Plan: 
Chaplains will continue to follow and will provide pastoral care on an as needed or requested basis.  recommends bedside caregivers page  on duty if patient shows signs of acute spiritual or emotional distress. [address] [facility] Staff  Spiritual Care  
[phone]

## 2019-10-29 NOTE — PROGRESS NOTES
Patient transferred with family at bedside to room [phone] in [address] [place] [address] Bedside, Verbal and Written shift change report given by Luisa Solorio RN (offgoing nurse). Report included the following information SBAR, Kardex, Intake/Output, MAR and Recent Results. Skin assessment completed.

## 2019-10-29 NOTE — PROGRESS NOTES
NUTRITION INITIAL ASSESSMENT/PLAN OF CARE TCC   
 
RECOMMENDATIONS:  
1. Enteral Nutrition: Continue goal rate of Osmolite 1.2 @ 70 mL/hr x 22 hrs (1540 mL)  providing 1848 kcal, 85 g protein, 1262 mL free water. Adjusted FWF to 125 mL Q6 hrs d/t hyponatremia (Total free water 1762 mL/day) 2. Monitor TF, labs and weight 3. RD to follow GOALS:  
1. Tolerates enteral nutrition via PEG meeting >75% of protein/calorie needs by 11/6 
2. Weight Maintenance (+/- 1-2 lb) by 11/6 ASSESSMENT:  
Wt status is classified as overweight per Body mass index is 27.9 kg/m². TF infusing at goal rate and tolerating well. Labs noted. Pt w/ hyponatremia and H/H (7.0/22.8). Nutrition recommendations listed. RD to follow. Nutrition Diagnoses:  
Problems chewing/swallowing related to mild oral/severe pharyngeal dysphagia as evidenced by MBS results and need for PEG placement. Increased nutrient needs related to poor wound healing as evidenced by multiple wounds documented by nursing including a stage 2 pressure injury to left buttocks. Nutrition Risk:  [] High  [x] Moderate []  Low SUBJECTIVE/OBJECTIVE:  
 
(10/29): Pt transferred from  to [address] [place] [address],[address] on 10/28/2019. SLP following. Pt seen in room this morning tube feeding infusing via PEG at goal rate. Pt appears very lethargic and will answer questions if prompted verbally several times. Daughter at bedside assisting Pt with bed exercises. Denies any GI distress and last BM on (10/28) per I/Os. Wounds documented by wound care on (10/24) noted including a healed stage 2 pressure injury to R buttocks and smaller stage 2 pressure injury to L buttocks. Weights have been variable this admission. Bed scale weight taken during visit; 193.2 lb; noted with extra items on bed. Will continue to monitor.   
 
Below in bold per previous RD notes:  
 
(10/26/19) Pt resting in bed with TF infusing @ 65 mL/hr during time of assessment with family at bedside. Pt with PEG placement 10/25/19. Tolerating feeds/TF advancement per RN reports. No reports n/v/d/c. CBW: 190 lb 10/26/19, continued weight fluctuations. Skin: stage 2 L buttock, wound to R heel. L arm. Advance as tolerated to goal rate Osmolite 1.2 @ 70 mL/hr x 22 hrs to provide 1540 mL, 1848 kcal, 85 g pro, 1262 mL free water with 150 mL flush Q 6. Will continue to monitor TF rate/tolerance, weight, labs, POC.  
  
(10/24/19) Pt resting in bed with TF infusing during time of assessment, family at bedside. Tolerating TF at goal rate per reports. Per I/Os BM 10/22/19. Seen by SLP 10/23/19 - failed MBS with recs for QOL/comfort diet (puree with HTL) vs PEG. Eliquis on hold for anticipation of EGD with PEG placement 10/25/19. Noted pt continues with weight fluctuations - unsure of accuracy, lasix possibly contributing. CBW: 193 lb 10/23/19. 175 lb 10/21/19, 194 lb 10/18/19. Family asking about PEG placement and eventually getting pt to bolus feeds. Spoke with family about this but to continue continuous feeds at this time until after placement/healing underway. Skin: per wound RN notes 10/24/19 Stage 2 to R buttock healed and stage 2 to L buttock with improvement. Continue current TF. Will continue to monitor TF tolerance, weight, labs, POC. 
  
(10/20/19) Pt resting in bed, awake with family at bedside during time of assessment. Pt with TF infusing via NGT at time of assessment at goal rate. SLP louisa completed 10/18/19 with recs of NPO, ice chips. Gi wants another speech eval completed this week - talks of possible PEG placement. Tolerating current TF per reports. Per I/Os BM 10/17/19. CBW: 194 lb 10/18/19. Fluctuations noted - will monitor closely for need to decrease TF rate.  Will continue to monitor TF/tolerance, weight, labs, POC.  
  
(10/17/19) Pt resting in bed with NGT in placed and TF running at goal rate Osmolite 1.2 @ 70 mL/hr x 22 hrs to provide 1540 mL, 1848 kcal, 85 g pro, 1262 mL free water with 150 mL flush Q 6hrs. 0 residuals reported. Daughter at bedside states pt had a good day yesterday until he pulled the NGT out and was given dilaudid - so a little more drowsy today. Tube dislodged and feedings resumed @ 224am. Loose BM yesterday per reports. Noted low K, elevated BUN. Took beds sera weight while in room 180.4 lb - placed in flowsheets. Noted pt on lasix possibly contributing to weight fluctuations. Skin: stage 2 right buttock, ecchymosis, abrasion noted. Will continue to monitor TF tolerance, weight, labs. 
  
(10/15/19) Pt resting in bed during time of assessment with daughter at bedside. Per speech on 10/13/19 rec'd NPO and ice chips PRN for oral care/comfort, pt has been NPO since. RN placed NGT this afternoon. Reassessed needs on todays bed scale weight 176.8 lb. Noted weight have been all over the place since admission - admit 10/8/19: 197.8 lb then 10/13/19: 165 lb. -190 per family reports. Recommend: Osmolite 1.2 @ 70 mL/hr x 22 hrs to provide 1540 mL, 1848 kcal, 85 g pro, 1262 mL free water . Spoke with RN/attending MD. Sachin Sanchez with daughter about recommendations and answered her questions regarding his nutrition. 10/10:  Pt reports his appetite is not good. His usual weight is 185 to 190 lbs. He denies issues with chewing or swallowing. No known food allergies. He likes chocolate Ensure and is trying to drink it but had only a few sips this morning with breakfast. Pt appears well nourished but po intake is sub-optimal. Pt with hypoalbuminemia as evidenced by albumin of 2.0. Chart review indicates pt with lower extremity edema. Current weight is 13 kg > than July 2019 Information Obtained from:  
 [x] Chart Review [x] Patient 
 [] Family/Caregiver 
 [] Nurse/Physician 
 [] Interdisciplinary Meeting/Rounds Diet: NPO with tube feedings via PEG Medications: [x] Reviewed Folvite,Uloric, Lasix, Floranex, Lopressor, MVI w/ Iron, Vit B1 
 Allergies: [x] Reviewed Patient Active Problem List  
Diagnosis Code  Malignant neoplasm of prostate ([facility] [address] C61  Hypertension I10  
 GERD (gastroesophageal reflux disease) K21.9  Glaucoma H40.9  Glaucoma H40.9  Dysphagia R13.10  Encounter for colonoscopy due to history of adenomatous colonic polyps Z12.11, Z86.010  
 Non-pressure chronic ulcer of right calf with fat layer exposed ([address] I39.366  Laceration of right lower leg with complication X36.390C  Edema R60.9  Pneumonia J18.9  Atrial fibrillation (HCC) I48.91  
 Hypothyroidism E03.9  Sepsis ([address] A41.9  Cellulitis L03.90  
 HTN (hypertension) N83  
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  
Diagnosis Date  Arthritis  Atrial fibrillation ([facility] [address] 07/2017 Dr [facility] cardio follows. chronic  Carcinoma of prostate ([facility] [address]  Cellulitis  Coarse tremor   
  hands, states \"my doctor is aware\"  Difficulty swallowing  Dysphagia  Edema  Encounter for colonoscopy due to history of adenomatous colonic polyps  Essential hypertension  Fall 10/07/2017 \"went to [facility] Emergency Room, CT Scan showed concussion, no bleeding\" per patient  Fatigue  GERD (gastroesophageal reflux disease) wo. esophagitis  Glaucoma  H/O joint replacement   
  left knee 2003  HLD (hyperlipidemia)  Hypertension  Hypertensive disorder  Hypothyroidism  Impotence  Laceration of right lower leg with complication  Malignant neoplasm of prostate ([facility] [address]   
  [id number] Adenocarcinoma of the Prostate, dx 11/3/2008, karlee 3+4, presenting PSA 5.5ng/mL.  Malignant tumor of prostate ([facility] [address]  Nocturia  Non-pressure chronic ulcer of right calf ([facility] [address] [facility] with fat layer exposed  Pancreatitis  Pneumonia  Primary osteoarthritis, right shoulder  Rotator cuff tear, right  Sepsis ([facility]  Shoulder dislocation, right, initial encounter  Syncope  Thyroid disease  Urinary retention   
 acute/hist. of   
  
Labs:   
Lab Results Component Value Date/Time Sodium 132 (L) 10/28/2019 04:10 AM  
 Potassium 4.2 10/28/2019 04:10 AM  
 Chloride 95 (L) 10/28/2019 04:10 AM  
 CO2 32 10/28/2019 04:10 AM  
 Anion gap 5 10/28/2019 04:10 AM  
 Glucose 100 (H) 10/28/2019 04:10 AM  
 BUN 18 10/28/2019 04:10 AM  
 Creatinine 0.59 (L) 10/28/2019 04:10 AM  
 Calcium 7.5 (L) 10/28/2019 04:10 AM  
 Magnesium 1.7 10/28/2019 04:10 AM  
 Phosphorus 3.4 10/28/2019 04:10 AM  
 Albumin 1.8 (L) 10/19/2019 05:05 AM  
 
 
Anthropometrics: BMI (calculated): 27.9 Last 3 Recorded Weights in this Encounter 10/28/19 2308 Weight: 83.2 kg (183 lb 8 oz) Ht Readings from Last 1 Encounters:  
10/28/19 5' 8\" (1.727 m) Documented Weight History: 
Weight Metrics 10/28/2019 10/27/2019 10/8/2019 10/3/2019 9/27/2019 9/26/2019 9/23/2019 Weight 183 lb 8 oz 194 lb 6.4 oz - 180 lb 180 lb - 190 lb BMI 27.9 kg/m2 - 29.56 kg/m2 27.37 kg/m2 - 27.37 kg/m2 28.89 kg/m2 No data found. IBW:  lb %IBW: 119% UBW: 185-190 lb  
[] Weight Loss [] Weight Gain [] Weight Stable [x] Variable weights this admission Estimated Nutrition Needs: [x] MSJ x 1.2-1.3 Calories: 9409-2393 kcal Based on:   [x] Actual BW   
Protein:    g Based on:   [x] Actual BW Fluid:       2832-5430 ml Based on:   [x] Actual BW  
 
 [x] No Cultural, Jewish or ethnic dietary need identified. [] Cultural, Jewish and ethnic food preferences identified and addressed Wt Status:  [] Normal (18.6 - 24.9) [] Underweight (<18.5) [x] Overweight (25 - 29.9) [] Mild Obesity (30 - 34.9)  [] Moderate Obesity (35 - 39.9) [] Morbid Obesity (40+) Nutrition Problems Identified:  
[x] Suboptimal PO intake v/s NPO w/ tube feedings via PEG 
[] Food Allergies [x] Difficulty chewing/swallowing/poor dentition 
[] Constipation/Diarrhea (last BM on 10/28; PRN bowel regimen in place) [] Nausea/Vomiting  
[] None 
[x] Other: Wounds (Including a healed stage 2 pressure injury to R buttocks and smaller stage 2 pressure injury to L buttocks) Plan:  
[] Therapeutic Diet 
[]  Obtained/adjusted food preferences/tolerances and/or snacks options  
[]  Supplements added  
[x] SLP following for feeding techniques []  HS snack added  
[]  Modify diet texture  
[]  Modify diet for food allergies []  Educate patient  
[]  Assist with menu selection []  Monitor PO intake on meal rounds  
[x]  Continue inpatient monitoring and intervention  
[x]  Participated in discharge planning/Interdisciplinary rounds/Team meetings  
[x]  Other: Enteral nutrition recommendations Education Needs: 
 [] Not appropriate for teaching at this time due to: 
 [x] Identified and addressed Nutrition Monitoring and Evaluation: 
[x] Continue ongoing monitoring and intervention 
[] Other Samira Patel

## 2019-10-29 NOTE — PROGRESS NOTES
[facility] Daily progress Note Patient: Gertrude Olivas MRN: [id number]  CSN: [id number] YOB: 1933  Age: 80 y.o. Sex: male DOA: 10/28/2019 LOS:  LOS: 1 day Subjective:  
 
CC: Cellulitis of left knee Mr Lisa Young is a 80 yr old patient was recently hospitalized for eval of AMS, fever, lathergy and hypotension. alos report of falls, hx of multiple ER visit last mth for syncopy and dehydration. Patient did have left knee pain post fall. Patient was found to have right leg cellulitis after cat bite 1 mth ago. In the ER be was placed on broad spectrum abx. Patient was seen by Ortho for eval of chronic BL knee pain, they report low clinical suspicion of left knee septic arthritis. Patient had XRAY to left knee done which showed small-moderate joint effusion, patient had aspiration done which  results was not consistent with an acute septic arthritis. Patient was followed by ID, patient had blood culture done on 10/8 , which show 1 of 2 blood cultures + P multocida. Patient was seen by Gi for eval of mild elevation of liver enzymes, we are asked to see the patient regarding further evaluation of possible cholangitis. They reported no plans for endoscopic intervention as this time, they think the likelihood of cholangitis is extremely small. Patient was found to Oropharyngeal dysphagia in the setting of severe illness and encephalopathy. Patient had MBS done and he failed  on  10/24 and is now s/p peg on 10/25. Patient had MACKENZIE which improved with IV fluids. Patient had Gallium scan done on 10/14, ID recommended Ceftriaxone 2g q 24hr or 6 week for presumable left femur OM . Patient had some acute respiratory fx, CXR showed right base new opacity , patient was started on vanc and merrem on 10/21 and was nano IV lasix with good response. Patient was seen by PT/OT and they recommended rehab.  On examination, patient is alert, need a lot encourage with answering questions. He report soreness to left knee and right shoulder. Daughter and wife at bedside, all questions were answered. Peg site is stable. No NVD, fever or chills. Nursing report urinary retention, he was straight cath with 500 cc output. PAST MEDICAL HX : Arthritis. AFIB, GERD, HTN, right rotator cuff tear, Malignant, Tachybradycardia syndrome,Glaucoma, adenomatous colon cancer,gastroesophageal reflux without Ang's esophagus , alcohol-related pancreatitis four years PAST SURGICAL: Cholecystectomy, Right shoulder arthroplasty Left total knee replacement, Hydrocele repair SOCIAL:  , former smoker quit in 1972,  Alcohol ingestion, four to five beers on a daily basis for many years. ALLERGIES: Adhesive, Darvon, Penicillin FAMILY HX: Diabetes father and brother ROS: wife and daughter at bedside, they were able to answer some question. No report of NVD, fever, chills or CP. No headaches or stiff neck. No sore throat. No cough or sputum. No SOB. No aspiration. No right upper quadrant pain. Right shoulder with decrease ROM. Right knee soreness with movement Objective:  
  
Visit Vitals /71 Pulse 100 Temp 99 °F (37.2 °C) Resp 18 Ht 5' 8\" (1.727 m) Wt 83.2 kg (183 lb 8 oz) SpO2 97% BMI 27.90 kg/m² Physical Exam: 
General appearance: alert, cooperative, no distress HEENT: negative Neck: supple, symmetrical, trachea midline, no adenopathy, thyroid: not enlarged, symmetric, no tenderness/mass/nodules, no carotid bruit and no JVD Lungs: clear to auscultation bilaterally Heart: regular rate and rhythm, S1, S2 normal, no murmur, click, rub or gallop Abdomen: soft, non-tender. Bowel sounds normal. No masses,  no organomegaly Pulses: 2+ and symmetric Skin: see nursing noted , sacral wound GI: Pegtube in place Left knee decrease range of movement Intake and Output: 
Current Shift:  No intake/output data recorded. Last three shifts:  10/27 1901 - 10/29 0700 In: -  
Out: 500 [Urine:500] Recent Results (from the past 24 hour(s)) GLUCOSE, POC Collection Time: 10/28/19 12:09 PM  
Result Value Ref Range Glucose (POC) 134 (H) 70 - 110 mg/dL GLUCOSE, POC Collection Time: 10/28/19  5:56 PM  
Result Value Ref Range Glucose (POC) 150 (H) 70 - 110 mg/dL Current Facility-Administered Medications:  
  cefTRIAXone (ROCEPHIN) 2 g in 0.9% sodium chloride (MBP/ADV) 50 mL MBP, 2 g, IntraVENous, Q24H, Miranda Barnett NP 
  albuterol-ipratropium (DUO-NEB) 2.5 MG-0.5 MG/3 ML, 3 mL, Nebulization, Q6H PRN, Abiel Luque MD 
  acetaminophen (TYLENOL) tablet 500 mg, 500 mg, Per G Tube, Q6H PRN, Abiel Luque MD 
  docusate sodium (COLACE) capsule 100 mg, 100 mg, Per G Tube, BID PRN, Abiel Luque MD 
  folic acid (FOLVITE) tablet 1 mg, 1 mg, Per Kelsey Kern, DAILY, Juan Clifton MD, 1 mg at 10/29/19 1035   Lactobacillus Acidoph & Bulgar [facility]) tablet 2 Tab, 2 Tab, Per G Tube, BID, Abiel Luque MD, 2 Tab at 10/29/19 1034 
  metoprolol tartrate (LOPRESSOR) tablet 12.5 mg, 12.5 mg, Per Kelsey Kern, Q12H, Juan Clifton MD, 12.5 mg at 10/29/19 1034   thiamine mononitrate (B-1) tablet 100 mg, 100 mg, Per G Tube, DAILY, Juan Clifton MD, 100 mg at 10/29/19 0900   multivitamin, tx-iron-ca-min (THERA-M w/ IRON) tablet 1 Tab, 1 Tab, Oral, DAILY, Abiel Luque MD, 1 Tab at 10/29/19 1036   febuxostat (ULORIC) tablet 80 mg, 80 mg, Oral, DAILY, Juan Clifton MD 
  desonide (TRIDESILON) 0.05 % cream (Patient Supplied), , Topical, BID, Juan Clifton MD 
  tamsulosin (FLOMAX) capsule 0.4 mg, 0.4 mg, Oral, DAILY WITH DINNER, Juan Clifton MD 
  apixaban (ELIQUIS) tablet 5 mg, 5 mg, Oral, Q12H, Juan Clifton MD, 5 mg at 10/29/19 1034 
  brimonidine (ALPHAGAN) 0.2 % ophthalmic solution 1 Drop, 1 Drop, Both Eyes, Q8H, Abiel Luque MD 
   cyanocobalamin tablet 1,000 mcg, 1,000 mcg, Oral, DAILY, Juan Clifton MD, 1,000 mcg at 10/29/19 1035   furosemide (LASIX) tablet 80 mg, 80 mg, Oral, DAILY, Juan Clifton MD, 80 mg at 10/29/19 1035   levothyroxine (SYNTHROID) tablet 175 mcg, 175 mcg, Oral, 6am, Juan Clifton MD, 175 mcg at 10/29/19 0600 
  latanoprost (XALATAN) 0.005 % ophthalmic solution 1 Drop, 1 Drop, Both Eyes, QPM, Mendel Freitas MD 
 
Lab Results Component Value Date/Time Glucose 100 (H) 10/28/2019 04:10 AM  
 Glucose 119 (H) 10/27/2019 04:00 AM  
 Glucose 97 10/26/2019 03:35 AM  
 Glucose 72 (L) 10/25/2019 05:41 AM  
 Glucose 138 (H) 10/24/2019 01:00 AM  
  
 
Assessment/Plan Encephalopathy, acute delirium:  resolved Left knee cellulitis- resolved Possible L knee infection, S/p Left knee replacement in past- Ortho followed no a cute septic arthritis. ID following for presumable distal left femur OM, they recommended Ceftriaxone 2 gm for 6 weeks, ended 11/19 via PICC. ID recommended ESR and CRP weekly order placed. 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 dietitian to follow Metabolic Encephalopath: resolved Hx of Prostate Cancer: s/p radiation and prostate seeds GERD: continue PPI Hx of Afib : on Eliquis + BB Elevated LFT: was seen by GI, they noted suspected due to sepsis, will monitor Hypothyroidism : continue synthroid Anemia: will closely monitor, Hx of Gout : continue Uloric HTN: stable on BP meds BPH: Flomax Patient is a FULL code. Wound care to sacral area Discussed with nursing will  Bladder scan, may leave cervantes in for ongoing urinary retention. FLU shot 9/28/2019 Continue PT/OT Tanvir Polo NP 
10/29/2019, 11:49 AM

## 2019-10-29 NOTE — PROGRESS NOTES
2353  Per report patient has not voided since his cervantes was taken off during admission at [facility]  Did a bladder scan and the result was >475 ml. Called Dr. Viry Wagner at 11:50 pm to report. 2930  Dr. Melody Mendez nurse called back. Reported the bladder scan result to nurse and vitals. She said she will call Dr. Viry Wagner. 0100  Per nurse, Dr. Viry Wagner ordered intermittent cath every 8 hours. Verified with nurse if we need to do a bladder scan before catheterization, per nurse Dr. Viry Wagner only ordered intermittent cath Q8 hrs. 0130   Put in an order in chart, did a catheterization. Results was 500cc yellow urine with white sediment. Patient was moaning and was guarding during the process. 0630  Put in Osmolite 1.2 in tube feeding. Verified rate, no flush noted on machine. Flushed with 30 ml water when I administered his scheduled synthroid. Cleaned insertion site, no drain. Abdomen was distended, no pain noted.

## 2019-10-29 NOTE — ROUTINE PROCESS
Skilled physical therapy services will be rendered on a TRIAL basis for a period of two weeks, pending patient's ability to actively participate. Therapist educated dtr Pawel Johnson and wife Kelyl Kennedy on two week trial, informing that skilled PT services will continue if pt begins to actively participate and demonstrate signs of functional progress; otherwise, discharge will be recommended. Dtr stated that discussion of discharge made her nervous, therapist informed that this is a unique case where pt is unable to actively participate on initial evaluation and skilled PT services cannot be rendered if pt is unable to participate; family verbalized understanding.   
 
Seema Lara, PT, DPT

## 2019-10-29 NOTE — PROGRESS NOTES
Patient arrived at 2030 via stretcher, assist X2. Oriented to room with family at bedside. Alert and oriented to self, PERRLA, able to respond short answer. O2 at 4L via n/c. Lung sound clear, no SOB noted. Pt on Osmolite 1.2 @ 70 ml/hr. Peg tube patent in place. Flushed with 100 ml of water. Skin dry and flaky. Bruises noted at [address]. Redness noted at bilateral heels, dressing present and heel boot present. Open area on sacrum with dressing intact. Open area noted at R upper back dressed with petrolium gauze and mepilex. Edema note at Left knee and RUE. PICC line intact at RUE with no s/s of infection. Pressure dressing in place at R neck intact, no s/s of bleeding noted. Left ring finger missing. Merino was removed at 1630 prior admission per report Pt did not void after admission. Oncomming made aware. Pt resting quietly in bed with no s/s of distress noted

## 2019-10-29 NOTE — ROUTINE PROCESS
Called Shasha Russ Np per request of Tommy Moreno RN that patient was unable to void straight catheterization  for 1000 cc urine. New orders noted.

## 2019-10-29 NOTE — ROUTINE PROCESS
Patient arrived on unit via bed with nurse, NAD noted nurse assessed pt and printed armband and labels. Pt oriented to unit and room.

## 2019-10-30 NOTE — PROGRESS NOTES
I have reviewed this patient's current medication list and recent laboratory results. On 10/28, H/H was 7/22.8 and this has been consistently low. There are many possible reasons for this. Did not see any iron studies. Please consider evaluating persistent anemia. Thank you, 
Jessa EMERSON Ph. M. S. 
10/30/2019

## 2019-10-30 NOTE — PROGRESS NOTES
Pt resting in bed with eyes closed, HOB elevated, tube feeding infusing with Osmolite 1.2 samy @ 70cc/hr and 125cc of free water q 6 hours, oxygen intact via n/c @ 3LPM, cervantes cath intact and draining clear yellow urine, PICC LUE single lumen intact, no c/o pain and no visual signs of pain noted, IV ABT cont with no adverse reactions noted, call bell and personal items within reach

## 2019-10-30 NOTE — PROGRESS NOTES
Pt resting in bed, HOB elevated, tylenol 500mg given for c/o gen discomfort, feeding tube bag and syringe changed per protocol, placement verified, no residual noted, oxygen intact, cervantes cath patent and draining clear yellow urine, call bell and personal items within reach

## 2019-10-31 NOTE — H&P
[facility] HISTORY AND PHYSICAL Name:  Jerald Rivas 
MR#:   [id number] :  1933 ACCOUNT #:  [de-identified] ADMIT DATE:  10/28/2019 REASON FOR ADMISSION:  Sepsis. HISTORY OF PRESENT ILLNESS:  The patient is a pleasant 80-year-old male with past medical history significant for AFib on anticoagulation, hypertension, hypothyroidism, BPH, gout, who was admitted to the hospital secondary to altered mental status, fever, and hypotension. The patient was admitted to the hospital due to severe sepsis with left lower extremity cellulitis. The patient also had acute respiratory failure with the right base new opacity for which he was started on vancomycin and meropenem. The patient had dysphagia and failed swallowing evaluation; therefore, he underwent PEG tube placement. The patient had encephalopathy due to sepsis which is slowly improving. The patient had left knee infection. Aspiration of the joint was negative. Bone scan showed some inflammation of distal femur and the patient is being started on treatment for prolonged course of antibiotic for a total of 6 weeks with Rocephin. At the time of my evaluation, the patient is alert, awake but minimally verbal.  He can answer simple questions with short answer. He is oriented to self, his age, date of birth. He denies any chest pain, shortness of breath or palpitation, but he is a very poor historian. PAST MEDICAL HISTORY: 
1.  AFib. 2.  Hypothyroidism. 3.  Osteoarthritis. 4.  Gout. 5.  Prostate cancer. 6.  Recent cellulitis. 7.  Recent sepsis. 8.  Dysphagia. 9.  Recent PEG. 10. Hyperlipidemia. 11. Shoulder dislocation. PAST SURGICAL HISTORY:  The patient has had knee replacement and shoulder surgery in addition to recent PEG. SOCIAL HISTORY:  The patient is , nonsmoker, drinks alcohol occasionally. FAMILY HISTORY:  Noncontributory. ALLERGIES:  PENICILLIN, DARVON AND ADHESIVE TAPE. CURRENT MEDICATIONS:  Medication list reviewed. REVIEW OF SYSTEMS:  The patient cannot provide full review of system due to decreased mental status. PHYSICAL EXAMINATION: 
GENERAL:  This is a well-nourished, well-developed male in no apparent distress. VITAL SIGNS:  Temperature is 96.8, heart rate 93, blood pressure is 95/90, repeat is 115/67, satting 99%, respiratory is 20. HEENT:  Normocephalic, atraumatic. Sclerae anicteric. Oropharynx clear. Mucous membranes moist. 
NECK:  No JVD, thyromegaly. HEART:  S1, S2, regular rate and rhythm. LUNGS:  Clear to auscultation bilaterally. ABDOMEN:  Nontender, nondistended. Normoactive bowel sound. EXTREMITIES:  No edema or clubbing. Motor strength is equal throughout; however, the patient does have decreased range of motion on the right upper extremity which per son has been chronic due to previous shoulder surgery and does have decreased range of motion on the left lower extremity due to generalized weakness. ASSESSMENT AND PLAN: 
1. Sepsis, resolved. 2.  Lower extremity cellulitis with questionable of distal femur osteomyelitis. The patient will continue on Rocephin 2 g daily per ID recommendations. 3.  Atrial fibrillation, currently rate controlled. Continue with anticoagulation. 4.  Osteoarthritis. 5.  Gout. 6.  Hypothyroidism on Synthroid. 7.  Hypertension, stable on beta-blocker. 8.  History of prostate cancer. 9.  Physical deconditioning. Continue physical therapy/occupational therapy. 10.  Flu shot received. 11.  Advanced directive per family at the bedside. Advanced directive has been completed. 12.  Depression screening is difficult to complete. 13.  The patient cannot answer all questions appropriately. 14.  Overall prognosis guarded. Family at the bedside and updated. Isabella Carranza MD 
 
 
[id number] 
D:  10/30/2019 16:55 
T:  10/30/2019 22:06 JOB #:  [id number]

## 2019-10-31 NOTE — ROUTINE PROCESS
Patient resting in bed. Voices no complaints. Tube feedings and medications through peg tube. No residual. Patient tolerating well. Picc line flushes well. Dressing changed per day shift nurse, Darlene Sofia. Oxygen 2 liters nasal cannula. Cream applied to face. Voltaren gel to shoulders and knees. Eye drops to both eyes. Call light within reach. Will continue to monitor.

## 2019-10-31 NOTE — PROGRESS NOTES
Community Care Team Documentation for Patient in [facility]     Patient discharged from [facility] to [address] [facility], on 10/28/19. Hospital Discharge diagnosis:       AMS   Severe Sepsis   Cellulitis of Left Knee   MACKENZIE   Atrial Fibrillation   Hypothyroidism    SNF Attending Provider:   Dr. Nikia Beavers    Anticipated discharge date from SNF:  11/12/19    PCP : Alcides Stringer MD    Nurse Navigator:     [facility] Team rounds completed, updates provided by facility. RRAT:  Medium Risk            13       Total Score        3 Has Seen PCP in Last 6 Months (Yes=3, No=0)    4 IP Visits Last 12 Months (1-3=4, 4=9, >4=11)    6 Charlson Comorbidity Score (Age + Comorbid Conditions)        Criteria that do not apply:    . Living with Significant Other. Assisted Living. LTAC. SNF. or   Rehab    Patient Length of Stay (>5 days = 3)    Pt.  Coverage (Medicare=5 , Medicaid, or Self-Pay=4)        Active Ambulatory Problems     Diagnosis Date Noted    Malignant neoplasm of prostate ([address] 01/16/2012    Hypertension     GERD (gastroesophageal reflux disease)     Glaucoma     Glaucoma     Dysphagia 06/17/2016    Encounter for colonoscopy due to history of adenomatous colonic polyps 01/03/2017    Non-pressure chronic ulcer of right calf with fat layer exposed ([address] 01/19/2017    Laceration of right lower leg with complication 54/53/0355    Edema 01/19/2017    Pneumonia 06/02/2018    Atrial fibrillation ([address] 06/02/2018    Hypothyroidism 06/02/2018    Sepsis ([address] 07/10/2019    Cellulitis 07/10/2019    HTN (hypertension) 48/41/3806    Neutrophilic leukocytosis 65/98/7962    Acute gout 07/11/2019    CKD (chronic kidney disease) stage 2, GFR 60-89 ml/min 07/11/2019    Syncope 09/27/2019    MACKENZIE (acute kidney injury) ([address] 09/27/2019    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    Altered mental status 10/08/2019    Severe sepsis ([facility] [address] 10/08/2019    Cellulitis of left knee 10/08/2019     Resolved Ambulatory Problems     Diagnosis Date Noted    No Resolved Ambulatory Problems     Past Medical History:   Diagnosis Date    Arthritis     Carcinoma of prostate ([facility]     Coarse tremor     Difficulty swallowing     Essential hypertension     Fall 10/07/2017    Fatigue     H/O joint replacement     HLD (hyperlipidemia)     Hypertensive disorder     Impotence     Malignant tumor of prostate ([facility]     Nocturia     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

## 2019-10-31 NOTE — ROUTINE PROCESS
Patient in bed at present time. Skin warm and dry to touch. Peg tube patent and flushes well. Picc line site clean and dry. Patient remains on IV antibiotics , no adverse reactions noted . Patient noted to have red tinged urine. New orders received for urinalysis . Urine obtained and sent to lab.

## 2019-10-31 NOTE — CONSULTS
[facility] Infectious Disease Physicians 
                                          (A Division of [address] [place] [address]) Consultation Note Date of Admission: 10/28/2019    Date of Consultation: 10/31/2019 Referred by:  Dr. Danielle Gonzalez Reason for Referral: antimicrobial management of osteomyelitis Current Antimicrobials: Prior Antimicrobials Ceftriaxone re-start 10/28 - 3 OM rx 10/8 - 23 Vancomycin 10/8 - 2  Cefepime, Levoflox 10/8 - 0 Meropenem 10/9 - 2 Ceftriaxone 10/11 - 10 Vuybfaedg50/21 - 3 Vanc 10/21 - 6  Levofloxacin 10/24 - 3 abx 10/8 - 19 Assessment: Plan:  
Presumed distal Left Femur Osteomyelitis - Ga scan 10/14: intense periprosthetic uptake L knee, distal femur - L knee synovial fl 10/15 not s/o septic arthritis -> continue ceftriaxone (target end date: 11/19/2019) -> possible subsequent po abx suppression given L knee prosthesis Pasteurella multocida BSI 
- presumed from L tibial OM. No other source identified 
- had h/o right leg cellulitis following cat bite 1 mo PTA but no active cellulitis when admitted 10/8 Encephalopathy 
- intermittently lethargic 
- much improved H/o PCN allergy (rash) 
- tolerated Keflex, Meropenem, tolerating Ceftriaxone HTN   
AF   
GERD   
DJD s/p L TKR Prostate CA Hypothyroidism H/o Pancreatitis H/o ETOH abuse Microbiology: 
 
10/8      blcx Pasteurella multocida 1 of 2              urcx NG 
10/9      R hip asp gs no wbc, NOS, cx NG 
  
10/10    blcx NGTD x 2 Lines / Catheters: L PICC 
 
HPI: 
 
80 year -old  male with h/o HTN, AF, GERD, DJD s/p L TKR, Prostate CA, Hypothyroidism, h/o pancreatitis, ETOH consumption initially admitted to [facility] 10/8/2019 with chills, weakness, altered mental status.  He had R leg cellulitis after cat bite 1 month PTA which resolved with antibiotics  Then 1 day PTA he had fever, chills and was admitted with temp 100.5, WBC 29.5, 13% bands. Blood cultures Pasteurella multocida in 1 of 2 sets. Repeat blood cultures 10/10 were NGTD x 2.  Gallium scan 10/14 showed intense periprosthetic uptake L knee, distal femur and Left knee synovial fluid 10/15 was not suggestive of septic arthritis. We presumed distal L femur osteomyelitis and treated with ceftriaxone but changed to Meropenem, Vanc 10/21 for possible HAP. Low grade fever 10/24 100.5 WBC normal. Meropenem changed to levofloxacin 10/24 and changed back to Ceftriaxone 10/28 and transferred to [facility] 10/28. There are no active hospital problems to display for this patient. Past Medical History:  
Diagnosis Date  Arthritis  Atrial fibrillation ([address] 07/2017 Dr Jenn Lozano cardio follows. chronic  Carcinoma of prostate ([address]  Cellulitis  Coarse tremor   
  hands, states \"my doctor is aware\"  Difficulty swallowing  Dysphagia  Edema  Encounter for colonoscopy due to history of adenomatous colonic polyps  Essential hypertension  Fall 10/07/2017 \"went to [facility] Emergency Room, CT Scan showed concussion, no bleeding\" per patient  Fatigue  GERD (gastroesophageal reflux disease) wo. esophagitis  Glaucoma  H/O joint replacement   
  left knee 2003  HLD (hyperlipidemia)  Hypertension  Hypertensive disorder  Hypothyroidism  Impotence  Laceration of right lower leg with complication  Malignant neoplasm of prostate ([address]   
  [id number] Adenocarcinoma of the Prostate, dx 11/3/2008, karlee 3+4, presenting PSA 5.5ng/mL.  Malignant tumor of prostate ([address]  Nocturia  Non-pressure chronic ulcer of right calf ([address] with fat layer exposed  Pancreatitis  Pneumonia  Primary osteoarthritis, right shoulder  Rotator cuff tear, right  Sepsis ([address]  Shoulder dislocation, right, initial encounter  Syncope  Thyroid disease  Urinary retention acute/hist. of   
 
Past Surgical History:  
Procedure Laterality Date  COLONOSCOPY N/A 1/3/2017  HX CHOLECYSTECTOMY  HX KNEE REPLACEMENT Left 01/2003  HX SHOULDER REPLACEMENT Right 7/17, 10/17 No family history on file. Social History Socioeconomic History  Marital status:  Spouse name: Not on file  Number of children: Not on file  Years of education: Not on file  Highest education level: Not on file Occupational History  Not on file Social Needs  Financial resource strain: Not on file  Food insecurity:  
  Worry: Not on file Inability: Not on file  Transportation needs:  
  Medical: Not on file Non-medical: Not on file Tobacco Use  Smoking status: Former Smoker  Smokeless tobacco: Never Used Substance and Sexual Activity  Alcohol use: Yes Comment: weekly  Drug use: No  
 Sexual activity: Not on file Lifestyle  Physical activity:  
  Days per week: Not on file Minutes per session: Not on file  Stress: Not on file Relationships  Social connections:  
  Talks on phone: Not on file Gets together: Not on file Attends Rastafarian service: Not on file Active member of club or organization: Not on file Attends meetings of clubs or organizations: Not on file Relationship status: Not on file  Intimate partner violence:  
  Fear of current or ex partner: Not on file Emotionally abused: Not on file Physically abused: Not on file Forced sexual activity: Not on file Other Topics Concern  Not on file Social History Narrative  Not on file Allergies: 
Adhesive; Darvon [propoxyphene]; and Penicillins . Medications: 
Current Facility-Administered Medications Medication Dose Route Frequency  hydrocortisone (HYTONE) 2.5 % cream   Topical BID  [address] [facility] ANESTHESIA   Other PRN  
 [address] [facility] EMERGENCY/STAT BOX   Other PRN  
  diclofenac (VOLTAREN) 1 % topical gel 2 g  2 g Topical Q6H  
 cefTRIAXone (ROCEPHIN) 2 g in 0.9% sodium chloride (MBP/ADV) 50 mL MBP  2 g IntraVENous Q24H  
 multivitamin (MULTI-DELYN, WELLESSE) oral liquid 30 mL  30 mL Oral DAILY  lactobacillus-acidophilus (LACTINEX) 1 Packet  1 Packet Oral BID  lansoprazole (PREVACID SOLUTAB) disintegrating tablet 30 mg  30 mg Oral ACB  albuterol-ipratropium (DUO-NEB) 2.5 MG-0.5 MG/3 ML  3 mL Nebulization Q6H PRN  
 acetaminophen (TYLENOL) tablet 500 mg  500 mg Per G Tube Q6H PRN  
 docusate sodium (COLACE) capsule 100 mg  100 mg Per G Tube BID PRN  
 folic acid (FOLVITE) tablet 1 mg  1 mg Per G Tube DAILY  metoprolol tartrate (LOPRESSOR) tablet 12.5 mg  12.5 mg Per G Tube Q12H  thiamine mononitrate (B-1) tablet 100 mg  100 mg Per G Tube DAILY  febuxostat (ULORIC) tablet 80 mg  80 mg Oral DAILY  tamsulosin (FLOMAX) capsule 0.4 mg  0.4 mg Oral DAILY WITH DINNER  
 apixaban (ELIQUIS) tablet 5 mg  5 mg Oral Q12H  
 brimonidine (ALPHAGAN) 0.2 % ophthalmic solution 1 Drop  1 Drop Both Eyes Q8H  
 cyanocobalamin tablet 1,000 mcg  1,000 mcg Oral DAILY  furosemide (LASIX) tablet 80 mg  80 mg Oral DAILY  levothyroxine (SYNTHROID) tablet 175 mcg  175 mcg Oral 6am  
 latanoprost (XALATAN) 0.005 % ophthalmic solution 1 Drop  1 Drop Both Eyes QPM  
  
 
ROS: 
A comprehensive review of systems was negative except for that written in the History of Present Illness. Physical Exam: 
Temp (24hrs), Av.2 °F (36.8 °C), Min:96.6 °F (35.9 °C), Max:99.8 °F (37.7 °C) Visit Vitals /63 Comment: Simultaneous filing. User may not have seen previous data. Pulse 90 Comment: Simultaneous filing. User may not have seen previous data. Temp 99.8 °F (37.7 °C) Resp 20 Ht 5' 8\" (1.727 m) Wt 83.2 kg (183 lb 8 oz) SpO2 98% BMI 27.90 kg/m² General: Well developed, well nourished 80 y.o.  male in no acute distress. ENT: ENT exam normal, no neck nodes or sinus tenderness Head: normocephalic, without obvious abnormality Mouth:  mucous membranes moist, pharynx normal without lesions Neck: supple, symmetrical, trachea midline Cardio:  regular rate and rhythm, S1, S2 normal, no murmur, click, rub or gallop and irregularly irregular rhythm Chest: inspection normal - no chest wall deformities or tenderness, respiratory effort normal 
Lungs: clear to auscultation and no wheezes or rales Abdomen: soft, non-tender. Bowel sounds normal. No masses, no organomegaly. G tube in place Extremities:  no redness or tenderness in the calves or thighs, no edema. L arm PICC in place Neuro: awake but lapses back to sleep constantly. Oriented (w/ prompting). Answers appropriately and follows simple commands Lab results: 
 
Chemistry No results for input(s): GLU, NA, K, CL, CO2, BUN, CREA, CA, AGAP, BUCR, TBIL, GPT, AP, TP, ALB, GLOB, AGRAT in the last 72 hours. CBC w/ Diff No results for input(s): WBC, RBC, HGB, HCT, PLT, GRANS, LYMPH, EOS, HGBEXT, HCTEXT, PLTEXT in the last 72 hours. Microbiology All Micro Results None Martell Espinosa MD, [facility] Infectious Diseases 
[phone] 
10/31/2019 5:18 PM

## 2019-10-31 NOTE — ROUTINE PROCESS
Patient resting in bed but not asleep. Eyes open and he is moaning. Ask patient if he is in pain, shakes his head yes. Tylenol and other meds given per peg tube. No residual. Flushes well. Tube feedings as ordered. PICC line in place. Flushes without difficulty. Oxygen per nasal cannula. Mepilex to both heels changed. Only there for protection. Refused heels boots. Had bowel movement today. Cleaned, changed and repositioned by both CNAs. Call light and family at bedside.

## 2019-11-01 NOTE — ROUTINE PROCESS
Bedside and Verbal shift change report given to SASHA Vázquez LPN (oncoming nurse) by Luana Hartley RN (offgoing nurse). Report included the following information SBAR, Kardex and Med Rec Status.

## 2019-11-01 NOTE — PROGRESS NOTES
Bleeding noted from right mid back area cleaned with normal saline butterbean size area mepilex  Applied.

## 2019-11-01 NOTE — ROUTINE PROCESS
Pt lying in bed, O2 @2 liters via nasal cannula, Tube feeding at Osmolite 1.2 70 ml/hr, HOB at greater than 30 degrees, Pt has no signs of obvious distress at this time.

## 2019-11-01 NOTE — ROUTINE PROCESS
Received patient awake with [email or web address] on. Tube feeding in progress ,tolerating well with no S/S of aspiration. G-tube Dressing dry/intact. HOB up ,curtis upper SR'S up,call bell in reach for safety. Verbal/responsive but with periodic confusion. Merino cath. Intact/patent draining yellow clear urine. Left arm PICC line dressing dry/occlussive. Repositioned and turned in bed ,on specialty bed. Mepilex on both heels intact.

## 2019-11-02 NOTE — ROUTINE PROCESS
Patient denies any pain or discomfort at this time. Tylenol given earlier for general discomfort. G-tube intact, patent, infusing Osmolite 1.2 samy @ 70 mL/hr.  O2 @ 2L/min via nasal canula. No SOB noted. Merino intact draining clear yellow urine.

## 2019-11-02 NOTE — PROGRESS NOTES
0853  Patient in bed resting. O2 at 3L, HOB elevated. Breathing is regular and unlabored. Patient voiced out he wanted help turning his TV off. I showed patient the remote was bedside and how to use it to turn on/off TV and to call for any help. Patient is alert and oriented to self and place. Garbled speech but responsive. Tube feeding on Osmolite 1.2 samy running at 70 ml/hr with flush 125 ml Q 6 hrs. Patient has a cervantes catheter on, intact and no kinks. 0550  Patient complained of having SOB and requested duo-neb. He stated this happened before and it helped after having the nebulizer. Patient also seemed anxious tonight. He has been up and taking off his gown. I asked him about it and he stated that it feels like there is too much on him. Covered patient with blanket instead. 6171  Patient kept taking O2 cannula off. I had to re-educate patient about importance of keeping her cannula on. Tube feeding patency checked, flushed with water. Changed dressing on insertion site. No drainage noted. Put in 3 cans osmolite.

## 2019-11-03 NOTE — ROUTINE PROCESS
Bedside and Verbal shift change report given to CHARMAINE Marie LPN  (oncoming nurse) by Val Juarez RN (offgoing nurse). Report included the following information SBAR, Kardex and Med Rec Status.

## 2019-11-03 NOTE — ROUTINE PROCESS
Bedside and Verbal shift change report given to CHARMAINE Marie LPN  (oncoming nurse) by Phyllis Smith RN (offgoing nurse). Report included the following information SBAR, Kardex and Med Rec Status.

## 2019-11-03 NOTE — ROUTINE PROCESS
Patient remain son IV antibiotics. No adverse reactions noted. Picc line site dry and intact. G-Tube patent and flushes well. Oxygen at 2l/min and saturations at 97%. Heels offloaded of bed . Patient turned and repositioned frequently for comfort.

## 2019-11-03 NOTE — ROUTINE PROCESS
1724 Patient noted to be wheezing breathing treatment given with good result 2255 G-tube intact, patent, and infusing at 70cc/hr via pump. No residual noted. HOB elevated 30 degrees. Merino catheter intact draining clear yellow urine. Tylenol given for general discomfort & was effective.

## 2019-11-03 NOTE — ROUTINE PROCESS
Rested fairly. HOB up,curtis upper SR'S up for safety. Confused. On 02 @2L/nc. Tolerated tube feedings well with no s/s of aspirations. Resp non labored. Merino intact and patent draining clear yellow urine. Moans and groans @ times,medicated for pain x1 . Slept after. Incontinent of bowels. Kept clean and dry. repositioned in bed,heels off bed surfaces.

## 2019-11-04 NOTE — PROGRESS NOTES
DOMENIC PICC line drsg changed d/t old drainage noted under drsg, when drsg removed a small abrasion noted under drsg, possible site of drainage, discussed with patient the necessity to cover with a bandaid d/t bleeding, Unable to place drsg without covering the abrasion and d/t the drainage leaking into the picc site the only way to decrease drainage without compromising the picc drsg was with a small bandaid, pt in agreement with placing the bandaid to cover the abrasion, if skin irritation occurs, will remove drsg, at this time no skin irritation noted, HOB elevated, HHN tx given for c/o SOB with relief noted, tylenol given for c/o gen discomfort with relief noted, pt repositioned in bed, feeding bag and syringe changed per protocol, no residual noted, placement verified, will notify incoming nurse to monitor Picc line drsg for drainage and/or irritation, oxygen intact @ 3LPM via n/c, cervantes cath draining clear yellow urine, pt cont to refuse heel boots, incont care given as needed, call bell and personal items within reach

## 2019-11-04 NOTE — PROGRESS NOTES
I have reviewed this patient's current medication list and recent laboratory results. At this time, I do not suggest any drug therapy adjustments or additional laboratory monitoring. Thank you, 
Marjan EMERSON Ph. M. S. 
11/4/2019

## 2019-11-04 NOTE — PROGRESS NOTES
[facility] Daily progress Note Patient: Linette Dinero MRN: [id number]  CSN: [id number] YOB: 1933  Age: 80 y.o. Sex: male DOA: 10/28/2019 LOS:  LOS: 7 days Subjective:  
 
CC: family ask to see to eval edema to ble Mr Roly Langford is a 80 yr old patient was recently hospitalized for eval of AMS, fever, lathergy and hypotension. alos report of falls, hx of multiple ER visit last mth for syncopy and dehydration. Patient did have left knee pain post fall. Patient was found to have right leg cellulitis after cat bite 1 mth ago. In the ER be was placed on broad spectrum abx. Patient was seen by Ortho for eval of chronic BL knee pain, they report low clinical suspicion of left knee septic arthritis. Patient had XRAY to left knee done which showed small-moderate joint effusion, patient had aspiration done which  results was not consistent with an acute septic arthritis. Patient was followed by ID, patient had blood culture done on 10/8 , which show 1 of 2 blood cultures + P multocida. Patient was seen by Gi for eval of mild elevation of liver enzymes, we are asked to see the patient regarding further evaluation of possible cholangitis. They reported no plans for endoscopic intervention as this time, they think the likelihood of cholangitis is extremely small. Patient was found to Oropharyngeal dysphagia in the setting of severe illness and encephalopathy. Patient had MBS done and he failed  on  10/24 and is now s/p peg on 10/25. Patient had MACKENZIE which improved with IV fluids. Patient had Gallium scan done on 10/14, ID recommended Ceftriaxone 2g q 24hr or 6 week for presumable left femur OM . Patient had some acute respiratory fx, CXR showed right base new opacity , patient was started on vanc and merrem on 10/21 and was nano IV lasix with good response. Patient was seen by PT/OT and they recommended rehab.  On examination, patient is alert, need a lot encourage with answering questions. He report soreness to left knee and right shoulder. Daughter and wife at bedside, all questions were answered. Peg site is stable. No NVD, fever or chills. Nursing report urinary retention, he was straight cath with 500 cc output.  
  
  
11/4 Seen at bedside, since admitted no report of falls or wounds. NO ER visit or re-hospitalization since admitted. Labs reviewed noted hemoglobin is 7.3 was 7.2. Report of hematuria, discussed with family possible trauma, no hematuria noted, his urine cx showed NG. Patient was followed by ID with recommendation, no s/s from abx. Patient with generalized edema, but seen to be improving. He is tolerating tube feeds. PAST MEDICAL HX : Arthritis. AFIB, GERD, HTN, right rotator cuff tear, Malignant, Tachybradycardia syndrome,Glaucoma, adenomatous colon cancer,gastroesophageal reflux without Ang's esophagus , alcohol-related pancreatitis four years  
  
PAST SURGICAL: Cholecystectomy, Right shoulder arthroplasty Left total knee replacement, Hydrocele repair 
  
SOCIAL:  , former smoker quit in 1972,  Alcohol ingestion, four to five beers on a daily basis for many years. 
  
ALLERGIES: Adhesive, Darvon, Penicillin 
  
FAMILY HX: Diabetes father and brother 
  
ROS: wife and daughter at bedside, they were able to answer some question. No report of NVD, fever, chills or CP. No headaches or stiff neck.  No sore throat.  No cough or sputum.  No SOB.  No aspiration. No right upper quadrant pain.   ROM. Right knee soreness with movement Objective:  
  
Visit Vitals /61 Pulse 70 Temp 96.7 °F (35.9 °C) Resp 20 Ht 5' 8\" (1.727 m) Wt 82.3 kg (181 lb 8 oz) SpO2 99% BMI 27.60 kg/m² Physical Exam: 
General appearance: alert, cooperative, no distress, appears stated age HEENT: negative Neck: supple, symmetrical, trachea midline, no adenopathy, thyroid: not enlarged, symmetric, no tenderness/mass/nodules, no carotid bruit and no JVD Lungs: clear to auscultation bilaterally Heart: regular rate and rhythm, S1, S2 normal, no murmur, click, rub or gallop Abdomen: soft, non-tender. Bowel sounds normal. No masses,  no organomegaly Pulses: 2+ and symmetric Skin: Skin color, texture, turgor normal. No rashes or lesions Scattered skin tags Intake and Output: 
Current Shift:  11/04 0701 - 11/04 1900 In: -  
Out: 769 [[id number]] Last three shifts:  11/02 1901 - 11/04 0700 In: -  
Out: [id number] [facility] Recent Results (from the past 24 hour(s)) CBC WITH AUTOMATED DIFF Collection Time: 11/04/19  4:25 AM  
Result Value Ref Range WBC 10.3 4.6 - 13.2 K/uL  
 RBC 2.45 (L) 4.70 - 5.50 M/uL HGB 7.3 (L) 13.0 - 16.0 g/dL HCT 24.4 (L) 36.0 - 48.0 % MCV 99.6 (H) 74.0 - 97.0 FL  
 MCH 29.8 24.0 - 34.0 PG  
 MCHC 29.9 (L) 31.0 - 37.0 g/dL  
 RDW 16.4 (H) 11.6 - 14.5 % PLATELET 752 472 - 418 K/uL MPV 8.6 (L) 9.2 - 11.8 FL  
 NEUTROPHILS 65 40 - 73 % LYMPHOCYTES 22 21 - 52 % MONOCYTES 8 3 - 10 % EOSINOPHILS 4 0 - 5 % BASOPHILS 1 0 - 2 %  
 ABS. NEUTROPHILS 6.7 1.8 - 8.0 K/UL  
 ABS. LYMPHOCYTES 2.3 0.9 - 3.6 K/UL  
 ABS. MONOCYTES 0.8 0.05 - 1.2 K/UL  
 ABS. EOSINOPHILS 0.5 (H) 0.0 - 0.4 K/UL  
 ABS. BASOPHILS 0.1 0.0 - 0.1 K/UL  
 DF AUTOMATED METABOLIC PANEL, BASIC Collection Time: 11/04/19  4:25 AM  
Result Value Ref Range Sodium 137 136 - 145 mmol/L Potassium 4.5 3.5 - 5.5 mmol/L Chloride 96 (L) 100 - 111 mmol/L  
 CO2 37 (H) 21 - 32 mmol/L Anion gap 4 3.0 - 18 mmol/L Glucose 106 (H) 74 - 99 mg/dL BUN 27 (H) 7.0 - 18 MG/DL Creatinine 0.51 (L) 0.6 - 1.3 MG/DL  
 BUN/Creatinine ratio 53 (H) 12 - 20 GFR est AA >60 >60 ml/min/1.73m2 GFR est non-AA >60 >60 ml/min/1.73m2 Calcium 8.0 (L) 8.5 - 10.1 MG/DL  
SED RATE (ESR) Collection Time: 11/04/19  4:25 AM  
Result Value Ref Range Sed rate, automated >140 (H) 0 - 20 mm/hr CRP, HIGH SENSITIVITY Collection Time: 11/04/19  4:25 AM  
Result Value Ref Range CRP, High sensitivity >9.5 mg/L Current Facility-Administered Medications:  
  hydrocortisone (HYTONE) 2.5 % cream, , Topical, BID, Sage Reyes NP 
  [facility] ANESTHESIA, , Other, PRN, Teola Mcburney, MD 
  [facility] EMERGENCY/STAT BOX, , Other, PRN, Teola Mcburney, MD 
  diclofenac (VOLTAREN) 1 % topical gel 2 g, 2 g, Topical, Q6H, Juan Clifton MD, 2 g at 11/04/19 1229   cefTRIAXone (ROCEPHIN) 2 g in 0.9% sodium chloride (MBP/ADV) 50 mL MBP, 2 g, IntraVENous, Q24H, Miranda Barnett NP, Last Rate: 100 mL/hr at 11/04/19 1206, 2 g at 11/04/19 1206   multivitamin (MULTI-DELYN, WELLESSE) oral liquid 30 mL, 30 mL, Oral, DAILY, Juan Clifton MD, 30 mL at 11/04/19 9484   lactobacillus-acidophilus (LACTINEX) 1 Packet, 1 Packet, Oral, BID, Teola Mcburney, MD, 1 Packet at 11/04/19 0809 
  lansoprazole (PREVACID SOLUTAB) disintegrating tablet 30 mg, 30 mg, Oral, ACB, Teola Mcburney, MD, 30 mg at 11/04/19 0714 
  albuterol-ipratropium (DUO-NEB) 2.5 MG-0.5 MG/3 ML, 3 mL, Nebulization, Q6H PRN, Teola Mcburney, MD, 3 mL at 11/04/19 4445   acetaminophen (TYLENOL) tablet 500 mg, 500 mg, Per G Tube, Q6H PRN, Teola Mcburney, MD, 500 mg at 11/04/19 2058   docusate sodium (COLACE) capsule 100 mg, 100 mg, Per G Tube, BID PRN, Teola Mcburney, MD 
  folic acid (FOLVITE) tablet 1 mg, 1 mg, Per Nida Riding, DAILY, Teola Mcburney, MD, 1 mg at 11/04/19 0545   metoprolol tartrate (LOPRESSOR) tablet 12.5 mg, 12.5 mg, Per Nida Riding, Q12H, Teola Mcburney, MD, 12.5 mg at 11/04/19 8577   thiamine mononitrate (B-1) tablet 100 mg, 100 mg, Per Nida Riding, DAILY, Juan Clifton MD, 100 mg at 11/04/19 8905   febuxostat (ULORIC) tablet 80 mg, 80 mg, Oral, DAILY, Juan Clifton MD, 80 mg at 11/04/19 121   tamsulosin (FLOMAX) capsule 0.4 mg, 0.4 mg, Oral, DAILY WITH DINNER, Aaron Dorsey MD, Stopped at 11/03/19 1700 
  apixaban (ELIQUIS) tablet 5 mg, 5 mg, Oral, Q12H, Aaron Dorsey MD, 5 mg at 11/04/19 7151   brimonidine (ALPHAGAN) 0.2 % ophthalmic solution 1 Drop, 1 Drop, Both Eyes, Q8H, Juan Clifton MD, 1 Drop at 11/04/19 1303   cyanocobalamin tablet 1,000 mcg, 1,000 mcg, Oral, DAILY, Juan Clifton MD, 1,000 mcg at 11/04/19 1810   furosemide (LASIX) tablet 80 mg, 80 mg, Oral, DAILY, Juan Clifton MD, 80 mg at 11/04/19 5370   levothyroxine (SYNTHROID) tablet 175 mcg, 175 mcg, Oral, 6am, Aaron Dorsey MD, 175 mcg at 11/04/19 1438   latanoprost (XALATAN) 0.005 % ophthalmic solution 1 Drop, 1 Drop, Both Eyes, QPM, Juan Clifton MD, 1 Drop at 11/03/19 1750 Lab Results Component Value Date/Time Glucose 106 (H) 11/04/2019 04:25 AM  
 Glucose 100 (H) 10/28/2019 04:10 AM  
 Glucose 119 (H) 10/27/2019 04:00 AM  
 Glucose 97 10/26/2019 03:35 AM  
 Glucose 72 (L) 10/25/2019 05:41 AM  
  
 
Assessment/Plan Encephalopathy, acute delirium:  resolved 
  
Left knee cellulitis- resolved 
  
Possible L knee infection, S/p Left knee replacement in past- Ortho followed no a cute septic arthritis. ID following for presumable distal left femur OM, they recommended Ceftriaxone 2 gm for 6 weeks, ended 11/19 via PICC. ID recommended ESR and CRP weekly order placed. He is s/p US guided arthrocentesis 10/15 - negative for infection 
  
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 
  Dysphagia : failed swallow test 10/24 s/p peg on 10/25.  dietitian to follow 
  
Metabolic Encephalopath: resolved 
  
Hx of Prostate Cancer: s/p radiation and prostate seeds 
  
GERD: continue PPI 
  
Hx of Afib : on Eliquis + BB 
  
Elevated LFT: was seen by GI, they noted suspected due to sepsis, will monitor 
  
Hypothyroidism : continue synthroid 
  
 Anemia: will closely monitor,  
  
Hx of Gout : continue Uloric 
  
HTN: stable on BP meds 
  
BPH: Flomax 
  
Patient is a FULL code.  
  
Wound care to sacral area 
  
Discussed with nursing will  Bladder scan, may leave cervantes in for ongoing urinary retention.  
  
FLU shot 9/28/2019 Tanvir Polo NP 
11/4/2019, 1:20 PM

## 2019-11-04 NOTE — PROGRESS NOTES
Pt resting quietly in bed with family in the majority of the evening, HOB elevated, tube feeding of osmolite 1.2 samy @ 70cc/hr with free water 125cc q 6 hours, placement verified, no residual noted, oxygen @ 3LPM via N/C intact, tylenol given x 1 for c/o gen discomfort with relief noted, HHN Tx given x 1 for c/o SOB with relief noted, pt educated on deep breathing and coughing, pt voiced and demonstrated understanding, pt refused heel boots, IV ABT cont with no adverse reactions noted, cervantes cath intact and draining clear yellow urine, call bell and personal items within reach

## 2019-11-04 NOTE — ROUTINE PROCESS
LUE Picc line remains clean , dry and intact. Patient remains on antibiotic therapy. No adverse reactions noted. Patient remains complete care for all activities of daily living with assist of 2. G-Tube remains patent with osmolite infusing without difficulty. Merino cath patent draining yellow tinged urine. Oxygen on via nasal cannula at 3L/min . Oxygen saturation 96-97% . Family remains at bedside and voiced satisfaction with his care.

## 2019-11-05 NOTE — ROUTINE PROCESS
Physical therapy @ bedside. Pt has no obvious signs of distress at this time. Will continue to monitor.

## 2019-11-05 NOTE — ROUTINE PROCESS
Patient lying awake in bed. Tube feedings as ordered. Patient tolerating well. Tylenol given earlier for headache. Cipro eye drops to left eye for yellow drainage. PICC line flushes without problems. Merino cath draining clear yellow urine. Daughter at bedside. Call light within reach.

## 2019-11-05 NOTE — ROUTINE PROCESS
Bedside and Verbal shift change report given to Corey Lambert LPN (oncoming nurse) by Moises Love LPN (offgoing nurse). Report included the following information SBAR, Kardex and MAR.

## 2019-11-05 NOTE — PROGRESS NOTES
0109  Patient in bed resting, son went to the nurse's station reporting that his father requested to have some tylenol for generalized pain and aches also breathing treatment. Administered tylenol and breathing treatment, vitals were taken WNL. Tube feeding Osmolite 1.2 running on 70 ml/hr flushes 125 Q6 hrs. O2 at 3L, HOB elevated. Breathing regular and unlabored. Merino draining without kinks. Changed his linen and pad while in room. Patient had no other complain. 0500  2 cans of Osmolite was added to patient's tube feeding. Rate verified as ordered. No residual, checked placement, flushed with water. Dressing on insertion site changed. Patient was assisted with putting his oxygen and gown on. Merino bag was emptied by CNA.

## 2019-11-05 NOTE — ROUTINE PROCESS
Pt complaining of shortness of breath O2 @ 3 liters via nasal cannula, sp 02 97% pulse rate irregular, Np Miranda informed and assessed patient, ordered chest x-ray. Pt given 1 tab of Tylenol 500 mg via G-tube. Pt repositioned in bed for comfort.

## 2019-11-05 NOTE — ROUTINE PROCESS
Pt reassessed after chest x-ray. Pt denies any chest pain at this time. Son at bedside. O2 @ 4 liters via  Nasal cannula changed by TERRIE Jean. O2 Sat 100% and pulse 82. Pt having difficulty verbalizing full sentences.

## 2019-11-05 NOTE — ROUTINE PROCESS
Jules Jean called back and given chest x-ray and EKG results. No new orders given to this nurse at this time.

## 2019-11-05 NOTE — PROGRESS NOTES
NUTRITION FOLLOW-UP/PLAN OF CARE [facility]   
 
RECOMMENDATIONS:  
1. Enteral Nutrition: Continue goal rate of Osmolite 1.2 @ 70 mL/hr x 22 hrs (1540 mL)  providing 1848 kcal, 85 g protein, 1262 mL free water. Adjusted FWF again to 150 mL Q6 hrs d/t elevated BUN (Total free water 1862 mL/day) 2. Monitor TF, labs and weight 3. RD to follow GOALS:  
1. Met/Ongoing: Tolerates enteral nutrition via PEG meeting >75% of protein/calorie needs by 11/12 2. Met/Ongoing: Weight Maintenance (+/- 1-2 lb) by 11/12 ASSESSMENT:  
Wt status is classified as overweight per Body mass index is 27.6 kg/m². TF infusing at goal rate and tolerating well. Labs noted. Pt w/ H/H (7.3/24.4) and elevated BUN. Nutrition recommendations listed. RD to follow. Nutrition Risk:  [] High  [x] Moderate []  Low SUBJECTIVE/OBJECTIVE:  
 
(11/5): Pt has been tolerating goal rate of TF with FWF via PEG. Last BM on (11/4) per I/Os. Weight is stable overall since admission to [facility] per recorded weights, but noted variable weights in system PTA. Pt seen in room this afternoon with visitor at bedside. Pt is much more alert and awake today. No c/o of GI distress and tolerating TF well. Explained that I would be increasing FWF to 150 mL Q6 hours and adjusted pump settings during visit. Also informed nurse, Misael Catherine. Will likely transition to nocturnal feeds to allow Pt to work with therapy during the day if he continues to tolerate TF and progress. Will continue to monitor.     
 
(10/29): Pt transferred from  to [facility] on 10/28/2019. SLP following. Pt seen in room this morning tube feeding infusing via PEG at goal rate. Pt appears very lethargic and will answer questions if prompted verbally several times. Daughter at bedside assisting Pt with bed exercises. Denies any GI distress and last BM on (10/28) per I/Os.  Wounds documented by wound care on (10/24) noted including a healed stage 2 pressure injury to R buttocks and smaller stage 2 pressure injury to L buttocks. Weights have been variable this admission. Bed scale weight taken during visit; 193.2 lb; noted with extra items on bed. Will continue to monitor. Below in bold per previous RD notes:  
 
(10/26/19) Pt resting in bed with TF infusing @ 65 mL/hr during time of assessment with family at bedside. Pt with PEG placement 10/25/19. Tolerating feeds/TF advancement per RN reports. No reports n/v/d/c. CBW: 190 lb 10/26/19, continued weight fluctuations. Skin: stage 2 L buttock, wound to R heel. L arm. Advance as tolerated to goal rate Osmolite 1.2 @ 70 mL/hr x 22 hrs to provide 1540 mL, 1848 kcal, 85 g pro, 1262 mL free water with 150 mL flush Q 6. Will continue to monitor TF rate/tolerance, weight, labs, POC.  
  
(10/24/19) Pt resting in bed with TF infusing during time of assessment, family at bedside. Tolerating TF at goal rate per reports. Per I/Os BM 10/22/19. Seen by SLP 10/23/19 - failed MBS with recs for QOL/comfort diet (puree with HTL) vs PEG. Eliquis on hold for anticipation of EGD with PEG placement 10/25/19. Noted pt continues with weight fluctuations - unsure of accuracy, lasix possibly contributing. CBW: 193 lb 10/23/19. 175 lb 10/21/19, 194 lb 10/18/19. Family asking about PEG placement and eventually getting pt to bolus feeds. Spoke with family about this but to continue continuous feeds at this time until after placement/healing underway. Skin: per wound RN notes 10/24/19 Stage 2 to R buttock healed and stage 2 to L buttock with improvement. Continue current TF. Will continue to monitor TF tolerance, weight, labs, POC. 
  
(10/20/19) Pt resting in bed, awake with family at bedside during time of assessment. Pt with TF infusing via NGT at time of assessment at goal rate. SLP eval completed 10/18/19 with recs of NPO, ice chips. Gi wants another speech louisa completed this week - talks of possible PEG placement. Tolerating current TF per reports. Per I/Os BM 10/17/19. CBW: 194 lb 10/18/19. Fluctuations noted - will monitor closely for need to decrease TF rate. Will continue to monitor TF/tolerance, weight, labs, POC.  
  
(10/17/19) Pt resting in bed with NGT in placed and TF running at goal rate Osmolite 1.2 @ 70 mL/hr x 22 hrs to provide 1540 mL, 1848 kcal, 85 g pro, 1262 mL free water with 150 mL flush Q 6hrs. 0 residuals reported. Daughter at bedside states pt had a good day yesterday until he pulled the NGT out and was given dilaudid - so a little more drowsy today. Tube dislodged and feedings resumed @ 224am. Loose BM yesterday per reports. Noted low K, elevated BUN. Took beds sera weight while in room 180.4 lb - placed in flowsheets. Noted pt on lasix possibly contributing to weight fluctuations. Skin: stage 2 right buttock, ecchymosis, abrasion noted. Will continue to monitor TF tolerance, weight, labs. 
  
(10/15/19) Pt resting in bed during time of assessment with daughter at bedside. Per speech on 10/13/19 rec'd NPO and ice chips PRN for oral care/comfort, pt has been NPO since. RN placed NGT this afternoon. Reassessed needs on todays bed scale weight 176.8 lb. Noted weight have been all over the place since admission - admit 10/8/19: 197.8 lb then 10/13/19: 165 lb. -190 per family reports. Recommend: Osmolite 1.2 @ 70 mL/hr x 22 hrs to provide 1540 mL, 1848 kcal, 85 g pro, 1262 mL free water . Spoke with RN/attending MD. Natan Jacobson with daughter about recommendations and answered her questions regarding his nutrition. 10/10:  Pt reports his appetite is not good. His usual weight is 185 to 190 lbs. He denies issues with chewing or swallowing. No known food allergies.   He likes chocolate Ensure and is trying to drink it but had only a few sips this morning with breakfast. Pt appears well nourished but po intake is sub-optimal. Pt with hypoalbuminemia as evidenced by albumin of 2.0. Chart review indicates pt with lower extremity edema. Current weight is 13 kg > than July 2019 Information Obtained from:  
 [x] Chart Review [x] Patient [x] Family/Caregiver [x] Nurse/Physician 
 [] Interdisciplinary Meeting/Rounds Diet: NPO with tube feedings via PEG Medications: [x] Reviewed Folvite, Uloric, Lasix, Lactinex, Lopressor, MVI w/ Iron, Vit B1 Allergies: [x] Reviewed Patient Active Problem List  
Diagnosis Code  Malignant neoplasm of prostate ([facility] C61  Hypertension I10  
 GERD (gastroesophageal reflux disease) K21.9  Glaucoma H40.9  Glaucoma H40.9  Dysphagia R13.10  Encounter for colonoscopy due to history of adenomatous colonic polyps Z12.11, Z86.010  
 Non-pressure chronic ulcer of right calf with fat layer exposed ([address] M82.464  Laceration of right lower leg with complication M55.728Z  Edema R60.9  Pneumonia J18.9  Atrial fibrillation (HCC) I48.91  
 Hypothyroidism E03.9  Sepsis ([address] A41.9  Cellulitis L03.90  
 HTN (hypertension) B57  
 Neutrophilic leukocytosis Q00.9  Acute gout M10.9  CKD (chronic kidney disease) stage 2, GFR 60-89 ml/min N18.2  Syncope R55  MACKENZIE (acute kidney injury) ([address] N17.9  Tachy-faviola syndrome (HCC) I49.5  Altered mental status R41.82  Severe sepsis (HCC) A41.9, R65.20  Cellulitis of left knee L03.116 Past Medical History:  
Diagnosis Date  Arthritis  Atrial fibrillation ([address] 07/2017 Dr Santos Ibarra cardio follows. chronic  Carcinoma of prostate ([address]  Cellulitis  Coarse tremor   
  hands, states \"my doctor is aware\"  Difficulty swallowing  Dysphagia  Edema  Encounter for colonoscopy due to history of adenomatous colonic polyps  Essential hypertension  Fall 10/07/2017 \"went to [facility] Emergency Room, CT Scan showed concussion, no bleeding\" per patient  Fatigue  GERD (gastroesophageal reflux disease) wo. esophagitis  Glaucoma  H/O joint replacement   
  left knee 2003  HLD (hyperlipidemia)  Hypertension  Hypertensive disorder  Hypothyroidism  Impotence  Laceration of right lower leg with complication  Malignant neoplasm of prostate ([address]   
  [id number] Adenocarcinoma of the Prostate, dx 11/3/2008, karlee 3+4, presenting PSA 5.5ng/mL. 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   
 acute/hist. of   
  
Labs:   
Lab Results Component Value Date/Time Sodium 137 11/04/2019 04:25 AM  
 Potassium 4.5 11/04/2019 04:25 AM  
 Chloride 96 (L) 11/04/2019 04:25 AM  
 CO2 37 (H) 11/04/2019 04:25 AM  
 Anion gap 4 11/04/2019 04:25 AM  
 Glucose 106 (H) 11/04/2019 04:25 AM  
 BUN 27 (H) 11/04/2019 04:25 AM  
 Creatinine 0.51 (L) 11/04/2019 04:25 AM  
 Calcium 8.0 (L) 11/04/2019 04:25 AM  
 Magnesium 1.7 10/28/2019 04:10 AM  
 Phosphorus 3.4 10/28/2019 04:10 AM  
 Albumin 1.8 (L) 10/19/2019 05:05 AM  
 
 
Anthropometrics: BMI (calculated): 27.6 Last 3 Recorded Weights in this Encounter 10/28/19 2308 11/04/19 1129 Weight: 83.2 kg (183 lb 8 oz) 82.3 kg (181 lb 8 oz) Ht Readings from Last 1 Encounters:  
10/28/19 5' 8\" (1.727 m) Documented Weight History: 
Weight Metrics 11/4/2019 10/28/2019 10/27/2019 10/8/2019 10/3/2019 9/27/2019 9/26/2019 Weight 181 lb 8 oz - 194 lb 6.4 oz - 180 lb 180 lb -  
BMI - 27.6 kg/m2 - 29.56 kg/m2 27.37 kg/m2 - 27.37 kg/m2 No data found. UBW: 185-190 lb  
[] Weight Loss 
[] Weight Gain 
[x] Weight Stable from admission weight 
[x] Variable weights this admission Estimated Nutrition Needs: [x] MSJ x 1.2-1.3 Calories: 5010-7010 kcal Based on:   [x] Actual BW   
Protein:    g Based on:   [x] Actual BW Fluid:       8582-5907 ml Based on:   [x] Actual BW  
 
Nutrition Problems Identified:  
[x] Suboptimal PO intake v/s NPO w/ tube feedings via PEG 
[] Food Allergies [x] Difficulty chewing/swallowing/poor dentition 
[] Constipation/Diarrhea (Last BM on 11/4; PRN bowel regimen in place) [] Nausea/Vomiting  
[] None 
[x] Other: Wounds (Including a healed stage 2 pressure injury to R buttocks and smaller stage 2 pressure injury to L buttocks) Plan:  
[] Therapeutic Diet 
[]  Obtained/adjusted food preferences/tolerances and/or snacks options  
[]  Supplements added  
[x] SLP following for feeding techniques []  HS snack added  
[]  Modify diet texture  
[]  Modify diet for food allergies []  Educate patient  
[]  Assist with menu selection []  Monitor PO intake on meal rounds  
[x]  Continue inpatient monitoring and intervention  
[x]  Participated in discharge planning/Interdisciplinary rounds/Team meetings  
[x]  Other: Enteral nutrition recommendations Education Needs: 
 [] Not appropriate for teaching at this time due to: 
 [x] Identified and addressed Nutrition Monitoring and Evaluation: 
[x] Continue ongoing monitoring and intervention 
[] Vanessa Long

## 2019-11-06 NOTE — ROUTINE PROCESS
Patient participated with therapy on this shift. Patient voiced complaints of generalized body pain after completing therapy. Patient medicated with tylenol with effective results. Picc line to left arm remains intact. No adverse reactions noted to antibiotic therapy. Merino catheter patent and draining straw colored urine. Patient turned and repositioned frequently for comfort.

## 2019-11-06 NOTE — PROGRESS NOTES
0100    Patient in bed resting, requested to have some tylenol for generalized pain and aches. Administered tylenol vitals were taken WNL. Tube feeding Osmolite 1.2 running on 70 ml/hr flushes 125 Q6 hrs. Placement was verified, flushed with water, and site was cleaned. Added 2 cans. O2 at 3L, was off but placed back on before I left the room and re-educated the patient. HOB elevated. Breathing regular and unlabored. Merino draining without kinks. Patient had no other complain. 0530  Patient had episodes of confusion throughout the night calling for family members and nurse's from different shifts. Patient is alert oriented to name/ place/time but not situation as he kept asking for iced water and he has been NPO for a while. Patient kept taking off his oxygen cannula then becomes anxious. I sat with patient to hold his hand and do breathing exercises to calm him down. Patient yanked the oxygen mask off when I gave him his scheduled albuterol. I had to hold it by his nose so he can have full dose.  Patient was given a bath by CNA this morning.

## 2019-11-06 NOTE — PROGRESS NOTES
IDT team, , MDS, /, Pharmacy, met and reviewed patients medication, diet, therapy, nursing needs, discharge plans, and overall progress. Concerns identified and addressed to meet patients needs.

## 2019-11-06 NOTE — ROUTINE PROCESS
Patient resting in bed. Daughter says therapy has really been working with patient. Gave tylenol for discomfort. Voltaren gel applied to both legs. Eye drops as ordered. PICC line to LUE flushes well. Oxygen at 4 liters nasal cannula. HHN as ordered. Osmolite 1.2 at 70 ml/hr with 150 free water flushes every 6 hours. No residual. Tolerating tube feedings. Mepilex to both heels for protection. Refused prev boots. Call light within reach.

## 2019-11-06 NOTE — PROGRESS NOTES
[facility] 
                                             (A Division of [address] [place] [address]) Follow-up Note Date of Admission: 10/28/2019     Date of Note:  11/6/2019 Summary:   
  
81 y/o  male HTN, AF, GERD, DJD s/p L TKR, Prostate CA, Hypothyroidism, h/o pancreatitis, ETOH consumption adm 10/8/2019 w/ chills, weakness, AMS. neck pain x 1 mo. Had R leg cellulitis after cat bite 1 mo PTA - resolved w/ abx. Then 1 day PTA fever, chills adm w/ 100.5, WBC 29.5, 13% bands. No uti, pneumonia, neg CTAP. 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 Rpt blcx 10/10 NGTD x 2.  Gallium scan 10/14: intense periprosthetic uptake L knee, distal femur. L knee synovial fluid 10/15- not s/o septic arthritis. Presume distal L femur OM. Meropenem, Vanc started 10/21 for ? HAP. Low grade fever 10/24 100.5 WBC normal. Meropenem changed to levofloxacin. Abx changed back to ceftriaxone 10/28.   
  
Interval History: 
  
Much more alert and interactive than last week but daughter says he was even brighter and more active yesterday. Appears to have been tired out by PT today. Denies any pain or shortness of breath. No diarrhea.  
  
  
Assessment: Plan:  
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  
-> will plan to give po abx (Amoxicillin) suppression after IV Ceftriaxone given presence of L knee prosthesis Pasteurella multocida BSI 
- 1 of 2 blcx 10/8 
- presumed from L tibial OM.   No other source identified 
- had h/o right leg cellulitis following cat bite 1 mo PTA but no active cellulitis when admitted 10/8    
Encephalopathy 
- intermittently lethargic 
- much improved    
H/o PCN allergy (rash) 
- tolerated Keflex, Meropenem, tolerating Ceftriaxone    
HTN    
AF    
GERD    
 DJD s/p L TKR    
Prostate CA    
Hypothyroidism    
H/o Pancreatitis    
H/o ETOH abuse    
  
Microbiology: 
  
10/8      blcx Pasteurella multocida 1 of 2              urcx NG 
10/9      R hip asp gs no wbc, NOS, cx NG 
  
10/10    blcx NGTD x 2 
  
Lines / Catheters: 
  
L PICC Patient Active Problem List  
Diagnosis Code  Malignant neoplasm of prostate ([facility] C61  Hypertension I10  
 GERD (gastroesophageal reflux disease) K21.9  Glaucoma H40.9  Glaucoma H40.9  Dysphagia R13.10  Encounter for colonoscopy due to history of adenomatous colonic polyps Z12.11, Z86.010  
 Non-pressure chronic ulcer of right calf with fat layer exposed ([facility] S04.920  Laceration of right lower leg with complication O98.296M  Edema R60.9  Pneumonia J18.9  Atrial fibrillation (HCC) I48.91  
 Hypothyroidism E03.9  Sepsis ([facility] A41.9  Cellulitis L03.90  
 HTN (hypertension) M96  
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  
 ciprofloxacin HCl (CILOXAN) 0.3 % ophthalmic solution 1 Drop  1 Drop Left Eye Q8H  
 [facility] ANESTHESIA   Other PRN  
 [facility] EMERGENCY/STAT BOX   Other PRN  
 diclofenac (VOLTAREN) 1 % topical gel 2 g  2 g Topical Q6H  
 cefTRIAXone (ROCEPHIN) 2 g in 0.9% sodium chloride (MBP/ADV) 50 mL MBP  2 g IntraVENous Q24H  
 multivitamin (MULTI-DELYN, WELLESSE) oral liquid 30 mL  30 mL Oral DAILY  lactobacillus-acidophilus (LACTINEX) 1 Packet  1 Packet Oral BID  
  lansoprazole (PREVACID SOLUTAB) disintegrating tablet 30 mg  30 mg Oral ACB  albuterol-ipratropium (DUO-NEB) 2.5 MG-0.5 MG/3 ML  3 mL Nebulization Q6H PRN  
 acetaminophen (TYLENOL) tablet 500 mg  500 mg Per G Tube Q6H PRN  
 docusate sodium (COLACE) capsule 100 mg  100 mg Per G Tube BID PRN  
 folic acid (FOLVITE) tablet 1 mg  1 mg Per G Tube DAILY  metoprolol tartrate (LOPRESSOR) tablet 12.5 mg  12.5 mg Per G Tube Q12H  thiamine mononitrate (B-1) tablet 100 mg  100 mg Per G Tube DAILY  febuxostat (ULORIC) tablet 80 mg  80 mg Oral DAILY  tamsulosin (FLOMAX) capsule 0.4 mg  0.4 mg Oral DAILY WITH DINNER  
 apixaban (ELIQUIS) tablet 5 mg  5 mg Oral Q12H  
 brimonidine (ALPHAGAN) 0.2 % ophthalmic solution 1 Drop  1 Drop Both Eyes Q8H  
 cyanocobalamin tablet 1,000 mcg  1,000 mcg Oral DAILY  furosemide (LASIX) tablet 80 mg  80 mg Oral DAILY  levothyroxine (SYNTHROID) tablet 175 mcg  175 mcg Oral 6am  
 latanoprost (XALATAN) 0.005 % ophthalmic solution 1 Drop  1 Drop Both Eyes QPM  
 
 
 
Review of Systems - Negative except as in interval history Objective: 
Visit Vitals /79 Pulse 78 Temp 97.2 °F (36.2 °C) Resp 20 Ht 5' 8\" (1.727 m) Wt 82.3 kg (181 lb 8 oz) SpO2 96% BMI 27.60 kg/m² Temp (24hrs), Av.3 °F (36.3 °C), Min:97.2 °F (36.2 °C), Max:97.4 °F (36.3 °C) General: Well developed, well nourished 86 y.o.  male alert, conversing, speech is more legible that when last seen HEENT: no scleral icterus, mucous membranes moist, pharynx normal without lesions, Neck: resistant to flexion in all directions Cardio:  irregularly irregular rhythm Lungs: rhonchi bilateral  
Abdomen: soft, non-tender. Bowel sounds normal. No masses, no organomegaly. PEG in place Extremities:  no redness or tenderness in the calves or thighs, no edema Lab results: 
 
Chemistry Recent Labs 19 
0425 *   
K 4.5  
CL 96* CO2 37* BUN 27* CREA 0.51* CA 8.0* AGAP 4  
BUCR 53* CBC w/ Diff Recent Labs 11/04/19 
0425 WBC 10.3 RBC 2.45* HGB 7.3* HCT 24.4*  
 GRANS 65 LYMPH 22 EOS 4 Microbiology All Micro Results Procedure Component Value Units Date/Time CULTURE, URINE [[id number]] Collected:  10/31/19 1315 Order Status:  Completed Specimen:  Urine from Merino Specimen Updated:  11/03/19 7827 Special Requests: NO SPECIAL REQUESTS Culture result: NO GROWTH 2 DAYS Neetu Kaufman MD, [facility] 
[phone]  
11/6/2019  
4:20 PM

## 2019-11-06 NOTE — ROUTINE PROCESS
Patient sitting up in bed. Osmolite 1.2 tube feeding at @ 70 ml/hr and free water flushes @ 150 every 6 hours. No residual. Patient tolerating well. PICC line to LUE flushes well, dressing c/d/i. Merino cath draining clear yellow urine. Changed mepilex to both heels. Oxygen at 4 liters nasal cannula. Daughter at bed side. Call light and personal items within reach.

## 2019-11-07 NOTE — PROGRESS NOTES
Community Care Team Documentation for Patient in [facility]     Patient discharged from [facility] [address] to [address] [facility], on 10/28/19. Hospital Discharge diagnosis:       AMS   Severe Sepsis   Cellulitis of Left Knee   MACKENZIE   Atrial Fibrillation   Hypothyroidism    SNF Attending Provider:   Dr. Kate Baez    Anticipated discharge date from SNF:  TBD    PCP : Isabella Park MD    Nurse Navigator:     [facility] Team rounds completed, updates provided by facility. RRAT:  Medium Risk            13       Total Score        3 Has Seen PCP in Last 6 Months (Yes=3, No=0)    4 IP Visits Last 12 Months (1-3=4, 4=9, >4=11)    6 Charlson Comorbidity Score (Age + Comorbid Conditions)        Criteria that do not apply:    . Living with Significant Other. Assisted Living. LTAC. SNF. or   Rehab    Patient Length of Stay (>5 days = 3)    Pt.  Coverage (Medicare=5 , Medicaid, or Self-Pay=4)        Active Ambulatory Problems     Diagnosis Date Noted    Malignant neoplasm of prostate ([address] 01/16/2012    Hypertension     GERD (gastroesophageal reflux disease)     Glaucoma     Glaucoma     Dysphagia 06/17/2016    Encounter for colonoscopy due to history of adenomatous colonic polyps 01/03/2017    Non-pressure chronic ulcer of right calf with fat layer exposed ([address] 01/19/2017    Laceration of right lower leg with complication 97/91/9680    Edema 01/19/2017    Pneumonia 06/02/2018    Atrial fibrillation ([address] 06/02/2018    Hypothyroidism 06/02/2018    Sepsis ([address] 07/10/2019    Cellulitis 07/10/2019    HTN (hypertension) 60/08/8003    Neutrophilic leukocytosis 98/67/2368    Acute gout 07/11/2019    CKD (chronic kidney disease) stage 2, GFR 60-89 ml/min 07/11/2019    Syncope 09/27/2019    MACKENZIE (acute kidney injury) ([address] 09/27/2019    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    Altered mental status 10/08/2019    Severe sepsis ([facility] 10/08/2019    Cellulitis of left knee 10/08/2019     Resolved Ambulatory Problems     Diagnosis Date Noted    No Resolved Ambulatory Problems     Past Medical History:   Diagnosis Date    Arthritis     Carcinoma of prostate ([facility]     Coarse tremor     Difficulty swallowing     Essential hypertension     Fall 10/07/2017    Fatigue     H/O joint replacement     HLD (hyperlipidemia)     Hypertensive disorder     Impotence     Malignant tumor of prostate ([facility]     Nocturia     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

## 2019-11-07 NOTE — PROGRESS NOTES
Patient c/o of SOB HOB elevate, O2 @ 100% with O2 @ 4L via NC. Emotional support given, Patient states he feels better. Patient educated on calling if SOB returns, Patient voiced understanding, call bell and personal items in reach.

## 2019-11-07 NOTE — FAMILY MEETING
Care Plan Meeting held with the following attendees: Activities, ,MDS,and Mr. Ivette Lanes and Anahi Wolf. Initial care plan reviewed along with ,therapy goals and frequency of service delivery,pt ability as of this date,activities offered and patient participation in activities,discharge plans, and specific questions regarding medications and other areas of concern. Statement Selected

## 2019-11-07 NOTE — PROGRESS NOTES
Tylenol 500 given via peg tube for complain of back pain with relief noted. Peg tube no residual noted.

## 2019-11-07 NOTE — ROUTINE PROCESS
Patient in bed resting, patient awake an anxious, removes O2 at 4L via NC, replaced as needed, scabs noted to majority in face, no active drainage noted, picc LUE, dressing clean, dry and intact, G tube to abdomen site open to air clean dry and intact, continuous tube feeding osmalite 1.2 at 70 cc/hr with free water flush 125 Q 6 hrs, infusing without difficulty, cervantes cath intact, draining clear yellow urine, Patient continue to refuse heal boots, multiple mepilex CDI, HOB elevated, IV antibiotic continues with no adverse reaction noted, resp even and unlabored, incontinence care given as needed, no complain of pain, call bell and personal items in reach.

## 2019-11-07 NOTE — ROUTINE PROCESS
Patient remains alert and verbal on this shift . G-tube patent , osmolite infusing without difficulty . Merino catheter remains intact ,patent and draining straw colored urine. Patient participated in therapy earlier in shift. Patient turned and repositioned frequently for comfort. Family at bedside. Picc Line to left arm and remains intact. No adverse reactions noted to antibiotic therapy.

## 2019-11-08 NOTE — ROUTINE PROCESS
0200 patient called, found patient in bed with gown and nasal cannula off, told patient not to removed his gown specially his nasal cannula to avoid shortness of breath. Nasal cannula at 4lpm reapplied. 0240 patient asleep, nasal cannula intact.

## 2019-11-08 NOTE — ROUTINE PROCESS
Tube feeding as ordered. PICC line dressing c/d/i. Flushes well. Merino cath draining clear yellow urine. Mepilex secure to both feet for protection. Oxygen at 4 liters nasal cannula. Daughter at bedside. Says she is leaving soon and no one will be coming. Call light and personal items within reach.

## 2019-11-08 NOTE — PROGRESS NOTES
Patient in bed resting, patient awake, HOB @ 30, O2 @ 4L, scabs noted to majority of face, no complain pain noted, Yellowish discharge and small skin tear noted on left PICC line,dressing changed aseptically. Continuous tube feeding osmalite 1.2 @ 70cc/hr with free water flush 150 Q 6hrs, infusing without difficulty, cervantes cath intact, draining, yellow clear urine, incontinence care given as needed, bilateral side rails up, call bell and personal items within reach

## 2019-11-09 NOTE — ROUTINE PROCESS
Pt sitting upright in bed, greater than 30 degree angle, Osmolite  1.2 infusing through PEG tube at 70 ml/hr, Pt. Vital signs at 88 pulse and 100 % O2Sat with 4 liters oxygen via nasal cannula. Call bell within reach.

## 2019-11-09 NOTE — ROUTINE PROCESS
Pt has no audible wheezing at this time. Pt. Has no change in normal breathing or affect. Pt. Requested and given albuterol treatment. Pt. Vital signs pulse 86 , O2Sat 100 % @ 4 liters via nasal cannula. Pt repositioned in bed for comfort. Pt has no obvious signs of distress at this time.

## 2019-11-09 NOTE — ROUTINE PROCESS
Bedside and Verbal shift change report given to KAIDEN Morrison (oncoming nurse) by Wendie Agustin LPN (offgoing nurse). Report included the following information SBAR, Kardex and MAR.

## 2019-11-09 NOTE — PROGRESS NOTES
Pt resting in bed with family at the bedside, HOB elevated, tube feeding of osmolite 1.2 samy @ 70cc/hr with free water flush of 150 q 6 hours infusing without difficulty, Picc LUE intact, IV abt cont with no adverse reactions noted, cervantes cath intact and draining clear yellow urine, oxygen intact @ 4LPM via n/c, pt repositioned in bed, meds given via peg without difficulty, pt's mouth swabbed per his request d/t c/o dry mouth, incont care given as needed, pt cont to refuse heel boots, mepilex intact, no c/o pain and no visual signs of pain noted, call bell and personal items within reach

## 2019-11-09 NOTE — PROGRESS NOTES
Patient in bed resting with eyes closed, HOB elevated, O2 @ 4L, no complain of pain noted, PICC line dressing dry and intact, Continues tube feeding osmalite 1.2 @ 70cc/hr with free water flush 150 Q 6hrs. Infusing without difficulty, cervantes cath draining, yellow clear urine, incontinence care given as needed, bilateral side rails up and personal items within reach

## 2019-11-09 NOTE — PROGRESS NOTES
met with Patient completed the initial Spiritual Assessment of the patient, and offered Pastoral Care, see flow sheets for interventions. Patient does not have any Uatsdin/cultural needs that will affect patients preferences in health care. Charted reviewed. Chaplains will continue to follow and will provide pastoral care on an as needed/requested basis. [id number] [facility] [phone]

## 2019-11-09 NOTE — ROUTINE PROCESS
Patient complain of nausea. Had small bowel movement today according to day shift CNA. Repositioned patient up in bed. Patient is anxious. Encouraged to deep breathe and relax. Has oxygen at 4 liters nasal cannula. Daughter is at bedside. Placed call to Dr Moses Preston for nausea medication. Call light within reach.

## 2019-11-10 NOTE — PROGRESS NOTES
[id number] Called to the bedside for a code blue. CPR was performed. Patient was intubated. 0440-Called to the bedside for a  Code blue. CPR was perform. 0450-Transported patient to the ER from [facility].

## 2019-11-10 NOTE — ROUTINE PROCESS
0230 left arm single lumen picc line dressing changed asceptically. Noted serous drainage on site. Positive blood return noted after line was flushed with 8mls normal saline.

## 2019-11-10 NOTE — ROUTINE PROCESS
0300 patient  Removed his oxygen cannula and gown again. Instructed patient not to remove his oxygen cannula. Patient complained of being thirsty, told patient that he is on npo and that he has a gtube in place for food and water. Ongoing feeding stopped for the moment to check gastric residual, will resume feeding later.

## 2019-11-10 NOTE — ROUTINE PROCESS
@5717 Wife notified and made aware of the patient's status change. Adult Code Blue Code in progress. @3255 Patient was moved to ER per bed by the [facility] nurse and Code Team 
@[id number] notified that patient was transferred to ER.

## 2019-11-10 NOTE — ROUTINE PROCESS
0330 checked on patient , patient complaining of knee pain 5/10, asked for prn tylenol, instructed pt. That it was too early to give his prn tylenol, diclofenac topical given instead.

## 2019-11-10 NOTE — ED NOTES
Patient with no heart beat, apneic and no blood pressure. Patient pronounced  by this RN and Maggie Slaughter RN.  and family at bedside. Dr. Madai Sands notified and in room.

## 2019-11-10 NOTE — ED NOTES
Family at bedside Per family wishes MD Lor Levy removed ETT and turned off the vent. Care withdrawn all medication drips stopped

## 2019-11-10 NOTE — ED PROVIDER NOTES
EMERGENCY DEPARTMENT HISTORY AND PHYSICAL EXAM 
 
 
Date: 11/10/2019 Patient Name: Cassie Camacho History of Presenting Illness Chief Complaint Patient presents with  Cardiac arrest  
 
 
History (Context): Cassie Camacho is a 80 y.o. gentleman who comes from rehab facility located in the hospital presents in cardiac arrest.  The patient has had multiple rounds of epinephrine. The patient was presented as an RRT upstairs, and the patient was actually pronounced dead, with everyone leaving the room. On reassessment shortly thereafter, the patient was found to have a heartbeat, and the patient was transferred to the emergency department. The patient is obtunded, and cannot answer questions. The entirety of the history was gathered from the hospitalist who was present at the RRT. PCP: Michele Gupta MD 
 
Current Outpatient Medications Medication Sig Dispense Refill  multivitamins-minerals-lutein (CENTRUM SILVER) tab tablet Take 1 Tab by mouth every other day. 30 Tab 0  
 albuterol-ipratropium (DUO-NEB) 2.5 mg-0.5 mg/3 ml nebu 3 mL by Nebulization route every six (6) hours as needed (SOB/WHEEZES). 30 Nebule 0  
 acetaminophen (TYLENOL) 500 mg tablet Take 1 Tab by mouth every six (6) hours as needed for Pain or Fever. 30 Tab 0  
 docusate sodium (COLACE) 100 mg capsule Take 1 Cap by mouth two (2) times daily as needed for Constipation for up to 90 days. Indications: constipation 15 Cap 0  
 folic acid (FOLVITE) 1 mg tablet Take 1 Tab by mouth daily. 30 Tab 0  
 Lactobacillus Acidoph & Bulgar (FLORANEX) 1 million cell tab tablet Take 2 Tabs by mouth two (2) times a day. 30 Tab 0  
 metoprolol tartrate (LOPRESSOR) 25 mg tablet 0.5 Tabs by Per NG tube route two (2) times a day. 30 Tab 0  
 omeprazole (PRILOSEC OTC) 20 mg tablet Take 1 Tab by mouth daily.  thiamine mononitrate (B-1) 100 mg tablet Take 1 Tab by mouth daily.  30 Tab 0  
  febuxostat (ULORIC) 80 mg tab tablet Take 80 mg by mouth daily.  desonide (TRIDESILON) 0.05 % cream Apply 1 Each to affected area two (2) times a day. TO FACE    
 tamsulosin (FLOMAX) 0.4 mg capsule Take 1 Cap by mouth daily (after dinner). 90 Cap 3  
 apixaban (ELIQUIS) 5 mg tablet Take 1 Tab by mouth two (2) times a day. 60 Tab 0  
 albuterol (PROAIR HFA) 90 mcg/actuation inhaler Take  inhaled by mouth.  ALPHAGAN P 0.15 % ophthalmic solution Administer 1 Drop to both eyes three (3) times daily.  cyanocobalamin 1,000 mcg tablet Take 1,000 mcg by mouth daily.  furosemide (LASIX) 40 mg tablet Take 40 mg by mouth daily. 2 tab daily  levothyroxine (SYNTHROID) 175 mcg tablet Take 175 mcg by mouth Daily (before breakfast).  bimatoprost (LUMIGAN) 0.03 % ophthalmic drops Administer 1 drop to both eyes every evening. Past History Past Medical History: 
Past Medical History:  
Diagnosis Date  Arthritis  Atrial fibrillation ([address] 07[place]2017 [place] [facility] [place] [facility] [place]. chronic  Carcinoma of prostate ([address]  Cellulitis  Coarse tremor   
  hands, states \"my doctor is aware\"  Difficulty swallowing  Dysphagia  Edema  Encounter for colonoscopy due to history of adenomatous colonic polyps  Essential hypertension  Fall 10/07/2017 \"went to [facility] Emergency Room, CT Scan showed concussion, no bleeding\" per patient  Fatigue  GERD (gastroesophageal reflux disease) wo. esophagitis  Glaucoma  H/O joint replacement   
  left knee 2003  HLD (hyperlipidemia)  Hypertension  Hypertensive disorder  Hypothyroidism  Impotence  Laceration of right lower leg with complication  Malignant neoplasm of prostate ([address]   
  [id number] Adenocarcinoma of the Prostate, dx 11/3/2008, karlee 3+4, presenting PSA 5.5ng/mL. 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   
 acute/hist. of   
 
 
Past Surgical History: 
Past Surgical History:  
Procedure Laterality Date  COLONOSCOPY N/A 1/3/2017  HX CHOLECYSTECTOMY  HX KNEE REPLACEMENT Left 01/2003  HX SHOULDER REPLACEMENT Right 7/17, 10/17 Family History: 
History reviewed. No pertinent family history. Social History: 
Social History Tobacco Use  Smoking status: Former Smoker  Smokeless tobacco: Never Used Substance Use Topics  Alcohol use: Yes Comment: weekly  Drug use: No  
 
 
Allergies: Allergies Allergen Reactions  Adhesive Other (comments) Skin irritation  Darvon [Propoxyphene] Hives, Myalgia and Other (comments) Joint pain  Penicillins Rash and Itching PMH, PSH, family history, social history, allergies reviewed with the patient with significant items noted above. Review of Systems History cannot be obtained, as patient in cardiac arrest and nonverbal. 
 
Physical Exam  
 
Vitals:  
 11/10/19 1175 11/10/19 4831 11/10/19 9992 11/10/19 6236 BP:      
Pulse: (!) 45 (!) 40 (!) 39 (!) 31 Resp: 14 12 (!) 0 Temp: (!) 92.3 °F (33.5 °C) (!) 92.4 °F (33.6 °C) (!) 92.4 °F (33.6 °C) (!) 92.4 °F (33.6 °C) SpO2: 100% 100% 100% Gen: Obtunded Airway: Intubated, verified with glide scope Breathing: Bilateral breath sounds. Circulation: End-tidal CO2 49 Disability: Pupils fixed and dilated, Corneal reflex absent Body: No obvious signs of trauma. Diagnostic Study Results Labs - No results found for this or any previous visit (from the past 12 hour(s)). Radiologic Studies -  
XR CHEST SNGL V Final Result IMPRESSION:  
  
 Endotracheal tube and left approach PICC line in position as above. Improved aeration of the of the lung bases, with the left costophrenic angle  
subtotally included within the field-of-view. Yari Ailyn CT Results  (Last 48 hours) None CXR Results  (Last 48 hours) None Medical Decision Making I am the first provider for this patient. MDM:  
Patient presents with cardiac arrest, status post ROSC. 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 See the [facility] for further details. Frequent assessments were made, and gradual up titration of medications was performed. Patient condition on initial evaluation: Critically ill Plan:  
ACLS Orders as below: 
Orders Placed This Encounter  XR CHEST SNGL V  
 CARDIAC PANEL,(CK, CKMB & TROPONIN)  METABOLIC PANEL, BASIC  CBC W/O DIFF  
 HEPATIC FUNCTION PANEL  
 BLOOD GAS, ARTERIAL  
 URINALYSIS W/ RFLX MICROSCOPIC  
 URINE MICROSCOPIC ONLY  
 POC CHEM8  
 POC LACTIC ACID  EKG, 12 LEAD, SUBSEQUENT  
 EMERGENT RELEASE OF UNCROSSMATCHED RED CELLS  
 DISCONTD: EPINEPHrine (ADRENALIN) 4 mg in 5% dextrose 250 mL infusion (PRE-MIX)  DISCONTD: NOREPINephrine (LEVOPHED) 16 mg in dextrose 5% 250 mL infusion  DISCONTD: NOREPINephrine (LEVOPHED) 8 mg in 0.9% NS 250ml infusion  Norepinephrine Bitartrate (LEVOPHED) 8 mg/250 mL (32 mcg/mL) in NS 0.9% 250 ml infusion  DISCONTD: 0.9% sodium chloride infusion 250 mL  EPINEPHrine (ADRENALIN) 0.1 mg/mL syringe 1 mg  EPINEPHrine (ADRENALIN) 0.1 mg/mL syringe 1 mg  DISCONTD: EPINEPHrine HCl (PF) (ADRENALIN) 1 mg/mL (1 mL) injection ED Course: Focus of the ED course was on postarrest resuscitation and cooling. Initiated cooling protocol, but patient was already cooled. Utilized pressors to revisit the patient. Patient has a PICC line in place. Utilized PICC line. Patient remained critically ill through the entirety of the ED course. 6 AMsigned out to Dr. Ian Sullivan. Patient status quo. Patient admitted to the ICU. Critical Care Time:  
Critical Care Time: The services I provided to this patient were to treat and/or prevent clinically significant deterioration that could result in the failure of one or more body systems and/or organ systems due to post cardiac arrest resuscitation, and refractory cardiogenic shock Services included the following: 
-reviewing nursing notes and old charts 
-vital sign assessments 
-direct patient care 
-medication orders and management 
-interpreting and reviewing diagnostic studies/labs 
-re-evaluations 
-documentation time Aggregate critical care time was 60 minutes, which includes only time during which I was engaged in work directly related to the patient's care as described above, whether I was at bedside or elsewhere in the Emergency Department. It did not include time spent performing other reported procedures or the services of residents, students, nurses, or advance practice providers. DMT Disposition: Admit Diagnosis Clinical Impression: 1. Cardiopulmonary arrest [address] Signed, Ian Luis MD 
Emergency Physician 
TAB [facility] As a voice dictation software was utilized to dictate this note, minor word transpositions can occur. I apologize for confusing wording and typographic errors. Please feel free to contact me for clarification.

## 2019-11-10 NOTE — ROUTINE PROCESS
Tracey hayes and md dr. Kierra Nolasco informed by charge nurse moira and family members where informed by nursing supervisor on duty

## 2019-11-10 NOTE — PROGRESS NOTES
Bereavement Note: 
 
 responded to the death of Margie Pimentel, who is a 80 y.o., male, offering Spiritual Care to patient and family, see flow sheets for interventions. Date of Death: 11/10/19 Extended Emergency Contact Information Primary Emergency Contact: Brooklyn Hernández Address: [address] [place] [address] [address] Home Phone: [phone] Mobile Phone: [phone] Relation: Spouse Secondary Emergency Contact: [facility] Home Phone: [phone] Work Phone: [phone] Mobile Phone: [phone] Relation: Daughter YES      NO  UNKNOWN Life Net   []        [x]    [] [place]   [] [x] [] Medical Examiner  []        [x]  [] Going to [place]  [x]        [] [] Autopsy   []        [x]         [] Sympathy Card  [x]        [] Bereavement Materials  [x]        [] Business Card Provided  [x]        []  Home: To be determined. Chaplains will continue to follow family and will provide spiritual care as needed.

## 2019-11-10 NOTE — ROUTINE PROCESS
5270 Patient called 4 times before  Since recieing report from evening shift. Patient given prn albuterol 2.5mg for shortness of breath and tramadol 25mg via peg tube for back pain. Patient reposition to fowlers position hob at 60, as requested.

## 2019-11-10 NOTE — ROUTINE PROCESS
@ 7074 Dr Ronaldo Andre called via answering service to notify that patient was coded and was moved to ER. .Ada.Emiliano Jean returned call and made aware.

## 2019-11-10 NOTE — ROUTINE PROCESS
0410 went in patient room, patient's nasal cannula and gown was off. Noticed that patient wasn't breathing with cold clammy skin, no pulse felt, charge nurse moira informed, compressions started , primary nurse shifted the patient to facemask at 15lpm, anoop ivey called for laure julian.

## 2019-11-10 NOTE — ROUTINE PROCESS
Gave Zofran and PRN breathing treatment per patient request. Repositioned patient. He slept for about an hour. Daughter has left and patient becoming anxious. again. Says he is lonely. Offered tv, books and music. Opted to watch [employer] football for now. Call light within reach.

## 2019-11-10 NOTE — ROUTINE PROCESS
Patient resting in bed, with oxygen via nasal cannula at 4lpm, not in respiratory distress. HOB elevated, with gtube with an ongoing tube feeding of osmolite 1.2 samy @ 70cc/hr with free water flush of 150 q 6 hours infusing without difficulty, left arm single lumen Picc line intact, on ceftriaxone IV abx, with ifc fr.16 intact and draining clear yellow urine, side rails up x3 call bell within reach.

## 2019-11-10 NOTE — PROGRESS NOTES
CODE NOTE Called for code blue in TCC. Patient in PEA arrest s/p 1 epi on arrival.  Continued in PEA entire code. Received approximately 6 epi, 3 bicarb, ca+. CPR for about 25 min total. 
 
ROSC, intubated, sent to ED.

## 2019-11-10 NOTE — ED NOTES
PT brought into ER post code on TCC, Pt has a pulse and is intubated PTA. EDT, ED MD, and ED RN at bedside on arrival to ER, Blood drawn and sent to to lab and pt started on an EPI drip

## 2019-11-10 NOTE — ED NOTES
Note:  Assuming care of patient at beginning of shift 7:07 AM 
I, Jalyn Cummins MD, assumed care of patient at the beginning of my shift. 
 
7:07 AM 
 
Date: 11/10/2019 Patient Name: Raghu Denny History of Presenting Illness Chief Complaint Patient presents with  Cardiac arrest  
 
 
Nursing notes regarding the HPI and triage nursing notes were reviewed. Prior medical records were reviewed. Current Facility-Administered Medications Medication Dose Route Frequency Provider Last Rate Last Dose  EPINEPHrine (ADRENALIN) 4 mg in 5% dextrose 250 mL infusion (PRE-MIX)  1-10 mcg/min IntraVENous TITRATE Oralia Xiong MD   Stopped at 11/10/19 8671  NOREPINephrine (LEVOPHED) 8 mg in 0.9% NS 250ml infusion  2-16 mcg/min IntraVENous TITRATE Belkys Appiah MD   Stopped at 11/10/19 0653  
 0.9% sodium chloride infusion 250 mL  250 mL IntraVENous PRN Belkys Appiah MD      
 
Current Outpatient Medications Medication Sig Dispense Refill  multivitamins-minerals-lutein (CENTRUM SILVER) tab tablet Take 1 Tab by mouth every other day. 30 Tab 0  
 albuterol-ipratropium (DUO-NEB) 2.5 mg-0.5 mg/3 ml nebu 3 mL by Nebulization route every six (6) hours as needed (SOB/WHEEZES). 30 Nebule 0  
 acetaminophen (TYLENOL) 500 mg tablet Take 1 Tab by mouth every six (6) hours as needed for Pain or Fever. 30 Tab 0  
 docusate sodium (COLACE) 100 mg capsule Take 1 Cap by mouth two (2) times daily as needed for Constipation for up to 90 days. Indications: constipation 15 Cap 0  
 folic acid (FOLVITE) 1 mg tablet Take 1 Tab by mouth daily. 30 Tab 0  
 Lactobacillus Acidoph & Bulgar (FLORANEX) 1 million cell tab tablet Take 2 Tabs by mouth two (2) times a day. 30 Tab 0  
 metoprolol tartrate (LOPRESSOR) 25 mg tablet 0.5 Tabs by Per NG tube route two (2) times a day. 30 Tab 0  
 omeprazole (PRILOSEC OTC) 20 mg tablet Take 1 Tab by mouth daily.  thiamine mononitrate (B-1) 100 mg tablet Take 1 Tab by mouth daily. 30 Tab 0  
 febuxostat (ULORIC) 80 mg tab tablet Take 80 mg by mouth daily.  desonide (TRIDESILON) 0.05 % cream Apply 1 Each to affected area two (2) times a day. TO FACE    
 tamsulosin (FLOMAX) 0.4 mg capsule Take 1 Cap by mouth daily (after dinner). 90 Cap 3  
 apixaban (ELIQUIS) 5 mg tablet Take 1 Tab by mouth two (2) times a day. 60 Tab 0  
 albuterol (PROAIR HFA) 90 mcg/actuation inhaler Take  inhaled by mouth.  ALPHAGAN P 0.15 % ophthalmic solution Administer 1 Drop to both eyes three (3) times daily.  cyanocobalamin 1,000 mcg tablet Take 1,000 mcg by mouth daily.  furosemide (LASIX) 40 mg tablet Take 40 mg by mouth daily. 2 tab daily  levothyroxine (SYNTHROID) 175 mcg tablet Take 175 mcg by mouth Daily (before breakfast).  bimatoprost (LUMIGAN) 0.03 % ophthalmic drops Administer 1 drop to both eyes every evening. Past History Past Medical History: 
Past Medical History:  
Diagnosis Date  Arthritis  Atrial fibrillation ([facility] [address] 07/2017 Dr Patricia Carroll cardio follows. chronic  Carcinoma of prostate ([facility] [address]  Cellulitis  Coarse tremor   
  hands, states \"my doctor is aware\"  Difficulty swallowing  Dysphagia  Edema  Encounter for colonoscopy due to history of adenomatous colonic polyps  Essential hypertension  Fall 10/07/2017 \"went to [facility] Emergency Room, CT Scan showed concussion, no bleeding\" per patient  Fatigue  GERD (gastroesophageal reflux disease) wo. esophagitis  Glaucoma  H/O joint replacement   
  left knee 2003  HLD (hyperlipidemia)  Hypertension  Hypertensive disorder  Hypothyroidism  Impotence  Laceration of right lower leg with complication  Malignant neoplasm of prostate ([facility] [address]   
  [id number] Adenocarcinoma of the Prostate, dx 11/3/2008, karlee 3+4, presenting PSA 5.5ng/mL. 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   
 acute/hist. of   
 
 
Past Surgical History: 
Past Surgical History:  
Procedure Laterality Date  COLONOSCOPY N/A 1/3/2017  HX CHOLECYSTECTOMY  HX KNEE REPLACEMENT Left 01/2003  HX SHOULDER REPLACEMENT Right 7/17, 10/17 Family History: 
History reviewed. No pertinent family history. Social History: 
Social History Tobacco Use  Smoking status: Former Smoker  Smokeless tobacco: Never Used Substance Use Topics  Alcohol use: Yes Comment: weekly  Drug use: No  
 
 
Allergies: Allergies Allergen Reactions  Adhesive Other (comments) Skin irritation  Darvon [Propoxyphene] Hives, Myalgia and Other (comments) Joint pain  Penicillins Rash and Itching Patient's primary care provider (as noted in EPIC):  Isabella Park MD 
 
Abnormal lab results from this emergency department encounter: 
Labs Reviewed CARDIAC PANEL,(CK, CKMB & TROPONIN) - Abnormal; Notable for the following components:  
    Result Value CK - MB 5.2 (*) All other components within normal limits METABOLIC PANEL, BASIC - Abnormal; Notable for the following components:  
 Sodium 134 (*) Chloride 91 (*) Glucose 185 (*) BUN 38 (*)   
 BUN/Creatinine ratio 34 (*) All other components within normal limits CBC W/O DIFF - Abnormal; Notable for the following components: WBC 15.8 (*)   
 RBC 1.91 (*) HGB 5.8 (*) HCT 20.2 (*) .8 (*) MCHC 28.7 (*)   
 RDW 17.7 (*) MPV 8.9 (*) All other components within normal limits HEPATIC FUNCTION PANEL - Abnormal; Notable for the following components:  
 Protein, total 5.7 (*) Albumin 1.8 (*) A-G Ratio 0.5 (*) Alk. phosphatase 197 (*) AST (SGOT) 1,306 (*) ALT (SGPT) 682 (*) All other components within normal limits URINALYSIS W/ RFLX MICROSCOPIC - Abnormal; Notable for the following components:  
 Protein TRACE (*) Blood TRACE (*) Leukocyte Esterase LARGE (*) All other components within normal limits URINE MICROSCOPIC ONLY - Abnormal; Notable for the following components:  
 Bacteria FEW (*) Mucus FEW (*) Amorphous Crystals 2+ (*) All other components within normal limits POC CHEM8 - Abnormal; Notable for the following components:  
 CO2, POC 28 (*) Glucose,  (*)   
 BUN, POC 38 (*) Sodium,  (*) Chloride, POC 88 (*) Hematocrit, POC 20 (*) Hemoglobin, POC 6.8 (*) All other components within normal limits POC LACTIC ACID - Abnormal; Notable for the following components:  
 Lactic Acid (POC) 15.35 (*) All other components within normal limits CULTURE, BLOOD CULTURE, BLOOD  
BLOOD GAS, ARTERIAL  
EMERGENT RELEASE OF UNCROSSMATCHED RED CELLS Lab values for this patient within approximately the last 12 hours: 
Recent Results (from the past 12 hour(s)) GLUCOSE, POC Collection Time: 11/10/19  4:29 AM  
Result Value Ref Range Glucose (POC) 168 (H) 70 - 110 mg/dL CARDIAC PANEL,(CK, CKMB & TROPONIN) Collection Time: 11/10/19  4:59 AM  
Result Value Ref Range  39 - 308 U/L  
 CK - MB 5.2 (H) <3.6 ng/ml CK-MB Index 2.2 0.0 - 4.0 % Troponin-I, QT 0.03 0.0 - 3.127 NG/ML  
METABOLIC PANEL, BASIC Collection Time: 11/10/19  4:59 AM  
Result Value Ref Range Sodium 134 (L) 136 - 145 mmol/L Potassium 4.8 3.5 - 5.5 mmol/L Chloride 91 (L) 100 - 111 mmol/L  
 CO2 25 21 - 32 mmol/L Anion gap 18 3.0 - 18 mmol/L Glucose 185 (H) 74 - 99 mg/dL BUN 38 (H) 7.0 - 18 MG/DL Creatinine 1.13 0.6 - 1.3 MG/DL  
 BUN/Creatinine ratio 34 (H) 12 - 20 GFR est AA >60 >60 ml/min/1.73m2 GFR est non-AA >60 >60 ml/min/1.73m2 Calcium 9.5 8.5 - 10.1 MG/DL  
CBC W/O DIFF Collection Time: 11/10/19  4:59 AM  
Result Value Ref Range WBC 15.8 (H) 4.6 - 13.2 K/uL  
 RBC 1.91 (L) 4.70 - 5.50 M/uL HGB 5.8 (LL) 13.0 - 16.0 g/dL HCT 20.2 (L) 36.0 - 48.0 % .8 (H) 74.0 - 97.0 FL  
 MCH 30.4 24.0 - 34.0 PG  
 MCHC 28.7 (L) 31.0 - 37.0 g/dL  
 RDW 17.7 (H) 11.6 - 14.5 % PLATELET 118 034 - 593 K/uL MPV 8.9 (L) 9.2 - 11.8 FL  
HEPATIC FUNCTION PANEL Collection Time: 11/10/19  4:59 AM  
Result Value Ref Range Protein, total 5.7 (L) 6.4 - 8.2 g/dL Albumin 1.8 (L) 3.4 - 5.0 g/dL Globulin 3.9 2.0 - 4.0 g/dL A-G Ratio 0.5 (L) 0.8 - 1.7 Bilirubin, total 0.3 0.2 - 1.0 MG/DL Bilirubin, direct 0.2 0.0 - 0.2 MG/DL Alk. phosphatase 197 (H) 45 - 117 U/L  
 AST (SGOT) 1,306 (H) 10 - 38 U/L  
 ALT (SGPT) 682 (H) 16 - 61 U/L  
POC CHEM8 Collection Time: 11/10/19  5:05 AM  
Result Value Ref Range CO2, POC 28 (H) 19 - 24 MMOL/L Glucose,  (H) 74 - 106 MG/DL  
 BUN, POC 38 (H) 7 - 18 MG/DL Creatinine, POC 0.9 0.6 - 1.3 MG/DL  
 GFRAA, POC >60 >60 ml/min/1.73m2 GFRNA, POC >60 >60 ml/min/1.73m2 Sodium,  (L) 136 - 145 MMOL/L Potassium, POC 4.7 3.5 - 5.5 MMOL/L Calcium, ionized (POC) 1.17 1.12 - 1.32 mmol/L Chloride, POC 88 (L) 100 - 108 MMOL/L Anion gap, POC 20 10 - 20 Hematocrit, POC 20 (L) 36 - 49 % Hemoglobin, POC 6.8 (L) 12 - 16 G/DL POC LACTIC ACID Collection Time: 11/10/19  5:06 AM  
Result Value Ref Range Lactic Acid (POC) 15.35 (HH) 0.40 - 2.00 mmol/L  
URINALYSIS W/ RFLX MICROSCOPIC Collection Time: 11/10/19  5:15 AM  
Result Value Ref Range Color YELLOW Appearance CLOUDY Specific gravity 1.018 1.005 - 1.030    
 pH (UA) 5.0 5.0 - 8.0 Protein TRACE (A) NEG mg/dL Glucose NEGATIVE  NEG mg/dL Ketone NEGATIVE  NEG mg/dL Bilirubin NEGATIVE  NEG Blood TRACE (A) NEG Urobilinogen 0.2 0.2 - 1.0 EU/dL Nitrites NEGATIVE  NEG Leukocyte Esterase LARGE (A) NEG URINE MICROSCOPIC ONLY Collection Time: 11/10/19  5:15 AM  
Result Value Ref Range WBC 21 to 35 0 - 4 /hpf  
 RBC 0 to 3 0 - 5 /hpf Epithelial cells FEW 0 - 5 /lpf Bacteria FEW (A) NEG /hpf Mucus FEW (A) NEG /lpf Amorphous Crystals 2+ (A) NEG  
EMERGENT RELEASE OF UNCROSSMATCHED RED CELLS Collection Time: 11/10/19  6:00 AM  
Result Value Ref Range Crossmatch Expiration 11/13/2019 ABO/Rh(D) PENDING Antibody screen PENDING Unit number [id number] Blood component type RC LR Unit division 00 Status of unit ISSUED Crossmatch result Not required Radiologist and cardiologist interpretations if available at time of this note: 
Radiology results: No results found. Medication(s) ordered for patient during this emergency visit encounter: 
Medications EPINEPHrine (ADRENALIN) 4 mg in 5% dextrose 250 mL infusion (PRE-MIX) (0 mcg/min IntraVENous IV Completed 11/10/19 0653) NOREPINephrine (LEVOPHED) 8 mg in 0.9% NS 250ml infusion (0 mcg/min IntraVENous IV Completed 11/10/19 0653)  
0.9% sodium chloride infusion 250 mL (has no administration in time range) EPINEPHrine (ADRENALIN) 0.1 mg/mL syringe 1 mg (0.5 mg IntraVENous Given 11/10/19 0615) EPINEPHrine (ADRENALIN) 0.1 mg/mL syringe 1 mg (1 mg IntraVENous Given 11/10/19 0515) Pt care assumed from Dr. Inocencio Arrington, ED provider. Pt complaint(s), current treatment plan, progression and available diagnostic results have been discussed thoroughly. Rounding occurred: no Intended Disposition: Admit. Pending diagnostic reports and/or labs (please list): Monitoring of the patient in critical state until transfer to the ICU. 7:08 AM 
 ED course after the attending for the patient Dr. Heidi Rose left, I assumed care of the patient. I spoke to the family with the  from [facility] at the bedside. The family has decided that they would like the patient to be DO NOT RESUSCITATE which earlier was just for cardia pulmonary resuscitation. They now want it to include no type of IV pressors and no ventilator support. This was the decision of the wife who is legal next of kin. Given that decision I had the family leave the room and the  accompanied him. The patient was removed from pressors as well as the ventilator. At 0653 hrs. the patient  was declared . Family is made aware of it with the  at the bedside. Patient is not a candidate for either organ donation nor for a  inquiry. Dictation disclaimer:  Please note that this dictation was completed with [employer], the computer voice recognition software. Quite often unanticipated grammatical, syntax, homophones, and other interpretive errors are inadvertently transcribed by the computer software. Please disregard these errors. Please excuse any errors that have escaped final proofreading. Coding Diagnoses Clinical Impression: 1. Cardiopulmonary arrest ([address] Disposition Disposition: . Sosa Sands M.D. DORA Board Certified Emergency Physician

## 2019-11-10 NOTE — ROUTINE PROCESS
1050 [facility] ed,rn, harris reynolds ed tech, carisa cornejo icu rrt, dwaine jason nurse supervisor, KEE[employer] RT, and dr. Shalom Levin came in to initiate code. Patient was hooked to cardiac monitor and bag valve mask at 15lpm.  
 1st epi iv given via left arm picc line was given c/o carisa cornejo rrt, compressions continued. 9728 2nd epi iv given  Via picc line c/o carisa cornejo icu rrt, compressioins continued. 5486 patient intubated, e.t size 71/2 lip in level 24, . 
 
0420 bicarb 50meq iv given c/o carisa cornejo icu rrt as ordered by md dr. Shalom Levin. 0421 3rd epi iv given. 0422 pulse check, no pulse. Compressions continued 0424 4th epi iv given, bicarb 50meqs iv given 0425 calcium chloride 1gm iv given 0427 pulse check, no pulse. 0428 5th epi iv given, compressions continued. 0429 rbs checked : 168mg/dl, relayed to dr. Pau Lee. 0430 6th epi iv given,  
 
0432 pulse checked, no pulse compressions continued. 0435 faint pulse was felt via jugular vein right  By dr. Pau Lee. 0436 7th epi iv given. 0438 iv bicarb 50meqs given. 2941 8th epi iv given before transferring patient to ER per dr. Mono fragoso order.   
 
0358 patient transferred to er via stretcher with carisa cornejo icu rrt, and sherie packer rt.  
 
0600 report given to [facility] er rn

## 2019-11-11 ENCOUNTER — HOME CARE VISIT (OUTPATIENT)
Dept: HOME HEALTH SERVICES | Facility: HOME HEALTH | Age: 84
End: 2019-11-11
Payer: MEDICARE

## 2019-11-11 LAB
ABO + RH BLD: NORMAL
BLD PROD TYP BPU: NORMAL
BLOOD GROUP ANTIBODIES SERPL: NORMAL
BPU ID: NORMAL
CALCULATED R AXIS, ECG10: -59 DEGREES
CALCULATED T AXIS, ECG11: -54 DEGREES
CROSSMATCH RESULT,%XM: NORMAL
DIAGNOSIS, 93000: NORMAL
Q-T INTERVAL, ECG07: 328 MS
QRS DURATION, ECG06: 106 MS
QTC CALCULATION (BEZET), ECG08: 431 MS
SPECIMEN EXP DATE BLD: NORMAL
STATUS OF UNIT,%ST: NORMAL
UNIT DIVISION, %UDIV: 0
VENTRICULAR RATE, ECG03: 104 BPM

## 2019-11-26 NOTE — DISCHARGE SUMMARY
.Mr Shelby Tran is a 80 yr old patient was recently hospitalized for eval of AMS, fever, lathergy and hypotension. alos report of falls, hx of multiple ER visit last mth for syncopy and dehydration. Patient did have left knee pain post fall. Patient was found to have right leg cellulitis after cat bite 1 mth ago. In the ER be was placed on broad spectrum abx. Patient was seen by Ortho for eval of chronic BL knee pain, they report low clinical suspicion of left knee septic arthritis.  Patient had XRAY to left knee done which showed small-moderate joint effusion, patient had aspiration done which  results was not consistent with an acute septic arthritis. Patient was followed by ID, patient had blood culture done on 10/8 , which show 1 of 2 blood cultures + P multocida. Patient was seen by Gi for eval of mild elevation of liver enzymes, we are asked to see the patient regarding further evaluation of possible cholangitis. They reported no plans for endoscopic intervention as this time, they think the likelihood of cholangitis is extremely small. Patient was found to Oropharyngeal dysphagia in the setting of severe illness and encephalopathy.  Patient had MBS done and he failed  on  10/24 and is now s/p peg on 10/25.  Patient had MACKENZIE which improved with IV fluids. Patient had Gallium scan done on 10/14, ID recommended Ceftriaxone 2g q 24hr or 6 week for presumable left femur OM . Patient had some acute respiratory fx, CXR showed right base new opacity , patient was started on vanc and merrem on 10/21 and was nano IV lasix with good response. Patient was seen by PT/OT and they recommended rehab. On examination, patient is alert, need a lot encourage with answering questions. He report soreness to left knee and right shoulder. Daughter and wife at bedside, all questions were answered. Peg site is stable. No NVD, fever or chills.  Nursing report urinary retention, he was straight cath with 500 cc output.  Labs reviewed noted hemoglobin is 7.3 was 7.2. Report of hematuria, discussed with family possible trauma, no hematuria noted, his urine cx showed NG. Patient was followed by ID with recommendation, no s/s from abx. Patient with generalized edema, but seen to be improving. 11/10 Report that patient was found with no pulse, CPR and Code blue was ultimately started, patient was transferred to ER for further eval. 
 
  
PAST MEDICAL HX : Arthritis. AFIB, GERD, HTN, right rotator cuff tear, Malignant, Tachybradycardia syndrome,Glaucoma, adenomatous colon cancer,gastroesophageal reflux without Ang's esophagus , alcohol-related pancreatitis four years  
  
PAST SURGICAL: Cholecystectomy, Right shoulder arthroplasty Left total knee replacement, Hydrocele repair 
  
SOCIAL:  , former smoker quit in 1972,  Alcohol ingestion, four to five beers on a daily basis for many years. 
  
ALLERGIES: Adhesive, Darvon, Penicillin 
  
FAMILY HX: Diabetes father and brother 
  
   
Recent Results Recent Results (from the past 24 hour(s)) CBC WITH AUTOMATED DIFF  
  Collection Time: 11/04/19  4:25 AM  
Result Value Ref Range  
  WBC 10.3 4.6 - 13.2 K/uL  
  RBC 2.45 (L) 4.70 - 5.50 M/uL  
  HGB 7.3 (L) 13.0 - 16.0 g/dL  
  HCT 24.4 (L) 36.0 - 48.0 %  
  MCV 99.6 (H) 74.0 - 97.0 FL  
  MCH 29.8 24.0 - 34.0 PG  
  MCHC 29.9 (L) 31.0 - 37.0 g/dL  
  RDW 16.4 (H) 11.6 - 14.5 %  
  PLATELET 736 280 - 994 K/uL  
  MPV 8.6 (L) 9.2 - 11.8 FL  
  NEUTROPHILS 65 40 - 73 %  
  LYMPHOCYTES 22 21 - 52 %  
  MONOCYTES 8 3 - 10 %  
  EOSINOPHILS 4 0 - 5 %  
  BASOPHILS 1 0 - 2 %  
  ABS. NEUTROPHILS 6.7 1.8 - 8.0 K/UL  
  ABS. LYMPHOCYTES 2.3 0.9 - 3.6 K/UL  
  ABS. MONOCYTES 0.8 0.05 - 1.2 K/UL  
  ABS. EOSINOPHILS 0.5 (H) 0.0 - 0.4 K/UL  
  ABS. BASOPHILS 0.1 0.0 - 0.1 K/UL  
  DF AUTOMATED METABOLIC PANEL, BASIC  
  Collection Time: 11/04/19  4:25 AM  
Result Value Ref Range  
  Sodium 137 136 - 145 mmol/L  
   Potassium 4.5 3.5 - 5.5 mmol/L  
  Chloride 96 (L) 100 - 111 mmol/L  
  CO2 37 (H) 21 - 32 mmol/L  
  Anion gap 4 3.0 - 18 mmol/L  
  Glucose 106 (H) 74 - 99 mg/dL  
  BUN 27 (H) 7.0 - 18 MG/DL  
  Creatinine 0.51 (L) 0.6 - 1.3 MG/DL  
  BUN/Creatinine ratio 53 (H) 12 - 20    
  GFR est AA >60 >60 ml/min/1.73m2  
  GFR est non-AA >60 >60 ml/min/1.73m2  
  Calcium 8.0 (L) 8.5 - 10.1 MG/DL  
SED RATE (ESR)  
  Collection Time: 11/04/19  4:25 AM  
Result Value Ref Range  
  Sed rate, automated >140 (H) 0 - 20 mm/hr CRP, HIGH SENSITIVITY  
  Collection Time: 11/04/19  4:25 AM  
Result Value Ref Range  
  CRP, High sensitivity >9.5 mg/L  
  
  
  
  
Current Facility-Administered Medications:  
  hydrocortisone (HYTONE) 2.5 % cream, , Topical, BID, Angelita Ramsey, NP 
  [facility] ANESTHESIA, , Other, PRN, Bismark Arce MD 
  [facility] EMERGENCY/STAT BOX, , Other, PRN, Bismark Arce MD 
  diclofenac (VOLTAREN) 1 % topical gel 2 g, 2 g, Topical, Q6H, Juan Clifton MD, 2 g at 11/04/19 1229   cefTRIAXone (ROCEPHIN) 2 g in 0.9% sodium chloride (MBP/ADV) 50 mL MBP, 2 g, IntraVENous, Q24H, Miranda Barentt NP, Last Rate: 100 mL/hr at 11/04/19 1206, 2 g at 11/04/19 1206   multivitamin (MULTI-DELYN, WELLESSE) oral liquid 30 mL, 30 mL, Oral, DAILY, Juan Clifton MD, 30 mL at 11/04/19 4129   lactobacillus-acidophilus (LACTINEX) 1 Packet, 1 Packet, Oral, BID, Bismark Arce MD, 1 Packet at 11/04/19 0809 
  lansoprazole (PREVACID SOLUTAB) disintegrating tablet 30 mg, 30 mg, Oral, ACB, Bismark Arce MD, 30 mg at 11/04/19 0714 
  albuterol-ipratropium (DUO-NEB) 2.5 MG-0.5 MG/3 ML, 3 mL, Nebulization, Q6H PRN, Bismark Arce MD, 3 mL at 11/04/19 9845   acetaminophen (TYLENOL) tablet 500 mg, 500 mg, Per G Tube, Q6H PRN, Bismark Arce MD, 500 mg at 11/04/19 8530   docusate sodium (COLACE) capsule 100 mg, 100 mg, Per G Tube, BID PRN, Bismark Arce MD 
   folic acid (FOLVITE) tablet 1 mg, 1 mg, Per Raphael Rojas, DAILY, Juan Clifton MD, 1 mg at 11/04/19 7371   metoprolol tartrate (LOPRESSOR) tablet 12.5 mg, 12.5 mg, Per Raphael Rojas, Q12H, Michelle Marques MD, 12.5 mg at 11/04/19 2394   thiamine mononitrate (B-1) tablet 100 mg, 100 mg, Per Raphael Rojas, DAILY, Juan Clifton MD, 100 mg at 11/04/19 8091   febuxostat (ULORIC) tablet 80 mg, 80 mg, Oral, DAILY, Juan Clifton MD, 80 mg at 11/04/19 1219   tamsulosin (FLOMAX) capsule 0.4 mg, 0.4 mg, Oral, DAILY WITH DINNER, Michelle Marques MD, Stopped at 11/03/19 1700 
  apixaban (ELIQUIS) tablet 5 mg, 5 mg, Oral, Q12H, Michelle Marques MD, 5 mg at 11/04/19 6255   brimonidine (ALPHAGAN) 0.2 % ophthalmic solution 1 Drop, 1 Drop, Both Eyes, Q8H, Juan Clifton MD, 1 Drop at 11/04/19 1303   cyanocobalamin tablet 1,000 mcg, 1,000 mcg, Oral, DAILY, Juan Clifton MD, 1,000 mcg at 11/04/19 0194   furosemide (LASIX) tablet 80 mg, 80 mg, Oral, DAILY, Juan Clifton MD, 80 mg at 11/04/19 6736   levothyroxine (SYNTHROID) tablet 175 mcg, 175 mcg, Oral, 6am, Michelle Marques MD, 175 mcg at 11/04/19 0293   latanoprost (XALATAN) 0.005 % ophthalmic solution 1 Drop, 1 Drop, Both Eyes, QPM, Juan Clifton MD, 1 Drop at 11/03/19 1750 
  
     
Lab Results Component Value Date/Time  
  Glucose 106 (H) 11/04/2019 04:25 AM  
  Glucose 100 (H) 10/28/2019 04:10 AM  
  Glucose 119 (H) 10/27/2019 04:00 AM  
  Glucose 97 10/26/2019 03:35 AM  
  Glucose 72 (L) 10/25/2019 05:41 AM  
  
  
Assessment/Plan PEA arrest: code blue, CPR was initiated.  He was sent to ER for further eval.

## 2024-01-09 NOTE — PROGRESS NOTES
Bedside shift change report given to this RN (oncoming nurse) by Gerardo Olivo RN (offgoing nurse). Report included the following information SBAR and Kardex. 

## (undated) DEVICE — SYR 50ML SLIP TIP NSAF LF STRL --

## (undated) DEVICE — GLOVE SURG SZ 75 L114IN FNGR THK98MIL CUF THK75MIL CRM

## (undated) DEVICE — BITE BLK ENDOSCP AD 54FR GRN POLYETH ENDOSCP W STRP SLD

## (undated) DEVICE — STRAP POS RESTRAINT 1.5IN WIDE -- VELCRO ALISTRAP

## (undated) DEVICE — KIT COLON W/ 1.1OZ LUB AND 2 END

## (undated) DEVICE — Device: Brand: DEFENDO VALVE AND CONNECTOR KIT

## (undated) DEVICE — SET ADMIN 16ML TBNG L100IN 2 Y INJ SITE IV PIGGY BK DISP

## (undated) DEVICE — FLEX ADVANTAGE 1500CC: Brand: FLEX ADVANTAGE

## (undated) DEVICE — TUBING, SUCTION, 3/16" X 6', STRAIGHT: Brand: MEDLINE

## (undated) DEVICE — BINDER ABD H12IN COT FOR 45-62IN WAIST UNIV PREM 4 PNL DSGN

## (undated) DEVICE — BASIN EMESIS 500CC ROSE 250/CS 60/PLT: Brand: MEDEGEN MEDICAL PRODUCTS, LLC

## (undated) DEVICE — KIT GASTMY PERC PEG PULL 20FR -- ENDOVIVE BX/2